# Patient Record
Sex: FEMALE | Race: WHITE | NOT HISPANIC OR LATINO | Employment: OTHER | URBAN - METROPOLITAN AREA
[De-identification: names, ages, dates, MRNs, and addresses within clinical notes are randomized per-mention and may not be internally consistent; named-entity substitution may affect disease eponyms.]

---

## 2017-01-04 DIAGNOSIS — C50.211 MALIGNANT NEOPLASM OF UPPER-INNER QUADRANT OF RIGHT FEMALE BREAST (HCC): ICD-10-CM

## 2017-01-17 ENCOUNTER — HOSPITAL ENCOUNTER (OUTPATIENT)
Dept: LAB | Facility: MEDICAL CENTER | Age: 67
End: 2017-01-17
Attending: INTERNAL MEDICINE
Payer: MEDICARE

## 2017-01-17 DIAGNOSIS — E78.5 HYPERLIPIDEMIA, UNSPECIFIED HYPERLIPIDEMIA TYPE: ICD-10-CM

## 2017-01-17 DIAGNOSIS — C50.211 MALIGNANT NEOPLASM OF UPPER-INNER QUADRANT OF RIGHT FEMALE BREAST (HCC): ICD-10-CM

## 2017-01-17 LAB
ALBUMIN SERPL BCP-MCNC: 4.2 G/DL (ref 3.2–4.9)
ALBUMIN/GLOB SERPL: 1.2 G/DL
ALP SERPL-CCNC: 49 U/L (ref 30–99)
ALT SERPL-CCNC: 17 U/L (ref 2–50)
ANION GAP SERPL CALC-SCNC: 7 MMOL/L (ref 0–11.9)
AST SERPL-CCNC: 29 U/L (ref 12–45)
BASOPHILS # BLD AUTO: 0.04 K/UL (ref 0–0.12)
BASOPHILS NFR BLD AUTO: 0.8 % (ref 0–1.8)
BILIRUB SERPL-MCNC: 0.4 MG/DL (ref 0.1–1.5)
BUN SERPL-MCNC: 25 MG/DL (ref 8–22)
CALCIUM SERPL-MCNC: 10.1 MG/DL (ref 8.5–10.5)
CHLORIDE SERPL-SCNC: 106 MMOL/L (ref 96–112)
CHOLEST SERPL-MCNC: 232 MG/DL (ref 100–199)
CO2 SERPL-SCNC: 27 MMOL/L (ref 20–33)
CREAT SERPL-MCNC: 1.05 MG/DL (ref 0.5–1.4)
EOSINOPHIL # BLD: 0.2 K/UL (ref 0–0.51)
EOSINOPHIL NFR BLD AUTO: 4.2 % (ref 0–6.9)
ERYTHROCYTE [DISTWIDTH] IN BLOOD BY AUTOMATED COUNT: 43.9 FL (ref 35.9–50)
GLOBULIN SER CALC-MCNC: 3.6 G/DL (ref 1.9–3.5)
GLUCOSE SERPL-MCNC: 93 MG/DL (ref 65–99)
HCT VFR BLD AUTO: 37.7 % (ref 37–47)
HDLC SERPL-MCNC: 67 MG/DL
HGB BLD-MCNC: 12.5 G/DL (ref 12–16)
IMM GRANULOCYTES # BLD AUTO: 0.01 K/UL (ref 0–0.11)
IMM GRANULOCYTES NFR BLD AUTO: 0.2 % (ref 0–0.9)
LDLC SERPL CALC-MCNC: 140 MG/DL
LYMPHOCYTES # BLD: 1.71 K/UL (ref 1–4.8)
LYMPHOCYTES NFR BLD AUTO: 35.7 % (ref 22–41)
MCH RBC QN AUTO: 31.2 PG (ref 27–33)
MCHC RBC AUTO-ENTMCNC: 33.2 G/DL (ref 33.6–35)
MCV RBC AUTO: 94 FL (ref 81.4–97.8)
MONOCYTES # BLD: 0.29 K/UL (ref 0–0.85)
MONOCYTES NFR BLD AUTO: 6.1 % (ref 0–13.4)
NEUTROPHILS # BLD: 2.54 K/UL (ref 2–7.15)
NEUTROPHILS NFR BLD AUTO: 53 % (ref 44–72)
NRBC # BLD AUTO: 0 K/UL
NRBC BLD-RTO: 0 /100 WBC
PLATELET # BLD AUTO: 223 K/UL (ref 164–446)
PMV BLD AUTO: 10.4 FL (ref 9–12.9)
POTASSIUM SERPL-SCNC: 4 MMOL/L (ref 3.6–5.5)
PROT SERPL-MCNC: 7.8 G/DL (ref 6–8.2)
RBC # BLD AUTO: 4.01 M/UL (ref 4.2–5.4)
SODIUM SERPL-SCNC: 140 MMOL/L (ref 135–145)
TRIGL SERPL-MCNC: 127 MG/DL (ref 0–149)
WBC # BLD AUTO: 4.8 K/UL (ref 4.8–10.8)

## 2017-01-17 PROCEDURE — 80053 COMPREHEN METABOLIC PANEL: CPT

## 2017-01-17 PROCEDURE — 36415 COLL VENOUS BLD VENIPUNCTURE: CPT

## 2017-01-17 PROCEDURE — 85025 COMPLETE CBC W/AUTO DIFF WBC: CPT

## 2017-01-17 PROCEDURE — 80061 LIPID PANEL: CPT | Mod: GA

## 2017-01-19 ENCOUNTER — OFFICE VISIT (OUTPATIENT)
Dept: HEMATOLOGY ONCOLOGY | Facility: MEDICAL CENTER | Age: 67
End: 2017-01-19
Payer: MEDICARE

## 2017-01-19 VITALS
HEIGHT: 65 IN | BODY MASS INDEX: 24.66 KG/M2 | OXYGEN SATURATION: 95 % | RESPIRATION RATE: 16 BRPM | DIASTOLIC BLOOD PRESSURE: 70 MMHG | SYSTOLIC BLOOD PRESSURE: 138 MMHG | HEART RATE: 78 BPM | TEMPERATURE: 97.7 F | WEIGHT: 148 LBS

## 2017-01-19 DIAGNOSIS — E04.1 THYROID NODULE: ICD-10-CM

## 2017-01-19 DIAGNOSIS — R93.89 ABNORMAL FINDINGS ON IMAGING TEST: ICD-10-CM

## 2017-01-19 PROCEDURE — 99214 OFFICE O/P EST MOD 30 MIN: CPT | Performed by: INTERNAL MEDICINE
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
## 2017-01-25 ENCOUNTER — OFFICE VISIT (OUTPATIENT)
Dept: MEDICAL GROUP | Facility: PHYSICIAN GROUP | Age: 67
End: 2017-01-25
Payer: MEDICARE

## 2017-01-25 ENCOUNTER — HOSPITAL ENCOUNTER (OUTPATIENT)
Dept: LAB | Facility: MEDICAL CENTER | Age: 67
End: 2017-01-25
Attending: INTERNAL MEDICINE
Payer: MEDICARE

## 2017-01-25 VITALS
HEART RATE: 78 BPM | TEMPERATURE: 98.1 F | DIASTOLIC BLOOD PRESSURE: 78 MMHG | OXYGEN SATURATION: 98 % | RESPIRATION RATE: 16 BRPM | WEIGHT: 147 LBS | HEIGHT: 65 IN | SYSTOLIC BLOOD PRESSURE: 170 MMHG | BODY MASS INDEX: 24.49 KG/M2

## 2017-01-25 DIAGNOSIS — E04.1 THYROID NODULE: ICD-10-CM

## 2017-01-25 DIAGNOSIS — G62.9 NEUROPATHY: ICD-10-CM

## 2017-01-25 DIAGNOSIS — S32.020S COMPRESSION FRACTURE OF L2, SEQUELA: ICD-10-CM

## 2017-01-25 LAB
MAGNESIUM SERPL-MCNC: 2.3 MG/DL (ref 1.5–2.5)
T3FREE SERPL-MCNC: 3.95 PG/ML (ref 2.4–4.2)
T4 FREE SERPL-MCNC: 0.9 NG/DL (ref 0.53–1.43)
TSH SERPL DL<=0.005 MIU/L-ACNC: 1.34 UIU/ML (ref 0.3–3.7)

## 2017-01-25 PROCEDURE — 99214 OFFICE O/P EST MOD 30 MIN: CPT | Performed by: NURSE PRACTITIONER

## 2017-01-25 PROCEDURE — 84443 ASSAY THYROID STIM HORMONE: CPT

## 2017-01-25 PROCEDURE — G8484 FLU IMMUNIZE NO ADMIN: HCPCS | Performed by: NURSE PRACTITIONER

## 2017-01-25 PROCEDURE — G8432 DEP SCR NOT DOC, RNG: HCPCS | Performed by: NURSE PRACTITIONER

## 2017-01-25 PROCEDURE — 3014F SCREEN MAMMO DOC REV: CPT | Performed by: NURSE PRACTITIONER

## 2017-01-25 PROCEDURE — 84481 FREE ASSAY (FT-3): CPT

## 2017-01-25 PROCEDURE — 1036F TOBACCO NON-USER: CPT | Performed by: NURSE PRACTITIONER

## 2017-01-25 PROCEDURE — 84439 ASSAY OF FREE THYROXINE: CPT

## 2017-01-25 PROCEDURE — 4040F PNEUMOC VAC/ADMIN/RCVD: CPT | Performed by: NURSE PRACTITIONER

## 2017-01-25 PROCEDURE — 83735 ASSAY OF MAGNESIUM: CPT

## 2017-01-25 PROCEDURE — G8420 CALC BMI NORM PARAMETERS: HCPCS | Performed by: NURSE PRACTITIONER

## 2017-01-25 PROCEDURE — 36415 COLL VENOUS BLD VENIPUNCTURE: CPT

## 2017-01-25 PROCEDURE — 1101F PT FALLS ASSESS-DOCD LE1/YR: CPT | Performed by: NURSE PRACTITIONER

## 2017-01-25 PROCEDURE — 3017F COLORECTAL CA SCREEN DOC REV: CPT | Performed by: NURSE PRACTITIONER

## 2017-01-25 RX ORDER — GABAPENTIN 100 MG/1
100 CAPSULE ORAL
Qty: 30 CAP | Refills: 2 | Status: SHIPPED | OUTPATIENT
Start: 2017-01-25 | End: 2017-08-30
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
## 2017-01-26 NOTE — PATIENT INSTRUCTIONS
Gabapentin capsules or tablets  What is this medicine?  GABAPENTIN (GA ba pen tin) is used to control partial seizures in adults with epilepsy. It is also used to treat certain types of nerve pain.  This medicine may be used for other purposes; ask your health care provider or pharmacist if you have questions.  COMMON BRAND NAME(S): Gabarone , Neurontin  What should I tell my health care provider before I take this medicine?  They need to know if you have any of these conditions:  -kidney disease  -suicidal thoughts, plans, or attempt; a previous suicide attempt by you or a family member  -an unusual or allergic reaction to gabapentin, other medicines, foods, dyes, or preservatives  -pregnant or trying to get pregnant  -breast-feeding  How should I use this medicine?  Take this medicine by mouth. Swallow it with a drink of water. Follow the directions on the prescription label. If this medicine upsets your stomach, take it with food or milk. Take your medicine at regular intervals. Do not take it more often than directed.  If you are directed to break the 600 or 800 mg tablets in half as part of your dose, the extra half tablet should be used for the next dose. If you have not used the extra half tablet within 3 days, it should be thrown away.  A special MedGuide will be given to you by the pharmacist with each prescription and refill. Be sure to read this information carefully each time.  Talk to your pediatrician regarding the use of this medicine in children. Special care may be needed.  Overdosage: If you think you have taken too much of this medicine contact a poison control center or emergency room at once.  NOTE: This medicine is only for you. Do not share this medicine with others.  What if I miss a dose?  If you miss a dose, take it as soon as you can. If it is almost time for your next dose, take only that dose. Do not take double or extra doses.  What may interact with this medicine?  Do not take this  medicine with any of the following medications:  -other gabapentin products  This medicine may also interact with the following medications:  -alcohol  -antacids  -antihistamines for allergy, cough and cold  -certain medicines for anxiety or sleep  -certain medicines for depression or psychotic disturbances  -homatropine; hydrocodone  -naproxen  -narcotic medicines (opiates) for pain  -phenothiazines like chlorpromazine, mesoridazine, prochlorperazine, thioridazine  This list may not describe all possible interactions. Give your health care provider a list of all the medicines, herbs, non-prescription drugs, or dietary supplements you use. Also tell them if you smoke, drink alcohol, or use illegal drugs. Some items may interact with your medicine.  What should I watch for while using this medicine?  Visit your doctor or health care professional for regular checks on your progress. You may want to keep a record at home of how you feel your condition is responding to treatment. You may want to share this information with your doctor or health care professional at each visit. You should contact your doctor or health care professional if your seizures get worse or if you have any new types of seizures. Do not stop taking this medicine or any of your seizure medicines unless instructed by your doctor or health care professional. Stopping your medicine suddenly can increase your seizures or their severity.  Wear a medical identification bracelet or chain if you are taking this medicine for seizures, and carry a card that lists all your medications.  You may get drowsy, dizzy, or have blurred vision. Do not drive, use machinery, or do anything that needs mental alertness until you know how this medicine affects you. To reduce dizzy or fainting spells, do not sit or stand up quickly, especially if you are an older patient. Alcohol can increase drowsiness and dizziness. Avoid alcoholic drinks.  Your mouth may get dry.  Chewing sugarless gum or sucking hard candy, and drinking plenty of water will help.  The use of this medicine may increase the chance of suicidal thoughts or actions. Pay special attention to how you are responding while on this medicine. Any worsening of mood, or thoughts of suicide or dying should be reported to your health care professional right away.  Women who become pregnant while using this medicine may enroll in the [place] American Antiepileptic Drug Pregnancy Registry by calling 1-858.958.6836. This registry collects information about the safety of antiepileptic drug use during pregnancy.  What side effects may I notice from receiving this medicine?  Side effects that you should report to your doctor or health care professional as soon as possible:  -allergic reactions like skin rash, itching or hives, swelling of the face, lips, or tongue  -worsening of mood, thoughts or actions of suicide or dying  Side effects that usually do not require medical attention (report to your doctor or health care professional if they continue or are bothersome):  -constipation  -difficulty walking or controlling muscle movements  -dizziness  -nausea  -slurred speech  -tiredness  -tremors  -weight gain  This list may not describe all possible side effects. Call your doctor for medical advice about side effects. You may report side effects to FDA at 5-738-FDA-8573.  Where should I keep my medicine?  Keep out of reach of children.  Store at room temperature between 15 and 30 degrees C (59 and 86 degrees F). Throw away any unused medicine after the expiration date.  NOTE: This sheet is a summary. It may not cover all possible information. If you have questions about this medicine, talk to your doctor, pharmacist, or health care provider.  © 2014, Elsevier/Gold Standard. (1/9/2013 11:43:23 AM)

## 2017-01-26 NOTE — ASSESSMENT & PLAN NOTE
Patient reports neuropathy in left leg and right hand. Describes pain as intermittent burning pain 8-9/10
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
## 2017-01-27 ENCOUNTER — HOSPITAL ENCOUNTER (OUTPATIENT)
Dept: RADIOLOGY | Facility: MEDICAL CENTER | Age: 67
End: 2017-01-27
Attending: SURGERY
Payer: MEDICARE

## 2017-01-27 DIAGNOSIS — E04.1 NONTOXIC UNINODULAR GOITER: ICD-10-CM

## 2017-01-27 DIAGNOSIS — E04.2 NONTOXIC MULTINODULAR GOITER: ICD-10-CM

## 2017-01-27 PROCEDURE — 76536 US EXAM OF HEAD AND NECK: CPT

## 2017-02-02 ENCOUNTER — PATIENT OUTREACH (OUTPATIENT)
Dept: HEALTH INFORMATION MANAGEMENT | Facility: OTHER | Age: 67
End: 2017-02-02

## 2017-02-02 NOTE — PROGRESS NOTES
Outcome: Patient requested to be called back to schedule this appointment in June -- Care gaps not discussed at this time. -- Demographics verified -- New member information not discused.    Attempt # 1st    Annual Wellness Visit Scheduling  Scheduling Status: Not Scheduled  If Not Scheduled, Choose Reason Why: Patient requested to be called back to schedule in June      Care Gap Scheduling (Attempt to Schedule EACH Overdue Care Gap!)  Health Maintenance Due   Topic Date Due   • Annual Wellness Visit  1950   • IMM DTaP/Tdap/Td Vaccine (1 - Tdap) 02/12/1969   • IMM ZOSTER VACCINE  02/12/2010   • IMM PNEUMOCOCCAL 65+ (ADULT) LOW/MEDIUM RISK SERIES (2 of 2 - PPSV23) 08/05/2016   • IMM INFLUENZA (1) 09/01/2016         [employer] Activation:  Already Active

## 2017-02-14 NOTE — PROGRESS NOTES
Attempt #:FINAL      Annual Wellness Visit Scheduling  1. Scheduling Status:Scheduled          Care Gap Scheduling (Attempt to Schedule EACH Overdue Care Gap!)     Health Maintenance Due   Topic Date Due   • Annual Wellness Visit  SCHEDULED    • IMM DTaP/Tdap/Td Vaccine (1 - Tdap) SCHEDULED   • IMM ZOSTER VACCINE  PT WOULD LIKE TO DISCUSS WITH PCP   • IMM PNEUMOCOCCAL 65+ (ADULT) LOW/MEDIUM RISK SERIES (2 of 2 - PPSV23) SCHEDULED    • IMM INFLUENZA (1) DISCUSS WITH PT          MyChart Activation: already active

## 2017-02-17 ENCOUNTER — HOSPITAL ENCOUNTER (OUTPATIENT)
Dept: PHYSICAL THERAPY | Facility: REHABILITATION | Age: 67
End: 2017-02-17
Attending: CLINICAL NURSE SPECIALIST
Payer: MEDICARE

## 2017-02-17 PROCEDURE — G8979 MOBILITY GOAL STATUS: HCPCS | Mod: CI

## 2017-02-17 PROCEDURE — 97110 THERAPEUTIC EXERCISES: CPT

## 2017-02-17 PROCEDURE — 97162 PT EVAL MOD COMPLEX 30 MIN: CPT

## 2017-02-17 PROCEDURE — G8978 MOBILITY CURRENT STATUS: HCPCS | Mod: CK

## 2017-02-20 ENCOUNTER — HOSPITAL ENCOUNTER (OUTPATIENT)
Dept: PHYSICAL THERAPY | Facility: REHABILITATION | Age: 67
End: 2017-02-20
Attending: CLINICAL NURSE SPECIALIST
Payer: MEDICARE

## 2017-02-20 PROCEDURE — 97110 THERAPEUTIC EXERCISES: CPT

## 2017-02-22 ENCOUNTER — APPOINTMENT (OUTPATIENT)
Dept: PHYSICAL THERAPY | Facility: REHABILITATION | Age: 67
End: 2017-02-22
Attending: CLINICAL NURSE SPECIALIST
Payer: MEDICARE

## 2017-02-23 DIAGNOSIS — I10 ESSENTIAL HYPERTENSION, BENIGN: ICD-10-CM

## 2017-02-23 RX ORDER — LOSARTAN POTASSIUM 25 MG/1
25 TABLET ORAL DAILY
Qty: 30 TAB | Refills: 11 | Status: SHIPPED | OUTPATIENT
Start: 2017-02-23 | End: 2017-02-27 | Stop reason: SDUPTHER

## 2017-02-27 ENCOUNTER — OFFICE VISIT (OUTPATIENT)
Dept: CARDIOLOGY | Facility: MEDICAL CENTER | Age: 67
End: 2017-02-27
Payer: MEDICARE

## 2017-02-27 VITALS
SYSTOLIC BLOOD PRESSURE: 148 MMHG | DIASTOLIC BLOOD PRESSURE: 76 MMHG | HEART RATE: 78 BPM | WEIGHT: 147 LBS | BODY MASS INDEX: 24.49 KG/M2 | HEIGHT: 65 IN

## 2017-02-27 DIAGNOSIS — E78.5 DYSLIPIDEMIA: ICD-10-CM

## 2017-02-27 DIAGNOSIS — I10 ESSENTIAL HYPERTENSION, BENIGN: ICD-10-CM

## 2017-02-27 DIAGNOSIS — I44.7 LBBB (LEFT BUNDLE BRANCH BLOCK): Chronic | ICD-10-CM

## 2017-02-27 PROCEDURE — G8432 DEP SCR NOT DOC, RNG: HCPCS | Performed by: INTERNAL MEDICINE

## 2017-02-27 PROCEDURE — 1101F PT FALLS ASSESS-DOCD LE1/YR: CPT | Performed by: INTERNAL MEDICINE

## 2017-02-27 PROCEDURE — 3017F COLORECTAL CA SCREEN DOC REV: CPT | Performed by: INTERNAL MEDICINE

## 2017-02-27 PROCEDURE — G8420 CALC BMI NORM PARAMETERS: HCPCS | Performed by: INTERNAL MEDICINE

## 2017-02-27 PROCEDURE — 99214 OFFICE O/P EST MOD 30 MIN: CPT | Performed by: INTERNAL MEDICINE

## 2017-02-27 PROCEDURE — 4040F PNEUMOC VAC/ADMIN/RCVD: CPT | Performed by: INTERNAL MEDICINE

## 2017-02-27 PROCEDURE — G8484 FLU IMMUNIZE NO ADMIN: HCPCS | Performed by: INTERNAL MEDICINE

## 2017-02-27 PROCEDURE — 3014F SCREEN MAMMO DOC REV: CPT | Performed by: INTERNAL MEDICINE

## 2017-02-27 PROCEDURE — 1036F TOBACCO NON-USER: CPT | Performed by: INTERNAL MEDICINE

## 2017-02-27 RX ORDER — LOSARTAN POTASSIUM 25 MG/1
25 TABLET ORAL DAILY
Qty: 90 TAB | Refills: 3 | Status: SHIPPED | OUTPATIENT
Start: 2017-02-27 | End: 2017-07-27

## 2017-02-27 RX ORDER — ROSUVASTATIN CALCIUM 10 MG/1
10 TABLET, COATED ORAL EVERY EVENING
Qty: 30 TAB | Refills: 11 | Status: SHIPPED | OUTPATIENT
Start: 2017-02-27 | End: 2017-07-27

## 2017-02-27 ASSESSMENT — ENCOUNTER SYMPTOMS
FALLS: 0
BRUISES/BLEEDS EASILY: 0
CLAUDICATION: 0
BLURRED VISION: 0
SHORTNESS OF BREATH: 0
NAUSEA: 0
LOSS OF CONSCIOUSNESS: 0
PND: 0
CHILLS: 0
FEVER: 0
FOCAL WEAKNESS: 0
WEAKNESS: 0
HEADACHES: 0
DIZZINESS: 0
COUGH: 0
PALPITATIONS: 0
ABDOMINAL PAIN: 0
SORE THROAT: 0
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
## 2017-02-28 ENCOUNTER — HOSPITAL ENCOUNTER (OUTPATIENT)
Dept: PHYSICAL THERAPY | Facility: REHABILITATION | Age: 67
End: 2017-02-28
Attending: CLINICAL NURSE SPECIALIST
Payer: MEDICARE

## 2017-02-28 PROCEDURE — 97110 THERAPEUTIC EXERCISES: CPT

## 2017-02-28 PROCEDURE — 97112 NEUROMUSCULAR REEDUCATION: CPT
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
## 2017-03-01 ENCOUNTER — TELEPHONE (OUTPATIENT)
Dept: CARDIOLOGY | Facility: MEDICAL CENTER | Age: 67
End: 2017-03-01

## 2017-03-01 NOTE — TELEPHONE ENCOUNTER
----- Message from Constanza Rodriguez sent at 3/1/2017  3:31 PM PST -----  Regarding: patient wants to discuss her Losartan medication  SMILEY/Althea        Patient wants to talk toy ou about her Losartan medication. She can be reached at [phone].

## 2017-03-01 NOTE — TELEPHONE ENCOUNTER
Patient states she has been taking Losartan 25mg for 2 or 3 days now and this morning her BP was 136/79 before taking it and after she took it she felt very dizzy and could not function for awhile.  She did not check her BP after feeling dizzy.  She is questioning if the dose is too high for her.  She will monitor her BP like she has been before she takes it and hold it if her BP is < 140.  To SMILEY PEARSON to please advise on Losartan dose.

## 2017-03-02 ENCOUNTER — HOSPITAL ENCOUNTER (OUTPATIENT)
Dept: PHYSICAL THERAPY | Facility: REHABILITATION | Age: 67
End: 2017-03-02
Attending: CLINICAL NURSE SPECIALIST
Payer: MEDICARE

## 2017-03-02 NOTE — TELEPHONE ENCOUNTER
Jarrod Mayberry M.D.  ALO [place]: Unspecified (Today, 3:45 PM)                     Try either 12.5 mg daily or if not quite for BP 12.5 BID            Previous          Patient informed and understands monitoring BP and how she feels.  She will report back if problems.

## 2017-03-03 ENCOUNTER — TELEPHONE (OUTPATIENT)
Dept: CARDIOLOGY | Facility: MEDICAL CENTER | Age: 67
End: 2017-03-03

## 2017-03-03 NOTE — TELEPHONE ENCOUNTER
----- Message from Dinorah Tiwari sent at 3/3/2017  3:33 PM PST -----  Regarding: reporting progress  Contact: [phone]  SMILEY/Althea    Pt calling to report progress before the weekend on particular med (she did not know which one).  Please call

## 2017-03-03 NOTE — TELEPHONE ENCOUNTER
Called patient back and she said she was on another call and wanted to finish that call and will call back.  Spoke with patient who states she took one half of her Losartan and later that afternoon her BP was elevated so she took the other half and that evening her BP was in the 140's.  She is extremely anxious and tends to take her BP 5 times a day and if elevated she takes it every couple of minutes.  I reassured her and we discussed that her highest BP at 150's systolic is not dangerous and that focusing on her BP and taking it too frequently causes anxiety that she may not even be aware of, thus elevating her BP even more.  Advised that she try taking the whole dose, 25mg of Losartan before bed since it caused dizziness when taken in the morning,  and monitoring her BP just twice daily, morning and evening and reporting back.  Reassured that we are available 24/7 as is the ER for a dangerous spike, and that there are many medications to treat HTN and there is also room for her to go up on her dose of Losartan.  She will report back in a week, sooner if problems.

## 2017-03-07 ENCOUNTER — APPOINTMENT (OUTPATIENT)
Dept: PHYSICAL THERAPY | Facility: REHABILITATION | Age: 67
End: 2017-03-07
Attending: CLINICAL NURSE SPECIALIST
Payer: MEDICARE

## 2017-03-07 ENCOUNTER — TELEPHONE (OUTPATIENT)
Dept: CARDIOLOGY | Facility: MEDICAL CENTER | Age: 67
End: 2017-03-07

## 2017-03-07 NOTE — TELEPHONE ENCOUNTER
----- Message from My Fallon R.N. sent at 3/6/2017  4:43 PM PST -----  Regarding: FW: patient has a question and wants a call back      ----- Message -----     From: Constanza Rodriguez     Sent: 3/6/2017   4:22 PM       To: My Fallon R.N.  Subject: patient has a question and wants a call back     SMILEY/My      Patient has question and would like a call back, would not elaborate further on nature of call. She can be reached at [phone].

## 2017-03-07 NOTE — TELEPHONE ENCOUNTER
Lengthy conversation with patient.  She decided she does not want to take her losartan at night as planned on Friday after a discussion, it is inconvenient.  She asked if she takes her BP too often if it will cause a neuropathy.  She has been taking her BP throughout the day and taking her Losartan only if she feels it is high.  Her BP has been running 130/67 and I let her know this is perfect.  She prefers to check her BP multiple times throughout the day and take the Losartan as necessary to avoid feeling lightheaded, as opposed to one half tablet twice daily or 1 tablet daily.  The highest BP for her before starting Losartan was 150 systolic. To SMILEY PEARSON to please advise.

## 2017-03-07 NOTE — TELEPHONE ENCOUNTER
Jarrod Mayberry M.D.  ALO Galeana: Unspecified (Today, 8:41 AM)                     Sounds reasonable she should do what she feels most comfortable and what works the best            Previous          Patient informed and she will try monitoring her BP and taking the Losartan as needed.

## 2017-03-09 ENCOUNTER — HOSPITAL ENCOUNTER (OUTPATIENT)
Dept: PHYSICAL THERAPY | Facility: REHABILITATION | Age: 67
End: 2017-03-09
Attending: CLINICAL NURSE SPECIALIST
Payer: MEDICARE

## 2017-03-09 PROCEDURE — 97110 THERAPEUTIC EXERCISES: CPT

## 2017-03-13 ENCOUNTER — TELEPHONE (OUTPATIENT)
Dept: CARDIOLOGY | Facility: MEDICAL CENTER | Age: 67
End: 2017-03-13

## 2017-03-13 NOTE — TELEPHONE ENCOUNTER
Dinorah ButlerptonIRON.        Phone Number: [phone]              SMILEY/rocael     Pt calling again, will be available to take your call after 11:40am.   Please try again        1353 - S/w pt, states she is unsure if she is getting h/a's from Losartan or not.  Pt states she went back to taking Losartan 25mg daily on 3/10/17 and takes it every morning at 9am.  Pt was unable to check her bp prior to taking the medication this AM because she was at the hospital where her  was having surgery.  Pt states her bp was 155/77, HR 96 at 12:45pm, however she was pretty anxious due to her 's surgery.  Pt states she also had a h/a, but the h/a is subsiding.  Advised pt to take her bp prior to her Losartan in the morning, then take the Losartan, then take a bp approx 2 hours after the medication, keep a log and call back on Thursday with an update.  Advised pt today, may not be the best day to gauge her bp because of the stress of her  having surgery.

## 2017-03-13 NOTE — TELEPHONE ENCOUNTER
----- Message from Dinorah Tiwari sent at 3/13/2017  8:02 AM PDT -----  Regarding: losartan problems  Contact: [phone]  SMILEY/rocael    Pt calling to report losartan has been causing her trouble since she began taking it.  Please call pt at

## 2017-03-22 ENCOUNTER — TELEPHONE (OUTPATIENT)
Dept: CARDIOLOGY | Facility: MEDICAL CENTER | Age: 67
End: 2017-03-22

## 2017-03-22 NOTE — TELEPHONE ENCOUNTER
From     Jarrod Mayberry M.D.      To     Padmini Bolton R.N.      Sent     3/21/2017  5:41 PM             Either she can go to losartan 25 mg bid or switch to bistolic 5 mg daily     Thank you              My Chart message answered with this advice.

## 2017-03-23 ENCOUNTER — HOSPITAL ENCOUNTER (OUTPATIENT)
Dept: PHYSICAL THERAPY | Facility: REHABILITATION | Age: 67
End: 2017-03-23
Attending: CLINICAL NURSE SPECIALIST
Payer: MEDICARE

## 2017-03-23 PROCEDURE — 97110 THERAPEUTIC EXERCISES: CPT

## 2017-03-23 PROCEDURE — G8980 MOBILITY D/C STATUS: HCPCS | Mod: CI

## 2017-03-23 PROCEDURE — G8979 MOBILITY GOAL STATUS: HCPCS | Mod: CI

## 2017-04-10 ENCOUNTER — HOSPITAL ENCOUNTER (OUTPATIENT)
Dept: LAB | Facility: MEDICAL CENTER | Age: 67
End: 2017-04-10
Attending: INTERNAL MEDICINE
Payer: MEDICARE

## 2017-04-10 DIAGNOSIS — E78.5 HYPERLIPIDEMIA, UNSPECIFIED HYPERLIPIDEMIA TYPE: ICD-10-CM

## 2017-04-10 LAB
ALBUMIN SERPL BCP-MCNC: 4.5 G/DL (ref 3.2–4.9)
ALBUMIN/GLOB SERPL: 1.5 G/DL
ALP SERPL-CCNC: 60 U/L (ref 30–99)
ALT SERPL-CCNC: 19 U/L (ref 2–50)
ANION GAP SERPL CALC-SCNC: 8 MMOL/L (ref 0–11.9)
AST SERPL-CCNC: 44 U/L (ref 12–45)
BASOPHILS # BLD AUTO: 0.6 % (ref 0–1.8)
BASOPHILS # BLD: 0.04 K/UL (ref 0–0.12)
BILIRUB SERPL-MCNC: 0.4 MG/DL (ref 0.1–1.5)
BUN SERPL-MCNC: 21 MG/DL (ref 8–22)
CALCIUM SERPL-MCNC: 9.6 MG/DL (ref 8.5–10.5)
CHLORIDE SERPL-SCNC: 103 MMOL/L (ref 96–112)
CO2 SERPL-SCNC: 26 MMOL/L (ref 20–33)
CREAT SERPL-MCNC: 0.99 MG/DL (ref 0.5–1.4)
EOSINOPHIL # BLD AUTO: 0.22 K/UL (ref 0–0.51)
EOSINOPHIL NFR BLD: 3 % (ref 0–6.9)
ERYTHROCYTE [DISTWIDTH] IN BLOOD BY AUTOMATED COUNT: 45.2 FL (ref 35.9–50)
GFR SERPL CREATININE-BSD FRML MDRD: 56 ML/MIN/1.73 M 2
GLOBULIN SER CALC-MCNC: 3 G/DL (ref 1.9–3.5)
GLUCOSE SERPL-MCNC: 136 MG/DL (ref 65–99)
HCT VFR BLD AUTO: 36.8 % (ref 37–47)
HGB BLD-MCNC: 11.8 G/DL (ref 12–16)
IMM GRANULOCYTES # BLD AUTO: 0.02 K/UL (ref 0–0.11)
IMM GRANULOCYTES NFR BLD AUTO: 0.3 % (ref 0–0.9)
LYMPHOCYTES # BLD AUTO: 2.18 K/UL (ref 1–4.8)
LYMPHOCYTES NFR BLD: 30.1 % (ref 22–41)
MCH RBC QN AUTO: 30.2 PG (ref 27–33)
MCHC RBC AUTO-ENTMCNC: 32.1 G/DL (ref 33.6–35)
MCV RBC AUTO: 94.1 FL (ref 81.4–97.8)
MONOCYTES # BLD AUTO: 0.39 K/UL (ref 0–0.85)
MONOCYTES NFR BLD AUTO: 5.4 % (ref 0–13.4)
NEUTROPHILS # BLD AUTO: 4.39 K/UL (ref 2–7.15)
NEUTROPHILS NFR BLD: 60.6 % (ref 44–72)
NRBC # BLD AUTO: 0 K/UL
NRBC BLD AUTO-RTO: 0 /100 WBC
PLATELET # BLD AUTO: 232 K/UL (ref 164–446)
PMV BLD AUTO: 11.1 FL (ref 9–12.9)
POTASSIUM SERPL-SCNC: 3.5 MMOL/L (ref 3.6–5.5)
PROT SERPL-MCNC: 7.5 G/DL (ref 6–8.2)
RBC # BLD AUTO: 3.91 M/UL (ref 4.2–5.4)
SODIUM SERPL-SCNC: 137 MMOL/L (ref 135–145)
WBC # BLD AUTO: 7.2 K/UL (ref 4.8–10.8)

## 2017-04-10 PROCEDURE — 36415 COLL VENOUS BLD VENIPUNCTURE: CPT

## 2017-04-10 PROCEDURE — 85025 COMPLETE CBC W/AUTO DIFF WBC: CPT

## 2017-04-10 PROCEDURE — 80053 COMPREHEN METABOLIC PANEL: CPT

## 2017-04-11 ENCOUNTER — HOSPITAL ENCOUNTER (OUTPATIENT)
Dept: RADIOLOGY | Facility: MEDICAL CENTER | Age: 67
End: 2017-04-11
Attending: INTERNAL MEDICINE
Payer: MEDICARE

## 2017-04-11 DIAGNOSIS — R93.89 ABNORMAL FINDINGS ON IMAGING TEST: ICD-10-CM

## 2017-04-11 PROCEDURE — 700117 HCHG RX CONTRAST REV CODE 255: Performed by: INTERNAL MEDICINE

## 2017-04-11 PROCEDURE — 71260 CT THORAX DX C+: CPT

## 2017-04-11 RX ADMIN — IOHEXOL 100 ML: 350 INJECTION, SOLUTION INTRAVENOUS at 09:30

## 2017-04-13 ENCOUNTER — OFFICE VISIT (OUTPATIENT)
Dept: HEMATOLOGY ONCOLOGY | Facility: MEDICAL CENTER | Age: 67
End: 2017-04-13
Payer: MEDICARE

## 2017-04-13 VITALS
OXYGEN SATURATION: 98 % | BODY MASS INDEX: 24.43 KG/M2 | DIASTOLIC BLOOD PRESSURE: 62 MMHG | WEIGHT: 146.83 LBS | TEMPERATURE: 98.4 F | HEART RATE: 78 BPM | RESPIRATION RATE: 16 BRPM | SYSTOLIC BLOOD PRESSURE: 122 MMHG

## 2017-04-13 DIAGNOSIS — E04.1 THYROID NODULE: ICD-10-CM

## 2017-04-13 PROCEDURE — 1101F PT FALLS ASSESS-DOCD LE1/YR: CPT | Performed by: INTERNAL MEDICINE

## 2017-04-13 PROCEDURE — 3014F SCREEN MAMMO DOC REV: CPT | Performed by: INTERNAL MEDICINE

## 2017-04-13 PROCEDURE — G8432 DEP SCR NOT DOC, RNG: HCPCS | Performed by: INTERNAL MEDICINE

## 2017-04-13 PROCEDURE — 1036F TOBACCO NON-USER: CPT | Performed by: INTERNAL MEDICINE

## 2017-04-13 PROCEDURE — 4040F PNEUMOC VAC/ADMIN/RCVD: CPT | Performed by: INTERNAL MEDICINE

## 2017-04-13 PROCEDURE — 99214 OFFICE O/P EST MOD 30 MIN: CPT | Performed by: INTERNAL MEDICINE

## 2017-04-13 PROCEDURE — G8420 CALC BMI NORM PARAMETERS: HCPCS | Performed by: INTERNAL MEDICINE

## 2017-04-13 ASSESSMENT — PAIN SCALES - GENERAL: PAINLEVEL: NO PAIN
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
## 2017-05-09 ENCOUNTER — TELEPHONE (OUTPATIENT)
Dept: CARDIOLOGY | Facility: MEDICAL CENTER | Age: 67
End: 2017-05-09

## 2017-05-09 NOTE — TELEPHONE ENCOUNTER
Called pt and left message about CT showing ulcerated plaque in descending aorta, stable from 10/2016     Advised to just follow up at next scheduled appointment no need for change in care    It is my pleasure to participate in the care of Ms. Dave.  Please do not hesitate to contact me with questions or concerns.    Jarrod Mayberry MD PhD FACC  Cardiologist [facility]

## 2017-05-09 NOTE — TELEPHONE ENCOUNTER
SMILEY/Lubna Glover told patient there was something on her scan and to have Dr. Mayberry review the scan. Patient would like a call back after Dr. Mayberry has seen the scan. She can be reached at [phone].            Pt states that Dr. Glover wanted Dr. Mayberry to review CT scan. States that it could be pertaining to cholesterol. Pt states that she would possible be open to doing injections if she is a candidate. Scan forwarded to Dr. Mayberry to review.     FYI to SMILEY

## 2017-06-30 ENCOUNTER — HOSPITAL ENCOUNTER (OUTPATIENT)
Dept: LAB | Facility: MEDICAL CENTER | Age: 67
End: 2017-06-30
Attending: SURGERY
Payer: MEDICARE

## 2017-06-30 LAB — THYROPEROXIDASE AB SERPL-ACNC: 2 IU/ML (ref 0–9)

## 2017-06-30 PROCEDURE — 36415 COLL VENOUS BLD VENIPUNCTURE: CPT

## 2017-06-30 PROCEDURE — 86376 MICROSOMAL ANTIBODY EACH: CPT

## 2017-06-30 PROCEDURE — 86800 THYROGLOBULIN ANTIBODY: CPT

## 2017-07-02 LAB — THYROGLOB AB SERPL-ACNC: <0.9 IU/ML (ref 0–4)

## 2017-07-05 ENCOUNTER — APPOINTMENT (OUTPATIENT)
Dept: HEMATOLOGY ONCOLOGY | Facility: MEDICAL CENTER | Age: 67
End: 2017-07-05
Payer: MEDICARE

## 2017-07-05 ENCOUNTER — TELEPHONE (OUTPATIENT)
Dept: HEMATOLOGY ONCOLOGY | Facility: MEDICAL CENTER | Age: 67
End: 2017-07-05

## 2017-07-05 NOTE — TELEPHONE ENCOUNTER
I called  about her missed oncology new patient appiontment at 0920. She stated that she had called over the weekend and spoke to our call center to inform us that she will not be making the appointment. I asked if I can rescheduled her appointment and  stated that she will call our office when she is ready to reschedule.

## 2017-07-27 ENCOUNTER — TELEPHONE (OUTPATIENT)
Dept: CARDIOLOGY | Facility: MEDICAL CENTER | Age: 67
End: 2017-07-27

## 2017-07-27 DIAGNOSIS — I10 ESSENTIAL HYPERTENSION, BENIGN: ICD-10-CM

## 2017-07-27 RX ORDER — LISINOPRIL 10 MG/1
10 TABLET ORAL DAILY
Qty: 30 TAB | Refills: 11 | OUTPATIENT
Start: 2017-07-27 | End: 2017-09-20 | Stop reason: SINTOL

## 2017-07-27 NOTE — TELEPHONE ENCOUNTER
----- Message from Constanza Rodriguez sent at 7/27/2017  8:00 AM PDT -----  Regarding: patient thinks blood pressure medication made her sick  Contact: [phone]  SMILEY/Althea      Patient thinks her blood pressure medication has made her sick. She can be reached at [phone].

## 2017-07-27 NOTE — TELEPHONE ENCOUNTER
Jarrod Mayberry M.D.  Padmini Bolton R.N.        Callhang: Unspecified (Today, 8:48 AM)                     Try lisinopril 10 mg, may need to increase if not enough for BP, monitor for dry cough     Thank you            Previous          Patient informed.  Medication called in.  Med list updated.

## 2017-07-27 NOTE — TELEPHONE ENCOUNTER
"Patient states she has had muscle aches ever since she started Losartan last February and always thought it was from her \"broken back\".  She looked up the side effects on the internet and is insisting on changing to another medication.  She does not monitor her BP and has no idea what this is running.  To SMILEY PEARSON to please advise.  "

## 2017-08-30 ENCOUNTER — HOSPITAL ENCOUNTER (OUTPATIENT)
Dept: RADIOLOGY | Facility: MEDICAL CENTER | Age: 67
End: 2017-08-30
Attending: FAMILY MEDICINE
Payer: MEDICARE

## 2017-08-30 ENCOUNTER — OFFICE VISIT (OUTPATIENT)
Dept: MEDICAL GROUP | Facility: PHYSICIAN GROUP | Age: 67
End: 2017-08-30
Payer: MEDICARE

## 2017-08-30 ENCOUNTER — HOSPITAL ENCOUNTER (OUTPATIENT)
Facility: MEDICAL CENTER | Age: 67
End: 2017-08-30
Attending: FAMILY MEDICINE
Payer: MEDICARE

## 2017-08-30 VITALS
WEIGHT: 144 LBS | HEIGHT: 65 IN | DIASTOLIC BLOOD PRESSURE: 84 MMHG | OXYGEN SATURATION: 99 % | RESPIRATION RATE: 14 BRPM | TEMPERATURE: 97.4 F | SYSTOLIC BLOOD PRESSURE: 126 MMHG | HEART RATE: 70 BPM | BODY MASS INDEX: 23.99 KG/M2

## 2017-08-30 DIAGNOSIS — S32.000S LUMBAR COMPRESSION FRACTURE, SEQUELA: ICD-10-CM

## 2017-08-30 DIAGNOSIS — M48.02 CERVICAL SPINAL STENOSIS: ICD-10-CM

## 2017-08-30 DIAGNOSIS — Z85.3 HISTORY OF BREAST CANCER: ICD-10-CM

## 2017-08-30 DIAGNOSIS — Z12.4 SCREENING FOR CERVICAL CANCER: ICD-10-CM

## 2017-08-30 DIAGNOSIS — M81.0 OSTEOPOROSIS, UNSPECIFIED OSTEOPOROSIS TYPE, UNSPECIFIED PATHOLOGICAL FRACTURE PRESENCE: ICD-10-CM

## 2017-08-30 DIAGNOSIS — E04.1 THYROID NODULE: ICD-10-CM

## 2017-08-30 DIAGNOSIS — N63.0 BREAST LUMP: ICD-10-CM

## 2017-08-30 DIAGNOSIS — Z78.9 STATIN INTOLERANCE: ICD-10-CM

## 2017-08-30 DIAGNOSIS — E78.5 HYPERLIPIDEMIA, UNSPECIFIED HYPERLIPIDEMIA TYPE: ICD-10-CM

## 2017-08-30 DIAGNOSIS — Z01.419 ENCOUNTER FOR CERVICAL PAP SMEAR WITH PELVIC EXAM: ICD-10-CM

## 2017-08-30 DIAGNOSIS — I70.90 ATHEROSCLEROSIS: ICD-10-CM

## 2017-08-30 PROCEDURE — 72100 X-RAY EXAM L-S SPINE 2/3 VWS: CPT

## 2017-08-30 PROCEDURE — 99215 OFFICE O/P EST HI 40 MIN: CPT | Mod: 25 | Performed by: FAMILY MEDICINE

## 2017-08-30 PROCEDURE — G0101 CA SCREEN;PELVIC/BREAST EXAM: HCPCS | Performed by: FAMILY MEDICINE
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
## 2017-08-31 LAB
CYTOLOGY REG CYTOL: NORMAL
HPV HR 12 DNA CVX QL NAA+PROBE: NEGATIVE
HPV16 DNA SPEC QL NAA+PROBE: NEGATIVE
HPV18 DNA SPEC QL NAA+PROBE: NEGATIVE
SPECIMEN SOURCE: NORMAL

## 2017-09-01 ENCOUNTER — TELEPHONE (OUTPATIENT)
Dept: MEDICAL GROUP | Facility: PHYSICIAN GROUP | Age: 67
End: 2017-09-01

## 2017-09-01 NOTE — TELEPHONE ENCOUNTER
----- Message from Jenna Hernandez M.D. sent at 9/1/2017 11:16 AM PDT -----  Please call Lauren.  We did not get enough cells on the PAP smear to evaluate.  The HPV came back negative.  She may stop in at her convenience for Re-pap.

## 2017-09-05 ENCOUNTER — HOSPITAL ENCOUNTER (OUTPATIENT)
Dept: LAB | Facility: MEDICAL CENTER | Age: 67
End: 2017-09-05
Attending: FAMILY MEDICINE
Payer: MEDICARE

## 2017-09-05 DIAGNOSIS — M81.0 OSTEOPOROSIS, UNSPECIFIED OSTEOPOROSIS TYPE, UNSPECIFIED PATHOLOGICAL FRACTURE PRESENCE: ICD-10-CM

## 2017-09-05 DIAGNOSIS — Z85.3 HISTORY OF BREAST CANCER: ICD-10-CM

## 2017-09-05 DIAGNOSIS — E04.1 THYROID NODULE: ICD-10-CM

## 2017-09-05 DIAGNOSIS — E78.5 HYPERLIPIDEMIA, UNSPECIFIED HYPERLIPIDEMIA TYPE: ICD-10-CM

## 2017-09-05 DIAGNOSIS — S32.000S LUMBAR COMPRESSION FRACTURE, SEQUELA: ICD-10-CM

## 2017-09-05 LAB
25(OH)D3 SERPL-MCNC: 25 NG/ML (ref 30–100)
ALBUMIN SERPL BCP-MCNC: 4.6 G/DL (ref 3.2–4.9)
ALBUMIN/GLOB SERPL: 1.3 G/DL
ALP SERPL-CCNC: 59 U/L (ref 30–99)
ALT SERPL-CCNC: 17 U/L (ref 2–50)
ANION GAP SERPL CALC-SCNC: 9 MMOL/L (ref 0–11.9)
AST SERPL-CCNC: 32 U/L (ref 12–45)
BASOPHILS # BLD AUTO: 0.9 % (ref 0–1.8)
BASOPHILS # BLD: 0.05 K/UL (ref 0–0.12)
BILIRUB SERPL-MCNC: 0.5 MG/DL (ref 0.1–1.5)
BUN SERPL-MCNC: 27 MG/DL (ref 8–22)
CALCIUM SERPL-MCNC: 10.6 MG/DL (ref 8.5–10.5)
CHLORIDE SERPL-SCNC: 100 MMOL/L (ref 96–112)
CHOLEST SERPL-MCNC: 260 MG/DL (ref 100–199)
CO2 SERPL-SCNC: 26 MMOL/L (ref 20–33)
CREAT SERPL-MCNC: 1.11 MG/DL (ref 0.5–1.4)
EOSINOPHIL # BLD AUTO: 0.16 K/UL (ref 0–0.51)
EOSINOPHIL NFR BLD: 2.8 % (ref 0–6.9)
ERYTHROCYTE [DISTWIDTH] IN BLOOD BY AUTOMATED COUNT: 46.2 FL (ref 35.9–50)
FASTING STATUS PATIENT QL REPORTED: NORMAL
GFR SERPL CREATININE-BSD FRML MDRD: 49 ML/MIN/1.73 M 2
GLOBULIN SER CALC-MCNC: 3.6 G/DL (ref 1.9–3.5)
GLUCOSE SERPL-MCNC: 84 MG/DL (ref 65–99)
HCT VFR BLD AUTO: 41 % (ref 37–47)
HDLC SERPL-MCNC: 60 MG/DL
HGB BLD-MCNC: 13 G/DL (ref 12–16)
IMM GRANULOCYTES # BLD AUTO: 0.02 K/UL (ref 0–0.11)
IMM GRANULOCYTES NFR BLD AUTO: 0.3 % (ref 0–0.9)
LDLC SERPL CALC-MCNC: 163 MG/DL
LYMPHOCYTES # BLD AUTO: 1.79 K/UL (ref 1–4.8)
LYMPHOCYTES NFR BLD: 31.1 % (ref 22–41)
MCH RBC QN AUTO: 30.9 PG (ref 27–33)
MCHC RBC AUTO-ENTMCNC: 31.7 G/DL (ref 33.6–35)
MCV RBC AUTO: 97.4 FL (ref 81.4–97.8)
MONOCYTES # BLD AUTO: 0.35 K/UL (ref 0–0.85)
MONOCYTES NFR BLD AUTO: 6.1 % (ref 0–13.4)
NEUTROPHILS # BLD AUTO: 3.39 K/UL (ref 2–7.15)
NEUTROPHILS NFR BLD: 58.8 % (ref 44–72)
NRBC # BLD AUTO: 0 K/UL
NRBC BLD AUTO-RTO: 0 /100 WBC
PLATELET # BLD AUTO: 241 K/UL (ref 164–446)
PMV BLD AUTO: 10.1 FL (ref 9–12.9)
POTASSIUM SERPL-SCNC: 4.2 MMOL/L (ref 3.6–5.5)
PROT SERPL-MCNC: 8.2 G/DL (ref 6–8.2)
RBC # BLD AUTO: 4.21 M/UL (ref 4.2–5.4)
SODIUM SERPL-SCNC: 135 MMOL/L (ref 135–145)
T3 SERPL-MCNC: 121.2 NG/DL (ref 60–181)
T4 FREE SERPL-MCNC: 0.86 NG/DL (ref 0.53–1.43)
TRIGL SERPL-MCNC: 186 MG/DL (ref 0–149)
TSH SERPL DL<=0.005 MIU/L-ACNC: 1.29 UIU/ML (ref 0.3–3.7)
WBC # BLD AUTO: 5.8 K/UL (ref 4.8–10.8)

## 2017-09-05 PROCEDURE — 84443 ASSAY THYROID STIM HORMONE: CPT

## 2017-09-05 PROCEDURE — 36415 COLL VENOUS BLD VENIPUNCTURE: CPT

## 2017-09-05 PROCEDURE — 85025 COMPLETE CBC W/AUTO DIFF WBC: CPT

## 2017-09-05 PROCEDURE — 80061 LIPID PANEL: CPT

## 2017-09-05 PROCEDURE — 84439 ASSAY OF FREE THYROXINE: CPT

## 2017-09-05 PROCEDURE — 84480 ASSAY TRIIODOTHYRONINE (T3): CPT

## 2017-09-05 PROCEDURE — 80053 COMPREHEN METABOLIC PANEL: CPT

## 2017-09-05 PROCEDURE — 82306 VITAMIN D 25 HYDROXY: CPT

## 2017-09-06 ENCOUNTER — TELEPHONE (OUTPATIENT)
Dept: MEDICAL GROUP | Facility: PHYSICIAN GROUP | Age: 67
End: 2017-09-06

## 2017-09-06 NOTE — TELEPHONE ENCOUNTER
----- Message from Jenna Hernandez M.D. sent at 9/5/2017 11:10 PM PDT -----  Please call patient to ensure they received their results through My Chart.  The patient requires follow up office visit.

## 2017-09-06 NOTE — TELEPHONE ENCOUNTER
ESTABLISHED PATIENT PRE-VISIT PLANNING     Note: Patient will not be contacted if there is no indication to call.     1.  Reviewed notes from the last few office visits within the medical group: Yes    2.  If any orders were placed at last visit or intended to be done for this visit (i.e. 6 mos follow-up), do we have Results/Consult Notes?        •  Labs - Labs ordered, completed and results are in chart.       •  Imaging - Imaging ordered, completed and results are in chart.       •  Referrals - Referral ordered, patient has NOT been seen.    3. Is this appointment scheduled as a Hospital Follow-Up? No    4.  Immunizations were updated in Epic using WebIZ?: Epic matches WebIZ       •  Web Iz Recommendations: FLU, PNEUMOVAX (PPSV23), TDAP and ZOSTAVAX (Shingles)    5.  Patient is due for the following Health Maintenance Topics:   Health Maintenance Due   Topic Date Due   • Annual Wellness Visit  1950   • IMM DTaP/Tdap/Td Vaccine (1 - Tdap) 02/12/1969   • IMM ZOSTER VACCINE  02/12/2010   • IMM PNEUMOCOCCAL 65+ (ADULT) LOW/MEDIUM RISK SERIES (2 of 2 - PPSV23) 08/05/2016   • IMM INFLUENZA (1) 09/01/2017           6.  Patient was informed to arrive 15 min prior to their scheduled appointment and bring in their medication bottles.

## 2017-09-07 ENCOUNTER — OFFICE VISIT (OUTPATIENT)
Dept: MEDICAL GROUP | Facility: PHYSICIAN GROUP | Age: 67
End: 2017-09-07
Payer: MEDICARE

## 2017-09-07 ENCOUNTER — TELEPHONE (OUTPATIENT)
Dept: CARDIOLOGY | Facility: MEDICAL CENTER | Age: 67
End: 2017-09-07

## 2017-09-07 ENCOUNTER — HOSPITAL ENCOUNTER (OUTPATIENT)
Facility: MEDICAL CENTER | Age: 67
End: 2017-09-07
Attending: FAMILY MEDICINE
Payer: MEDICARE

## 2017-09-07 VITALS
RESPIRATION RATE: 12 BRPM | DIASTOLIC BLOOD PRESSURE: 80 MMHG | SYSTOLIC BLOOD PRESSURE: 126 MMHG | HEART RATE: 90 BPM | OXYGEN SATURATION: 97 % | TEMPERATURE: 97.7 F

## 2017-09-07 DIAGNOSIS — Z12.4 SCREENING FOR CERVICAL CANCER: ICD-10-CM

## 2017-09-07 DIAGNOSIS — N30.01 ACUTE CYSTITIS WITH HEMATURIA: ICD-10-CM

## 2017-09-07 DIAGNOSIS — R33.9 URINARY RETENTION: ICD-10-CM

## 2017-09-07 DIAGNOSIS — N28.9 ACUTE RENAL INSUFFICIENCY: ICD-10-CM

## 2017-09-07 DIAGNOSIS — M79.10 MYALGIA: ICD-10-CM

## 2017-09-07 LAB
APPEARANCE UR: NORMAL
BILIRUB UR STRIP-MCNC: NORMAL MG/DL
COLOR UR AUTO: NORMAL
GLUCOSE UR STRIP.AUTO-MCNC: NORMAL MG/DL
KETONES UR STRIP.AUTO-MCNC: NORMAL MG/DL
LEUKOCYTE ESTERASE UR QL STRIP.AUTO: NORMAL
NITRITE UR QL STRIP.AUTO: NORMAL
PH UR STRIP.AUTO: 6 [PH] (ref 5–8)
PROT UR QL STRIP: 30 MG/DL
RBC UR QL AUTO: NORMAL
SP GR UR STRIP.AUTO: 1.02
UROBILINOGEN UR STRIP-MCNC: NORMAL MG/DL

## 2017-09-07 PROCEDURE — 87086 URINE CULTURE/COLONY COUNT: CPT

## 2017-09-07 PROCEDURE — 87186 SC STD MICRODIL/AGAR DIL: CPT

## 2017-09-07 PROCEDURE — 99214 OFFICE O/P EST MOD 30 MIN: CPT | Performed by: FAMILY MEDICINE

## 2017-09-07 PROCEDURE — 87077 CULTURE AEROBIC IDENTIFY: CPT

## 2017-09-07 PROCEDURE — 88175 CYTOPATH C/V AUTO FLUID REDO: CPT

## 2017-09-07 PROCEDURE — 81002 URINALYSIS NONAUTO W/O SCOPE: CPT | Performed by: FAMILY MEDICINE

## 2017-09-07 RX ORDER — NITROFURANTOIN 25; 75 MG/1; MG/1
100 CAPSULE ORAL 2 TIMES DAILY
Qty: 14 CAP | Refills: 0 | Status: SHIPPED | OUTPATIENT
Start: 2017-09-07 | End: 2017-09-10

## 2017-09-07 NOTE — TELEPHONE ENCOUNTER
Problem with Cholesterol level   Received: Today   Message Contents   Zay Jules R.N.   Phone Number: [phone]             CW/Kinsey     Pt received lab results back from PCP and her cholesterol level is high. Pt would please like a call back at [phone] to find out what she should do.      Attempted to call pt, no answer, will try again later    Kinsey BURNS

## 2017-09-07 NOTE — PROGRESS NOTES
"Chief Complaint   Patient presents with   • Gynecologic Exam   • Vaginal Itching   • Follow-Up     labs       HISTORY OF PRESENT ILLNESS: Patient is a 67 y.o. female established patient here today for the following concerns:    1. Urinary retention  2. Acute renal insufficiency  3. Acute cystitis with hematuria  Here today with one day of dysuria, difficulty emptying bladder and on most recent labs show pre-renal azotemia.      4. Myalgia  Has had long standing hx of wide spread muscle pain.  She was found to have vit D deficiency.  She denies any weakness.  \"feels like I'm on a statin\" all the time.      5. Screening for cervical cancer  Here for re-pap as there was not enough cellularity on the last check      Past Medical, Social, and Family history reviewed and updated in EPIC    Allergies:Statins [hmg-coa-r inhibitors]; Codeine; Penicillins; Statins [hmg-coa-r inhibitors]; Codeine; and Pcn [penicillins]    Current Outpatient Prescriptions   Medication Sig Dispense Refill   • nitrofurantoin monohydr macro (MACROBID) 100 MG Cap Take 1 Cap by mouth 2 times a day for 7 days. 14 Cap 0   • lisinopril (PRINIVIL) 10 MG Tab Take 1 Tab by mouth every day. 30 Tab 11   • ibuprofen (MOTRIN) 800 MG Tab Take 1 Tab by mouth every 8 hours as needed for Mild Pain. 30 Tab 3   • Calcium Carbonate (CALTRATE 600 PO) Take 1 Tab by mouth 1 time daily as needed.     • vitamin D, Ergocalciferol, (DRISDOL) 65657 UNITS Cap capsule Take  by mouth every 7 days.     • famotidine (PEPCID) 40 MG Tab TAKE ONE TABLET BY MOUTH ONCE DAILY 30 Tab 0     No current facility-administered medications for this visit.          ROS:  Review of Systems   Constitutional: Negative for fever, chills, weight loss and malaise/fatigue.   HENT: Negative for ear pain, nosebleeds, congestion, sore throat and neck pain.    Eyes: Negative for blurred vision.   Respiratory: Negative for cough, sputum production, shortness of breath and wheezing.    Cardiovascular: " Negative for chest pain, palpitations,  and leg swelling.   Gastrointestinal: Negative for heartburn, nausea, vomiting, diarrhea and abdominal pain.   Genitourinary: Negative for dysuria, urgency and frequency.   Musculoskeletal: Negative for myalgias, back pain and joint pain.   Skin: Negative for rash and itching.   Neurological: Negative for dizziness, tingling, tremors, sensory change, focal weakness and headaches.   Endo/Heme/Allergies: Does not bruise/bleed easily.   Psychiatric/Behavioral: Negative for depression, anxiety, suicidal ideas, insomnia and memory loss.      Exam:  Blood pressure 126/80, pulse 90, temperature 36.5 °C (97.7 °F), resp. rate 12, SpO2 97 %.    General:  Well nourished, well developed in NAD  Head is grossly normal.  Neck: Supple without JVD   Pulmonary:  Normal effort.   Cardiovascular: Regular rate  Extremities: no clubbing, cyanosis, or edema.  Psych: affect appropriate  External genitalia without lesions  Vaginal mucosa pink and well rugated  Minimal cervical discharge  Pap obtained    Bimanual exam shows normal positioned and sized uterus with no adnexal tenderness or masses        Please note that this dictation was created using voice recognition software. I have made every reasonable attempt to correct obvious errors, but I expect that there are errors of grammar and possibly content that I did not discover before finalizing the note.    Assessment/Plan:  1. Urinary retention  - POCT Urinalysis  - URINE CULTURE(NEW); Future  - nitrofurantoin monohydr macro (MACROBID) 100 MG Cap; Take 1 Cap by mouth 2 times a day for 7 days.  Dispense: 14 Cap; Refill: 0    2. Acute renal insufficiency  Suspect pre-renal, increase hydration, treat UTI.     3. Acute cystitis with hematuria    - nitrofurantoin monohydr macro (MACROBID) 100 MG Cap; Take 1 Cap by mouth 2 times a day for 7 days.  Dispense: 14 Cap; Refill: 0    4. Myalgia  - CK ISOENZYMES SERUM; Future    5. Screening for cervical  cancer  - THINPREP PAP ONLY; Future    No Follow-up on file.

## 2017-09-08 NOTE — TELEPHONE ENCOUNTER
Jarrod Mayberry M.D.  Kinsey Jules RCLEMENCIA.   Caller: Unspecified (Yesterday, 12:57 PM)             She could do repatha or praulent, it is up to the insurance, it is a monthly injection and it is very costly for the copy but no side effects if she wants to proceed let us know     Thank you      Spoke with pt, discussed MD Spivey recommendations, pt verbalizes understanding, per pt she is currently busy and requested to send my chart message for the name of medications so she could call her insurance later, my chart message sent to pt.    Kinsey BURNS

## 2017-09-08 NOTE — TELEPHONE ENCOUNTER
"Spoke with pt, pt reports recent lipid panel done, cholesterol is still high. Pt stated \"she tried everything and she is intolerant of statins\" so pt is wondering if she could try the Repatha, per pt she saw it on TV. Reminded pt of eating low choles diet and doing exercise as tolerated, pt stated \"i'm trying but is hard when you have cancer\", Advice pt that will ask MD Spivey regarding Repatha    Forwarded to MD Allyssa Euceda RN   "

## 2017-09-08 NOTE — TELEPHONE ENCOUNTER
Jarrod Mayberry M.D.  Kinsey Jules R.N.   Caller: Unspecified (Yesterday, 12:57 PM)             It isn't that bad, just focus on healthy eating     Thank you      Mychart message sent to pt with MD Allyssa Euceda RN

## 2017-09-08 NOTE — TELEPHONE ENCOUNTER
Medications to lower cholesterol   Received: Today   Message Contents   Lauren CurtisSuzi Jules R.N.   Phone Number: [phone]             Thank you Kinsey unfortunately I can't afford either one. 550.00   Can he find another over the counter that has less side effects for muscles and I'll have to try it.   I'm doing a blood test in a few days for muscles I'll know more then I just did a lipid blood test a couple of days ago if dr Mayberry could look at it a step me how bad it is.

## 2017-09-09 LAB
BACTERIA UR CULT: ABNORMAL
SIGNIFICANT IND 70042: ABNORMAL
SOURCE SOURCE: ABNORMAL

## 2017-09-10 RX ORDER — SULFAMETHOXAZOLE AND TRIMETHOPRIM 800; 160 MG/1; MG/1
1 TABLET ORAL 2 TIMES DAILY
Qty: 14 TAB | Refills: 0 | Status: SHIPPED | OUTPATIENT
Start: 2017-09-10 | End: 2017-09-20

## 2017-09-11 ENCOUNTER — HOSPITAL ENCOUNTER (OUTPATIENT)
Dept: RADIOLOGY | Facility: MEDICAL CENTER | Age: 67
End: 2017-09-11
Attending: FAMILY MEDICINE
Payer: MEDICARE

## 2017-09-11 ENCOUNTER — TELEPHONE (OUTPATIENT)
Dept: MEDICAL GROUP | Facility: PHYSICIAN GROUP | Age: 67
End: 2017-09-11

## 2017-09-11 DIAGNOSIS — E04.1 THYROID NODULE: ICD-10-CM

## 2017-09-11 NOTE — TELEPHONE ENCOUNTER
----- Message from Jenna Hernandez M.D. sent at 9/10/2017  8:48 PM PDT -----  Urine culture shows that the bacteria is only intermediately sensitive to macrobid, need to change antibiotics, Bactrim called in.

## 2017-09-13 ENCOUNTER — OFFICE VISIT (OUTPATIENT)
Dept: HEMATOLOGY ONCOLOGY | Facility: MEDICAL CENTER | Age: 67
End: 2017-09-13
Payer: MEDICARE

## 2017-09-13 VITALS
SYSTOLIC BLOOD PRESSURE: 130 MMHG | HEIGHT: 64 IN | BODY MASS INDEX: 24.35 KG/M2 | OXYGEN SATURATION: 90 % | DIASTOLIC BLOOD PRESSURE: 60 MMHG | HEART RATE: 85 BPM | WEIGHT: 142.64 LBS | TEMPERATURE: 98.1 F

## 2017-09-13 DIAGNOSIS — C50.211 MALIGNANT NEOPLASM OF UPPER-INNER QUADRANT OF RIGHT FEMALE BREAST (HCC): ICD-10-CM

## 2017-09-13 PROCEDURE — 99213 OFFICE O/P EST LOW 20 MIN: CPT | Performed by: INTERNAL MEDICINE

## 2017-09-13 ASSESSMENT — PAIN SCALES - GENERAL: PAINLEVEL: NO PAIN
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
## 2017-09-17 ENCOUNTER — PATIENT MESSAGE (OUTPATIENT)
Dept: MEDICAL GROUP | Facility: PHYSICIAN GROUP | Age: 67
End: 2017-09-17

## 2017-09-20 ENCOUNTER — PATIENT MESSAGE (OUTPATIENT)
Dept: MEDICAL GROUP | Facility: PHYSICIAN GROUP | Age: 67
End: 2017-09-20

## 2017-09-20 ENCOUNTER — OFFICE VISIT (OUTPATIENT)
Dept: MEDICAL GROUP | Facility: PHYSICIAN GROUP | Age: 67
End: 2017-09-20
Payer: MEDICARE

## 2017-09-20 VITALS
SYSTOLIC BLOOD PRESSURE: 122 MMHG | OXYGEN SATURATION: 96 % | HEART RATE: 94 BPM | HEIGHT: 64 IN | DIASTOLIC BLOOD PRESSURE: 68 MMHG | WEIGHT: 141 LBS | TEMPERATURE: 98.6 F | BODY MASS INDEX: 24.07 KG/M2 | RESPIRATION RATE: 12 BRPM

## 2017-09-20 DIAGNOSIS — I10 ESSENTIAL HYPERTENSION: ICD-10-CM

## 2017-09-20 DIAGNOSIS — R35.0 URINARY FREQUENCY: ICD-10-CM

## 2017-09-20 DIAGNOSIS — R31.9 HEMATURIA: ICD-10-CM

## 2017-09-20 LAB
APPEARANCE UR: CLEAR
BILIRUB UR STRIP-MCNC: NEGATIVE MG/DL
COLOR UR AUTO: YELLOW
GLUCOSE UR STRIP.AUTO-MCNC: NEGATIVE MG/DL
KETONES UR STRIP.AUTO-MCNC: NEGATIVE MG/DL
LEUKOCYTE ESTERASE UR QL STRIP.AUTO: NEGATIVE
NITRITE UR QL STRIP.AUTO: NEGATIVE
PH UR STRIP.AUTO: 6 [PH] (ref 5–8)
PROT UR QL STRIP: NORMAL MG/DL
RBC UR QL AUTO: NORMAL
SP GR UR STRIP.AUTO: 1.01
UROBILINOGEN UR STRIP-MCNC: NEGATIVE MG/DL

## 2017-09-20 PROCEDURE — 99214 OFFICE O/P EST MOD 30 MIN: CPT | Performed by: FAMILY MEDICINE

## 2017-09-20 PROCEDURE — 81002 URINALYSIS NONAUTO W/O SCOPE: CPT | Performed by: FAMILY MEDICINE

## 2017-09-20 RX ORDER — SULFAMETHOXAZOLE AND TRIMETHOPRIM 800; 160 MG/1; MG/1
1 TABLET ORAL 2 TIMES DAILY
Qty: 28 TAB | Refills: 0 | Status: SHIPPED | OUTPATIENT
Start: 2017-09-20 | End: 2017-10-04

## 2017-09-20 RX ORDER — VALSARTAN 80 MG/1
80 TABLET ORAL DAILY
Qty: 30 TAB | Refills: 11 | Status: SHIPPED | OUTPATIENT
Start: 2017-09-20 | End: 2018-01-30 | Stop reason: SINTOL

## 2017-09-20 NOTE — PROGRESS NOTES
Chief Complaint   Patient presents with   • Urinary Frequency       HISTORY OF PRESENT ILLNESS: Patient is a 67 y.o. female established patient here today for the following concerns:    1. Urinary frequency  3. Hematuria   Recently treated for E.coli UTI.  Reports that she finished the course of bactrim but is still having some dysuria.  No flank pains.  No hx of stones.  No fevers, chills or nausea.     2. Essential hypertension  Reports that she has been experiencing weeks of dry tickling cough.  Her BP medication was changed to lisinopril recently.  BP has been controlled.           Past Medical, Social, and Family history reviewed and updated in EPIC    Allergies:Statins [hmg-coa-r inhibitors]; Codeine; Penicillins; Statins [hmg-coa-r inhibitors]; Codeine; and Pcn [penicillins]    Current Outpatient Prescriptions   Medication Sig Dispense Refill   • valsartan (DIOVAN) 80 MG Tab Take 1 Tab by mouth every day. 30 Tab 11   • sulfamethoxazole-trimethoprim (BACTRIM DS) 800-160 MG tablet Take 1 Tab by mouth 2 times a day for 14 days. 28 Tab 0   • aspirin 81 MG tablet Take 81 mg by mouth every day.     • vitamin D, Ergocalciferol, (DRISDOL) 09974 UNITS Cap capsule Take  by mouth every 7 days.     • ibuprofen (MOTRIN) 800 MG Tab Take 1 Tab by mouth every 8 hours as needed for Mild Pain. 30 Tab 3   • famotidine (PEPCID) 40 MG Tab TAKE ONE TABLET BY MOUTH ONCE DAILY 30 Tab 0     No current facility-administered medications for this visit.          ROS:  Review of Systems   Constitutional: Negative for fever, chills, weight loss and malaise/fatigue.   HENT: Negative for ear pain, nosebleeds, congestion, sore throat and neck pain.    Eyes: Negative for blurred vision.   Respiratory: Negative for cough, sputum production, shortness of breath and wheezing.    Cardiovascular: Negative for chest pain, palpitations,  and leg swelling.   Gastrointestinal: Negative for heartburn, nausea, vomiting, diarrhea and abdominal pain.  "  Genitourinary: Negative for dysuria, urgency and frequency.   Musculoskeletal: Negative for myalgias, back pain and joint pain.   Skin: Negative for rash and itching.   Neurological: Negative for dizziness, tingling, tremors, sensory change, focal weakness and headaches.   Endo/Heme/Allergies: Does not bruise/bleed easily.   Psychiatric/Behavioral: Negative for depression, anxiety, suicidal ideas, insomnia and memory loss.      Exam:  Blood pressure 122/68, pulse 94, temperature 37 °C (98.6 °F), resp. rate 12, height 1.63 m (5' 4.17\"), weight 64 kg (141 lb), SpO2 96 %.    General:  Well nourished, well developed in NAD  Head is grossly normal.  Neck: Supple without JVD   Pulmonary:  Normal effort.   Cardiovascular: Regular rate  Extremities: no clubbing, cyanosis, or edema.  Psych: affect appropriate      Please note that this dictation was created using voice recognition software. I have made every reasonable attempt to correct obvious errors, but I expect that there are errors of grammar and possibly content that I did not discover before finalizing the note.    Assessment/Plan:  1. Urinary frequency  - POCT Urinalysis  - sulfamethoxazole-trimethoprim (BACTRIM DS) 800-160 MG tablet; Take 1 Tab by mouth 2 times a day for 14 days.  Dispense: 28 Tab; Refill: 0  - URINALYSIS; Future    2. Essential hypertension  - valsartan (DIOVAN) 80 MG Tab; Take 1 Tab by mouth every day.  Dispense: 30 Tab; Refill: 11    3. Hematuria  - sulfamethoxazole-trimethoprim (BACTRIM DS) 800-160 MG tablet; Take 1 Tab by mouth 2 times a day for 14 days.  Dispense: 28 Tab; Refill: 0  - URINALYSIS; Future  If hematuria is persistent after clearance of infection we will obtain imaging to r/o stone or mass            "

## 2017-09-20 NOTE — TELEPHONE ENCOUNTER
----- Message from Lauren Dave sent at 9/19/2017  8:01 PM PDT -----  Regarding: Non-Urgent Medical Question  Contact: [phone]  When do you want me to come in.

## 2017-09-21 ENCOUNTER — TELEPHONE (OUTPATIENT)
Dept: CARDIOLOGY | Facility: MEDICAL CENTER | Age: 67
End: 2017-09-21

## 2017-09-21 NOTE — TELEPHONE ENCOUNTER
Patient calling to discuss Bactrim and Diovan prescribed by PCP.  She was on Lisinopril and had a cough and this was changed yesterday to Diovan 80mg by her PCP.  She was given Bactrim for a UTI and blood in her urine.  She is quite anxious, wanting Dr. Mayberry's approval before she takes these medications.  She is asking what the side effects are, what if 80mg of Diovan doesn't work, what time of day can she take it, etc.  Reassured that these 2 medications are appropriate and that I will make sure Dr. Mayberry is aware and in agreement.  She agrees to give them a try.  To SMILEY PEARSON.

## 2017-09-21 NOTE — TELEPHONE ENCOUNTER
Jarrod Mayberry M.D.  ALO Galeana: Unspecified (Today, 11:06 AM)             Agreed diovan should help with the cough and keep the BP good     Thank you      Patient informed.

## 2017-09-21 NOTE — TELEPHONE ENCOUNTER
----- Message from Ledy Machado sent at 9/21/2017 10:30 AM PDT -----  Regarding: Patient's primary doctor wants to change BP medication  SMILEY/Althea    Patient said that her primary doctor wants to change her blood pressure medication and she wants a call back at [phone] to discuss.

## 2017-09-26 ENCOUNTER — TELEPHONE (OUTPATIENT)
Dept: RADIOLOGY | Facility: MEDICAL CENTER | Age: 67
End: 2017-09-26

## 2017-09-27 ENCOUNTER — HOSPITAL ENCOUNTER (OUTPATIENT)
Dept: RADIOLOGY | Facility: MEDICAL CENTER | Age: 67
End: 2017-09-27
Attending: FAMILY MEDICINE
Payer: MEDICARE

## 2017-09-27 DIAGNOSIS — N63.0 BREAST LUMP: ICD-10-CM

## 2017-09-27 DIAGNOSIS — Z85.3 HISTORY OF BREAST CANCER: ICD-10-CM

## 2017-09-27 PROCEDURE — 77080 DXA BONE DENSITY AXIAL: CPT

## 2017-09-27 PROCEDURE — 76642 ULTRASOUND BREAST LIMITED: CPT | Mod: RT

## 2017-09-27 PROCEDURE — G0279 TOMOSYNTHESIS, MAMMO: HCPCS

## 2017-10-02 ENCOUNTER — TELEPHONE (OUTPATIENT)
Dept: MEDICAL GROUP | Facility: PHYSICIAN GROUP | Age: 67
End: 2017-10-02

## 2017-10-02 DIAGNOSIS — R30.0 DYSURIA: ICD-10-CM

## 2017-10-02 NOTE — TELEPHONE ENCOUNTER
VOICEMAIL  1. Caller Name: Lauren Dave                        Call Back Number: [phone] (home)       2. Message: pt seen 09/20/17 for UTI and states she is still having sx.  She is asking if she can get an order for a UA    3. Patient approves office to leave a detailed voicemail/MyChart message: N\A

## 2017-10-05 ENCOUNTER — HOSPITAL ENCOUNTER (OUTPATIENT)
Facility: MEDICAL CENTER | Age: 67
End: 2017-10-05
Attending: FAMILY MEDICINE
Payer: MEDICARE

## 2017-10-05 DIAGNOSIS — R30.0 DYSURIA: ICD-10-CM

## 2017-10-05 LAB
AMBIGUOUS DTTM AMBI4: NORMAL
APPEARANCE UR: CLEAR
BACTERIA #/AREA URNS HPF: ABNORMAL /HPF
BILIRUB UR QL STRIP.AUTO: NEGATIVE
COLOR UR: YELLOW
EPI CELLS #/AREA URNS HPF: ABNORMAL /HPF
GLUCOSE UR STRIP.AUTO-MCNC: NEGATIVE MG/DL
KETONES UR STRIP.AUTO-MCNC: NEGATIVE MG/DL
LEUKOCYTE ESTERASE UR QL STRIP.AUTO: NEGATIVE
MICRO URNS: ABNORMAL
MUCOUS THREADS #/AREA URNS HPF: ABNORMAL /HPF
NITRITE UR QL STRIP.AUTO: NEGATIVE
PH UR STRIP.AUTO: 6 [PH]
PROT UR QL STRIP: 30 MG/DL
RBC # URNS HPF: ABNORMAL /HPF
RBC UR QL AUTO: ABNORMAL
SP GR UR STRIP.AUTO: 1.01
UROBILINOGEN UR STRIP.AUTO-MCNC: NORMAL MG/DL
WBC #/AREA URNS HPF: ABNORMAL /HPF

## 2017-10-05 PROCEDURE — 81001 URINALYSIS AUTO W/SCOPE: CPT

## 2017-10-06 ENCOUNTER — TELEPHONE (OUTPATIENT)
Dept: MEDICAL GROUP | Facility: PHYSICIAN GROUP | Age: 67
End: 2017-10-06

## 2017-10-06 NOTE — TELEPHONE ENCOUNTER
----- Message from Jenna Hernandez M.D. sent at 10/6/2017  8:38 AM PDT -----  No infection.  Decreasing microscopic blood.

## 2017-12-06 ENCOUNTER — OFFICE VISIT (OUTPATIENT)
Dept: CARDIOLOGY | Facility: MEDICAL CENTER | Age: 67
End: 2017-12-06
Payer: MEDICARE

## 2017-12-06 VITALS
HEIGHT: 64 IN | WEIGHT: 146 LBS | OXYGEN SATURATION: 97 % | BODY MASS INDEX: 24.92 KG/M2 | SYSTOLIC BLOOD PRESSURE: 142 MMHG | HEART RATE: 88 BPM | DIASTOLIC BLOOD PRESSURE: 92 MMHG

## 2017-12-06 DIAGNOSIS — E78.5 DYSLIPIDEMIA: ICD-10-CM

## 2017-12-06 DIAGNOSIS — I44.7 LBBB (LEFT BUNDLE BRANCH BLOCK): Chronic | ICD-10-CM

## 2017-12-06 DIAGNOSIS — I25.84 CORONARY ARTERY DISEASE DUE TO CALCIFIED CORONARY LESION: Chronic | ICD-10-CM

## 2017-12-06 DIAGNOSIS — I10 ESSENTIAL HYPERTENSION: Chronic | ICD-10-CM

## 2017-12-06 DIAGNOSIS — I25.10 CORONARY ARTERY DISEASE DUE TO CALCIFIED CORONARY LESION: Chronic | ICD-10-CM

## 2017-12-06 PROCEDURE — 99214 OFFICE O/P EST MOD 30 MIN: CPT | Performed by: INTERNAL MEDICINE

## 2017-12-08 ASSESSMENT — ENCOUNTER SYMPTOMS
COUGH: 0
SHORTNESS OF BREATH: 0
CLAUDICATION: 0
FOCAL WEAKNESS: 0
FEVER: 0
WEAKNESS: 0
NAUSEA: 0
CHILLS: 0
BRUISES/BLEEDS EASILY: 0
PND: 0
SORE THROAT: 0
FALLS: 0
BLURRED VISION: 0
PALPITATIONS: 0
ABDOMINAL PAIN: 0
DIZZINESS: 0

## 2017-12-08 NOTE — PROGRESS NOTES
Subjective:   Lauren Dave is a 67 y.o. female who presents today For follow-up of her left bundle branch block hypertension she's also had Adriamycin from breast cancer    Does a lot of different concerns muscle aches joint aches concerns about her heart health    She still concerning the role that she hasn't tolerated statins with her elevated LDLs    Past Medical History:   Diagnosis Date   • Arthritis    • Breast cancer (CMS-HCC) 8/7/2013   • Bronchitis 2005   • Bundle branch block, right    • Cancer (CMS-HCC)    • Cancer (CMS-HCC)     right breast cancer    • Cold    • Coronary artery disease due to calcified coronary lesion - Calcifications seen on CT scan also wtih aortic ulceration    • Dental disorder     tooth infection   • Dyslipidemia     Tried atorvastatin, pravastatin, lovastatin, and Zetia.  All causes severe myalgias.  Just taking fish oil.     • Essential hypertension    • Heart burn    • LBBB (left bundle branch block) 12/16/2014    Noted on prechemo EKG 9/2014, MUGA WNL   • Pneumonia    • Pseudogout    • Scarlet fever    • Unspecified hemorrhagic conditions     gums     Past Surgical History:   Procedure Laterality Date   • CATH REMOVAL  2/24/2014    Performed by Aggie Burns M.D. at [facility]   • MASTECTOMY  8/22/2013    Performed by Aggie Burns M.D. at [facility]   • NODE BIOPSY SENTINEL  8/22/2013    Performed by Aggie Burns M.D. at [facility]   • CATH PLACEMENT  8/22/2013    Performed by Aggie Burns M.D. at [facility]   • US-NEEDLE CORE BX-BREAST PANEL  2013    right breast   • COLONOSCOPY  2003   • BREAST BIOPSY     • TONSILLECTOMY       Family History   Problem Relation Age of Onset   • Cancer Mother      possible thyroid and gynecological   • Arthritis Father    • Heart Disease Paternal Grandmother    • Diabetes Paternal Grandmother    • Cancer Maternal Aunt      breast cancer- 60s  "  • Breast Cancer Maternal Aunt    • Diabetes Maternal Uncle    • Heart Disease Maternal Grandmother    • Heart Disease Maternal Grandfather      MI   • Stroke Paternal Grandfather    • Other Son      breast mass   • Other Brother      MS     History   Smoking Status   • Former Smoker   • Packs/day: 1.00   • Years: 0.00   • Types: Cigarettes   • Quit date: 10/1/2013   Smokeless Tobacco   • Never Used     Allergies   Allergen Reactions   • Statins [Hmg-Coa-R Inhibitors] Unspecified     Muscle Spasms   RXN=unknown   • Codeine Vomiting   • Lisinopril Cough   • Penicillins      \"does not work\" .'IMMUNE TO PENICILLIN,AMPICILLIN IS THE ONLY THING THAT WORKS'   • Statins [Hmg-Coa-R Inhibitors]      Muscle pain   • Codeine Vomiting and Nausea     Clarified with patient - states that she has an \"upset stomach\" when she takes it   • Pcn [Penicillins] Unspecified     Does not work  RXN=ongoing     Outpatient Encounter Prescriptions as of 12/6/2017   Medication Sig Dispense Refill   • valsartan (DIOVAN) 80 MG Tab Take 1 Tab by mouth every day. 30 Tab 11   • vitamin D, Ergocalciferol, (DRISDOL) 44763 UNITS Cap capsule Take  by mouth every 7 days.     • aspirin 81 MG tablet Take 81 mg by mouth every day.     • ibuprofen (MOTRIN) 800 MG Tab Take 1 Tab by mouth every 8 hours as needed for Mild Pain. 30 Tab 3   • famotidine (PEPCID) 40 MG Tab TAKE ONE TABLET BY MOUTH ONCE DAILY 30 Tab 0     No facility-administered encounter medications on file as of 12/6/2017.      Review of Systems   Constitutional: Negative for chills and fever.   HENT: Negative for sore throat.    Eyes: Negative for blurred vision.   Respiratory: Negative for cough and shortness of breath.    Cardiovascular: Negative for chest pain, palpitations, claudication, leg swelling and PND.   Gastrointestinal: Negative for abdominal pain and nausea.   Musculoskeletal: Negative for falls and joint pain.   Skin: Negative for rash.   Neurological: Negative for dizziness, " "focal weakness and weakness.   Endo/Heme/Allergies: Does not bruise/bleed easily.        Objective:   /92   Pulse 88   Ht 1.63 m (5' 4.17\")   Wt 66.2 kg (146 lb)   SpO2 97%   BMI 24.93 kg/m²     Physical Exam   Constitutional: No distress.   HENT:   Mouth/Throat: Oropharynx is clear and moist.   Eyes: No scleral icterus.   Cardiovascular: Normal rate, regular rhythm and intact distal pulses.  Exam reveals no gallop and no friction rub.    No murmur heard.  Pulmonary/Chest: Effort normal and breath sounds normal. She has no rales.   Abdominal: Bowel sounds are normal. There is no tenderness.   Musculoskeletal: She exhibits no edema.   Neurological: She is alert.   Skin: No rash noted. She is not diaphoretic.   Psychiatric: She has a normal mood and affect.       Assessment:     1. LBBB (left bundle branch block)     2. Dyslipidemia     3. Essential hypertension     4. Coronary artery disease due to calcified coronary lesion - Calcifications seen on CT scan also wtih aortic ulceration         Medical Decision Making:  Today's Assessment / Status / Plan:     It was my pleasure to meet with Ms. Dave.    She reports good blood pressures at home she is tolerating the valsartan best    We discussed lipid-lowering could be a candidate for PSA 9 overall her heart has since been reasonable given the cost she doesn't think she can afford it    I will see Ms. Dave back in 1 year time and encouraged her to follow up with us over the phone or e-mail using my MyChart as issues arise.    It is my pleasure to participate in the care of Ms. Dave.  Please do not hesitate to contact me with questions or concerns.    Jarrod Mayberry MD PhD FACC  Cardiologist [facility]    "

## 2018-01-11 DIAGNOSIS — K21.9 GASTROESOPHAGEAL REFLUX DISEASE WITHOUT ESOPHAGITIS: ICD-10-CM

## 2018-01-11 RX ORDER — FAMOTIDINE 40 MG/1
TABLET, FILM COATED ORAL
OUTPATIENT
Start: 2018-01-11

## 2018-01-11 NOTE — TELEPHONE ENCOUNTER
I informed the patient Per AYAKA Guzman we can not refill her pepcid medication. Meir states she needs to follow up with her primary care physician for medication maintaince. Patient was upset since meir prescribed the Pepcid back in June 26 th 2017. I explained because meir prescribed it for only 30 days and has only been seen by  twice this year that she Dosent feel comfortable refilling it. Patient stated she understood.

## 2018-01-12 ENCOUNTER — PATIENT MESSAGE (OUTPATIENT)
Dept: MEDICAL GROUP | Facility: PHYSICIAN GROUP | Age: 68
End: 2018-01-12

## 2018-01-12 DIAGNOSIS — M10.00 ACUTE IDIOPATHIC GOUT, UNSPECIFIED SITE: ICD-10-CM

## 2018-01-12 DIAGNOSIS — K21.9 GASTROESOPHAGEAL REFLUX DISEASE WITHOUT ESOPHAGITIS: ICD-10-CM

## 2018-01-12 RX ORDER — FAMOTIDINE 40 MG/1
40 TABLET, FILM COATED ORAL 2 TIMES DAILY
Qty: 180 TAB | Refills: 0 | Status: SHIPPED | OUTPATIENT
Start: 2018-01-12 | End: 2018-08-22

## 2018-01-12 RX ORDER — IBUPROFEN 800 MG/1
800 TABLET ORAL EVERY 8 HOURS PRN
Qty: 90 TAB | Refills: 0 | Status: SHIPPED | OUTPATIENT
Start: 2018-01-12 | End: 2018-07-21 | Stop reason: SDUPTHER

## 2018-01-12 NOTE — TELEPHONE ENCOUNTER
----- Message from Lauren Dave sent at 1/12/2018  7:30 AM PST -----  Regarding: RE: Prescription Question  Contact: [phone]  The famododine 1 in the am and 1 before bed.     ----- Message -----  From: Cleo Hodges, Med Ass't  Sent: 1/12/18, 7:08 AM  To: Lauren Dave  Subject: RE: Prescription Question    Hi Lauren,    Which one do you take of two in the morning and at night?  Please advise.  Thank you.    ----- Message -----     From: Lauren Dave     Sent: 1/12/2018  6:12 AM PST       To: Jenna Hernandez M.D.  Subject: Prescription Question    Hi could you refill my IB Profin 800 mg 90 day and my fomodadine 40mg I take two morn and night unless there is one stronger. 90 day.   Thank you

## 2018-01-12 NOTE — TELEPHONE ENCOUNTER
The medications are pending.  The patient stated that she takes the Famotidine differently.  Thank you.

## 2018-01-12 NOTE — TELEPHONE ENCOUNTER
----- Message from Lauren Dave sent at 1/12/2018  6:12 AM PST -----  Regarding: Prescription Question  Contact: [phone]  Hi could you refill my IB Profin 800 mg 90 day and my fomodadine 40mg I take two morn and night unless there is one stronger. 90 day.   Thank you

## 2018-01-29 ENCOUNTER — PATIENT MESSAGE (OUTPATIENT)
Dept: CARDIOLOGY | Facility: MEDICAL CENTER | Age: 68
End: 2018-01-29

## 2018-01-29 NOTE — TELEPHONE ENCOUNTER
Advised ptChandana message was forwarded to CW, however he is unavailable today.  Advised will contact her as soon as CW reviews her message.

## 2018-01-29 NOTE — TELEPHONE ENCOUNTER
----- Message from My Fallon R.N. sent at 1/29/2018  2:41 PM PST -----  Regarding: FW: valsartan causing coughing  Contact: [phone]    ----- Message -----  From: Dinorah Tiwari  Sent: 1/29/2018   1:06 PM  To: Padmini Bolton R.N.  Subject: valsartan causing coughing                       CW/kong    Pt calling to follow up on the [employer] msg she sent to CW this morning about her b/p medication(s).   She has issues with valsartan, coughing is like before affecting her sleep, muscle pain also.  Please call Lauren at .

## 2018-01-30 ENCOUNTER — PATIENT MESSAGE (OUTPATIENT)
Dept: FAMILY PLANNING/WOMEN'S HEALTH CLINIC | Facility: OTHER | Age: 68
End: 2018-01-30

## 2018-01-30 ENCOUNTER — TELEPHONE (OUTPATIENT)
Dept: MEDICAL GROUP | Facility: PHYSICIAN GROUP | Age: 68
End: 2018-01-30

## 2018-01-30 ENCOUNTER — TELEPHONE (OUTPATIENT)
Dept: CARDIOLOGY | Facility: MEDICAL CENTER | Age: 68
End: 2018-01-30

## 2018-01-30 DIAGNOSIS — Z13.220 SCREENING FOR HYPERLIPIDEMIA: ICD-10-CM

## 2018-01-30 DIAGNOSIS — Z13.29 SCREENING FOR ENDOCRINE DISORDER: ICD-10-CM

## 2018-01-30 DIAGNOSIS — I10 ESSENTIAL HYPERTENSION: Chronic | ICD-10-CM

## 2018-01-30 DIAGNOSIS — M79.10 MYALGIA: ICD-10-CM

## 2018-01-30 DIAGNOSIS — E55.9 VITAMIN D DEFICIENCY: ICD-10-CM

## 2018-01-30 DIAGNOSIS — Z13.0 SCREENING FOR DEFICIENCY ANEMIA: ICD-10-CM

## 2018-01-30 RX ORDER — AMLODIPINE BESYLATE 10 MG/1
10 TABLET ORAL DAILY
Qty: 30 TAB | Refills: 11 | Status: SHIPPED | OUTPATIENT
Start: 2018-01-30 | End: 2018-08-22

## 2018-01-30 NOTE — TELEPHONE ENCOUNTER
----- Message from Lauren Dave sent at 1/29/2018  7:21 PM PST -----  Regarding: RE: Non-Urgent Medical Question  Contact: [phone]  The ones I used to have from doctor Elsa in my chart and Liped and the one for muscles that dr. Hernandez and I talked about.     ----- Message -----  From: Rosalia Hodge Med Ass't  Sent: 1/29/18, 7:03 PM  To: Lauren Dave  Subject: RE: Non-Urgent Medical Question    What kind of test do you need?    ----- Message -----     From: Lauren Dave     Sent: 1/29/2018  5:47 PM PST       To: Rosalia Hodge Med Ass't  Subject: RE: Non-Urgent Medical Question    Can Jenna order a blood test for me.     ----- Message -----  From: Rosalia Hodge Med Ass't  Sent: 1/29/18, 5:12 PM  To: Lauren Dave  Subject: RE: Non-Urgent Medical Question    You can call [phone].     ----- Message -----     From: Lauren Dave     Sent: 1/29/2018  5:10 PM PST       To: Rosalia Hodge Med Ass't  Subject: RE: Non-Urgent Medical Question    Nishi has been calling me to schedule     ----- Message -----  From: Rosalia Hodge Med Ass't  Sent: 1/29/18, 5:06 PM  To: Lauren Dave  Subject: RE: Non-Urgent Medical Question    Misbah Aguilar,  Around what time of the year do you do your wellness visit?    Thank you,  Rosalia Hodges  Medical Assistant     ----- Message -----     From: Lauren Dave     Sent: 1/29/2018  4:55 PM PST       To: Jenna Hernandez M.D.  Subject: Non-Urgent Medical Question    Hi Can we schedule my wellness appointment soon and would like to get a blood test soon to discuss results.   Thanks

## 2018-01-31 NOTE — TELEPHONE ENCOUNTER
Please let her know that she just simply has high blood pressure is very common.  She should switch this to amlodipine 5 mg daily she may have to increase to 10 mg daily.    I will see Ms. Dave back in 6 months time and encouraged her to follow up with us over the phone or e-mail using my MyChart as issues arise.    It is my pleasure to participate in the care of Ms. Dave.  Please do not hesitate to contact me with questions or concerns.    Jarrod Mayberry MD PhD FACC  Cardiologist [facility]

## 2018-01-31 NOTE — TELEPHONE ENCOUNTER
Documentation       My Fallon R.N. routed conversation to You Yesterday (2:50 PM)      My Fallon R.N. Yesterday (2:49 PM)        Advised pt, Chandana message was forwarded to CW, however he is unavailable today.  Advised will contact her as soon as CW reviews her message.         Documentation       My Fallon R.N. Yesterday (2:48 PM)        ----- Message from My Fallon R.N. sent at 1/29/2018  2:41 PM PST -----  Regarding: FW: valsartan causing coughing  Contact: [phone]     ----- Message -----  From: Dinorah Tiwari  Sent: 1/29/2018   1:06 PM  To: Padmini Bolton R.N.  Subject: valsartan causing coughing                        CW/kong     Pt calling to follow up on the [employer]et[employer]ot msg she sent to CW this morning about her b/p medication(s).   She has issues with valsartan, coughing is like before affecting her sleep, muscle pain also.  Please call Lauren at .         Documentation       My Fallon R.N. routed conversation to Jarrod Mayberry M.D. Yesterday (11:30 AM)      [place] Ass't routed conversation to My Fallon R.N. Yesterday (8:38 AM)      Lauren Mayberry M.D. Yesterday (8:32 AM)        Hi Doctor Mayberry this is Crystals mother-law seems the Valsortin is causing me to cough and have a lot of back and muscle pain just like the losirtin what is the real reason I'm to be on this medication I'm told the 150's are not that high how can I get off and why do I have High blood pressure because of Chemo, exercise, weight or bundle block or cholesterol.   Can I stop cold turkey or can you suggest something for me.

## 2018-01-31 NOTE — TELEPHONE ENCOUNTER
Jarrod Mayberry M.D. routed conversation to You 44 minutes ago (4:15 PM)      Jarrod Mayberry M.D. 48 minutes ago (4:11 PM)     Patient informed.  Valsartan DC'd and amlodipine called in and med list updated.       Please let her know that she just simply has high blood pressure is very common.  She should switch this to amlodipine 5 mg daily she may have to increase to 10 mg daily.     I will see Ms. Dave back in 6 months time and encouraged her to follow up with us over the phone or e-mail using my MyChart as issues arise.     It is my pleasure to participate in the care of Ms. Dave.  Please do not hesitate to contact me with questions or concerns.     Jarrod Mayberry MD PhD FAC  Cardiologist [facility]            Documentation

## 2018-01-31 NOTE — TELEPHONE ENCOUNTER
Patient informed.  She is quite anxious, asking what the side effects are, asking me to spell it for her, asking if it will cause a cough, etc.  She agrees to try it, she will start at 5mg and increase to 10mg/d if needed. Medication called in and valsartan DC'd.

## 2018-01-31 NOTE — TELEPHONE ENCOUNTER
Please let her know that she just simply has high blood pressure is very common.  She should switch this to amlodipine 5 mg daily she may have to increase to 10 mg daily.     I will see Ms. Dave back in 6 months time and encouraged her to follow up with us over the phone or e-mail using my MyChart as issues arise.     It is my pleasure to participate in the care of Ms. Dave.  Please do not hesitate to contact me with questions or concerns.     Jarrod Mayberry MD PhD [facility]  Cardiologist [facility]      Patient  informed.  She is very anxious, concerned about side effects, asking how to spell it and if it will give her a cough...etc.,  She agrees to try it.  Medication called in and she will start at 5mg/d and if needed increase to 10mg/d.

## 2018-02-05 ENCOUNTER — HOSPITAL ENCOUNTER (OUTPATIENT)
Dept: LAB | Facility: MEDICAL CENTER | Age: 68
End: 2018-02-05
Attending: FAMILY MEDICINE
Payer: MEDICARE

## 2018-02-05 DIAGNOSIS — E78.2 MIXED HYPERLIPIDEMIA: ICD-10-CM

## 2018-02-05 DIAGNOSIS — Z13.0 SCREENING FOR DEFICIENCY ANEMIA: ICD-10-CM

## 2018-02-05 DIAGNOSIS — M79.10 MYALGIA: ICD-10-CM

## 2018-02-05 DIAGNOSIS — Z13.220 SCREENING FOR HYPERLIPIDEMIA: ICD-10-CM

## 2018-02-05 DIAGNOSIS — E55.9 VITAMIN D DEFICIENCY: ICD-10-CM

## 2018-02-05 LAB
25(OH)D3 SERPL-MCNC: 25 NG/ML (ref 30–100)
ALBUMIN SERPL BCP-MCNC: 4.5 G/DL (ref 3.2–4.9)
ALBUMIN/GLOB SERPL: 1.5 G/DL
ALP SERPL-CCNC: 57 U/L (ref 30–99)
ALT SERPL-CCNC: 14 U/L (ref 2–50)
ANION GAP SERPL CALC-SCNC: 8 MMOL/L (ref 0–11.9)
AST SERPL-CCNC: 30 U/L (ref 12–45)
BILIRUB SERPL-MCNC: 0.4 MG/DL (ref 0.1–1.5)
BUN SERPL-MCNC: 31 MG/DL (ref 8–22)
CALCIUM SERPL-MCNC: 9.7 MG/DL (ref 8.5–10.5)
CHLORIDE SERPL-SCNC: 103 MMOL/L (ref 96–112)
CK SERPL-CCNC: 47 U/L (ref 0–154)
CO2 SERPL-SCNC: 25 MMOL/L (ref 20–33)
CREAT SERPL-MCNC: 1.05 MG/DL (ref 0.5–1.4)
GLOBULIN SER CALC-MCNC: 3.1 G/DL (ref 1.9–3.5)
GLUCOSE SERPL-MCNC: 77 MG/DL (ref 65–99)
POTASSIUM SERPL-SCNC: 3.9 MMOL/L (ref 3.6–5.5)
PROT SERPL-MCNC: 7.6 G/DL (ref 6–8.2)
SODIUM SERPL-SCNC: 136 MMOL/L (ref 135–145)

## 2018-02-05 PROCEDURE — 82550 ASSAY OF CK (CPK): CPT

## 2018-02-05 PROCEDURE — 82306 VITAMIN D 25 HYDROXY: CPT

## 2018-02-05 PROCEDURE — 80053 COMPREHEN METABOLIC PANEL: CPT

## 2018-02-05 PROCEDURE — 85025 COMPLETE CBC W/AUTO DIFF WBC: CPT

## 2018-02-05 PROCEDURE — 36415 COLL VENOUS BLD VENIPUNCTURE: CPT

## 2018-02-06 ENCOUNTER — PATIENT MESSAGE (OUTPATIENT)
Dept: MEDICAL GROUP | Facility: PHYSICIAN GROUP | Age: 68
End: 2018-02-06

## 2018-02-06 LAB
BASOPHILS # BLD AUTO: 0.8 % (ref 0–1.8)
BASOPHILS # BLD: 0.04 K/UL (ref 0–0.12)
EOSINOPHIL # BLD AUTO: 0.13 K/UL (ref 0–0.51)
EOSINOPHIL NFR BLD: 2.5 % (ref 0–6.9)
ERYTHROCYTE [DISTWIDTH] IN BLOOD BY AUTOMATED COUNT: 45.1 FL (ref 35.9–50)
HCT VFR BLD AUTO: 39.4 % (ref 37–47)
HGB BLD-MCNC: 11.9 G/DL (ref 12–16)
IMM GRANULOCYTES # BLD AUTO: 0.02 K/UL (ref 0–0.11)
IMM GRANULOCYTES NFR BLD AUTO: 0.4 % (ref 0–0.9)
LYMPHOCYTES # BLD AUTO: 1.39 K/UL (ref 1–4.8)
LYMPHOCYTES NFR BLD: 26.5 % (ref 22–41)
MCH RBC QN AUTO: 29.5 PG (ref 27–33)
MCHC RBC AUTO-ENTMCNC: 30.2 G/DL (ref 33.6–35)
MCV RBC AUTO: 97.5 FL (ref 81.4–97.8)
MONOCYTES # BLD AUTO: 0.33 K/UL (ref 0–0.85)
MONOCYTES NFR BLD AUTO: 6.3 % (ref 0–13.4)
NEUTROPHILS # BLD AUTO: 3.33 K/UL (ref 2–7.15)
NEUTROPHILS NFR BLD: 63.5 % (ref 44–72)
NRBC # BLD AUTO: 0 K/UL
NRBC BLD-RTO: 0 /100 WBC
PLATELET # BLD AUTO: 254 K/UL (ref 164–446)
PMV BLD AUTO: 10.4 FL (ref 9–12.9)
RBC # BLD AUTO: 4.04 M/UL (ref 4.2–5.4)
WBC # BLD AUTO: 5.2 K/UL (ref 4.8–10.8)

## 2018-02-07 ENCOUNTER — TELEPHONE (OUTPATIENT)
Dept: CARDIOLOGY | Facility: MEDICAL CENTER | Age: 68
End: 2018-02-07

## 2018-02-07 ENCOUNTER — PATIENT MESSAGE (OUTPATIENT)
Dept: MEDICAL GROUP | Facility: PHYSICIAN GROUP | Age: 68
End: 2018-02-07

## 2018-02-07 DIAGNOSIS — R53.83 OTHER FATIGUE: ICD-10-CM

## 2018-02-07 DIAGNOSIS — D64.9 ANEMIA, UNSPECIFIED TYPE: ICD-10-CM

## 2018-02-07 NOTE — TELEPHONE ENCOUNTER
Spoke with patient who called to inform Dr. Mayberry that she never started the amlodipine because her BP has been good, running an average of 130/70 for 5 consecutive days now.  She has weaned herself off of the Valsartan and will continue to monitor. To SMILEY PEARSON.

## 2018-02-08 NOTE — PROGRESS NOTES
Patient is having some fatigue and according to her last CBC slight drop in her hemoglobin. I spoke with Dr. Charles's would like to run some iron studies just to check for iron deficiency anemia. Patient has been in communication with myself via my chart and I did update her on the plan. I have ordered iron studies for patient to complete.

## 2018-02-16 ENCOUNTER — HOSPITAL ENCOUNTER (OUTPATIENT)
Dept: LAB | Facility: MEDICAL CENTER | Age: 68
End: 2018-02-16
Attending: FAMILY MEDICINE
Payer: MEDICARE

## 2018-02-16 ENCOUNTER — HOSPITAL ENCOUNTER (OUTPATIENT)
Dept: LAB | Facility: MEDICAL CENTER | Age: 68
End: 2018-02-16
Attending: NURSE PRACTITIONER
Payer: MEDICARE

## 2018-02-16 DIAGNOSIS — E78.2 MIXED HYPERLIPIDEMIA: ICD-10-CM

## 2018-02-16 DIAGNOSIS — D64.9 ANEMIA, UNSPECIFIED TYPE: ICD-10-CM

## 2018-02-16 DIAGNOSIS — R53.83 OTHER FATIGUE: ICD-10-CM

## 2018-02-16 LAB
CHOLEST SERPL-MCNC: 244 MG/DL (ref 100–199)
FERRITIN SERPL-MCNC: 41.9 NG/ML (ref 10–291)
HDLC SERPL-MCNC: 57 MG/DL
IRON SATN MFR SERPL: 25 % (ref 15–55)
IRON SERPL-MCNC: 81 UG/DL (ref 40–170)
LDLC SERPL CALC-MCNC: 149 MG/DL
TIBC SERPL-MCNC: 329 UG/DL (ref 250–450)
TRIGL SERPL-MCNC: 191 MG/DL (ref 0–149)

## 2018-02-16 PROCEDURE — 82728 ASSAY OF FERRITIN: CPT

## 2018-02-16 PROCEDURE — 83550 IRON BINDING TEST: CPT

## 2018-02-16 PROCEDURE — 80061 LIPID PANEL: CPT

## 2018-02-16 PROCEDURE — 83540 ASSAY OF IRON: CPT

## 2018-02-16 PROCEDURE — 36415 COLL VENOUS BLD VENIPUNCTURE: CPT

## 2018-02-20 ENCOUNTER — TELEPHONE (OUTPATIENT)
Dept: HEMATOLOGY ONCOLOGY | Facility: MEDICAL CENTER | Age: 68
End: 2018-02-20

## 2018-02-20 NOTE — TELEPHONE ENCOUNTER
Patient completed iron studies which were within normal limits. I did discuss the results with Dr. Charles. There is no concern for iron deficiency anemia. I have updated patient via my chart and patient is scheduled to be seen in the clinic for her follow-up regarding breast cancer in April with Dr. Charles.

## 2018-02-21 ENCOUNTER — TELEPHONE (OUTPATIENT)
Dept: MEDICAL GROUP | Facility: PHYSICIAN GROUP | Age: 68
End: 2018-02-21

## 2018-02-21 NOTE — TELEPHONE ENCOUNTER
----- Message from Jenna Hernandez M.D. sent at 2/21/2018  7:42 AM PST -----  Please call patient to ensure they received their results through My Chart.  The patient requires follow up office visit.

## 2018-02-22 ENCOUNTER — PATIENT MESSAGE (OUTPATIENT)
Dept: MEDICAL GROUP | Facility: PHYSICIAN GROUP | Age: 68
End: 2018-02-22

## 2018-02-22 ENCOUNTER — TELEPHONE (OUTPATIENT)
Dept: HEMATOLOGY ONCOLOGY | Facility: MEDICAL CENTER | Age: 68
End: 2018-02-22

## 2018-02-22 DIAGNOSIS — E78.2 MIXED HYPERLIPIDEMIA: ICD-10-CM

## 2018-02-22 RX ORDER — EZETIMIBE 10 MG/1
10 TABLET ORAL DAILY
Qty: 90 TAB | Refills: 3 | Status: SHIPPED | OUTPATIENT
Start: 2018-02-22 | End: 2018-08-22

## 2018-02-22 NOTE — TELEPHONE ENCOUNTER
From: Lauren Dave  To: Anibal Hernandez M.D.  Sent: 2/22/2018 12:49 PM PST  Subject: Non-Urgent Medical Question    Was everything else ok.     ----- Message -----  From: Anibal Hernandez M.D.  Sent: 2/22/18, 12:40 PM  To: Lauren Dave  Subject: RE: Non-Urgent Medical Question    Yes office visit is for high cholesterol. I am sending in Zetia (non-statin) cholesterol medication. Repeat cholesterol in 3-6 months.     Anibal    ----- Message -----   From: Lauren Dave   Sent: 2/22/2018 12:32 PM PST   To: Anibal Hernandez M.D.  Subject: Non-Urgent Medical Question    I have a question about Lipid Profile resulted on 1/17/17, 12:20 PM.    Is this what the doctor wants to talk about where's the rest of the lipid panel. If so just have anibal order some medicine but no statins Not sure I will make the appointment if it keeps snowing I don't drive in the snow.

## 2018-02-22 NOTE — TELEPHONE ENCOUNTER
Patient called stated that she had her lab done would like for Viktoriya MARLEY to review them ASAP. Please call her ASAP with her results thank you.

## 2018-02-22 NOTE — TELEPHONE ENCOUNTER
From: Lauren Dave  To: Anibal Hernandez M.D.  Sent: 2/22/2018 12:32 PM PST  Subject: Non-Urgent Medical Question    I have a question about Lipid Profile resulted on 1/17/17, 12:20 PM.    Is this what the doctor wants to talk about where's the rest of the lipid panel. If so just have anibal order some medicine but no statins Not sure I will make the appointment if it keeps snowing I don't drive in the snow.

## 2018-02-27 RX ORDER — MONTELUKAST SODIUM 4 MG/1
1 TABLET, CHEWABLE ORAL 2 TIMES DAILY
Qty: 60 TAB | Refills: 5 | Status: SHIPPED | OUTPATIENT
Start: 2018-02-27 | End: 2018-08-22

## 2018-03-26 ENCOUNTER — TELEPHONE (OUTPATIENT)
Dept: MEDICAL GROUP | Facility: PHYSICIAN GROUP | Age: 68
End: 2018-03-26

## 2018-03-26 NOTE — TELEPHONE ENCOUNTER
1. Caller Name: [facility]                                         Call Back Number: [phone]      Patient approves a detailed voicemail message: N\A    Pharmacy states colestipol will cost pt $46 and she is asking for a cheaper alternative.

## 2018-03-27 RX ORDER — GEMFIBROZIL 600 MG/1
600 TABLET, FILM COATED ORAL 2 TIMES DAILY
Qty: 60 TAB | Refills: 11 | Status: SHIPPED | OUTPATIENT
Start: 2018-03-27 | End: 2018-08-22

## 2018-04-11 ENCOUNTER — HOSPITAL ENCOUNTER (OUTPATIENT)
Dept: RADIOLOGY | Facility: MEDICAL CENTER | Age: 68
End: 2018-04-11
Attending: SURGERY
Payer: MEDICARE

## 2018-04-11 DIAGNOSIS — E04.1 NONTOXIC UNINODULAR GOITER: ICD-10-CM

## 2018-04-11 DIAGNOSIS — E04.2 NONTOXIC MULTINODULAR GOITER: ICD-10-CM

## 2018-04-11 PROCEDURE — 76536 US EXAM OF HEAD AND NECK: CPT

## 2018-04-17 ENCOUNTER — APPOINTMENT (OUTPATIENT)
Dept: HEMATOLOGY ONCOLOGY | Facility: MEDICAL CENTER | Age: 68
End: 2018-04-17
Payer: MEDICARE

## 2018-04-17 ENCOUNTER — TELEPHONE (OUTPATIENT)
Dept: HEMATOLOGY ONCOLOGY | Facility: MEDICAL CENTER | Age: 68
End: 2018-04-17

## 2018-04-17 NOTE — TELEPHONE ENCOUNTER
Patient  called stated that she is sick today 04/17/18  and she would need to reschedule appointment  To June thank you

## 2018-06-04 ENCOUNTER — OFFICE VISIT (OUTPATIENT)
Dept: HEMATOLOGY ONCOLOGY | Facility: MEDICAL CENTER | Age: 68
End: 2018-06-04
Payer: MEDICARE

## 2018-06-04 VITALS
TEMPERATURE: 98.4 F | OXYGEN SATURATION: 96 % | HEART RATE: 82 BPM | DIASTOLIC BLOOD PRESSURE: 98 MMHG | SYSTOLIC BLOOD PRESSURE: 174 MMHG | HEIGHT: 64 IN | WEIGHT: 142.86 LBS | RESPIRATION RATE: 16 BRPM | BODY MASS INDEX: 24.39 KG/M2

## 2018-06-04 DIAGNOSIS — C50.211 MALIGNANT NEOPLASM OF UPPER-INNER QUADRANT OF RIGHT BREAST IN FEMALE, ESTROGEN RECEPTOR NEGATIVE (HCC): ICD-10-CM

## 2018-06-04 DIAGNOSIS — Z17.1 MALIGNANT NEOPLASM OF UPPER-INNER QUADRANT OF RIGHT BREAST IN FEMALE, ESTROGEN RECEPTOR NEGATIVE (HCC): ICD-10-CM

## 2018-06-04 PROCEDURE — 99214 OFFICE O/P EST MOD 30 MIN: CPT | Performed by: INTERNAL MEDICINE

## 2018-06-04 ASSESSMENT — PAIN SCALES - GENERAL: PAINLEVEL: NO PAIN
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
## 2018-06-14 ENCOUNTER — HOSPITAL ENCOUNTER (OUTPATIENT)
Dept: LAB | Facility: MEDICAL CENTER | Age: 68
End: 2018-06-14
Attending: FAMILY MEDICINE
Payer: MEDICARE

## 2018-06-14 DIAGNOSIS — E78.2 MIXED HYPERLIPIDEMIA: ICD-10-CM

## 2018-06-14 LAB
ALBUMIN SERPL BCP-MCNC: 4.6 G/DL (ref 3.2–4.9)
ALBUMIN/GLOB SERPL: 1.2 G/DL
ALP SERPL-CCNC: 66 U/L (ref 30–99)
ALT SERPL-CCNC: 16 U/L (ref 2–50)
ANION GAP SERPL CALC-SCNC: 10 MMOL/L (ref 0–11.9)
AST SERPL-CCNC: 34 U/L (ref 12–45)
BILIRUB SERPL-MCNC: 0.5 MG/DL (ref 0.1–1.5)
BUN SERPL-MCNC: 24 MG/DL (ref 8–22)
CALCIUM SERPL-MCNC: 10.1 MG/DL (ref 8.5–10.5)
CHLORIDE SERPL-SCNC: 102 MMOL/L (ref 96–112)
CHOLEST SERPL-MCNC: 262 MG/DL (ref 100–199)
CO2 SERPL-SCNC: 27 MMOL/L (ref 20–33)
CREAT SERPL-MCNC: 0.88 MG/DL (ref 0.5–1.4)
GLOBULIN SER CALC-MCNC: 3.8 G/DL (ref 1.9–3.5)
GLUCOSE SERPL-MCNC: 87 MG/DL (ref 65–99)
HDLC SERPL-MCNC: 69 MG/DL
LDLC SERPL CALC-MCNC: 142 MG/DL
POTASSIUM SERPL-SCNC: 4.2 MMOL/L (ref 3.6–5.5)
PROT SERPL-MCNC: 8.4 G/DL (ref 6–8.2)
SODIUM SERPL-SCNC: 139 MMOL/L (ref 135–145)
TRIGL SERPL-MCNC: 257 MG/DL (ref 0–149)

## 2018-06-14 PROCEDURE — 80061 LIPID PANEL: CPT

## 2018-06-14 PROCEDURE — 36415 COLL VENOUS BLD VENIPUNCTURE: CPT

## 2018-06-14 PROCEDURE — 80053 COMPREHEN METABOLIC PANEL: CPT

## 2018-06-15 ENCOUNTER — HOSPITAL ENCOUNTER (OUTPATIENT)
Dept: RADIOLOGY | Facility: MEDICAL CENTER | Age: 68
End: 2018-06-15
Attending: INTERNAL MEDICINE
Payer: MEDICARE

## 2018-06-15 ENCOUNTER — TELEPHONE (OUTPATIENT)
Dept: MEDICAL GROUP | Facility: PHYSICIAN GROUP | Age: 68
End: 2018-06-15

## 2018-06-15 DIAGNOSIS — C50.211 MALIGNANT NEOPLASM OF UPPER-INNER QUADRANT OF RIGHT BREAST IN FEMALE, ESTROGEN RECEPTOR NEGATIVE (HCC): ICD-10-CM

## 2018-06-15 DIAGNOSIS — Z17.1 MALIGNANT NEOPLASM OF UPPER-INNER QUADRANT OF RIGHT BREAST IN FEMALE, ESTROGEN RECEPTOR NEGATIVE (HCC): ICD-10-CM

## 2018-06-15 PROCEDURE — 71260 CT THORAX DX C+: CPT

## 2018-06-15 PROCEDURE — 700117 HCHG RX CONTRAST REV CODE 255: Performed by: INTERNAL MEDICINE

## 2018-06-15 RX ADMIN — IOHEXOL 75 ML: 350 INJECTION, SOLUTION INTRAVENOUS at 10:15

## 2018-06-15 NOTE — TELEPHONE ENCOUNTER
----- Message from Jenna Hernandez M.D. sent at 6/15/2018  7:56 AM PDT -----  Cholesterol is a bit high.  I think we may want to treat this.  Kidney function much improved. Globulin levels are high.  Recommend follow up visit to discuss

## 2018-06-29 ENCOUNTER — TELEPHONE (OUTPATIENT)
Dept: CARDIOLOGY | Facility: MEDICAL CENTER | Age: 68
End: 2018-06-29

## 2018-06-29 ENCOUNTER — PATIENT MESSAGE (OUTPATIENT)
Dept: MEDICAL GROUP | Facility: PHYSICIAN GROUP | Age: 68
End: 2018-06-29

## 2018-06-29 NOTE — TELEPHONE ENCOUNTER
Jarrod Mayberry M.D.  ALO Galeana: Unspecified (Today,  1:48 PM)             Let her know this is expected, nothing new     Thank you      Patient informed via My Chart.

## 2018-06-29 NOTE — TELEPHONE ENCOUNTER
Joseline Escobar, Zachery Ass't  Padmini Bolton R.N.   Phone Number: [phone]          Previous Messages         Non-Urgent Medical Question     You   Lauren Dave Just now (1:47 PM)         I sure will.  He is out of the office for a couple of weeks and he may check in with me sooner, and if so I will get back to you with what he says .  Please don't worry, for the most part it shows no significant changes.  You do not have an aneurysm and we all get atherosclerosis as we age, but lets see what Dr. Mayberry says, he's the expert.  I will be in touch soon. Take Care, Althea BURNS for Dr. Mayberry        This Akellat message has not been read.      Zachery Roberts Ass't routed conversation to You 20 minutes ago (1:27 PM)      Lauren Dave   Jarrod Mayberry M.D. 37 minutes ago (1:10 PM)        Althea could you have Dr Mayberry take a look at my last chest scan just recently something about the a Aorta something done at [facility].         To SMILEY PEARSON to please advise.

## 2018-07-21 DIAGNOSIS — M10.00 ACUTE IDIOPATHIC GOUT, UNSPECIFIED SITE: ICD-10-CM

## 2018-07-23 RX ORDER — IBUPROFEN 800 MG/1
TABLET ORAL
Qty: 90 TAB | Refills: 0 | Status: SHIPPED | OUTPATIENT
Start: 2018-07-23 | End: 2019-08-09 | Stop reason: SDUPTHER

## 2018-07-23 NOTE — TELEPHONE ENCOUNTER
Was the patient seen in the last year in this department? Yes     Does patient have an active prescription for medications requested? No     Received Request Via: Patient liz

## 2018-07-29 ENCOUNTER — HOSPITAL ENCOUNTER (OUTPATIENT)
Facility: MEDICAL CENTER | Age: 68
End: 2018-07-29
Attending: FAMILY MEDICINE
Payer: MEDICARE

## 2018-07-29 ENCOUNTER — OFFICE VISIT (OUTPATIENT)
Dept: URGENT CARE | Facility: PHYSICIAN GROUP | Age: 68
End: 2018-07-29
Payer: MEDICARE

## 2018-07-29 VITALS
TEMPERATURE: 97.6 F | DIASTOLIC BLOOD PRESSURE: 68 MMHG | WEIGHT: 142 LBS | BODY MASS INDEX: 24.37 KG/M2 | RESPIRATION RATE: 14 BRPM | SYSTOLIC BLOOD PRESSURE: 142 MMHG | OXYGEN SATURATION: 96 % | HEART RATE: 84 BPM

## 2018-07-29 DIAGNOSIS — R30.0 DYSURIA: ICD-10-CM

## 2018-07-29 DIAGNOSIS — N30.01 ACUTE CYSTITIS WITH HEMATURIA: ICD-10-CM

## 2018-07-29 LAB
APPEARANCE UR: CLEAR
BILIRUB UR STRIP-MCNC: NORMAL MG/DL
COLOR UR AUTO: YELLOW
GLUCOSE UR STRIP.AUTO-MCNC: NORMAL MG/DL
KETONES UR STRIP.AUTO-MCNC: NORMAL MG/DL
LEUKOCYTE ESTERASE UR QL STRIP.AUTO: NORMAL
NITRITE UR QL STRIP.AUTO: NORMAL
PH UR STRIP.AUTO: 6 [PH] (ref 5–8)
PROT UR QL STRIP: NORMAL MG/DL
RBC UR QL AUTO: NORMAL
SP GR UR STRIP.AUTO: 1.02
UROBILINOGEN UR STRIP-MCNC: NORMAL MG/DL

## 2018-07-29 PROCEDURE — 99213 OFFICE O/P EST LOW 20 MIN: CPT | Performed by: FAMILY MEDICINE

## 2018-07-29 PROCEDURE — 87086 URINE CULTURE/COLONY COUNT: CPT

## 2018-07-29 PROCEDURE — 81002 URINALYSIS NONAUTO W/O SCOPE: CPT | Performed by: FAMILY MEDICINE

## 2018-07-29 RX ORDER — SULFAMETHOXAZOLE AND TRIMETHOPRIM 800; 160 MG/1; MG/1
1 TABLET ORAL EVERY 12 HOURS
Qty: 6 TAB | Refills: 0 | Status: SHIPPED | OUTPATIENT
Start: 2018-07-29 | End: 2018-08-02 | Stop reason: SDUPTHER

## 2018-07-29 ASSESSMENT — ENCOUNTER SYMPTOMS
WEIGHT LOSS: 0
COUGH: 0
SORE THROAT: 0
EYE REDNESS: 0
FEVER: 0
SHORTNESS OF BREATH: 0
EYE DISCHARGE: 0
NAUSEA: 0
FLANK PAIN: 1
CHILLS: 0
VOMITING: 0
CONSTIPATION: 0
PALPITATIONS: 0
HEMOPTYSIS: 0
HEADACHES: 0
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
## 2018-07-31 LAB
BACTERIA UR CULT: NORMAL
SIGNIFICANT IND 70042: NORMAL
SITE SITE: NORMAL
SOURCE SOURCE: NORMAL

## 2018-08-02 ENCOUNTER — PATIENT MESSAGE (OUTPATIENT)
Dept: MEDICAL GROUP | Facility: PHYSICIAN GROUP | Age: 68
End: 2018-08-02

## 2018-08-02 DIAGNOSIS — N30.01 ACUTE CYSTITIS WITH HEMATURIA: ICD-10-CM

## 2018-08-02 RX ORDER — SULFAMETHOXAZOLE AND TRIMETHOPRIM 800; 160 MG/1; MG/1
1 TABLET ORAL EVERY 12 HOURS
Qty: 6 TAB | Refills: 0 | Status: SHIPPED | OUTPATIENT
Start: 2018-08-02 | End: 2018-08-05

## 2018-08-22 ENCOUNTER — OFFICE VISIT (OUTPATIENT)
Dept: MEDICAL GROUP | Facility: PHYSICIAN GROUP | Age: 68
End: 2018-08-22
Payer: MEDICARE

## 2018-08-22 VITALS
SYSTOLIC BLOOD PRESSURE: 126 MMHG | BODY MASS INDEX: 24.79 KG/M2 | RESPIRATION RATE: 14 BRPM | HEART RATE: 76 BPM | DIASTOLIC BLOOD PRESSURE: 80 MMHG | HEIGHT: 64 IN | OXYGEN SATURATION: 98 % | TEMPERATURE: 98.6 F | WEIGHT: 145.2 LBS

## 2018-08-22 DIAGNOSIS — I25.84 CORONARY ARTERY DISEASE DUE TO CALCIFIED CORONARY LESION: Chronic | ICD-10-CM

## 2018-08-22 DIAGNOSIS — K04.7 DENTAL ABSCESS: ICD-10-CM

## 2018-08-22 DIAGNOSIS — R77.9 ELEVATED BLOOD PROTEIN: ICD-10-CM

## 2018-08-22 DIAGNOSIS — E78.2 MIXED HYPERLIPIDEMIA: ICD-10-CM

## 2018-08-22 DIAGNOSIS — E55.9 VITAMIN D DEFICIENCY: ICD-10-CM

## 2018-08-22 DIAGNOSIS — I25.10 CORONARY ARTERY DISEASE DUE TO CALCIFIED CORONARY LESION: Chronic | ICD-10-CM

## 2018-08-22 DIAGNOSIS — I10 ESSENTIAL HYPERTENSION: Chronic | ICD-10-CM

## 2018-08-22 PROCEDURE — 99214 OFFICE O/P EST MOD 30 MIN: CPT | Performed by: FAMILY MEDICINE

## 2018-08-22 RX ORDER — EZETIMIBE 10 MG/1
10 TABLET ORAL DAILY
Qty: 90 TAB | Refills: 3 | Status: SHIPPED | OUTPATIENT
Start: 2018-08-22 | End: 2018-12-19

## 2018-08-22 ASSESSMENT — PATIENT HEALTH QUESTIONNAIRE - PHQ9: CLINICAL INTERPRETATION OF PHQ2 SCORE: 0

## 2018-08-22 NOTE — PROGRESS NOTES
Chief Complaint   Patient presents with   • Hyperlipidemia     fv labs       HISTORY OF PRESENT ILLNESS: Patient is a 68 y.o. female established patient here today for the following concerns:    1. Dental abscess  Here today to follow up on labs, but reports that she has been experiencing a dental abscess with pain.  Has dental appointment in 2 weeks. Requests ampicillin as this is the only antibiotic that she can tolerate that works.      2. Elevated blood protein  Review of the labs show elevated protein and globulin levels.  This has intermittently been high in the past.      3. Mixed hyperlipidemia  4. Coronary artery disease due to calcified coronary lesion - Calcifications seen on CT scan also wtih aortic ulceration  5. Essential hypertension  Reports since giving up coffee BP has been normal and she stopped taking all her blood pressure medications.  She did not start the cholesterol medications.  Has hx of CAD seen on CT.  She feels her cholesterol will be better than the last labs let on as she was incompletely fasting that day.  She is very hesitant to treat the cholesterol and is intolerant to statin therapy.     6. Vitamin D deficiency  Previously low, and required ergocalciferol replacement.  Feeling fatigued again.  Concerned she has fibromyalgia.       Past Medical, Social, and Family history reviewed and updated in EPIC    Allergies:Statins [hmg-coa-r inhibitors]; Codeine; Lisinopril; Penicillins; Statins [hmg-coa-r inhibitors]; Codeine; and Pcn [penicillins]    Current Outpatient Prescriptions   Medication Sig Dispense Refill   • ampicillin (PRINCIPEN) 250 MG Cap Take 1 Cap by mouth 4 times a day for 7 days. 28 Cap 0   • ezetimibe (ZETIA) 10 MG Tab Take 1 Tab by mouth every day. 90 Tab 3   • ibuprofen (MOTRIN) 800 MG Tab TAKE ONE TABLET BY MOUTH EVERY 8 HOURS AS NEEDED FOR MILD PAIN 90 Tab 0   • vitamin D, Ergocalciferol, (DRISDOL) 16869 UNITS Cap capsule Take  by mouth every 7 days.       No  "current facility-administered medications for this visit.          ROS:  Review of Systems   Constitutional: Negative for fever, chills, weight loss and malaise/fatigue.   HENT: Negative for ear pain, nosebleeds, congestion, sore throat and neck pain.    Eyes: Negative for blurred vision.   Respiratory: Negative for cough, sputum production, shortness of breath and wheezing.    Cardiovascular: Negative for chest pain, palpitations,  and leg swelling.   Gastrointestinal: Negative for heartburn, nausea, vomiting, diarrhea and abdominal pain.   Genitourinary: Negative for dysuria, urgency and frequency.   Musculoskeletal: Negative for myalgias, back pain and joint pain.   Skin: Negative for rash and itching.   Neurological: Negative for dizziness, tingling, tremors, sensory change, focal weakness and headaches.   Endo/Heme/Allergies: Does not bruise/bleed easily.   Psychiatric/Behavioral: Negative for depression, anxiety, suicidal ideas, insomnia and memory loss.      Exam:  Blood pressure 126/80, pulse 76, temperature 37 °C (98.6 °F), resp. rate 14, height 1.626 m (5' 4\"), weight 65.9 kg (145 lb 3.2 oz), SpO2 98 %.    General:  Well nourished, well developed in NAD  Head is grossly normal.  Neck: Supple without JVD   Pulmonary:  Normal effort.   Cardiovascular: Regular rate  Extremities: no clubbing, cyanosis, or edema.  Psych: affect appropriate      Please note that this dictation was created using voice recognition software. I have made every reasonable attempt to correct obvious errors, but I expect that there are errors of grammar and possibly content that I did not discover before finalizing the note.    Assessment/Plan:  1. Dental abscess  - ampicillin (PRINCIPEN) 250 MG Cap; Take 1 Cap by mouth 4 times a day for 7 days.  Dispense: 28 Cap; Refill: 0    2. Elevated blood protein  - COMP METABOLIC PANEL; Future    3. Mixed hyperlipidemia  - ezetimibe (ZETIA) 10 MG Tab; Take 1 Tab by mouth every day.  Dispense: 90 " Tab; Refill: 3  - LIPID PROFILE; Future    4. Coronary artery disease due to calcified coronary lesion - Calcifications seen on CT scan also wtih aortic ulceration  - ezetimibe (ZETIA) 10 MG Tab; Take 1 Tab by mouth every day.  Dispense: 90 Tab; Refill: 3  - LIPID PROFILE; Future    5. Essential hypertension  Off all meds, BP normal today  - LIPID PROFILE; Future    6. Vitamin D deficiency  - VITAMIN D,25 HYDROXY; Future

## 2018-08-22 NOTE — TELEPHONE ENCOUNTER
----- Message from Lauren Dave sent at 8/22/2018  3:29 PM PDT -----  Regarding: Prescription Question  Contact: [phone]  Please could you send my prescriptions to [facility] they do not have the meds at [address]  That Jenna ordered today I really need them bad. !! And can you let me know if confirmed.   Thank you Lauren

## 2018-08-23 ENCOUNTER — TELEPHONE (OUTPATIENT)
Dept: MEDICAL GROUP | Facility: PHYSICIAN GROUP | Age: 68
End: 2018-08-23

## 2018-08-23 DIAGNOSIS — K04.7 DENTAL INFECTION: ICD-10-CM

## 2018-08-23 RX ORDER — AMPICILLIN 500 MG/1
500 CAPSULE ORAL 4 TIMES DAILY
Qty: 40 CAP | Refills: 0 | Status: SHIPPED | OUTPATIENT
Start: 2018-08-23 | End: 2018-10-04

## 2018-08-23 NOTE — TELEPHONE ENCOUNTER
----- Message from Lauren Dave sent at 8/22/2018  7:21 PM PDT -----  Regarding: Prescription Question  Contact: [phone]  [place] I have called all over for ampicillin and no where to be found except [facility] they have 500 mil only not 250 please change it and call it in to [facility]s I am hurting real bad and need it first thing in the morning please.   Thank you Lauren

## 2018-08-30 ENCOUNTER — TELEPHONE (OUTPATIENT)
Dept: MEDICAL GROUP | Facility: PHYSICIAN GROUP | Age: 68
End: 2018-08-30

## 2018-08-30 RX ORDER — CLINDAMYCIN HYDROCHLORIDE 300 MG/1
300 CAPSULE ORAL 3 TIMES DAILY
Qty: 21 CAP | Refills: 0 | Status: SHIPPED | OUTPATIENT
Start: 2018-08-30 | End: 2018-09-06

## 2018-08-30 NOTE — TELEPHONE ENCOUNTER
Dr Hernandez- Pharmacy is stating that ampicillin is not available. Clindamycin could be a good option for pt given PNC allergy. Please advise.

## 2018-09-30 ENCOUNTER — OFFICE VISIT (OUTPATIENT)
Dept: URGENT CARE | Facility: PHYSICIAN GROUP | Age: 68
End: 2018-09-30
Payer: MEDICARE

## 2018-09-30 VITALS
OXYGEN SATURATION: 96 % | HEART RATE: 83 BPM | BODY MASS INDEX: 23.82 KG/M2 | TEMPERATURE: 97.5 F | SYSTOLIC BLOOD PRESSURE: 124 MMHG | HEIGHT: 65 IN | DIASTOLIC BLOOD PRESSURE: 72 MMHG | WEIGHT: 143 LBS

## 2018-09-30 DIAGNOSIS — J02.8 SORE THROAT (VIRAL): ICD-10-CM

## 2018-09-30 DIAGNOSIS — J01.00 ACUTE NON-RECURRENT MAXILLARY SINUSITIS: Primary | ICD-10-CM

## 2018-09-30 DIAGNOSIS — B97.89 SORE THROAT (VIRAL): ICD-10-CM

## 2018-09-30 DIAGNOSIS — R05.9 COUGH: ICD-10-CM

## 2018-09-30 LAB
INT CON NEG: NEGATIVE
INT CON POS: POSITIVE
S PYO AG THROAT QL: NORMAL

## 2018-09-30 PROCEDURE — 87880 STREP A ASSAY W/OPTIC: CPT | Performed by: PHYSICIAN ASSISTANT

## 2018-09-30 PROCEDURE — 99214 OFFICE O/P EST MOD 30 MIN: CPT | Performed by: PHYSICIAN ASSISTANT

## 2018-09-30 RX ORDER — AZITHROMYCIN 250 MG/1
TABLET, FILM COATED ORAL
Qty: 6 TAB | Refills: 0 | Status: SHIPPED | OUTPATIENT
Start: 2018-09-30 | End: 2018-10-04

## 2018-09-30 ASSESSMENT — ENCOUNTER SYMPTOMS
FEVER: 0
SPUTUM PRODUCTION: 1
SORE THROAT: 1
COUGH: 1
SINUS PAIN: 1
WHEEZING: 0
HEADACHES: 1

## 2018-09-30 NOTE — PROGRESS NOTES
"Subjective:      Lauren Dave is a 68 y.o. female who presents with Cough (congestion x 2 weeks)    PMH:  has a past medical history of Arthritis; Breast cancer (HCC) (8/7/2013); Bronchitis (2005); Bundle branch block, right; Cancer (HCC); Cancer (HCC); Cold; Coronary artery disease due to calcified coronary lesion - Calcifications seen on CT scan also wtih aortic ulceration; Dental disorder; Dyslipidemia; Essential hypertension; Heart burn; LBBB (left bundle branch block) (12/16/2014); Pneumonia; Pseudogout; Scarlet fever; and Unspecified hemorrhagic conditions.  MEDS:   Current Outpatient Prescriptions:   •  vitamin D, Ergocalciferol, (DRISDOL) 79580 UNITS Cap capsule, Take  by mouth every 7 days., Disp: , Rfl:   •  ampicillin (PRINCIPEN) 500 MG Cap, Take 1 Cap by mouth 4 times a day., Disp: 40 Cap, Rfl: 0  •  ezetimibe (ZETIA) 10 MG Tab, Take 1 Tab by mouth every day., Disp: 90 Tab, Rfl: 3  •  ibuprofen (MOTRIN) 800 MG Tab, TAKE ONE TABLET BY MOUTH EVERY 8 HOURS AS NEEDED FOR MILD PAIN, Disp: 90 Tab, Rfl: 0  ALLERGIES:   Allergies   Allergen Reactions   • Statins [Hmg-Coa-R Inhibitors] Unspecified     Muscle Spasms   RXN=unknown   • Codeine Vomiting   • Lisinopril Cough   • Penicillins      \"does not work\" .'IMMUNE TO PENICILLIN,AMPICILLIN IS THE ONLY THING THAT WORKS'   • Statins [Hmg-Coa-R Inhibitors]      Muscle pain   • Codeine Vomiting and Nausea     Clarified with patient - states that she has an \"upset stomach\" when she takes it   • Pcn [Penicillins] Unspecified     Does not work  RXN=ongoing     SURGHX:   Past Surgical History:   Procedure Laterality Date   • CATH REMOVAL  2/24/2014    Performed by Aggie Burns M.D. at SURGERY SAME DAY [facility]   • MASTECTOMY  8/22/2013    Performed by Aggie Burns M.D. at SURGERY SAME DAY [facility]   • NODE BIOPSY SENTINEL  8/22/2013    Performed by Aggie Burns M.D. at SURGERY SAME DAY [facility]   • CATH PLACEMENT  8/22/2013    " "Performed by Aggie Burns M.D. at SURGERY SAME DAY [facility]S   • US-NEEDLE CORE BX-BREAST PANEL  2013    right breast   • COLONOSCOPY  2003   • BREAST BIOPSY     • TONSILLECTOMY       SOCHX:  reports that she quit smoking about 5 years ago. Her smoking use included Cigarettes. She smoked 1.00 pack per day for 0.00 years. She has never used smokeless tobacco. She reports that she does not drink alcohol or use drugs.  FH:  Reviewed with patient/family. Not pertinent to this complaint.          Patient presents with:  Cough: congestion x 2 weeks, sore throat since last night.  \"swallowing razor blades\" .  Pt denies CP,SOB, NVD.            Cough   This is a new problem. Episode onset: 2 weeks. The problem has been unchanged. The problem occurs every few minutes. The cough is productive of sputum. Associated symptoms include headaches, nasal congestion, postnasal drip and a sore throat. Pertinent negatives include no fever or wheezing. The symptoms are aggravated by lying down. Risk factors for lung disease include smoking/tobacco exposure. She has tried body position changes and OTC cough suppressant for the symptoms. Her past medical history is significant for bronchitis.       Review of Systems   Constitutional: Negative for fever.   HENT: Positive for congestion, postnasal drip, sinus pain and sore throat.    Respiratory: Positive for cough and sputum production. Negative for wheezing.    Neurological: Positive for headaches.   All other systems reviewed and are negative.         Objective:     /72 (BP Location: Left arm, Patient Position: Sitting, BP Cuff Size: Adult)   Pulse 83   Temp 36.4 °C (97.5 °F)   Ht 1.651 m (5' 5\")   Wt 64.9 kg (143 lb)   SpO2 96%   BMI 23.80 kg/m²      Physical Exam   Constitutional: She is oriented to person, place, and time. She appears well-developed and well-nourished. No distress.   HENT:   Head: Normocephalic and atraumatic.   Right Ear: Tympanic membrane normal. "   Left Ear: Tympanic membrane normal.   Nose: Mucosal edema present. Right sinus exhibits maxillary sinus tenderness. Left sinus exhibits maxillary sinus tenderness.   Mouth/Throat: Uvula is midline and mucous membranes are normal. Posterior oropharyngeal erythema present. No oropharyngeal exudate or posterior oropharyngeal edema.   Eyes: Pupils are equal, round, and reactive to light. Conjunctivae and EOM are normal.   Neck: Normal range of motion. Neck supple.   Cardiovascular: Normal rate and regular rhythm.    Pulmonary/Chest: Effort normal and breath sounds normal. No respiratory distress.   Abdominal: Soft.   Musculoskeletal: Normal range of motion.   Lymphadenopathy:     She has no cervical adenopathy.   Neurological: She is alert and oriented to person, place, and time.   Skin: Skin is warm and dry. Capillary refill takes less than 2 seconds.   Psychiatric: She has a normal mood and affect.   Nursing note and vitals reviewed.              Assessment/Plan:     1. Acute non-recurrent maxillary sinusitis  azithromycin (ZITHROMAX) 250 MG Tab   2. Sore throat (viral)  POCT Rapid Strep A    azithromycin (ZITHROMAX) 250 MG Tab   3. Cough  azithromycin (ZITHROMAX) 250 MG Tab     PT can continue OTC medications, increase fluids and rest until symptoms improve.     PT should follow up with PCP in 1-2 days for re-evaluation if symptoms have not improved.  Discussed red flags and reasons to return to UC or ED.  Pt and/or family verbalized understanding of diagnosis and follow up instructions and was offered informational handout on diagnosis.  PT discharged.

## 2018-10-04 ENCOUNTER — TELEPHONE (OUTPATIENT)
Dept: URGENT CARE | Facility: PHYSICIAN GROUP | Age: 68
End: 2018-10-04

## 2018-10-04 ENCOUNTER — TELEPHONE (OUTPATIENT)
Dept: HEMATOLOGY ONCOLOGY | Facility: MEDICAL CENTER | Age: 68
End: 2018-10-04

## 2018-10-04 ENCOUNTER — TELEPHONE (OUTPATIENT)
Dept: MEDICAL GROUP | Facility: PHYSICIAN GROUP | Age: 68
End: 2018-10-04

## 2018-10-04 DIAGNOSIS — J01.00 ACUTE NON-RECURRENT MAXILLARY SINUSITIS: ICD-10-CM

## 2018-10-04 RX ORDER — AMPICILLIN 500 MG/1
500 CAPSULE ORAL 4 TIMES DAILY
Qty: 40 CAP | Refills: 0 | Status: SHIPPED | OUTPATIENT
Start: 2018-10-04 | End: 2018-10-04 | Stop reason: SDUPTHER

## 2018-10-04 RX ORDER — AMPICILLIN 500 MG/1
500 CAPSULE ORAL 4 TIMES DAILY
Qty: 40 CAP | Refills: 0 | Status: SHIPPED | OUTPATIENT
Start: 2018-10-04 | End: 2018-10-16

## 2018-10-04 NOTE — TELEPHONE ENCOUNTER
Lauren, called stating that she is on her last day of the Zpak and would like her Ampicillin Rx.  She knows that is the only thing that will help her.  She is still coughing and sounds very congested.  No fevers and her symptoms have improved very little.

## 2018-10-04 NOTE — TELEPHONE ENCOUNTER
Claudette gonzalez [facility] Imaging Scheduling stated she spoke with Yovnne Hurley on 9/27/2018 regarding an order for a 6 month diagnostic bilateral mammogram for patient. Contact ext:[phone]. Please advise.

## 2018-10-04 NOTE — TELEPHONE ENCOUNTER
"pt called stating the abx are working and wants a specific abx. I spoke with Etelvina TAYLOR  in Urgent Care and she stated to tell pt to finish abx and if isn't working then to see doctor Hernandez about it. Informed pt and pt said \"Nope. I wont do it. That's not gonna happen. Nope\". Transfer pt call over to Urgent Care   "

## 2018-10-16 ENCOUNTER — OFFICE VISIT (OUTPATIENT)
Dept: URGENT CARE | Facility: PHYSICIAN GROUP | Age: 68
End: 2018-10-16
Payer: MEDICARE

## 2018-10-16 VITALS
RESPIRATION RATE: 16 BRPM | TEMPERATURE: 98.4 F | HEART RATE: 83 BPM | WEIGHT: 145 LBS | OXYGEN SATURATION: 96 % | DIASTOLIC BLOOD PRESSURE: 86 MMHG | BODY MASS INDEX: 24.13 KG/M2 | SYSTOLIC BLOOD PRESSURE: 142 MMHG

## 2018-10-16 DIAGNOSIS — R19.7 ACUTE DIARRHEA: ICD-10-CM

## 2018-10-16 LAB
APPEARANCE UR: CLEAR
BILIRUB UR STRIP-MCNC: NEGATIVE MG/DL
COLOR UR AUTO: NORMAL
GLUCOSE UR STRIP.AUTO-MCNC: NEGATIVE MG/DL
KETONES UR STRIP.AUTO-MCNC: NEGATIVE MG/DL
LEUKOCYTE ESTERASE UR QL STRIP.AUTO: NEGATIVE
NITRITE UR QL STRIP.AUTO: NEGATIVE
PH UR STRIP.AUTO: 6 [PH] (ref 5–8)
PROT UR QL STRIP: NEGATIVE MG/DL
RBC UR QL AUTO: NORMAL
SP GR UR STRIP.AUTO: 1.01
UROBILINOGEN UR STRIP-MCNC: 0.2 MG/DL

## 2018-10-16 PROCEDURE — 81002 URINALYSIS NONAUTO W/O SCOPE: CPT | Performed by: NURSE PRACTITIONER

## 2018-10-16 PROCEDURE — 99213 OFFICE O/P EST LOW 20 MIN: CPT | Performed by: NURSE PRACTITIONER

## 2018-10-16 NOTE — LETTER
October 16, 2018         Patient: Lauren Dave   YOB: 1950   Date of Visit: 10/16/2018           To Whom it May Concern:    Lauren Dave was seen in my clinic on 10/16/2018. She may be excused from work for three days due to illness.     If you have any questions or concerns, please don't hesitate to call.        Sincerely,           DAILY Garcia.  Electronically Signed

## 2018-10-16 NOTE — LETTER
October 16, 2018         Patient: Lauren Dave   YOB: 1950   Date of Visit: 10/16/2018           To Whom it May Concern:    Lauren Dave was seen in my clinic on 10/16/2018. She may     If you have any questions or concerns, please don't hesitate to call.        Sincerely,           YULISSA Garcia  Electronically Signed

## 2018-10-17 ENCOUNTER — HOSPITAL ENCOUNTER (OUTPATIENT)
Facility: MEDICAL CENTER | Age: 68
End: 2018-10-17
Attending: NURSE PRACTITIONER
Payer: MEDICARE

## 2018-10-17 DIAGNOSIS — R19.7 ACUTE DIARRHEA: ICD-10-CM

## 2018-10-17 LAB
C DIFF DNA SPEC QL NAA+PROBE: NEGATIVE
C DIFF TOX GENS STL QL NAA+PROBE: NEGATIVE

## 2018-10-17 PROCEDURE — 87177 OVA AND PARASITES SMEARS: CPT

## 2018-10-17 PROCEDURE — 87493 C DIFF AMPLIFIED PROBE: CPT

## 2018-10-17 PROCEDURE — 87899 AGENT NOS ASSAY W/OPTIC: CPT

## 2018-10-17 PROCEDURE — 87046 STOOL CULTR AEROBIC BACT EA: CPT

## 2018-10-17 PROCEDURE — 87045 FECES CULTURE AEROBIC BACT: CPT

## 2018-10-17 PROCEDURE — 87209 SMEAR COMPLEX STAIN: CPT

## 2018-10-18 LAB
E COLI SXT1+2 STL IA: NORMAL
SIGNIFICANT IND 70042: NORMAL
SITE SITE: NORMAL
SOURCE SOURCE: NORMAL

## 2018-10-20 LAB
BACTERIA STL CULT: NORMAL
E COLI SXT1+2 STL IA: NORMAL
SIGNIFICANT IND 70042: NORMAL
SITE SITE: NORMAL
SOURCE SOURCE: NORMAL

## 2018-10-22 DIAGNOSIS — C50.211 MALIGNANT NEOPLASM OF UPPER-INNER QUADRANT OF RIGHT BREAST IN FEMALE, ESTROGEN RECEPTOR NEGATIVE (HCC): ICD-10-CM

## 2018-10-22 DIAGNOSIS — Z17.1 MALIGNANT NEOPLASM OF UPPER-INNER QUADRANT OF RIGHT BREAST IN FEMALE, ESTROGEN RECEPTOR NEGATIVE (HCC): ICD-10-CM

## 2018-10-22 LAB
O+P SPEC MICRO: NORMAL
SIGNIFICANT IND 70042: NORMAL
SITE SITE: NORMAL
SOURCE SOURCE: NORMAL

## 2018-11-07 ENCOUNTER — APPOINTMENT (OUTPATIENT)
Dept: RADIOLOGY | Facility: MEDICAL CENTER | Age: 68
End: 2018-11-07
Attending: INTERNAL MEDICINE
Payer: MEDICARE

## 2018-11-29 ENCOUNTER — PATIENT MESSAGE (OUTPATIENT)
Dept: MEDICAL GROUP | Facility: PHYSICIAN GROUP | Age: 68
End: 2018-11-29

## 2018-12-07 ENCOUNTER — PATIENT MESSAGE (OUTPATIENT)
Dept: MEDICAL GROUP | Facility: PHYSICIAN GROUP | Age: 68
End: 2018-12-07

## 2018-12-07 RX ORDER — OMEPRAZOLE 20 MG/1
20 CAPSULE, DELAYED RELEASE ORAL DAILY
Qty: 30 CAP | Refills: 1 | Status: SHIPPED | OUTPATIENT
Start: 2018-12-07 | End: 2018-12-19

## 2018-12-19 ENCOUNTER — OFFICE VISIT (OUTPATIENT)
Dept: CARDIOLOGY | Facility: MEDICAL CENTER | Age: 68
End: 2018-12-19
Payer: MEDICARE

## 2018-12-19 VITALS
HEIGHT: 65 IN | SYSTOLIC BLOOD PRESSURE: 140 MMHG | HEART RATE: 92 BPM | WEIGHT: 145 LBS | OXYGEN SATURATION: 93 % | BODY MASS INDEX: 24.16 KG/M2 | DIASTOLIC BLOOD PRESSURE: 70 MMHG

## 2018-12-19 DIAGNOSIS — E78.5 DYSLIPIDEMIA: ICD-10-CM

## 2018-12-19 DIAGNOSIS — I25.10 CORONARY ARTERY DISEASE DUE TO CALCIFIED CORONARY LESION: Chronic | ICD-10-CM

## 2018-12-19 DIAGNOSIS — I25.84 CORONARY ARTERY DISEASE DUE TO CALCIFIED CORONARY LESION: Chronic | ICD-10-CM

## 2018-12-19 DIAGNOSIS — M10.00 ACUTE IDIOPATHIC GOUT, UNSPECIFIED SITE: ICD-10-CM

## 2018-12-19 DIAGNOSIS — I44.7 LBBB (LEFT BUNDLE BRANCH BLOCK): Chronic | ICD-10-CM

## 2018-12-19 PROCEDURE — 99214 OFFICE O/P EST MOD 30 MIN: CPT | Performed by: INTERNAL MEDICINE

## 2018-12-19 RX ORDER — AMLODIPINE BESYLATE 10 MG/1
5-10 TABLET ORAL DAILY
Qty: 30 TAB | Refills: 6 | Status: SHIPPED | OUTPATIENT
Start: 2018-12-19 | End: 2019-04-03

## 2018-12-19 ASSESSMENT — ENCOUNTER SYMPTOMS
CHILLS: 0
PALPITATIONS: 0
WEAKNESS: 0
ABDOMINAL PAIN: 0
DIZZINESS: 0
CLAUDICATION: 0
SORE THROAT: 0
COUGH: 0
FALLS: 0
FEVER: 0
NAUSEA: 0
SHORTNESS OF BREATH: 0
PND: 0
FOCAL WEAKNESS: 0
BLURRED VISION: 0
HEADACHES: 1
BRUISES/BLEEDS EASILY: 0

## 2018-12-19 NOTE — PROGRESS NOTES
Chief Complaint   Patient presents with   • Irregular Heart Beat     follow up       Subjective:   Lauren Dave is a 68 y.o. female who presents today follow-up of her history of left bundle branch block and hypertension as well as dyslipidemia    Yesterday she had significant headache and pressure across the back of the head she checked her blood pressure quite high 170s over 110 this improved over the course of the day and did not resolve by the next morning but did resolve later in the day she did in fact take 5 mg of amlodipine    Past Medical History:   Diagnosis Date   • Arthritis    • Breast cancer (HCC) 8/7/2013   • Bronchitis 2005   • Bundle branch block, right    • Cancer (HCC)    • Cancer (HCC)     right breast cancer    • Cold    • Coronary artery disease due to calcified coronary lesion - Calcifications seen on CT scan also wtih aortic ulceration    • Dental disorder     tooth infection   • Dyslipidemia     Tried atorvastatin, pravastatin, lovastatin, and Zetia.  All causes severe myalgias.  Just taking fish oil.     • Essential hypertension    • Heart burn    • LBBB (left bundle branch block) 12/16/2014    Noted on prechemo EKG 9/2014, FELIX NORIEGAL   • Pneumonia    • Pseudogout    • Scarlet fever    • Unspecified hemorrhagic conditions     gums     Past Surgical History:   Procedure Laterality Date   • CATH REMOVAL  2/24/2014    Performed by Aggie Burns M.D. at SURGERY SAME DAY [facility]   • MASTECTOMY  8/22/2013    Performed by Aggie Burns M.D. at SURGERY SAME DAY [facility]   • NODE BIOPSY SENTINEL  8/22/2013    Performed by Aggie Burns M.D. at SURGERY SAME DAY [facility]   • CATH PLACEMENT  8/22/2013    Performed by Aggie Burns M.D. at SURGERY SAME DAY [facility]   • US-NEEDLE CORE BX-BREAST PANEL  2013    right breast   • COLONOSCOPY  2003   • BREAST BIOPSY     • TONSILLECTOMY       Family History   Problem Relation Age of Onset   • Cancer Mother          "possible thyroid and gynecological   • Arthritis Father    • Heart Disease Paternal Grandmother    • Diabetes Paternal Grandmother    • Cancer Maternal Aunt         breast cancer- 60s   • Breast Cancer Maternal Aunt    • Diabetes Maternal Uncle    • Heart Disease Maternal Grandmother    • Heart Disease Maternal Grandfather         MI   • Stroke Paternal Grandfather    • Other Son         breast mass   • Other Brother         MS     Social History     Social History   • Marital status:      Spouse name: N/A   • Number of children: 2   • Years of education: N/A     Occupational History   •  [employer]     Social History Main Topics   • Smoking status: Former Smoker     Packs/day: 1.00     Years: 0.00     Types: Cigarettes     Quit date: 10/1/2013   • Smokeless tobacco: Never Used   • Alcohol use No   • Drug use: No   • Sexual activity: Not on file      Comment: , 2 children, enemployed     Other Topics Concern   • Not on file     Social History Narrative    ** Merged History Encounter **         Patient is a mariya. Patient has 2 adult children. She is  from .           Allergies   Allergen Reactions   • Statins [Hmg-Coa-R Inhibitors] Unspecified     Muscle Spasms   RXN=unknown   • Codeine Vomiting   • Lisinopril Cough   • Penicillins      \"does not work\" .'IMMUNE TO PENICILLIN,AMPICILLIN IS THE ONLY THING THAT WORKS'   • Statins [Hmg-Coa-R Inhibitors]      Muscle pain   • Codeine Vomiting and Nausea     Clarified with patient - states that she has an \"upset stomach\" when she takes it   • Pcn [Penicillins] Unspecified     Does not work  RXN=ongoing     Outpatient Encounter Prescriptions as of 12/19/2018   Medication Sig Dispense Refill   • ibuprofen (MOTRIN) 800 MG Tab TAKE ONE TABLET BY MOUTH EVERY 8 HOURS AS NEEDED FOR MILD PAIN 90 Tab 0   • vitamin D, Ergocalciferol, (DRISDOL) 85287 UNITS Cap capsule Take  by mouth every 7 days.     • [DISCONTINUED] omeprazole (PRILOSEC) 20 MG " "delayed-release capsule Take 1 Cap by mouth every day. (Patient not taking: Reported on 12/19/2018) 30 Cap 1   • [DISCONTINUED] ezetimibe (ZETIA) 10 MG Tab Take 1 Tab by mouth every day. (Patient not taking: Reported on 12/19/2018) 90 Tab 3     No facility-administered encounter medications on file as of 12/19/2018.      Review of Systems   Constitutional: Negative for chills and fever.   HENT: Negative for sore throat.    Eyes: Negative for blurred vision.   Respiratory: Negative for cough and shortness of breath.    Cardiovascular: Negative for chest pain, palpitations, claudication, leg swelling and PND.   Gastrointestinal: Negative for abdominal pain and nausea.   Musculoskeletal: Negative for falls and joint pain.   Skin: Negative for rash.   Neurological: Positive for headaches. Negative for dizziness, focal weakness and weakness.   Endo/Heme/Allergies: Does not bruise/bleed easily.        Objective:   /70 (BP Location: Left arm, Patient Position: Sitting, BP Cuff Size: Adult)   Pulse 92   Ht 1.651 m (5' 5\")   Wt 65.8 kg (145 lb)   SpO2 93%   BMI 24.13 kg/m²     Physical Exam   Constitutional: No distress.   HENT:   Mouth/Throat: Oropharynx is clear and moist. No oropharyngeal exudate.   Eyes: No scleral icterus.   Neck: No JVD present.   Cardiovascular: Normal rate and normal heart sounds.  Exam reveals no gallop and no friction rub.    No murmur heard.  Pulmonary/Chest: No respiratory distress. She has no wheezes. She has no rales.   Abdominal: Soft. Bowel sounds are normal.   Musculoskeletal: She exhibits no edema.   Neurological: She is alert.   Skin: No rash noted. She is not diaphoretic.   Psychiatric: She has a normal mood and affect.     We reviewed in person the recent labs  Recent Results (from the past 4032 hour(s))   POCT Urinalysis    Collection Time: 07/29/18 12:08 PM   Result Value Ref Range    POC Color yellow Negative    POC Appearance clear Negative    POC Leukocyte Esterase trace " Negative    POC Nitrites neg Negative    POC Urobiligen neg Negative (0.2) mg/dL    POC Protein trace Negative mg/dL    POC Urine PH 6.0 5.0 - 8.0    POC Blood moderate Negative    POC Specific Gravity 1.020 <1.005 - >1.030    POC Ketones neg Negative mg/dL    POC Bilirubin neg Negative mg/dL    POC Glucose neg Negative mg/dL   Urine Culture    Collection Time: 07/29/18 12:15 PM   Result Value Ref Range    Significant Indicator NEG     Source UR     Site      Urine Culture Mixed skin samantha ,000 cfu/mL    POCT Rapid Strep A    Collection Time: 09/30/18  9:50 AM   Result Value Ref Range    Rapid Strep Screen Neg     Internal Control Positive Positive     Internal Control Negative Negative    POCT Urinalysis    Collection Time: 10/16/18  4:57 PM   Result Value Ref Range    POC Color LIGHT YELLOW Negative    POC Appearance CLEAR Negative    POC Leukocyte Esterase NEGATIVE Negative    POC Nitrites NEGATIVE Negative    POC Urobiligen 0.2 Negative (0.2) mg/dL    POC Protein NEGATIVE Negative mg/dL    POC Urine PH 6.0 5.0 - 8.0    POC Blood MODERATE Negative    POC Specific Gravity 1.010 <1.005 - >1.030    POC Ketones NEGATIVE Negative mg/dL    POC Bilirubin NEGATIVE Negative mg/dL    POC Glucose NEGATIVE Negative mg/dL   CULTURE STOOL    Collection Time: 10/17/18  8:00 AM   Result Value Ref Range    EHEC Negative for Shiga Toxin 1 and 2.     Significant Indicator NEG     Source STL     Site STOOL     Culture Result Stool       No enteric pathogens isolated.  NOTE:  Stool cultures are screened for Shiga Toxins 1 and 2,  Salmonella, Shigella, Campylobacter, Aeromonas,  Plesiomonas, and Vibrio.     C Diff by PCR rflx Toxin    Collection Time: 10/17/18  8:00 AM   Result Value Ref Range    C Diff by PCR Negative Negative    [id number] Presumptive Negative Negative   COMPLETE O&P    Collection Time: 10/17/18  8:00 AM   Result Value Ref Range    Significant Indicator NEG     Source STL     Site STOOL     Ova And Parasites        No Ova or Parasites seen.  NOTE:  Ova and parasite exam of stool is generally not performed on  patients hospitalized for >3 days unless nosocomial  transmission of parasitic agents is suspected.  Screening for Cryptosporidium is not included in routine Ova  and Parasite examination.     EHEC(SIGA TOXIN)DETECTION    Collection Time: 10/17/18  8:00 AM   Result Value Ref Range    Significant Indicator NEG     Source STL     Site STOOL     EHEC Negative for Shiga Toxin 1 and 2.        Assessment:     1. LBBB (left bundle branch block)  EC-ECHOCARDIOGRAM COMPLETE W/O CONT   2. Coronary artery disease due to calcified coronary lesion - Calcifications seen on CT scan also wtih aortic ulceration  EC-ECHOCARDIOGRAM COMPLETE W/O CONT   3. Dyslipidemia     4. Acute idiopathic gout, unspecified site         Medical Decision Making:  Today's Assessment / Status / Plan:     It was my pleasure to meet with Ms. Dave.    The 10-year ASCVD risk score (Washingtonkoby CRUZ Jr., et al., 2013) is: 9.4%   She has tried many statins without success we did discuss that she could do the PCSK9 but the cost seems to be prohibitive I do not think that there is dramatic evidence for the benefit of Zetia or Colestid so she will focus on heart healthy diet    We should get a echocardiogram long-term left bundle branch block    Monitor blood pressure she may in fact need daily blood pressure medicines in the past she has been able to wean off with close monitoring she understands the goal blood pressures    I will see Ms. Dave back in 1 year time and encouraged her to follow up with us over the phone or e-mail using my MyChart as issues arise.    It is my pleasure to participate in the care of Ms. Dave.  Please do not hesitate to contact me with questions or concerns.    Jarrod Mayberry MD PhD FACC  Cardiologist [facility]

## 2018-12-19 NOTE — PATIENT INSTRUCTIONS
Lowering total cholesterol and LDL (bad) cholesterol:  - Eat leaner cuts of meat, or eliminate altogether if possible red meat, and frequently substitute fish or chicken.  - Limit saturated fat to no more than 7-10% of total calories no more than 10 g per day is recommended. Some sources of saturated fat include butter, animal fats, hydrogenated vegetable fats and oils, many desserts, whole milk dairy products.  - Replaced saturated fats with polyunsaturated fats and monounsaturated fats. Foods high in monounsaturated fat include nuts, although well, canola oil, avocados, and olives.  - Limit trans fat (processed foods) and replaced with fresh fruits and vegetables  - Recommend nonfat dairy products  - Increase substantially the amount of soluble fiber intake (legumes such as beans, fruit, whole grains).  - Consider nutritional supplements: plant sterile spreads such as Benecol, fish oil,  flaxseed oil, omega-3 acids capsules 1000 mg twice a day, or viscous fiber such as Metamucil  - Attain ideal weight and regular exercise (at least 30 minutes per day of walking)    Lowering triglycerides:  - Reduce intake of simple sugar: Desserts, candy, pastries, honey, sodas, sugared cereals, yogurt, Gatorade, sports bars, canned fruit, smoothies, fruit juice, coffee drinks  - Reduced intake of refined starches: Refined Pasta  - Reduce or abstain from alcohol  - Increase omega-3 fatty acids: [place], Trout, Mackerel, Herring, Albacore tuna and supplements  - Attain ideal weight and regular exercise (at least 30 minutes per day of walking)      Elevating HDL (good) cholesterol:  - Increase physical activity  - Seasoned foods with garlic and onions  - Increase omega-3 fatty acids and supplements as listed above  - Incorporating appropriate amounts of monounsaturated fats such as nuts, olive oil, canola oil, avocados, olives  - Stop smoking  - Attain ideal weight and regular exercise (at least 30 minutes per day of walking)

## 2018-12-21 DIAGNOSIS — J01.00 ACUTE NON-RECURRENT MAXILLARY SINUSITIS: ICD-10-CM

## 2018-12-21 RX ORDER — AMPICILLIN 500 MG/1
500 CAPSULE ORAL 4 TIMES DAILY
Qty: 40 CAP | Refills: 0 | OUTPATIENT
Start: 2018-12-21

## 2018-12-21 NOTE — TELEPHONE ENCOUNTER
Med is denied, original RX was written for dental abcess 8/18 which was changed to clindamycin due to out of stock status. I have no idea why RX would still be active as we called a replaced it months ago. I explained that med is denied and if pt has an infection needs to be seen and evaluated.

## 2019-01-02 ENCOUNTER — APPOINTMENT (OUTPATIENT)
Dept: RADIOLOGY | Facility: MEDICAL CENTER | Age: 69
End: 2019-01-02
Attending: INTERNAL MEDICINE
Payer: MEDICARE

## 2019-01-30 ENCOUNTER — APPOINTMENT (OUTPATIENT)
Dept: RADIOLOGY | Facility: MEDICAL CENTER | Age: 69
End: 2019-01-30
Attending: INTERNAL MEDICINE
Payer: MEDICARE

## 2019-02-05 RX ORDER — OMEPRAZOLE 20 MG/1
CAPSULE, DELAYED RELEASE ORAL
Qty: 90 CAP | Refills: 1 | Status: SHIPPED | OUTPATIENT
Start: 2019-02-05 | End: 2019-09-18

## 2019-02-07 ENCOUNTER — TELEPHONE (OUTPATIENT)
Dept: HEMATOLOGY ONCOLOGY | Facility: MEDICAL CENTER | Age: 69
End: 2019-02-07

## 2019-02-07 NOTE — TELEPHONE ENCOUNTER
"Patient states she has not been able to complete her mammogram due to the construction located at the breast center. She informs me there is construction in the parking lot and she \"will not park underground.\" She has rescheduled her appointment. I will reschedule her appointment with Dr. Charles.   "

## 2019-02-13 ENCOUNTER — APPOINTMENT (OUTPATIENT)
Dept: HEMATOLOGY ONCOLOGY | Facility: MEDICAL CENTER | Age: 69
End: 2019-02-13
Payer: MEDICARE

## 2019-02-27 ENCOUNTER — HOSPITAL ENCOUNTER (OUTPATIENT)
Dept: LAB | Facility: MEDICAL CENTER | Age: 69
End: 2019-02-27
Attending: SURGERY
Payer: MEDICARE

## 2019-02-27 ENCOUNTER — HOSPITAL ENCOUNTER (OUTPATIENT)
Dept: LAB | Facility: MEDICAL CENTER | Age: 69
End: 2019-02-27
Attending: FAMILY MEDICINE
Payer: MEDICARE

## 2019-02-27 DIAGNOSIS — R77.9 ELEVATED BLOOD PROTEIN: ICD-10-CM

## 2019-02-27 DIAGNOSIS — E55.9 VITAMIN D DEFICIENCY: ICD-10-CM

## 2019-02-27 DIAGNOSIS — I25.10 CORONARY ARTERY DISEASE DUE TO CALCIFIED CORONARY LESION: Chronic | ICD-10-CM

## 2019-02-27 DIAGNOSIS — I10 ESSENTIAL HYPERTENSION: Chronic | ICD-10-CM

## 2019-02-27 DIAGNOSIS — E78.2 MIXED HYPERLIPIDEMIA: ICD-10-CM

## 2019-02-27 DIAGNOSIS — I25.84 CORONARY ARTERY DISEASE DUE TO CALCIFIED CORONARY LESION: Chronic | ICD-10-CM

## 2019-02-27 LAB
25(OH)D3 SERPL-MCNC: 44 NG/ML (ref 30–100)
ALBUMIN SERPL BCP-MCNC: 4.2 G/DL (ref 3.2–4.9)
ALBUMIN/GLOB SERPL: 1.3 G/DL
ALP SERPL-CCNC: 54 U/L (ref 30–99)
ALT SERPL-CCNC: 15 U/L (ref 2–50)
ANION GAP SERPL CALC-SCNC: 8 MMOL/L (ref 0–11.9)
AST SERPL-CCNC: 32 U/L (ref 12–45)
BILIRUB SERPL-MCNC: 0.5 MG/DL (ref 0.1–1.5)
BUN SERPL-MCNC: 27 MG/DL (ref 8–22)
CALCIUM SERPL-MCNC: 9.6 MG/DL (ref 8.5–10.5)
CHLORIDE SERPL-SCNC: 105 MMOL/L (ref 96–112)
CHOLEST SERPL-MCNC: 263 MG/DL (ref 100–199)
CO2 SERPL-SCNC: 26 MMOL/L (ref 20–33)
CREAT SERPL-MCNC: 0.9 MG/DL (ref 0.5–1.4)
FASTING STATUS PATIENT QL REPORTED: NORMAL
GLOBULIN SER CALC-MCNC: 3.3 G/DL (ref 1.9–3.5)
GLUCOSE SERPL-MCNC: 91 MG/DL (ref 65–99)
HDLC SERPL-MCNC: 58 MG/DL
LDLC SERPL CALC-MCNC: 164 MG/DL
POTASSIUM SERPL-SCNC: 4 MMOL/L (ref 3.6–5.5)
PROT SERPL-MCNC: 7.5 G/DL (ref 6–8.2)
SODIUM SERPL-SCNC: 139 MMOL/L (ref 135–145)
T4 FREE SERPL-MCNC: 0.92 NG/DL (ref 0.53–1.43)
TRIGL SERPL-MCNC: 203 MG/DL (ref 0–149)
TSH SERPL DL<=0.005 MIU/L-ACNC: 1.6 UIU/ML (ref 0.38–5.33)

## 2019-02-27 PROCEDURE — 82306 VITAMIN D 25 HYDROXY: CPT

## 2019-02-27 PROCEDURE — 80061 LIPID PANEL: CPT

## 2019-02-27 PROCEDURE — 84439 ASSAY OF FREE THYROXINE: CPT

## 2019-02-27 PROCEDURE — 84443 ASSAY THYROID STIM HORMONE: CPT

## 2019-02-27 PROCEDURE — 80053 COMPREHEN METABOLIC PANEL: CPT

## 2019-02-27 PROCEDURE — 36415 COLL VENOUS BLD VENIPUNCTURE: CPT

## 2019-03-01 ENCOUNTER — TELEPHONE (OUTPATIENT)
Dept: MEDICAL GROUP | Facility: PHYSICIAN GROUP | Age: 69
End: 2019-03-01

## 2019-03-01 NOTE — TELEPHONE ENCOUNTER
----- Message from Jenna Hernandez M.D. sent at 2/28/2019 11:08 PM PST -----  Please call patient to ensure they received their results through My Chart.  The patient requires follow up office visit for cholesterol elevation

## 2019-03-06 ENCOUNTER — HOSPITAL ENCOUNTER (OUTPATIENT)
Dept: RADIOLOGY | Facility: MEDICAL CENTER | Age: 69
End: 2019-03-06
Attending: INTERNAL MEDICINE
Payer: MEDICARE

## 2019-03-06 ENCOUNTER — HOSPITAL ENCOUNTER (OUTPATIENT)
Dept: RADIOLOGY | Facility: MEDICAL CENTER | Age: 69
End: 2019-03-06
Attending: SURGERY
Payer: MEDICARE

## 2019-03-06 DIAGNOSIS — E04.2 NONTOXIC MULTINODULAR GOITER: ICD-10-CM

## 2019-03-06 DIAGNOSIS — C50.211 MALIGNANT NEOPLASM OF UPPER-INNER QUADRANT OF RIGHT BREAST IN FEMALE, ESTROGEN RECEPTOR NEGATIVE (HCC): ICD-10-CM

## 2019-03-06 DIAGNOSIS — Z17.1 MALIGNANT NEOPLASM OF UPPER-INNER QUADRANT OF RIGHT BREAST IN FEMALE, ESTROGEN RECEPTOR NEGATIVE (HCC): ICD-10-CM

## 2019-03-06 PROCEDURE — 76536 US EXAM OF HEAD AND NECK: CPT

## 2019-03-06 PROCEDURE — G0279 TOMOSYNTHESIS, MAMMO: HCPCS

## 2019-03-13 ENCOUNTER — OFFICE VISIT (OUTPATIENT)
Dept: HEMATOLOGY ONCOLOGY | Facility: MEDICAL CENTER | Age: 69
End: 2019-03-13
Payer: MEDICARE

## 2019-03-13 VITALS
HEIGHT: 66 IN | WEIGHT: 147.38 LBS | SYSTOLIC BLOOD PRESSURE: 122 MMHG | TEMPERATURE: 96.8 F | OXYGEN SATURATION: 97 % | HEART RATE: 83 BPM | RESPIRATION RATE: 14 BRPM | DIASTOLIC BLOOD PRESSURE: 68 MMHG | BODY MASS INDEX: 23.69 KG/M2

## 2019-03-13 DIAGNOSIS — C50.211 MALIGNANT NEOPLASM OF UPPER-INNER QUADRANT OF RIGHT BREAST IN FEMALE, ESTROGEN RECEPTOR NEGATIVE (HCC): ICD-10-CM

## 2019-03-13 DIAGNOSIS — Z17.1 MALIGNANT NEOPLASM OF UPPER-INNER QUADRANT OF RIGHT BREAST IN FEMALE, ESTROGEN RECEPTOR NEGATIVE (HCC): ICD-10-CM

## 2019-03-13 DIAGNOSIS — Z12.31 ENCOUNTER FOR SCREENING MAMMOGRAM FOR MALIGNANT NEOPLASM OF BREAST: ICD-10-CM

## 2019-03-13 PROCEDURE — 99214 OFFICE O/P EST MOD 30 MIN: CPT | Performed by: INTERNAL MEDICINE

## 2019-03-13 ASSESSMENT — PAIN SCALES - GENERAL: PAINLEVEL: NO PAIN
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
## 2019-03-18 ENCOUNTER — TELEPHONE (OUTPATIENT)
Dept: CARDIOLOGY | Facility: MEDICAL CENTER | Age: 69
End: 2019-03-18

## 2019-03-18 DIAGNOSIS — E78.5 DYSLIPIDEMIA: ICD-10-CM

## 2019-03-18 NOTE — TELEPHONE ENCOUNTER
Concerns relayed to MD for recommendations.    -----------  Non-Urgent Medical Question   Lauren Mayberry M.D. 2 days ago     Hi Doctor Jefe can I get a referral to the cholesterol clinic at [facility] as you know I can't take any cholesterol meds I guess the shots are cheaper now and they don't make your muscles hurt is this something that would help bring down my numbers.   I just did a blood test at [facility] if you would look it over and tell me your thoughts.   Thanks Lauren henry.

## 2019-03-19 NOTE — TELEPHONE ENCOUNTER
Sure    Her lipids look about the same perhaps just a little bit worse than previous but nothing alarming again reasonable to take the newer agents    Thank you

## 2019-04-03 ENCOUNTER — NON-PROVIDER VISIT (OUTPATIENT)
Dept: VASCULAR LAB | Facility: MEDICAL CENTER | Age: 69
End: 2019-04-03
Attending: INTERNAL MEDICINE
Payer: MEDICARE

## 2019-04-03 VITALS — SYSTOLIC BLOOD PRESSURE: 167 MMHG | HEART RATE: 89 BPM | DIASTOLIC BLOOD PRESSURE: 102 MMHG

## 2019-04-03 DIAGNOSIS — E78.5 DYSLIPIDEMIA: ICD-10-CM

## 2019-04-03 PROCEDURE — 99213 OFFICE O/P EST LOW 20 MIN: CPT | Performed by: PHARMACIST

## 2019-04-03 RX ORDER — EZETIMIBE 10 MG/1
10 TABLET ORAL DAILY
Qty: 30 TAB | Refills: 0 | Status: SHIPPED | OUTPATIENT
Start: 2019-04-03 | End: 2019-09-18

## 2019-04-03 RX ORDER — FAMOTIDINE 40 MG/1
40 TABLET, FILM COATED ORAL DAILY
COMMUNITY
End: 2019-09-18

## 2019-04-03 NOTE — PROGRESS NOTES
"[facility] - Initial Visit  Date of Service: 04/03/19    TIME IN: 958a  TIME OUT: 1045a    Dmitri Li has been referred for evaluation and management of dyslipidemia    Referral Source: Dr. Mayberry on 3/19/19    HPI  History of ASCVD: Yes, Details: CAD  Other Established (non-atherosclerotic) Vascular Disease, if Present: None  Age at Initial Diagnosis of Dyslipidemia: \"years ago\" per pt    Current Prescription Lipid Lowering Medications - including dose:   Statin: None  Non-Statin: None  Current Lipid Lowering and Related Supplements:   None  Any Current Side Effects Potentially Related to Lipid Lowering therapy?   No  Current Adherence to Lipid Lowering Therapies   Complete  Previously Attempted Interventions for Lipids - including outcome  Statin:   Atorvastatin 20mg - myalgia  Pravastatin 20mg - myalgia  Rosuvastatin 2.5mg - myalgia    Non-Statin:   Gemfibirozil - unable to tolerate  Colestipol - unable to tolerate  Fenofibrate - myalgia   Zetia - not sure  CholestOFF - myalgia    Any Previous History of Statin Intolerance?   Yes, Details: see above  Baseline Lipids Prior to Treatment:     Results for DMITRI LI (MRN [id number]) as of 4/3/2019 14:43   Ref. Range 2/27/2019 07:17   Cholesterol,Tot Latest Ref Range: 100 - 199 mg/dL 263 (H)   Triglycerides Latest Ref Range: 0 - 149 mg/dL 203 (H)   HDL Latest Ref Range: >=40 mg/dL 58   LDL Latest Ref Range: <100 mg/dL 164 (H)       Other Pertinent History: Hx of breast cancer, GERD  History of other CV risk factors: HTN    PAST MEDICAL HISTORY:  has a past medical history of Arthritis; Breast cancer (HCC) (8/7/2013); Bronchitis (2005); Bundle branch block, right; Cancer (HCC); Cancer (HCC); Cold; Coronary artery disease due to calcified coronary lesion - Calcifications seen on CT scan also wtih aortic ulceration; Dental disorder; Dyslipidemia; Essential hypertension; Heart burn; LBBB (left bundle branch block) (12/16/2014); Pneumonia; " Pseudogout; Scarlet fever; and Unspecified hemorrhagic conditions.    PAST SURGICAL HISTORY:  has a past surgical history that includes colonoscopy (2003); tonsillectomy; breast biopsy; mastectomy (8/22/2013); node biopsy sentinel (8/22/2013); cath placement (8/22/2013); cath removal (2/24/2014); and us-needle core bx-breast panel (2013).    CURRENT MEDICATIONS:   Current Outpatient Prescriptions:   •  famotidine, 40 mg, Oral, DAILY  •  vitamin D3, 1 Cap, Oral, DAILY  •  omeprazole, TAKE 1 CAPSULE BY MOUTH ONCE DAILY, Taking  •  ibuprofen, TAKE ONE TABLET BY MOUTH EVERY 8 HOURS AS NEEDED FOR MILD PAIN, PRN    ALLERGIES: Statins [hmg-coa-r inhibitors]; Codeine; Lisinopril; Penicillins; Statins [hmg-coa-r inhibitors]; Codeine; and Pcn [penicillins]    SOCIAL HISTORY   History   Smoking Status   • Former Smoker   • Packs/day: 1.00   • Years: 0.00   • Types: Cigarettes   • Quit date: 10/1/2013   Smokeless Tobacco   • Never Used     Change in weight: Stable  Exercise habits: minimal exercise - walks all day at work, works 4 days a week  Diet: low fat    ROS  Physical Exam    DATA REVIEW:  Most Recent Lipid Panel:   Lab Results   Component Value Date    CHOLSTRLTOT 263 (H) 02/27/2019    TRIGLYCERIDE 203 (H) 02/27/2019    HDL 58 02/27/2019     (H) 02/27/2019       Other Pertinent Blood Work:   Lab Results   Component Value Date    SODIUM 139 02/27/2019    POTASSIUM 4.0 02/27/2019    CHLORIDE 105 02/27/2019    CO2 26 02/27/2019    ANION 8.0 02/27/2019    GLUCOSE 91 02/27/2019    BUN 27 (H) 02/27/2019    CREATININE 0.90 02/27/2019    CALCIUM 9.6 02/27/2019    ASTSGOT 32 02/27/2019    ALTSGPT 15 02/27/2019    ALKPHOSPHAT 54 02/27/2019    TBILIRUBIN 0.5 02/27/2019    ALBUMIN 4.2 02/27/2019    AGRATIO 1.3 02/27/2019    TSHULTRASEN 1.600 02/27/2019    CPKTOTAL 47 02/05/2018       Recent Imaging Studies:    Pt having ECHO on 4/10/19    12/26/2014 - US Carotid   FINDINGS:  Right carotid: The carotid vessels are patent.   There is mild hypoechoic atherosclerotic plaque seen at the bifurcation and within the proximal internal carotid artery.  Peak systolic velocity is measured at 107 cm/sec within the common carotid artery and 88 cm/sec within the internal carotid artery.  The IC/CC ratio is calculated at 1.04.    Left carotid: The carotid vessels are patent.  There is mild calcific and hypoechoic atherosclerotic plaque seen at the bifurcation and within the internal carotid artery.  Peak systolic velocity is measured at 99 cm/sec within the common carotid artery and 94 cm/sec within the internal carotid artery.  The IC/CC ratio is calculated at 0.95.    Antegrade flow is seen within the vertebral arteries.  1.  Mild calcific and hypoechoic atherosclerotic disease in the carotid bulbs and proximal internal carotid arteries. Resulting stenosis is less than 50%.    ASSESSMENT AND PLAN  Patient Type, check all that apply:   Primary Prevention  Established Atherosclerotic Cardiovascular Disease (ASCVD)  Yes, Details: CAD  Other Established (non-atherosclerotic) Vascular Disease, if Present:  Left Bundle Branch Block  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):   Unknown  ACC/AHA Indication for Statin Therapy, ayaka all that apply:  Established ASCVD: Indication for High intensity statin   Calculated Risk for ASCVD, if applicable    N/A  Other Significant Risk Markers, if any, ayaka all that apply   None  Goal LDL-C and nonHDL-C based on Clinic Protocol  LDL-C:   <70 mg/dL  Lifestyle Recommendations From Today’s Visit:    Eating Plan: Concentrate on  Low sat/Trans fat    Statin Therapy Recommendations from Today’s Visit:   None  Non-Statin Medications Recommendations from Today’s Visit:   Zetia 10mg QD  Indication for PCSK9 Inhibitor, if applicable:  ASCVD with suboptimal control of LDL-C despite maximally tolerated statin  Supplements Recommended at this visit:   Continue vitD, start CoQ10   Recommendations for Other  "Cardiovascular Risk Factors, ayaka all that apply: Hypertension- pt states that her BP is \"normal\" at home. Advised to monitor and bring to next appt   Patient referred to lipid clinic for Dyslipidemia and history of CAD seen on CT. Patient has tried atorvastatin, pravastatin, and rosuvastatin at various times but per patient decision has stopped taking various statins due to myalgia and cost inhibiting. Patient has also tried Zetia, finofibrate, fish oil, and Niacin at various times with no success due to patient reporting increased myalgia while taking these agents and cost inhibiting.     Patient and  report to have a diet that consists mostly of chicken, fish and veggies. They try to limit the amount of processed foods they eat. Patient states that with her job she walks about \"12 miles a day,\" and states that she works 4 days a week.     Pt developed a rash while on zetia, however this was also during her radiation treatment for breast cancer. Due to this pt is willing to rechallenge zetia, however as pt's LDL target is less than 70 due to having CAD, will most likely need to consider starting PCSK9I, although pt is on a limited income and this is very concerning for her.     Studies Ordered at Todays Visit: None  Blood Work Ordered At Today’s visit: lipid panel, lfts   Follow-Up: 5/29/19 as this is the first available time in May that pt would be able to return with her .     Jenna Dominguez, PharmD    CC:  TERENCE Christianson, Jarrod BUTLER, *  Dr. Bloch  "

## 2019-04-04 ENCOUNTER — TELEPHONE (OUTPATIENT)
Dept: CARDIOLOGY | Facility: MEDICAL CENTER | Age: 69
End: 2019-04-04

## 2019-04-04 NOTE — TELEPHONE ENCOUNTER
question about lab order   Received: Today Message Contents   Constanza Szymanski R.N.          CW/Domonique     Patient said she is scheduled for a blood draw next Wednesday, but she just had one at [facility], she wants to know if she still needs to do the lab order for [facility]. She can be reached at [phone].      Upon chart review, [facility] did not order any recent labs.    Returned patient call and reviewed findings.  Deferred patient to follow up with Lipid Clinic as they are the ones who placed recent labs from their OV yesterday.  She verbalizes understanding and states no other concerns or questions at this time.  Pt is appreciative of information given.

## 2019-04-10 ENCOUNTER — HOSPITAL ENCOUNTER (OUTPATIENT)
Dept: CARDIOLOGY | Facility: MEDICAL CENTER | Age: 69
End: 2019-04-10
Attending: INTERNAL MEDICINE
Payer: MEDICARE

## 2019-04-10 DIAGNOSIS — I44.7 LBBB (LEFT BUNDLE BRANCH BLOCK): Chronic | ICD-10-CM

## 2019-04-10 DIAGNOSIS — I25.84 CORONARY ARTERY DISEASE DUE TO CALCIFIED CORONARY LESION: Chronic | ICD-10-CM

## 2019-04-10 DIAGNOSIS — I25.10 CORONARY ARTERY DISEASE DUE TO CALCIFIED CORONARY LESION: Chronic | ICD-10-CM

## 2019-04-10 LAB
LV EJECT FRACT  99904: 55
LV EJECT FRACT MOD 2C 99903: 51.49
LV EJECT FRACT MOD 4C 99902: 52.05
LV EJECT FRACT MOD BP 99901: 52.78

## 2019-04-10 PROCEDURE — 93306 TTE W/DOPPLER COMPLETE: CPT

## 2019-04-10 PROCEDURE — 93306 TTE W/DOPPLER COMPLETE: CPT | Mod: 26 | Performed by: INTERNAL MEDICINE

## 2019-04-11 ENCOUNTER — TELEPHONE (OUTPATIENT)
Dept: CARDIOLOGY | Facility: MEDICAL CENTER | Age: 69
End: 2019-04-11

## 2019-04-11 NOTE — LETTER
April 11, 2019        Lauren Dave  [address] [facility] [place] [address]          Dear Lauren,    We have received the results of your recent: Echocardiogram    Your test came back within normal limits.  Please follow up as previously discussed with your physician.      Feel free to call us with any questions.        Sincerely,          Domonique Mayberry  Electronically Signed

## 2019-04-11 NOTE — TELEPHONE ENCOUNTER
EC-ECHOCARDIOGRAM COMPLETE W/O CONT   Order: [id number]   Status:  Final result   Visible to patient:  Yes (MyChart)  Dx:  LBBB (left bundle branch block); Tisha...   Notes recorded by Jarrod Mayberry M.D. on 4/10/2019 at 8:16 PM PDT    The echo looks good, please let her know     Thank you     Attempted to call patient, unable to reach.  Left voicemail regarding MD recommendations and to return this call if she has any questions or concerns.    Letter printed with MD recommendations and mailed to patient's mailing address.

## 2019-04-18 ENCOUNTER — TELEPHONE (OUTPATIENT)
Dept: VASCULAR LAB | Facility: MEDICAL CENTER | Age: 69
End: 2019-04-18

## 2019-04-18 NOTE — TELEPHONE ENCOUNTER
Patient called in today to inform clinic that she does not want to take Praluent and would like to decline all future appointments with the lipid clinic.    Baljit Pichardo, Med. Ass't  [facility]

## 2019-05-24 ENCOUNTER — TELEPHONE (OUTPATIENT)
Dept: MEDICAL GROUP | Facility: PHYSICIAN GROUP | Age: 69
End: 2019-05-24

## 2019-05-24 DIAGNOSIS — R20.2 NOTALGIA PARESTHETICA: ICD-10-CM

## 2019-05-24 NOTE — TELEPHONE ENCOUNTER
----- Message from Lauren Dave sent at 5/24/2019  9:37 AM PDT -----  Regarding: Non-Urgent Medical Question  Contact: [phone]  Misbah West I would like to see Dr Morales for my Notalgia Paresthetica they need a referral from you if you could fax them here is their number. Thank You Lauren Salinas [phone]

## 2019-05-24 NOTE — TELEPHONE ENCOUNTER
----- Message from Jim Sullivan MD sent at 2/6/2017  1:56 PM CST -----  See attached result which is abnormal    Instructions:Inform patient that her liver tests are normal. Her eosinophil count is higher than last year indicating more allergic activity in her body   Order is pending if you agree.

## 2019-08-09 DIAGNOSIS — M10.00 ACUTE IDIOPATHIC GOUT, UNSPECIFIED SITE: ICD-10-CM

## 2019-08-09 RX ORDER — IBUPROFEN 800 MG/1
800 TABLET ORAL EVERY 8 HOURS PRN
Qty: 90 TAB | Refills: 0 | Status: SHIPPED | OUTPATIENT
Start: 2019-08-09 | End: 2019-09-18

## 2019-08-09 NOTE — TELEPHONE ENCOUNTER
----- Message from Lauren Dave sent at 8/9/2019 12:01 PM PDT -----  Regarding: Prescription Question  Contact: [phone]  Misbah West could you send in my 800 mg of   IBprofin for my Gout I just ran out. [facility] [address] I'll be stopping by there in an hour.   Thanks Lauren

## 2019-09-18 ENCOUNTER — PATIENT MESSAGE (OUTPATIENT)
Dept: CARDIOLOGY | Facility: MEDICAL CENTER | Age: 69
End: 2019-09-18

## 2019-09-18 ENCOUNTER — OFFICE VISIT (OUTPATIENT)
Dept: MEDICAL GROUP | Facility: PHYSICIAN GROUP | Age: 69
End: 2019-09-18
Payer: MEDICARE

## 2019-09-18 ENCOUNTER — HOSPITAL ENCOUNTER (OUTPATIENT)
Facility: MEDICAL CENTER | Age: 69
End: 2019-09-18
Attending: FAMILY MEDICINE
Payer: MEDICARE

## 2019-09-18 VITALS
WEIGHT: 146.6 LBS | DIASTOLIC BLOOD PRESSURE: 62 MMHG | BODY MASS INDEX: 23.56 KG/M2 | TEMPERATURE: 97.4 F | OXYGEN SATURATION: 98 % | HEIGHT: 66 IN | SYSTOLIC BLOOD PRESSURE: 130 MMHG | HEART RATE: 62 BPM | RESPIRATION RATE: 16 BRPM

## 2019-09-18 DIAGNOSIS — Z17.1 MALIGNANT NEOPLASM OF UPPER-INNER QUADRANT OF RIGHT BREAST IN FEMALE, ESTROGEN RECEPTOR NEGATIVE (HCC): ICD-10-CM

## 2019-09-18 DIAGNOSIS — C50.211 MALIGNANT NEOPLASM OF UPPER-INNER QUADRANT OF RIGHT BREAST IN FEMALE, ESTROGEN RECEPTOR NEGATIVE (HCC): ICD-10-CM

## 2019-09-18 DIAGNOSIS — Z23 NEED FOR VACCINATION AGAINST STREPTOCOCCUS PNEUMONIAE: ICD-10-CM

## 2019-09-18 DIAGNOSIS — Z12.4 SCREENING FOR CERVICAL CANCER: ICD-10-CM

## 2019-09-18 DIAGNOSIS — E78.2 MIXED HYPERLIPIDEMIA: ICD-10-CM

## 2019-09-18 DIAGNOSIS — R73.01 IMPAIRED FASTING GLUCOSE: ICD-10-CM

## 2019-09-18 DIAGNOSIS — Z01.419 WELL WOMAN EXAM WITH ROUTINE GYNECOLOGICAL EXAM: ICD-10-CM

## 2019-09-18 DIAGNOSIS — E67.3 HYPERVITAMINOSIS D: ICD-10-CM

## 2019-09-18 DIAGNOSIS — I65.22 LEFT CAROTID STENOSIS: ICD-10-CM

## 2019-09-18 DIAGNOSIS — E04.1 THYROID NODULE: ICD-10-CM

## 2019-09-18 DIAGNOSIS — Z12.11 SCREENING FOR COLON CANCER: ICD-10-CM

## 2019-09-18 DIAGNOSIS — R23.3 EASY BRUISABILITY: ICD-10-CM

## 2019-09-18 PROCEDURE — 88175 CYTOPATH C/V AUTO FLUID REDO: CPT

## 2019-09-18 PROCEDURE — G0101 CA SCREEN;PELVIC/BREAST EXAM: HCPCS | Mod: 25 | Performed by: FAMILY MEDICINE

## 2019-09-18 PROCEDURE — 90732 PPSV23 VACC 2 YRS+ SUBQ/IM: CPT | Performed by: FAMILY MEDICINE

## 2019-09-18 PROCEDURE — 87624 HPV HI-RISK TYP POOLED RSLT: CPT

## 2019-09-18 PROCEDURE — G0009 ADMIN PNEUMOCOCCAL VACCINE: HCPCS | Performed by: FAMILY MEDICINE

## 2019-09-18 ASSESSMENT — PATIENT HEALTH QUESTIONNAIRE - PHQ9: CLINICAL INTERPRETATION OF PHQ2 SCORE: 0
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
## 2019-09-19 NOTE — PATIENT COMMUNICATION
It seems the last time she did this was 2014 and she had mild narrowing then but can certainly get an update I ordered it please let her know

## 2019-10-04 ENCOUNTER — TELEPHONE (OUTPATIENT)
Dept: HEMATOLOGY ONCOLOGY | Facility: MEDICAL CENTER | Age: 69
End: 2019-10-04

## 2019-10-04 NOTE — TELEPHONE ENCOUNTER
Patient called requesting to stay here with [facility] for care and needed to schedule her 1 year follow up visit. Patient requested Viktoriya.

## 2019-10-08 ENCOUNTER — TELEPHONE (OUTPATIENT)
Dept: CARDIOLOGY | Facility: MEDICAL CENTER | Age: 69
End: 2019-10-08

## 2019-10-08 NOTE — TELEPHONE ENCOUNTER
Not sure what to say she does hve carotid stenosis and carotid plaque could call [phone] and see if they have a better diagnosis    Thank you

## 2019-10-08 NOTE — TELEPHONE ENCOUNTER
"Called [phone] and reviewed MD recommendations.  Spoke with [facility] tech and states there is no problem with the diagnosis carotid stenosis.    Returned Steffany's phone call and reviewed findings.  She states, 9 months ago Medicare became more strict with approving testing.  Discussed MD recommendations and she states that carotids that is less than 50% considered normal and not stenosis.  She states [facility] will not stop her from having the test done but we will have her sign the ABN that says she will pay for the cost that insurance won't cover.  Steffany noted \"[facility] screening\" as a resource and they may perform the testing if pt does not want to proceed with the testing to be performed at [facility].      Received fax from Steffany regarding approved diagnosis for US.    Called patient and reviewed findings.  Offered to talk to pt about public resource of [facility], but was not accepted.  Pt verbalizes understanding and states she will go ahead and cancel appointment.    "

## 2019-10-08 NOTE — TELEPHONE ENCOUNTER
Steffany Cash says she is returning your call. She would like a call back at: [phone].    Thank you so much,    Enrique

## 2019-10-08 NOTE — TELEPHONE ENCOUNTER
SMILEY Jeter at imaging called re: diagnosis code for US order, it won't be covered by insurance & pt has the appt tomorrow. She is at ext. [phone]

## 2019-10-09 ENCOUNTER — APPOINTMENT (OUTPATIENT)
Dept: RADIOLOGY | Facility: MEDICAL CENTER | Age: 69
End: 2019-10-09
Attending: INTERNAL MEDICINE
Payer: MEDICARE

## 2019-10-30 ENCOUNTER — HOSPITAL ENCOUNTER (OUTPATIENT)
Dept: LAB | Facility: MEDICAL CENTER | Age: 69
End: 2019-10-30
Attending: FAMILY MEDICINE
Payer: MEDICARE

## 2019-10-30 ENCOUNTER — HOSPITAL ENCOUNTER (OUTPATIENT)
Dept: RADIOLOGY | Facility: MEDICAL CENTER | Age: 69
End: 2019-10-30
Attending: INTERNAL MEDICINE
Payer: MEDICARE

## 2019-10-30 DIAGNOSIS — E78.2 MIXED HYPERLIPIDEMIA: ICD-10-CM

## 2019-10-30 DIAGNOSIS — E67.3 HYPERVITAMINOSIS D: ICD-10-CM

## 2019-10-30 DIAGNOSIS — E04.1 THYROID NODULE: ICD-10-CM

## 2019-10-30 DIAGNOSIS — I65.22 LEFT CAROTID STENOSIS: ICD-10-CM

## 2019-10-30 DIAGNOSIS — R23.3 EASY BRUISABILITY: ICD-10-CM

## 2019-10-30 DIAGNOSIS — R73.01 IMPAIRED FASTING GLUCOSE: ICD-10-CM

## 2019-10-30 LAB
25(OH)D3 SERPL-MCNC: 34 NG/ML (ref 30–100)
ALBUMIN SERPL BCP-MCNC: 4.6 G/DL (ref 3.2–4.9)
ALBUMIN/GLOB SERPL: 1.4 G/DL
ALP SERPL-CCNC: 64 U/L (ref 30–99)
ALT SERPL-CCNC: 17 U/L (ref 2–50)
ANION GAP SERPL CALC-SCNC: 11 MMOL/L (ref 0–11.9)
AST SERPL-CCNC: 32 U/L (ref 12–45)
BASOPHILS # BLD AUTO: 0.8 % (ref 0–1.8)
BASOPHILS # BLD: 0.05 K/UL (ref 0–0.12)
BILIRUB SERPL-MCNC: 0.4 MG/DL (ref 0.1–1.5)
BUN SERPL-MCNC: 21 MG/DL (ref 8–22)
CALCIUM SERPL-MCNC: 9.7 MG/DL (ref 8.5–10.5)
CHLORIDE SERPL-SCNC: 102 MMOL/L (ref 96–112)
CHOLEST SERPL-MCNC: 283 MG/DL (ref 100–199)
CO2 SERPL-SCNC: 26 MMOL/L (ref 20–33)
CREAT SERPL-MCNC: 0.91 MG/DL (ref 0.5–1.4)
EOSINOPHIL # BLD AUTO: 0.11 K/UL (ref 0–0.51)
EOSINOPHIL NFR BLD: 1.8 % (ref 0–6.9)
ERYTHROCYTE [DISTWIDTH] IN BLOOD BY AUTOMATED COUNT: 45.8 FL (ref 35.9–50)
EST. AVERAGE GLUCOSE BLD GHB EST-MCNC: 126 MG/DL
FASTING STATUS PATIENT QL REPORTED: NORMAL
GLOBULIN SER CALC-MCNC: 3.4 G/DL (ref 1.9–3.5)
GLUCOSE SERPL-MCNC: 85 MG/DL (ref 65–99)
HBA1C MFR BLD: 6 % (ref 0–5.6)
HCT VFR BLD AUTO: 42.4 % (ref 37–47)
HDLC SERPL-MCNC: 69 MG/DL
HGB BLD-MCNC: 13.5 G/DL (ref 12–16)
IMM GRANULOCYTES # BLD AUTO: 0.02 K/UL (ref 0–0.11)
IMM GRANULOCYTES NFR BLD AUTO: 0.3 % (ref 0–0.9)
LDLC SERPL CALC-MCNC: 179 MG/DL
LYMPHOCYTES # BLD AUTO: 1.38 K/UL (ref 1–4.8)
LYMPHOCYTES NFR BLD: 22.3 % (ref 22–41)
MCH RBC QN AUTO: 30.8 PG (ref 27–33)
MCHC RBC AUTO-ENTMCNC: 31.8 G/DL (ref 33.6–35)
MCV RBC AUTO: 96.8 FL (ref 81.4–97.8)
MONOCYTES # BLD AUTO: 0.38 K/UL (ref 0–0.85)
MONOCYTES NFR BLD AUTO: 6.1 % (ref 0–13.4)
NEUTROPHILS # BLD AUTO: 4.24 K/UL (ref 2–7.15)
NEUTROPHILS NFR BLD: 68.7 % (ref 44–72)
NRBC # BLD AUTO: 0 K/UL
NRBC BLD-RTO: 0 /100 WBC
PLATELET # BLD AUTO: 253 K/UL (ref 164–446)
PMV BLD AUTO: 10.9 FL (ref 9–12.9)
POTASSIUM SERPL-SCNC: 4 MMOL/L (ref 3.6–5.5)
PROT SERPL-MCNC: 8 G/DL (ref 6–8.2)
RBC # BLD AUTO: 4.38 M/UL (ref 4.2–5.4)
SODIUM SERPL-SCNC: 139 MMOL/L (ref 135–145)
T3 SERPL-MCNC: 134.5 NG/DL (ref 60–181)
T4 FREE SERPL-MCNC: 0.91 NG/DL (ref 0.53–1.43)
TRIGL SERPL-MCNC: 175 MG/DL (ref 0–149)
TSH SERPL DL<=0.005 MIU/L-ACNC: 1.79 UIU/ML (ref 0.38–5.33)
WBC # BLD AUTO: 6.2 K/UL (ref 4.8–10.8)

## 2019-10-30 PROCEDURE — 85025 COMPLETE CBC W/AUTO DIFF WBC: CPT

## 2019-10-30 PROCEDURE — 84439 ASSAY OF FREE THYROXINE: CPT

## 2019-10-30 PROCEDURE — 80061 LIPID PANEL: CPT

## 2019-10-30 PROCEDURE — 83036 HEMOGLOBIN GLYCOSYLATED A1C: CPT | Mod: GA

## 2019-10-30 PROCEDURE — 84480 ASSAY TRIIODOTHYRONINE (T3): CPT

## 2019-10-30 PROCEDURE — 82306 VITAMIN D 25 HYDROXY: CPT

## 2019-10-30 PROCEDURE — 36415 COLL VENOUS BLD VENIPUNCTURE: CPT

## 2019-10-30 PROCEDURE — 80053 COMPREHEN METABOLIC PANEL: CPT

## 2019-10-30 PROCEDURE — 84443 ASSAY THYROID STIM HORMONE: CPT

## 2019-10-30 PROCEDURE — 93880 EXTRACRANIAL BILAT STUDY: CPT

## 2019-10-31 ENCOUNTER — PATIENT MESSAGE (OUTPATIENT)
Dept: CARDIOLOGY | Facility: MEDICAL CENTER | Age: 69
End: 2019-10-31

## 2019-12-30 DIAGNOSIS — M10.00 ACUTE IDIOPATHIC GOUT, UNSPECIFIED SITE: ICD-10-CM

## 2020-01-02 RX ORDER — IBUPROFEN 800 MG/1
800 TABLET ORAL EVERY 8 HOURS PRN
Qty: 90 TAB | Refills: 0 | Status: SHIPPED | OUTPATIENT
Start: 2020-01-02 | End: 2020-02-06

## 2020-01-08 ENCOUNTER — HOSPITAL ENCOUNTER (EMERGENCY)
Facility: MEDICAL CENTER | Age: 70
End: 2020-01-08
Attending: EMERGENCY MEDICINE
Payer: MEDICARE

## 2020-01-08 ENCOUNTER — APPOINTMENT (OUTPATIENT)
Dept: RADIOLOGY | Facility: MEDICAL CENTER | Age: 70
End: 2020-01-08
Attending: EMERGENCY MEDICINE
Payer: MEDICARE

## 2020-01-08 VITALS
RESPIRATION RATE: 15 BRPM | OXYGEN SATURATION: 96 % | HEART RATE: 88 BPM | TEMPERATURE: 98.1 F | SYSTOLIC BLOOD PRESSURE: 168 MMHG | DIASTOLIC BLOOD PRESSURE: 81 MMHG | WEIGHT: 142.42 LBS | BODY MASS INDEX: 23.73 KG/M2 | HEIGHT: 65 IN

## 2020-01-08 DIAGNOSIS — G89.29 CHRONIC PAIN OF LEFT KNEE: ICD-10-CM

## 2020-01-08 DIAGNOSIS — M25.562 CHRONIC PAIN OF LEFT KNEE: ICD-10-CM

## 2020-01-08 DIAGNOSIS — F41.8 SITUATIONAL ANXIETY: ICD-10-CM

## 2020-01-08 PROCEDURE — 73564 X-RAY EXAM KNEE 4 OR MORE: CPT | Mod: LT

## 2020-01-08 PROCEDURE — 302875 HCHG BANDAGE ACE 4 OR 6""

## 2020-01-08 PROCEDURE — 99284 EMERGENCY DEPT VISIT MOD MDM: CPT

## 2020-01-08 ASSESSMENT — LIFESTYLE VARIABLES: DO YOU DRINK ALCOHOL: NO

## 2020-01-08 NOTE — ED PROVIDER NOTES
ED Provider Note    CHIEF COMPLAINT  Chief Complaint   Patient presents with   • Knee Pain     pt has had edema in her L knee for 30 days. pt recieved cortizone shot w/ no relief. pt has hx of gout and told to get referal from ER for MRI. Pt using home cane.        HPI  Lauren Dave is a 69 y.o. female who presents to the emergency department for evaluation of knee pain.  Patient has had left knee pain for about a month.  She states she is a history of pseudogout.  She denies history of falls trauma or twisting injury.  She was seen by an orthopedic doctor at the orthopedic clinic when x-rays performed show some arthritis.  She had a steroid shot and was advised to follow-up with your primary care doctor.  She called her doctor for follow-up and she was told to come the emergency department.    Denies any recent falls or trauma.  No fevers or chills.  She has pain when she tries to straighten it all the way but she is able to.  Bending quite easily.  Denies any pain in the calf.  Denies any other acute concerns or complaints.  She is here requesting an MRI.    REVIEW OF SYSTEMS  See HPI for further details. All other systems are negative.    PAST MEDICAL HISTORY  Past Medical History:   Diagnosis Date   • Arthritis    • Breast cancer (HCC) 8/7/2013   • Bronchitis 2005   • Bundle branch block, right    • Cancer (HCC)    • Cancer (HCC)     right breast cancer    • Cold    • Coronary artery disease due to calcified coronary lesion - Calcifications seen on CT scan also wtih aortic ulceration    • Dental disorder     tooth infection   • Dyslipidemia     Tried atorvastatin, pravastatin, lovastatin, and Zetia.  All causes severe myalgias.  Just taking fish oil.     • Essential hypertension    • Heart burn    • LBBB (left bundle branch block) 12/16/2014    Noted on prechemo EKG 9/2014, FELIX WNL   • Pneumonia    • Pseudogout    • Scarlet fever    • Unspecified hemorrhagic conditions     gums       FAMILY  HISTORY  Family History   Problem Relation Age of Onset   • Cancer Mother         possible thyroid and gynecological   • Arthritis Father    • Heart Disease Paternal Grandmother    • Diabetes Paternal Grandmother    • Cancer Maternal Aunt         breast cancer- 60s   • Breast Cancer Maternal Aunt    • Diabetes Maternal Uncle    • Heart Disease Maternal Grandmother    • Heart Disease Maternal Grandfather         MI   • Stroke Paternal Grandfather    • Other Son         breast mass   • Other Brother         MS       SOCIAL HISTORY  Social History     Socioeconomic History   • Marital status:      Spouse name: Not on file   • Number of children: 2   • Years of education: Not on file   • Highest education level: Not on file   Occupational History     Employer: [employer]   Social Needs   • Financial resource strain: Not on file   • Food insecurity:     Worry: Not on file     Inability: Not on file   • Transportation needs:     Medical: Not on file     Non-medical: Not on file   Tobacco Use   • Smoking status: Former Smoker     Packs/day: 1.00     Years: 0.00     Pack years: 0.00     Types: Cigarettes     Last attempt to quit: 10/1/2013     Years since quittin.2   • Smokeless tobacco: Never Used   Substance and Sexual Activity   • Alcohol use: No     Alcohol/week: 0.0 oz   • Drug use: No   • Sexual activity: Not on file     Comment: , 2 children, enemployed   Lifestyle   • Physical activity:     Days per week: Not on file     Minutes per session: Not on file   • Stress: Not on file   Relationships   • Social connections:     Talks on phone: Not on file     Gets together: Not on file     Attends Yarsanism service: Not on file     Active member of club or organization: Not on file     Attends meetings of clubs or organizations: Not on file     Relationship status: Not on file   • Intimate partner violence:     Fear of current or ex partner: Not on file     Emotionally abused: Not on file      "Physically abused: Not on file     Forced sexual activity: Not on file   Other Topics Concern   • Not on file   Social History Narrative    ** Merged History Encounter **         Patient is a . Patient has 2 adult children. She is  from .             SURGICAL HISTORY  Past Surgical History:   Procedure Laterality Date   • CATH REMOVAL  2/24/2014    Performed by Aggie Burns M.D. at [facility]   • MASTECTOMY  8/22/2013    Performed by Aggie Burns M.D. at [facility]   • NODE BIOPSY SENTINEL  8/22/2013    Performed by Aggie Burns M.D. at [facility]   • CATH PLACEMENT  8/22/2013    Performed by Aggie Burns M.D. at [facility]   • US-NEEDLE CORE BX-BREAST PANEL  2013    right breast   • COLONOSCOPY  2003   • BREAST BIOPSY     • TONSILLECTOMY         CURRENT MEDICATIONS  Home Medications     Reviewed by Kae Shah R.N. (Registered Nurse) on 01/08/20 at 0825  Med List Status: Not Addressed   Medication Last Dose Status   Cholecalciferol (VITAMIN D3) 5000 units Cap  Active   ibuprofen (MOTRIN) 800 MG Tab  Active                ALLERGIES  Allergies   Allergen Reactions   • Statins [Hmg-Coa-R Inhibitors] Unspecified     Muscle Spasms   RXN=unknown   • Codeine Vomiting   • Eggs      Pt states that she is allergic to eggs per her mother.   • Lisinopril Cough   • Penicillins      \"does not work\" .'IMMUNE TO PENICILLIN,AMPICILLIN IS THE ONLY THING THAT WORKS'   • Statins [Hmg-Coa-R Inhibitors]      Muscle pain   • Codeine Vomiting and Nausea     Clarified with patient - states that she has an \"upset stomach\" when she takes it   • Pcn [Penicillins] Unspecified     Does not work  RXN=ongoing       PHYSICAL EXAM  VITAL SIGNS: BP (!) 180/80   Pulse 96   Temp 36.7 °C (98.1 °F) (Temporal)   Resp 16   Ht 1.651 m (5' 5\")   Wt 64.6 kg (142 lb 6.7 oz)   SpO2 96%   BMI 23.70 kg/m²    Constitutional: Well " developed, Well nourished, No acute distress, Non-toxic appearance.   HENT: Normocephalic, Atraumatic,  Musculoskeletal: Good range of motion in all major joints.  Right lower extremity has good range of motion.  Normal distal neurovascular examination.  The knee is swollen and there is swelling in the prepatellar and suprapatellar area.  Is not red or hot.  She has good range of motion.  She has no signs of septic arthritis.  Ligaments are stable.  Neurologic: Alert, No focal deficits noted.   Psychiatric: Affect normal      RADIOLOGY/PROCEDURES  DX-KNEE COMPLETE 4+ LEFT   Final Result      1.  Tricompartmental osteoarthritis      2.  Chondrocalcinosis      3.  Joint effusion            COURSE & MEDICAL DECISION MAKING  Pertinent Labs & Imaging studies reviewed. (See chart for details)    The patient presents with a long history of knee pain worsening over the last month.  She been seen by orthopedics.  X-ray today shows constellation of chronic findings.  There is no history of acute trauma.  Her knee looks to have calcium pyrophosphate deposition disease a lot of calcium and degenerative change.  There is no signs of acute infectious process.    I think this is aggravated by the patient working a new job and walking a lot.  She will be advised to rest.  Follow-up with orthopedics.  She should return for pain, swelling or she develops redness or other concerns.  Questions were answered.  She is agreeable with the plan.  Discharged in good condition.    She is counseled that an MRI is unlikely to actually be helpful.  And that she should follow-up orthopedic doctor and she will need ongoing care for this.  She is referred back to Ortho and her primary care doctor.  Questions were answered, she is agreeable with the plan.      The patient was noted to have elevated blood pressure while in the ER and was counseled to see their doctor within one wee to have this rechecked.    Julio Espino M.D.  [facility] [address]  [place] [id number]  [phone]    Schedule an appointment as soon as possible for a visit in 2 days          FINAL IMPRESSION  1. Chronic pain of left knee         2.   3.         Electronically signed by: Ryder López, 1/8/2020 9:44 AM

## 2020-01-08 NOTE — DISCHARGE INSTRUCTIONS
Rest, use Ace wrap for comfort.  Minimize walking.  Follow-up with orthopedic doctor.  Return for more pain redness warmth fever or other concerns

## 2020-01-08 NOTE — ED TRIAGE NOTES
"..  Chief Complaint   Patient presents with   • Knee Pain     pt has had edema in her L knee for 30 days. pt recieved cortizone shot w/ no relief. pt has hx of gout and told to get referal from ER for MRI. Pt using home cane.    ..BP (!) 180/80   Pulse 96   Temp 36.7 °C (98.1 °F) (Temporal)   Resp 16   Ht 1.651 m (5' 5\")   Wt 64.6 kg (142 lb 6.7 oz)   SpO2 96%   "

## 2020-01-10 ENCOUNTER — PATIENT MESSAGE (OUTPATIENT)
Dept: MEDICAL GROUP | Facility: PHYSICIAN GROUP | Age: 70
End: 2020-01-10

## 2020-01-13 ENCOUNTER — OFFICE VISIT (OUTPATIENT)
Dept: URGENT CARE | Facility: PHYSICIAN GROUP | Age: 70
End: 2020-01-13
Payer: MEDICARE

## 2020-01-13 VITALS
TEMPERATURE: 98.4 F | HEIGHT: 66 IN | DIASTOLIC BLOOD PRESSURE: 74 MMHG | RESPIRATION RATE: 16 BRPM | BODY MASS INDEX: 23.46 KG/M2 | HEART RATE: 92 BPM | WEIGHT: 146 LBS | OXYGEN SATURATION: 95 % | SYSTOLIC BLOOD PRESSURE: 138 MMHG

## 2020-01-13 DIAGNOSIS — J22 LOWER RESP. TRACT INFECTION: ICD-10-CM

## 2020-01-13 PROCEDURE — 99214 OFFICE O/P EST MOD 30 MIN: CPT | Performed by: FAMILY MEDICINE

## 2020-01-13 RX ORDER — BENZONATATE 100 MG/1
100 CAPSULE ORAL 3 TIMES DAILY PRN
Qty: 30 CAP | Refills: 0 | Status: SHIPPED | OUTPATIENT
Start: 2020-01-13 | End: 2020-04-14

## 2020-01-13 RX ORDER — BENZONATATE 100 MG/1
100 CAPSULE ORAL 3 TIMES DAILY PRN
Qty: 30 CAP | Refills: 0 | Status: SHIPPED
Start: 2020-01-13 | End: 2020-01-13 | Stop reason: SDUPTHER

## 2020-01-13 ASSESSMENT — ENCOUNTER SYMPTOMS
SPUTUM PRODUCTION: 0
SHORTNESS OF BREATH: 0
MYALGIAS: 0
EYE PAIN: 0
DIZZINESS: 0
NAUSEA: 0
COUGH: 1
VOMITING: 1
FEVER: 0
SORE THROAT: 0
CHILLS: 0

## 2020-01-13 NOTE — PROGRESS NOTES
"Subjective:   Lauren Dave is a 69 y.o. female who presents for Cough (Dry cough x2days  (POST- congestion, vomiting, fever, headaches))        69-year-old female presents to the urgent care with a chief complaint of a cough for the past 2 days.  The patient has been exposed to colleagues at work with similar symptoms.  Cough is nonproductive.  Patient mentions the symptoms were worse yesterday with subjective fevers headache and vomiting twice yesterday.  Patient states symptoms have improved today at the cough is persisting.    Cough   This is a new problem. Episode onset: 2 days. The cough is non-productive. Pertinent negatives include no chest pain, chills, fever, myalgias, rash, sore throat or shortness of breath. Treatments tried: NyQuil, ibuprofen. The treatment provided moderate relief.     Review of Systems   Constitutional: Negative for chills and fever.   HENT: Negative for sore throat.    Eyes: Negative for pain.   Respiratory: Positive for cough. Negative for sputum production and shortness of breath.    Cardiovascular: Negative for chest pain.   Gastrointestinal: Positive for vomiting (Resolved now). Negative for nausea.   Genitourinary: Negative for hematuria.   Musculoskeletal: Negative for myalgias.   Skin: Negative for rash.   Neurological: Negative for dizziness.   All other systems reviewed and are negative.    Allergies   Allergen Reactions   • Statins [Hmg-Coa-R Inhibitors] Unspecified     Muscle Spasms   RXN=unknown   • Codeine Vomiting   • Eggs      Pt states that she is allergic to eggs per her mother.   • Lisinopril Cough   • Penicillins      \"does not work\" .'IMMUNE TO PENICILLIN,AMPICILLIN IS THE ONLY THING THAT WORKS'   • Statins [Hmg-Coa-R Inhibitors]      Muscle pain   • Codeine Vomiting and Nausea     Clarified with patient - states that she has an \"upset stomach\" when she takes it   • Pcn [Penicillins] Unspecified     Does not work  RXN=ongoing      Objective:   /74   " "Pulse 92   Temp 36.9 °C (98.4 °F) (Temporal)   Resp 16   Ht 1.67 m (5' 5.75\")   Wt 66.2 kg (146 lb)   SpO2 95%   BMI 23.74 kg/m²   Physical Exam  Vitals signs and nursing note reviewed.   Constitutional:       General: She is not in acute distress.     Appearance: She is well-developed.   HENT:      Head: Normocephalic and atraumatic.      Right Ear: Tympanic membrane and external ear normal.      Left Ear: Tympanic membrane and external ear normal.      Nose: Nose normal.      Mouth/Throat:      Mouth: Mucous membranes are moist.   Eyes:      Conjunctiva/sclera: Conjunctivae normal.      Pupils: Pupils are equal, round, and reactive to light.   Cardiovascular:      Rate and Rhythm: Normal rate and regular rhythm.      Heart sounds: No murmur.   Pulmonary:      Effort: Pulmonary effort is normal. No respiratory distress.      Breath sounds: Rhonchi (few) present. No wheezing.   Abdominal:      General: There is no distension.      Palpations: Abdomen is soft.      Tenderness: There is no tenderness.   Musculoskeletal: Normal range of motion.   Skin:     General: Skin is warm and dry.   Neurological:      General: No focal deficit present.      Mental Status: She is alert and oriented to person, place, and time. Mental status is at baseline.      Gait: Gait (gait at baseline) normal.   Psychiatric:         Judgment: Judgment normal.           Assessment/Plan:   1. Lower resp. tract infection  - benzonatate (TESSALON) 100 MG Cap; Take 1 Cap by mouth 3 times a day as needed for Cough.  Dispense: 30 Cap; Refill: 0      Discussed close monitoring, return precautions, and supportive measures including maintaining adequate fluid hydration and caloric intake, relative rest and OTC symptom management including acetaminophen as needed for pain and/or fever.    Differential diagnosis, natural history, supportive care, and indications for immediate follow-up discussed.     Advised the patient to follow-up with the primary " care physician for recheck, reevaluation, and consideration of further management.

## 2020-01-13 NOTE — PATIENT INSTRUCTIONS
Bronchitis  Bronchitis is a problem of the air tubes leading to your lungs. This problem makes it hard for air to get in and out of the lungs. You may cough a lot because your air tubes are narrow. Going without care can cause lasting (chronic) bronchitis.  HOME CARE   · Drink enough fluids to keep your pee (urine) clear or pale yellow.  · Use a cool mist humidifier.  · Quit smoking if you smoke. If you keep smoking, the bronchitis might not get better.  · Only take medicine as told by your doctor.  GET HELP RIGHT AWAY IF:   · Coughing keeps you awake.  · You start to wheeze.  · You become more sick or weak.  · You have a hard time breathing or get short of breath.  · You cough up blood.  · Coughing lasts more than 2 weeks.  · You have a fever.  · Your baby is older than 3 months with a rectal temperature of 102° F (38.9° C) or higher.  · Your baby is 3 months old or younger with a rectal temperature of 100.4° F (38° C) or higher.  MAKE SURE YOU:  · Understand these instructions.  · Will watch your condition.  · Will get help right away if you are not doing well or get worse.  Document Released: 06/05/2009 Document Revised: 03/11/2013 Document Reviewed: 11/19/2010  [employer]® Patient Information ©2014 [employer], [employer].

## 2020-01-15 ENCOUNTER — PATIENT MESSAGE (OUTPATIENT)
Dept: MEDICAL GROUP | Facility: PHYSICIAN GROUP | Age: 70
End: 2020-01-15

## 2020-01-15 DIAGNOSIS — M17.10 PRIMARY OSTEOARTHRITIS OF KNEE, UNSPECIFIED LATERALITY: ICD-10-CM

## 2020-02-05 DIAGNOSIS — M10.00 ACUTE IDIOPATHIC GOUT, UNSPECIFIED SITE: ICD-10-CM

## 2020-02-06 RX ORDER — IBUPROFEN 800 MG/1
TABLET ORAL
Qty: 90 TAB | Refills: 0 | Status: SHIPPED | OUTPATIENT
Start: 2020-02-06 | End: 2020-05-13

## 2020-02-06 NOTE — TELEPHONE ENCOUNTER
Was the patient seen in the last year in this department? Yes    Does patient have an active prescription for medications requested? No     Received Request Via: Pharmacy      Pt met protocol?: Yes    LAST OV 09/18/2019

## 2020-03-03 ENCOUNTER — TELEPHONE (OUTPATIENT)
Dept: HEMATOLOGY ONCOLOGY | Facility: MEDICAL CENTER | Age: 70
End: 2020-03-03

## 2020-03-03 DIAGNOSIS — Z17.1 MALIGNANT NEOPLASM OF UPPER-INNER QUADRANT OF RIGHT BREAST IN FEMALE, ESTROGEN RECEPTOR NEGATIVE (HCC): ICD-10-CM

## 2020-03-03 DIAGNOSIS — C50.211 MALIGNANT NEOPLASM OF UPPER-INNER QUADRANT OF RIGHT BREAST IN FEMALE, ESTROGEN RECEPTOR NEGATIVE (HCC): ICD-10-CM

## 2020-03-03 NOTE — TELEPHONE ENCOUNTER
Phone Number Called: [phone]    Call outcome: Left detailed message for patient. Informed to call back with any additional questions.    Message: AYAKA Guzman ordered CBC and CMP that are in her chart and she can complete them prior to her appointment.

## 2020-03-03 NOTE — TELEPHONE ENCOUNTER
Patient called to see if we can order labs prior to her appointment on 03/18/2020.Patient stated she always has labs prior to her appointments with our office.

## 2020-03-11 ENCOUNTER — APPOINTMENT (OUTPATIENT)
Dept: RADIOLOGY | Facility: MEDICAL CENTER | Age: 70
End: 2020-03-11
Attending: INTERNAL MEDICINE
Payer: MEDICARE

## 2020-03-18 ENCOUNTER — APPOINTMENT (OUTPATIENT)
Dept: HEMATOLOGY ONCOLOGY | Facility: MEDICAL CENTER | Age: 70
End: 2020-03-18
Payer: MEDICARE

## 2020-04-14 ENCOUNTER — TELEMEDICINE (OUTPATIENT)
Dept: MEDICAL GROUP | Facility: PHYSICIAN GROUP | Age: 70
End: 2020-04-14
Payer: MEDICARE

## 2020-04-14 VITALS — BODY MASS INDEX: 21.53 KG/M2 | WEIGHT: 134 LBS | HEIGHT: 66 IN

## 2020-04-14 DIAGNOSIS — R63.4 UNINTENTIONAL WEIGHT LOSS: ICD-10-CM

## 2020-04-14 DIAGNOSIS — Z12.31 ENCOUNTER FOR SCREENING MAMMOGRAM FOR MALIGNANT NEOPLASM OF BREAST: ICD-10-CM

## 2020-04-14 DIAGNOSIS — M11.89 PSEUDOGOUT INVOLVING MULTIPLE JOINTS: ICD-10-CM

## 2020-04-14 DIAGNOSIS — Z12.39 SCREENING FOR BREAST CANCER: ICD-10-CM

## 2020-04-14 DIAGNOSIS — M25.50 POLYARTHRALGIA: ICD-10-CM

## 2020-04-14 DIAGNOSIS — R73.9 HYPERGLYCEMIA: ICD-10-CM

## 2020-04-14 DIAGNOSIS — M23.209 OLD TEAR OF MENISCUS OF KNEE, UNSPECIFIED LATERALITY, UNSPECIFIED MENISCUS, UNSPECIFIED TEAR TYPE: ICD-10-CM

## 2020-04-14 DIAGNOSIS — R53.1 WEAKNESS: ICD-10-CM

## 2020-04-14 DIAGNOSIS — G56.03 BILATERAL CARPAL TUNNEL SYNDROME: ICD-10-CM

## 2020-04-14 DIAGNOSIS — E78.2 MIXED HYPERLIPIDEMIA: ICD-10-CM

## 2020-04-14 DIAGNOSIS — G62.9 NEUROPATHY: ICD-10-CM

## 2020-04-14 DIAGNOSIS — R53.83 FATIGUE, UNSPECIFIED TYPE: ICD-10-CM

## 2020-04-14 DIAGNOSIS — Z79.52 CURRENT CHRONIC USE OF SYSTEMIC STEROIDS: ICD-10-CM

## 2020-04-14 DIAGNOSIS — Z82.61 FAMILY HISTORY OF RHEUMATOID ARTHRITIS: ICD-10-CM

## 2020-04-14 PROCEDURE — 99214 OFFICE O/P EST MOD 30 MIN: CPT | Mod: 95,CR | Performed by: FAMILY MEDICINE

## 2020-04-14 RX ORDER — METHYLPREDNISOLONE 4 MG/1
TABLET ORAL
COMMUNITY
Start: 2020-02-19 | End: 2020-04-13

## 2020-04-14 RX ORDER — IBUPROFEN 800 MG/1
TABLET ORAL
COMMUNITY
Start: 2020-04-13 | End: 2020-04-13

## 2020-04-14 RX ORDER — FENOFIBRATE 145 MG/1
TABLET, COATED ORAL
COMMUNITY
Start: 2020-04-12 | End: 2020-04-13

## 2020-04-14 RX ORDER — MELOXICAM 15 MG/1
15 TABLET ORAL
COMMUNITY
Start: 2020-03-05 | End: 2020-04-13

## 2020-04-14 RX ORDER — ALBUTEROL SULFATE 90 UG/1
AEROSOL, METERED RESPIRATORY (INHALATION)
COMMUNITY
Start: 2020-04-12 | End: 2020-04-13

## 2020-04-14 ASSESSMENT — FIBROSIS 4 INDEX: FIB4 SCORE: 2.15

## 2020-04-14 NOTE — PROGRESS NOTES
Chief Complaint   Patient presents with   • Extremity Weakness       HISTORY OF PRESENT ILLNESS: Patient is a 70 y.o. female established patient here today for the following concerns:    This encounter was conducted via Zoom .   Verbal consent was obtained. Patient's identity was verified.      This is a pleasant 70 year old female with hx of impaired glucose tolerance and hx of breast cancer who reports over the last few months has been treated for bronchitis and for multiple joint issues including meniscus tear, pseudogout and carpal tunnel using steroids.  She reports that since being off them she has been feeling poorly. Reports unintentional weight loss, generalized weakness and worsening joint pains.   Her brother was just diagnosed with RA.  She requests some updated labs to see if anything systemic is going on.  She does have appointment with ALAYNA today about her joint pain and weakness.          Past Medical, Social, and Family history reviewed and updated in EPIC    Allergies:Statins [hmg-coa-r inhibitors]; Codeine; Eggs; Lisinopril; Penicillins; Statins [hmg-coa-r inhibitors]; Codeine; and Pcn [penicillins]    Current Outpatient Medications   Medication Sig Dispense Refill   • ibuprofen (MOTRIN) 800 MG Tab TAKE 1 TABLET BY MOUTH EVERY 8 HOURS AS NEEDED FOR MILD PAIN 90 Tab 0   • Cholecalciferol (VITAMIN D3) 5000 units Cap Take 1 Cap by mouth every day.       No current facility-administered medications for this visit.          ROS:  Review of Systems   Constitutional: Negative for fever, chills, weight loss and malaise/fatigue.   HENT: Negative for ear pain, nosebleeds, congestion, sore throat and neck pain.    Eyes: Negative for blurred vision.   Respiratory: Negative for cough, sputum production, shortness of breath and wheezing.    Cardiovascular: Negative for chest pain, palpitations,  and leg swelling.   Gastrointestinal: Negative for heartburn, nausea, vomiting, diarrhea and abdominal pain.  "  Genitourinary: Negative for dysuria, urgency and frequency.   Musculoskeletal: Negative for myalgias, back pain and joint pain.   Skin: Negative for rash and itching.   Neurological: Negative for dizziness, tingling, tremors, sensory change, focal weakness and headaches.   Endo/Heme/Allergies: Does not bruise/bleed easily.   Psychiatric/Behavioral: Negative for depression, anxiety, suicidal ideas, insomnia and memory loss.      Exam:  Ht 1.67 m (5' 5.75\")   Wt 60.8 kg (134 lb)     General:  Well nourished, well developed in NAD  Head is grossly normal.  Neck: Supple without JVD   Pulmonary:  Normal effort. No wheezing, talking in full sentences.   Psych: affect appropriate      Please note that this dictation was created using voice recognition software. I have made every reasonable attempt to correct obvious errors, but I expect that there are errors of grammar and possibly content that I did not discover before finalizing the note.    Assessment/Plan:  Encounter Diagnoses   Name Primary?   • Neuropathy    • Old tear of meniscus of knee, unspecified laterality, unspecified meniscus, unspecified tear type    • Bilateral carpal tunnel syndrome    • Weakness    • Unintentional weight loss    • Fatigue, unspecified type    • Pseudogout involving multiple joints    • Current chronic use of systemic steroids    • Polyarthralgia    • Family history of rheumatoid arthritis    • Hyperglycemia    • Mixed hyperlipidemia        Will follow up with ortho and oncology as planned for their ongoing care, but will include some additional labs.  My concern is conversion to DM2 after multiple steroid rounds.  Follow up after labs have been completed.  In addition, given the joint pain and family hx will get RA work up too.   "

## 2020-04-16 ENCOUNTER — HOSPITAL ENCOUNTER (OUTPATIENT)
Dept: LAB | Facility: MEDICAL CENTER | Age: 70
End: 2020-04-16
Attending: NURSE PRACTITIONER
Payer: MEDICARE

## 2020-04-16 ENCOUNTER — HOSPITAL ENCOUNTER (OUTPATIENT)
Dept: LAB | Facility: MEDICAL CENTER | Age: 70
End: 2020-04-16
Attending: FAMILY MEDICINE
Payer: MEDICARE

## 2020-04-16 DIAGNOSIS — G62.9 NEUROPATHY: ICD-10-CM

## 2020-04-16 DIAGNOSIS — R63.4 UNINTENTIONAL WEIGHT LOSS: ICD-10-CM

## 2020-04-16 DIAGNOSIS — R53.1 WEAKNESS: ICD-10-CM

## 2020-04-16 DIAGNOSIS — R73.9 HYPERGLYCEMIA: ICD-10-CM

## 2020-04-16 DIAGNOSIS — Z82.61 FAMILY HISTORY OF RHEUMATOID ARTHRITIS: ICD-10-CM

## 2020-04-16 DIAGNOSIS — E78.2 MIXED HYPERLIPIDEMIA: ICD-10-CM

## 2020-04-16 DIAGNOSIS — R53.83 FATIGUE, UNSPECIFIED TYPE: ICD-10-CM

## 2020-04-16 DIAGNOSIS — M25.50 POLYARTHRALGIA: ICD-10-CM

## 2020-04-16 DIAGNOSIS — Z79.52 CURRENT CHRONIC USE OF SYSTEMIC STEROIDS: ICD-10-CM

## 2020-04-16 DIAGNOSIS — Z17.1 MALIGNANT NEOPLASM OF UPPER-INNER QUADRANT OF RIGHT BREAST IN FEMALE, ESTROGEN RECEPTOR NEGATIVE (HCC): ICD-10-CM

## 2020-04-16 DIAGNOSIS — C50.211 MALIGNANT NEOPLASM OF UPPER-INNER QUADRANT OF RIGHT BREAST IN FEMALE, ESTROGEN RECEPTOR NEGATIVE (HCC): ICD-10-CM

## 2020-04-16 LAB
25(OH)D3 SERPL-MCNC: 65 NG/ML (ref 30–100)
ALBUMIN SERPL BCP-MCNC: 4.1 G/DL (ref 3.2–4.9)
ALBUMIN SERPL BCP-MCNC: 4.2 G/DL (ref 3.2–4.9)
ALBUMIN/GLOB SERPL: 1.1 G/DL
ALBUMIN/GLOB SERPL: 1.1 G/DL
ALP SERPL-CCNC: 70 U/L (ref 30–99)
ALP SERPL-CCNC: 73 U/L (ref 30–99)
ALT SERPL-CCNC: 10 U/L (ref 2–50)
ALT SERPL-CCNC: 12 U/L (ref 2–50)
ANION GAP SERPL CALC-SCNC: 14 MMOL/L (ref 7–16)
ANION GAP SERPL CALC-SCNC: 15 MMOL/L (ref 7–16)
APPEARANCE UR: CLEAR
AST SERPL-CCNC: 21 U/L (ref 12–45)
AST SERPL-CCNC: 34 U/L (ref 12–45)
BACTERIA #/AREA URNS HPF: NEGATIVE /HPF
BILIRUB SERPL-MCNC: 0.2 MG/DL (ref 0.1–1.5)
BILIRUB SERPL-MCNC: 0.3 MG/DL (ref 0.1–1.5)
BILIRUB UR QL STRIP.AUTO: NEGATIVE
BUN SERPL-MCNC: 29 MG/DL (ref 8–22)
BUN SERPL-MCNC: 30 MG/DL (ref 8–22)
CALCIUM SERPL-MCNC: 10 MG/DL (ref 8.5–10.5)
CALCIUM SERPL-MCNC: 10 MG/DL (ref 8.5–10.5)
CHLORIDE SERPL-SCNC: 100 MMOL/L (ref 96–112)
CHLORIDE SERPL-SCNC: 101 MMOL/L (ref 96–112)
CHOLEST SERPL-MCNC: 225 MG/DL (ref 100–199)
CO2 SERPL-SCNC: 22 MMOL/L (ref 20–33)
CO2 SERPL-SCNC: 23 MMOL/L (ref 20–33)
COLOR UR: YELLOW
CREAT SERPL-MCNC: 0.95 MG/DL (ref 0.5–1.4)
CREAT SERPL-MCNC: 0.97 MG/DL (ref 0.5–1.4)
EPI CELLS #/AREA URNS HPF: NEGATIVE /HPF
FASTING STATUS PATIENT QL REPORTED: NORMAL
FOLATE SERPL-MCNC: 13.7 NG/ML
GLOBULIN SER CALC-MCNC: 3.8 G/DL (ref 1.9–3.5)
GLOBULIN SER CALC-MCNC: 3.9 G/DL (ref 1.9–3.5)
GLUCOSE SERPL-MCNC: 84 MG/DL (ref 65–99)
GLUCOSE SERPL-MCNC: 92 MG/DL (ref 65–99)
GLUCOSE UR STRIP.AUTO-MCNC: NEGATIVE MG/DL
HDLC SERPL-MCNC: 58 MG/DL
HYALINE CASTS #/AREA URNS LPF: ABNORMAL /LPF
KETONES UR STRIP.AUTO-MCNC: NEGATIVE MG/DL
LDLC SERPL CALC-MCNC: 138 MG/DL
LEUKOCYTE ESTERASE UR QL STRIP.AUTO: NEGATIVE
MICRO URNS: ABNORMAL
NITRITE UR QL STRIP.AUTO: NEGATIVE
PH UR STRIP.AUTO: 6 [PH] (ref 5–8)
POTASSIUM SERPL-SCNC: 4.2 MMOL/L (ref 3.6–5.5)
POTASSIUM SERPL-SCNC: 4.2 MMOL/L (ref 3.6–5.5)
PROT SERPL-MCNC: 7.9 G/DL (ref 6–8.2)
PROT SERPL-MCNC: 8.1 G/DL (ref 6–8.2)
PROT UR QL STRIP: 100 MG/DL
RBC # URNS HPF: ABNORMAL /HPF
RBC UR QL AUTO: ABNORMAL
RHEUMATOID FACT SER IA-ACNC: 12 IU/ML (ref 0–14)
SODIUM SERPL-SCNC: 137 MMOL/L (ref 135–145)
SODIUM SERPL-SCNC: 138 MMOL/L (ref 135–145)
SP GR UR STRIP.AUTO: 1.02
TRIGL SERPL-MCNC: 145 MG/DL (ref 0–149)
TSH SERPL DL<=0.005 MIU/L-ACNC: 1.71 UIU/ML (ref 0.38–5.33)
UROBILINOGEN UR STRIP.AUTO-MCNC: 0.2 MG/DL
VIT B12 SERPL-MCNC: 422 PG/ML (ref 211–911)
WBC #/AREA URNS HPF: ABNORMAL /HPF

## 2020-04-16 PROCEDURE — 85025 COMPLETE CBC W/AUTO DIFF WBC: CPT

## 2020-04-16 PROCEDURE — 86038 ANTINUCLEAR ANTIBODIES: CPT

## 2020-04-16 PROCEDURE — 83036 HEMOGLOBIN GLYCOSYLATED A1C: CPT | Mod: GA

## 2020-04-16 PROCEDURE — 80053 COMPREHEN METABOLIC PANEL: CPT | Mod: 91

## 2020-04-16 PROCEDURE — 82306 VITAMIN D 25 HYDROXY: CPT

## 2020-04-16 PROCEDURE — 86200 CCP ANTIBODY: CPT

## 2020-04-16 PROCEDURE — 84443 ASSAY THYROID STIM HORMONE: CPT

## 2020-04-16 PROCEDURE — 81001 URINALYSIS AUTO W/SCOPE: CPT

## 2020-04-16 PROCEDURE — 80061 LIPID PANEL: CPT

## 2020-04-16 PROCEDURE — 85025 COMPLETE CBC W/AUTO DIFF WBC: CPT | Mod: 91

## 2020-04-16 PROCEDURE — 80053 COMPREHEN METABOLIC PANEL: CPT

## 2020-04-16 PROCEDURE — 84207 ASSAY OF VITAMIN B-6: CPT

## 2020-04-16 PROCEDURE — 86431 RHEUMATOID FACTOR QUANT: CPT

## 2020-04-16 PROCEDURE — 82607 VITAMIN B-12: CPT

## 2020-04-16 PROCEDURE — 82746 ASSAY OF FOLIC ACID SERUM: CPT

## 2020-04-16 PROCEDURE — 36415 COLL VENOUS BLD VENIPUNCTURE: CPT

## 2020-04-17 LAB
BASOPHILS # BLD AUTO: 0.6 % (ref 0–1.8)
BASOPHILS # BLD AUTO: 0.8 % (ref 0–1.8)
BASOPHILS # BLD: 0.05 K/UL (ref 0–0.12)
BASOPHILS # BLD: 0.07 K/UL (ref 0–0.12)
CCP IGG SERPL-ACNC: 4 UNITS (ref 0–19)
COMMENT 1642: NORMAL
COMMENT 1642: NORMAL
EOSINOPHIL # BLD AUTO: 0.22 K/UL (ref 0–0.51)
EOSINOPHIL # BLD AUTO: 0.25 K/UL (ref 0–0.51)
EOSINOPHIL NFR BLD: 2.5 % (ref 0–6.9)
EOSINOPHIL NFR BLD: 2.8 % (ref 0–6.9)
ERYTHROCYTE [DISTWIDTH] IN BLOOD BY AUTOMATED COUNT: 50.6 FL (ref 35.9–50)
ERYTHROCYTE [DISTWIDTH] IN BLOOD BY AUTOMATED COUNT: 50.6 FL (ref 35.9–50)
EST. AVERAGE GLUCOSE BLD GHB EST-MCNC: 123 MG/DL
HBA1C MFR BLD: 5.9 % (ref 0–5.6)
HCT VFR BLD AUTO: 36.5 % (ref 37–47)
HCT VFR BLD AUTO: 37.3 % (ref 37–47)
HGB BLD-MCNC: 10.9 G/DL (ref 12–16)
HGB BLD-MCNC: 11 G/DL (ref 12–16)
HYPOCHROMIA BLD QL SMEAR: ABNORMAL
IMM GRANULOCYTES # BLD AUTO: 0.02 K/UL (ref 0–0.11)
IMM GRANULOCYTES # BLD AUTO: 0.03 K/UL (ref 0–0.11)
IMM GRANULOCYTES NFR BLD AUTO: 0.2 % (ref 0–0.9)
IMM GRANULOCYTES NFR BLD AUTO: 0.3 % (ref 0–0.9)
LYMPHOCYTES # BLD AUTO: 1.22 K/UL (ref 1–4.8)
LYMPHOCYTES # BLD AUTO: 1.26 K/UL (ref 1–4.8)
LYMPHOCYTES NFR BLD: 13.8 % (ref 22–41)
LYMPHOCYTES NFR BLD: 13.9 % (ref 22–41)
MACROCYTES BLD QL SMEAR: ABNORMAL
MCH RBC QN AUTO: 27.9 PG (ref 27–33)
MCH RBC QN AUTO: 28.3 PG (ref 27–33)
MCHC RBC AUTO-ENTMCNC: 29.5 G/DL (ref 33.6–35)
MCHC RBC AUTO-ENTMCNC: 29.9 G/DL (ref 33.6–35)
MCV RBC AUTO: 94.7 FL (ref 81.4–97.8)
MCV RBC AUTO: 94.8 FL (ref 81.4–97.8)
MONOCYTES # BLD AUTO: 0.56 K/UL (ref 0–0.85)
MONOCYTES # BLD AUTO: 0.56 K/UL (ref 0–0.85)
MONOCYTES NFR BLD AUTO: 6.2 % (ref 0–13.4)
MONOCYTES NFR BLD AUTO: 6.3 % (ref 0–13.4)
MORPHOLOGY BLD-IMP: NORMAL
MORPHOLOGY BLD-IMP: NORMAL
NEUTROPHILS # BLD AUTO: 6.79 K/UL (ref 2–7.15)
NEUTROPHILS # BLD AUTO: 6.93 K/UL (ref 2–7.15)
NEUTROPHILS NFR BLD: 76.1 % (ref 44–72)
NEUTROPHILS NFR BLD: 76.5 % (ref 44–72)
NRBC # BLD AUTO: 0 K/UL
NRBC # BLD AUTO: 0 K/UL
NRBC BLD-RTO: 0 /100 WBC
NRBC BLD-RTO: 0 /100 WBC
NUCLEAR IGG SER QL IA: NORMAL
PLATELET # BLD AUTO: 373 K/UL (ref 164–446)
PLATELET # BLD AUTO: 454 K/UL (ref 164–446)
PLATELET BLD QL SMEAR: NORMAL
PLATELET BLD QL SMEAR: NORMAL
PMV BLD AUTO: 10.2 FL (ref 9–12.9)
PMV BLD AUTO: 10.6 FL (ref 9–12.9)
POLYCHROMASIA BLD QL SMEAR: NORMAL
RBC # BLD AUTO: 3.85 M/UL (ref 4.2–5.4)
RBC # BLD AUTO: 3.94 M/UL (ref 4.2–5.4)
RBC BLD AUTO: PRESENT
RBC BLD AUTO: PRESENT
WBC # BLD AUTO: 8.9 K/UL (ref 4.8–10.8)
WBC # BLD AUTO: 9.1 K/UL (ref 4.8–10.8)

## 2020-04-19 LAB — VIT B6 SERPL-MCNC: 21.1 NMOL/L (ref 20–125)

## 2020-04-20 ENCOUNTER — TELEPHONE (OUTPATIENT)
Dept: HEMATOLOGY ONCOLOGY | Facility: MEDICAL CENTER | Age: 70
End: 2020-04-20

## 2020-04-20 ENCOUNTER — APPOINTMENT (OUTPATIENT)
Dept: HEMATOLOGY ONCOLOGY | Facility: MEDICAL CENTER | Age: 70
End: 2020-04-20
Payer: MEDICARE

## 2020-04-20 DIAGNOSIS — C50.211 MALIGNANT NEOPLASM OF UPPER-INNER QUADRANT OF RIGHT BREAST IN FEMALE, ESTROGEN RECEPTOR NEGATIVE (HCC): ICD-10-CM

## 2020-04-20 DIAGNOSIS — Z17.1 MALIGNANT NEOPLASM OF UPPER-INNER QUADRANT OF RIGHT BREAST IN FEMALE, ESTROGEN RECEPTOR NEGATIVE (HCC): ICD-10-CM

## 2020-04-20 NOTE — TELEPHONE ENCOUNTER
Pt returned message. Informed pt of above info from APRN.  Pt verbalized understanding & stated no further questions at this time.

## 2020-04-20 NOTE — TELEPHONE ENCOUNTER
Per request of Viktoriya MARLEY, called pt to review recent lab results.  Pt did not answer, left message with contact info & office hours.    To Staff:  Please transfer to RN    For RN:  Inform pt that lab results were reviewed by Viktoriya MARLEY & her levels show slight anemia which APRN will f/u with PCP on.  Pt's results also show that she is slightly dehydrated & instruct her to increase her fluids.  Also inform pt that she will need to f/u with PCP (Dr. Hernandez) to receive lab results that were ordered by PCP.

## 2020-04-21 ENCOUNTER — PATIENT MESSAGE (OUTPATIENT)
Dept: MEDICAL GROUP | Facility: PHYSICIAN GROUP | Age: 70
End: 2020-04-21

## 2020-04-21 ENCOUNTER — TELEPHONE (OUTPATIENT)
Dept: MEDICAL GROUP | Facility: PHYSICIAN GROUP | Age: 70
End: 2020-04-21

## 2020-04-22 ENCOUNTER — TELEMEDICINE (OUTPATIENT)
Dept: MEDICAL GROUP | Facility: PHYSICIAN GROUP | Age: 70
End: 2020-04-22
Payer: MEDICARE

## 2020-04-22 ENCOUNTER — PATIENT MESSAGE (OUTPATIENT)
Dept: MEDICAL GROUP | Facility: PHYSICIAN GROUP | Age: 70
End: 2020-04-22

## 2020-04-22 VITALS — WEIGHT: 137 LBS | HEIGHT: 65 IN | BODY MASS INDEX: 22.82 KG/M2

## 2020-04-22 DIAGNOSIS — M11.9 CRYSTAL ARTHROPATHY: ICD-10-CM

## 2020-04-22 DIAGNOSIS — D50.9 IRON DEFICIENCY ANEMIA, UNSPECIFIED IRON DEFICIENCY ANEMIA TYPE: ICD-10-CM

## 2020-04-22 DIAGNOSIS — R31.21 ASYMPTOMATIC MICROSCOPIC HEMATURIA: ICD-10-CM

## 2020-04-22 PROCEDURE — 99214 OFFICE O/P EST MOD 30 MIN: CPT | Mod: 95,CR | Performed by: FAMILY MEDICINE

## 2020-04-22 RX ORDER — LANOLIN ALCOHOL/MO/W.PET/CERES
325 CREAM (GRAM) TOPICAL
Qty: 90 TAB | Refills: 0 | Status: SHIPPED | OUTPATIENT
Start: 2020-04-22 | End: 2020-05-22

## 2020-04-22 ASSESSMENT — FIBROSIS 4 INDEX: FIB4 SCORE: 1.02

## 2020-04-22 NOTE — PROGRESS NOTES
Chief Complaint   Patient presents with   • Anemia     fv labs       HISTORY OF PRESENT ILLNESS: Patient is a 70 y.o. female established patient here today for the following concerns:    This encounter was conducted via Zoom .   Verbal consent was obtained. Patient's identity was verified.      1. Iron deficiency anemia, unspecified iron deficiency anemia type  2. Asymptomatic microscopic hematuria  3. Crystal arthropathy  Patient is on Zoom today to discuss her labs.  She was found to have about 2 g drop in HGB in 6 months.  Reports no recent surgeries.  She has hx of Ulcers but reports no reminiscent pain or diarrhea/melena or hematochezia.  She is due for colon cancer screening and has cologuard on hand.  She was also found to have very small amount of microscopic hematuria.  At the time of last visit she had been feeling very run down and with hx of Breast Cancer she underwent lab testing.  This is reviewed in detail with the patient.  There was some concern about underlying RA and therefore underwent RF and CCP which were negative.  She reports family hx of pseudogout, but tells me a brother takes a preventive medication.  She has not had a UA level recently.        Past Medical, Social, and Family history reviewed and updated in EPIC    Allergies:Statins [hmg-coa-r inhibitors]; Codeine; Eggs; Lisinopril; Penicillins; Statins [hmg-coa-r inhibitors]; Codeine; and Pcn [penicillins]    Current Outpatient Medications   Medication Sig Dispense Refill   • ferrous sulfate 325 (65 Fe) MG EC tablet Take 1 Tab by mouth 3 times a day, with meals for 30 days. 90 Tab 0   • ibuprofen (MOTRIN) 800 MG Tab TAKE 1 TABLET BY MOUTH EVERY 8 HOURS AS NEEDED FOR MILD PAIN 90 Tab 0   • Cholecalciferol (VITAMIN D3) 5000 units Cap Take 1 Cap by mouth every day.       No current facility-administered medications for this visit.          ROS:  Review of Systems   Constitutional: Negative for fever, chills, weight loss and malaise/fatigue.  "  HENT: Negative for ear pain, nosebleeds, congestion, sore throat and neck pain.    Eyes: Negative for blurred vision.   Respiratory: Negative for cough, sputum production, shortness of breath and wheezing.    Cardiovascular: Negative for chest pain, palpitations,  and leg swelling.   Gastrointestinal: Negative for heartburn, nausea, vomiting, diarrhea and abdominal pain.   Genitourinary: Negative for dysuria, urgency and frequency.   Musculoskeletal: Negative for myalgias, back pain and joint pain.   Skin: Negative for rash and itching.   Neurological: Negative for dizziness, tingling, tremors, sensory change, focal weakness and headaches.   Endo/Heme/Allergies: Does not bruise/bleed easily.   Psychiatric/Behavioral: Negative for depression, anxiety, suicidal ideas, insomnia and memory loss.      Exam:  Ht 1.638 m (5' 4.5\")   Wt 62.1 kg (137 lb)     General:  Well nourished, well developed in NAD  Head is grossly normal.  Neck: Supple without JVD, Trachea midline, no thyromegaly noted  Pulmonary:  Normal effort. Speaking in full sentences  Psych: affect appropriate  Skin: no Jaundice noted or facial rashes    Please note that this dictation was created using voice recognition software. I have made every reasonable attempt to correct obvious errors, but I expect that there are errors of grammar and possibly content that I did not discover before finalizing the note.    Assessment/Plan:  1. Iron deficiency anemia, unspecified iron deficiency anemia type  Will check cologuard for colon cancer screening but also FIT test given the new onset iron deficiency anemia.  Start iron replacement and check labs in 1 month. Recheck microscopic hematuria, suspect postmenopausal atrophy as this cause given only 5 RBCs.   - OCCULT BLOOD, FECAL IMMUNOASSAY  - CBC WITH DIFFERENTIAL; Future  - FERRITIN; Future  - IRON/TOTAL IRON BIND; Future  - RETICULOCYTES COUNT; Future  - URINALYSIS,CULTURE IF INDICATED; Future  - ferrous sulfate " 325 (65 Fe) MG EC tablet; Take 1 Tab by mouth 3 times a day, with meals for 30 days.  Dispense: 90 Tab; Refill: 0    2. Asymptomatic microscopic hematuria  - CBC WITH DIFFERENTIAL; Future  - FERRITIN; Future  - IRON/TOTAL IRON BIND; Future  - RETICULOCYTES COUNT; Future  - URINALYSIS,CULTURE IF INDICATED; Future  - ferrous sulfate 325 (65 Fe) MG EC tablet; Take 1 Tab by mouth 3 times a day, with meals for 30 days.  Dispense: 90 Tab; Refill: 0    3. Crystal arthropathy  Check   - URIC ACID, SERUM    1 month

## 2020-05-11 DIAGNOSIS — M10.00 ACUTE IDIOPATHIC GOUT, UNSPECIFIED SITE: ICD-10-CM

## 2020-05-12 NOTE — TELEPHONE ENCOUNTER
Was the patient seen in the last year in this department? Yes    Does patient have an active prescription for medications requested? No     Received Request Via: Pharmacy      Pt met protocol?: No   Pt last ov 9/2019

## 2020-05-13 RX ORDER — IBUPROFEN 800 MG/1
TABLET ORAL
Qty: 90 TAB | Refills: 0 | Status: SHIPPED | OUTPATIENT
Start: 2020-05-13 | End: 2020-08-21 | Stop reason: SDUPTHER

## 2020-05-16 ENCOUNTER — HOSPITAL ENCOUNTER (OUTPATIENT)
Dept: LAB | Facility: MEDICAL CENTER | Age: 70
End: 2020-05-16
Attending: FAMILY MEDICINE
Payer: MEDICARE

## 2020-05-22 ENCOUNTER — HOSPITAL ENCOUNTER (OUTPATIENT)
Dept: RADIOLOGY | Facility: MEDICAL CENTER | Age: 70
End: 2020-05-22
Attending: NURSE PRACTITIONER | Admitting: NURSE PRACTITIONER
Payer: MEDICARE

## 2020-05-22 DIAGNOSIS — Z17.1 MALIGNANT NEOPLASM OF UPPER-INNER QUADRANT OF RIGHT BREAST IN FEMALE, ESTROGEN RECEPTOR NEGATIVE (HCC): ICD-10-CM

## 2020-05-22 DIAGNOSIS — C50.211 MALIGNANT NEOPLASM OF UPPER-INNER QUADRANT OF RIGHT BREAST IN FEMALE, ESTROGEN RECEPTOR NEGATIVE (HCC): ICD-10-CM

## 2020-05-22 PROCEDURE — G0279 TOMOSYNTHESIS, MAMMO: HCPCS

## 2020-05-23 ENCOUNTER — HOSPITAL ENCOUNTER (OUTPATIENT)
Dept: LAB | Facility: MEDICAL CENTER | Age: 70
End: 2020-05-23
Attending: FAMILY MEDICINE
Payer: MEDICARE

## 2020-05-23 DIAGNOSIS — D50.9 IRON DEFICIENCY ANEMIA, UNSPECIFIED IRON DEFICIENCY ANEMIA TYPE: ICD-10-CM

## 2020-05-23 DIAGNOSIS — R31.21 ASYMPTOMATIC MICROSCOPIC HEMATURIA: ICD-10-CM

## 2020-05-23 LAB
APPEARANCE UR: CLEAR
BACTERIA #/AREA URNS HPF: NEGATIVE /HPF
BASOPHILS # BLD AUTO: 0.4 % (ref 0–1.8)
BASOPHILS # BLD: 0.04 K/UL (ref 0–0.12)
BILIRUB UR QL STRIP.AUTO: NEGATIVE
COLOR UR: YELLOW
EOSINOPHIL # BLD AUTO: 0.14 K/UL (ref 0–0.51)
EOSINOPHIL NFR BLD: 1.5 % (ref 0–6.9)
EPI CELLS #/AREA URNS HPF: NEGATIVE /HPF
ERYTHROCYTE [DISTWIDTH] IN BLOOD BY AUTOMATED COUNT: 54.5 FL (ref 35.9–50)
FERRITIN SERPL-MCNC: 107 NG/ML (ref 10–291)
GLUCOSE UR STRIP.AUTO-MCNC: NEGATIVE MG/DL
HCT VFR BLD AUTO: 36.8 % (ref 37–47)
HGB BLD-MCNC: 11.3 G/DL (ref 12–16)
HGB RETIC QN AUTO: 33.2 PG/CELL (ref 29–35)
HYALINE CASTS #/AREA URNS LPF: ABNORMAL /LPF
IMM GRANULOCYTES # BLD AUTO: 0.05 K/UL (ref 0–0.11)
IMM GRANULOCYTES NFR BLD AUTO: 0.6 % (ref 0–0.9)
IMM RETICS NFR: 9.1 % (ref 9.3–17.4)
IRON SATN MFR SERPL: 20 % (ref 15–55)
IRON SERPL-MCNC: 55 UG/DL (ref 40–170)
KETONES UR STRIP.AUTO-MCNC: NEGATIVE MG/DL
LEUKOCYTE ESTERASE UR QL STRIP.AUTO: NEGATIVE
LYMPHOCYTES # BLD AUTO: 1.64 K/UL (ref 1–4.8)
LYMPHOCYTES NFR BLD: 18.1 % (ref 22–41)
MCH RBC QN AUTO: 29.2 PG (ref 27–33)
MCHC RBC AUTO-ENTMCNC: 30.7 G/DL (ref 33.6–35)
MCV RBC AUTO: 95.1 FL (ref 81.4–97.8)
MICRO URNS: ABNORMAL
MONOCYTES # BLD AUTO: 0.66 K/UL (ref 0–0.85)
MONOCYTES NFR BLD AUTO: 7.3 % (ref 0–13.4)
NEUTROPHILS # BLD AUTO: 6.54 K/UL (ref 2–7.15)
NEUTROPHILS NFR BLD: 72.1 % (ref 44–72)
NITRITE UR QL STRIP.AUTO: NEGATIVE
NRBC # BLD AUTO: 0 K/UL
NRBC BLD-RTO: 0 /100 WBC
PH UR STRIP.AUTO: 6 [PH] (ref 5–8)
PLATELET # BLD AUTO: 322 K/UL (ref 164–446)
PMV BLD AUTO: 10.9 FL (ref 9–12.9)
PROT UR QL STRIP: 100 MG/DL
RBC # BLD AUTO: 3.87 M/UL (ref 4.2–5.4)
RBC # URNS HPF: ABNORMAL /HPF
RBC UR QL AUTO: NEGATIVE
RETICS # AUTO: 0.07 M/UL (ref 0.04–0.06)
RETICS/RBC NFR: 1.9 % (ref 0.8–2.1)
SP GR UR STRIP.AUTO: 1.02
TIBC SERPL-MCNC: 277 UG/DL (ref 250–450)
UIBC SERPL-MCNC: 222 UG/DL (ref 110–370)
URATE SERPL-MCNC: 4.7 MG/DL (ref 1.9–8.2)
UROBILINOGEN UR STRIP.AUTO-MCNC: 0.2 MG/DL
WBC # BLD AUTO: 9.1 K/UL (ref 4.8–10.8)
WBC #/AREA URNS HPF: ABNORMAL /HPF

## 2020-05-23 PROCEDURE — 85046 RETICYTE/HGB CONCENTRATE: CPT

## 2020-05-23 PROCEDURE — 36415 COLL VENOUS BLD VENIPUNCTURE: CPT

## 2020-05-23 PROCEDURE — 85025 COMPLETE CBC W/AUTO DIFF WBC: CPT

## 2020-05-23 PROCEDURE — 82728 ASSAY OF FERRITIN: CPT

## 2020-05-23 PROCEDURE — 83550 IRON BINDING TEST: CPT

## 2020-05-23 PROCEDURE — 84550 ASSAY OF BLOOD/URIC ACID: CPT

## 2020-05-23 PROCEDURE — 83540 ASSAY OF IRON: CPT

## 2020-05-23 PROCEDURE — 81001 URINALYSIS AUTO W/SCOPE: CPT

## 2020-05-26 ENCOUNTER — TELEPHONE (OUTPATIENT)
Dept: MEDICAL GROUP | Facility: PHYSICIAN GROUP | Age: 70
End: 2020-05-26

## 2020-05-26 NOTE — TELEPHONE ENCOUNTER
I spoke to Lauren.    Lauren is requesting VV with Jenna Hernandez M.D. for further discussion.    VV scheduled.

## 2020-05-27 ENCOUNTER — TELEPHONE (OUTPATIENT)
Dept: HEMATOLOGY ONCOLOGY | Facility: MEDICAL CENTER | Age: 70
End: 2020-05-27

## 2020-05-27 NOTE — TELEPHONE ENCOUNTER
Patient called in to cancel appt and would not give a reason as to why. Lauren said that she would call us when she is ready to reschedule.

## 2020-05-29 ENCOUNTER — TELEPHONE (OUTPATIENT)
Dept: HEMATOLOGY ONCOLOGY | Facility: MEDICAL CENTER | Age: 70
End: 2020-05-29

## 2020-05-29 NOTE — TELEPHONE ENCOUNTER
Phone Number Called: [phone]    Call outcome: Did not leave a detailed message. Requested patient to call back.    Message: Left message for patient to return call to reschedule her appointment with Viktoriya Tavera. Per Viktoriya patient can be seen virtually instead of coming into the office.

## 2020-05-29 NOTE — TELEPHONE ENCOUNTER
Spoke with pt in regards to her recent [employer] message. Pt states she had labs drawn through her PCP and is requesting the results. I informed pt she will need to call her PCP to have the results released but that I will also send her message over to Viktoriya MARLEY to see if there is any additional information that needs to be relayed. Pt aware she is out of the office at this time so a return call (if needed) will not be made until early next week. Pt states she will call our office back to reschedule her missed appointment since she does not have a  at this time. Pt states no further questions.

## 2020-06-04 ENCOUNTER — TELEMEDICINE (OUTPATIENT)
Dept: HEMATOLOGY ONCOLOGY | Facility: MEDICAL CENTER | Age: 70
End: 2020-06-04
Payer: MEDICARE

## 2020-06-04 DIAGNOSIS — C50.211 MALIGNANT NEOPLASM OF UPPER-INNER QUADRANT OF RIGHT BREAST IN FEMALE, ESTROGEN RECEPTOR NEGATIVE (HCC): ICD-10-CM

## 2020-06-04 DIAGNOSIS — Z17.1 MALIGNANT NEOPLASM OF UPPER-INNER QUADRANT OF RIGHT BREAST IN FEMALE, ESTROGEN RECEPTOR NEGATIVE (HCC): ICD-10-CM

## 2020-06-04 PROCEDURE — 99213 OFFICE O/P EST LOW 20 MIN: CPT | Mod: 95,CR | Performed by: NURSE PRACTITIONER

## 2020-06-04 RX ORDER — IBUPROFEN 800 MG/1
TABLET ORAL
COMMUNITY
Start: 2020-05-15 | End: 2020-07-08 | Stop reason: SDUPTHER

## 2020-06-04 ASSESSMENT — ENCOUNTER SYMPTOMS
DIARRHEA: 0
NAUSEA: 0
VOMITING: 0
CHILLS: 0
WHEEZING: 0
COUGH: 0
SHORTNESS OF BREATH: 0
PALPITATIONS: 0
CONSTIPATION: 0
WEIGHT LOSS: 0
FEVER: 0
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
## 2020-06-05 ENCOUNTER — TELEMEDICINE (OUTPATIENT)
Dept: MEDICAL GROUP | Facility: PHYSICIAN GROUP | Age: 70
End: 2020-06-05
Payer: MEDICARE

## 2020-06-05 VITALS — HEIGHT: 65 IN | WEIGHT: 139 LBS | BODY MASS INDEX: 23.16 KG/M2

## 2020-06-05 DIAGNOSIS — E04.1 THYROID NODULE: ICD-10-CM

## 2020-06-05 DIAGNOSIS — D50.9 IRON DEFICIENCY ANEMIA, UNSPECIFIED IRON DEFICIENCY ANEMIA TYPE: ICD-10-CM

## 2020-06-05 DIAGNOSIS — R77.1 ELEVATED SERUM GLOBULIN LEVEL: ICD-10-CM

## 2020-06-05 PROCEDURE — 99214 OFFICE O/P EST MOD 30 MIN: CPT | Mod: 95,CR | Performed by: FAMILY MEDICINE

## 2020-06-05 ASSESSMENT — FIBROSIS 4 INDEX: FIB4 SCORE: 1.44

## 2020-06-05 NOTE — PROGRESS NOTES
Chief Complaint   Patient presents with   • Anemia     fv labs       HISTORY OF PRESENT ILLNESS: Patient is a 70 y.o. female established patient here today for the following concerns:    This encounter was conducted via Zoom .   Verbal consent was obtained. Patient's identity was verified.      1. Elevated serum globulin level  2. Iron deficiency anemia, unspecified iron deficiency anemia type  3. Thyroid nodule    This is a super sweet 70 year old female with hx of breast cancer in 2017 who was recently found to have significant drop in H/H along with profound fatigue and anorexia.  She reports that she has been able to gain weight and appetite is better.  She was empirically started on iron therapy and has had progressive improvement in blood counts already.  She has FIT testing and cologuard to still get done.  Her oncology APN noted slight elevation in Immunoglobulin levels and recommended possible SPEP to round out her testing.  In addition, has thyroid US upcoming for surveillance.  Over all feeling well other than joint pain for which she has received steroid injections and PT.        Past Medical, Social, and Family history reviewed and updated in EPIC    Allergies:Statins [hmg-coa-r inhibitors]; Codeine; Eggs; Lisinopril; Penicillins; Statins [hmg-coa-r inhibitors]; Codeine; and Pcn [penicillins]    Current Outpatient Medications   Medication Sig Dispense Refill   • Ferrous Sulfate (IRON PO) Take  by mouth.     • FAMOTIDINE PO Take  by mouth.     • ibuprofen (MOTRIN) 800 MG Tab TAKE 1 TABLET BY MOUTH EVERY 8 HOURS AS NEEDED FOR MILD PAIN 90 Tab 0   • Cholecalciferol (VITAMIN D3) 5000 units Cap Take 1 Cap by mouth every day.     • ibuprofen (MOTRIN) 800 MG Tab        No current facility-administered medications for this visit.          ROS:  Review of Systems   Constitutional: Negative for fever, chills, weight loss and malaise/fatigue.   HENT: Negative for ear pain, nosebleeds, congestion, sore throat and  "neck pain.    Eyes: Negative for blurred vision.   Respiratory: Negative for cough, sputum production, shortness of breath and wheezing.    Cardiovascular: Negative for chest pain, palpitations,  and leg swelling.   Gastrointestinal: Negative for heartburn, nausea, vomiting, diarrhea and abdominal pain.   Genitourinary: Negative for dysuria, urgency and frequency.   Musculoskeletal: Negative for myalgias, back pain and joint pain.   Skin: Negative for rash and itching.   Neurological: Negative for dizziness, tingling, tremors, sensory change, focal weakness and headaches.   Endo/Heme/Allergies: Does not bruise/bleed easily.   Psychiatric/Behavioral: Negative for depression, anxiety, suicidal ideas, insomnia and memory loss.      Exam:  Ht 1.638 m (5' 4.5\")   Wt 63 kg (139 lb)     General:  Well nourished, well developed in NAD  Head is grossly normal.  Neck: Supple without JVD, Trachea midline, no thyromegaly noted  Pulmonary:  Normal effort. Speaking in full sentences  Psych: affect appropriate  Skin: no Jaundice noted or facial rashes    Please note that this dictation was created using voice recognition software. I have made every reasonable attempt to correct obvious errors, but I expect that there are errors of grammar and possibly content that I did not discover before finalizing the note.    Assessment/Plan:  1. Elevated serum globulin level  - SPEP W/REFLEX TO ROGELIO, A, G, M; Future    2. Iron deficiency anemia, unspecified iron deficiency anemia type  Recheck levels in another 1-3 months, continue iron, awaiting stool testing.     3. Thyroid nodule  Awaiting US.     Follow up pending findings.          "

## 2020-06-07 ENCOUNTER — HOSPITAL ENCOUNTER (OUTPATIENT)
Facility: MEDICAL CENTER | Age: 70
End: 2020-06-07
Attending: FAMILY MEDICINE
Payer: MEDICARE

## 2020-06-07 PROCEDURE — 82274 ASSAY TEST FOR BLOOD FECAL: CPT

## 2020-06-08 ENCOUNTER — HOSPITAL ENCOUNTER (OUTPATIENT)
Dept: LAB | Facility: MEDICAL CENTER | Age: 70
End: 2020-06-08
Attending: FAMILY MEDICINE
Payer: MEDICARE

## 2020-06-08 ENCOUNTER — HOSPITAL ENCOUNTER (OUTPATIENT)
Dept: RADIOLOGY | Facility: MEDICAL CENTER | Age: 70
End: 2020-06-08
Attending: SURGERY
Payer: MEDICARE

## 2020-06-08 DIAGNOSIS — E04.2 MULTINODULAR GOITER: ICD-10-CM

## 2020-06-08 DIAGNOSIS — R77.1 ELEVATED SERUM GLOBULIN LEVEL: ICD-10-CM

## 2020-06-08 LAB — URATE SERPL-MCNC: 5.2 MG/DL (ref 1.9–8.2)

## 2020-06-08 PROCEDURE — 84165 PROTEIN E-PHORESIS SERUM: CPT

## 2020-06-08 PROCEDURE — 36415 COLL VENOUS BLD VENIPUNCTURE: CPT

## 2020-06-08 PROCEDURE — 76536 US EXAM OF HEAD AND NECK: CPT

## 2020-06-08 PROCEDURE — 84155 ASSAY OF PROTEIN SERUM: CPT

## 2020-06-08 PROCEDURE — 84550 ASSAY OF BLOOD/URIC ACID: CPT

## 2020-06-11 LAB
ALBUMIN SERPL ELPH-MCNC: 4.38 G/DL (ref 3.75–5.01)
ALPHA1 GLOB SERPL ELPH-MCNC: 0.27 G/DL (ref 0.19–0.46)
ALPHA2 GLOB SERPL ELPH-MCNC: 0.77 G/DL (ref 0.48–1.05)
B-GLOBULIN SERPL ELPH-MCNC: 0.77 G/DL (ref 0.48–1.1)
GAMMA GLOB SERPL ELPH-MCNC: 1.21 G/DL (ref 0.62–1.51)
INTERPRETATION SERPL IFE-IMP: NORMAL
MONOCLON BAND OBS SERPL: NORMAL
PATHOLOGY STUDY: NORMAL
PROT SERPL-MCNC: 7.4 G/DL (ref 6.3–8.2)

## 2020-06-12 LAB — HEMOCCULT STL QL IA: NEGATIVE

## 2020-06-25 DIAGNOSIS — D50.9 IRON DEFICIENCY ANEMIA, UNSPECIFIED IRON DEFICIENCY ANEMIA TYPE: ICD-10-CM

## 2020-07-06 DIAGNOSIS — M10.00 ACUTE IDIOPATHIC GOUT, UNSPECIFIED SITE: ICD-10-CM

## 2020-07-06 RX ORDER — IBUPROFEN 800 MG/1
TABLET ORAL
Qty: 90 TAB | Refills: 0 | Status: CANCELLED | OUTPATIENT
Start: 2020-07-06

## 2020-07-06 NOTE — TELEPHONE ENCOUNTER
----- Message from Lauren Dave sent at 7/5/2020 10:10 AM PDT -----  Regarding: Prescription Question  Contact: [phone]  Can you call in my 800 IB in

## 2020-07-08 RX ORDER — IBUPROFEN 800 MG/1
800 TABLET ORAL EVERY 8 HOURS PRN
Qty: 90 TAB | Refills: 0 | Status: SHIPPED | OUTPATIENT
Start: 2020-07-08 | End: 2020-08-13

## 2020-07-24 ENCOUNTER — TELEPHONE (OUTPATIENT)
Dept: PHYSICAL MEDICINE AND REHAB | Facility: MEDICAL CENTER | Age: 70
End: 2020-07-24

## 2020-07-24 ENCOUNTER — OFFICE VISIT (OUTPATIENT)
Dept: PHYSICAL MEDICINE AND REHAB | Facility: MEDICAL CENTER | Age: 70
End: 2020-07-24
Payer: MEDICARE

## 2020-07-24 VITALS
TEMPERATURE: 98.6 F | DIASTOLIC BLOOD PRESSURE: 90 MMHG | SYSTOLIC BLOOD PRESSURE: 170 MMHG | BODY MASS INDEX: 22.52 KG/M2 | HEART RATE: 88 BPM | OXYGEN SATURATION: 97 % | HEIGHT: 65 IN | WEIGHT: 135.14 LBS

## 2020-07-24 DIAGNOSIS — M54.12 CERVICAL RADICULOPATHY: ICD-10-CM

## 2020-07-24 DIAGNOSIS — M54.2 CERVICALGIA: ICD-10-CM

## 2020-07-24 DIAGNOSIS — G56.03 BILATERAL CARPAL TUNNEL SYNDROME: ICD-10-CM

## 2020-07-24 PROCEDURE — 95885 MUSC TST DONE W/NERV TST LIM: CPT | Mod: XS | Performed by: PHYSICAL MEDICINE & REHABILITATION

## 2020-07-24 PROCEDURE — 95911 NRV CNDJ TEST 9-10 STUDIES: CPT | Performed by: PHYSICAL MEDICINE & REHABILITATION

## 2020-07-24 PROCEDURE — 95886 MUSC TEST DONE W/N TEST COMP: CPT | Performed by: PHYSICAL MEDICINE & REHABILITATION

## 2020-07-24 PROCEDURE — 99999 PR NO CHARGE: CPT | Mod: 25 | Performed by: PHYSICAL MEDICINE & REHABILITATION

## 2020-07-24 ASSESSMENT — PATIENT HEALTH QUESTIONNAIRE - PHQ9: CLINICAL INTERPRETATION OF PHQ2 SCORE: 0

## 2020-07-24 ASSESSMENT — FIBROSIS 4 INDEX: FIB4 SCORE: 1.44

## 2020-07-24 ASSESSMENT — PAIN SCALES - GENERAL: PAINLEVEL: 4=SLIGHT-MODERATE PAIN

## 2020-07-24 NOTE — PROCEDURES
[facility]  Sports and Spine, Physiatry, EMG  [facility]  [address]  [place] [address]    Test Date:  2020    Patient: Luaren Dave : 1950 Physician: Warren Berrios MD   Sex: Female Height:  cm Ref Phys: Dr. Varma   MRN#: [id number] Weight:  lbs. Technician: N/A     Patient Complaints:  Bilateral upper extremity paresthesias    Lauren presents for electrodiagnostic evaluation of her bilateral upper extremity paresthesias.  She reports that she has numbness and tingling in her upper extremities, the left is worse than the right.  The 3rd digit is completely numb.  From her report, these symptoms have been ongoing since 2020.  She does have some history of mild neck stiffness, but does report significant neck pain currently.  Her pain is a 9/10 on the NRS, related to symptoms in her hands.    PMH/PSH/Meds/SH were reviewed in the chart at the time of the visit.      Past medical history is positive for breast cancer with sentinel node biopsy on the right    NCV & EMG Findings:  Evaluation of the left median motor nerve showed prolonged distal onset latency (4.8 ms) and reduced amplitude (4.6 mV).  The right median motor nerve showed prolonged distal onset latency (4.8 ms) and decreased conduction velocity (Elbow-Wrist, 45 m/s).  The left median/radial (dig I) comparison and the right median/radial (dig I) comparison nerves showed prolonged distal peak latency (Median, L3.8, R3.8 ms) and abnormal peak latency difference (Median-Radial, L1.1, R1.0 ms).  The left median/ulnar (dig IV) comparison and the right median/ulnar (dig IV) comparison nerves showed prolonged distal peak latency (Median Wr, L4.4, R4.6 ms) and abnormal peak latency difference (Median Wr-Ulnar Wr, L0.8, R0.7 ms).  All left vs. right side differences were within normal limits.      Needle evaluation of the left biceps, the left mid cervical paraspinal, and the left low cervical  paraspinal muscles showed CRD Other. No myokymia was noted.  All remaining muscles (as indicated in the following table) showed no evidence of electrical instability.      Impression/Recommendations:  Abnormal study  1. Today's electrodiagnostic findings are consistent with:      A. 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 carpal tunnel syndrome (CTS)).  This is a sensorimotor lesion with some evidence of remote axonal loss without ongoing axon loss on needle exam of the APB      B. Right median mononeuropathy at the wrist (e.g. CTS).  This is a sensorimotor lesion.  Needle exam of the right APB was deferred.      C. Chronic left cervical radiculopathy, primarily involving C5-6  2. Today's electrodiagnostic findings point against:      A. Acute left cervical radiculopathy      B. Myopathy  3. Clinically, these findings are consistent with her symptoms.  Needle examination of the right upper extremity was discontinued with determination that she had had lymph node biopsy on the right related to her breast cancer treatment.  Discussed that this is a relative caution, rather than an absolute contraindication to needle testing, but determined not to proceed with further needle examination of the right upper extremity.    Thank you very much for asking me to participate in Lauren Dave's care.  Please contact me with any questions or concerns.    ___________________________    Warren Berrios MD  Physical Medicine and Rehabilitation  Interventional Spine and Sports Physiatry  [facility]          Nerve Conduction Studies  Motor Summary Table     Stim Site NR Onset (ms) Norm Onset (ms) O-P Amp (mV) Norm O-P Amp Site1 Site2 Delta-0 (ms) Dist (cm) Camilo (m/s) Norm Camilo (m/s)   Left Median Motor (Abd Poll Brev)    T32C   Wrist    4.8 <4.2 4.6 >5 Elbow Wrist 4.0 20.0 50 >50   Elbow    8.8  4.3  Ulnar to APB Elbow 5.3 0.0     Ulnar to APB    3.5  3.2          Right Median Motor (Abd Poll Brev)    T31.4C      Wrist    4.8 <4.2 7.0 >5 Elbow Wrist 4.3 19.5 45 >50   Elbow    9.1  7.1  Ulnar to APB Elbow 5.6 0.0     Ulnar to APB    3.5  3.9            Comparison Summary Table     Stim Site NR Peak (ms) Norm Peak (ms) P-T Amp (µV) Site1 Site2 Delta-P (ms) Norm Delta (ms)   Left Median/Radial Dig I Comparison (Digit 1 - 10cm)   Median    3.8 <3.1 3.3 Median Radial 1.1 <0.4   Radial    2.7 <3.0 10.0       Right Median/Radial Dig I Comparison (Digit 1 - 10cm)   Median    3.8 <3.1 11.5 Median Radial 1.0 <0.4   Radial    2.8 <3.0 7.4       Left Median/Ulnar Dig IV Comparison (Digit 4 - 14cm)   Median Wr    4.4 <4.1 4.9 Median Wr Ulnar Wr 0.8 <0.4   Ulnar Wr    3.6 <3.9 13.7       Right Median/Ulnar Dig IV Comparison (Digit 4 - 14cm)   Median Wr    4.6 <4.1 18.1 Median Wr Ulnar Wr 0.7 <0.4   Ulnar Wr    3.9 <3.9 8.1         EMG+     Side Muscle Nerve Root Ins Act Fibs Psw Amp Dur Poly Recrt Int Pat Other Comment   Right Deltoid Axillary C5-6 Nml Nml Nml Nml Nml 0 Nml Nml None    Right Biceps Musculocut C5-6 Nml Nml Nml Nml Nml 0 Nml Nml None    Left Deltoid Axillary C5-6 Nml Nml Nml Nml Nml 0 Nml Nml None    Left Biceps Musculocut C5-6 Nml Nml Nml Nml Nml 0 Nml Nml CRD    Left Triceps Radial C6-7-8 Nml Nml Nml Nml Nml 0 Nml Nml None    Left PronatorTeres Median C6-7 Nml Nml Nml Nml Nml 0 Nml Nml None    Left Abd Poll Brev Median C8-T1 Nml Nml Nml Nml Nml 0 Nml Nml None    Left 1stDorInt Ulnar C8-T1 Nml Nml Nml Nml Nml 0 Nml Nml None    Left Cervical Parasp Up Rami C1-3 Nml Nml Nml      None    Left Cervical Parasp Mid Rami C4-6 Nml Nml Nml      CRD    Left Cervical Parasp Low Rami C7-8 Nml Nml Nml      CRD            Waveforms:

## 2020-07-24 NOTE — TELEPHONE ENCOUNTER
Pt asked when the EMG report will be done. She would like them sent to her Mychart, Dr.Amber Hernandez, and  immediately. I informed her that our notes for this visit still need to be written and uploaded, and that this may take a few days as the Doctor has other patients. Please advise.

## 2020-07-29 ENCOUNTER — PATIENT MESSAGE (OUTPATIENT)
Dept: CARDIOLOGY | Facility: MEDICAL CENTER | Age: 70
End: 2020-07-29

## 2020-07-29 RX ORDER — AMLODIPINE BESYLATE 5 MG/1
5 TABLET ORAL DAILY
Qty: 30 TAB | Refills: 11 | Status: SHIPPED | OUTPATIENT
Start: 2020-07-29 | End: 2020-08-13 | Stop reason: SINTOL

## 2020-07-29 NOTE — PATIENT COMMUNICATION
"Called pt in regards to her [employer] message. Pt states her BP has been in the 150's - 170's but can't say how long it has been running high for, as she just started taking her own BP daily within the last couple of days. Pt states she has been under a lot of stress recently. She states she had amlodipine in which she she was told to take 1/4 to 1/2 of a pill when BP is high but that this medication is now  and she would like a new prescription for it w/ CW's recommendation for dosing (if it should be the same or different). Pt states CW told her she should take her amlodipine daily but she reports that she does not do this, she only takes it when she feels BP is high, but states \"maybe I should start taking it every day\". Pt states she took 1/4 of  amlodipine this am after elevated BP reading in the 160's and then took another 1/4 for a total of 1/2 of a pill, so 5 mg, in which she states BP was 139/73 after. Advised pt to work on stress relief methods, stay hydrated, take BP daily but not to take it 3 times a day like she has been as this can add stress and that if BP med is ordered daily for her again she should take it daily. Advised pt that she has not been seen in our office since 2018, therefore she should make an appt, offered to transfer her to scheduling and/or give her the number but the pt says \"I can't right now, I will call again tomorrow\". Informed pt that I will ask CW if he would like to reorder the amlodipine for the pt and at what dose. Also told her that I would relay this information to her through [employer] along w/ our number for the scheduling office.       To CW, would you like to reorder amlodipine for pt and at what dose?   "

## 2020-08-09 ENCOUNTER — PATIENT MESSAGE (OUTPATIENT)
Dept: CARDIOLOGY | Facility: MEDICAL CENTER | Age: 70
End: 2020-08-09

## 2020-08-11 ENCOUNTER — PATIENT MESSAGE (OUTPATIENT)
Dept: CARDIOLOGY | Facility: MEDICAL CENTER | Age: 70
End: 2020-08-11

## 2020-08-11 DIAGNOSIS — I44.7 LBBB (LEFT BUNDLE BRANCH BLOCK): ICD-10-CM

## 2020-08-11 DIAGNOSIS — R60.0 LOCALIZED EDEMA: ICD-10-CM

## 2020-08-11 NOTE — PATIENT COMMUNICATION
Its likely the amlodipine so the swelling should stop within a week after she stops it now, if it doesn't go away we can arrange for an echo    She can trial losartan 25 mg daily for blood pressure    Thank you

## 2020-08-12 ENCOUNTER — TELEPHONE (OUTPATIENT)
Dept: PHYSICAL MEDICINE AND REHAB | Facility: MEDICAL CENTER | Age: 70
End: 2020-08-12

## 2020-08-12 ENCOUNTER — TELEPHONE (OUTPATIENT)
Dept: MEDICAL GROUP | Facility: PHYSICIAN GROUP | Age: 70
End: 2020-08-12

## 2020-08-12 SDOH — HEALTH STABILITY: MENTAL HEALTH: HOW OFTEN DO YOU HAVE A DRINK CONTAINING ALCOHOL?: NEVER

## 2020-08-12 SDOH — HEALTH STABILITY: MENTAL HEALTH: HOW OFTEN DO YOU HAVE 6 OR MORE DRINKS ON ONE OCCASION?: NEVER

## 2020-08-12 NOTE — TELEPHONE ENCOUNTER
Future Appointments       Provider Department Center    8/13/2020 1:40 PM Jenna Hernandez M.D.; [facility]  [facility]    9/3/2020 9:30 AM Jarrod Mayberry M.D. [facility]     5/24/2021 10:50 AM [facility] [address]    5/26/2021 11:30 AM [facility]         ANNUAL WELLNESS VISIT PRE-VISIT PLANNING WITHOUT OUTREACH    1.  Reviewed note from last office visit with PCP: YES    2.  If any orders were placed at last visit, do we have Results/Consult Notes?        •  Labs - Labs ordered, NOT completed. Patient advised to complete prior to next appointment.       •  Imaging - Imaging ordered, completed and results are in chart.       •  Referrals - No referrals were ordered at last office visit.    3.  Immunizations were updated in [employer] using [employer]?: Yes       •  [employer] Recommendations: FLU, TDAP, SHINGRIX (Shingles) and CPOX        •  Is patient due for Tdap? YES. Patient was not notified of copay/out of pocket cost.       •  Is patient due for Shingrix? YES. Patient was not notified of copay/out of pocket cost.     4.  Patient is due for the following Health Maintenance Topics:   Health Maintenance Due   Topic Date Due   • Annual Wellness Visit  1950   • IMM ZOSTER VACCINES (1 of 2) 02/12/2000     5.  Reviewed/Updated the following with patient:       •   Preferred Pharmacy? YES       •   Preferred Lab? YES       •   Preferred Communication? YES       •   Allergies? YES       •   Medications? YES. Was Abstract Encounter opened and chart updated? YES       •   Social History? YES. Was Abstract Encounter opened and chart updated? YES       •   Family History (document living status of immediate family members and if + hx of  cancer, diabetes, hypertension, hyperlipidemia, heart attack, stroke) YES. Was Abstract Encounter opened and chart updated?  YES    6.  Care Team Updated:       •   DME Company (gait device, O2, CPAP, etc.): NO       •   Other Specialists (eye doctor, derm, GYN, cardiology, endo, etc): YES    7. Orders for overdue Health Maintenance topics pended in Pre-Charting? NO    8.  Patient has the following Care Path diagnoses on Problem List:  NONE    9.  Patient was advised: “This is a free wellness visit. The provider will screen for medical conditions to help you stay healthy. If you have other concerns to address you may be asked to discuss these at a separate visit or there may be an additional fee.”     10.  Patient was informed to arrive 15 min prior to their scheduled appointment and bring in their medication bottles.

## 2020-08-12 NOTE — TELEPHONE ENCOUNTER
I called patient to let her to  know I sent the EMG results through my Chart she was requested . RR

## 2020-08-13 ENCOUNTER — HOSPITAL ENCOUNTER (OUTPATIENT)
Dept: LAB | Facility: MEDICAL CENTER | Age: 70
End: 2020-08-13
Attending: FAMILY MEDICINE
Payer: MEDICARE

## 2020-08-13 ENCOUNTER — OFFICE VISIT (OUTPATIENT)
Dept: MEDICAL GROUP | Facility: PHYSICIAN GROUP | Age: 70
End: 2020-08-13
Payer: MEDICARE

## 2020-08-13 VITALS
BODY MASS INDEX: 21.89 KG/M2 | RESPIRATION RATE: 16 BRPM | TEMPERATURE: 98 F | HEIGHT: 65 IN | DIASTOLIC BLOOD PRESSURE: 62 MMHG | SYSTOLIC BLOOD PRESSURE: 115 MMHG | WEIGHT: 131.4 LBS | OXYGEN SATURATION: 96 % | HEART RATE: 89 BPM

## 2020-08-13 DIAGNOSIS — M11.20 PSEUDOGOUT: ICD-10-CM

## 2020-08-13 DIAGNOSIS — R73.01 IMPAIRED FASTING GLUCOSE: ICD-10-CM

## 2020-08-13 DIAGNOSIS — Z00.00 MEDICARE ANNUAL WELLNESS VISIT, SUBSEQUENT: Primary | ICD-10-CM

## 2020-08-13 DIAGNOSIS — I10 ESSENTIAL HYPERTENSION: Chronic | ICD-10-CM

## 2020-08-13 DIAGNOSIS — R60.0 PEDAL EDEMA: ICD-10-CM

## 2020-08-13 DIAGNOSIS — N28.9 RENAL INSUFFICIENCY: ICD-10-CM

## 2020-08-13 LAB
ANION GAP SERPL CALC-SCNC: 16 MMOL/L (ref 7–16)
BUN SERPL-MCNC: 35 MG/DL (ref 8–22)
CALCIUM SERPL-MCNC: 10.1 MG/DL (ref 8.5–10.5)
CHLORIDE SERPL-SCNC: 101 MMOL/L (ref 96–112)
CO2 SERPL-SCNC: 22 MMOL/L (ref 20–33)
CREAT SERPL-MCNC: 1.56 MG/DL (ref 0.5–1.4)
EST. AVERAGE GLUCOSE BLD GHB EST-MCNC: 117 MG/DL
FASTING STATUS PATIENT QL REPORTED: NORMAL
GLUCOSE SERPL-MCNC: 98 MG/DL (ref 65–99)
HBA1C MFR BLD: 5.7 % (ref 0–5.6)
POTASSIUM SERPL-SCNC: 4.2 MMOL/L (ref 3.6–5.5)
SODIUM SERPL-SCNC: 139 MMOL/L (ref 135–145)

## 2020-08-13 PROCEDURE — 80048 BASIC METABOLIC PNL TOTAL CA: CPT

## 2020-08-13 PROCEDURE — 36415 COLL VENOUS BLD VENIPUNCTURE: CPT | Mod: GA

## 2020-08-13 PROCEDURE — 83036 HEMOGLOBIN GLYCOSYLATED A1C: CPT | Mod: GA

## 2020-08-13 PROCEDURE — G0439 PPPS, SUBSEQ VISIT: HCPCS | Performed by: FAMILY MEDICINE

## 2020-08-13 RX ORDER — LOSARTAN POTASSIUM 25 MG/1
25 TABLET ORAL DAILY
Qty: 90 TAB | Refills: 3 | Status: SHIPPED | OUTPATIENT
Start: 2020-08-13 | End: 2020-08-19 | Stop reason: SINTOL

## 2020-08-13 ASSESSMENT — ACTIVITIES OF DAILY LIVING (ADL): BATHING_REQUIRES_ASSISTANCE: 0

## 2020-08-13 ASSESSMENT — ENCOUNTER SYMPTOMS: GENERAL WELL-BEING: FAIR

## 2020-08-13 ASSESSMENT — FIBROSIS 4 INDEX: FIB4 SCORE: 1.44

## 2020-08-13 ASSESSMENT — PATIENT HEALTH QUESTIONNAIRE - PHQ9: CLINICAL INTERPRETATION OF PHQ2 SCORE: 0
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
## 2020-08-14 ENCOUNTER — TELEPHONE (OUTPATIENT)
Dept: MEDICAL GROUP | Facility: PHYSICIAN GROUP | Age: 70
End: 2020-08-14

## 2020-08-14 DIAGNOSIS — N18.9 ACUTE ON CHRONIC RENAL INSUFFICIENCY: ICD-10-CM

## 2020-08-14 DIAGNOSIS — N28.9 ACUTE ON CHRONIC RENAL INSUFFICIENCY: ICD-10-CM

## 2020-08-14 NOTE — TELEPHONE ENCOUNTER
Received request via: Patient    Was the patient seen in the last year in this department? Yes    Does the patient have an active prescription (recently filled or refills available) for medication(s) requested? Yes. Refill request has been refused in Epic. Contacted pharmacy and called in most recent prescription.  Called in to [facility].

## 2020-08-19 ENCOUNTER — HOSPITAL ENCOUNTER (OUTPATIENT)
Dept: LAB | Facility: MEDICAL CENTER | Age: 70
End: 2020-08-19
Attending: FAMILY MEDICINE
Payer: MEDICARE

## 2020-08-19 ENCOUNTER — OFFICE VISIT (OUTPATIENT)
Dept: MEDICAL GROUP | Facility: PHYSICIAN GROUP | Age: 70
End: 2020-08-19
Payer: MEDICARE

## 2020-08-19 VITALS
SYSTOLIC BLOOD PRESSURE: 122 MMHG | BODY MASS INDEX: 22.33 KG/M2 | HEART RATE: 88 BPM | WEIGHT: 134 LBS | RESPIRATION RATE: 16 BRPM | HEIGHT: 65 IN | DIASTOLIC BLOOD PRESSURE: 60 MMHG | TEMPERATURE: 97.9 F | OXYGEN SATURATION: 100 %

## 2020-08-19 DIAGNOSIS — N28.9 ACUTE RENAL INSUFFICIENCY: ICD-10-CM

## 2020-08-19 DIAGNOSIS — R60.0 PEDAL EDEMA: ICD-10-CM

## 2020-08-19 DIAGNOSIS — D50.9 IRON DEFICIENCY ANEMIA, UNSPECIFIED IRON DEFICIENCY ANEMIA TYPE: ICD-10-CM

## 2020-08-19 DIAGNOSIS — I10 ESSENTIAL HYPERTENSION: ICD-10-CM

## 2020-08-19 DIAGNOSIS — Z20.822 CLOSE EXPOSURE TO COVID-19 VIRUS: ICD-10-CM

## 2020-08-19 LAB
ALBUMIN SERPL BCP-MCNC: 4.6 G/DL (ref 3.2–4.9)
ALBUMIN/GLOB SERPL: 1.4 G/DL
ALP SERPL-CCNC: 71 U/L (ref 30–99)
ALT SERPL-CCNC: 10 U/L (ref 2–50)
ANION GAP SERPL CALC-SCNC: 16 MMOL/L (ref 7–16)
APPEARANCE UR: CLEAR
AST SERPL-CCNC: 25 U/L (ref 12–45)
BACTERIA #/AREA URNS HPF: NEGATIVE /HPF
BASOPHILS # BLD AUTO: 0.6 % (ref 0–1.8)
BASOPHILS # BLD: 0.05 K/UL (ref 0–0.12)
BILIRUB SERPL-MCNC: 0.2 MG/DL (ref 0.1–1.5)
BILIRUB UR QL STRIP.AUTO: NEGATIVE
BUN SERPL-MCNC: 27 MG/DL (ref 8–22)
CALCIUM SERPL-MCNC: 9.8 MG/DL (ref 8.5–10.5)
CHLORIDE SERPL-SCNC: 99 MMOL/L (ref 96–112)
CO2 SERPL-SCNC: 23 MMOL/L (ref 20–33)
COLOR UR: YELLOW
CREAT SERPL-MCNC: 1.17 MG/DL (ref 0.5–1.4)
EOSINOPHIL # BLD AUTO: 0.34 K/UL (ref 0–0.51)
EOSINOPHIL NFR BLD: 3.8 % (ref 0–6.9)
EPI CELLS #/AREA URNS HPF: NEGATIVE /HPF
ERYTHROCYTE [DISTWIDTH] IN BLOOD BY AUTOMATED COUNT: 47.4 FL (ref 35.9–50)
FASTING STATUS PATIENT QL REPORTED: NORMAL
FERRITIN SERPL-MCNC: 124 NG/ML (ref 10–291)
GLOBULIN SER CALC-MCNC: 3.2 G/DL (ref 1.9–3.5)
GLUCOSE SERPL-MCNC: 155 MG/DL (ref 65–99)
GLUCOSE UR STRIP.AUTO-MCNC: NEGATIVE MG/DL
HCT VFR BLD AUTO: 37.3 % (ref 37–47)
HGB BLD-MCNC: 11.6 G/DL (ref 12–16)
HYALINE CASTS #/AREA URNS LPF: ABNORMAL /LPF
IMM GRANULOCYTES # BLD AUTO: 0.02 K/UL (ref 0–0.11)
IMM GRANULOCYTES NFR BLD AUTO: 0.2 % (ref 0–0.9)
IRON SATN MFR SERPL: 12 % (ref 15–55)
IRON SERPL-MCNC: 33 UG/DL (ref 40–170)
KETONES UR STRIP.AUTO-MCNC: NEGATIVE MG/DL
LEUKOCYTE ESTERASE UR QL STRIP.AUTO: ABNORMAL
LYMPHOCYTES # BLD AUTO: 1.96 K/UL (ref 1–4.8)
LYMPHOCYTES NFR BLD: 21.9 % (ref 22–41)
MCH RBC QN AUTO: 30.2 PG (ref 27–33)
MCHC RBC AUTO-ENTMCNC: 31.1 G/DL (ref 33.6–35)
MCV RBC AUTO: 97.1 FL (ref 81.4–97.8)
MICRO URNS: ABNORMAL
MONOCYTES # BLD AUTO: 0.61 K/UL (ref 0–0.85)
MONOCYTES NFR BLD AUTO: 6.8 % (ref 0–13.4)
NEUTROPHILS # BLD AUTO: 5.97 K/UL (ref 2–7.15)
NEUTROPHILS NFR BLD: 66.7 % (ref 44–72)
NITRITE UR QL STRIP.AUTO: NEGATIVE
NRBC # BLD AUTO: 0 K/UL
NRBC BLD-RTO: 0 /100 WBC
PH UR STRIP.AUTO: 6 [PH] (ref 5–8)
PLATELET # BLD AUTO: 347 K/UL (ref 164–446)
PMV BLD AUTO: 10.4 FL (ref 9–12.9)
POTASSIUM SERPL-SCNC: 3.9 MMOL/L (ref 3.6–5.5)
PROT SERPL-MCNC: 7.8 G/DL (ref 6–8.2)
PROT UR QL STRIP: 30 MG/DL
RBC # BLD AUTO: 3.84 M/UL (ref 4.2–5.4)
RBC # URNS HPF: ABNORMAL /HPF
RBC UR QL AUTO: ABNORMAL
SODIUM SERPL-SCNC: 138 MMOL/L (ref 135–145)
SP GR UR STRIP.AUTO: 1.01
TIBC SERPL-MCNC: 273 UG/DL (ref 250–450)
UIBC SERPL-MCNC: 240 UG/DL (ref 110–370)
UROBILINOGEN UR STRIP.AUTO-MCNC: 0.2 MG/DL
WBC # BLD AUTO: 9 K/UL (ref 4.8–10.8)
WBC #/AREA URNS HPF: ABNORMAL /HPF

## 2020-08-19 PROCEDURE — 99214 OFFICE O/P EST MOD 30 MIN: CPT | Performed by: FAMILY MEDICINE

## 2020-08-19 PROCEDURE — 82728 ASSAY OF FERRITIN: CPT

## 2020-08-19 PROCEDURE — 36415 COLL VENOUS BLD VENIPUNCTURE: CPT

## 2020-08-19 PROCEDURE — 87086 URINE CULTURE/COLONY COUNT: CPT

## 2020-08-19 PROCEDURE — 83540 ASSAY OF IRON: CPT

## 2020-08-19 PROCEDURE — 80053 COMPREHEN METABOLIC PANEL: CPT

## 2020-08-19 PROCEDURE — 81001 URINALYSIS AUTO W/SCOPE: CPT

## 2020-08-19 PROCEDURE — 83550 IRON BINDING TEST: CPT

## 2020-08-19 PROCEDURE — 85025 COMPLETE CBC W/AUTO DIFF WBC: CPT

## 2020-08-19 RX ORDER — AMLODIPINE BESYLATE 5 MG/1
5 TABLET ORAL DAILY
COMMUNITY
End: 2020-08-19

## 2020-08-19 ASSESSMENT — FIBROSIS 4 INDEX: FIB4 SCORE: 1.44

## 2020-08-19 NOTE — PROGRESS NOTES
Chief Complaint   Patient presents with   • Hypertension     fv lisinopril   • Foot Swelling     bilateral feet swelling   • Anemia     fv        HISTORY OF PRESENT ILLNESS: Patient is a 70 y.o. female established patient here today for the following concerns:    1. Close exposure to COVID-19 virus  Reports she was very sick in March and feels likely had COVID 19.  Requesting antibody test.      2. Acute renal insufficiency  3. Pedal edema  4. Essential hypertension  She is struggling with pedal edema.  She was started on amlodipine 5 mg for BP, she feels that her feet were puffy prior to the onset of her starting amlodipine.  SHe has had work up with ortho and has had labs with use for underlying rheum disease and MM which were both negative.  She has been using NSAIDs for the joint pains as it is the only thing that seems to work.  She was noted to have prerenal azotemia as well.  She has been pushing water like crazy these last few days.  She is due to recheck iron levels.  Home BP has been running in the 140s/90s.  Today looks reasonable on the 5 mg only of amlodipine.       Past Medical, Social, and Family history reviewed and updated in EPIC    Allergies:Statins [hmg-coa-r inhibitors], Eggs, Lisinopril, Penicillins, and Codeine    Current Outpatient Medications   Medication Sig Dispense Refill   • FAMOTIDINE PO Take  by mouth.     • ibuprofen (MOTRIN) 800 MG Tab TAKE 1 TABLET BY MOUTH EVERY 8 HOURS AS NEEDED FOR MILD PAIN 90 Tab 0   • Ferrous Sulfate (IRON PO) Take  by mouth.     • Cholecalciferol (VITAMIN D3) 5000 units Cap Take 1 Cap by mouth every day.       No current facility-administered medications for this visit.          ROS:  Review of Systems   Constitutional: Negative for fever, chills, weight loss and malaise/fatigue.   HENT: Negative for ear pain, nosebleeds, congestion, sore throat and neck pain.    Eyes: Negative for blurred vision.   Respiratory: Negative for cough, sputum production,  "shortness of breath and wheezing.    Cardiovascular: Negative for chest pain, palpitations,  and leg swelling.   Gastrointestinal: Negative for heartburn, nausea, vomiting, diarrhea and abdominal pain.   Genitourinary: Negative for dysuria, urgency and frequency.   Musculoskeletal: Negative for myalgias, back pain and joint pain.   Skin: Negative for rash and itching.   Neurological: Negative for dizziness, tingling, tremors, sensory change, focal weakness and headaches.   Endo/Heme/Allergies: Does not bruise/bleed easily.   Psychiatric/Behavioral: Negative for depression, anxiety, suicidal ideas, insomnia and memory loss.      Exam:  /60   Pulse 88   Temp 36.6 °C (97.9 °F)   Resp 16   Ht 1.638 m (5' 4.5\")   Wt 60.8 kg (134 lb)   SpO2 100%     General:  Well nourished, well developed in NAD  Head is grossly normal.  Neck: Supple without JVD   Pulmonary:  Normal effort.   Cardiovascular: Regular rate  Extremities: no clubbing, cyanosis, or bilateral non-pitting pedal edema.  Psych: affect appropriate      Please note that this dictation was created using voice recognition software. I have made every reasonable attempt to correct obvious errors, but I expect that there are errors of grammar and possibly content that I did not discover before finalizing the note.    Assessment/Plan:  1. Close exposure to COVID-19 virus  No active symptoms.  Discussed that likely this will be negative given the previous   - SARS CoV-2 Ab, Total; Future    2. Acute renal insufficiency  R/o nephropathy, appears prerenal   - Comp Metabolic Panel; Future  - URINALYSIS,CULTURE IF INDICATED; Future    3. Pedal edema  D/c the amlodipine   - Comp Metabolic Panel; Future  - URINALYSIS,CULTURE IF INDICATED; Future    4. Essential hypertension  Continue to monitor home BP.  The next class I would consider as ACE/ARB cause cough and CCB cause swelling and she already has prerenal azotemia, would be BB.  Will wait for echo.  Consider low " dose atenolol if no contraindication.

## 2020-08-20 ENCOUNTER — TELEPHONE (OUTPATIENT)
Dept: MEDICAL GROUP | Facility: PHYSICIAN GROUP | Age: 70
End: 2020-08-20

## 2020-08-20 ENCOUNTER — PATIENT MESSAGE (OUTPATIENT)
Dept: MEDICAL GROUP | Facility: PHYSICIAN GROUP | Age: 70
End: 2020-08-20

## 2020-08-20 DIAGNOSIS — M81.0 OSTEOPOROSIS, UNSPECIFIED OSTEOPOROSIS TYPE, UNSPECIFIED PATHOLOGICAL FRACTURE PRESENCE: ICD-10-CM

## 2020-08-20 DIAGNOSIS — E61.1 IRON DEFICIENCY: ICD-10-CM

## 2020-08-20 NOTE — TELEPHONE ENCOUNTER
----- Message from Lauren Dave sent at 8/19/2020  7:03 PM PDT -----  Regarding: Procedure Question  Contact: [phone]  Jenna let's set up a bone density test after the 25th.

## 2020-08-20 NOTE — TELEPHONE ENCOUNTER
----- Message from Zachery Castellanos Ass't sent at 8/20/2020  1:05 PM PDT -----  Patient informed of provider's result note. Patient would like to know if she still needs to take medication for anemia?   Kary Quevedo    Chief Complaint   Patient presents with    GI Problem       History of Present Illness:   Ms. Quevedo comes in today again having stomach issues.  She reports for 7 days experiencing excessive abdominal burning, pain with gas and burping.  She states she has not tried medication for her recent symptoms as she does not like to take medications.  She states she now only drinks shakes to avoid having discomfort.  She states eating makes her symptoms worse.  She reports having similar symptoms in the past and was diagnosed with peptic ulcer disease by EGD 20 years ago.  She states she was also treated for H pylori during that time by GI Dr. Skinner.    She states she drinks coffee daily (up until 4 days ago).  She states she occasionally drinks alcohol.  Otherwise, she denies tobacco or illicit drug use; consumption of spicy foods or peppermint; use of NSAID's; or late night eating.    She reports having occasional headaches due to allergies.  Otherwise, she denies having fever, chills, fatigue, appetite changes; shortness of breath, cough, wheezing; chest pain, palpitations, leg swelling; nausea, vomiting, diarrhea, constipation, indigestion or heartburn; unusual urinary symptoms; back pain; associated headaches; anxiety, depression, homicidal or suicidal thoughts.    She states she has never had colonoscopy.  She reports no blood in her stool.      No current outpatient medications on file.       Review of Systems   Constitutional: Negative for activity change, appetite change, chills, fatigue and fever.   Respiratory: Negative for cough, shortness of breath and wheezing.    Cardiovascular: Negative for chest pain, palpitations and leg swelling.   Gastrointestinal: Positive for abdominal pain. Negative for blood in stool, constipation, diarrhea, nausea and vomiting.        See history of present illness.   Genitourinary: Negative for difficulty urinating.   Musculoskeletal: Negative for back pain.    Neurological: Negative for headaches.   Psychiatric/Behavioral: Negative for dysphoric mood and suicidal ideas. The patient is not nervous/anxious.         Negative for homicidal ideas.       Objective:  Physical Exam  Vitals signs reviewed.   Constitutional:       General: She is not in acute distress.     Appearance: Normal appearance. She is well-developed. She is not ill-appearing or diaphoretic.      Comments: Pleasant.   Eyes:      Comments: She wears glasses.   Neck:      Musculoskeletal: Normal range of motion and neck supple. No muscular tenderness.      Thyroid: No thyromegaly.   Cardiovascular:      Rate and Rhythm: Normal rate and regular rhythm.      Heart sounds: Normal heart sounds. No murmur.   Pulmonary:      Effort: Pulmonary effort is normal. No respiratory distress.      Breath sounds: Normal breath sounds. No stridor. No wheezing.   Abdominal:      General: Bowel sounds are normal. There is no distension.      Palpations: Abdomen is soft. There is no mass.      Tenderness: There is no abdominal tenderness. There is no guarding or rebound.   Musculoskeletal: Normal range of motion.         General: No swelling or tenderness.      Comments: She is ambulatory without problems.   Lymphadenopathy:      Cervical: No cervical adenopathy.   Neurological:      General: No focal deficit present.      Mental Status: She is alert and oriented to person, place, and time.   Psychiatric:         Mood and Affect: Mood normal.         Behavior: Behavior normal.         Thought Content: Thought content normal.         Judgment: Judgment normal.         ASSESSMENT:  1. Epigastric abdominal pain    2. History of peptic ulcer disease    3. Colon cancer screening        PLAN:  Kary was seen today for gi problem.    Diagnoses and all orders for this visit:    Epigastric abdominal pain  -     pantoprazole (PROTONIX) 40 MG tablet; Take 1 tablet (40 mg total) by mouth once daily.  -     Case request GI:  ESOPHAGOGASTRODUODENOSCOPY (EGD)    History of peptic ulcer disease    Colon cancer screening  -     Case request GI: COLONOSCOPY       Patient advised to call for results.  Take Protonix 40 mg daily x 1 month; medication precautions discussed with patient.  GERD lifestyle changes discussed/advised.  Continue current medications, follow low sodium, low cholesterol, low carb diet, daily walks.  Follow up if symptoms worsen or fail to improve.

## 2020-08-20 NOTE — RESULT ENCOUNTER NOTE
Patient informed of provider's result note. Patient would like to know if she still needs to take medication for anemia?

## 2020-08-21 DIAGNOSIS — M10.00 ACUTE IDIOPATHIC GOUT, UNSPECIFIED SITE: ICD-10-CM

## 2020-08-21 RX ORDER — IBUPROFEN 800 MG/1
TABLET ORAL
Qty: 90 TAB | Refills: 0 | Status: SHIPPED | OUTPATIENT
Start: 2020-08-21

## 2020-08-21 RX ORDER — LANOLIN ALCOHOL/MO/W.PET/CERES
325 CREAM (GRAM) TOPICAL 2 TIMES DAILY
Qty: 60 TAB | Refills: 3 | Status: SHIPPED | OUTPATIENT
Start: 2020-08-21 | End: 2021-03-15

## 2020-08-22 LAB
BACTERIA UR CULT: NORMAL
SIGNIFICANT IND 70042: NORMAL
SITE SITE: NORMAL
SOURCE SOURCE: NORMAL

## 2020-08-25 ENCOUNTER — HOSPITAL ENCOUNTER (OUTPATIENT)
Dept: CARDIOLOGY | Facility: MEDICAL CENTER | Age: 70
End: 2020-08-25
Attending: INTERNAL MEDICINE
Payer: MEDICARE

## 2020-08-25 ENCOUNTER — HOSPITAL ENCOUNTER (OUTPATIENT)
Dept: RADIOLOGY | Facility: MEDICAL CENTER | Age: 70
End: 2020-08-25
Attending: FAMILY MEDICINE
Payer: MEDICARE

## 2020-08-25 DIAGNOSIS — M81.0 OSTEOPOROSIS, UNSPECIFIED OSTEOPOROSIS TYPE, UNSPECIFIED PATHOLOGICAL FRACTURE PRESENCE: ICD-10-CM

## 2020-08-25 DIAGNOSIS — R60.0 LOCALIZED EDEMA: ICD-10-CM

## 2020-08-25 DIAGNOSIS — I44.7 LBBB (LEFT BUNDLE BRANCH BLOCK): ICD-10-CM

## 2020-08-25 LAB
LV EJECT FRACT  99904: 60
LV EJECT FRACT MOD 2C 99903: 52.3
LV EJECT FRACT MOD 4C 99902: 57.57
LV EJECT FRACT MOD BP 99901: 54.95

## 2020-08-25 PROCEDURE — 77080 DXA BONE DENSITY AXIAL: CPT

## 2020-08-25 PROCEDURE — 93306 TTE W/DOPPLER COMPLETE: CPT

## 2020-08-25 PROCEDURE — 93306 TTE W/DOPPLER COMPLETE: CPT | Mod: 26 | Performed by: INTERNAL MEDICINE

## 2020-08-26 ENCOUNTER — PATIENT MESSAGE (OUTPATIENT)
Dept: MEDICAL GROUP | Facility: PHYSICIAN GROUP | Age: 70
End: 2020-08-26

## 2020-08-26 ENCOUNTER — PATIENT MESSAGE (OUTPATIENT)
Dept: CARDIOLOGY | Facility: MEDICAL CENTER | Age: 70
End: 2020-08-26

## 2020-08-26 NOTE — PATIENT COMMUNICATION
Jarrod Mayberry M.D.  Domonique Szymanski R.N.             The echo looks good, please let her know     Thank you      See Powered by [employer] message 08/26/20

## 2020-08-30 ENCOUNTER — PATIENT MESSAGE (OUTPATIENT)
Dept: MEDICAL GROUP | Facility: PHYSICIAN GROUP | Age: 70
End: 2020-08-30

## 2020-08-30 DIAGNOSIS — M79.672 CHRONIC PAIN IN LEFT FOOT: ICD-10-CM

## 2020-08-30 DIAGNOSIS — G89.29 CHRONIC FOOT PAIN, RIGHT: ICD-10-CM

## 2020-08-30 DIAGNOSIS — M79.671 CHRONIC FOOT PAIN, RIGHT: ICD-10-CM

## 2020-08-30 DIAGNOSIS — G89.29 CHRONIC PAIN IN LEFT FOOT: ICD-10-CM

## 2020-09-02 ENCOUNTER — PATIENT MESSAGE (OUTPATIENT)
Dept: MEDICAL GROUP | Facility: PHYSICIAN GROUP | Age: 70
End: 2020-09-02

## 2020-09-03 ENCOUNTER — OFFICE VISIT (OUTPATIENT)
Dept: CARDIOLOGY | Facility: MEDICAL CENTER | Age: 70
End: 2020-09-03
Payer: MEDICARE

## 2020-09-03 ENCOUNTER — TELEPHONE (OUTPATIENT)
Dept: MEDICAL GROUP | Facility: PHYSICIAN GROUP | Age: 70
End: 2020-09-03

## 2020-09-03 VITALS
SYSTOLIC BLOOD PRESSURE: 140 MMHG | WEIGHT: 133.6 LBS | OXYGEN SATURATION: 94 % | BODY MASS INDEX: 22.26 KG/M2 | HEART RATE: 86 BPM | HEIGHT: 65 IN | DIASTOLIC BLOOD PRESSURE: 72 MMHG

## 2020-09-03 DIAGNOSIS — M25.50 POLYARTHRALGIA: ICD-10-CM

## 2020-09-03 DIAGNOSIS — I44.7 LBBB (LEFT BUNDLE BRANCH BLOCK): Chronic | ICD-10-CM

## 2020-09-03 DIAGNOSIS — I25.84 CORONARY ARTERY DISEASE DUE TO CALCIFIED CORONARY LESION: Chronic | ICD-10-CM

## 2020-09-03 DIAGNOSIS — I25.10 CORONARY ARTERY DISEASE DUE TO CALCIFIED CORONARY LESION: Chronic | ICD-10-CM

## 2020-09-03 DIAGNOSIS — M79.89 SWOLLEN FINGER: ICD-10-CM

## 2020-09-03 DIAGNOSIS — I10 ESSENTIAL HYPERTENSION: Chronic | ICD-10-CM

## 2020-09-03 DIAGNOSIS — M13.0 POLYARTHRITIS: ICD-10-CM

## 2020-09-03 PROCEDURE — 99214 OFFICE O/P EST MOD 30 MIN: CPT | Performed by: INTERNAL MEDICINE

## 2020-09-03 ASSESSMENT — ENCOUNTER SYMPTOMS
CLAUDICATION: 0
BRUISES/BLEEDS EASILY: 0
SENSORY CHANGE: 1
PALPITATIONS: 0
SORE THROAT: 0
ABDOMINAL PAIN: 0
BLURRED VISION: 0
CHILLS: 0
WEAKNESS: 0
FALLS: 0
FEVER: 0
BACK PAIN: 1
FOCAL WEAKNESS: 0
COUGH: 0
SHORTNESS OF BREATH: 0
PND: 0
NAUSEA: 0
DIZZINESS: 0

## 2020-09-03 ASSESSMENT — FIBROSIS 4 INDEX: FIB4 SCORE: 1.59

## 2020-09-03 NOTE — PROGRESS NOTES
Chief Complaint   Patient presents with   • Follow-Up     Left Bundle Branch Block       Subjective:   Lauren Dave is a 70 y.o. female who presents today for follow-up of her history of coronary calcifications and dyslipidemia to a severe degree although not able to tolerate statins or low-fat intermittent hypertension should call us for problems with her blood pressures were last  She is been doing with a lot of polyarthritis and has significant swelling of her toes and fingers she had negative CCP and rheumatoid factor she was worried she had bronchitis possibly COVID back in March    Past Medical History:   Diagnosis Date   • Arthritis    • Breast cancer (HCC) 8/7/2013   • Bronchitis 2005   • Bundle branch block, right    • Cancer (HCC)    • Cancer (HCC)     right breast cancer    • Cold    • Coronary artery disease due to calcified coronary lesion - Calcifications seen on CT scan also wtih aortic ulceration    • Dental disorder     tooth infection   • Dyslipidemia     Tried atorvastatin, pravastatin, lovastatin, and Zetia.  All causes severe myalgias.  Just taking fish oil.     • Essential hypertension    • Heart burn    • LBBB (left bundle branch block) 12/16/2014    Noted on prechemo EKG 9/2014, MUGYASMANY WNL   • Pneumonia    • Pseudogout    • Scarlet fever    • Unspecified hemorrhagic conditions     gums     Past Surgical History:   Procedure Laterality Date   • CATH REMOVAL  2/24/2014    Performed by Aggie Burns M.D. at SURGERY SAME DAY [facility]   • MASTECTOMY  8/22/2013    Performed by Aggie Burns M.D. at SURGERY SAME DAY [facility]   • NODE BIOPSY SENTINEL  8/22/2013    Performed by Aggie Burns M.D. at SURGERY SAME DAY [facility]   • CATH PLACEMENT  8/22/2013    Performed by Aggie Burns M.D. at SURGERY SAME DAY [facility]   • US-NEEDLE CORE BX-BREAST PANEL  2013    right breast   • COLONOSCOPY  2003   • BREAST BIOPSY     • TONSILLECTOMY       Family History   Problem  Relation Age of Onset   • Cancer Mother         possible thyroid and gynecological   • Mult Sclerosis Mother    • Arthritis Father    • Mult Sclerosis Brother    • Gout Brother    • Heart Disease Paternal Grandmother    • Diabetes Paternal Grandmother    • Cancer Maternal Aunt         breast cancer- 60s   • Diabetes Maternal Uncle    • Heart Disease Maternal Grandmother    • Heart Disease Maternal Grandfather         MI   • Stroke Paternal Grandfather    • Other Son         breast mass   • Heart Disease Son         HEART MURMUR   • Gout Son      Social History     Socioeconomic History   • Marital status:      Spouse name: Not on file   • Number of children: 2   • Years of education: Not on file   • Highest education level: Not on file   Occupational History     Employer: [employer]   Social Needs   • Financial resource strain: Not on file   • Food insecurity     Worry: Not on file     Inability: Not on file   • Transportation needs     Medical: Not on file     Non-medical: Not on file   Tobacco Use   • Smoking status: Former Smoker     Packs/day: 1.00     Years: 0.00     Pack years: 0.00     Types: Cigarettes     Quit date: 10/1/2013     Years since quittin.9   • Smokeless tobacco: Never Used   Substance and Sexual Activity   • Alcohol use: No     Alcohol/week: 0.0 oz     Frequency: Never     Binge frequency: Never   • Drug use: No   • Sexual activity: Not on file     Comment: , 2 children, enemployed   Lifestyle   • Physical activity     Days per week: Not on file     Minutes per session: Not on file   • Stress: Not on file   Relationships   • Social connections     Talks on phone: Not on file     Gets together: Not on file     Attends [employer] service: Not on file     Active member of club or organization: Not on file     Attends meetings of clubs or organizations: Not on file     Relationship status: Not on file   • Intimate partner violence     Fear of current or ex partner: Not on file  "    Emotionally abused: Not on file     Physically abused: Not on file     Forced sexual activity: Not on file   Other Topics Concern   • Not on file   Social History Narrative    ** Merged History Encounter **         Patient is a . Patient has 2 adult children. She is  from .           Allergies   Allergen Reactions   • Statins [Hmg-Coa-R Inhibitors] Unspecified     Muscle Spasms   RXN=unknown   • Eggs      Pt states that she is allergic to eggs per her mother.   • Lisinopril Cough   • Penicillins      \"does not work\" .'IMMUNE TO PENICILLIN,AMPICILLIN IS THE ONLY THING THAT WORKS'   • Codeine Vomiting and Nausea     Clarified with patient - states that she has an \"upset stomach\" when she takes it     Outpatient Encounter Medications as of 9/3/2020   Medication Sig Dispense Refill   • ibuprofen (MOTRIN) 800 MG Tab TAKE 1 TABLET BY MOUTH EVERY 8 HOURS AS NEEDED FOR MILD PAIN 90 Tab 0   • ferrous sulfate 325 (65 Fe) MG EC tablet Take 1 Tab by mouth 2 Times a Day. 60 Tab 3   • Ferrous Sulfate (IRON PO) Take  by mouth.     • FAMOTIDINE PO Take  by mouth.     • Cholecalciferol (VITAMIN D3) 5000 units Cap Take 1 Cap by mouth every day.       No facility-administered encounter medications on file as of 9/3/2020.      Review of Systems   Constitutional: Negative for chills and fever.   HENT: Negative for sore throat.    Eyes: Negative for blurred vision.   Respiratory: Negative for cough and shortness of breath.    Cardiovascular: Positive for leg swelling (feet). Negative for chest pain, palpitations, claudication and PND.   Gastrointestinal: Negative for abdominal pain and nausea.   Musculoskeletal: Positive for back pain and joint pain. Negative for falls.   Skin: Negative for rash.   Neurological: Positive for sensory change. Negative for dizziness, focal weakness and weakness.   Endo/Heme/Allergies: Does not bruise/bleed easily.        Objective:   /72 (BP Location: Left arm, Patient " "Position: Sitting, BP Cuff Size: Adult)   Pulse 86   Ht 1.651 m (5' 5\")   Wt 60.6 kg (133 lb 9.6 oz)   SpO2 94%   BMI 22.23 kg/m²     Physical Exam   Constitutional: No distress.   HENT:   Patient wearing a mask due to COVID precautions   Eyes: No scleral icterus.   Neck: No JVD present.   Cardiovascular: Normal rate and normal heart sounds. Exam reveals no gallop and no friction rub.   No murmur heard.  Pulmonary/Chest: No respiratory distress. She has no wheezes. She has no rales.   Abdominal: Soft. Bowel sounds are normal.   Musculoskeletal:         General: No edema.      Comments: She has a diffuse dactylitis of the fingers and toes   Neurological: She is alert.   Skin: No rash noted. She is not diaphoretic.   Psychiatric: She has a normal mood and affect.     We reviewed in person the most recent labs  Recent Results (from the past 4032 hour(s))   CBC WITH DIFFERENTIAL    Collection Time: 04/16/20  8:49 AM   Result Value Ref Range    WBC 9.1 4.8 - 10.8 K/uL    RBC 3.94 (L) 4.20 - 5.40 M/uL    Hemoglobin 11.0 (L) 12.0 - 16.0 g/dL    Hematocrit 37.3 37.0 - 47.0 %    MCV 94.7 81.4 - 97.8 fL    MCH 27.9 27.0 - 33.0 pg    MCHC 29.5 (L) 33.6 - 35.0 g/dL    RDW 50.6 (H) 35.9 - 50.0 fL    Platelet Count 373 164 - 446 K/uL    MPV 10.6 9.0 - 12.9 fL    Neutrophils-Polys 76.10 (H) 44.00 - 72.00 %    Lymphocytes 13.90 (L) 22.00 - 41.00 %    Monocytes 6.20 0.00 - 13.40 %    Eosinophils 2.80 0.00 - 6.90 %    Basophils 0.80 0.00 - 1.80 %    Immature Granulocytes 0.20 0.00 - 0.90 %    Nucleated RBC 0.00 /100 WBC    Neutrophils (Absolute) 6.93 2.00 - 7.15 K/uL    Lymphs (Absolute) 1.26 1.00 - 4.80 K/uL    Monos (Absolute) 0.56 0.00 - 0.85 K/uL    Eos (Absolute) 0.25 0.00 - 0.51 K/uL    Baso (Absolute) 0.07 0.00 - 0.12 K/uL    Immature Granulocytes (abs) 0.02 0.00 - 0.11 K/uL    NRBC (Absolute) 0.00 K/uL    Hypochromia 2+    Comp Metabolic Panel    Collection Time: 04/16/20  8:49 AM   Result Value Ref Range    Sodium 138 " 135 - 145 mmol/L    Potassium 4.2 3.6 - 5.5 mmol/L    Chloride 101 96 - 112 mmol/L    Co2 22 20 - 33 mmol/L    Anion Gap 15.0 7.0 - 16.0    Glucose 84 65 - 99 mg/dL    Bun 30 (H) 8 - 22 mg/dL    Creatinine 0.97 0.50 - 1.40 mg/dL    Calcium 10.0 8.5 - 10.5 mg/dL    AST(SGOT) 34 12 - 45 U/L    ALT(SGPT) 12 2 - 50 U/L    Alkaline Phosphatase 70 30 - 99 U/L    Total Bilirubin 0.3 0.1 - 1.5 mg/dL    Albumin 4.2 3.2 - 4.9 g/dL    Total Protein 8.1 6.0 - 8.2 g/dL    Globulin 3.9 (H) 1.9 - 3.5 g/dL    A-G Ratio 1.1 g/dL   ESTIMATED GFR    Collection Time: 04/16/20  8:49 AM   Result Value Ref Range    GFR If African American >60 >60 mL/min/1.73 m 2    GFR If Non  57 (A) >60 mL/min/1.73 m 2   PERIPHERAL SMEAR REVIEW    Collection Time: 04/16/20  8:49 AM   Result Value Ref Range    Peripheral Smear Review see below    PLATELET ESTIMATE    Collection Time: 04/16/20  8:49 AM   Result Value Ref Range    Plt Estimation Normal    MORPHOLOGY    Collection Time: 04/16/20  8:49 AM   Result Value Ref Range    RBC Morphology Present    DIFFERENTIAL COMMENT    Collection Time: 04/16/20  8:49 AM   Result Value Ref Range    Comments-Diff see below    CBC WITH DIFFERENTIAL    Collection Time: 04/16/20  8:55 AM   Result Value Ref Range    WBC 8.9 4.8 - 10.8 K/uL    RBC 3.85 (L) 4.20 - 5.40 M/uL    Hemoglobin 10.9 (L) 12.0 - 16.0 g/dL    Hematocrit 36.5 (L) 37.0 - 47.0 %    MCV 94.8 81.4 - 97.8 fL    MCH 28.3 27.0 - 33.0 pg    MCHC 29.9 (L) 33.6 - 35.0 g/dL    RDW 50.6 (H) 35.9 - 50.0 fL    Platelet Count 454 (H) 164 - 446 K/uL    MPV 10.2 9.0 - 12.9 fL    Neutrophils-Polys 76.50 (H) 44.00 - 72.00 %    Lymphocytes 13.80 (L) 22.00 - 41.00 %    Monocytes 6.30 0.00 - 13.40 %    Eosinophils 2.50 0.00 - 6.90 %    Basophils 0.60 0.00 - 1.80 %    Immature Granulocytes 0.30 0.00 - 0.90 %    Nucleated RBC 0.00 /100 WBC    Neutrophils (Absolute) 6.79 2.00 - 7.15 K/uL    Lymphs (Absolute) 1.22 1.00 - 4.80 K/uL    Monos (Absolute) 0.56  0.00 - 0.85 K/uL    Eos (Absolute) 0.22 0.00 - 0.51 K/uL    Baso (Absolute) 0.05 0.00 - 0.12 K/uL    Immature Granulocytes (abs) 0.03 0.00 - 0.11 K/uL    NRBC (Absolute) 0.00 K/uL    Macrocytosis 2+    Comp Metabolic Panel    Collection Time: 04/16/20  8:55 AM   Result Value Ref Range    Sodium 137 135 - 145 mmol/L    Potassium 4.2 3.6 - 5.5 mmol/L    Chloride 100 96 - 112 mmol/L    Co2 23 20 - 33 mmol/L    Anion Gap 14.0 7.0 - 16.0    Glucose 92 65 - 99 mg/dL    Bun 29 (H) 8 - 22 mg/dL    Creatinine 0.95 0.50 - 1.40 mg/dL    Calcium 10.0 8.5 - 10.5 mg/dL    AST(SGOT) 21 12 - 45 U/L    ALT(SGPT) 10 2 - 50 U/L    Alkaline Phosphatase 73 30 - 99 U/L    Total Bilirubin 0.2 0.1 - 1.5 mg/dL    Albumin 4.1 3.2 - 4.9 g/dL    Total Protein 7.9 6.0 - 8.2 g/dL    Globulin 3.8 (H) 1.9 - 3.5 g/dL    A-G Ratio 1.1 g/dL   Lipid Profile    Collection Time: 04/16/20  8:55 AM   Result Value Ref Range    Cholesterol,Tot 225 (H) 100 - 199 mg/dL    Triglycerides 145 0 - 149 mg/dL    HDL 58 >=40 mg/dL     (H) <100 mg/dL   HEMOGLOBIN A1C    Collection Time: 04/16/20  8:55 AM   Result Value Ref Range    Glycohemoglobin 5.9 (H) 0.0 - 5.6 %    Est Avg Glucose 123 mg/dL   TSH WITH REFLEX TO FT4    Collection Time: 04/16/20  8:55 AM   Result Value Ref Range    TSH 1.710 0.380 - 5.330 uIU/mL   VITAMIN D,25 HYDROXY    Collection Time: 04/16/20  8:55 AM   Result Value Ref Range    25-Hydroxy   Vitamin D 25 65 30 - 100 ng/mL   RHEUMATOID ARTHRITIS FACTOR    Collection Time: 04/16/20  8:55 AM   Result Value Ref Range    Rheumatoid Factor -Neph- 12 0 - 14 IU/mL   LYLY IGG HEBRERT W/RFLX TO LYLY IGG IFA    Collection Time: 04/16/20  8:55 AM   Result Value Ref Range    Antinuclear Antibody None Detected None Detected   CCP ANTIBODY    Collection Time: 04/16/20  8:55 AM   Result Value Ref Range    Ccp Antibodies 4 0 - 19 Units   VITAMIN B12    Collection Time: 04/16/20  8:55 AM   Result Value Ref Range    Vitamin B12 -True Cobalamin 422 023 - 913  pg/mL   VITAMIN B6    Collection Time: 04/16/20  8:55 AM   Result Value Ref Range    Vitamin B6 21.1 20.0 - 125.0 nmol/L   FOLATE    Collection Time: 04/16/20  8:55 AM   Result Value Ref Range    Folate -Folic Acid 13.7 >4.0 ng/mL   FASTING STATUS    Collection Time: 04/16/20  8:55 AM   Result Value Ref Range    Fasting Status Fasting    ESTIMATED GFR    Collection Time: 04/16/20  8:55 AM   Result Value Ref Range    GFR If African American >60 >60 mL/min/1.73 m 2    GFR If Non African American 58 (A) >60 mL/min/1.73 m 2   PERIPHERAL SMEAR REVIEW    Collection Time: 04/16/20  8:55 AM   Result Value Ref Range    Peripheral Smear Review see below    PLATELET ESTIMATE    Collection Time: 04/16/20  8:55 AM   Result Value Ref Range    Plt Estimation Increased    MORPHOLOGY    Collection Time: 04/16/20  8:55 AM   Result Value Ref Range    RBC Morphology Present     Polychromia 1+    DIFFERENTIAL COMMENT    Collection Time: 04/16/20  8:55 AM   Result Value Ref Range    Comments-Diff see below    URINALYSIS,CULTURE IF INDICATED    Collection Time: 04/16/20  9:03 AM    Specimen: Urine   Result Value Ref Range    Color Yellow     Character Clear     Specific Gravity 1.019 <1.035    Ph 6.0 5.0 - 8.0    Glucose Negative Negative mg/dL    Ketones Negative Negative mg/dL    Protein 100 (A) Negative mg/dL    Bilirubin Negative Negative    Urobilinogen, Urine 0.2 Negative    Nitrite Negative Negative    Leukocyte Esterase Negative Negative    Occult Blood Small (A) Negative    Micro Urine Req Microscopic    URINE MICROSCOPIC (W/UA)    Collection Time: 04/16/20  9:03 AM   Result Value Ref Range    WBC 5-10 (A) /hpf    RBC 5-10 (A) /hpf    Bacteria Negative None /hpf    Epithelial Cells Negative /hpf    Hyaline Cast 0-2 /lpf   URINALYSIS,CULTURE IF INDICATED    Collection Time: 05/23/20  9:24 AM    Specimen: Urine   Result Value Ref Range    Color Yellow     Character Clear     Specific Gravity 1.019 <1.035    Ph 6.0 5.0 - 8.0     Glucose Negative Negative mg/dL    Ketones Negative Negative mg/dL    Protein 100 (A) Negative mg/dL    Bilirubin Negative Negative    Urobilinogen, Urine 0.2 Negative    Nitrite Negative Negative    Leukocyte Esterase Negative Negative    Occult Blood Negative Negative    Micro Urine Req Microscopic    RETICULOCYTES COUNT    Collection Time: 05/23/20  9:24 AM   Result Value Ref Range    Reticulocyte Count 1.9 0.8 - 2.1 %    Retic, Absolute 0.07 (H) 0.04 - 0.06 M/uL    Imm. Reticulocyte Fraction 9.1 (L) 9.3 - 17.4 %    Retic Hgb Equivalent 33.2 29.0 - 35.0 pg/cell   IRON/TOTAL IRON BIND    Collection Time: 05/23/20  9:24 AM   Result Value Ref Range    Iron 55 40 - 170 ug/dL    Total Iron Binding 277 250 - 450 ug/dL    Unsat Iron Binding 222 110 - 370 ug/dL    % Saturation 20 15 - 55 %   FERRITIN    Collection Time: 05/23/20  9:24 AM   Result Value Ref Range    Ferritin 107.0 10.0 - 291.0 ng/mL   CBC WITH DIFFERENTIAL    Collection Time: 05/23/20  9:24 AM   Result Value Ref Range    WBC 9.1 4.8 - 10.8 K/uL    RBC 3.87 (L) 4.20 - 5.40 M/uL    Hemoglobin 11.3 (L) 12.0 - 16.0 g/dL    Hematocrit 36.8 (L) 37.0 - 47.0 %    MCV 95.1 81.4 - 97.8 fL    MCH 29.2 27.0 - 33.0 pg    MCHC 30.7 (L) 33.6 - 35.0 g/dL    RDW 54.5 (H) 35.9 - 50.0 fL    Platelet Count 322 164 - 446 K/uL    MPV 10.9 9.0 - 12.9 fL    Neutrophils-Polys 72.10 (H) 44.00 - 72.00 %    Lymphocytes 18.10 (L) 22.00 - 41.00 %    Monocytes 7.30 0.00 - 13.40 %    Eosinophils 1.50 0.00 - 6.90 %    Basophils 0.40 0.00 - 1.80 %    Immature Granulocytes 0.60 0.00 - 0.90 %    Nucleated RBC 0.00 /100 WBC    Neutrophils (Absolute) 6.54 2.00 - 7.15 K/uL    Lymphs (Absolute) 1.64 1.00 - 4.80 K/uL    Monos (Absolute) 0.66 0.00 - 0.85 K/uL    Eos (Absolute) 0.14 0.00 - 0.51 K/uL    Baso (Absolute) 0.04 0.00 - 0.12 K/uL    Immature Granulocytes (abs) 0.05 0.00 - 0.11 K/uL    NRBC (Absolute) 0.00 K/uL   URIC ACID    Collection Time: 05/23/20  9:24 AM   Result Value Ref Range     Uric Acid 4.7 1.9 - 8.2 mg/dL   URINE MICROSCOPIC (W/UA)    Collection Time: 05/23/20  9:24 AM   Result Value Ref Range    WBC 0-2 /hpf    RBC 5-10 (A) /hpf    Bacteria Negative None /hpf    Epithelial Cells Negative /hpf    Hyaline Cast 0-2 /lpf   OCCULT BLOOD FECES IMMUNOASSAY    Collection Time: 06/07/20  4:30 PM   Result Value Ref Range    Occult Blood, IA Negative Negative   SPEP W/REFLEX TO ROGELIO, A, G, M    Collection Time: 06/08/20 10:07 AM   Result Value Ref Range    Albumin 4.38 3.75 - 5.01 g/dL    Alpha-1 Globulin 0.27 0.19 - 0.46 g/dL    Alpha-2 Globulin 0.77 0.48 - 1.05 g/dL    Beta Globulin 0.77 0.48 - 1.10 g/dL    Gamma Globulin 1.21 0.62 - 1.51 g/dL    Interpretation See Note     ROGELIO Reflex Not Done     Total Protein, Serum 7.4 6.3 - 8.2 g/dL    EER Serum Prot. Electro. Reflex See Note    URIC ACID    Collection Time: 06/08/20 10:07 AM   Result Value Ref Range    Uric Acid 5.2 1.9 - 8.2 mg/dL   HEMOGLOBIN A1C    Collection Time: 08/13/20  2:34 PM   Result Value Ref Range    Glycohemoglobin 5.7 (H) 0.0 - 5.6 %    Est Avg Glucose 117 mg/dL   Basic Metabolic Panel    Collection Time: 08/13/20  2:34 PM   Result Value Ref Range    Sodium 139 135 - 145 mmol/L    Potassium 4.2 3.6 - 5.5 mmol/L    Chloride 101 96 - 112 mmol/L    Co2 22 20 - 33 mmol/L    Glucose 98 65 - 99 mg/dL    Bun 35 (H) 8 - 22 mg/dL    Creatinine 1.56 (H) 0.50 - 1.40 mg/dL    Calcium 10.1 8.5 - 10.5 mg/dL    Anion Gap 16.0 7.0 - 16.0   FASTING STATUS    Collection Time: 08/13/20  2:34 PM   Result Value Ref Range    Fasting Status Non-Fasting    ESTIMATED GFR    Collection Time: 08/13/20  2:34 PM   Result Value Ref Range    GFR If  40 (A) >60 mL/min/1.73 m 2    GFR If Non  33 (A) >60 mL/min/1.73 m 2   URINALYSIS,CULTURE IF INDICATED    Collection Time: 08/19/20  3:34 PM    Specimen: Urine   Result Value Ref Range    Color Yellow     Character Clear     Specific Gravity 1.012 <1.035    Ph 6.0 5.0 - 8.0     Glucose Negative Negative mg/dL    Ketones Negative Negative mg/dL    Protein 30 (A) Negative mg/dL    Bilirubin Negative Negative    Urobilinogen, Urine 0.2 Negative    Nitrite Negative Negative    Leukocyte Esterase Small (A) Negative    Occult Blood Trace (A) Negative    Micro Urine Req Microscopic    URINE MICROSCOPIC (W/UA)    Collection Time: 08/19/20  3:34 PM   Result Value Ref Range    WBC 10-20 (A) /hpf    RBC 0-2 /hpf    Bacteria Negative None /hpf    Epithelial Cells Negative /hpf    Hyaline Cast 0-2 /lpf   URINE CULTURE(NEW)    Collection Time: 08/19/20  3:34 PM    Specimen: Urine   Result Value Ref Range    Significant Indicator NEG     Source UR     Site -     Culture Result Mixed skin samantha 10-50,000 cfu/mL    Comp Metabolic Panel    Collection Time: 08/19/20  3:35 PM   Result Value Ref Range    Sodium 138 135 - 145 mmol/L    Potassium 3.9 3.6 - 5.5 mmol/L    Chloride 99 96 - 112 mmol/L    Co2 23 20 - 33 mmol/L    Anion Gap 16.0 7.0 - 16.0    Glucose 155 (H) 65 - 99 mg/dL    Bun 27 (H) 8 - 22 mg/dL    Creatinine 1.17 0.50 - 1.40 mg/dL    Calcium 9.8 8.5 - 10.5 mg/dL    AST(SGOT) 25 12 - 45 U/L    ALT(SGPT) 10 2 - 50 U/L    Alkaline Phosphatase 71 30 - 99 U/L    Total Bilirubin 0.2 0.1 - 1.5 mg/dL    Albumin 4.6 3.2 - 4.9 g/dL    Total Protein 7.8 6.0 - 8.2 g/dL    Globulin 3.2 1.9 - 3.5 g/dL    A-G Ratio 1.4 g/dL   FERRITIN    Collection Time: 08/19/20  3:35 PM   Result Value Ref Range    Ferritin 124.0 10.0 - 291.0 ng/mL   IRON/TOTAL IRON BIND    Collection Time: 08/19/20  3:35 PM   Result Value Ref Range    Iron 33 (L) 40 - 170 ug/dL    Total Iron Binding 273 250 - 450 ug/dL    Unsat Iron Binding 240 110 - 370 ug/dL    % Saturation 12 (L) 15 - 55 %   CBC WITH DIFFERENTIAL    Collection Time: 08/19/20  3:35 PM   Result Value Ref Range    WBC 9.0 4.8 - 10.8 K/uL    RBC 3.84 (L) 4.20 - 5.40 M/uL    Hemoglobin 11.6 (L) 12.0 - 16.0 g/dL    Hematocrit 37.3 37.0 - 47.0 %    MCV 97.1 81.4 - 97.8 fL    MCH  30.2 27.0 - 33.0 pg    MCHC 31.1 (L) 33.6 - 35.0 g/dL    RDW 47.4 35.9 - 50.0 fL    Platelet Count 347 164 - 446 K/uL    MPV 10.4 9.0 - 12.9 fL    Neutrophils-Polys 66.70 44.00 - 72.00 %    Lymphocytes 21.90 (L) 22.00 - 41.00 %    Monocytes 6.80 0.00 - 13.40 %    Eosinophils 3.80 0.00 - 6.90 %    Basophils 0.60 0.00 - 1.80 %    Immature Granulocytes 0.20 0.00 - 0.90 %    Nucleated RBC 0.00 /100 WBC    Neutrophils (Absolute) 5.97 2.00 - 7.15 K/uL    Lymphs (Absolute) 1.96 1.00 - 4.80 K/uL    Monos (Absolute) 0.61 0.00 - 0.85 K/uL    Eos (Absolute) 0.34 0.00 - 0.51 K/uL    Baso (Absolute) 0.05 0.00 - 0.12 K/uL    Immature Granulocytes (abs) 0.02 0.00 - 0.11 K/uL    NRBC (Absolute) 0.00 K/uL   FASTING STATUS    Collection Time: 08/19/20  3:35 PM   Result Value Ref Range    Fasting Status Non-Fasting    ESTIMATED GFR    Collection Time: 08/19/20  3:35 PM   Result Value Ref Range    GFR If  55 (A) >60 mL/min/1.73 m 2    GFR If Non  46 (A) >60 mL/min/1.73 m 2   EC-ECHOCARDIOGRAM COMPLETE W/O CONT    Collection Time: 08/25/20  3:54 PM   Result Value Ref Range    Eject.Frac. MOD BP 54.95     Eject.Frac. MOD 4C 57.57     Eject.Frac. MOD 2C 52.3     Left Ventrical Ejection Fraction 60        Assessment:     1. Essential hypertension     2. Coronary artery disease due to calcified coronary lesion - Calcifications seen on CT scan also wtih aortic ulceration     3. LBBB (left bundle branch block)     4. Swollen finger  REFERRAL TO RHEUMATOLOGY   5. Polyarthritis  REFERRAL TO RHEUMATOLOGY       Medical Decision Making:  Today's Assessment / Status / Plan:     It was my pleasure to meet with Ms. Dave.    Recent echo was normal    Blood pressure is well controlled.  We specifically assessed the labs on hypertension treatment    Her symptoms seem much more consistent with some rheumatologic condition she should see rheumatology    I will see Ms. Dave back in 1 year time and encouraged her to  follow up with us over the phone or electronically using my MyChart as issues arise.    It is my pleasure to participate in the care of Ms. Dave.  Please do not hesitate to contact me with questions or concerns.    Jarrod Mayberry MD PhD [facility]  Cardiologist [facility]    Please note that this dictation was created using voice recognition software. There may be errors I did not discover before finalizing the note.

## 2020-09-03 NOTE — TELEPHONE ENCOUNTER
----- Message from Lauren Dave sent at 9/3/2020 12:11 PM PDT -----  Regarding: RE: Procedure Question  Contact: [phone]  I found one that takes new patients could dr Hernandez send a referral to   [facility] at fax [phone] there the only one taking new patients in [place] let them know how bad my hands and feet are.    hands and fingers and ankles very swollen need to get in ASAP   Thank You      ----- Message -----  From: Cleo Hodges, Med Ass't  Sent: 9/3/20, 11:38 AM  To: Lauren Dave  Subject: RE: Procedure Question    Misbah Aguilar,    The referrals are pretty quick on authorizing the referral.  It should not take long.    Thank you.      ----- Message -----       From:Lauren Dave       Sent:9/3/2020 11:35 AM PDT         To:Jenna Hernandez M.D.    Subject:Procedure Question    How fast can I get in to see a rheumatologist can you refer stat.

## 2020-09-05 ENCOUNTER — HOSPITAL ENCOUNTER (OUTPATIENT)
Dept: RADIOLOGY | Facility: MEDICAL CENTER | Age: 70
End: 2020-09-05
Attending: FAMILY MEDICINE
Payer: MEDICARE

## 2020-09-05 ENCOUNTER — HOSPITAL ENCOUNTER (OUTPATIENT)
Dept: LAB | Facility: MEDICAL CENTER | Age: 70
End: 2020-09-05
Attending: FAMILY MEDICINE
Payer: MEDICARE

## 2020-09-05 DIAGNOSIS — Z20.822 CLOSE EXPOSURE TO COVID-19 VIRUS: ICD-10-CM

## 2020-09-05 DIAGNOSIS — G89.29 CHRONIC FOOT PAIN, RIGHT: ICD-10-CM

## 2020-09-05 DIAGNOSIS — M79.671 CHRONIC FOOT PAIN, RIGHT: ICD-10-CM

## 2020-09-05 DIAGNOSIS — M79.672 CHRONIC PAIN IN LEFT FOOT: ICD-10-CM

## 2020-09-05 DIAGNOSIS — G89.29 CHRONIC PAIN IN LEFT FOOT: ICD-10-CM

## 2020-09-05 PROCEDURE — 36415 COLL VENOUS BLD VENIPUNCTURE: CPT

## 2020-09-05 PROCEDURE — 86769 SARS-COV-2 COVID-19 ANTIBODY: CPT

## 2020-09-06 LAB — SARS-COV-2 AB SERPL QL IA: NORMAL

## 2020-09-08 ENCOUNTER — TELEPHONE (OUTPATIENT)
Dept: MEDICAL GROUP | Facility: PHYSICIAN GROUP | Age: 70
End: 2020-09-08

## 2020-09-08 ENCOUNTER — HOSPITAL ENCOUNTER (OUTPATIENT)
Dept: LAB | Facility: MEDICAL CENTER | Age: 70
End: 2020-09-08
Attending: PODIATRIST
Payer: MEDICARE

## 2020-09-08 DIAGNOSIS — M79.89 SWOLLEN FINGER: ICD-10-CM

## 2020-09-08 DIAGNOSIS — M25.50 POLYARTHRALGIA: ICD-10-CM

## 2020-09-08 DIAGNOSIS — N28.9 ACUTE RENAL INSUFFICIENCY: ICD-10-CM

## 2020-09-08 LAB
ERYTHROCYTE [SEDIMENTATION RATE] IN BLOOD BY WESTERGREN METHOD: 60 MM/HOUR (ref 0–30)
RHEUMATOID FACT SER IA-ACNC: 12 IU/ML (ref 0–14)
URATE SERPL-MCNC: 5.6 MG/DL (ref 1.9–8.2)

## 2020-09-08 PROCEDURE — 86812 HLA TYPING A B OR C: CPT

## 2020-09-08 PROCEDURE — 85652 RBC SED RATE AUTOMATED: CPT

## 2020-09-08 PROCEDURE — 86431 RHEUMATOID FACTOR QUANT: CPT

## 2020-09-08 PROCEDURE — 86038 ANTINUCLEAR ANTIBODIES: CPT

## 2020-09-08 PROCEDURE — 84550 ASSAY OF BLOOD/URIC ACID: CPT

## 2020-09-08 PROCEDURE — 36415 COLL VENOUS BLD VENIPUNCTURE: CPT

## 2020-09-08 NOTE — TELEPHONE ENCOUNTER
----- Message from Lauren Dave sent at 9/8/2020 10:43 AM PDT -----  Regarding: Non-Urgent Medical Question  Contact: [phone]  Misbah Gallo could you please re fax a referral over to Dr aGllegos office a rumatogist they are taking new patients I really need to get an appointment ASAP [phone] [phone] they say they haven't gotten it yet.   Feet are killing me.

## 2020-09-08 NOTE — TELEPHONE ENCOUNTER
Referral was already sent to [facility] which is where Dr. Mendiola is.  I don't know what she needs for her podiatrist.  I also see she has a my chart message stating she cancelled the MRI we ordered for her feet.  I don't understand why, it was to look for neuromas and underlying arthritis which is what I ordered.  I would ask that she follow up with her podiatrist on what imaging he wants or her orthopedist if she doesn't feel we have the right testing ordered.

## 2020-09-10 LAB
HLA-B27 QL FC: NEGATIVE
NUCLEAR IGG SER QL IA: NORMAL

## 2020-09-24 ENCOUNTER — TELEPHONE (OUTPATIENT)
Dept: MEDICAL GROUP | Facility: PHYSICIAN GROUP | Age: 70
End: 2020-09-24

## 2020-09-24 NOTE — TELEPHONE ENCOUNTER
----- Message from Jenna Hernandez M.D. sent at 9/24/2020  9:02 AM PDT -----  Regarding: RE: MRI  Yes I just need the report not the images for my records.   ----- Message -----  From: Cleo Hodges, Zachery Ass't  Sent: 9/23/2020   4:41 PM PDT  To: Jenna Hernandez M.D.  Subject: MAGDALENE Rubio from [facility] called regarding Lauren.  She states that she was calling in regards to the MRI.  She states that they can do the MRI, but the images cannot be sent right now.  They are still in the process and about a month away from being able to do that.  They would only be able to send out the report right now.  If you wist to continue, please advise.    Joanne Cross ([facility])  [phone]

## 2020-09-29 DIAGNOSIS — M25.471 RIGHT ANKLE SWELLING: ICD-10-CM

## 2020-10-05 ENCOUNTER — PATIENT MESSAGE (OUTPATIENT)
Dept: CARDIOLOGY | Facility: MEDICAL CENTER | Age: 70
End: 2020-10-05

## 2020-10-05 DIAGNOSIS — R60.0 LOCALIZED EDEMA: ICD-10-CM

## 2020-10-12 ENCOUNTER — TELEPHONE (OUTPATIENT)
Dept: MEDICAL GROUP | Facility: PHYSICIAN GROUP | Age: 70
End: 2020-10-12

## 2020-10-12 NOTE — TELEPHONE ENCOUNTER
----- Message from Lauren Dave sent at 10/12/2020  3:44 PM PDT -----  Regarding: Procedure Question  Contact: [phone]  Hi were you able to find results of MRI on right foot and ankle from [facility]   Elizabeth Aguilar

## 2020-10-12 NOTE — LETTER
[facility]  Jenna Hernandez M.D.  [address]  [facility] [facility] [address]  Fax: [phone]   Authorization for Release/Disclosure of   Protected Health Information   Name: DMITRI DAVE : 1950 SSN: [id number]   Address: [address] [place] [address]  [place] [address] Phone:    [phone] (home)    I authorize the entity listed below to release/disclose the PHI below to:   [facility]/Jenna Hernandez M.D. and Jenna Hernandez M.D.   Provider or Entity Name:  [facility]    Address   City, [facility]   Phone:      Fax:  [phone]   Reason for request: continuity of care   Information to be released:    [  ] LAST COLONOSCOPY,  including any PATH REPORT and follow-up  [  ] LAST FIT/COLOGUARD RESULT [  ] LAST DEXA  [  ] LAST MAMMOGRAM  [  ] LAST PAP  [  ] LAST LABS [  ] RETINA EXAM REPORT  [  ] IMMUNIZATION RECORDS  [X] Release all info        DATES OF SERVICE OR TIME PERIOD TO BE DISCLOSED: _____________  I understand and acknowledge that:  * This Authorization may be revoked at any time by you in writing, except if your health information has already been used or disclosed.  * Your health information that will be used or disclosed as a result of you signing this authorization could be re-disclosed by the recipient. If this occurs, your re-disclosed health information may no longer be protected by State or Federal laws.  * You may refuse to sign this Authorization. Your refusal will not affect your ability to obtain treatment.  * This Authorization becomes effective upon signing and will  on (date) __________.      If no date is indicated, this Authorization will  one (1) year from the signature date.    Name: Dmitri Dave    Signature: [facility]    Date:     10/12/2020       PLEASE FAX REQUESTED RECORDS BACK TO: [phone]

## 2020-10-14 ENCOUNTER — HOSPITAL ENCOUNTER (OUTPATIENT)
Dept: RADIOLOGY | Facility: MEDICAL CENTER | Age: 70
End: 2020-10-14
Attending: INTERNAL MEDICINE
Payer: MEDICARE

## 2020-10-14 ENCOUNTER — PATIENT MESSAGE (OUTPATIENT)
Dept: CARDIOLOGY | Facility: MEDICAL CENTER | Age: 70
End: 2020-10-14

## 2020-10-14 DIAGNOSIS — R60.0 LOCALIZED EDEMA: ICD-10-CM

## 2020-10-14 PROCEDURE — 76775 US EXAM ABDO BACK WALL LIM: CPT

## 2020-10-15 ENCOUNTER — TELEPHONE (OUTPATIENT)
Dept: CARDIOLOGY | Facility: MEDICAL CENTER | Age: 70
End: 2020-10-15

## 2020-10-15 ENCOUNTER — TELEPHONE (OUTPATIENT)
Dept: MEDICAL GROUP | Facility: PHYSICIAN GROUP | Age: 70
End: 2020-10-15

## 2020-10-15 NOTE — TELEPHONE ENCOUNTER
----- Message from Jenna Hernandez M.D. sent at 10/15/2020  7:11 AM PDT -----  Synovitis (inflammation of the joint lining) no erosions.  Need follow up with rheumatology

## 2020-10-15 NOTE — TELEPHONE ENCOUNTER
----- Message from Jarrod Mayberry M.D. sent at 10/14/2020  5:25 PM PDT -----  Kidney cysts were seen which are typically benign    Please let her know    Thank you

## 2020-10-17 ENCOUNTER — HOSPITAL ENCOUNTER (OUTPATIENT)
Dept: LAB | Facility: MEDICAL CENTER | Age: 70
End: 2020-10-17
Attending: DERMATOLOGY
Payer: MEDICARE

## 2020-10-17 LAB
ALBUMIN SERPL BCP-MCNC: 4.2 G/DL (ref 3.2–4.9)
ALBUMIN/GLOB SERPL: 1.2 G/DL
ALP SERPL-CCNC: 67 U/L (ref 30–99)
ALT SERPL-CCNC: 10 U/L (ref 2–50)
ANION GAP SERPL CALC-SCNC: 12 MMOL/L (ref 7–16)
APPEARANCE UR: CLEAR
AST SERPL-CCNC: 26 U/L (ref 12–45)
BACTERIA #/AREA URNS HPF: NEGATIVE /HPF
BASOPHILS # BLD AUTO: 0.5 % (ref 0–1.8)
BASOPHILS # BLD: 0.04 K/UL (ref 0–0.12)
BILIRUB SERPL-MCNC: 0.3 MG/DL (ref 0.1–1.5)
BILIRUB UR QL STRIP.AUTO: NEGATIVE
BUN SERPL-MCNC: 25 MG/DL (ref 8–22)
CALCIUM SERPL-MCNC: 9.8 MG/DL (ref 8.5–10.5)
CHLORIDE SERPL-SCNC: 104 MMOL/L (ref 96–112)
CK SERPL-CCNC: 33 U/L (ref 0–154)
CO2 SERPL-SCNC: 21 MMOL/L (ref 20–33)
COLOR UR: YELLOW
COMMENT 1642: NORMAL
CREAT SERPL-MCNC: 1.1 MG/DL (ref 0.5–1.4)
EOSINOPHIL # BLD AUTO: 0.23 K/UL (ref 0–0.51)
EOSINOPHIL NFR BLD: 2.9 % (ref 0–6.9)
EPI CELLS #/AREA URNS HPF: NEGATIVE /HPF
ERYTHROCYTE [DISTWIDTH] IN BLOOD BY AUTOMATED COUNT: 44 FL (ref 35.9–50)
GLOBULIN SER CALC-MCNC: 3.4 G/DL (ref 1.9–3.5)
GLUCOSE SERPL-MCNC: 80 MG/DL (ref 65–99)
GLUCOSE UR STRIP.AUTO-MCNC: NEGATIVE MG/DL
HCT VFR BLD AUTO: 36.1 % (ref 37–47)
HGB BLD-MCNC: 11 G/DL (ref 12–16)
HYALINE CASTS #/AREA URNS LPF: NORMAL /LPF
IMM GRANULOCYTES # BLD AUTO: 0.02 K/UL (ref 0–0.11)
IMM GRANULOCYTES NFR BLD AUTO: 0.3 % (ref 0–0.9)
KETONES UR STRIP.AUTO-MCNC: NEGATIVE MG/DL
LEUKOCYTE ESTERASE UR QL STRIP.AUTO: NEGATIVE
LYMPHOCYTES # BLD AUTO: 1.43 K/UL (ref 1–4.8)
LYMPHOCYTES NFR BLD: 17.9 % (ref 22–41)
MCH RBC QN AUTO: 28.8 PG (ref 27–33)
MCHC RBC AUTO-ENTMCNC: 30.5 G/DL (ref 33.6–35)
MCV RBC AUTO: 94.5 FL (ref 81.4–97.8)
MICRO URNS: ABNORMAL
MONOCYTES # BLD AUTO: 0.41 K/UL (ref 0–0.85)
MONOCYTES NFR BLD AUTO: 5.1 % (ref 0–13.4)
MORPHOLOGY BLD-IMP: NORMAL
NEUTROPHILS # BLD AUTO: 5.86 K/UL (ref 2–7.15)
NEUTROPHILS NFR BLD: 73.3 % (ref 44–72)
NITRITE UR QL STRIP.AUTO: NEGATIVE
NRBC # BLD AUTO: 0 K/UL
NRBC BLD-RTO: 0 /100 WBC
PH UR STRIP.AUTO: 5.5 [PH] (ref 5–8)
PLATELET # BLD AUTO: 301 K/UL (ref 164–446)
PMV BLD AUTO: 11.3 FL (ref 9–12.9)
POTASSIUM SERPL-SCNC: 4 MMOL/L (ref 3.6–5.5)
PROT SERPL-MCNC: 7.6 G/DL (ref 6–8.2)
PROT UR QL STRIP: 100 MG/DL
RBC # BLD AUTO: 3.82 M/UL (ref 4.2–5.4)
RBC # URNS HPF: NORMAL /HPF
RBC UR QL AUTO: ABNORMAL
SODIUM SERPL-SCNC: 137 MMOL/L (ref 135–145)
SP GR UR STRIP.AUTO: 1.02
UROBILINOGEN UR STRIP.AUTO-MCNC: 0.2 MG/DL
WBC # BLD AUTO: 8 K/UL (ref 4.8–10.8)
WBC #/AREA URNS HPF: NORMAL /HPF

## 2020-10-17 PROCEDURE — 85025 COMPLETE CBC W/AUTO DIFF WBC: CPT

## 2020-10-17 PROCEDURE — 81001 URINALYSIS AUTO W/SCOPE: CPT

## 2020-10-17 PROCEDURE — 80053 COMPREHEN METABOLIC PANEL: CPT

## 2020-10-17 PROCEDURE — 82085 ASSAY OF ALDOLASE: CPT

## 2020-10-17 PROCEDURE — 82550 ASSAY OF CK (CPK): CPT

## 2020-10-17 PROCEDURE — 36415 COLL VENOUS BLD VENIPUNCTURE: CPT

## 2020-10-19 ENCOUNTER — TELEPHONE (OUTPATIENT)
Dept: CARDIOLOGY | Facility: MEDICAL CENTER | Age: 70
End: 2020-10-19

## 2020-10-20 LAB — ALDOLASE SERPL-CCNC: 4.8 U/L (ref 1.5–8.1)

## 2020-11-03 ENCOUNTER — PATIENT MESSAGE (OUTPATIENT)
Dept: CARDIOLOGY | Facility: MEDICAL CENTER | Age: 70
End: 2020-11-03

## 2020-11-03 ENCOUNTER — TELEMEDICINE (OUTPATIENT)
Dept: MEDICAL GROUP | Facility: PHYSICIAN GROUP | Age: 70
End: 2020-11-03
Payer: MEDICARE

## 2020-11-03 DIAGNOSIS — L93.0 DISCOID LUPUS: ICD-10-CM

## 2020-11-03 DIAGNOSIS — M06.00 SERONEGATIVE RHEUMATOID ARTHRITIS (HCC): Primary | ICD-10-CM

## 2020-11-03 PROCEDURE — 99214 OFFICE O/P EST MOD 30 MIN: CPT | Mod: 95,CR | Performed by: FAMILY MEDICINE

## 2020-11-03 RX ORDER — PREDNISONE 10 MG/1
TABLET ORAL
Qty: 30 TAB | Refills: 0 | Status: SHIPPED | OUTPATIENT
Start: 2020-11-03 | End: 2020-11-11 | Stop reason: SDUPTHER

## 2020-11-03 RX ORDER — HYDROXYCHLOROQUINE SULFATE 200 MG/1
200 TABLET, FILM COATED ORAL DAILY
Qty: 30 TAB | Refills: 1 | Status: SHIPPED | OUTPATIENT
Start: 2020-11-03 | End: 2020-11-18 | Stop reason: SDUPTHER

## 2020-11-03 NOTE — PROGRESS NOTES
Chief Complaint   Patient presents with   • Joint Swelling     bilateral hand and feet       HISTORY OF PRESENT ILLNESS: Patient is a 70 y.o. female established patient here today for the following concerns:    This encounter was conducted via Zoom .   Verbal consent was obtained. Patient's identity was verified.      1. Seronegative rheumatoid arthritis (HCC)  2. Discoid lupus  This is a pleasant 70 year old female who over the course of the last 6+ months has had progressive joint pains, swelling in the feet and ankles including the hands.  She has hx of gout, but this has not been anything like her gout.  She has had steroid injections which will help only very temporarily.  She also updates me that she recently had biopsy of rash on the face which was consistent with discoid lupus.  She has family hx of RA in brother.   She does have consultation with rheumatology in about 6 weeks       Past Medical, Social, and Family history reviewed and updated in EPIC    Allergies:Statins [hmg-coa-r inhibitors], Eggs, Lisinopril, Penicillins, and Codeine    Current Outpatient Medications   Medication Sig Dispense Refill   • Cyanocobalamin (VITAMIN B-12 PO) Take  by mouth.     • CALCIUM PO Take  by mouth.     • predniSONE (DELTASONE) 10 MG Tab Take 60 mg PO x 3 days, 50 mg x 3 days, 40 mg x 3 days, 30 mg x 3 days, 20 mg x 3 days, 10 mg x 3 days 30 Tab 0   • hydroxychloroquine (PLAQUENIL) 200 MG Tab Take 1 Tab by mouth every day. 30 Tab 1   • ibuprofen (MOTRIN) 800 MG Tab TAKE 1 TABLET BY MOUTH EVERY 8 HOURS AS NEEDED FOR MILD PAIN 90 Tab 0   • ferrous sulfate 325 (65 Fe) MG EC tablet Take 1 Tab by mouth 2 Times a Day. 60 Tab 3   • FAMOTIDINE PO Take  by mouth.     • Cholecalciferol (VITAMIN D3) 5000 units Cap Take 1 Cap by mouth every day.     • Ferrous Sulfate (IRON PO) Take  by mouth.       No current facility-administered medications for this visit.          ROS:  Review of Systems   Constitutional: Negative for fever,  chills, weight loss and malaise/fatigue.   HENT: Negative for ear pain, nosebleeds, congestion, sore throat and neck pain.    Eyes: Negative for blurred vision.   Respiratory: Negative for cough, sputum production, shortness of breath and wheezing.    Cardiovascular: Negative for chest pain, palpitations,  and leg swelling.   Gastrointestinal: Negative for heartburn, nausea, vomiting, diarrhea and abdominal pain.   Genitourinary: Negative for dysuria, urgency and frequency.   Musculoskeletal: Negative for myalgias, back pain and joint pain.   Skin: Negative for rash and itching.   Neurological: Negative for dizziness, tingling, tremors, sensory change, focal weakness and headaches.   Endo/Heme/Allergies: Does not bruise/bleed easily.   Psychiatric/Behavioral: Negative for depression, anxiety, suicidal ideas, insomnia and memory loss.      Exam:  Afebrile per patient    General:  Well nourished, well developed in NAD  Head is grossly normal.  Neck: Supple without JVD, Trachea midline, no thyromegaly noted  Pulmonary:  Normal effort. Speaking in full sentences  Psych: affect appropriate  Skin: no Jaundice noted or facial rashes  MSK: bilateral hands edematous with poor range of motion in the PIP and MCP.      Please note that this dictation was created using voice recognition software. I have made every reasonable attempt to correct obvious errors, but I expect that there are errors of grammar and possibly content that I did not discover before finalizing the note.    Assessment/Plan:  1. Seronegative rheumatoid arthritis (HCC)  - predniSONE (DELTASONE) 10 MG Tab; Take 60 mg PO x 3 days, 50 mg x 3 days, 40 mg x 3 days, 30 mg x 3 days, 20 mg x 3 days, 10 mg x 3 days  Dispense: 30 Tab; Refill: 0  - hydroxychloroquine (PLAQUENIL) 200 MG Tab; Take 1 Tab by mouth every day.  Dispense: 30 Tab; Refill: 1  - REFERRAL TO OPHTHALMOLOGY  - Comp Metabolic Panel; Future  - CBC WITH DIFFERENTIAL; Future  - G6PD QUANT + RBC;  Future    2. Discoid lupus  - G6PD QUANT + RBC; Future    follow up 2 weeks.

## 2020-11-04 ENCOUNTER — PATIENT MESSAGE (OUTPATIENT)
Dept: CARDIOLOGY | Facility: MEDICAL CENTER | Age: 70
End: 2020-11-04

## 2020-11-11 DIAGNOSIS — M06.00 SERONEGATIVE RHEUMATOID ARTHRITIS (HCC): ICD-10-CM

## 2020-11-11 RX ORDER — PREDNISONE 10 MG/1
TABLET ORAL
Qty: 30 TAB | Refills: 0 | Status: SHIPPED | OUTPATIENT
Start: 2020-11-11 | End: 2020-11-18 | Stop reason: SDUPTHER

## 2020-11-18 ENCOUNTER — TELEMEDICINE (OUTPATIENT)
Dept: MEDICAL GROUP | Facility: PHYSICIAN GROUP | Age: 70
End: 2020-11-18
Payer: MEDICARE

## 2020-11-18 VITALS — BODY MASS INDEX: 22.16 KG/M2 | HEIGHT: 65 IN | WEIGHT: 133 LBS

## 2020-11-18 DIAGNOSIS — M06.00 SERONEGATIVE RHEUMATOID ARTHRITIS (HCC): ICD-10-CM

## 2020-11-18 PROCEDURE — 99213 OFFICE O/P EST LOW 20 MIN: CPT | Mod: 95,CR | Performed by: FAMILY MEDICINE

## 2020-11-18 RX ORDER — PREDNISONE 10 MG/1
TABLET ORAL
Qty: 30 TAB | Refills: 0 | Status: SHIPPED | OUTPATIENT
Start: 2020-11-18 | End: 2022-01-18

## 2020-11-18 RX ORDER — HYDROXYCHLOROQUINE SULFATE 200 MG/1
200 TABLET, FILM COATED ORAL DAILY
Qty: 30 TAB | Refills: 1 | Status: SHIPPED | OUTPATIENT
Start: 2020-11-18 | End: 2021-04-15 | Stop reason: SDUPTHER

## 2020-11-18 ASSESSMENT — FIBROSIS 4 INDEX: FIB4 SCORE: 1.91

## 2020-11-18 NOTE — PROGRESS NOTES
Chief Complaint   Patient presents with   • Hand Swelling     fv        HISTORY OF PRESENT ILLNESS: Patient is a 70 y.o. female established patient here today for the following concerns:    This encounter was conducted via Zoom .   Verbal consent was obtained. Patient's identity was verified.      1. Seronegative rheumatoid arthritis (HCC)  This is a very pleasant 70 year old female who is here after having symmetrical joint swelling of the feet and ankles and synovitis detected on MRI of the ankles.  She has responded beautifully to prednisone and has been started on Plaquenil.  She reports that she is feeling human again and like herself.  She has her first rheumatology consult in 1 month.  She has eye exam scheduled for January, but will check in with her optometrist.        Past Medical, Social, and Family history reviewed and updated in EPIC    Allergies:Statins [hmg-coa-r inhibitors], Eggs, Lisinopril, Penicillins, and Codeine    Current Outpatient Medications   Medication Sig Dispense Refill   • predniSONE (DELTASONE) 10 MG Tab Take 30 mg PO x 3 days, 20 mg x 3 days, 10 mg x 3 days 30 Tab 0   • hydroxychloroquine (PLAQUENIL) 200 MG Tab Take 1 Tab by mouth every day. 30 Tab 1   • Cyanocobalamin (VITAMIN B-12 PO) Take  by mouth.     • CALCIUM PO Take  by mouth.     • ibuprofen (MOTRIN) 800 MG Tab TAKE 1 TABLET BY MOUTH EVERY 8 HOURS AS NEEDED FOR MILD PAIN 90 Tab 0   • FAMOTIDINE PO Take  by mouth.     • Cholecalciferol (VITAMIN D3) 5000 units Cap Take 1 Cap by mouth every day.     • ferrous sulfate 325 (65 Fe) MG EC tablet Take 1 Tab by mouth 2 Times a Day. (Patient not taking: Reported on 11/18/2020) 60 Tab 3   • Ferrous Sulfate (IRON PO) Take  by mouth.       No current facility-administered medications for this visit.          ROS:  Review of Systems   Constitutional: Negative for fever, chills, weight loss and malaise/fatigue.   HENT: Negative for ear pain, nosebleeds, congestion, sore throat and neck  "pain.    Eyes: Negative for blurred vision.   Respiratory: Negative for cough, sputum production, shortness of breath and wheezing.    Cardiovascular: Negative for chest pain, palpitations,  and leg swelling.   Gastrointestinal: Negative for heartburn, nausea, vomiting, diarrhea and abdominal pain.   Genitourinary: Negative for dysuria, urgency and frequency.   Musculoskeletal: Negative for myalgias, back pain and joint pain.   Skin: Negative for rash and itching.   Neurological: Negative for dizziness, tingling, tremors, sensory change, focal weakness and headaches.   Endo/Heme/Allergies: Does not bruise/bleed easily.   Psychiatric/Behavioral: Negative for depression, anxiety, suicidal ideas, insomnia and memory loss.      Exam:  Ht 1.651 m (5' 5\")   Wt 60.3 kg (133 lb)     General:  Well nourished, well developed in NAD  Head is grossly normal.  Neck: Supple without JVD, Trachea midline, no thyromegaly noted  Pulmonary:  Normal effort. Speaking in full sentences  Psych: affect appropriate  Skin: no Jaundice noted or facial rashes    Please note that this dictation was created using voice recognition software. I have made every reasonable attempt to correct obvious errors, but I expect that there are errors of grammar and possibly content that I did not discover before finalizing the note.    Assessment/Plan:  1. Seronegative rheumatoid arthritis (HCC)  Continue slow taper of prednisone.  Continue Plaquenil.  Keep rheum appointment.  Follow up thereafter.   - predniSONE (DELTASONE) 10 MG Tab; Take 30 mg PO x 3 days, 20 mg x 3 days, 10 mg x 3 days  Dispense: 30 Tab; Refill: 0  - hydroxychloroquine (PLAQUENIL) 200 MG Tab; Take 1 Tab by mouth every day.  Dispense: 30 Tab; Refill: 1    Transitional care discussed.         "

## 2021-01-15 DIAGNOSIS — Z23 NEED FOR VACCINATION: ICD-10-CM

## 2021-02-03 ENCOUNTER — IMMUNIZATION (OUTPATIENT)
Dept: FAMILY PLANNING/WOMEN'S HEALTH CLINIC | Facility: IMMUNIZATION CENTER | Age: 71
End: 2021-02-03
Attending: INTERNAL MEDICINE
Payer: MEDICARE

## 2021-02-03 DIAGNOSIS — Z23 NEED FOR VACCINATION: ICD-10-CM

## 2021-02-03 DIAGNOSIS — Z23 ENCOUNTER FOR VACCINATION: Primary | ICD-10-CM

## 2021-02-03 PROCEDURE — 0001A PFIZER SARS-COV-2 VACCINE: CPT

## 2021-02-03 PROCEDURE — 91300 PFIZER SARS-COV-2 VACCINE: CPT

## 2021-02-04 ENCOUNTER — PATIENT MESSAGE (OUTPATIENT)
Dept: MEDICAL GROUP | Facility: PHYSICIAN GROUP | Age: 71
End: 2021-02-04

## 2021-02-04 ENCOUNTER — TELEPHONE (OUTPATIENT)
Dept: MEDICAL GROUP | Facility: PHYSICIAN GROUP | Age: 71
End: 2021-02-04

## 2021-02-04 RX ORDER — FAMOTIDINE 40 MG/1
TABLET, FILM COATED ORAL
Qty: 180 TAB | Refills: 0 | Status: SHIPPED | OUTPATIENT
Start: 2021-02-04 | End: 2021-04-13

## 2021-02-16 ENCOUNTER — HOSPITAL ENCOUNTER (EMERGENCY)
Facility: MEDICAL CENTER | Age: 71
End: 2021-02-16
Attending: EMERGENCY MEDICINE
Payer: MEDICARE

## 2021-02-16 ENCOUNTER — APPOINTMENT (OUTPATIENT)
Dept: RADIOLOGY | Facility: MEDICAL CENTER | Age: 71
End: 2021-02-16
Attending: EMERGENCY MEDICINE
Payer: MEDICARE

## 2021-02-16 ENCOUNTER — NURSE TRIAGE (OUTPATIENT)
Dept: HEALTH INFORMATION MANAGEMENT | Facility: OTHER | Age: 71
End: 2021-02-16

## 2021-02-16 VITALS
HEIGHT: 66 IN | BODY MASS INDEX: 22.5 KG/M2 | RESPIRATION RATE: 20 BRPM | TEMPERATURE: 97.7 F | HEART RATE: 88 BPM | OXYGEN SATURATION: 95 % | DIASTOLIC BLOOD PRESSURE: 68 MMHG | SYSTOLIC BLOOD PRESSURE: 138 MMHG | WEIGHT: 140 LBS

## 2021-02-16 DIAGNOSIS — U07.1 PNEUMONIA DUE TO COVID-19 VIRUS: ICD-10-CM

## 2021-02-16 DIAGNOSIS — J12.82 PNEUMONIA DUE TO COVID-19 VIRUS: ICD-10-CM

## 2021-02-16 DIAGNOSIS — E86.0 DEHYDRATION: ICD-10-CM

## 2021-02-16 LAB
ALBUMIN SERPL BCP-MCNC: 3.8 G/DL (ref 3.2–4.9)
ALBUMIN/GLOB SERPL: 0.9 G/DL
ALP SERPL-CCNC: 96 U/L (ref 30–99)
ALT SERPL-CCNC: 52 U/L (ref 2–50)
ANION GAP SERPL CALC-SCNC: 13 MMOL/L (ref 7–16)
AST SERPL-CCNC: 100 U/L (ref 12–45)
BASOPHILS # BLD AUTO: 0.2 % (ref 0–1.8)
BASOPHILS # BLD: 0.02 K/UL (ref 0–0.12)
BILIRUB SERPL-MCNC: 0.5 MG/DL (ref 0.1–1.5)
BUN SERPL-MCNC: 30 MG/DL (ref 8–22)
CALCIUM SERPL-MCNC: 9.5 MG/DL (ref 8.5–10.5)
CHLORIDE SERPL-SCNC: 94 MMOL/L (ref 96–112)
CO2 SERPL-SCNC: 21 MMOL/L (ref 20–33)
CREAT SERPL-MCNC: 1.43 MG/DL (ref 0.5–1.4)
EOSINOPHIL # BLD AUTO: 0.02 K/UL (ref 0–0.51)
EOSINOPHIL NFR BLD: 0.2 % (ref 0–6.9)
ERYTHROCYTE [DISTWIDTH] IN BLOOD BY AUTOMATED COUNT: 46 FL (ref 35.9–50)
FLUAV RNA SPEC QL NAA+PROBE: NEGATIVE
FLUBV RNA SPEC QL NAA+PROBE: NEGATIVE
GLOBULIN SER CALC-MCNC: 4.1 G/DL (ref 1.9–3.5)
GLUCOSE SERPL-MCNC: 108 MG/DL (ref 65–99)
HCT VFR BLD AUTO: 35.3 % (ref 37–47)
HGB BLD-MCNC: 11.5 G/DL (ref 12–16)
IMM GRANULOCYTES # BLD AUTO: 0.08 K/UL (ref 0–0.11)
IMM GRANULOCYTES NFR BLD AUTO: 0.8 % (ref 0–0.9)
LACTATE BLD-SCNC: 1.3 MMOL/L (ref 0.5–2)
LYMPHOCYTES # BLD AUTO: 0.68 K/UL (ref 1–4.8)
LYMPHOCYTES NFR BLD: 7 % (ref 22–41)
MCH RBC QN AUTO: 30.3 PG (ref 27–33)
MCHC RBC AUTO-ENTMCNC: 32.6 G/DL (ref 33.6–35)
MCV RBC AUTO: 93.1 FL (ref 81.4–97.8)
MONOCYTES # BLD AUTO: 0.83 K/UL (ref 0–0.85)
MONOCYTES NFR BLD AUTO: 8.6 % (ref 0–13.4)
NEUTROPHILS # BLD AUTO: 8.07 K/UL (ref 2–7.15)
NEUTROPHILS NFR BLD: 83.2 % (ref 44–72)
NRBC # BLD AUTO: 0 K/UL
NRBC BLD-RTO: 0 /100 WBC
PLATELET # BLD AUTO: 309 K/UL (ref 164–446)
PMV BLD AUTO: 10.6 FL (ref 9–12.9)
POTASSIUM SERPL-SCNC: 3.5 MMOL/L (ref 3.6–5.5)
PROT SERPL-MCNC: 7.9 G/DL (ref 6–8.2)
RBC # BLD AUTO: 3.79 M/UL (ref 4.2–5.4)
RSV RNA SPEC QL NAA+PROBE: NEGATIVE
SARS-COV-2 RNA RESP QL NAA+PROBE: DETECTED
SODIUM SERPL-SCNC: 128 MMOL/L (ref 135–145)
SPECIMEN SOURCE: ABNORMAL
WBC # BLD AUTO: 9.7 K/UL (ref 4.8–10.8)

## 2021-02-16 PROCEDURE — 80053 COMPREHEN METABOLIC PANEL: CPT

## 2021-02-16 PROCEDURE — 71045 X-RAY EXAM CHEST 1 VIEW: CPT

## 2021-02-16 PROCEDURE — 87040 BLOOD CULTURE FOR BACTERIA: CPT

## 2021-02-16 PROCEDURE — 0241U HCHG SARS-COV-2 COVID-19 NFCT DS RESP RNA 4 TRGT MIC: CPT

## 2021-02-16 PROCEDURE — 96374 THER/PROPH/DIAG INJ IV PUSH: CPT

## 2021-02-16 PROCEDURE — 700105 HCHG RX REV CODE 258: Performed by: EMERGENCY MEDICINE

## 2021-02-16 PROCEDURE — 83605 ASSAY OF LACTIC ACID: CPT

## 2021-02-16 PROCEDURE — 99285 EMERGENCY DEPT VISIT HI MDM: CPT

## 2021-02-16 PROCEDURE — C9803 HOPD COVID-19 SPEC COLLECT: HCPCS | Performed by: EMERGENCY MEDICINE

## 2021-02-16 PROCEDURE — 700111 HCHG RX REV CODE 636 W/ 250 OVERRIDE (IP): Performed by: EMERGENCY MEDICINE

## 2021-02-16 PROCEDURE — 85025 COMPLETE CBC W/AUTO DIFF WBC: CPT

## 2021-02-16 RX ORDER — ONDANSETRON 4 MG/1
4 TABLET, FILM COATED ORAL EVERY 4 HOURS PRN
Qty: 10 EACH | Refills: 0 | Status: SHIPPED | OUTPATIENT
Start: 2021-02-16 | End: 2021-03-15

## 2021-02-16 RX ORDER — ONDANSETRON 4 MG/1
4 TABLET, ORALLY DISINTEGRATING ORAL EVERY 8 HOURS PRN
Qty: 10 TABLET | Refills: 0 | Status: SHIPPED | OUTPATIENT
Start: 2021-02-16 | End: 2021-03-15

## 2021-02-16 RX ORDER — SODIUM CHLORIDE 9 MG/ML
1000 INJECTION, SOLUTION INTRAVENOUS ONCE
Status: COMPLETED | OUTPATIENT
Start: 2021-02-16 | End: 2021-02-16

## 2021-02-16 RX ORDER — ONDANSETRON 2 MG/ML
4 INJECTION INTRAMUSCULAR; INTRAVENOUS ONCE
Status: COMPLETED | OUTPATIENT
Start: 2021-02-16 | End: 2021-02-16

## 2021-02-16 RX ADMIN — SODIUM CHLORIDE 1000 ML: 9 INJECTION, SOLUTION INTRAVENOUS at 15:35

## 2021-02-16 RX ADMIN — ONDANSETRON 4 MG: 2 INJECTION INTRAMUSCULAR; INTRAVENOUS at 15:35

## 2021-02-16 ASSESSMENT — FIBROSIS 4 INDEX: FIB4 SCORE: 3.19

## 2021-02-16 NOTE — ED PROVIDER NOTES
"ED Provider  Scribed for Travon Razo D.O. by Dany Kumar. 2/16/2021  1:21 PM    Means of arrival:Ten  History obtained from:Patient  History limited by: None    CHIEF COMPLAINT  Chief Complaint   Patient presents with    Cough       HPI  Lauren Dave is a 71 y.o. female who presents to the ED for evaluation of a cough onset approximately 2/3/2021. The patient reports that she was in contact with an individual on 2/3/2021 who later tested positive for COVID-19. The patient reports that she also received the first dose of her COVID-19 vaccine on 2/5/2021 but states that she began to experience a cough before receiving this dose. The patient reports that her \"nagging\" cough has persisted since its onset and is typically precipitated with deep inspirations. She endorses associated congestion, fevers, chills, diarrhea, diaphoresis, and nose pain which she has alleviated with ampicillin. However, she denies any associated anosmia, ageusia, or dyspnea. The patient has been treating her symptoms with Tylenol and she reports taking hydroxychloroquine regularly for arthritis. She also takes Prednisone regularly. The patient had chemotherapy completed about 8 years ago but she denies any history of lung or heart disease.    REVIEW OF SYSTEMS  See HPI for further details. All other systems are negative.     PAST MEDICAL HISTORY   has a past medical history of Arthritis, Breast cancer (HCC) (8/7/2013), Bronchitis (2005), Bundle branch block, right, Cancer (HCC), Cancer (HCC), Cold, Coronary artery disease due to calcified coronary lesion - Calcifications seen on CT scan also wtih aortic ulceration, Dental disorder, Dyslipidemia, Essential hypertension, Heart burn, LBBB (left bundle branch block) (12/16/2014), Pneumonia, Pseudogout, Scarlet fever, and Unspecified hemorrhagic conditions.    SOCIAL HISTORY  Social History     Tobacco Use    Smoking status: Former Smoker     Packs/day: 1.00     Years: 0.00     " "Pack years: 0.00     Types: Cigarettes     Quit date: 10/1/2013     Years since quittin.3    Smokeless tobacco: Never Used   Substance and Sexual Activity    Alcohol use: No     Alcohol/week: 0.0 oz    Drug use: No    Sexual activity:      Comment: , 2 children, enemployed       SURGICAL HISTORY   has a past surgical history that includes colonoscopy (); tonsillectomy; breast biopsy; mastectomy (2013); node biopsy sentinel (2013); cath placement (2013); cath removal (2014); and us-needle core bx-breast panel ().    CURRENT MEDICATIONS  Home Medications    **Home medications have not yet been reviewed for this encounter**         ALLERGIES  Allergies   Allergen Reactions    Statins [Hmg-Coa-R Inhibitors] Unspecified     Muscle Spasms   RXN=unknown    Eggs      Pt states that she is allergic to eggs per her mother.    Lisinopril Cough    Penicillins      \"does not work\" .'IMMUNE TO PENICILLIN,AMPICILLIN IS THE ONLY THING THAT WORKS'    Codeine Vomiting and Nausea     Clarified with patient - states that she has an \"upset stomach\" when she takes it       PHYSICAL EXAM  VITAL SIGNS: /65   Pulse (!) 101   Temp 37.1 °C (98.7 °F) (Temporal)   Resp (!) 24   SpO2 92%   Constitutional: Alert in no apparent distress.  HENT: No signs of trauma, mucous membranes are moist  Eyes: Conjunctiva normal, Non-icteric.   Neck: Normal range of motion, No tenderness, Supple.  Lymphatic: No lymphadenopathy noted.   Cardiovascular: Regular rate and rhythm, no murmurs.   Thorax & Lungs: Dry cough present. Normal breath sounds, No respiratory distress, No wheezing, No chest tenderness.   Abdomen: Bowel sounds normal, Soft, No tenderness, No masses, No pulsatile masses. No peritoneal signs.  Skin: Warm, Dry, normal color.   Back: No bony tenderness, No CVA tenderness.   Extremities: No edema, No tenderness, No cyanosis  Musculoskeletal: Good range of motion in all major joints. No tenderness to " palpation or major deformities noted.   Neurologic: Alert and oriented x4, Normal motor function, Normal sensory function, No focal deficits noted.   Psychiatric: Affect normal, Judgment normal, Mood normal.     DIAGNOSTIC STUDIES    LABS  Results for orders placed or performed during the hospital encounter of 02/16/21   COV-2, FLU A/B, AND RSV BY PCR (2-4 HOURS CEPHEID): Collect NP swab in VTM    Specimen: Respirate   Result Value Ref Range    Influenza virus A RNA Negative Negative    Influenza virus B, PCR Negative Negative    RSV, PCR Negative Negative    SARS-CoV-2 by PCR DETECTED (AA)     SARS-CoV-2 Source NP Swab    LACTIC ACID   Result Value Ref Range    Lactic Acid 1.3 0.5 - 2.0 mmol/L   CBC WITH DIFFERENTIAL   Result Value Ref Range    WBC 9.7 4.8 - 10.8 K/uL    RBC 3.79 (L) 4.20 - 5.40 M/uL    Hemoglobin 11.5 (L) 12.0 - 16.0 g/dL    Hematocrit 35.3 (L) 37.0 - 47.0 %    MCV 93.1 81.4 - 97.8 fL    MCH 30.3 27.0 - 33.0 pg    MCHC 32.6 (L) 33.6 - 35.0 g/dL    RDW 46.0 35.9 - 50.0 fL    Platelet Count 309 164 - 446 K/uL    MPV 10.6 9.0 - 12.9 fL    Neutrophils-Polys 83.20 (H) 44.00 - 72.00 %    Lymphocytes 7.00 (L) 22.00 - 41.00 %    Monocytes 8.60 0.00 - 13.40 %    Eosinophils 0.20 0.00 - 6.90 %    Basophils 0.20 0.00 - 1.80 %    Immature Granulocytes 0.80 0.00 - 0.90 %    Nucleated RBC 0.00 /100 WBC    Neutrophils (Absolute) 8.07 (H) 2.00 - 7.15 K/uL    Lymphs (Absolute) 0.68 (L) 1.00 - 4.80 K/uL    Monos (Absolute) 0.83 0.00 - 0.85 K/uL    Eos (Absolute) 0.02 0.00 - 0.51 K/uL    Baso (Absolute) 0.02 0.00 - 0.12 K/uL    Immature Granulocytes (abs) 0.08 0.00 - 0.11 K/uL    NRBC (Absolute) 0.00 K/uL   COMP METABOLIC PANEL   Result Value Ref Range    Sodium 128 (L) 135 - 145 mmol/L    Potassium 3.5 (L) 3.6 - 5.5 mmol/L    Chloride 94 (L) 96 - 112 mmol/L    Co2 21 20 - 33 mmol/L    Anion Gap 13.0 7.0 - 16.0    Glucose 108 (H) 65 - 99 mg/dL    Bun 30 (H) 8 - 22 mg/dL    Creatinine 1.43 (H) 0.50 - 1.40 mg/dL     Calcium 9.5 8.5 - 10.5 mg/dL    AST(SGOT) 100 (H) 12 - 45 U/L    ALT(SGPT) 52 (H) 2 - 50 U/L    Alkaline Phosphatase 96 30 - 99 U/L    Total Bilirubin 0.5 0.1 - 1.5 mg/dL    Albumin 3.8 3.2 - 4.9 g/dL    Total Protein 7.9 6.0 - 8.2 g/dL    Globulin 4.1 (H) 1.9 - 3.5 g/dL    A-G Ratio 0.9 g/dL   BLOOD CULTURE    Specimen: Peripheral; Blood   Result Value Ref Range    Significant Indicator NEG     Source BLD     Site PERIPHERAL     Culture Result       No Growth  Note: Blood cultures are incubated for 5 days and  are monitored continuously.Positive blood cultures  are called to the RN and reported as soon as  they are identified.     ESTIMATED GFR   Result Value Ref Range    GFR If  44 (A) >60 mL/min/1.73 m 2    GFR If Non  36 (A) >60 mL/min/1.73 m 2     All labs reviewed by me.    RADIOLOGY  DX-CHEST-PORTABLE (1 VIEW)   Final Result      Bilateral ill-defined pulmonary infiltrates. These findings are consistent with Covid 19 pneumonia.        The radiologist's interpretations of all radiological studies have been reviewed by me.    Films have been independently by me      COURSE  Pertinent Labs & Imaging studies reviewed. (See chart for details)    1:21 PM - Patient seen and examined at bedside. Discussed plan of care. Ordered for DX-Chest, Lactic Acid, COV-2, Flu A/B, and RSV, CBC Diff, CMP, and Blood Culture x 2 to evaluate her symptoms.     MEDICAL DECISION MAKING  This is a 71 y.o. female who presents with complaints of URI symptoms and fevers.  She was exposed to someone with Covid prior to getting her injection, and her Covid test here is positive.  Her chest x-ray is concerning for Covid, she has no respiratory distress, and her pulse oximetry is normal.  This time she has signs of mild dehydration from decreased p.o. intake additional fluids will be given and she will be discharged home      Discharged home in stable condition    FINAL IMPRESSION  1. Pneumonia due to COVID-19  virus    2. Dehydration         I, Dany Kumar (Scribe), am scribing for, and in the presence of, Travon Razo D.O..    Electronically signed by: Dany Kumar (Scribe), 2/16/2021    ITravon D.O. personally performed the services described in this documentation, as scribed by Dany Kumar in my presence, and it is both accurate and complete.    C    The note accurately reflects work and decisions made by me.  Travon Razo D.O.  2/17/2021  7:20 PM

## 2021-02-16 NOTE — ED TRIAGE NOTES
Pt here via [facility] for c/o cough and flu like s/s pt states has had COVID exposure on 2/5 received her first COVID vaccine on 2/3/2021. Pt c/o sweats, cough, fever/chills, diarrhea, and vomiting.

## 2021-02-16 NOTE — ED NOTES
Flory from Lab called with critical result of COVID positive at 1516. Critical lab result read back to Flory.   Dr. Razo notified of critical lab result at 1516.  Critical lab result read back by Dr. Razo.

## 2021-02-16 NOTE — DISCHARGE INSTRUCTIONS
You are Covid positive, your chest x-ray is consistent with Covid.    There is no cure for Covid.  Will be discharged home with antinausea medications please use Motrin Tylenol for discomfort and fevers.  Use over-the-counter decongestants for other symptoms.    Please ensure that you drink additional fluids.  Return for worsening symptoms

## 2021-02-17 ENCOUNTER — TELEPHONE (OUTPATIENT)
Dept: CARDIOLOGY | Facility: MEDICAL CENTER | Age: 71
End: 2021-02-17

## 2021-02-17 ENCOUNTER — NURSE TRIAGE (OUTPATIENT)
Dept: HEALTH INFORMATION MANAGEMENT | Facility: OTHER | Age: 71
End: 2021-02-17

## 2021-02-17 NOTE — ED NOTES
Saw pt in the hallway states she cant stay any longer states she needs to get home to her dog. States her dog needs insulin. Asked if zofran could be sent to [facility] on [address]. Will notify ERP.

## 2021-02-17 NOTE — ED NOTES
Po challenge complete and pt states she feels better.paged ERP overhead to update. Pt refusing 2nd set of blood cultures @ this time. IV d' per pt  Request

## 2021-02-18 ENCOUNTER — NURSE TRIAGE (OUTPATIENT)
Dept: HEALTH INFORMATION MANAGEMENT | Facility: OTHER | Age: 71
End: 2021-02-18

## 2021-02-18 ENCOUNTER — PATIENT MESSAGE (OUTPATIENT)
Dept: CARDIOLOGY | Facility: MEDICAL CENTER | Age: 71
End: 2021-02-18

## 2021-02-18 NOTE — TELEPHONE ENCOUNTER
Pt having diarrhea and sweats. Diarrhea started yesterday. Pt wanting information on managing covid symptoms. Given information on symptom management.    Reason for Disposition  • [1] COVID-19 infection diagnosed or suspected AND [2] mild symptoms (fever, cough) AND [3] no trouble breathing or other complications    Additional Information  • Negative: SEVERE difficulty breathing (e.g., struggling for each breath, speaks in single words)  • Negative: Difficult to awaken or acting confused (e.g., disoriented, slurred speech)  • Negative: Bluish (or gray) lips or face now  • Negative: Shock suspected (e.g., cold/pale/clammy skin, too weak to stand, low BP, rapid pulse)  • Negative: Sounds like a life-threatening emergency to the triager  • Negative: [1] COVID-19 suspected (e.g., cough, fever, shortness of breath) AND [2] public health department recommends testing  • Negative: [1] COVID-19 exposure AND [2] no symptoms  • Negative: COVID-19 and Breastfeeding, questions about  • Negative: SEVERE or constant chest pain (Exception: mild central chest pain, present only when coughing)  • Negative: MODERATE difficulty breathing (e.g., speaks in phrases, SOB even at rest, pulse 100-120)  • Negative: MILD difficulty breathing (e.g., minimal/no SOB at rest, SOB with walking, pulse <100)  • Negative: Chest pain  • Negative: Patient sounds very sick or weak to the triager  • Negative: Fever > 103 F (39.4 C)  • Negative: [1] Fever > 101 F (38.3 C) AND [2] age > 60  • Negative: [1] Fever > 100.0 F (37.8 C) AND [2] bedridden (e.g., nursing home patient, CVA, chronic illness, recovering from surgery)  • Negative: HIGH RISK patient (e.g., age > 64 years, diabetes, heart or lung disease, weak immune system)  • Negative: Fever present > 3 days (72 hours)  • Negative: [1] Fever returns after gone for over 24 hours AND [2] symptoms worse or not improved  • Negative: [1] Continuous (nonstop) coughing interferes with work or school AND [2]  "no improvement using cough treatment per protocol  • Negative: Cough present > 3 weeks    Answer Assessment - Initial Assessment Questions  1. COVID-19 DIAGNOSIS: \"Who made your Coronavirus (COVID-19) diagnosis?\" \"Was it confirmed by a positive lab test?\" If not diagnosed by a HCP, ask \"Are there lots of cases (community spread) where you live?\" (See public health department website, if unsure)    * MAJOR community spread: high number of cases; numbers of cases are increasing; many people hospitalized.    * MINOR community spread: low number of cases; not increasing; few or no people hospitalized      MAJOR  2. ONSET: \"When did the COVID-19 symptoms start?\"       17 days  3. WORST SYMPTOM: \"What is your worst symptom?\" (e.g., cough, fever, shortness of breath, muscle aches)      Diarrhea and sweats  4. COUGH: \"How bad is the cough?\"        Coughing up phlem  5. FEVER: \"Do you have a fever?\" If so, ask: \"What is your temperature, how was it measured, and when did it start?\"      unknown  6. RESPIRATORY STATUS: \"Describe your breathing?\" (e.g., shortness of breath, wheezing, unable to speak)       normal  7. BETTER-SAME-WORSE: \"Are you getting better, staying the same or getting worse compared to yesterday?\"  If getting worse, ask, \"In what way?\"      same  8. HIGH RISK DISEASE: \"Do you have any chronic medical problems?\" (e.g., asthma, heart or lung disease, weak immune system, etc.)      unknown  9. PREGNANCY: \"Is there any chance you are pregnant?\" \"When was your last menstrual period?\"      no  10. OTHER SYMPTOMS: \"Do you have any other symptoms?\"  (e.g., runny nose, headache, sore throat, loss of smell)        no    Protocols used: CORONAVIRUS (COVID-19) DIAGNOSED OR [id number]      "

## 2021-02-18 NOTE — TELEPHONE ENCOUNTER
Sorry to hear about COVID.  Chest xrays are not accurate to measure the heart, last year it looked great on the echo which is more accurate.    Wish her a happy birthday and that she gets better soon.    Thanks

## 2021-02-18 NOTE — TELEPHONE ENCOUNTER
CW    Pt called stating she has Covid and was told she has an enlarged heart. Pt is asking if CW will look at her tests and give Pt his opinion on what she should do. Pt states she doesn't have a PCP. Please call Pt [phone].

## 2021-02-19 NOTE — TELEPHONE ENCOUNTER
Returned pt call and reviewed MD recommendations.  She verbalizes understanding and states no other concerns or questions at this time.  Pt is appreciative of information given.

## 2021-02-21 LAB
BACTERIA BLD CULT: NORMAL
SIGNIFICANT IND 70042: NORMAL
SITE SITE: NORMAL
SOURCE SOURCE: NORMAL

## 2021-02-24 ENCOUNTER — NURSE TRIAGE (OUTPATIENT)
Dept: HEALTH INFORMATION MANAGEMENT | Facility: OTHER | Age: 71
End: 2021-02-24

## 2021-02-24 NOTE — TELEPHONE ENCOUNTER
Pt calling to ask where she may be able to get a repeat chest xray to see if her pneumonia is getting better. Pt only has cough but tested for covid on 2/16. Advised PCP for order for xray or urgent care. Advised pt of symptoms to monitor to know if pneumonia may be getting worse.    Reason for Disposition  • COVID-19, questions about    Additional Information  • Negative: SEVERE difficulty breathing (e.g., struggling for each breath, speaks in single words)  • Negative: Difficult to awaken or acting confused (e.g., disoriented, slurred speech)  • Negative: Bluish (or gray) lips or face now  • Negative: Shock suspected (e.g., cold/pale/clammy skin, too weak to stand, low BP, rapid pulse)  • Negative: Sounds like a life-threatening emergency to the triager  • Negative: [1] COVID-19 suspected (e.g., cough, fever, shortness of breath) AND [2] public health department recommends testing  • Negative: [1] COVID-19 exposure AND [2] no symptoms  • Negative: COVID-19 and Breastfeeding, questions about  • Negative: SEVERE or constant chest pain (Exception: mild central chest pain, present only when coughing)  • Negative: MODERATE difficulty breathing (e.g., speaks in phrases, SOB even at rest, pulse 100-120)  • Negative: MILD difficulty breathing (e.g., minimal/no SOB at rest, SOB with walking, pulse <100)  • Negative: Chest pain  • Negative: Patient sounds very sick or weak to the triager  • Negative: Fever > 103 F (39.4 C)  • Negative: [1] Fever > 101 F (38.3 C) AND [2] age > 60  • Negative: [1] Fever > 100.0 F (37.8 C) AND [2] bedridden (e.g., nursing home patient, CVA, chronic illness, recovering from surgery)  • Negative: HIGH RISK patient (e.g., age > 64 years, diabetes, heart or lung disease, weak immune system)  • Negative: Fever present > 3 days (72 hours)  • Negative: [1] Fever returns after gone for over 24 hours AND [2] symptoms worse or not improved  • Negative: [1] Continuous (nonstop) coughing interferes with  "work or school AND [2] no improvement using cough treatment per protocol  • Negative: Cough present > 3 weeks  • Negative: [1] COVID-19 infection diagnosed or suspected AND [2] mild symptoms (fever, cough) AND [3] no trouble breathing or other complications    Answer Assessment - Initial Assessment Questions  1. COVID-19 DIAGNOSIS: \"Who made your Coronavirus (COVID-19) diagnosis?\" \"Was it confirmed by a positive lab test?\" If not diagnosed by a HCP, ask \"Are there lots of cases (community spread) where you live?\" (See public health department website, if unsure)    * MAJOR community spread: high number of cases; numbers of cases are increasing; many people hospitalized.    * MINOR community spread: low number of cases; not increasing; few or no people hospitalized      MAJOR  2. ONSET: \"When did the COVID-19 symptoms start?\"       2/3/21  3. WORST SYMPTOM: \"What is your worst symptom?\" (e.g., cough, fever, shortness of breath, muscle aches)      cough  4. COUGH: \"How bad is the cough?\"        lingering  5. FEVER: \"Do you have a fever?\" If so, ask: \"What is your temperature, how was it measured, and when did it start?\"      no  6. RESPIRATORY STATUS: \"Describe your breathing?\" (e.g., shortness of breath, wheezing, unable to speak)       normal  7. BETTER-SAME-WORSE: \"Are you getting better, staying the same or getting worse compared to yesterday?\"  If getting worse, ask, \"In what way?\"      same  8. HIGH RISK DISEASE: \"Do you have any chronic medical problems?\" (e.g., asthma, heart or lung disease, weak immune system, etc.)      no  9. PREGNANCY: \"Is there any chance you are pregnant?\" \"When was your last menstrual period?\"      no  10. OTHER SYMPTOMS: \"Do you have any other symptoms?\"  (e.g., runny nose, headache, sore throat, loss of smell)        no    Protocols used: CORONAVIRUS (COVID-19) DIAGNOSED OR [id number]      "

## 2021-03-09 ENCOUNTER — NURSE TRIAGE (OUTPATIENT)
Dept: HEALTH INFORMATION MANAGEMENT | Facility: OTHER | Age: 71
End: 2021-03-09

## 2021-03-09 NOTE — TELEPHONE ENCOUNTER
"Pt would like to establish with new PCP to receive imaging post covid. No new patient appt until April at [facility] requested by patient. Pt will try [facility].    Reason for Disposition  • Nursing judgment    Additional Information  • Negative: Nursing judgment  • Negative: Nursing judgment    Answer Assessment - Initial Assessment Questions  1. REASON FOR CALL or QUESTION: \"What is your reason for calling today?\" or \"How can I best help you?\" or \"What question do you have that I can help answer?\"      Imaging post covid    Protocols used: INFORMATION ONLY CALL - NO TRIAGE-A-OH      "

## 2021-03-15 ENCOUNTER — OFFICE VISIT (OUTPATIENT)
Dept: CARDIOLOGY | Facility: MEDICAL CENTER | Age: 71
End: 2021-03-15
Payer: MEDICARE

## 2021-03-15 VITALS
SYSTOLIC BLOOD PRESSURE: 150 MMHG | RESPIRATION RATE: 18 BRPM | WEIGHT: 135.8 LBS | OXYGEN SATURATION: 98 % | DIASTOLIC BLOOD PRESSURE: 72 MMHG | BODY MASS INDEX: 22.63 KG/M2 | HEIGHT: 65 IN | HEART RATE: 84 BPM

## 2021-03-15 DIAGNOSIS — I25.84 CORONARY ARTERY DISEASE DUE TO CALCIFIED CORONARY LESION: Chronic | ICD-10-CM

## 2021-03-15 DIAGNOSIS — E78.5 DYSLIPIDEMIA: ICD-10-CM

## 2021-03-15 DIAGNOSIS — D50.9 IRON DEFICIENCY ANEMIA, UNSPECIFIED IRON DEFICIENCY ANEMIA TYPE: ICD-10-CM

## 2021-03-15 DIAGNOSIS — I25.10 CORONARY ARTERY DISEASE DUE TO CALCIFIED CORONARY LESION: Chronic | ICD-10-CM

## 2021-03-15 DIAGNOSIS — R91.1 PULMONARY NODULE: ICD-10-CM

## 2021-03-15 DIAGNOSIS — I44.7 LBBB (LEFT BUNDLE BRANCH BLOCK): Chronic | ICD-10-CM

## 2021-03-15 PROCEDURE — 99213 OFFICE O/P EST LOW 20 MIN: CPT | Performed by: INTERNAL MEDICINE

## 2021-03-15 RX ORDER — ALENDRONATE SODIUM 70 MG/1
TABLET ORAL
COMMUNITY
Start: 2021-02-17 | End: 2021-03-15

## 2021-03-15 ASSESSMENT — ENCOUNTER SYMPTOMS
PND: 0
COUGH: 0
FEVER: 0
FALLS: 0
CHILLS: 0
SHORTNESS OF BREATH: 0
SORE THROAT: 0
DIZZINESS: 0
PALPITATIONS: 0
NAUSEA: 0
BLURRED VISION: 0
WEAKNESS: 0
ABDOMINAL PAIN: 0
CLAUDICATION: 0
FOCAL WEAKNESS: 0
BRUISES/BLEEDS EASILY: 0

## 2021-03-15 ASSESSMENT — FIBROSIS 4 INDEX: FIB4 SCORE: 3.19

## 2021-03-15 NOTE — PROGRESS NOTES
Chief Complaint   Patient presents with   • Coronary Artery Disease     F/V Dx: CAD due to Calcified Coronary Lesion - Calcifications   • Dyslipidemia     F/V   • Hypertension     F/V       Subjective:   Lauren Dave is a 71 y.o. female who presents today for follow-up of a history of left bundle branch block    She recently had Covid with pneumonia labs show hyponatremia she is also had recurrence of iron deficiency anemia    Her ex- whom I take care of was hospitalized for Covid as well and is now living in a facility hoping to go to a group home    Past Medical History:   Diagnosis Date   • Arthritis    • Breast cancer (HCC) 8/7/2013   • Bronchitis 2005   • Bundle branch block, right    • Cancer (HCC)    • Cancer (HCC)     right breast cancer    • Cold    • Coronary artery disease due to calcified coronary lesion - Calcifications seen on CT scan also wtih aortic ulceration    • Dental disorder     tooth infection   • Dyslipidemia     Tried atorvastatin, pravastatin, lovastatin, and Zetia.  All causes severe myalgias.  Just taking fish oil.     • Essential hypertension    • Heart burn    • LBBB (left bundle branch block) 12/16/2014    Noted on prechemo EKG 9/2014, MUGA WNL   • Pneumonia    • Pseudogout    • Scarlet fever    • Unspecified hemorrhagic conditions     gums     Past Surgical History:   Procedure Laterality Date   • CATH REMOVAL  2/24/2014    Performed by Aggie Burns M.D. at SURGERY SAME DAY [facility] ORS   • MASTECTOMY  8/22/2013    Performed by Aggie Burns M.D. at SURGERY SAME DAY [facility] ORS   • NODE BIOPSY SENTINEL  8/22/2013    Performed by Aggie Burns M.D. at SURGERY SAME DAY [facility] ORS   • CATH PLACEMENT  8/22/2013    Performed by Aggie Burns M.D. at SURGERY SAME DAY [facility] ORS   • US-NEEDLE CORE BX-BREAST PANEL  2013    right breast   • COLONOSCOPY  2003   • BREAST BIOPSY     • TONSILLECTOMY       Family History   Problem Relation Age of Onset   •  Cancer Mother         possible thyroid and gynecological   • Mult Sclerosis Mother    • Arthritis Father    • Mult Sclerosis Brother    • Gout Brother    • Heart Disease Paternal Grandmother    • Diabetes Paternal Grandmother    • Cancer Maternal Aunt         breast cancer- 60s   • Diabetes Maternal Uncle    • Heart Disease Maternal Grandmother    • Heart Disease Maternal Grandfather         MI   • Stroke Paternal Grandfather    • Other Son         breast mass   • Heart Disease Son         HEART MURMUR   • Gout Son      Social History     Socioeconomic History   • Marital status:      Spouse name: Not on file   • Number of children: 2   • Years of education: Not on file   • Highest education level: Not on file   Occupational History     Employer: [employer]   Tobacco Use   • Smoking status: Former Smoker     Packs/day: 1.00     Years: 0.00     Pack years: 0.00     Types: Cigarettes     Quit date: 10/1/2013     Years since quittin.4   • Smokeless tobacco: Never Used   Substance and Sexual Activity   • Alcohol use: No     Alcohol/week: 0.0 oz   • Drug use: No   • Sexual activity: Not on file     Comment: , 2 children, enemployed   Other Topics Concern   • Not on file   Social History Narrative    ** Merged History Encounter **         Patient is a mariya. Patient has 2 adult children. She is  from .           Social Determinants of Health     Financial Resource Strain:    • Difficulty of Paying Living Expenses:    Food Insecurity:    • Worried About Running Out of Food in the Last Year:    • Ran Out of Food in the Last Year:    Transportation Needs:    • Lack of Transportation (Medical):    • Lack of Transportation (Non-Medical):    Physical Activity:    • Days of Exercise per Week:    • Minutes of Exercise per Session:    Stress:    • Feeling of Stress :    Social Connections:    • Frequency of Communication with Friends and Family:    • Frequency of Social Gatherings with  "Friends and Family:    • Attends Mosque Services:    • Active Member of Clubs or Organizations:    • Attends Club or Organization Meetings:    • Marital Status:    Intimate Partner Violence:    • Fear of Current or Ex-Partner:    • Emotionally Abused:    • Physically Abused:    • Sexually Abused:      Allergies   Allergen Reactions   • Statins [Hmg-Coa-R Inhibitors] Unspecified     Muscle Spasms   RXN=unknown   • Eggs      Pt states that she is allergic to eggs per her mother.   • Lisinopril Cough   • Penicillins      \"does not work\" .'IMMUNE TO PENICILLIN,AMPICILLIN IS THE ONLY THING THAT WORKS'   • Codeine Vomiting and Nausea     Clarified with patient - states that she has an \"upset stomach\" when she takes it     Outpatient Encounter Medications as of 3/15/2021   Medication Sig Dispense Refill   • famotidine (PEPCID) 40 MG Tab Take 1 tablet by mouth twice daily 180 Tab 0   • predniSONE (DELTASONE) 10 MG Tab Take 30 mg PO x 3 days, 20 mg x 3 days, 10 mg x 3 days 30 Tab 0   • hydroxychloroquine (PLAQUENIL) 200 MG Tab Take 1 Tab by mouth every day. 30 Tab 1   • Cyanocobalamin (VITAMIN B-12 PO) Take  by mouth.     • CALCIUM PO Take  by mouth.     • ibuprofen (MOTRIN) 800 MG Tab TAKE 1 TABLET BY MOUTH EVERY 8 HOURS AS NEEDED FOR MILD PAIN 90 Tab 0   • Cholecalciferol (VITAMIN D3) 5000 units Cap Take 1 Cap by mouth every day.     • [DISCONTINUED] alendronate (FOSAMAX) 70 MG Tab TAKE 1 TABLET BY MOUTH 30 MINUTES BEFORE THE FIRST FOOD BEVERAGE OR MEDICINE OF THE DAY WITH PLAIN WATER ONCE A WEEK FOR 30 DAYS     • [DISCONTINUED] ondansetron (ZOFRAN) 4 MG Tab tablet Take 1 tablet by mouth every four hours as needed for Nausea/Vomiting. (Patient not taking: Reported on 3/15/2021) 10 Each 0   • [DISCONTINUED] ondansetron (ZOFRAN ODT) 4 MG TABLET DISPERSIBLE Take 1 tablet by mouth every 8 hours as needed for Nausea. (Patient not taking: Reported on 3/15/2021) 10 tablet 0   • [DISCONTINUED] ferrous sulfate 325 (65 Fe) MG EC " "tablet Take 1 Tab by mouth 2 Times a Day. (Patient not taking: Reported on 11/18/2020) 60 Tab 3   • [DISCONTINUED] Ferrous Sulfate (IRON PO) Take  by mouth.       No facility-administered encounter medications on file as of 3/15/2021.     Review of Systems   Constitutional: Negative for chills and fever.   HENT: Negative for sore throat.    Eyes: Negative for blurred vision.   Respiratory: Negative for cough and shortness of breath.    Cardiovascular: Negative for chest pain, palpitations, claudication, leg swelling and PND.   Gastrointestinal: Negative for abdominal pain and nausea.   Musculoskeletal: Negative for falls and joint pain.   Skin: Negative for rash.   Neurological: Negative for dizziness, focal weakness and weakness.   Endo/Heme/Allergies: Does not bruise/bleed easily.        Objective:   /72 (BP Location: Left arm, Patient Position: Sitting, BP Cuff Size: Adult)   Pulse 84   Resp 18   Ht 1.651 m (5' 5\")   Wt 61.6 kg (135 lb 12.8 oz)   SpO2 98%   BMI 22.60 kg/m²     Physical Exam   Constitutional: No distress.   HENT:   Patient wearing a mask due to COVID precautions   Eyes: No scleral icterus.   Neck: No JVD present.   Cardiovascular: Normal rate and normal heart sounds. Exam reveals no gallop and no friction rub.   No murmur heard.  Pulmonary/Chest: No respiratory distress. She has no wheezes. She has no rales.   Abdominal: Soft. Bowel sounds are normal.   Musculoskeletal:         General: No edema.   Neurological: She is alert.   Skin: No rash noted. She is not diaphoretic.   Psychiatric: She has a normal mood and affect.     We reviewed in person the most recent labs  Recent Results (from the past 4032 hour(s))   ALDOLASE    Collection Time: 10/17/20  9:34 AM   Result Value Ref Range    Aldolase 4.8 1.5 - 8.1 U/L   CBC WITH DIFFERENTIAL    Collection Time: 10/17/20  9:34 AM   Result Value Ref Range    WBC 8.0 4.8 - 10.8 K/uL    RBC 3.82 (L) 4.20 - 5.40 M/uL    Hemoglobin 11.0 (L) 12.0 - " 16.0 g/dL    Hematocrit 36.1 (L) 37.0 - 47.0 %    MCV 94.5 81.4 - 97.8 fL    MCH 28.8 27.0 - 33.0 pg    MCHC 30.5 (L) 33.6 - 35.0 g/dL    RDW 44.0 35.9 - 50.0 fL    Platelet Count 301 164 - 446 K/uL    MPV 11.3 9.0 - 12.9 fL    Neutrophils-Polys 73.30 (H) 44.00 - 72.00 %    Lymphocytes 17.90 (L) 22.00 - 41.00 %    Monocytes 5.10 0.00 - 13.40 %    Eosinophils 2.90 0.00 - 6.90 %    Basophils 0.50 0.00 - 1.80 %    Immature Granulocytes 0.30 0.00 - 0.90 %    Nucleated RBC 0.00 /100 WBC    Neutrophils (Absolute) 5.86 2.00 - 7.15 K/uL    Lymphs (Absolute) 1.43 1.00 - 4.80 K/uL    Monos (Absolute) 0.41 0.00 - 0.85 K/uL    Eos (Absolute) 0.23 0.00 - 0.51 K/uL    Baso (Absolute) 0.04 0.00 - 0.12 K/uL    Immature Granulocytes (abs) 0.02 0.00 - 0.11 K/uL    NRBC (Absolute) 0.00 K/uL   Comp Metabolic Panel    Collection Time: 10/17/20  9:34 AM   Result Value Ref Range    Sodium 137 135 - 145 mmol/L    Potassium 4.0 3.6 - 5.5 mmol/L    Chloride 104 96 - 112 mmol/L    Co2 21 20 - 33 mmol/L    Anion Gap 12.0 7.0 - 16.0    Glucose 80 65 - 99 mg/dL    Bun 25 (H) 8 - 22 mg/dL    Creatinine 1.10 0.50 - 1.40 mg/dL    Calcium 9.8 8.5 - 10.5 mg/dL    AST(SGOT) 26 12 - 45 U/L    ALT(SGPT) 10 2 - 50 U/L    Alkaline Phosphatase 67 30 - 99 U/L    Total Bilirubin 0.3 0.1 - 1.5 mg/dL    Albumin 4.2 3.2 - 4.9 g/dL    Total Protein 7.6 6.0 - 8.2 g/dL    Globulin 3.4 1.9 - 3.5 g/dL    A-G Ratio 1.2 g/dL   CREATINE KINASE    Collection Time: 10/17/20  9:34 AM   Result Value Ref Range    CPK Total 33 0 - 154 U/L   URINALYSIS,CULTURE IF INDICATED    Collection Time: 10/17/20  9:34 AM   Result Value Ref Range    Color Yellow     Character Clear     Specific Gravity 1.020 <1.035    Ph 5.5 5.0 - 8.0    Glucose Negative Negative mg/dL    Ketones Negative Negative mg/dL    Protein 100 (A) Negative mg/dL    Bilirubin Negative Negative    Urobilinogen, Urine 0.2 Negative    Nitrite Negative Negative    Leukocyte Esterase Negative Negative    Occult Blood  Small (A) Negative    Micro Urine Req Microscopic    URINE MICROSCOPIC (W/UA)    Collection Time: 10/17/20  9:34 AM   Result Value Ref Range    WBC 2-5 /hpf    RBC 0-2 /hpf    Bacteria Negative None /hpf    Epithelial Cells Negative /hpf    Hyaline Cast 0-2 /lpf   PERIPHERAL SMEAR REVIEW    Collection Time: 10/17/20  9:34 AM   Result Value Ref Range    Peripheral Smear Review see below    DIFFERENTIAL COMMENT    Collection Time: 10/17/20  9:34 AM   Result Value Ref Range    Comments-Diff see below    ESTIMATED GFR    Collection Time: 10/17/20  9:34 AM   Result Value Ref Range    GFR If  59 (A) >60 mL/min/1.73 m 2    GFR If Non African American 49 (A) >60 mL/min/1.73 m 2   LACTIC ACID    Collection Time: 02/16/21  1:15 PM   Result Value Ref Range    Lactic Acid 1.3 0.5 - 2.0 mmol/L   CBC WITH DIFFERENTIAL    Collection Time: 02/16/21  1:15 PM   Result Value Ref Range    WBC 9.7 4.8 - 10.8 K/uL    RBC 3.79 (L) 4.20 - 5.40 M/uL    Hemoglobin 11.5 (L) 12.0 - 16.0 g/dL    Hematocrit 35.3 (L) 37.0 - 47.0 %    MCV 93.1 81.4 - 97.8 fL    MCH 30.3 27.0 - 33.0 pg    MCHC 32.6 (L) 33.6 - 35.0 g/dL    RDW 46.0 35.9 - 50.0 fL    Platelet Count 309 164 - 446 K/uL    MPV 10.6 9.0 - 12.9 fL    Neutrophils-Polys 83.20 (H) 44.00 - 72.00 %    Lymphocytes 7.00 (L) 22.00 - 41.00 %    Monocytes 8.60 0.00 - 13.40 %    Eosinophils 0.20 0.00 - 6.90 %    Basophils 0.20 0.00 - 1.80 %    Immature Granulocytes 0.80 0.00 - 0.90 %    Nucleated RBC 0.00 /100 WBC    Neutrophils (Absolute) 8.07 (H) 2.00 - 7.15 K/uL    Lymphs (Absolute) 0.68 (L) 1.00 - 4.80 K/uL    Monos (Absolute) 0.83 0.00 - 0.85 K/uL    Eos (Absolute) 0.02 0.00 - 0.51 K/uL    Baso (Absolute) 0.02 0.00 - 0.12 K/uL    Immature Granulocytes (abs) 0.08 0.00 - 0.11 K/uL    NRBC (Absolute) 0.00 K/uL   COMP METABOLIC PANEL    Collection Time: 02/16/21  1:15 PM   Result Value Ref Range    Sodium 128 (L) 135 - 145 mmol/L    Potassium 3.5 (L) 3.6 - 5.5 mmol/L    Chloride 94  (L) 96 - 112 mmol/L    Co2 21 20 - 33 mmol/L    Anion Gap 13.0 7.0 - 16.0    Glucose 108 (H) 65 - 99 mg/dL    Bun 30 (H) 8 - 22 mg/dL    Creatinine 1.43 (H) 0.50 - 1.40 mg/dL    Calcium 9.5 8.5 - 10.5 mg/dL    AST(SGOT) 100 (H) 12 - 45 U/L    ALT(SGPT) 52 (H) 2 - 50 U/L    Alkaline Phosphatase 96 30 - 99 U/L    Total Bilirubin 0.5 0.1 - 1.5 mg/dL    Albumin 3.8 3.2 - 4.9 g/dL    Total Protein 7.9 6.0 - 8.2 g/dL    Globulin 4.1 (H) 1.9 - 3.5 g/dL    A-G Ratio 0.9 g/dL   BLOOD CULTURE    Collection Time: 02/16/21  1:15 PM    Specimen: Peripheral; Blood   Result Value Ref Range    Significant Indicator NEG     Source BLD     Site PERIPHERAL     Culture Result No growth after 5 days of incubation.    ESTIMATED GFR    Collection Time: 02/16/21  1:15 PM   Result Value Ref Range    GFR If  44 (A) >60 mL/min/1.73 m 2    GFR If Non  36 (A) >60 mL/min/1.73 m 2   COV-2, FLU A/B, AND RSV BY PCR (2-4 HOURS CEPHEID): Collect NP swab in VTM    Collection Time: 02/16/21  1:48 PM    Specimen: Respirate   Result Value Ref Range    Influenza virus A RNA Negative Negative    Influenza virus B, PCR Negative Negative    RSV, PCR Negative Negative    SARS-CoV-2 by PCR DETECTED (AA)     SARS-CoV-2 Source NP Swab        Assessment:     1. LBBB (left bundle branch block)     2. Dyslipidemia     3. Coronary artery disease due to calcified coronary lesion - Calcifications seen on CT scan also wtih aortic ulceration     4. Iron deficiency anemia, unspecified iron deficiency anemia type  CBC WITHOUT DIFFERENTIAL    Comp Metabolic Panel    IRON/TOTAL IRON BIND   5. Pulmonary nodule  DX-CHEST-2 VIEWS       Medical Decision Making:  Today's Assessment / Status / Plan:     It was my pleasure to meet with Ms. Dave.    Blood pressure is well controlled.  We specifically assessed the labs on hypertension treatment    We will update her chest x-ray for resolution    I will see Ms. Dave back in 1 year time and  encouraged her to follow up with us over the phone or electronically using my MyChart as issues arise.    It is my pleasure to participate in the care of Ms. Dave.  Please do not hesitate to contact me with questions or concerns.    Jarrod Mayberry MD PhD [facility]  Cardiologist [facility]    Please note that this dictation was created using voice recognition software. There may be errors I did not discover before finalizing the note.

## 2021-04-13 RX ORDER — FAMOTIDINE 40 MG/1
TABLET, FILM COATED ORAL
Qty: 180 TABLET | Refills: 1 | Status: SHIPPED | OUTPATIENT
Start: 2021-04-13

## 2021-04-14 DIAGNOSIS — M06.00 SERONEGATIVE RHEUMATOID ARTHRITIS (HCC): ICD-10-CM

## 2021-04-14 RX ORDER — HYDROXYCHLOROQUINE SULFATE 200 MG/1
200 TABLET, FILM COATED ORAL DAILY
Qty: 30 TABLET | Refills: 1 | Status: CANCELLED | OUTPATIENT
Start: 2021-04-14

## 2021-04-15 DIAGNOSIS — M06.00 SERONEGATIVE RHEUMATOID ARTHRITIS (HCC): ICD-10-CM

## 2021-04-15 RX ORDER — HYDROXYCHLOROQUINE SULFATE 200 MG/1
200 TABLET, FILM COATED ORAL DAILY
Qty: 30 TABLET | Refills: 0 | Status: SHIPPED | OUTPATIENT
Start: 2021-04-15 | End: 2023-07-18

## 2021-04-20 ENCOUNTER — HOSPITAL ENCOUNTER (OUTPATIENT)
Dept: RADIOLOGY | Facility: MEDICAL CENTER | Age: 71
End: 2021-04-20
Attending: FAMILY MEDICINE
Payer: MEDICARE

## 2021-04-20 DIAGNOSIS — Z86.16 HISTORY OF 2019 NOVEL CORONAVIRUS DISEASE (COVID-19): ICD-10-CM

## 2021-04-20 DIAGNOSIS — Z87.68 PERSONAL HISTORY OF PERINATAL PROBLEMS: ICD-10-CM

## 2021-04-20 PROCEDURE — 71250 CT THORAX DX C-: CPT | Mod: MH

## 2021-05-24 ENCOUNTER — HOSPITAL ENCOUNTER (OUTPATIENT)
Dept: RADIOLOGY | Facility: MEDICAL CENTER | Age: 71
End: 2021-05-24
Attending: NURSE PRACTITIONER
Payer: MEDICARE

## 2021-05-24 DIAGNOSIS — Z17.1 MALIGNANT NEOPLASM OF UPPER-INNER QUADRANT OF RIGHT BREAST IN FEMALE, ESTROGEN RECEPTOR NEGATIVE (HCC): ICD-10-CM

## 2021-05-24 DIAGNOSIS — C50.211 MALIGNANT NEOPLASM OF UPPER-INNER QUADRANT OF RIGHT BREAST IN FEMALE, ESTROGEN RECEPTOR NEGATIVE (HCC): ICD-10-CM

## 2021-05-24 PROCEDURE — G0279 TOMOSYNTHESIS, MAMMO: HCPCS

## 2021-05-26 ENCOUNTER — APPOINTMENT (OUTPATIENT)
Dept: HEMATOLOGY ONCOLOGY | Facility: MEDICAL CENTER | Age: 71
End: 2021-05-26
Payer: MEDICARE

## 2021-06-21 ENCOUNTER — TELEPHONE (OUTPATIENT)
Dept: HEMATOLOGY ONCOLOGY | Facility: MEDICAL CENTER | Age: 71
End: 2021-06-21

## 2021-06-21 DIAGNOSIS — Z17.1 MALIGNANT NEOPLASM OF UPPER-INNER QUADRANT OF RIGHT BREAST IN FEMALE, ESTROGEN RECEPTOR NEGATIVE (HCC): ICD-10-CM

## 2021-06-21 DIAGNOSIS — C50.211 MALIGNANT NEOPLASM OF UPPER-INNER QUADRANT OF RIGHT BREAST IN FEMALE, ESTROGEN RECEPTOR NEGATIVE (HCC): ICD-10-CM

## 2021-06-21 NOTE — TELEPHONE ENCOUNTER
Pt called into the clinic in regards to their upcoming appt. Patient was inquiring if they need any lab done prior. Consulted with KAYLA Valera and informed patient that there is no lab orders currently placed but will get into contact with AYAKA Tavera to see if any blood work is needed.

## 2021-07-01 ENCOUNTER — HOSPITAL ENCOUNTER (OUTPATIENT)
Dept: RADIOLOGY | Facility: MEDICAL CENTER | Age: 71
End: 2021-07-01
Attending: INTERNAL MEDICINE
Payer: MEDICARE

## 2021-07-01 ENCOUNTER — HOSPITAL ENCOUNTER (OUTPATIENT)
Dept: LAB | Facility: MEDICAL CENTER | Age: 71
End: 2021-07-01
Attending: INTERNAL MEDICINE
Payer: MEDICARE

## 2021-07-01 ENCOUNTER — HOSPITAL ENCOUNTER (OUTPATIENT)
Dept: LAB | Facility: MEDICAL CENTER | Age: 71
End: 2021-07-01
Attending: NURSE PRACTITIONER
Payer: MEDICARE

## 2021-07-01 ENCOUNTER — HOSPITAL ENCOUNTER (OUTPATIENT)
Dept: LAB | Facility: MEDICAL CENTER | Age: 71
End: 2021-07-01
Attending: FAMILY MEDICINE
Payer: MEDICARE

## 2021-07-01 ENCOUNTER — APPOINTMENT (OUTPATIENT)
Dept: LAB | Facility: MEDICAL CENTER | Age: 71
End: 2021-07-01
Payer: MEDICARE

## 2021-07-01 DIAGNOSIS — M06.00 SERONEGATIVE RHEUMATOID ARTHRITIS (HCC): ICD-10-CM

## 2021-07-01 DIAGNOSIS — C50.211 MALIGNANT NEOPLASM OF UPPER-INNER QUADRANT OF RIGHT BREAST IN FEMALE, ESTROGEN RECEPTOR NEGATIVE (HCC): ICD-10-CM

## 2021-07-01 DIAGNOSIS — L93.0 DISCOID LUPUS: ICD-10-CM

## 2021-07-01 DIAGNOSIS — D50.9 IRON DEFICIENCY ANEMIA, UNSPECIFIED IRON DEFICIENCY ANEMIA TYPE: ICD-10-CM

## 2021-07-01 DIAGNOSIS — R91.1 PULMONARY NODULE: ICD-10-CM

## 2021-07-01 DIAGNOSIS — Z17.1 MALIGNANT NEOPLASM OF UPPER-INNER QUADRANT OF RIGHT BREAST IN FEMALE, ESTROGEN RECEPTOR NEGATIVE (HCC): ICD-10-CM

## 2021-07-01 DIAGNOSIS — M54.6 PAIN IN THORACIC SPINE: ICD-10-CM

## 2021-07-01 LAB
ALBUMIN SERPL BCP-MCNC: 4.4 G/DL (ref 3.2–4.9)
ALBUMIN SERPL BCP-MCNC: 4.4 G/DL (ref 3.2–4.9)
ALBUMIN SERPL BCP-MCNC: 4.5 G/DL (ref 3.2–4.9)
ALBUMIN/GLOB SERPL: 1.4 G/DL
ALBUMIN/GLOB SERPL: 1.4 G/DL
ALBUMIN/GLOB SERPL: 1.5 G/DL
ALP SERPL-CCNC: 35 U/L (ref 30–99)
ALP SERPL-CCNC: 36 U/L (ref 30–99)
ALP SERPL-CCNC: 37 U/L (ref 30–99)
ALT SERPL-CCNC: 10 U/L (ref 2–50)
ALT SERPL-CCNC: 12 U/L (ref 2–50)
ALT SERPL-CCNC: 12 U/L (ref 2–50)
ANION GAP SERPL CALC-SCNC: 10 MMOL/L (ref 7–16)
ANION GAP SERPL CALC-SCNC: 11 MMOL/L (ref 7–16)
ANION GAP SERPL CALC-SCNC: 9 MMOL/L (ref 7–16)
AST SERPL-CCNC: 35 U/L (ref 12–45)
AST SERPL-CCNC: 36 U/L (ref 12–45)
AST SERPL-CCNC: 36 U/L (ref 12–45)
BASOPHILS # BLD AUTO: 0.3 % (ref 0–1.8)
BASOPHILS # BLD AUTO: 0.4 % (ref 0–1.8)
BASOPHILS # BLD: 0.02 K/UL (ref 0–0.12)
BASOPHILS # BLD: 0.03 K/UL (ref 0–0.12)
BILIRUB SERPL-MCNC: 0.2 MG/DL (ref 0.1–1.5)
BILIRUB SERPL-MCNC: 0.3 MG/DL (ref 0.1–1.5)
BILIRUB SERPL-MCNC: 0.3 MG/DL (ref 0.1–1.5)
BUN SERPL-MCNC: 28 MG/DL (ref 8–22)
BUN SERPL-MCNC: 28 MG/DL (ref 8–22)
BUN SERPL-MCNC: 29 MG/DL (ref 8–22)
CALCIUM SERPL-MCNC: 9.6 MG/DL (ref 8.5–10.5)
CHLORIDE SERPL-SCNC: 100 MMOL/L (ref 96–112)
CHLORIDE SERPL-SCNC: 101 MMOL/L (ref 96–112)
CHLORIDE SERPL-SCNC: 103 MMOL/L (ref 96–112)
CO2 SERPL-SCNC: 26 MMOL/L (ref 20–33)
CO2 SERPL-SCNC: 27 MMOL/L (ref 20–33)
CO2 SERPL-SCNC: 27 MMOL/L (ref 20–33)
CREAT SERPL-MCNC: 1.17 MG/DL (ref 0.5–1.4)
CREAT SERPL-MCNC: 1.22 MG/DL (ref 0.5–1.4)
CREAT SERPL-MCNC: 1.27 MG/DL (ref 0.5–1.4)
EOSINOPHIL # BLD AUTO: 0.04 K/UL (ref 0–0.51)
EOSINOPHIL # BLD AUTO: 0.05 K/UL (ref 0–0.51)
EOSINOPHIL NFR BLD: 0.6 % (ref 0–6.9)
EOSINOPHIL NFR BLD: 0.7 % (ref 0–6.9)
ERYTHROCYTE [DISTWIDTH] IN BLOOD BY AUTOMATED COUNT: 47.4 FL (ref 35.9–50)
ERYTHROCYTE [DISTWIDTH] IN BLOOD BY AUTOMATED COUNT: 47.8 FL (ref 35.9–50)
ERYTHROCYTE [DISTWIDTH] IN BLOOD BY AUTOMATED COUNT: 48.1 FL (ref 35.9–50)
GLOBULIN SER CALC-MCNC: 3.1 G/DL (ref 1.9–3.5)
GLUCOSE SERPL-MCNC: 146 MG/DL (ref 65–99)
GLUCOSE SERPL-MCNC: 148 MG/DL (ref 65–99)
GLUCOSE SERPL-MCNC: 150 MG/DL (ref 65–99)
HCT VFR BLD AUTO: 42.6 % (ref 37–47)
HCT VFR BLD AUTO: 43 % (ref 37–47)
HCT VFR BLD AUTO: 43.2 % (ref 37–47)
HGB BLD-MCNC: 13.5 G/DL (ref 12–16)
HGB BLD-MCNC: 13.6 G/DL (ref 12–16)
HGB BLD-MCNC: 13.6 G/DL (ref 12–16)
IMM GRANULOCYTES # BLD AUTO: 0.02 K/UL (ref 0–0.11)
IMM GRANULOCYTES # BLD AUTO: 0.03 K/UL (ref 0–0.11)
IMM GRANULOCYTES NFR BLD AUTO: 0.3 % (ref 0–0.9)
IMM GRANULOCYTES NFR BLD AUTO: 0.4 % (ref 0–0.9)
IRON SATN MFR SERPL: 34 % (ref 15–55)
IRON SERPL-MCNC: 82 UG/DL (ref 40–170)
LYMPHOCYTES # BLD AUTO: 1.76 K/UL (ref 1–4.8)
LYMPHOCYTES # BLD AUTO: 1.87 K/UL (ref 1–4.8)
LYMPHOCYTES NFR BLD: 24.4 % (ref 22–41)
LYMPHOCYTES NFR BLD: 25.9 % (ref 22–41)
MCH RBC QN AUTO: 31.3 PG (ref 27–33)
MCH RBC QN AUTO: 31.7 PG (ref 27–33)
MCH RBC QN AUTO: 31.9 PG (ref 27–33)
MCHC RBC AUTO-ENTMCNC: 31.3 G/DL (ref 33.6–35)
MCHC RBC AUTO-ENTMCNC: 31.6 G/DL (ref 33.6–35)
MCHC RBC AUTO-ENTMCNC: 31.9 G/DL (ref 33.6–35)
MCV RBC AUTO: 100.2 FL (ref 81.4–97.8)
MCV RBC AUTO: 100.2 FL (ref 81.4–97.8)
MCV RBC AUTO: 99.8 FL (ref 81.4–97.8)
MONOCYTES # BLD AUTO: 0.42 K/UL (ref 0–0.85)
MONOCYTES # BLD AUTO: 0.46 K/UL (ref 0–0.85)
MONOCYTES NFR BLD AUTO: 5.8 % (ref 0–13.4)
MONOCYTES NFR BLD AUTO: 6.4 % (ref 0–13.4)
NEUTROPHILS # BLD AUTO: 4.79 K/UL (ref 2–7.15)
NEUTROPHILS # BLD AUTO: 4.95 K/UL (ref 2–7.15)
NEUTROPHILS NFR BLD: 66.2 % (ref 44–72)
NEUTROPHILS NFR BLD: 68.6 % (ref 44–72)
NRBC # BLD AUTO: 0 K/UL
NRBC # BLD AUTO: 0 K/UL
NRBC BLD-RTO: 0 /100 WBC
NRBC BLD-RTO: 0 /100 WBC
PLATELET # BLD AUTO: 244 K/UL (ref 164–446)
PLATELET # BLD AUTO: 250 K/UL (ref 164–446)
PLATELET # BLD AUTO: 261 K/UL (ref 164–446)
PMV BLD AUTO: 10.7 FL (ref 9–12.9)
PMV BLD AUTO: 11.1 FL (ref 9–12.9)
PMV BLD AUTO: 11.2 FL (ref 9–12.9)
POTASSIUM SERPL-SCNC: 3.6 MMOL/L (ref 3.6–5.5)
PROT SERPL-MCNC: 7.5 G/DL (ref 6–8.2)
PROT SERPL-MCNC: 7.5 G/DL (ref 6–8.2)
PROT SERPL-MCNC: 7.6 G/DL (ref 6–8.2)
RBC # BLD AUTO: 4.27 M/UL (ref 4.2–5.4)
RBC # BLD AUTO: 4.29 M/UL (ref 4.2–5.4)
RBC # BLD AUTO: 4.31 M/UL (ref 4.2–5.4)
SARS-COV-2 AB SERPL QL IA: REACTIVE
SODIUM SERPL-SCNC: 137 MMOL/L (ref 135–145)
SODIUM SERPL-SCNC: 137 MMOL/L (ref 135–145)
SODIUM SERPL-SCNC: 140 MMOL/L (ref 135–145)
TIBC SERPL-MCNC: 242 UG/DL (ref 250–450)
UIBC SERPL-MCNC: 160 UG/DL (ref 110–370)
WBC # BLD AUTO: 7.2 K/UL (ref 4.8–10.8)
WBC # BLD AUTO: 7.2 K/UL (ref 4.8–10.8)
WBC # BLD AUTO: 7.3 K/UL (ref 4.8–10.8)

## 2021-07-01 PROCEDURE — 80053 COMPREHEN METABOLIC PANEL: CPT | Mod: 91

## 2021-07-01 PROCEDURE — 86769 SARS-COV-2 COVID-19 ANTIBODY: CPT

## 2021-07-01 PROCEDURE — 72072 X-RAY EXAM THORAC SPINE 3VWS: CPT

## 2021-07-01 PROCEDURE — 82955 ASSAY OF G6PD ENZYME: CPT

## 2021-07-01 PROCEDURE — 83550 IRON BINDING TEST: CPT

## 2021-07-01 PROCEDURE — 80053 COMPREHEN METABOLIC PANEL: CPT

## 2021-07-01 PROCEDURE — 85027 COMPLETE CBC AUTOMATED: CPT | Mod: XU

## 2021-07-01 PROCEDURE — 36415 COLL VENOUS BLD VENIPUNCTURE: CPT

## 2021-07-01 PROCEDURE — 85025 COMPLETE CBC W/AUTO DIFF WBC: CPT

## 2021-07-01 PROCEDURE — 85025 COMPLETE CBC W/AUTO DIFF WBC: CPT | Mod: 91

## 2021-07-01 PROCEDURE — 83540 ASSAY OF IRON: CPT

## 2021-07-03 LAB — G6PD RBC-CCNC: 12.3 U/G HB (ref 9.9–16.6)

## 2021-07-08 ENCOUNTER — OFFICE VISIT (OUTPATIENT)
Dept: HEMATOLOGY ONCOLOGY | Facility: MEDICAL CENTER | Age: 71
End: 2021-07-08
Payer: MEDICARE

## 2021-07-08 VITALS
TEMPERATURE: 97.8 F | BODY MASS INDEX: 24.26 KG/M2 | HEART RATE: 88 BPM | DIASTOLIC BLOOD PRESSURE: 68 MMHG | WEIGHT: 142.09 LBS | RESPIRATION RATE: 18 BRPM | HEIGHT: 64 IN | OXYGEN SATURATION: 99 % | SYSTOLIC BLOOD PRESSURE: 160 MMHG

## 2021-07-08 DIAGNOSIS — E04.1 THYROID NODULE: ICD-10-CM

## 2021-07-08 DIAGNOSIS — R91.1 PULMONARY NODULE: ICD-10-CM

## 2021-07-08 DIAGNOSIS — M25.511 ACUTE PAIN OF RIGHT SHOULDER: ICD-10-CM

## 2021-07-08 DIAGNOSIS — C50.211 MALIGNANT NEOPLASM OF UPPER-INNER QUADRANT OF RIGHT BREAST IN FEMALE, ESTROGEN RECEPTOR NEGATIVE (HCC): ICD-10-CM

## 2021-07-08 DIAGNOSIS — R10.30 LOWER ABDOMINAL PAIN: ICD-10-CM

## 2021-07-08 DIAGNOSIS — Z17.1 MALIGNANT NEOPLASM OF UPPER-INNER QUADRANT OF RIGHT BREAST IN FEMALE, ESTROGEN RECEPTOR NEGATIVE (HCC): ICD-10-CM

## 2021-07-08 PROCEDURE — 99214 OFFICE O/P EST MOD 30 MIN: CPT | Performed by: NURSE PRACTITIONER

## 2021-07-08 RX ORDER — METHYLPREDNISOLONE 4 MG/1
TABLET ORAL
COMMUNITY
Start: 2021-06-29 | End: 2022-01-18

## 2021-07-08 RX ORDER — TRIAMCINOLONE ACETONIDE 1 MG/G
CREAM TOPICAL
COMMUNITY
Start: 2021-04-29 | End: 2022-01-18

## 2021-07-08 RX ORDER — ALENDRONATE SODIUM 70 MG/1
TABLET ORAL
COMMUNITY
Start: 2021-06-29 | End: 2022-01-18

## 2021-07-08 RX ORDER — TIZANIDINE 4 MG/1
TABLET ORAL
COMMUNITY
Start: 2021-06-28 | End: 2022-01-18

## 2021-07-08 RX ORDER — PREDNISONE 5 MG/1
10 TABLET ORAL DAILY
COMMUNITY
Start: 2021-04-16 | End: 2022-11-01

## 2021-07-08 ASSESSMENT — ENCOUNTER SYMPTOMS
DIAPHORESIS: 0
CHILLS: 0
ABDOMINAL PAIN: 1
DIARRHEA: 1
NAUSEA: 0
WEIGHT LOSS: 0
FEVER: 0
COUGH: 0
VOMITING: 0
HEADACHES: 1
CONSTIPATION: 0
DIZZINESS: 0
SHORTNESS OF BREATH: 0
PALPITATIONS: 0
WHEEZING: 0
BLOOD IN STOOL: 0

## 2021-07-08 ASSESSMENT — PATIENT HEALTH QUESTIONNAIRE - PHQ9: CLINICAL INTERPRETATION OF PHQ2 SCORE: 0

## 2021-07-08 ASSESSMENT — FIBROSIS 4 INDEX: FIB4 SCORE: 3.02

## 2021-07-08 ASSESSMENT — PAIN SCALES - GENERAL: PAINLEVEL: 8=MODERATE-SEVERE PAIN
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
## 2021-07-15 ENCOUNTER — TELEPHONE (OUTPATIENT)
Dept: HEMATOLOGY ONCOLOGY | Facility: MEDICAL CENTER | Age: 71
End: 2021-07-15

## 2021-07-15 NOTE — TELEPHONE ENCOUNTER
Contacted patient today to see how she was doing after my visit from last week.  Patient was experiencing significant pain on the right shoulder/neck and was taking over-the-counter medication such as ibuprofen as well as Tylenol.  Patient is also followed by rheumatology and is currently on prednisone 2.5 mg daily per rheumatology.  Patient has abnormal kidney functions and I did inform her that I would recommend her discontinuing the ibuprofen based on her current medication list with the prednisone as well as her kidney function.  She contacted her PCP through [facility] and not able to get into be seen until mid August.    She did state that her abdominal pain has completely resolved.  However she does still have significant pain in that shoulder and neck.  She is also previously seen by Dr. Berrios, physiatry and pain specialist.  Based on this pain I did recommend patient contact Dr. Berrios to see if she can assist with the shoulder pain.  Patient did verbalize understanding's and agreement the plan.      Did confirm again that it would be fried for her to hold off on any ibuprofen based on the fact that she currently takes prednisone per her rheumatologist as well as her slightly abnormal kidney function.  She did verbalize understanding as well.

## 2021-07-15 NOTE — TELEPHONE ENCOUNTER
Can you please call Lauren Dave and see if she would like to schedule a follow-up to evaluation neck and shoulder pain?  Thank you.  Warren Berrios MD

## 2021-07-19 ENCOUNTER — PATIENT MESSAGE (OUTPATIENT)
Dept: CARDIOLOGY | Facility: MEDICAL CENTER | Age: 71
End: 2021-07-19

## 2021-07-19 NOTE — TELEPHONE ENCOUNTER
----- Message from Jarrod Mayberry M.D. sent at 7/2/2021  9:01 AM PDT -----  Labs look good, please let her know     Thank you

## 2021-07-22 ENCOUNTER — APPOINTMENT (OUTPATIENT)
Dept: PHYSICAL MEDICINE AND REHAB | Facility: MEDICAL CENTER | Age: 71
End: 2021-07-22
Payer: MEDICARE

## 2021-07-22 NOTE — PROGRESS NOTES
Follow up patient note  Pain Medicine, Interventional spine and sports physiatry, Physical medicine rehabilitation      Chief complaint: No chief complaint on file.   ***      HISTORY    Please see new patient note dated *** by Dr Berrios,  for more details.     HPI  Patient identification: Lauren Dave 71 y.o. female  *** presents for follow-up of    Interval history: ***       ROS Red Flags : ***  Fever, Chills, Sweats: Denies  Involuntary Weight Loss: Denies  Bowel/Bladder Incontinence: Denies  Saddle Anesthesia: Denies    PMHx:   Past Medical History:   Diagnosis Date   • Arthritis    • Breast cancer (HCC) 8/7/2013   • Bronchitis 2005   • Bundle branch block, right    • Cancer (HCC)    • Cancer (HCC)     right breast cancer    • Cold    • Coronary artery disease due to calcified coronary lesion - Calcifications seen on CT scan also wtih aortic ulceration    • Dental disorder     tooth infection   • Dyslipidemia     Tried atorvastatin, pravastatin, lovastatin, and Zetia.  All causes severe myalgias.  Just taking fish oil.     • Essential hypertension    • Heart burn    • LBBB (left bundle branch block) 12/16/2014    Noted on prechemo EKG 9/2014, MUGA WNL   • Pneumonia    • Pseudogout    • Scarlet fever    • Unspecified hemorrhagic conditions     gums       PSHx:   Past Surgical History:   Procedure Laterality Date   • CATH REMOVAL  2/24/2014    Performed by Aggie Burns M.D. at SURGERY SAME DAY [facility]   • MASTECTOMY  8/22/2013    Performed by Aggie Burns M.D. at SURGERY SAME DAY [facility]   • NODE BIOPSY SENTINEL  8/22/2013    Performed by Aggie Burns M.D. at SURGERY SAME DAY [facility]   • CATH PLACEMENT  8/22/2013    Performed by Aggie Burns M.D. at SURGERY SAME DAY [facility]   • US-NEEDLE CORE BX-BREAST PANEL  2013    right breast   • COLONOSCOPY  2003   • BREAST BIOPSY     • TONSILLECTOMY         Family history   Denies neuromuscular disease  Family History  "  Problem Relation Age of Onset   • Cancer Mother         possible thyroid and gynecological   • Mult Sclerosis Mother    • Arthritis Father    • Mult Sclerosis Brother    • Gout Brother    • Heart Disease Paternal Grandmother    • Diabetes Paternal Grandmother    • Cancer Maternal Aunt         breast cancer- 60s   • Diabetes Maternal Uncle    • Heart Disease Maternal Grandmother    • Heart Disease Maternal Grandfather         MI   • Stroke Paternal Grandfather    • Other Son         breast mass   • Heart Disease Son         HEART MURMUR   • Gout Son        Medications:   Current Outpatient Medications   Medication   • alendronate (FOSAMAX) 70 MG Tab   • methylPREDNISolone (MEDROL DOSEPAK) 4 MG Tablet Therapy Pack   • tizanidine (ZANAFLEX) 4 MG Tab   • triamcinolone acetonide (KENALOG) 0.1 % Cream   • predniSONE (DELTASONE) 5 MG Tab   • hydroxychloroquine (PLAQUENIL) 200 MG Tab   • famotidine (PEPCID) 40 MG Tab   • predniSONE (DELTASONE) 10 MG Tab   • Cyanocobalamin (VITAMIN B-12 PO)   • CALCIUM PO   • ibuprofen (MOTRIN) 800 MG Tab   • Cholecalciferol (VITAMIN D3) 5000 units Cap     No current facility-administered medications for this visit.       Allergies:   Allergies   Allergen Reactions   • Statins [Hmg-Coa-R Inhibitors] Unspecified     Muscle Spasms   RXN=unknown   • Eggs      Pt states that she is allergic to eggs per her mother.   • Lisinopril Cough   • Penicillins      \"does not work\" .'IMMUNE TO PENICILLIN,AMPICILLIN IS THE ONLY THING THAT WORKS'   • Codeine Vomiting and Nausea     Clarified with patient - states that she has an \"upset stomach\" when she takes it       Social Hx:   Social History     Socioeconomic History   • Marital status:      Spouse name: Not on file   • Number of children: 2   • Years of education: Not on file   • Highest education level: Not on file   Occupational History     Employer: [employer]   Tobacco Use   • Smoking status: Former Smoker     Packs/day: 1.00     " Years: 0.00     Pack years: 0.00     Types: Cigarettes     Quit date: 10/1/2013     Years since quittin.8   • Smokeless tobacco: Never Used   Vaping Use   • Vaping Use: Never used   Substance and Sexual Activity   • Alcohol use: No     Alcohol/week: 0.0 oz   • Drug use: No   • Sexual activity: Not on file     Comment: , 2 children, enemployed   Other Topics Concern   • Not on file   Social History Narrative    ** Merged History Encounter **         Patient is a . Patient has 2 adult children. She is  from .           Social Determinants of Health     Financial Resource Strain:    • Difficulty of Paying Living Expenses:    Food Insecurity:    • Worried About Running Out of Food in the Last Year:    • Ran Out of Food in the Last Year:    Transportation Needs:    • Lack of Transportation (Medical):    • Lack of Transportation (Non-Medical):    Physical Activity:    • Days of Exercise per Week:    • Minutes of Exercise per Session:    Stress:    • Feeling of Stress :    Social Connections:    • Frequency of Communication with Friends and Family:    • Frequency of Social Gatherings with Friends and Family:    • Attends Hoah[employer] Services:    • Active Member of Clubs or Organizations:    • Attends Club or Organization Meetings:    • Marital Status:    Intimate Partner Violence:    • Fear of Current or Ex-Partner:    • Emotionally Abused:    • Physically Abused:    • Sexually Abused:          EXAMINATION     Physical Exam:   Vitals: There were no vitals taken for this visit.    Constitutional:   Body Habitus: There is no height or weight on file to calculate BMI.  Cooperation: Fully cooperates with exam  Appearance: Well-groomed no disheveled ***    Respiratory-  breathing comfortable on room air, no audible wheezing  Cardiovascular- capillary refills less than 2 seconds. No lower extremity edema is noted. ***  Psychiatric- alert and oriented ×3. Normal affect. ***  Spine:  ***          MEDICAL DECISION MAKING    DATA    Labs:   Lab Results   Component Value Date/Time    SODIUM 140 07/01/2021 08:48 AM    SODIUM 137 07/01/2021 08:48 AM    SODIUM 137 07/01/2021 08:48 AM    POTASSIUM 3.6 07/01/2021 08:48 AM    POTASSIUM 3.6 07/01/2021 08:48 AM    POTASSIUM 3.6 07/01/2021 08:48 AM    CHLORIDE 103 07/01/2021 08:48 AM    CHLORIDE 101 07/01/2021 08:48 AM    CHLORIDE 100 07/01/2021 08:48 AM    CO2 26 07/01/2021 08:48 AM    CO2 27 07/01/2021 08:48 AM    CO2 27 07/01/2021 08:48 AM    GLUCOSE 148 (H) 07/01/2021 08:48 AM    GLUCOSE 150 (H) 07/01/2021 08:48 AM    GLUCOSE 146 (H) 07/01/2021 08:48 AM    BUN 28 (H) 07/01/2021 08:48 AM    BUN 29 (H) 07/01/2021 08:48 AM    BUN 28 (H) 07/01/2021 08:48 AM    CREATININE 1.22 07/01/2021 08:48 AM    CREATININE 1.27 07/01/2021 08:48 AM    CREATININE 1.17 07/01/2021 08:48 AM        Lab Results   Component Value Date/Time    PROTHROMBTM 12.0 10/25/2016 12:49 PM    INR 0.86 (L) 10/25/2016 12:49 PM        Lab Results   Component Value Date/Time    WBC 7.2 07/01/2021 08:48 AM    WBC 7.3 07/01/2021 08:48 AM    WBC 7.2 07/01/2021 08:48 AM    RBC 4.29 07/01/2021 08:48 AM    RBC 4.27 07/01/2021 08:48 AM    RBC 4.31 07/01/2021 08:48 AM    HEMOGLOBIN 13.6 07/01/2021 08:48 AM    HEMOGLOBIN 13.6 07/01/2021 08:48 AM    HEMOGLOBIN 13.5 07/01/2021 08:48 AM    HEMATOCRIT 43.0 07/01/2021 08:48 AM    HEMATOCRIT 42.6 07/01/2021 08:48 AM    HEMATOCRIT 43.2 07/01/2021 08:48 AM    .2 (H) 07/01/2021 08:48 AM    MCV 99.8 (H) 07/01/2021 08:48 AM    .2 (H) 07/01/2021 08:48 AM    MCH 31.7 07/01/2021 08:48 AM    MCH 31.9 07/01/2021 08:48 AM    MCH 31.3 07/01/2021 08:48 AM    MCHC 31.6 (L) 07/01/2021 08:48 AM    MCHC 31.9 (L) 07/01/2021 08:48 AM    MCHC 31.3 (L) 07/01/2021 08:48 AM    MPV 11.2 07/01/2021 08:48 AM    MPV 10.7 07/01/2021 08:48 AM    MPV 11.1 07/01/2021 08:48 AM    NEUTSPOLYS 66.20 07/01/2021 08:48 AM    NEUTSPOLYS 68.60 07/01/2021 08:48 AM    LYMPHOCYTES 25.90  07/01/2021 08:48 AM    LYMPHOCYTES 24.40 07/01/2021 08:48 AM    MONOCYTES 6.40 07/01/2021 08:48 AM    MONOCYTES 5.80 07/01/2021 08:48 AM    EOSINOPHILS 0.70 07/01/2021 08:48 AM    EOSINOPHILS 0.60 07/01/2021 08:48 AM    BASOPHILS 0.40 07/01/2021 08:48 AM    BASOPHILS 0.30 07/01/2021 08:48 AM    HYPOCHROMIA 2+ 04/16/2020 08:49 AM        Lab Results   Component Value Date/Time    HBA1C 5.7 (H) 08/13/2020 02:34 PM          Imaging: I personally reviewed following images    X-ray thoracic spine 07/01/2021  ***    I reviewed the following radiology reports    Xray thoarcic spine 07/01/2021  IMPRESSION:     Mild multilevel degenerative disc disease.  Moderate probably old superior endplate compression L2 vertebral body.                   Results for orders placed in visit on 08/07/15    MR-CERVICAL SPINE-W/O    Impression  Developmentally narrow central canal resulting in moderate C4/5/6 central stenoses. Lesser C3/4 central stenosis    Moderate left C5/6 foraminal stenosis due to uncovertebral spurring    Enlarged left thyroid lobe. Ultrasound is advised to further characterize.                                                               Results for orders placed during the hospital encounter of 04/20/21    CT-CHEST (THORAX) W/O    Impression  1. Mild peripheral groundglass and linear opacities throughout both lungs, likely sequela of prior Covid 19 infection.  2. No new or suspicious pulmonary nodule.  3. Unchanged bilateral thyroid nodules.         DIAGNOSIS   {No diagnosis found. (Refresh or delete this SmartLink)}      ASSESSMENT and PLAN:     Lauren Dave 71 y.o. female seen for above    There are no diagnoses linked to this encounter.      Follow up: {cktime follow up:61684}        Thank you for allowing me to participate in the care of this patient. If you have any questions please not hesitate to contact me.      Please note that this dictation was created using voice recognition software. I have made  every reasonable attempt to correct obvious errors but there may be errors of grammar and content that I may have overlooked prior to finalization of this note.      Warren Berrios MD  Interventional Spine and Sports Physiatry  Physical Medicine and Rehabilitation  [facility]

## 2021-07-27 ENCOUNTER — PATIENT MESSAGE (OUTPATIENT)
Dept: CARDIOLOGY | Facility: MEDICAL CENTER | Age: 71
End: 2021-07-27

## 2021-07-29 ENCOUNTER — TELEPHONE (OUTPATIENT)
Dept: CARDIOLOGY | Facility: MEDICAL CENTER | Age: 71
End: 2021-07-29

## 2021-07-29 NOTE — TELEPHONE ENCOUNTER
CW    Patient asked if they need an artery check, they said it may be a CT scan. But they are not sure. Pt would like to know if it needs to be done before scheduling an appointment. They can be reached at [phone].    Thank you,  Beth VALENCIA

## 2021-07-29 NOTE — PATIENT COMMUNICATION
Task deferred to schedulers to schedule for [facility].    Replied to pt via demandmarthart regarding findings.

## 2021-08-03 NOTE — TELEPHONE ENCOUNTER
Returned pt call and reviewed MD recommendations.  She verbalizes understanding and states no other concerns or questions at this time.  Lauren is appreciative of information given.

## 2021-12-21 ENCOUNTER — PATIENT MESSAGE (OUTPATIENT)
Dept: PHYSICAL MEDICINE AND REHAB | Facility: MEDICAL CENTER | Age: 71
End: 2021-12-21

## 2021-12-27 NOTE — TELEPHONE ENCOUNTER
From: Lauren Dave  To: Physician Warren Berrios  Sent: 12/21/2021 8:40 AM PST  Subject: Nerve pain in back very painful     Would like an appointment ASAP have Tuesday and weds off. Have tomorrow off if I can get squeezed in. The 22nd.   [phone]

## 2021-12-27 NOTE — PATIENT COMMUNICATION
Called to follow up as the message I sent last week had not been read. The patient declined to schedule at this time she stated she wanted to wait on the weather.

## 2022-01-03 ENCOUNTER — HOSPITAL ENCOUNTER (OUTPATIENT)
Dept: LAB | Facility: MEDICAL CENTER | Age: 72
End: 2022-01-03
Attending: INTERNAL MEDICINE
Payer: MEDICARE

## 2022-01-03 LAB
ALT SERPL-CCNC: 11 U/L (ref 2–50)
AST SERPL-CCNC: 39 U/L (ref 12–45)
BASOPHILS # BLD AUTO: 0.6 % (ref 0–1.8)
BASOPHILS # BLD: 0.05 K/UL (ref 0–0.12)
CREAT SERPL-MCNC: 1.21 MG/DL (ref 0.5–1.4)
CRP SERPL HS-MCNC: 0.32 MG/DL (ref 0–0.75)
EOSINOPHIL # BLD AUTO: 0.14 K/UL (ref 0–0.51)
EOSINOPHIL NFR BLD: 1.6 % (ref 0–6.9)
ERYTHROCYTE [DISTWIDTH] IN BLOOD BY AUTOMATED COUNT: 46.5 FL (ref 35.9–50)
ERYTHROCYTE [SEDIMENTATION RATE] IN BLOOD BY WESTERGREN METHOD: 20 MM/HOUR (ref 0–25)
HCT VFR BLD AUTO: 39.1 % (ref 37–47)
HCV AB SER QL: NORMAL
HGB BLD-MCNC: 12.6 G/DL (ref 12–16)
IMM GRANULOCYTES # BLD AUTO: 0.04 K/UL (ref 0–0.11)
IMM GRANULOCYTES NFR BLD AUTO: 0.5 % (ref 0–0.9)
LYMPHOCYTES # BLD AUTO: 1.86 K/UL (ref 1–4.8)
LYMPHOCYTES NFR BLD: 21.5 % (ref 22–41)
MCH RBC QN AUTO: 31.7 PG (ref 27–33)
MCHC RBC AUTO-ENTMCNC: 32.2 G/DL (ref 33.6–35)
MCV RBC AUTO: 98.5 FL (ref 81.4–97.8)
MONOCYTES # BLD AUTO: 0.59 K/UL (ref 0–0.85)
MONOCYTES NFR BLD AUTO: 6.8 % (ref 0–13.4)
NEUTROPHILS # BLD AUTO: 5.97 K/UL (ref 2–7.15)
NEUTROPHILS NFR BLD: 69 % (ref 44–72)
NRBC # BLD AUTO: 0 K/UL
NRBC BLD-RTO: 0 /100 WBC
PLATELET # BLD AUTO: 270 K/UL (ref 164–446)
PMV BLD AUTO: 11.1 FL (ref 9–12.9)
RBC # BLD AUTO: 3.97 M/UL (ref 4.2–5.4)
WBC # BLD AUTO: 8.7 K/UL (ref 4.8–10.8)

## 2022-01-03 PROCEDURE — 86140 C-REACTIVE PROTEIN: CPT

## 2022-01-03 PROCEDURE — 84460 ALANINE AMINO (ALT) (SGPT): CPT

## 2022-01-03 PROCEDURE — 84450 TRANSFERASE (AST) (SGOT): CPT

## 2022-01-03 PROCEDURE — 36415 COLL VENOUS BLD VENIPUNCTURE: CPT

## 2022-01-03 PROCEDURE — 82565 ASSAY OF CREATININE: CPT

## 2022-01-03 PROCEDURE — 85652 RBC SED RATE AUTOMATED: CPT

## 2022-01-03 PROCEDURE — 86803 HEPATITIS C AB TEST: CPT

## 2022-01-03 PROCEDURE — 86480 TB TEST CELL IMMUN MEASURE: CPT

## 2022-01-03 PROCEDURE — 85025 COMPLETE CBC W/AUTO DIFF WBC: CPT

## 2022-01-04 ENCOUNTER — HOSPITAL ENCOUNTER (OUTPATIENT)
Dept: RADIOLOGY | Facility: MEDICAL CENTER | Age: 72
End: 2022-01-04
Attending: FAMILY MEDICINE
Payer: MEDICARE

## 2022-01-04 DIAGNOSIS — Z86.16 HISTORY OF 2019 NOVEL CORONAVIRUS DISEASE (COVID-19): ICD-10-CM

## 2022-01-04 DIAGNOSIS — J84.10 POSTINFLAMMATORY PULMONARY FIBROSIS (HCC): ICD-10-CM

## 2022-01-04 DIAGNOSIS — R07.9 CHEST PAIN, UNSPECIFIED TYPE: ICD-10-CM

## 2022-01-04 LAB
GAMMA INTERFERON BACKGROUND BLD IA-ACNC: 0.04 IU/ML
M TB IFN-G BLD-IMP: NEGATIVE
M TB IFN-G CD4+ BCKGRND COR BLD-ACNC: 0 IU/ML
MITOGEN IGNF BCKGRD COR BLD-ACNC: 1.55 IU/ML
QFT TB2 - NIL TBQ2: 0 IU/ML

## 2022-01-04 PROCEDURE — 71260 CT THORAX DX C+: CPT | Mod: MH

## 2022-01-04 PROCEDURE — 700117 HCHG RX CONTRAST REV CODE 255: Performed by: FAMILY MEDICINE

## 2022-01-04 RX ADMIN — IOHEXOL 75 ML: 350 INJECTION, SOLUTION INTRAVENOUS at 10:45

## 2022-01-11 ENCOUNTER — APPOINTMENT (OUTPATIENT)
Dept: PHYSICAL MEDICINE AND REHAB | Facility: MEDICAL CENTER | Age: 72
End: 2022-01-11
Payer: MEDICARE

## 2022-01-14 ENCOUNTER — PATIENT MESSAGE (OUTPATIENT)
Dept: CARDIOLOGY | Facility: MEDICAL CENTER | Age: 72
End: 2022-01-14

## 2022-01-14 ENCOUNTER — TELEPHONE (OUTPATIENT)
Dept: CARDIOLOGY | Facility: MEDICAL CENTER | Age: 72
End: 2022-01-14

## 2022-01-14 NOTE — TELEPHONE ENCOUNTER
CW    Patient sent a my chart message this morning and was told to make an appointment asap. Dr Herrera's first available is March 1st. What would you like her to do. Please reach out.    Thank you,   Linda MASON

## 2022-01-17 NOTE — PATIENT COMMUNICATION
Upon chart review MD has availability tomorrow and called pt to offer appt, she accepts.  Scheduled pt for [facility] tomorrow 1/18/2022.  She verbalizes understanding and states no other concerns or questions at this time.  Pt is appreciative of information given.

## 2022-01-18 ENCOUNTER — OFFICE VISIT (OUTPATIENT)
Dept: CARDIOLOGY | Facility: MEDICAL CENTER | Age: 72
End: 2022-01-18
Payer: MEDICARE

## 2022-01-18 VITALS
OXYGEN SATURATION: 98 % | WEIGHT: 145 LBS | HEIGHT: 65 IN | SYSTOLIC BLOOD PRESSURE: 140 MMHG | HEART RATE: 90 BPM | DIASTOLIC BLOOD PRESSURE: 70 MMHG | RESPIRATION RATE: 16 BRPM | BODY MASS INDEX: 24.16 KG/M2

## 2022-01-18 DIAGNOSIS — I10 ESSENTIAL HYPERTENSION: Chronic | ICD-10-CM

## 2022-01-18 DIAGNOSIS — I65.22 LEFT CAROTID STENOSIS: ICD-10-CM

## 2022-01-18 DIAGNOSIS — I25.10 CORONARY ARTERY DISEASE DUE TO CALCIFIED CORONARY LESION: Chronic | ICD-10-CM

## 2022-01-18 DIAGNOSIS — I44.7 LBBB (LEFT BUNDLE BRANCH BLOCK): Chronic | ICD-10-CM

## 2022-01-18 DIAGNOSIS — I25.84 CORONARY ARTERY DISEASE DUE TO CALCIFIED CORONARY LESION: Chronic | ICD-10-CM

## 2022-01-18 DIAGNOSIS — E78.5 DYSLIPIDEMIA: ICD-10-CM

## 2022-01-18 PROCEDURE — 99214 OFFICE O/P EST MOD 30 MIN: CPT | Performed by: INTERNAL MEDICINE

## 2022-01-18 RX ORDER — AMITRIPTYLINE HYDROCHLORIDE 25 MG/1
25 TABLET, FILM COATED ORAL NIGHTLY
Qty: 90 TABLET | Refills: 3 | Status: SHIPPED | OUTPATIENT
Start: 2022-01-18 | End: 2022-06-29

## 2022-01-18 RX ORDER — CETIRIZINE HYDROCHLORIDE 10 MG/1
10 TABLET ORAL 2 TIMES DAILY
COMMUNITY
Start: 2021-11-26 | End: 2022-06-29

## 2022-01-18 RX ORDER — GABAPENTIN 300 MG/1
300 CAPSULE ORAL 2 TIMES DAILY
COMMUNITY
Start: 2021-12-22 | End: 2022-01-18

## 2022-01-18 RX ORDER — AMLODIPINE BESYLATE 5 MG/1
5 TABLET ORAL DAILY
Qty: 90 TABLET | Refills: 3 | Status: SHIPPED | OUTPATIENT
Start: 2022-01-18 | End: 2022-06-29 | Stop reason: SDUPTHER

## 2022-01-18 ASSESSMENT — FIBROSIS 4 INDEX: FIB4 SCORE: 3.09

## 2022-01-18 NOTE — PROGRESS NOTES
Chief Complaint   Patient presents with   • Coronary Artery Disease     F/V Dx: Coronary artery disease due to calcified coronary lesion - Calcifications seen on CT scan also wtih aortic ulceration       Subjective     Lauren Dave is a 71 y.o. female who presents today for follow-up of her history of dyslipidemia but bundle-branch block hypertension    Is been doing okay for the last year tolerating medications okay    Had an episode in the car where she wasn't sure if she passed out    Has some dizziness and some has been hotter at work    Past Medical History:   Diagnosis Date   • Arthritis    • Breast cancer (HCC) 8/7/2013   • Bronchitis 2005   • Cancer (HCC)    • Cancer (HCC)     right breast cancer    • Cold    • Coronary artery disease due to calcified coronary lesion - Calcifications seen on CT scan also wtih aortic ulceration    • Dental disorder     tooth infection   • Dyslipidemia     Tried atorvastatin, pravastatin, lovastatin, and Zetia.  All causes severe myalgias.  Just taking fish oil.     • Essential hypertension    • Heart burn    • LBBB (left bundle branch block) 12/16/2014    Noted on prechemo EKG 9/2014, MUGYASMANY WNL   • Pneumonia    • Pseudogout    • Scarlet fever    • Unspecified hemorrhagic conditions     gums     Past Surgical History:   Procedure Laterality Date   • CATH REMOVAL  2/24/2014    Performed by Aggie Burns M.D. at SURGERY SAME DAY [facility] ORS   • MASTECTOMY  8/22/2013    Performed by Aggei Burns M.D. at SURGERY SAME DAY [facility] ORS   • NODE BIOPSY SENTINEL  8/22/2013    Performed by Aggie Burns M.D. at SURGERY SAME DAY [facility] ORS   • CATH PLACEMENT  8/22/2013    Performed by Aggie Burns M.D. at SURGERY SAME DAY [facility] ORS   • US-NEEDLE CORE BX-BREAST PANEL  2013    right breast   • COLONOSCOPY  2003   • BREAST BIOPSY     • TONSILLECTOMY       Family History   Problem Relation Age of Onset   • Cancer Mother         possible thyroid and  gynecological   • Multiple Sclerosis Mother    • Arthritis Father    • Multiple Sclerosis Brother    • Gout Brother    • Heart Disease Paternal Grandmother    • Diabetes Paternal Grandmother    • Cancer Maternal Aunt         breast cancer- 60s   • Diabetes Maternal Uncle    • Heart Disease Maternal Grandmother    • Heart Disease Maternal Grandfather         MI   • Stroke Paternal Grandfather    • Other Son         breast mass   • Heart Disease Son         HEART MURMUR   • Gout Son      Social History     Socioeconomic History   • Marital status:      Spouse name: Not on file   • Number of children: 2   • Years of education: Not on file   • Highest education level: Not on file   Occupational History     Employer: [employer]   Tobacco Use   • Smoking status: Former Smoker     Packs/day: 1.00     Years: 0.00     Pack years: 0.00     Types: Cigarettes     Quit date: 10/1/2013     Years since quittin.3   • Smokeless tobacco: Never Used   Vaping Use   • Vaping Use: Never used   Substance and Sexual Activity   • Alcohol use: No     Alcohol/week: 0.0 oz   • Drug use: No   • Sexual activity: Not on file     Comment: , 2 children, enemployed   Other Topics Concern   • Not on file   Social History Narrative    ** Merged History Encounter **         Patient is a mariya. Patient has 2 adult children. She is  from .           Social Determinants of Health     Financial Resource Strain:    • Difficulty of Paying Living Expenses: Not on file   Food Insecurity:    • Worried About Running Out of Food in the Last Year: Not on file   • Ran Out of Food in the Last Year: Not on file   Transportation Needs:    • Lack of Transportation (Medical): Not on file   • Lack of Transportation (Non-Medical): Not on file   Physical Activity:    • Days of Exercise per Week: Not on file   • Minutes of Exercise per Session: Not on file   Stress:    • Feeling of Stress : Not on file   Social Connections:    •  "Frequency of Communication with Friends and Family: Not on file   • Frequency of Social Gatherings with Friends and Family: Not on file   • Attends Quaker Services: Not on file   • Active Member of Clubs or Organizations: Not on file   • Attends Club or Organization Meetings: Not on file   • Marital Status: Not on file   Intimate Partner Violence:    • Fear of Current or Ex-Partner: Not on file   • Emotionally Abused: Not on file   • Physically Abused: Not on file   • Sexually Abused: Not on file   Housing Stability:    • Unable to Pay for Housing in the Last Year: Not on file   • Number of Places Lived in the Last Year: Not on file   • Unstable Housing in the Last Year: Not on file     Allergies   Allergen Reactions   • Statins [Hmg-Coa-R Inhibitors] Unspecified     Muscle Spasms   RXN=unknown   • Atorvastatin    • Eggs      Pt states that she is allergic to eggs per her mother.   • Lisinopril Cough   • Penicillins      \"does not work\" .'IMMUNE TO PENICILLIN,AMPICILLIN IS THE ONLY THING THAT WORKS'   • Codeine Vomiting and Nausea     Clarified with patient - states that she has an \"upset stomach\" when she takes it     Outpatient Encounter Medications as of 1/18/2022   Medication Sig Dispense Refill   • cetirizine (ZYRTEC) 10 MG Tab Take 10 mg by mouth 2 times a day.     • predniSONE (DELTASONE) 5 MG Tab Take 10 mg by mouth.     • hydroxychloroquine (PLAQUENIL) 200 MG Tab Take 1 tablet by mouth every day. 30 tablet 0   • famotidine (PEPCID) 40 MG Tab Take 1 tablet by mouth twice daily 180 tablet 1   • Cyanocobalamin (VITAMIN B-12 PO) Take  by mouth.     • ibuprofen (MOTRIN) 800 MG Tab TAKE 1 TABLET BY MOUTH EVERY 8 HOURS AS NEEDED FOR MILD PAIN 90 Tab 0   • Cholecalciferol (VITAMIN D3) 5000 units Cap Take 1 Cap by mouth every day.     • [DISCONTINUED] gabapentin (NEURONTIN) 300 MG Cap Take 300 mg by mouth 2 times a day. (Patient not taking: Reported on 1/18/2022)     • [DISCONTINUED] alendronate (FOSAMAX) 70 MG " "Tab TAKE 1 TABLET BY MOUTH 30 MINUTES BEFORE THE FIRST FOOD BEVERAGE OR MEDICINE OF THE DAY WITH PLAIN WATER ONCE A WEEK FOR 30 DAYS (Patient not taking: Reported on 1/18/2022)     • [DISCONTINUED] methylPREDNISolone (MEDROL DOSEPAK) 4 MG Tablet Therapy Pack TAKE BY MOUTH AS DIRECTED ON INSIDE OF PACKAGE (Patient not taking: Reported on 1/18/2022)     • [DISCONTINUED] tizanidine (ZANAFLEX) 4 MG Tab TAKE 1 TABLET BY MOUTH AS NEEDED AT BEDTIME FOR 30 DAYS (Patient not taking: Reported on 1/18/2022)     • [DISCONTINUED] triamcinolone acetonide (KENALOG) 0.1 % Cream APPLY CREAM EXTERNALLY TWICE DAILY TO AFFECTED AREA (Patient not taking: Reported on 1/18/2022)     • [DISCONTINUED] predniSONE (DELTASONE) 10 MG Tab Take 30 mg PO x 3 days, 20 mg x 3 days, 10 mg x 3 days (Patient not taking: Reported on 1/18/2022) 30 Tab 0   • [DISCONTINUED] CALCIUM PO Take  by mouth. (Patient not taking: Reported on 1/18/2022)       No facility-administered encounter medications on file as of 1/18/2022.     ROS           Objective     /70 (BP Location: Left arm, Patient Position: Sitting, BP Cuff Size: Adult)   Pulse 90   Resp 16   Ht 1.651 m (5' 5\")   Wt 65.8 kg (145 lb)   SpO2 98%   BMI 24.13 kg/m²     Physical Exam  Constitutional:       General: She is not in acute distress.     Appearance: She is not diaphoretic.   Eyes:      General: No scleral icterus.  Neck:      Vascular: No JVD.   Cardiovascular:      Rate and Rhythm: Normal rate.      Heart sounds: Normal heart sounds. No murmur heard.  No friction rub. No gallop.    Pulmonary:      Effort: No respiratory distress.      Breath sounds: No wheezing or rales.   Abdominal:      General: Bowel sounds are normal.      Palpations: Abdomen is soft.   Skin:     Findings: No rash.   Neurological:      Mental Status: She is alert.            We reviewed in person the most recent labs  Recent Results (from the past 5040 hour(s))   G6PD QUANT + RBC    Collection Time: 07/01/21  " 8:48 AM   Result Value Ref Range    G-6-Pd 12.3 9.9 - 16.6 U/g Hb   Comp Metabolic Panel    Collection Time: 07/01/21  8:48 AM   Result Value Ref Range    Sodium 140 135 - 145 mmol/L    Potassium 3.6 3.6 - 5.5 mmol/L    Chloride 103 96 - 112 mmol/L    Co2 26 20 - 33 mmol/L    Anion Gap 11.0 7.0 - 16.0    Glucose 148 (H) 65 - 99 mg/dL    Bun 28 (H) 8 - 22 mg/dL    Creatinine 1.22 0.50 - 1.40 mg/dL    Calcium 9.6 8.5 - 10.5 mg/dL    AST(SGOT) 36 12 - 45 U/L    ALT(SGPT) 10 2 - 50 U/L    Alkaline Phosphatase 37 30 - 99 U/L    Total Bilirubin 0.2 0.1 - 1.5 mg/dL    Albumin 4.4 3.2 - 4.9 g/dL    Total Protein 7.5 6.0 - 8.2 g/dL    Globulin 3.1 1.9 - 3.5 g/dL    A-G Ratio 1.4 g/dL   CBC WITH DIFFERENTIAL    Collection Time: 07/01/21  8:48 AM   Result Value Ref Range    WBC 7.2 4.8 - 10.8 K/uL    RBC 4.29 4.20 - 5.40 M/uL    Hemoglobin 13.6 12.0 - 16.0 g/dL    Hematocrit 43.0 37.0 - 47.0 %    .2 (H) 81.4 - 97.8 fL    MCH 31.7 27.0 - 33.0 pg    MCHC 31.6 (L) 33.6 - 35.0 g/dL    RDW 47.8 35.9 - 50.0 fL    Platelet Count 250 164 - 446 K/uL    MPV 11.2 9.0 - 12.9 fL    Neutrophils-Polys 66.20 44.00 - 72.00 %    Lymphocytes 25.90 22.00 - 41.00 %    Monocytes 6.40 0.00 - 13.40 %    Eosinophils 0.70 0.00 - 6.90 %    Basophils 0.40 0.00 - 1.80 %    Immature Granulocytes 0.40 0.00 - 0.90 %    Nucleated RBC 0.00 /100 WBC    Neutrophils (Absolute) 4.79 2.00 - 7.15 K/uL    Lymphs (Absolute) 1.87 1.00 - 4.80 K/uL    Monos (Absolute) 0.46 0.00 - 0.85 K/uL    Eos (Absolute) 0.05 0.00 - 0.51 K/uL    Baso (Absolute) 0.03 0.00 - 0.12 K/uL    Immature Granulocytes (abs) 0.03 0.00 - 0.11 K/uL    NRBC (Absolute) 0.00 K/uL   IRON/TOTAL IRON BIND    Collection Time: 07/01/21  8:48 AM   Result Value Ref Range    Iron 82 40 - 170 ug/dL    Total Iron Binding 242 (L) 250 - 450 ug/dL    Unsat Iron Binding 160 110 - 370 ug/dL    % Saturation 34 15 - 55 %   Comp Metabolic Panel    Collection Time: 07/01/21  8:48 AM   Result Value Ref Range     Sodium 137 135 - 145 mmol/L    Potassium 3.6 3.6 - 5.5 mmol/L    Chloride 101 96 - 112 mmol/L    Co2 27 20 - 33 mmol/L    Anion Gap 9.0 7.0 - 16.0    Glucose 150 (H) 65 - 99 mg/dL    Bun 29 (H) 8 - 22 mg/dL    Creatinine 1.27 0.50 - 1.40 mg/dL    Calcium 9.6 8.5 - 10.5 mg/dL    AST(SGOT) 35 12 - 45 U/L    ALT(SGPT) 12 2 - 50 U/L    Alkaline Phosphatase 35 30 - 99 U/L    Total Bilirubin 0.3 0.1 - 1.5 mg/dL    Albumin 4.4 3.2 - 4.9 g/dL    Total Protein 7.5 6.0 - 8.2 g/dL    Globulin 3.1 1.9 - 3.5 g/dL    A-G Ratio 1.4 g/dL   CBC WITHOUT DIFFERENTIAL    Collection Time: 07/01/21  8:48 AM   Result Value Ref Range    WBC 7.3 4.8 - 10.8 K/uL    RBC 4.27 4.20 - 5.40 M/uL    Hemoglobin 13.6 12.0 - 16.0 g/dL    Hematocrit 42.6 37.0 - 47.0 %    MCV 99.8 (H) 81.4 - 97.8 fL    MCH 31.9 27.0 - 33.0 pg    MCHC 31.9 (L) 33.6 - 35.0 g/dL    RDW 47.4 35.9 - 50.0 fL    Platelet Count 261 164 - 446 K/uL    MPV 10.7 9.0 - 12.9 fL   SARS CoV-2 Ab, Total    Collection Time: 07/01/21  8:48 AM   Result Value Ref Range    SARS-CoV-2 Ab Qual Reactive (A) Non-Reactive   CBC WITH DIFFERENTIAL    Collection Time: 07/01/21  8:48 AM   Result Value Ref Range    WBC 7.2 4.8 - 10.8 K/uL    RBC 4.31 4.20 - 5.40 M/uL    Hemoglobin 13.5 12.0 - 16.0 g/dL    Hematocrit 43.2 37.0 - 47.0 %    .2 (H) 81.4 - 97.8 fL    MCH 31.3 27.0 - 33.0 pg    MCHC 31.3 (L) 33.6 - 35.0 g/dL    RDW 48.1 35.9 - 50.0 fL    Platelet Count 244 164 - 446 K/uL    MPV 11.1 9.0 - 12.9 fL    Neutrophils-Polys 68.60 44.00 - 72.00 %    Lymphocytes 24.40 22.00 - 41.00 %    Monocytes 5.80 0.00 - 13.40 %    Eosinophils 0.60 0.00 - 6.90 %    Basophils 0.30 0.00 - 1.80 %    Immature Granulocytes 0.30 0.00 - 0.90 %    Nucleated RBC 0.00 /100 WBC    Neutrophils (Absolute) 4.95 2.00 - 7.15 K/uL    Lymphs (Absolute) 1.76 1.00 - 4.80 K/uL    Monos (Absolute) 0.42 0.00 - 0.85 K/uL    Eos (Absolute) 0.04 0.00 - 0.51 K/uL    Baso (Absolute) 0.02 0.00 - 0.12 K/uL    Immature Granulocytes  (abs) 0.02 0.00 - 0.11 K/uL    NRBC (Absolute) 0.00 K/uL   Comp Metabolic Panel    Collection Time: 07/01/21  8:48 AM   Result Value Ref Range    Sodium 137 135 - 145 mmol/L    Potassium 3.6 3.6 - 5.5 mmol/L    Chloride 100 96 - 112 mmol/L    Co2 27 20 - 33 mmol/L    Anion Gap 10.0 7.0 - 16.0    Glucose 146 (H) 65 - 99 mg/dL    Bun 28 (H) 8 - 22 mg/dL    Creatinine 1.17 0.50 - 1.40 mg/dL    Calcium 9.6 8.5 - 10.5 mg/dL    AST(SGOT) 36 12 - 45 U/L    ALT(SGPT) 12 2 - 50 U/L    Alkaline Phosphatase 36 30 - 99 U/L    Total Bilirubin 0.3 0.1 - 1.5 mg/dL    Albumin 4.5 3.2 - 4.9 g/dL    Total Protein 7.6 6.0 - 8.2 g/dL    Globulin 3.1 1.9 - 3.5 g/dL    A-G Ratio 1.5 g/dL   ESTIMATED GFR    Collection Time: 07/01/21  8:48 AM   Result Value Ref Range    GFR If  53 (A) >60 mL/min/1.73 m 2    GFR If Non  43 (A) >60 mL/min/1.73 m 2   ESTIMATED GFR    Collection Time: 07/01/21  8:48 AM   Result Value Ref Range    GFR If  55 (A) >60 mL/min/1.73 m 2    GFR If Non  46 (A) >60 mL/min/1.73 m 2   ESTIMATED GFR    Collection Time: 07/01/21  8:48 AM   Result Value Ref Range    GFR If African American 50 (A) >60 mL/min/1.73 m 2    GFR If Non  41 (A) >60 mL/min/1.73 m 2   CBC WITH DIFFERENTIAL    Collection Time: 01/03/22  8:54 AM   Result Value Ref Range    WBC 8.7 4.8 - 10.8 K/uL    RBC 3.97 (L) 4.20 - 5.40 M/uL    Hemoglobin 12.6 12.0 - 16.0 g/dL    Hematocrit 39.1 37.0 - 47.0 %    MCV 98.5 (H) 81.4 - 97.8 fL    MCH 31.7 27.0 - 33.0 pg    MCHC 32.2 (L) 33.6 - 35.0 g/dL    RDW 46.5 35.9 - 50.0 fL    Platelet Count 270 164 - 446 K/uL    MPV 11.1 9.0 - 12.9 fL    Neutrophils-Polys 69.00 44.00 - 72.00 %    Lymphocytes 21.50 (L) 22.00 - 41.00 %    Monocytes 6.80 0.00 - 13.40 %    Eosinophils 1.60 0.00 - 6.90 %    Basophils 0.60 0.00 - 1.80 %    Immature Granulocytes 0.50 0.00 - 0.90 %    Nucleated RBC 0.00 /100 WBC    Neutrophils (Absolute) 5.97 2.00 - 7.15  K/uL    Lymphs (Absolute) 1.86 1.00 - 4.80 K/uL    Monos (Absolute) 0.59 0.00 - 0.85 K/uL    Eos (Absolute) 0.14 0.00 - 0.51 K/uL    Baso (Absolute) 0.05 0.00 - 0.12 K/uL    Immature Granulocytes (abs) 0.04 0.00 - 0.11 K/uL    NRBC (Absolute) 0.00 K/uL   Sed Rate    Collection Time: 01/03/22  8:54 AM   Result Value Ref Range    Sed Rate Westergren 20 0 - 25 mm/hour   CRP QUANTITIVE (NON-CARDIAC)    Collection Time: 01/03/22  8:54 AM   Result Value Ref Range    Stat C-Reactive Protein 0.32 0.00 - 0.75 mg/dL   CREATININE    Collection Time: 01/03/22  8:54 AM   Result Value Ref Range    Creatinine 1.21 0.50 - 1.40 mg/dL   ASPARTATE AMINO-BLAS    Collection Time: 01/03/22  8:54 AM   Result Value Ref Range    AST(SGOT) 39 12 - 45 U/L   ALANINE AMINO-TRANS    Collection Time: 01/03/22  8:54 AM   Result Value Ref Range    ALT(SGPT) 11 2 - 50 U/L   Quantiferon Gold Plus TB (4 tube)    Collection Time: 01/03/22  8:54 AM   Result Value Ref Range    QFT NIL 0.04 IU/mL    QFT TB1 - NIL 0.00 <=0.34 IU/mL    QFT TB2 - NIL 0.00 <=0.34 IU/mL    QFT Mitogen - NIL 1.55 IU/mL    QFT Gold Plus Negative Negative   HEP C VIRUS ANTIBODY    Collection Time: 01/03/22  8:54 AM   Result Value Ref Range    Hepatitis C Antibody Non-Reactive Non-Reactive   ESTIMATED GFR    Collection Time: 01/03/22  8:54 AM   Result Value Ref Range    GFR If  53 (A) >60 mL/min/1.73 m 2    GFR If Non  44 (A) >60 mL/min/1.73 m 2     Reviewed her CT and       Assessment & Plan     1. LBBB (left bundle branch block)     2. Essential hypertension     3. Dyslipidemia     4. Coronary artery disease due to calcified coronary lesion - Calcifications seen on CT scan also wtih aortic ulceration     5. Left carotid stenosis  US-CAROTID DOPPLER BILAT       Medical Decision Making: Today's Assessment/Status/Plan:        It was my pleasure to meet with MsBeti Jolie.    We addressed the management of hypertension at today's visit. Blood pressure  is well controlled.  We specifically assessed the labs on hypertension treatment    Intolerant to multiple statins    Describes radicualr pain in the thoracic spine will trial low dose elavil with mail order    We will check a carotid with mild stenosis in the past should she have further recurrences can always do a event monitor    I will see Ms. Dave back in 1 year time and encouraged her to follow up with us over the phone or electronically using my MyChart as issues arise.    It is my pleasure to participate in the care of Ms. Dave.  Please do not hesitate to contact me with questions or concerns.    Jarrod Mayberry MD PhD formerly [facility]  Cardiologist [facility]    Please note that this dictation was created using voice recognition software. There may be errors I did not discover before finalizing the note.

## 2022-01-24 ENCOUNTER — TELEPHONE (OUTPATIENT)
Dept: CARDIOLOGY | Facility: MEDICAL CENTER | Age: 72
End: 2022-01-24

## 2022-01-24 NOTE — TELEPHONE ENCOUNTER
----- Message from Domonique Szymanski R.N. sent at 1/24/2022  1:42 PM PST -----  Pt requesting to schedule FV with CW in [facility] or .  She is also requesting to be placed on last minute cancellation list.    Thank you!

## 2022-01-24 NOTE — PATIENT COMMUNICATION
Upon chart review, Carotid u/s already ordered during last OV and scheduled for 1/25.     Replied to pt via [employer] regarding findings and advise.

## 2022-01-25 ENCOUNTER — HOSPITAL ENCOUNTER (OUTPATIENT)
Dept: RADIOLOGY | Facility: MEDICAL CENTER | Age: 72
End: 2022-01-25
Attending: INTERNAL MEDICINE
Payer: MEDICARE

## 2022-01-25 DIAGNOSIS — I65.22 LEFT CAROTID STENOSIS: ICD-10-CM

## 2022-01-25 PROCEDURE — 93880 EXTRACRANIAL BILAT STUDY: CPT

## 2022-01-28 ENCOUNTER — PATIENT MESSAGE (OUTPATIENT)
Dept: CARDIOLOGY | Facility: MEDICAL CENTER | Age: 72
End: 2022-01-28

## 2022-01-28 NOTE — TELEPHONE ENCOUNTER
----- Message from Jarrod Mayberry M.D. sent at 1/26/2022  2:11 PM PST -----  The ultrasound duplex looks good, please let her know     Thank you

## 2022-04-25 ENCOUNTER — PATIENT MESSAGE (OUTPATIENT)
Dept: CARDIOLOGY | Facility: MEDICAL CENTER | Age: 72
End: 2022-04-25
Payer: MEDICARE

## 2022-04-26 NOTE — PATIENT COMMUNICATION
Replied to pt via [employer] requesting more information regarding concerns, awaiting pt response.    Task deferred to schedulers to schedule for FV via staff message

## 2022-04-28 ENCOUNTER — TELEPHONE (OUTPATIENT)
Dept: CARDIOLOGY | Facility: MEDICAL CENTER | Age: 72
End: 2022-04-28
Payer: MEDICARE

## 2022-04-28 NOTE — TELEPHONE ENCOUNTER
----- Message from Domonique Szymanski R.N. sent at 4/26/2022 10:48 AM PDT -----  CW pt requesting to schedule FV regarding her BP concerns, thank you!

## 2022-05-22 ENCOUNTER — PATIENT MESSAGE (OUTPATIENT)
Dept: CARDIOLOGY | Facility: MEDICAL CENTER | Age: 72
End: 2022-05-22
Payer: MEDICARE

## 2022-05-22 NOTE — LETTER
May 26, 2022        Lauren Dave  [address] [facility]  [place] [address]        Dear Lauren:    We received your [employer] message from 5/22/22 and we responded to it on 5/23/2022 at 9:22am.  We received notification you have not reviewed the message at this time.    Please reply back to the [employer] message or call our office as we have a few questions in regards to your blood pressure concerns.    If you have any questions or concerns, please don't hesitate to call.        Sincerely,        Domonique BURNS    Electronically Signed

## 2022-05-23 NOTE — PATIENT COMMUNICATION
Replied to pt via [employer] requesting more information regarding concerns, awaiting pt response.    Task deferred to schedulers to schedule for FV via staff message.

## 2022-05-25 ENCOUNTER — TELEPHONE (OUTPATIENT)
Dept: CARDIOLOGY | Facility: MEDICAL CENTER | Age: 72
End: 2022-05-25
Payer: MEDICARE

## 2022-05-25 NOTE — TELEPHONE ENCOUNTER
----- Message from Domonique Szymanski R.N. sent at 5/23/2022  9:21 AM PDT -----  Pt requesting VV, please schedule with either CW or MR, thank you!

## 2022-06-01 DIAGNOSIS — Z17.1 MALIGNANT NEOPLASM OF UPPER-INNER QUADRANT OF RIGHT BREAST IN FEMALE, ESTROGEN RECEPTOR NEGATIVE (HCC): ICD-10-CM

## 2022-06-01 DIAGNOSIS — C50.211 MALIGNANT NEOPLASM OF UPPER-INNER QUADRANT OF RIGHT BREAST IN FEMALE, ESTROGEN RECEPTOR NEGATIVE (HCC): ICD-10-CM

## 2022-06-02 ENCOUNTER — PATIENT MESSAGE (OUTPATIENT)
Dept: CARDIOLOGY | Facility: MEDICAL CENTER | Age: 72
End: 2022-06-02
Payer: MEDICARE

## 2022-06-02 NOTE — LETTER
June 8, 2022        Lauren Dave  [address] [facility]  [place] [address]        Dear Lauren:    I replied to your [employer] message 6/3/22 and received a notification it was not reviewed at this time.    Are those blood pressures you sent after taking your blood pressure medications?  If so, how long after?     Please refer to the message we sent on 5/23/22 and reply with your answers to the questions we asked as Dr. Mayberry will want to know those specifics in order to safely advise on your concerns.    If you have any questions or concerns, please don't hesitate to call.        Sincerely,      Domonique BURNS  Electronically Signed

## 2022-06-04 NOTE — PATIENT COMMUNICATION
Replied to pt via [employer] requesting more information regarding concerns, awaiting pt response.

## 2022-06-08 ENCOUNTER — APPOINTMENT (OUTPATIENT)
Dept: RADIOLOGY | Facility: MEDICAL CENTER | Age: 72
End: 2022-06-08
Attending: NURSE PRACTITIONER
Payer: MEDICARE

## 2022-06-08 ENCOUNTER — TELEPHONE (OUTPATIENT)
Dept: CARDIOLOGY | Facility: MEDICAL CENTER | Age: 72
End: 2022-06-08
Payer: MEDICARE

## 2022-06-08 NOTE — TELEPHONE ENCOUNTER
----- Message from Domonique Szymanski R.N. sent at 6/8/2022  1:55 PM PDT -----  Regarding: request to place on last min cancellation  Pt requesting to be placed on last min cancellation list, thank you!

## 2022-06-11 ENCOUNTER — PATIENT MESSAGE (OUTPATIENT)
Dept: CARDIOLOGY | Facility: MEDICAL CENTER | Age: 72
End: 2022-06-11
Payer: MEDICARE

## 2022-06-19 ENCOUNTER — PATIENT MESSAGE (OUTPATIENT)
Dept: CARDIOLOGY | Facility: MEDICAL CENTER | Age: 72
End: 2022-06-19
Payer: MEDICARE

## 2022-06-20 ENCOUNTER — PATIENT MESSAGE (OUTPATIENT)
Dept: CARDIOLOGY | Facility: MEDICAL CENTER | Age: 72
End: 2022-06-20
Payer: MEDICARE

## 2022-06-20 ENCOUNTER — TELEPHONE (OUTPATIENT)
Dept: CARDIOLOGY | Facility: MEDICAL CENTER | Age: 72
End: 2022-06-20
Payer: MEDICARE

## 2022-06-20 NOTE — TELEPHONE ENCOUNTER
----- Message from Domonique Szymanski R.N. sent at 6/20/2022 11:19 AM PDT -----  Please place pt on last minute cancellation list for CW, per pt request, thank you!

## 2022-06-20 NOTE — PATIENT COMMUNICATION
Replied to pt via [employer] regarding recommendations, see encounter.    awaiting for BP log to be received

## 2022-06-21 ENCOUNTER — PATIENT MESSAGE (OUTPATIENT)
Dept: CARDIOLOGY | Facility: MEDICAL CENTER | Age: 72
End: 2022-06-21
Payer: MEDICARE

## 2022-06-22 ENCOUNTER — PATIENT MESSAGE (OUTPATIENT)
Dept: CARDIOLOGY | Facility: MEDICAL CENTER | Age: 72
End: 2022-06-22
Payer: MEDICARE

## 2022-06-24 ENCOUNTER — TELEPHONE (OUTPATIENT)
Dept: CARDIOLOGY | Facility: MEDICAL CENTER | Age: 72
End: 2022-06-24
Payer: MEDICARE

## 2022-06-24 NOTE — TELEPHONE ENCOUNTER
Reviewed MD availability for 6/29.    Called pt to offer last minute opening 6/29 at 3:40pm.  Pt verbalizes understanding and states no other concerns or questions at this time.  Lauren is appreciative of information given.

## 2022-06-29 ENCOUNTER — OFFICE VISIT (OUTPATIENT)
Dept: CARDIOLOGY | Facility: MEDICAL CENTER | Age: 72
End: 2022-06-29
Payer: MEDICARE

## 2022-06-29 ENCOUNTER — HOSPITAL ENCOUNTER (OUTPATIENT)
Dept: RADIOLOGY | Facility: MEDICAL CENTER | Age: 72
End: 2022-06-29
Attending: NURSE PRACTITIONER
Payer: MEDICARE

## 2022-06-29 ENCOUNTER — HOSPITAL ENCOUNTER (OUTPATIENT)
Dept: LAB | Facility: MEDICAL CENTER | Age: 72
End: 2022-06-29
Attending: FAMILY MEDICINE
Payer: MEDICARE

## 2022-06-29 ENCOUNTER — HOSPITAL ENCOUNTER (OUTPATIENT)
Dept: LAB | Facility: MEDICAL CENTER | Age: 72
End: 2022-06-29
Attending: NURSE PRACTITIONER
Payer: MEDICARE

## 2022-06-29 VITALS
HEART RATE: 93 BPM | OXYGEN SATURATION: 98 % | HEIGHT: 64 IN | DIASTOLIC BLOOD PRESSURE: 58 MMHG | SYSTOLIC BLOOD PRESSURE: 132 MMHG | BODY MASS INDEX: 24.69 KG/M2 | RESPIRATION RATE: 16 BRPM | WEIGHT: 144.6 LBS

## 2022-06-29 DIAGNOSIS — E78.5 DYSLIPIDEMIA: ICD-10-CM

## 2022-06-29 DIAGNOSIS — I44.7 LBBB (LEFT BUNDLE BRANCH BLOCK): Chronic | ICD-10-CM

## 2022-06-29 DIAGNOSIS — I25.10 CORONARY ARTERY DISEASE DUE TO CALCIFIED CORONARY LESION: Chronic | ICD-10-CM

## 2022-06-29 DIAGNOSIS — I25.84 CORONARY ARTERY DISEASE DUE TO CALCIFIED CORONARY LESION: Chronic | ICD-10-CM

## 2022-06-29 DIAGNOSIS — I10 ESSENTIAL HYPERTENSION: Chronic | ICD-10-CM

## 2022-06-29 DIAGNOSIS — Z17.1 MALIGNANT NEOPLASM OF UPPER-INNER QUADRANT OF RIGHT BREAST IN FEMALE, ESTROGEN RECEPTOR NEGATIVE (HCC): ICD-10-CM

## 2022-06-29 DIAGNOSIS — C50.211 MALIGNANT NEOPLASM OF UPPER-INNER QUADRANT OF RIGHT BREAST IN FEMALE, ESTROGEN RECEPTOR NEGATIVE (HCC): ICD-10-CM

## 2022-06-29 DIAGNOSIS — I10 ESSENTIAL HYPERTENSION: ICD-10-CM

## 2022-06-29 LAB
ALBUMIN SERPL BCP-MCNC: 4.4 G/DL (ref 3.2–4.9)
ALBUMIN/GLOB SERPL: 1.6 G/DL
ALP SERPL-CCNC: 39 U/L (ref 30–99)
ALT SERPL-CCNC: 12 U/L (ref 2–50)
ANION GAP SERPL CALC-SCNC: 10 MMOL/L (ref 7–16)
AST SERPL-CCNC: 38 U/L (ref 12–45)
BASOPHILS # BLD AUTO: 0.2 % (ref 0–1.8)
BASOPHILS # BLD: 0.02 K/UL (ref 0–0.12)
BILIRUB SERPL-MCNC: 0.2 MG/DL (ref 0.1–1.5)
BUN SERPL-MCNC: 29 MG/DL (ref 8–22)
CALCIUM SERPL-MCNC: 9.6 MG/DL (ref 8.5–10.5)
CHLORIDE SERPL-SCNC: 102 MMOL/L (ref 96–112)
CO2 SERPL-SCNC: 24 MMOL/L (ref 20–33)
CREAT SERPL-MCNC: 1.1 MG/DL (ref 0.5–1.4)
CRP SERPL HS-MCNC: <0.3 MG/DL (ref 0–0.75)
EOSINOPHIL # BLD AUTO: 0.03 K/UL (ref 0–0.51)
EOSINOPHIL NFR BLD: 0.3 % (ref 0–6.9)
ERYTHROCYTE [DISTWIDTH] IN BLOOD BY AUTOMATED COUNT: 46.9 FL (ref 35.9–50)
GFR SERPLBLD CREATININE-BSD FMLA CKD-EPI: 53 ML/MIN/1.73 M 2
GLOBULIN SER CALC-MCNC: 2.7 G/DL (ref 1.9–3.5)
GLUCOSE SERPL-MCNC: 145 MG/DL (ref 65–99)
HCT VFR BLD AUTO: 40.1 % (ref 37–47)
HGB BLD-MCNC: 12.6 G/DL (ref 12–16)
IMM GRANULOCYTES # BLD AUTO: 0.05 K/UL (ref 0–0.11)
IMM GRANULOCYTES NFR BLD AUTO: 0.6 % (ref 0–0.9)
LYMPHOCYTES # BLD AUTO: 0.96 K/UL (ref 1–4.8)
LYMPHOCYTES NFR BLD: 11 % (ref 22–41)
MCH RBC QN AUTO: 31.4 PG (ref 27–33)
MCHC RBC AUTO-ENTMCNC: 31.4 G/DL (ref 33.6–35)
MCV RBC AUTO: 100 FL (ref 81.4–97.8)
MONOCYTES # BLD AUTO: 0.41 K/UL (ref 0–0.85)
MONOCYTES NFR BLD AUTO: 4.7 % (ref 0–13.4)
NEUTROPHILS # BLD AUTO: 7.29 K/UL (ref 2–7.15)
NEUTROPHILS NFR BLD: 83.2 % (ref 44–72)
NRBC # BLD AUTO: 0 K/UL
NRBC BLD-RTO: 0 /100 WBC
PLATELET # BLD AUTO: 268 K/UL (ref 164–446)
PMV BLD AUTO: 10.6 FL (ref 9–12.9)
POTASSIUM SERPL-SCNC: 4.5 MMOL/L (ref 3.6–5.5)
PROT SERPL-MCNC: 7.1 G/DL (ref 6–8.2)
RBC # BLD AUTO: 4.01 M/UL (ref 4.2–5.4)
SODIUM SERPL-SCNC: 136 MMOL/L (ref 135–145)
TSH SERPL DL<=0.005 MIU/L-ACNC: 0.87 UIU/ML (ref 0.38–5.33)
WBC # BLD AUTO: 8.8 K/UL (ref 4.8–10.8)

## 2022-06-29 PROCEDURE — G0279 TOMOSYNTHESIS, MAMMO: HCPCS

## 2022-06-29 PROCEDURE — 80053 COMPREHEN METABOLIC PANEL: CPT

## 2022-06-29 PROCEDURE — 85025 COMPLETE CBC W/AUTO DIFF WBC: CPT

## 2022-06-29 PROCEDURE — 85652 RBC SED RATE AUTOMATED: CPT

## 2022-06-29 PROCEDURE — 86140 C-REACTIVE PROTEIN: CPT

## 2022-06-29 PROCEDURE — 36415 COLL VENOUS BLD VENIPUNCTURE: CPT

## 2022-06-29 PROCEDURE — 99214 OFFICE O/P EST MOD 30 MIN: CPT | Performed by: INTERNAL MEDICINE

## 2022-06-29 PROCEDURE — 84443 ASSAY THYROID STIM HORMONE: CPT

## 2022-06-29 RX ORDER — AMPICILLIN 500 MG/1
CAPSULE ORAL
COMMUNITY
Start: 2022-06-28 | End: 2022-06-29

## 2022-06-29 RX ORDER — HYDRALAZINE HYDROCHLORIDE 10 MG/1
10 TABLET, FILM COATED ORAL 3 TIMES DAILY PRN
Qty: 25 TABLET | Refills: 11 | Status: SHIPPED | OUTPATIENT
Start: 2022-06-29 | End: 2022-06-29 | Stop reason: SDUPTHER

## 2022-06-29 RX ORDER — AMLODIPINE BESYLATE 5 MG/1
5 TABLET ORAL
Qty: 90 TABLET | Refills: 3 | Status: SHIPPED | OUTPATIENT
Start: 2022-06-29 | End: 2022-06-29 | Stop reason: SDUPTHER

## 2022-06-29 RX ORDER — AMLODIPINE BESYLATE 5 MG/1
5 TABLET ORAL
Qty: 90 TABLET | Refills: 3 | Status: SHIPPED | OUTPATIENT
Start: 2022-06-29 | End: 2022-09-20 | Stop reason: SDUPTHER

## 2022-06-29 RX ORDER — HYDRALAZINE HYDROCHLORIDE 10 MG/1
10 TABLET, FILM COATED ORAL 3 TIMES DAILY PRN
Qty: 25 TABLET | Refills: 11 | Status: SHIPPED | OUTPATIENT
Start: 2022-06-29 | End: 2023-06-21 | Stop reason: SDUPTHER

## 2022-06-29 ASSESSMENT — FIBROSIS 4 INDEX: FIB4 SCORE: 3.14

## 2022-06-29 NOTE — TELEPHONE ENCOUNTER
Received request from FABIAN COATES pt requesting medications to be sent to [address].    Medication sent to preferred pharmacy as requested.

## 2022-06-29 NOTE — PATIENT INSTRUCTIONS
For low blood pressures lie on the ground with feet up and monitor blood pressure and have some salt

## 2022-06-30 LAB — ERYTHROCYTE [SEDIMENTATION RATE] IN BLOOD BY WESTERGREN METHOD: 16 MM/HOUR (ref 0–25)

## 2022-06-30 NOTE — PROGRESS NOTES
Chief Complaint   Patient presents with   • Coronary Artery Disease     F/V Dx: Coronary artery disease due to calcified coronary lesion - Calcifications seen on CT scan also wtih aortic ulceration   • Dyslipidemia   • Hypertension       Subjective     Lauren Dave is a 72 y.o. female who presents today for follow-up of her labile hypertension and left bundle branch block    She had an event in the other day where she had intense chest pressure after a stressful phone conversation and then developed coughing difficulty with breathing she was about to call EMS but her symptoms have improved    She did have another episode where she fell to her knees in her kitchen    She describes throbbing and aching of the left arm with activity    Past Medical History:   Diagnosis Date   • Arthritis    • Breast cancer (HCC) 8/7/2013   • Bronchitis 2005   • Cancer (HCC)    • Cancer (HCC)     right breast cancer    • Cold    • Coronary artery disease due to calcified coronary lesion - Calcifications seen on CT scan also wtih aortic ulceration    • Dental disorder     tooth infection   • Dyslipidemia     Tried atorvastatin, pravastatin, lovastatin, and Zetia.  All causes severe myalgias.  Just taking fish oil.     • Essential hypertension    • Heart burn    • LBBB (left bundle branch block) 12/16/2014    Noted on prechemo EKG 9/2014, MUGYASMANY WNL   • Pneumonia    • Pseudogout    • Scarlet fever    • Unspecified hemorrhagic conditions     gums     Past Surgical History:   Procedure Laterality Date   • CATH REMOVAL  2/24/2014    Performed by Aggie Burns M.D. at SURGERY SAME DAY [facility]   • MASTECTOMY  8/22/2013    Performed by Aggie Burns M.D. at SURGERY SAME DAY [facility]   • NODE BIOPSY SENTINEL  8/22/2013    Performed by Aggie Burns M.D. at SURGERY SAME DAY [facility]   • CATH PLACEMENT  8/22/2013    Performed by Aggie Burns M.D. at SURGERY SAME DAY [facility] ORS   • US-NEEDLE CORE BX-BREAST  PANEL  2013    right breast   • COLONOSCOPY  2003   • BREAST BIOPSY     • TONSILLECTOMY       Family History   Problem Relation Age of Onset   • Cancer Mother         possible thyroid and gynecological   • Multiple Sclerosis Mother    • Arthritis Father    • Multiple Sclerosis Brother    • Gout Brother    • Heart Disease Paternal Grandmother    • Diabetes Paternal Grandmother    • Cancer Maternal Aunt         breast cancer- 60s   • Diabetes Maternal Uncle    • Heart Disease Maternal Grandmother    • Heart Disease Maternal Grandfather         MI   • Stroke Paternal Grandfather    • Other Son         breast mass   • Heart Disease Son         HEART MURMUR   • Gout Son      Social History     Socioeconomic History   • Marital status:      Spouse name: Not on file   • Number of children: 2   • Years of education: Not on file   • Highest education level: Not on file   Occupational History     Employer: [employer]    Tobacco Use   • Smoking status: Former Smoker     Packs/day: 1.00     Years: 0.00     Pack years: 0.00     Types: Cigarettes     Quit date: 10/1/2013     Years since quittin.7   • Smokeless tobacco: Never Used   Vaping Use   • Vaping Use: Never used   Substance and Sexual Activity   • Alcohol use: No     Alcohol/week: 0.0 oz   • Drug use: No   • Sexual activity: Not on file     Comment: , 2 children, enemployed   Other Topics Concern   • Not on file   Social History Narrative    ** Merged History Encounter **         Patient is a . Patient has 2 adult children. She is  from .           Social Determinants of Health     Financial Resource Strain: Not on file   Food Insecurity: Not on file   Transportation Needs: Not on file   Physical Activity: Not on file   Stress: Not on file   Social Connections: Not on file   Intimate Partner Violence: Not on file   Housing Stability: Not on file     Allergies   Allergen Reactions   • Statins [Hmg-Coa-R Inhibitors] Unspecified     " Muscle Spasms   RXN=unknown   • Atorvastatin    • Eggs      Pt states that she is allergic to eggs per her mother.   • Lisinopril Cough   • Losartan      Tried in 2017   • Penicillins      \"does not work\" .'IMMUNE TO PENICILLIN,AMPICILLIN IS THE ONLY THING THAT WORKS'   • Codeine Vomiting and Nausea     Clarified with patient - states that she has an \"upset stomach\" when she takes it     Outpatient Encounter Medications as of 6/29/2022   Medication Sig Dispense Refill   • [DISCONTINUED] amLODIPine (NORVASC) 5 MG Tab Take 1 Tablet by mouth 1 time a day as needed (/90). 90 Tablet 3   • [DISCONTINUED] hydrALAZINE (APRESOLINE) 10 MG Tab Take 1 Tablet by mouth 3 times a day as needed (/100). 25 Tablet 11   • predniSONE (DELTASONE) 5 MG Tab Take 10 mg by mouth every day.     • hydroxychloroquine (PLAQUENIL) 200 MG Tab Take 1 tablet by mouth every day. 30 tablet 0   • famotidine (PEPCID) 40 MG Tab Take 1 tablet by mouth twice daily (Patient taking differently: Take 40 mg by mouth as needed.) 180 tablet 1   • Cyanocobalamin (VITAMIN B-12 PO) Take  by mouth.     • ibuprofen (MOTRIN) 800 MG Tab TAKE 1 TABLET BY MOUTH EVERY 8 HOURS AS NEEDED FOR MILD PAIN 90 Tab 0   • Cholecalciferol (VITAMIN D3) 5000 units Cap Take 1 Cap by mouth every day.     • [DISCONTINUED] ampicillin (PRINCIPEN) 500 MG Cap  (Patient not taking: Reported on 6/29/2022)     • [DISCONTINUED] cetirizine (ZYRTEC) 10 MG Tab Take 10 mg by mouth 2 times a day.     • [DISCONTINUED] amLODIPine (NORVASC) 5 MG Tab Take 1 Tablet by mouth every day. (Patient taking differently: Take 5 mg by mouth as needed.) 90 Tablet 3   • [DISCONTINUED] amitriptyline (ELAVIL) 25 MG Tab Take 1 Tablet by mouth every evening. 90 Tablet 3   • [DISCONTINUED] amitriptyline (ELAVIL) 25 MG Tab Take 1 Tablet by mouth every evening. 90 Tablet 3     No facility-administered encounter medications on file as of 6/29/2022.     ROS           Objective     /58 (BP Location: Left " "arm, Patient Position: Sitting, BP Cuff Size: Adult)   Pulse 93   Resp 16   Ht 1.626 m (5' 4\")   Wt 65.6 kg (144 lb 9.6 oz)   SpO2 98%   BMI 24.82 kg/m²     Physical Exam  Constitutional:       General: She is not in acute distress.     Appearance: She is not diaphoretic.   Eyes:      General: No scleral icterus.  Neck:      Vascular: No JVD.   Cardiovascular:      Rate and Rhythm: Normal rate.      Heart sounds: Normal heart sounds. No murmur heard.    No friction rub. No gallop.   Pulmonary:      Effort: No respiratory distress.      Breath sounds: No wheezing or rales.   Abdominal:      General: Bowel sounds are normal.      Palpations: Abdomen is soft.   Skin:     Findings: No rash.   Neurological:      Mental Status: She is alert.            We reviewed in person the most recent labs  Recent Results (from the past 5040 hour(s))   CBC WITH DIFFERENTIAL    Collection Time: 01/03/22  8:54 AM   Result Value Ref Range    WBC 8.7 4.8 - 10.8 K/uL    RBC 3.97 (L) 4.20 - 5.40 M/uL    Hemoglobin 12.6 12.0 - 16.0 g/dL    Hematocrit 39.1 37.0 - 47.0 %    MCV 98.5 (H) 81.4 - 97.8 fL    MCH 31.7 27.0 - 33.0 pg    MCHC 32.2 (L) 33.6 - 35.0 g/dL    RDW 46.5 35.9 - 50.0 fL    Platelet Count 270 164 - 446 K/uL    MPV 11.1 9.0 - 12.9 fL    Neutrophils-Polys 69.00 44.00 - 72.00 %    Lymphocytes 21.50 (L) 22.00 - 41.00 %    Monocytes 6.80 0.00 - 13.40 %    Eosinophils 1.60 0.00 - 6.90 %    Basophils 0.60 0.00 - 1.80 %    Immature Granulocytes 0.50 0.00 - 0.90 %    Nucleated RBC 0.00 /100 WBC    Neutrophils (Absolute) 5.97 2.00 - 7.15 K/uL    Lymphs (Absolute) 1.86 1.00 - 4.80 K/uL    Monos (Absolute) 0.59 0.00 - 0.85 K/uL    Eos (Absolute) 0.14 0.00 - 0.51 K/uL    Baso (Absolute) 0.05 0.00 - 0.12 K/uL    Immature Granulocytes (abs) 0.04 0.00 - 0.11 K/uL    NRBC (Absolute) 0.00 K/uL   Sed Rate    Collection Time: 01/03/22  8:54 AM   Result Value Ref Range    Sed Rate [facility] 20 0 - 25 mm/hour   CRP QUANTITIVE (NON-CARDIAC) "    Collection Time: 01/03/22  8:54 AM   Result Value Ref Range    Stat C-Reactive Protein 0.32 0.00 - 0.75 mg/dL   CREATININE    Collection Time: 01/03/22  8:54 AM   Result Value Ref Range    Creatinine 1.21 0.50 - 1.40 mg/dL   ASPARTATE AMINO-BLAS    Collection Time: 01/03/22  8:54 AM   Result Value Ref Range    AST(SGOT) 39 12 - 45 U/L   ALANINE AMINO-TRANS    Collection Time: 01/03/22  8:54 AM   Result Value Ref Range    ALT(SGPT) 11 2 - 50 U/L   Quantiferon Gold Plus TB (4 tube)    Collection Time: 01/03/22  8:54 AM   Result Value Ref Range    QFT NIL 0.04 IU/mL    QFT TB1 - NIL 0.00 <=0.34 IU/mL    QFT TB2 - NIL 0.00 <=0.34 IU/mL    QFT Mitogen - NIL 1.55 IU/mL    QFT Gold Plus Negative Negative   HEP C VIRUS ANTIBODY    Collection Time: 01/03/22  8:54 AM   Result Value Ref Range    Hepatitis C Antibody Non-Reactive Non-Reactive   ESTIMATED GFR    Collection Time: 01/03/22  8:54 AM   Result Value Ref Range    GFR If  53 (A) >60 mL/min/1.73 m 2    GFR If Non  44 (A) >60 mL/min/1.73 m 2   Comp Metabolic Panel    Collection Time: 06/29/22 12:28 PM   Result Value Ref Range    Sodium 136 135 - 145 mmol/L    Potassium 4.5 3.6 - 5.5 mmol/L    Chloride 102 96 - 112 mmol/L    Co2 24 20 - 33 mmol/L    Anion Gap 10.0 7.0 - 16.0    Glucose 145 (H) 65 - 99 mg/dL    Bun 29 (H) 8 - 22 mg/dL    Creatinine 1.10 0.50 - 1.40 mg/dL    Calcium 9.6 8.5 - 10.5 mg/dL    AST(SGOT) 38 12 - 45 U/L    ALT(SGPT) 12 2 - 50 U/L    Alkaline Phosphatase 39 30 - 99 U/L    Total Bilirubin 0.2 0.1 - 1.5 mg/dL    Albumin 4.4 3.2 - 4.9 g/dL    Total Protein 7.1 6.0 - 8.2 g/dL    Globulin 2.7 1.9 - 3.5 g/dL    A-G Ratio 1.6 g/dL   ESTIMATED GFR    Collection Time: 06/29/22 12:28 PM   Result Value Ref Range    GFR (CKD-EPI) 53 (A) >60 mL/min/1.73 m 2   THYROID CASCADE PROFILE    Collection Time: 06/29/22 12:31 PM   Result Value Ref Range    TSH 0.870 0.380 - 5.330 uIU/mL         Assessment & Plan     1. LBBB (left  bundle branch block)  NM-CARDIAC STRESS TEST    EC-ECHOCARDIOGRAM COMPLETE W/O CONT   2. Essential hypertension  EC-ECHOCARDIOGRAM COMPLETE W/O CONT   3. Dyslipidemia     4. Coronary artery disease due to calcified coronary lesion - Calcifications seen on CT scan also wtih aortic ulceration  NM-CARDIAC STRESS TEST    EC-ECHOCARDIOGRAM COMPLETE W/O CONT       Medical Decision Making: Today's Assessment/Status/Plan:         It was my pleasure to meet with Ms. Dave.    We addressed the management of hypertension at today's visit. Blood pressure is well controlled.  We specifically assessed the labs on hypertension treatment    She is on as needed amlodipine and if her blood pressures are too high take as needed hydralazine unfortunately she has low blood pressures at times as well and has had episodes of near syncope so cannot take a daily medicine likely    We will do a follow-up echocardiogram with her history of chemotherapy exposure and left bundle branch block and a stress test last 1 was 5 years ago with the left arm throbbing and aching with activity she understands if this is abnormal we will recommend an angiogram    I will see Ms. Dave back in 4 months time and encouraged her to follow up with us over the phone or electronically using my MyChart as issues arise.    It is my pleasure to participate in the care of Ms. Dave.  Please do not hesitate to contact me with questions or concerns.    Jarrod Mayberry MD PhD [facility]  Cardiologist [facility]    Please note that this dictation was created using voice recognition software. There may be errors I did not discover before finalizing the note.

## 2022-07-07 ENCOUNTER — APPOINTMENT (OUTPATIENT)
Dept: HEMATOLOGY ONCOLOGY | Facility: MEDICAL CENTER | Age: 72
End: 2022-07-07
Payer: MEDICARE

## 2022-07-12 ENCOUNTER — APPOINTMENT (OUTPATIENT)
Dept: HEMATOLOGY ONCOLOGY | Facility: MEDICAL CENTER | Age: 72
End: 2022-07-12
Payer: MEDICARE

## 2022-07-12 ENCOUNTER — APPOINTMENT (OUTPATIENT)
Dept: RADIOLOGY | Facility: MEDICAL CENTER | Age: 72
End: 2022-07-12
Attending: INTERNAL MEDICINE
Payer: MEDICARE

## 2022-07-27 ENCOUNTER — APPOINTMENT (OUTPATIENT)
Dept: RADIOLOGY | Facility: MEDICAL CENTER | Age: 72
End: 2022-07-27
Attending: FAMILY MEDICINE
Payer: MEDICARE

## 2022-08-03 ENCOUNTER — HOSPITAL ENCOUNTER (OUTPATIENT)
Dept: RADIOLOGY | Facility: MEDICAL CENTER | Age: 72
End: 2022-08-03
Attending: FAMILY MEDICINE
Payer: MEDICARE

## 2022-08-03 DIAGNOSIS — R14.0 GASTRIC TYMPANY: ICD-10-CM

## 2022-08-17 ENCOUNTER — HOSPITAL ENCOUNTER (OUTPATIENT)
Dept: HEMATOLOGY ONCOLOGY | Facility: MEDICAL CENTER | Age: 72
End: 2022-08-17
Attending: NURSE PRACTITIONER
Payer: MEDICARE

## 2022-08-17 VITALS
DIASTOLIC BLOOD PRESSURE: 84 MMHG | HEART RATE: 85 BPM | OXYGEN SATURATION: 95 % | HEIGHT: 63 IN | RESPIRATION RATE: 16 BRPM | BODY MASS INDEX: 25.62 KG/M2 | SYSTOLIC BLOOD PRESSURE: 136 MMHG | WEIGHT: 144.62 LBS | TEMPERATURE: 98.1 F

## 2022-08-17 DIAGNOSIS — Z85.3 HISTORY OF BREAST CANCER IN FEMALE: ICD-10-CM

## 2022-08-17 PROCEDURE — 99213 OFFICE O/P EST LOW 20 MIN: CPT | Performed by: NURSE PRACTITIONER

## 2022-08-17 PROCEDURE — 99212 OFFICE O/P EST SF 10 MIN: CPT

## 2022-08-17 ASSESSMENT — ENCOUNTER SYMPTOMS
FEVER: 0
VOMITING: 0
DIARRHEA: 0
PALPITATIONS: 0
MYALGIAS: 1
SHORTNESS OF BREATH: 0
WEIGHT LOSS: 0
CHILLS: 0
CONSTIPATION: 0
COUGH: 0
WHEEZING: 0
NAUSEA: 0

## 2022-08-17 ASSESSMENT — PAIN SCALES - GENERAL: PAINLEVEL: NO PAIN

## 2022-08-17 ASSESSMENT — FIBROSIS 4 INDEX: FIB4 SCORE: 2.95
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
## 2022-08-31 ENCOUNTER — HOSPITAL ENCOUNTER (OUTPATIENT)
Dept: RADIOLOGY | Facility: MEDICAL CENTER | Age: 72
End: 2022-08-31
Attending: INTERNAL MEDICINE
Payer: MEDICARE

## 2022-08-31 ENCOUNTER — PATIENT MESSAGE (OUTPATIENT)
Dept: CARDIOLOGY | Facility: MEDICAL CENTER | Age: 72
End: 2022-08-31

## 2022-08-31 DIAGNOSIS — I25.10 CORONARY ARTERY DISEASE DUE TO CALCIFIED CORONARY LESION: Chronic | ICD-10-CM

## 2022-08-31 DIAGNOSIS — I25.84 CORONARY ARTERY DISEASE DUE TO CALCIFIED CORONARY LESION: Chronic | ICD-10-CM

## 2022-08-31 DIAGNOSIS — I44.7 LBBB (LEFT BUNDLE BRANCH BLOCK): Chronic | ICD-10-CM

## 2022-08-31 PROCEDURE — 78452 HT MUSCLE IMAGE SPECT MULT: CPT | Mod: 26 | Performed by: INTERNAL MEDICINE

## 2022-08-31 PROCEDURE — 93018 CV STRESS TEST I&R ONLY: CPT | Performed by: INTERNAL MEDICINE

## 2022-08-31 PROCEDURE — 700111 HCHG RX REV CODE 636 W/ 250 OVERRIDE (IP)

## 2022-08-31 PROCEDURE — A9502 TC99M TETROFOSMIN: HCPCS

## 2022-08-31 RX ORDER — REGADENOSON 0.08 MG/ML
0.4 INJECTION, SOLUTION INTRAVENOUS ONCE
Status: COMPLETED | OUTPATIENT
Start: 2022-08-31 | End: 2022-08-31

## 2022-08-31 RX ORDER — AMINOPHYLLINE 25 MG/ML
100 INJECTION, SOLUTION INTRAVENOUS
Status: DISCONTINUED | OUTPATIENT
Start: 2022-08-31 | End: 2022-09-01 | Stop reason: HOSPADM

## 2022-08-31 RX ORDER — REGADENOSON 0.08 MG/ML
INJECTION, SOLUTION INTRAVENOUS
Status: COMPLETED
Start: 2022-08-31 | End: 2022-08-31

## 2022-08-31 RX ADMIN — REGADENOSON 0.4 MG: 0.08 INJECTION, SOLUTION INTRAVENOUS at 11:27

## 2022-09-01 NOTE — TELEPHONE ENCOUNTER
----- Message from Jarrod Mayberry M.D. sent at 8/31/2022  5:16 PM PDT -----  Stress test looks good but we need to verify EF with an echocardiogram, please let her know     Thank you

## 2022-09-20 ENCOUNTER — OFFICE VISIT (OUTPATIENT)
Dept: CARDIOLOGY | Facility: MEDICAL CENTER | Age: 72
End: 2022-09-20
Payer: MEDICARE

## 2022-09-20 VITALS
DIASTOLIC BLOOD PRESSURE: 80 MMHG | RESPIRATION RATE: 16 BRPM | SYSTOLIC BLOOD PRESSURE: 160 MMHG | HEIGHT: 62 IN | WEIGHT: 147 LBS | OXYGEN SATURATION: 98 % | BODY MASS INDEX: 27.05 KG/M2 | HEART RATE: 84 BPM

## 2022-09-20 DIAGNOSIS — E78.5 DYSLIPIDEMIA: ICD-10-CM

## 2022-09-20 DIAGNOSIS — I10 ESSENTIAL HYPERTENSION: ICD-10-CM

## 2022-09-20 DIAGNOSIS — I25.84 CORONARY ARTERY DISEASE DUE TO CALCIFIED CORONARY LESION: Chronic | ICD-10-CM

## 2022-09-20 DIAGNOSIS — I25.10 CORONARY ARTERY DISEASE DUE TO CALCIFIED CORONARY LESION: Chronic | ICD-10-CM

## 2022-09-20 DIAGNOSIS — I44.7 LBBB (LEFT BUNDLE BRANCH BLOCK): Chronic | ICD-10-CM

## 2022-09-20 PROCEDURE — 99214 OFFICE O/P EST MOD 30 MIN: CPT | Performed by: INTERNAL MEDICINE

## 2022-09-20 RX ORDER — AMLODIPINE BESYLATE 2.5 MG/1
2.5 TABLET ORAL
Qty: 180 TABLET | Refills: 3 | Status: SHIPPED | OUTPATIENT
Start: 2022-09-20 | End: 2022-10-24

## 2022-09-20 ASSESSMENT — FIBROSIS 4 INDEX: FIB4 SCORE: 2.95

## 2022-09-20 NOTE — PROGRESS NOTES
Chief Complaint   Patient presents with    Follow-Up     F/V Dx: LBBB (left bundle branch block)    Hypertension    Coronary Artery Disease     F/V DX:cornary artery disease due to calcified coronary lesion - Calcifications seen on CT scan also wtih aortic ulceration       Subjective     Lauren Dave is a 72 y.o. female who presents today for follow-up of history of labile hypertension left bundle branch block    We did an stress test for episodes of chest pain earlier this year had moderate septal scar    BP has been very labile sometimes 100/60 but as high as 220/160      Past Medical History:   Diagnosis Date    Arthritis     Breast cancer (HCC) 8/7/2013    Bronchitis 2005    Cancer (HCC)     Cancer (HCC)     right breast cancer     Cold     Coronary artery disease due to calcified coronary lesion - Calcifications seen on CT scan also wtih aortic ulceration     Dental disorder     tooth infection    Dyslipidemia     Tried atorvastatin, pravastatin, lovastatin, and Zetia.  All causes severe myalgias.  Just taking fish oil.      Essential hypertension     Heart burn     LBBB (left bundle branch block) 12/16/2014    Noted on prechemo EKG 9/2014, MUGA WNL    Pneumonia     Pseudogout     Scarlet fever     Unspecified hemorrhagic conditions     gums     Past Surgical History:   Procedure Laterality Date    CATH REMOVAL  2/24/2014    Performed by Aggie Burns M.D. at [facility]    MASTECTOMY  8/22/2013    Performed by Aggie Burns M.D. [facility]    NODE BIOPSY SENTINEL  8/22/2013    Performed by Aggie Burns M.D. at [facility]    CATH PLACEMENT  8/22/2013    Performed by Aggie Burns M.D. at [facility]    US-NEEDLE CORE BX-BREAST PANEL  2013    right breast    COLONOSCOPY  2003    BREAST BIOPSY      TONSILLECTOMY       Family History   Problem Relation Age of Onset    Cancer Mother         possible thyroid and  "gynecological    Multiple Sclerosis Mother     Arthritis Father     Multiple Sclerosis Brother     Gout Brother     Heart Disease Paternal Grandmother     Diabetes Paternal Grandmother     Cancer Maternal Aunt         breast cancer- 60s    Diabetes Maternal Uncle     Heart Disease Maternal Grandmother     Heart Disease Maternal Grandfather         MI    Stroke Paternal Grandfather     Other Son         breast mass    Heart Disease Son         HEART MURMUR    Gout Son      Social History     Socioeconomic History    Marital status:      Spouse name: Not on file    Number of children: 2    Years of education: Not on file    Highest education level: Not on file   Occupational History     Employer: [employer]    Tobacco Use    Smoking status: Former     Packs/day: 1.00     Years: 0.00     Pack years: 0.00     Types: Cigarettes     Quit date: 10/1/2013     Years since quittin.9    Smokeless tobacco: Never   Vaping Use    Vaping Use: Never used   Substance and Sexual Activity    Alcohol use: No     Alcohol/week: 0.0 oz    Drug use: No    Sexual activity: Not on file     Comment: , 2 children, enemployed   Other Topics Concern    Not on file   Social History Narrative    ** Merged History Encounter **         Patient is a . Patient has 2 adult children. She is  from .           Social Determinants of Health     Financial Resource Strain: Not on file   Food Insecurity: Not on file   Transportation Needs: Not on file   Physical Activity: Not on file   Stress: Not on file   Social Connections: Not on file   Intimate Partner Violence: Not on file   Housing Stability: Not on file     Allergies   Allergen Reactions    Statins [Hmg-Coa-R Inhibitors] Unspecified     Muscle Spasms   RXN=unknown    Atorvastatin     Eggs      Pt states that she is allergic to eggs per her mother.    Lisinopril Cough    Losartan      Tried in 2017    Penicillins      \"does not work\" .'IMMUNE TO " "PENICILLIN,AMPICILLIN IS THE ONLY THING THAT WORKS'    Codeine Vomiting and Nausea     Clarified with patient - states that she has an \"upset stomach\" when she takes it     Outpatient Encounter Medications as of 9/20/2022   Medication Sig Dispense Refill    amLODIPine (NORVASC) 5 MG Tab Take 1 Tablet by mouth 1 time a day as needed (/90). 90 Tablet 3    hydrALAZINE (APRESOLINE) 10 MG Tab Take 1 Tablet by mouth 3 times a day as needed (/100). 25 Tablet 11    predniSONE (DELTASONE) 5 MG Tab Take 10 mg by mouth every day.      hydroxychloroquine (PLAQUENIL) 200 MG Tab Take 1 tablet by mouth every day. 30 tablet 0    famotidine (PEPCID) 40 MG Tab Take 1 tablet by mouth twice daily (Patient taking differently: Take 40 mg by mouth as needed.) 180 tablet 1    Cyanocobalamin (VITAMIN B-12 PO) Take  by mouth.      ibuprofen (MOTRIN) 800 MG Tab TAKE 1 TABLET BY MOUTH EVERY 8 HOURS AS NEEDED FOR MILD PAIN 90 Tab 0    Cholecalciferol (VITAMIN D3) 5000 units Cap Take 1 Cap by mouth every day.       No facility-administered encounter medications on file as of 9/20/2022.     ROS           Objective     BP (!) 160/80 (BP Location: Left arm, Patient Position: Sitting, BP Cuff Size: Adult)   Pulse 84   Resp 16   Ht 1.575 m (5' 2\")   Wt 66.7 kg (147 lb)   SpO2 98%   BMI 26.89 kg/m²     Physical Exam  Constitutional:       General: She is not in acute distress.     Appearance: She is not diaphoretic.   HENT:      Mouth/Throat:      Comments: Wearing a mask for COVID protocol  Eyes:      General: No scleral icterus.  Neck:      Vascular: No JVD.   Cardiovascular:      Rate and Rhythm: Normal rate.      Heart sounds: Normal heart sounds. No murmur heard.    No friction rub. No gallop.   Pulmonary:      Effort: No respiratory distress.      Breath sounds: No wheezing or rales.   Abdominal:      General: Bowel sounds are normal.      Palpations: Abdomen is soft.   Musculoskeletal:      Right lower leg: No edema.      Left " lower leg: No edema.   Skin:     Findings: No rash.   Neurological:      Mental Status: She is alert. Mental status is at baseline.   Psychiatric:         Mood and Affect: Mood normal.          We reviewed in person the most recent labs  Recent Results (from the past 5040 hour(s))   Comp Metabolic Panel    Collection Time: 06/29/22 12:28 PM   Result Value Ref Range    Sodium 136 135 - 145 mmol/L    Potassium 4.5 3.6 - 5.5 mmol/L    Chloride 102 96 - 112 mmol/L    Co2 24 20 - 33 mmol/L    Anion Gap 10.0 7.0 - 16.0    Glucose 145 (H) 65 - 99 mg/dL    Bun 29 (H) 8 - 22 mg/dL    Creatinine 1.10 0.50 - 1.40 mg/dL    Calcium 9.6 8.5 - 10.5 mg/dL    AST(SGOT) 38 12 - 45 U/L    ALT(SGPT) 12 2 - 50 U/L    Alkaline Phosphatase 39 30 - 99 U/L    Total Bilirubin 0.2 0.1 - 1.5 mg/dL    Albumin 4.4 3.2 - 4.9 g/dL    Total Protein 7.1 6.0 - 8.2 g/dL    Globulin 2.7 1.9 - 3.5 g/dL    A-G Ratio 1.6 g/dL   CBC WITH DIFFERENTIAL    Collection Time: 06/29/22 12:28 PM   Result Value Ref Range    WBC 8.8 4.8 - 10.8 K/uL    RBC 4.01 (L) 4.20 - 5.40 M/uL    Hemoglobin 12.6 12.0 - 16.0 g/dL    Hematocrit 40.1 37.0 - 47.0 %    .0 (H) 81.4 - 97.8 fL    MCH 31.4 27.0 - 33.0 pg    MCHC 31.4 (L) 33.6 - 35.0 g/dL    RDW 46.9 35.9 - 50.0 fL    Platelet Count 268 164 - 446 K/uL    MPV 10.6 9.0 - 12.9 fL    Neutrophils-Polys 83.20 (H) 44.00 - 72.00 %    Lymphocytes 11.00 (L) 22.00 - 41.00 %    Monocytes 4.70 0.00 - 13.40 %    Eosinophils 0.30 0.00 - 6.90 %    Basophils 0.20 0.00 - 1.80 %    Immature Granulocytes 0.60 0.00 - 0.90 %    Nucleated RBC 0.00 /100 WBC    Neutrophils (Absolute) 7.29 (H) 2.00 - 7.15 K/uL    Lymphs (Absolute) 0.96 (L) 1.00 - 4.80 K/uL    Monos (Absolute) 0.41 0.00 - 0.85 K/uL    Eos (Absolute) 0.03 0.00 - 0.51 K/uL    Baso (Absolute) 0.02 0.00 - 0.12 K/uL    Immature Granulocytes (abs) 0.05 0.00 - 0.11 K/uL    NRBC (Absolute) 0.00 K/uL   ESTIMATED GFR    Collection Time: 06/29/22 12:28 PM   Result Value Ref Range     GFR (CKD-EPI) 53 (A) >60 mL/min/1.73 m 2   THYROID CASCADE PROFILE    Collection Time: 06/29/22 12:31 PM   Result Value Ref Range    TSH 0.870 0.380 - 5.330 uIU/mL   Sed Rate    Collection Time: 06/29/22 12:31 PM   Result Value Ref Range    Sed Rate Westergren 16 0 - 25 mm/hour   CRP QUANTITIVE (NON-CARDIAC)    Collection Time: 06/29/22 12:31 PM   Result Value Ref Range    Stat C-Reactive Protein <0.30 0.00 - 0.75 mg/dL       We reviewed the pictures of stress test with scar of the septum EF was 31%  Assessment & Plan     1. Dyslipidemia        2. Coronary artery disease due to calcified coronary lesion - Calcifications seen on CT scan also wtih aortic ulceration        3. LBBB (left bundle branch block)            Medical Decision Making: Today's Assessment/Status/Plan:        It was my pleasure to meet with Ms. Dave.    We addressed the management of hypertension at today's visit. Blood pressure is well controlled.  We specifically assessed the labs on hypertension treatment    We addressed the management of dyslipidemia at today's visit. She is intolerant to statin    We addressed the management of coronary artery disease.  She is on proper antiplatelet, cholesterol management and beta-blockers as appropriate.  We addressed the potential side effects and laboratory follow-up for these medications.    Check EF with echocardiogram if low consider angiogram    Has typical angina will add daily amlodipine and increase as needed    I will see Ms. Dave back in 3 months time and encouraged her to follow up with us over the phone or electronically using my MyChart as issues arise.    It is my pleasure to participate in the care of Ms. Dave.  Please do not hesitate to contact me with questions or concerns.    Jarrod Mayberry MD PhD FACC  Cardiologist [facility]    Please note that this dictation was created using voice recognition software. There may be errors I did  not discover before finalizing the note.

## 2022-09-21 ENCOUNTER — TELEPHONE (OUTPATIENT)
Dept: VASCULAR LAB | Facility: MEDICAL CENTER | Age: 72
End: 2022-09-21
Payer: MEDICARE

## 2022-09-21 NOTE — TELEPHONE ENCOUNTER
[facility]    Called pt to establish care with lipid clinic.  Pt refuses to establish care until she knows the cost of the medications we will use. Provided pt with names of the medications so she can check withher insurance regarding deductibles, etc.     Follow up in 2 weeks.    Vince Sheppard, PharmD

## 2022-10-05 ENCOUNTER — DOCUMENTATION (OUTPATIENT)
Dept: VASCULAR LAB | Facility: MEDICAL CENTER | Age: 72
End: 2022-10-05
Payer: MEDICARE

## 2022-10-05 NOTE — PROGRESS NOTES
[facility] and Pharmacotherapy [facility]    Received pharmacotherapy referral for lipids  from Dr. Mayberry on 9-20-22.    Patient requests c/b in a week or so to discuss scheduling initial visit as she would like more time to investigate medication cost(s).    Insurance: Medicare  PCP: non-[facility]  Locations to be seen: [facility] at [phone], fax [phone]    Sumit Bill, PharmD, BCACP

## 2022-10-12 ENCOUNTER — TELEPHONE (OUTPATIENT)
Dept: CARDIOLOGY | Facility: MEDICAL CENTER | Age: 72
End: 2022-10-12

## 2022-10-12 ENCOUNTER — HOSPITAL ENCOUNTER (OUTPATIENT)
Dept: CARDIOLOGY | Facility: MEDICAL CENTER | Age: 72
End: 2022-10-12
Attending: INTERNAL MEDICINE
Payer: MEDICARE

## 2022-10-12 DIAGNOSIS — I25.84 CORONARY ARTERY DISEASE DUE TO CALCIFIED CORONARY LESION: Chronic | ICD-10-CM

## 2022-10-12 DIAGNOSIS — I44.7 LBBB (LEFT BUNDLE BRANCH BLOCK): Chronic | ICD-10-CM

## 2022-10-12 DIAGNOSIS — I44.7 LBBB (LEFT BUNDLE BRANCH BLOCK): ICD-10-CM

## 2022-10-12 DIAGNOSIS — I10 ESSENTIAL HYPERTENSION: Chronic | ICD-10-CM

## 2022-10-12 DIAGNOSIS — I25.10 CORONARY ARTERY DISEASE DUE TO CALCIFIED CORONARY LESION: Chronic | ICD-10-CM

## 2022-10-12 DIAGNOSIS — I50.22 CHRONIC SYSTOLIC CONGESTIVE HEART FAILURE (HCC): ICD-10-CM

## 2022-10-12 LAB
LV EJECT FRACT  99904: 35
LV EJECT FRACT MOD 2C 99903: 42.88
LV EJECT FRACT MOD 4C 99902: 39
LV EJECT FRACT MOD BP 99901: 41.48

## 2022-10-12 PROCEDURE — 93306 TTE W/DOPPLER COMPLETE: CPT

## 2022-10-12 PROCEDURE — 93306 TTE W/DOPPLER COMPLETE: CPT | Mod: 26 | Performed by: INTERNAL MEDICINE

## 2022-10-13 ENCOUNTER — TELEPHONE (OUTPATIENT)
Dept: VASCULAR LAB | Facility: MEDICAL CENTER | Age: 72
End: 2022-10-13
Payer: MEDICARE

## 2022-10-13 NOTE — TELEPHONE ENCOUNTER
[facility] and Pharmacotherapy [facility]    Received pharmacotherapy referral for lipids from Dr. Mayberry on 9/20/22    Patient states she will c/b to discuss scheduling initial visit as she would like more time to investigate medication cost(s).    Insurance: A[employer]  PCP: [facility]  Locations to be seen: Any    [facility]ticoagulation[facility]Pharmacotherapy Clinic at [phone], fax [phone]    Marciano Hogan, DelorisD, BCACP

## 2022-10-18 ENCOUNTER — PRE-ADMISSION TESTING (OUTPATIENT)
Dept: ADMISSIONS | Facility: MEDICAL CENTER | Age: 72
End: 2022-10-18
Attending: INTERNAL MEDICINE
Payer: MEDICARE

## 2022-10-18 ENCOUNTER — TELEPHONE (OUTPATIENT)
Dept: CARDIOLOGY | Facility: MEDICAL CENTER | Age: 72
End: 2022-10-18

## 2022-10-18 DIAGNOSIS — Z01.810 PRE-OPERATIVE CARDIOVASCULAR EXAMINATION: ICD-10-CM

## 2022-10-18 DIAGNOSIS — Z01.812 PRE-OPERATIVE LABORATORY EXAMINATION: ICD-10-CM

## 2022-10-18 LAB
ALBUMIN SERPL BCP-MCNC: 4.3 G/DL (ref 3.2–4.9)
ALBUMIN/GLOB SERPL: 1.5 G/DL
ALP SERPL-CCNC: 47 U/L (ref 30–99)
ALT SERPL-CCNC: 14 U/L (ref 2–50)
ANION GAP SERPL CALC-SCNC: 13 MMOL/L (ref 7–16)
APTT PPP: 27.7 SEC (ref 24.7–36)
AST SERPL-CCNC: 34 U/L (ref 12–45)
BILIRUB SERPL-MCNC: 0.2 MG/DL (ref 0.1–1.5)
BUN SERPL-MCNC: 28 MG/DL (ref 8–22)
CALCIUM SERPL-MCNC: 9.5 MG/DL (ref 8.5–10.5)
CHLORIDE SERPL-SCNC: 101 MMOL/L (ref 96–112)
CO2 SERPL-SCNC: 25 MMOL/L (ref 20–33)
CREAT SERPL-MCNC: 1.09 MG/DL (ref 0.5–1.4)
EKG IMPRESSION: NORMAL
ERYTHROCYTE [DISTWIDTH] IN BLOOD BY AUTOMATED COUNT: 44.1 FL (ref 35.9–50)
GFR SERPLBLD CREATININE-BSD FMLA CKD-EPI: 54 ML/MIN/1.73 M 2
GLOBULIN SER CALC-MCNC: 2.9 G/DL (ref 1.9–3.5)
GLUCOSE SERPL-MCNC: 196 MG/DL (ref 65–99)
HCT VFR BLD AUTO: 38.9 % (ref 37–47)
HGB BLD-MCNC: 12.6 G/DL (ref 12–16)
INR PPP: 0.91 (ref 0.87–1.13)
MCH RBC QN AUTO: 31.3 PG (ref 27–33)
MCHC RBC AUTO-ENTMCNC: 32.4 G/DL (ref 33.6–35)
MCV RBC AUTO: 96.5 FL (ref 81.4–97.8)
PLATELET # BLD AUTO: 331 K/UL (ref 164–446)
PMV BLD AUTO: 9.8 FL (ref 9–12.9)
POTASSIUM SERPL-SCNC: 4.2 MMOL/L (ref 3.6–5.5)
PROT SERPL-MCNC: 7.2 G/DL (ref 6–8.2)
PROTHROMBIN TIME: 12.2 SEC (ref 12–14.6)
RBC # BLD AUTO: 4.03 M/UL (ref 4.2–5.4)
SODIUM SERPL-SCNC: 139 MMOL/L (ref 135–145)
WBC # BLD AUTO: 6.8 K/UL (ref 4.8–10.8)

## 2022-10-18 PROCEDURE — 36415 COLL VENOUS BLD VENIPUNCTURE: CPT

## 2022-10-18 PROCEDURE — 85610 PROTHROMBIN TIME: CPT

## 2022-10-18 PROCEDURE — 93005 ELECTROCARDIOGRAM TRACING: CPT

## 2022-10-18 PROCEDURE — 93010 ELECTROCARDIOGRAM REPORT: CPT | Performed by: INTERNAL MEDICINE

## 2022-10-18 PROCEDURE — 85730 THROMBOPLASTIN TIME PARTIAL: CPT

## 2022-10-18 PROCEDURE — 80053 COMPREHEN METABOLIC PANEL: CPT

## 2022-10-18 PROCEDURE — 85027 COMPLETE CBC AUTOMATED: CPT

## 2022-10-18 ASSESSMENT — FIBROSIS 4 INDEX: FIB4 SCORE: 2.95

## 2022-10-18 NOTE — TELEPHONE ENCOUNTER
Received notification from ANEL, procedure  preadmitting KAT[facility] Oh states they are hesitant to let patient go home due to EKG results.  Requesting advise.    Voalte sent to MD for advise, awaiting response.

## 2022-10-18 NOTE — TELEPHONE ENCOUNTER
Already done      So nice to meet you!        Preventive Health Recommendations  Male Ages 40 to 49    Yearly exam:             See your health care provider every year in order to  o   Review health changes.   o   Discuss preventive care.    o   Review your medicines if your doctor has prescribed any.  You should be tested each year for STDs (sexually transmitted diseases) if you re at risk.   Have a cholesterol test every 5 years.   Have a colonoscopy (test for colon cancer) if someone in your family has had colon cancer or polyps before age 50.   After age 45, have a diabetes test (fasting glucose). If you are at risk for diabetes, you should have this test every 3 years.    Talk with your health care provider about whether or not a prostate cancer screening test (PSA) is right for you.    Shots: Get a flu shot each year. Get a tetanus shot every 10 years.     Nutrition:  Eat at least 5 servings of fruits and vegetables daily.   Eat whole-grain bread, whole-wheat pasta and brown rice instead of white grains and rice.   Get adequate Calcium and Vitamin D.     Lifestyle  Exercise for at least 150 minutes a week (30 minutes a day, 5 days a week). This will help you control your weight and prevent disease.   Limit alcohol to one drink per day.   No smoking.   Wear sunscreen to prevent skin cancer.   See your dentist every six months for an exam and cleaning.

## 2022-10-18 NOTE — TELEPHONE ENCOUNTER
Returned Elizabeth's call and reviewed MD recommendations.  She verbalizes understanding and states no other concerns or questions at this time.  Elizabeth is appreciative of information given.

## 2022-10-18 NOTE — TELEPHONE ENCOUNTER
Patient preop for Tomorrow EKG does show some ST depressions not present on prior but in the setting of left bundle branch block alarming she can safely go home and follow-up with us tomorrow for angiogram.    It is my pleasure to participate in the care of Ms. Dave.  Please do not hesitate to contact me with questions or concerns.    Jarrod Mayberry MD PhD [facility]  Cardiologist [facility]    Please note that this dictation was created using voice recognition software. There may be errors I did not discover before finalizing the note.     10/18/2022  3:32 PM

## 2022-10-18 NOTE — TELEPHONE ENCOUNTER
CW    Caller and Facility Name:   Elizabeth from Pre- Admit  Requesting the EKG be read STAT    Callback Number or Extension: -  [phone]  [phone]    Thank you,   Althea RIVERA

## 2022-10-19 ENCOUNTER — HOSPITAL ENCOUNTER (OUTPATIENT)
Facility: MEDICAL CENTER | Age: 72
End: 2022-10-19
Attending: INTERNAL MEDICINE | Admitting: INTERNAL MEDICINE
Payer: MEDICARE

## 2022-10-19 ENCOUNTER — APPOINTMENT (OUTPATIENT)
Dept: CARDIOLOGY | Facility: MEDICAL CENTER | Age: 72
End: 2022-10-19
Attending: INTERNAL MEDICINE
Payer: MEDICARE

## 2022-10-19 ENCOUNTER — TELEPHONE (OUTPATIENT)
Dept: CARDIOLOGY | Facility: MEDICAL CENTER | Age: 72
End: 2022-10-19

## 2022-10-19 VITALS
HEART RATE: 83 BPM | WEIGHT: 144.18 LBS | TEMPERATURE: 96.8 F | RESPIRATION RATE: 18 BRPM | DIASTOLIC BLOOD PRESSURE: 80 MMHG | OXYGEN SATURATION: 95 % | BODY MASS INDEX: 25.55 KG/M2 | SYSTOLIC BLOOD PRESSURE: 129 MMHG | HEIGHT: 63 IN

## 2022-10-19 DIAGNOSIS — I44.7 LBBB (LEFT BUNDLE BRANCH BLOCK): ICD-10-CM

## 2022-10-19 DIAGNOSIS — I25.9 ISCHEMIC HEART DISEASE DUE TO CORONARY ARTERY OBSTRUCTION (HCC): Chronic | ICD-10-CM

## 2022-10-19 DIAGNOSIS — I25.84 CORONARY ARTERY DISEASE DUE TO CALCIFIED CORONARY LESION: ICD-10-CM

## 2022-10-19 DIAGNOSIS — I25.84 CORONARY ARTERY DISEASE DUE TO CALCIFIED CORONARY LESION: Chronic | ICD-10-CM

## 2022-10-19 DIAGNOSIS — I24.0 ISCHEMIC HEART DISEASE DUE TO CORONARY ARTERY OBSTRUCTION (HCC): Chronic | ICD-10-CM

## 2022-10-19 DIAGNOSIS — I25.10 CORONARY ARTERY DISEASE DUE TO CALCIFIED CORONARY LESION: Chronic | ICD-10-CM

## 2022-10-19 DIAGNOSIS — I25.10 CORONARY ARTERY DISEASE DUE TO CALCIFIED CORONARY LESION: ICD-10-CM

## 2022-10-19 DIAGNOSIS — I50.22 CHRONIC SYSTOLIC CONGESTIVE HEART FAILURE (HCC): ICD-10-CM

## 2022-10-19 LAB — GLUCOSE BLD STRIP.AUTO-MCNC: 74 MG/DL (ref 65–99)

## 2022-10-19 PROCEDURE — 82962 GLUCOSE BLOOD TEST: CPT

## 2022-10-19 PROCEDURE — 700102 HCHG RX REV CODE 250 W/ 637 OVERRIDE(OP)

## 2022-10-19 PROCEDURE — 160035 HCHG PACU - 1ST 60 MINS PHASE I

## 2022-10-19 PROCEDURE — 99153 MOD SED SAME PHYS/QHP EA: CPT

## 2022-10-19 PROCEDURE — 93458 L HRT ARTERY/VENTRICLE ANGIO: CPT | Mod: 26 | Performed by: INTERNAL MEDICINE

## 2022-10-19 PROCEDURE — 700111 HCHG RX REV CODE 636 W/ 250 OVERRIDE (IP)

## 2022-10-19 PROCEDURE — 700105 HCHG RX REV CODE 258: Performed by: INTERNAL MEDICINE

## 2022-10-19 PROCEDURE — 160046 HCHG PACU - 1ST 60 MINS PHASE II

## 2022-10-19 PROCEDURE — 700101 HCHG RX REV CODE 250

## 2022-10-19 PROCEDURE — 700117 HCHG RX CONTRAST REV CODE 255: Performed by: INTERNAL MEDICINE

## 2022-10-19 PROCEDURE — 99152 MOD SED SAME PHYS/QHP 5/>YRS: CPT | Performed by: INTERNAL MEDICINE

## 2022-10-19 PROCEDURE — A9270 NON-COVERED ITEM OR SERVICE: HCPCS

## 2022-10-19 PROCEDURE — 160002 HCHG RECOVERY MINUTES (STAT)

## 2022-10-19 RX ORDER — SODIUM CHLORIDE 9 MG/ML
INJECTION, SOLUTION INTRAVENOUS CONTINUOUS
Status: DISCONTINUED | OUTPATIENT
Start: 2022-10-19 | End: 2022-10-19 | Stop reason: HOSPADM

## 2022-10-19 RX ORDER — SODIUM CHLORIDE 9 MG/ML
1000 INJECTION, SOLUTION INTRAVENOUS ONCE
Status: COMPLETED | OUTPATIENT
Start: 2022-10-19 | End: 2022-10-19

## 2022-10-19 RX ORDER — HEPARIN SODIUM 200 [USP'U]/100ML
INJECTION, SOLUTION INTRAVENOUS
Status: COMPLETED
Start: 2022-10-19 | End: 2022-10-19

## 2022-10-19 RX ORDER — MIDAZOLAM HYDROCHLORIDE 1 MG/ML
INJECTION INTRAMUSCULAR; INTRAVENOUS
Status: COMPLETED
Start: 2022-10-19 | End: 2022-10-19

## 2022-10-19 RX ORDER — ASPIRIN 81 MG/1
TABLET, CHEWABLE ORAL
Status: COMPLETED
Start: 2022-10-19 | End: 2022-10-19

## 2022-10-19 RX ORDER — METOPROLOL SUCCINATE 25 MG/1
25 TABLET, EXTENDED RELEASE ORAL EVERY EVENING
Qty: 90 TABLET | Refills: 0 | Status: SHIPPED | OUTPATIENT
Start: 2022-10-19 | End: 2022-10-19

## 2022-10-19 RX ORDER — AMPICILLIN 500 MG/1
500 CAPSULE ORAL 4 TIMES DAILY
Status: ON HOLD | COMMUNITY
Start: 2022-11-03 | End: 2022-11-28

## 2022-10-19 RX ORDER — LIDOCAINE HYDROCHLORIDE 20 MG/ML
INJECTION, SOLUTION INFILTRATION; PERINEURAL
Status: COMPLETED
Start: 2022-10-19 | End: 2022-10-19

## 2022-10-19 RX ORDER — ASPIRIN 81 MG/1
81 TABLET ORAL DAILY
Qty: 100 TABLET | Refills: 0 | Status: SHIPPED | OUTPATIENT
Start: 2022-10-19

## 2022-10-19 RX ORDER — HEPARIN SODIUM 1000 [USP'U]/ML
INJECTION, SOLUTION INTRAVENOUS; SUBCUTANEOUS
Status: COMPLETED
Start: 2022-10-19 | End: 2022-10-19

## 2022-10-19 RX ORDER — VERAPAMIL HYDROCHLORIDE 2.5 MG/ML
INJECTION, SOLUTION INTRAVENOUS
Status: COMPLETED
Start: 2022-10-19 | End: 2022-10-19

## 2022-10-19 RX ADMIN — HEPARIN SODIUM: 1000 INJECTION, SOLUTION INTRAVENOUS; SUBCUTANEOUS at 09:22

## 2022-10-19 RX ADMIN — MIDAZOLAM 2 MG: 1 INJECTION INTRAMUSCULAR; INTRAVENOUS at 10:25

## 2022-10-19 RX ADMIN — IOHEXOL 37 ML: 350 INJECTION, SOLUTION INTRAVENOUS at 10:28

## 2022-10-19 RX ADMIN — SODIUM CHLORIDE 1000 ML: 9 INJECTION, SOLUTION INTRAVENOUS at 08:55

## 2022-10-19 RX ADMIN — ASPIRIN 81 MG 324 MG: 81 TABLET ORAL at 10:10

## 2022-10-19 RX ADMIN — HEPARIN SODIUM 2000 UNITS: 200 INJECTION, SOLUTION INTRAVENOUS at 09:22

## 2022-10-19 RX ADMIN — LIDOCAINE HYDROCHLORIDE: 20 INJECTION, SOLUTION INFILTRATION; PERINEURAL at 09:22

## 2022-10-19 RX ADMIN — FENTANYL CITRATE 100 MCG: 50 INJECTION, SOLUTION INTRAMUSCULAR; INTRAVENOUS at 10:25

## 2022-10-19 RX ADMIN — NITROGLYCERIN 10 ML: 20 INJECTION INTRAVENOUS at 09:22

## 2022-10-19 RX ADMIN — VERAPAMIL HYDROCHLORIDE 5 MG: 2.5 INJECTION, SOLUTION INTRAVENOUS at 09:22

## 2022-10-19 ASSESSMENT — FIBROSIS 4 INDEX: FIB4 SCORE: 1.98

## 2022-10-19 ASSESSMENT — PAIN DESCRIPTION - PAIN TYPE: TYPE: ACUTE PAIN

## 2022-10-19 NOTE — OR NURSING
1045: Pt arrived from cath lab post L heart cath. Pt is awake and oriented. TR band in place to R radial; CDI. Cardiac rhythm appears to be SR with BBB. Pt denies pain or nausea.     1100: Left VM with callback for pt's contactHaleigh. Pt tolerating orals.     1215: TR band removed per order; gauze and tegaderm to R radial sight; CDI and soft; educated pt on wrist precautions; pt requested work excuse note; RN voalte-texted Dr. Matthew.     1251: Report to [facility] Hathaway. Pt to phase II via julián with RN.

## 2022-10-19 NOTE — OR NURSING
VSS, pt steady with ambulation, meets discharge criteria. Discharged home with friend. Ambulated to lobby with hospital escort. Alfred CDI. IV dc'd, cathlon intact. Pt to f/u with physician as directed by physician. Pt to return to ER for any emergent sx. Pt verbalizes understanding of discharge instructions and all questions were answered.

## 2022-10-19 NOTE — DISCHARGE INSTRUCTIONS
HOME CARE INSTRUCTIONS    ACTIVITY: Rest and take it easy for the first 24 hours.  A responsible adult is recommended to remain with you during that time.  It is normal to feel sleepy.  We encourage you to not do anything that requires balance, judgment or coordination.    FOR 24 HOURS DO NOT:  Drive, operate machinery or run household appliances.  Drink beer or alcoholic beverages.  Make important decisions or sign legal documents.    SPECIAL INSTRUCTIONS:     General Care Instructions  Maintain a bandage over the incision site for 24 hours.  It's normal to find a small bruise or dime-sized lump at the insertion site. This should disappear within a few weeks.  Do not apply lotions or powders to the site.  Do not immerse the catheter insertion site in water (bathtub/swimming) for five days. It is ok to shower 24 hours after the procedure.  You may resume your normal diet immediately; on the day of your procedure, drink 6-10 glasses of water to help flush the contrast liquid out of your system.  If the doctor inserted the catheter in your arm:  For 48 hours, DO NOT lift anything heavier than 10 pounds (approximately a gallon of milk). DO NOT do any heavy pushing, pulling, or twisting.    When to call your healthcare provider  Call your cardiologist right away at [phone] if you have any of the following:   Problems/Concerns taking any of your prescribed heart medicines.   The insertion site has increasing pain, swelling, redness, bleeding, or drainage.   Your arm or leg below where the insertion site changes color, is cool, or is numb.   You have chest pain or shortness of breath that does not go away with rest.   Your pulse feels irregular -- very slow (less than 60 beats/minute) or very fast (over 100 beats/minute).   You have dizziness, fainting, or you are very tired.   You are coughing up blood or yellow or green mucus.   You have chills or a fever over 101°F (38.3°C).    If there is bleeding at the catheter  insertion site, apply pressure for 10 minutes.  If bleeding persists, call 911, and continue to hold pressure until advanced medical support arrives.    DIET: To avoid nausea, slowly advance diet as tolerated, avoiding spicy or greasy foods for the first day.  Add more substantial food to your diet according to your physician's instructions.  Babies can be fed formula or breast milk as soon as they are hungry.  INCREASE FLUIDS AND FIBER TO AVOID CONSTIPATION.    SURGICAL DRESSING/BATHING: May remove dressing in 24 hours. Do not submerge in water such as bath tubs, hot tubs, or swimming pools until cleared by your provider.     MEDICATIONS: Resume taking daily medication.  Take prescribed pain medication with food.  If no medication is prescribed, you may take non-aspirin pain medication if needed.  PAIN MEDICATION CAN BE VERY CONSTIPATING.  Take a stool softener or laxative such as senokot, pericolace, or milk of magnesia if needed.    A follow-up appointment should be arranged with your doctor; call to schedule.    You should CALL YOUR PHYSICIAN if you develop:  Fever greater than 101 degrees F.  Pain not relieved by medication, or persistent nausea or vomiting.  Excessive bleeding (blood soaking through dressing) or unexpected drainage from the wound.  Extreme redness or swelling around the incision site, drainage of pus or foul smelling drainage.  Inability to urinate or empty your bladder within 8 hours.  Problems with breathing or chest pain.    You should call 911 if you develop problems with breathing or chest pain.  If you are unable to contact your doctor or surgical center, you should go to the nearest emergency room or urgent care center.  Physician's telephone #: [phone]    MILD FLU-LIKE SYMPTOMS ARE NORMAL.  YOU MAY EXPERIENCE GENERALIZED MUSCLE ACHES, THROAT IRRITATION, HEADACHE AND/OR SOME NAUSEA.    If any questions arise, call your doctor.  If your doctor is not available, please feel free to  call the Surgical Center at [phone].  The Center is open Monday through Friday from 7AM to 7PM.      A registered nurse may call you a few days after your surgery to see how you are doing after your procedure.    You may also receive a survey in the mail within the next two weeks and we ask that you take a few moments to complete the survey and return it to us.  Our goal is to provide you with very good care and we value your comments.     Depression / Suicide Risk    As you are discharged from this [facility] facility, it is important to learn how to keep safe from harming yourself.    Recognize the warning signs:  Abrupt changes in personality, positive or negative- including increase in energy   Giving away possessions  Change in eating patterns- significant weight changes-  positive or negative  Change in sleeping patterns- unable to sleep or sleeping all the time   Unwillingness or inability to communicate  Depression  Unusual sadness, discouragement and loneliness  Talk of wanting to die  Neglect of personal appearance   Rebelliousness- reckless behavior  Withdrawal from people/activities they love  Confusion- inability to concentrate     If you or a loved one observes any of these behaviors or has concerns about self-harm, here's what you can do:  Talk about it- your feelings and reasons for harming yourself  Remove any means that you might use to hurt yourself (examples: pills, rope, extension cords, firearm)  Get professional help from the community (Mental Health, Substance Abuse, psychological counseling)  Do not be alone:Call your Safe Contact- someone whom you trust who will be there for you.  Call your local CRISIS HOTLINE 035-2273 or 847-073-9033  Call your local Children's Mobile Crisis Response Team [place] (940) 139-1709 or www.[employer]  Call the toll free National Suicide Prevention Hotlines   National Suicide Prevention Lifeline 002-341-BLFK (5991)  National Hope Line  Network [facility] (504-5601)    I acknowledge receipt and understanding of these Home Care instructions.

## 2022-10-19 NOTE — PROCEDURES
Cardiac Catheterization report    10/19/2022  10:49 AM    Referring MD: Dr. Mayberry    Indication/Preoperative diagnosis:  Systolic heart failure  LBBB  Labile hypertension  Aortic atherosclerosis    Postoperative diagnosis:  Calcific multivessel CAD (ostial LAD, mid LCx, pRCA)  LVEDP 12 mmHg    Recommendations:  Guideline directed medical therapy   Cardiovascular Risk factor modification  CABG      Procedures performed:  Coronary arteriograms  Left heart catheterization and Left ventriculogram   Supervision moderate sedation      FINDINGS:  I.  HEMODYNAMICS   Ao: 159/67 mmHg   LVEDP: 12 mmHg   Gradient on LV pullback: No    II. CORONARY ANGIOGRAPHY:  Left main coronary artery:  Large, bifurcating no CAD  Left anterior descending artery:  Large vessel, 95% ostial heavily calcified stenosis.   Left circumflex coronary artery:  Moderate to large caliber, co-dominant. 80% mid stenosis.  Right coronary artery:  Moderate caliber co-dominant 70% calcific proximal-ostial stenosis.      Procedure details:  The risks and benefits of cardiac catheterization and possible intervention were explained to the patient including death, heart attack, stroke, and emergency surgery.  The patient verbalized understanding and wished to proceed.  The patient was brought to the cardiac catheterization laboratory in the fasting state and prepped and draped in the usual sterile fashion.  The right wrist was locally anesthetized with lidocaine and the right radial artery was cannulated with 5/6-[place] equipment and standard radial cocktail was given.  Coronary angiography was performed using standard diagnostic catheters in the usual fashion and used to cross the aortic valve to perform  left heart catheterization. All catheters and guidewires were removed.  A TR-Band was placed without difficulty to achieve patent hemostasis.  Patient tolerated procedure well left the Cath Lab in stable condition.    Complications:  None apparent    Sedation  time:  Moderate sedation directly monitored by me during the case while supervising the administration of the sedation medication by an independent trained RN to assist in the monitoring of the patient's level of consciousness and physiological status. I, the supervising physician was present the entire time from beginning of medication administration until the end of the procedure from 1007 until 1031. For detailed administration records please see the moderate sedation documentation in the media tab.    Following the procedure I discussed the results with the patient and the patient who declines to have anyone else notified. Discussed with Dr. Mayberry.    Yair Matthew MD, FACC, [facility]

## 2022-10-19 NOTE — PROGRESS NOTES
Patient in pre-op, assessment completed, patient and friend Haleigh updated on plan of care, all questions answered, no further needs at this time, call light within reach.

## 2022-10-20 ENCOUNTER — OFFICE VISIT (OUTPATIENT)
Dept: CARDIOLOGY | Facility: MEDICAL CENTER | Age: 72
End: 2022-10-20
Payer: MEDICARE

## 2022-10-20 VITALS
HEART RATE: 82 BPM | HEIGHT: 62 IN | WEIGHT: 146.8 LBS | SYSTOLIC BLOOD PRESSURE: 118 MMHG | OXYGEN SATURATION: 95 % | DIASTOLIC BLOOD PRESSURE: 58 MMHG | RESPIRATION RATE: 14 BRPM | BODY MASS INDEX: 27.02 KG/M2

## 2022-10-20 DIAGNOSIS — I25.10 CORONARY ARTERY DISEASE DUE TO CALCIFIED CORONARY LESION: Chronic | ICD-10-CM

## 2022-10-20 DIAGNOSIS — I24.0 ISCHEMIC HEART DISEASE DUE TO CORONARY ARTERY OBSTRUCTION (HCC): Chronic | ICD-10-CM

## 2022-10-20 DIAGNOSIS — I25.9 ISCHEMIC HEART DISEASE DUE TO CORONARY ARTERY OBSTRUCTION (HCC): Chronic | ICD-10-CM

## 2022-10-20 DIAGNOSIS — I50.22 CHRONIC SYSTOLIC CONGESTIVE HEART FAILURE (HCC): Chronic | ICD-10-CM

## 2022-10-20 DIAGNOSIS — E27.40 HYPOADRENALISM (HCC): Chronic | ICD-10-CM

## 2022-10-20 DIAGNOSIS — D69.2 SENILE PURPURA (HCC): ICD-10-CM

## 2022-10-20 DIAGNOSIS — I70.0 ATHEROSCLEROSIS OF AORTA (HCC): ICD-10-CM

## 2022-10-20 DIAGNOSIS — E78.5 DYSLIPIDEMIA: ICD-10-CM

## 2022-10-20 DIAGNOSIS — I10 ESSENTIAL HYPERTENSION: Chronic | ICD-10-CM

## 2022-10-20 DIAGNOSIS — I44.7 LBBB (LEFT BUNDLE BRANCH BLOCK): Chronic | ICD-10-CM

## 2022-10-20 DIAGNOSIS — I25.84 CORONARY ARTERY DISEASE DUE TO CALCIFIED CORONARY LESION: Chronic | ICD-10-CM

## 2022-10-20 PROCEDURE — 99215 OFFICE O/P EST HI 40 MIN: CPT | Performed by: INTERNAL MEDICINE

## 2022-10-20 RX ORDER — METOPROLOL SUCCINATE 50 MG/1
50 TABLET, EXTENDED RELEASE ORAL DAILY
Qty: 90 TABLET | Refills: 3 | Status: ON HOLD | OUTPATIENT
Start: 2022-10-20 | End: 2022-12-01

## 2022-10-20 ASSESSMENT — FIBROSIS 4 INDEX: FIB4 SCORE: 1.98

## 2022-10-20 NOTE — PATIENT INSTRUCTIONS
Cardiac Catheterization report     10/19/2022  10:49 AM     Referring MD: Dr. Mayberry     Indication/Preoperative diagnosis:  Systolic heart failure  LBBB  Labile hypertension  Aortic atherosclerosis     Postoperative diagnosis:  Calcific multivessel CAD (ostial LAD, mid LCx, pRCA)  LVEDP 12 mmHg     Recommendations:  Guideline directed medical therapy   Cardiovascular Risk factor modification  CABG        Procedures performed:  Coronary arteriograms  Left heart catheterization and Left ventriculogram   Supervision moderate sedation        FINDINGS:  I.  HEMODYNAMICS              Ao: 159/67 mmHg              LVEDP: 12 mmHg              Gradient on LV pullback: No     II. CORONARY ANGIOGRAPHY:  Left main coronary artery:  Large, bifurcating no CAD  Left anterior descending artery:  Large vessel, 95% ostial heavily calcified stenosis.   Left circumflex coronary artery:  Moderate to large caliber, co-dominant. 80% mid stenosis.  Right coronary artery:  Moderate caliber co-dominant 70% calcific proximal-ostial stenosis.        Procedure details:  The risks and benefits of cardiac catheterization and possible intervention were explained to the patient including death, heart attack, stroke, and emergency surgery.  The patient verbalized understanding and wished to proceed.  The patient was brought to the cardiac catheterization laboratory in the fasting state and prepped and draped in the usual sterile fashion.  The right wrist was locally anesthetized with lidocaine and the right radial artery was cannulated with 5/6-Azeri equipment and standard radial cocktail was given.  Coronary angiography was performed using standard diagnostic catheters in the usual fashion and used to cross the aortic valve to perform  left heart catheterization. All catheters and guidewires were removed.  A TR-Band was placed without difficulty to achieve patent hemostasis.  Patient tolerated procedure well left the Cath Lab in stable  condition.     Complications:  None apparent     Sedation time:  Moderate sedation directly monitored by me during the case while supervising the administration of the sedation medication by an independent trained RN to assist in the monitoring of the patient's level of consciousness and physiological status. I, the supervising physician was present the entire time from beginning of medication administration until the end of the procedure from 1007 until 1031. For detailed administration records please see the moderate sedation documentation in the media tab.     Following the procedure I discussed the results with the patient and the patient who declines to have anyone else notified. Discussed with Dr. Mayberry.     Yair Matthew MD, FACC, [facility]

## 2022-10-20 NOTE — PROGRESS NOTES
Chief Complaint   Patient presents with    Dyslipidemia       Subjective     Lauren Dave is a 72 y.o. female who presents today for urgent follow-up of her angiogram yesterday.  We did arrange an angiogram due to abnormal stress test and echocardiogram and this found that she had severe three-vessel coronary disease.  I have arranged for her to meet with Dr. Padmini Mac for CT surgery to discuss surgery    Obviously with these findings she is very anxious about her cholesterol the past we tried multiple statins which she was intolerant to and referred for PCSK9 with the above information hopefully that can help to get it approved and hopefully it is affordable    She has been on long-term steroids and is down on the normal amount we discussed for heart surgery she may be advised to take stress dose steroids given her steroid dependence and that this would increase possibility for complications related to surgery    She has long-term back pain and was wondering if this was related to her heart    When she did her pre-EKG she had ST depressions in the setting of stress and high blood pressure which increases the risk of her ischemic heart disease with coronary obstruction    She is done well on aspirin and prednisone she does have senile purpura on exam today    Past Medical History:   Diagnosis Date    Arthritis     Breast cancer (HCC) 08/07/2013    Bronchitis 2005    Cancer (HCC)     right breast cancer     Cataract     removed    Chronic systolic congestive heart failure (HCC) EF 35% 10/12/2022    Coronary artery disease due to calcified coronary lesion - Calcifications seen on CT scan also wtih aortic ulceration     Dental disorder     tooth infection    Dyslipidemia     Tried atorvastatin, pravastatin, lovastatin, and Zetia.  All causes severe myalgias.  Just taking fish oil.      Essential hypertension     Heart burn     Ischemic heart disease due to coronary artery obstruction (HCC) - Severe 3vD  on cath 10/19/2022    LBBB (left bundle branch block) 2014    Noted on prechemo EKG 2014, MUGA WNL    Pneumonia     Pseudogout     Scarlet fever     Unspecified hemorrhagic conditions     gums     Past Surgical History:   Procedure Laterality Date    CATH REMOVAL  2014    Performed by Aggie Burns M.D. at [facility]    MASTECTOMY  2013    Performed by Aggie Burns M.D. at [facility]    NODE BIOPSY SENTINEL  2013    Performed by Aggie Burns M.D. at [facility]    CATH PLACEMENT  2013    Performed by Aggie Burns M.D. at [facility]    US-NEEDLE CORE BX-BREAST PANEL      right breast    COLONOSCOPY      BREAST BIOPSY      TONSILLECTOMY       Family History   Problem Relation Age of Onset    Cancer Mother         possible thyroid and gynecological    Multiple Sclerosis Mother     Arthritis Father     Multiple Sclerosis Brother     Gout Brother     Heart Disease Paternal Grandmother     Diabetes Paternal Grandmother     Cancer Maternal Aunt         breast cancer- 60s    Diabetes Maternal Uncle     Heart Disease Maternal Grandmother     Heart Disease Maternal Grandfather         MI    Stroke Paternal Grandfather     Other Son         breast mass    Heart Disease Son         HEART MURMUR    Gout Son      Social History     Socioeconomic History    Marital status:      Spouse name: Not on file    Number of children: 2    Years of education: Not on file    Highest education level: Not on file   Occupational History     Employer: [employer]   Tobacco Use    Smoking status: Former     Packs/day: 1.00     Years: 0.00     Pack years: 0.00     Types: Cigarettes     Quit date: 10/1/2013     Years since quittin.0    Smokeless tobacco: Never   Vaping Use    Vaping Use: Never used   Substance and Sexual Activity    Alcohol use: No     Alcohol/week: 0.0 oz    Drug use: No    Sexual  "activity: Not on file     Comment: , 2 children, enemployed   Other Topics Concern    Not on file   Social History Narrative    ** Merged History Encounter **         Patient is a . Patient has 2 adult children. She is  from .           Social Determinants of Health     Financial Resource Strain: Not on file   Food Insecurity: Not on file   Transportation Needs: Not on file   Physical Activity: Not on file   Stress: Not on file   Social Connections: Not on file   Intimate Partner Violence: Not on file   Housing Stability: Not on file     Allergies   Allergen Reactions    Statins [Hmg-Coa-R Inhibitors] Unspecified     Muscle Spasms   RXN=unknown    Atorvastatin     Eggs      Pt states that she is allergic to eggs per her mother.    Lisinopril Cough    Losartan      Tried in 2017    Penicillins      \"does not work\" .'IMMUNE TO PENICILLIN,AMPICILLIN IS THE ONLY THING THAT WORKS'    Codeine Vomiting and Nausea     Clarified with patient - states that she has an \"upset stomach\" when she takes it     Outpatient Encounter Medications as of 10/20/2022   Medication Sig Dispense Refill    METOPROLOL SUCCINATE PO Take 25 mg by mouth every day.      AMPICILLIN PO Take  by mouth. Twice a day for 7 days      aspirin 81 MG EC tablet Take 1 Tablet by mouth every day. 100 Tablet 0    amLODIPine (NORVASC) 2.5 MG Tab Take 1 Tablet by mouth 1 time a day as needed (/90). 180 Tablet 3    hydrALAZINE (APRESOLINE) 10 MG Tab Take 1 Tablet by mouth 3 times a day as needed (/100). 25 Tablet 11    predniSONE (DELTASONE) 5 MG Tab Take 10 mg by mouth every day.      hydroxychloroquine (PLAQUENIL) 200 MG Tab Take 1 tablet by mouth every day. 30 tablet 0    famotidine (PEPCID) 40 MG Tab Take 1 tablet by mouth twice daily (Patient taking differently: Take 40 mg by mouth as needed.) 180 tablet 1    Cyanocobalamin (VITAMIN B-12 PO) Take  by mouth.      ibuprofen (MOTRIN) 800 MG Tab TAKE 1 TABLET BY MOUTH " "EVERY 8 HOURS AS NEEDED FOR MILD PAIN 90 Tab 0    Cholecalciferol (VITAMIN D3) 5000 units Cap Take 1 Cap by mouth every day.       No facility-administered encounter medications on file as of 10/20/2022.     ROS           Objective     /58 (BP Location: Left arm, Patient Position: Sitting, BP Cuff Size: Adult)   Pulse 82   Resp 14   Ht 1.575 m (5' 2\")   Wt 66.6 kg (146 lb 12.8 oz)   SpO2 95%   BMI 26.85 kg/m²     Physical Exam                 We reviewed the images of her angiogram as well as her CT scan from January that shows severe coronary disease but also chronic ulcerative plaque of the aorta  Assessment & Plan     1. Ischemic heart disease due to coronary artery obstruction (HCC) - Severe 3vD on cath  US-CAROTID DOPPLER BILAT    US-VEIN MAPPING LOWER EXTREMITY BILAT      2. LBBB (left bundle branch block)        3. Coronary artery disease due to calcified coronary lesion - Calcifications seen on CT scan also wtih aortic ulceration  US-CAROTID DOPPLER BILAT    US-VEIN MAPPING LOWER EXTREMITY BILAT      4. Chronic systolic congestive heart failure (HCC) EF 35%        5. Dyslipidemia        6. Senile purpura (HCC)        7. Essential hypertension        8. 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 Dave.    We addressed the management of hypertension at today's visit. Blood pressure is well controlled.  We specifically assessed the labs on hypertension treatment    We addressed the management of dyslipidemia and atherosclerosis at today's visit.  I gave her a prescription for Repatha to see if this is affordable as she would like to know upfront if this is an option prior to committing to the lipid clinic as she had done in the past    We addressed the management of coronary artery disease.  She is on proper antiplatelet, cholesterol management and beta-blockers as appropriate.  " We addressed the potential side effects and laboratory follow-up for these medications.    We discussed a lot of things about potential CABG versus angiogram the preferred treatment would be CABG given multivessel coronary disease low EF and nature of her coronary disease she also has ulcerated plaques of the aorta and interventional team would likely require Impella support for stenting of her proximal LAD so bypass is preferred as she does have significant atherosclerotic plaque of the descending aorta she does have plaque of the ascending aorta and so that may limit her options for CABG or certainly increase the risk for complications from cross-clamp    She is on long-term steroids which certainly increases her risk for complications of the sternotomy and healing from CABG    We will update her carotid ultrasound and vein mapping to make sure that her conversation is most informative with the surgeons    Depending on a conversation with the surgery team happy to participate in the conversation will decide for CABG versus high risk PCI again the overall preferences for CABG if available    I will see Ms. Dave back in 2 months time and encouraged her to follow up with us over the phone or electronically using my MyChart as issues arise.    It is my pleasure to participate in the care of Ms. Dave.  Please do not hesitate to contact me with questions or concerns.    Jarrod Mayberry MD PhD [facility]  Cardiologist [facility]    Please note that this dictation was created using voice recognition software. There may be errors I did not discover before finalizing the note.     Ms. Dave's care is highly complex due to high risk diagnosis with either severe exacerbation, progression, or side effects of treatment. We specifically discussed the need for high risk medication requiring at least quarterly testing and/or made a decision on elective/emergent major surgery with  identified patient or procedure risk factors specific to Ms. Dave. I have personally spent extra time in discussion about these facts with Ms. Dave and reviewed and or ordered at least 3 tests, documents or other physician/[facility] reports available including labs, imaging and EKGs as appropriate separate from today's encounter.  I have reviewed images with Ms. Dave and personally interpreted on this encounter day the referenced EKG, echocardiogram, stress tests, CT scan, cardiac catheterization or other cardiac imaging and my personal interpretation is what is specifically stated in this note.

## 2022-10-20 NOTE — TELEPHONE ENCOUNTER
I met with Ms. Dave after her diagnostic angiogram in the Cath Lab she has three-vessel disease proximal RCA proximal LAD and mid left circumflex given her reduced ejection fraction she would most benefit from CT surgery evaluation and likely bypass.  She would like to meet with Dr. Mac.  If she decides not to pursue surgical revascularization we can attempt higher risk intervention to her multivessel coronary disease.    It is my pleasure to participate in the care of Ms. Dave.  Please do not hesitate to contact me with questions or concerns.    Jarrod Mayberry MD PhD [facility]  Cardiologist [facility]    Please note that this dictation was created using voice recognition software. There may be errors I did not discover before finalizing the note.     10/19/2022  8:33 PM  Went over

## 2022-10-21 ENCOUNTER — TELEPHONE (OUTPATIENT)
Dept: CARDIOLOGY | Facility: MEDICAL CENTER | Age: 72
End: 2022-10-21

## 2022-10-21 NOTE — TELEPHONE ENCOUNTER
CW    Caller: Lauren    Topic/issue: Pt called and wanted to speak to a nurse on which dosage she should take for metoprolol SR (TOPROL XL) 50 MG TABLET SR 24 HR. She states she has one script for 25 MG and the script sent yesterday was for 50 MG.    Callback Number: [phone]

## 2022-10-24 ENCOUNTER — TELEPHONE (OUTPATIENT)
Dept: CARDIOLOGY | Facility: MEDICAL CENTER | Age: 72
End: 2022-10-24
Payer: MEDICARE

## 2022-10-24 DIAGNOSIS — I10 ESSENTIAL HYPERTENSION: ICD-10-CM

## 2022-10-24 RX ORDER — AMLODIPINE BESYLATE 2.5 MG/1
2.5 TABLET ORAL 2 TIMES DAILY
Qty: 60 TABLET | Refills: 3 | Status: ON HOLD | OUTPATIENT
Start: 2022-10-24 | End: 2022-12-01

## 2022-10-24 NOTE — TELEPHONE ENCOUNTER
CW    Caller: Lauren    Reported Symptoms: Low BP, she would like to discuss her: Metoprolot and Amlodipine.    She also has not heard from Jolanta Mac regarding her surgery, and was told to report this.     Recent Blood Pressure/Heart Rate Readin/69  126/74    Callback Number: [phone]    Thank you,   Althea RIVERA

## 2022-10-24 NOTE — TELEPHONE ENCOUNTER
Phone Number Called: [phone]    Call outcome: Spoke to patient regarding message below.    Message: Called to follow up with the patient regarding concerns of blood pressure. Patient recent blood pressure is 126/74. Patient wants to know if she needs to take her amlodipine when her blood pressure is normal like this. Review of chart shows that patient should only be taking amlodipine 2.5 MG One time daily as needed for BP > 160/90. Patient states CW told her she should be taking the Amlodipine BID. This RN was unable to locate this recommendation in patient chart. Advised patient to take medication as prescribed and this RN will reach out to CW to advise on patient amlodipine dose. Patient wants to know when she should be taking her blood pressure if she is only taking the amlodipine as needed. Advise patient she can check her blood pressure in the morning and evening and if she has symptoms such as headache, chest pain, or dizziness. Patient verbalizes understanding. Confirmed with patient that she is taking metoprolol 50 MG daily and hydralazine TID PRN for BP >200/100.     Patient is concerned because Dr Padmini Mac with CT surgery has not reached out to her yet and she needs to know if she can return to work prior to scheduling surgery. Advised patient I will reach out to Dr Mac office to call patient ASAP.     To CW: Please advise on patient amlodipine dose. Patient thought you told her to take it BID. Instructions in chart say daily PRN for BP > 160/90.    TO CT Surgery: Please call patient ASAP.

## 2022-10-25 NOTE — TELEPHONE ENCOUNTER
Agreed to trial amlodipine 2.5 twice daily to see if it is more stable and follow-up with CT surgery to get an appointment

## 2022-10-25 NOTE — TELEPHONE ENCOUNTER
Phone Number Called: [phone]    Call outcome: Spoke to patient regarding message below.    Message: Called to inform patient of CW recommendations. Advised patient to check her BP 2 hours after she takes her amlodipine. Patient verbalizes understanding. New phone numbers provided for CT Surgery. Patient verbalizes understanding. Will follow up if BP going too low on BID Amlodipine.

## 2022-10-25 NOTE — H&P (VIEW-ONLY)
REFERRING PHYSICIAN: Jarrod Mayberry MD.     CONSULTING PHYSICIAN: Padmini Mac MD.    CHIEF COMPLAINT: chest pain    HISTORY OF PRESENT ILLNESS: The patient is a 72 y.o. female with past medical history of HTN, HLD, breast cancer status post chemotherapy and radiation (Right chest), long term steroid use, HFrEF 35%, and coronary artery disease presents to the clinic for chest pain. She had an abnormal stress test and an angiogram which revealed multivessel disease. Patient weaned off steroids three days ago.   She has had COVID twice and was told there are residual lung changes but at no point did she require home oxygen.  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        PAST MEDICAL HISTORY:   Past Medical History:   Diagnosis Date    Arthritis     Breast cancer (HCC) 08/07/2013    Bronchitis 2005    Cancer (HCC)     right breast cancer     Cataract     removed    Chronic systolic congestive heart failure (HCC) EF 35% 10/12/2022    Coronary artery disease due to calcified coronary lesion - Calcifications seen on CT scan also wtih aortic ulceration     Dental disorder     tooth infection    Dyslipidemia     Tried atorvastatin, pravastatin, lovastatin, and Zetia.  All causes severe myalgias.  Just taking fish oil.      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 at SURGERY [facility]    MASTECTOMY  8/22/2013    Performed by Aggie Burns M.D. at SURGERY SAME DAY [facility] ORS    NODE BIOPSY SENTINEL  8/22/2013    Performed by  Aggie Burns M.D. at SURGERY SAME DAY [facility]S    CATH PLACEMENT  2013    Performed by Aggie Burns M.D. at SURGERY SAME DAY [facility] ORS    US-NEEDLE CORE BX-BREAST PANEL      right breast    COLONOSCOPY  2003    BREAST BIOPSY      TONSILLECTOMY         FAMILY HISTORY:   Family History   Problem Relation Age of Onset    Cancer Mother         possible thyroid and gynecological    Multiple Sclerosis Mother     Arthritis Father     Multiple Sclerosis Brother     Gout Brother     Heart Disease Paternal Grandmother     Diabetes Paternal Grandmother     Cancer Maternal Aunt         breast cancer- 60s    Diabetes Maternal Uncle     Heart Disease Maternal Grandmother     Heart Disease Maternal Grandfather         MI    Stroke Paternal Grandfather     Other Son         breast mass    Heart Disease Son         HEART MURMUR    Gout Son         SOCIAL HISTORY:   Social History     Socioeconomic History    Marital status:      Spouse name: Not on file    Number of children: 2    Years of education: Not on file    Highest education level: Not on file   Occupational History     Employer: [employer]   Tobacco Use    Smoking status: Former     Packs/day: 1.00     Years: 0.00     Pack years: 0.00     Types: Cigarettes     Quit date: 10/1/2013     Years since quittin.0    Smokeless tobacco: Never   Vaping Use    Vaping Use: Never used   Substance and Sexual Activity    Alcohol use: No     Alcohol/week: 0.0 oz    Drug use: No    Sexual activity: Not on file     Comment: , 2 children, enemployed   Other Topics Concern    Not on file   Social History Narrative    ** Merged History Encounter **         Patient is a . Patient has 2 adult children. She is  from .           Social Determinants of Health     Financial Resource Strain: Not on file   Food Insecurity: Not on file   Transportation Needs: Not on file   Physical Activity: Not on file   Stress: Not on file  "  Social Connections: Not on file   Intimate Partner Violence: Not on file   Housing Stability: Not on file       ALLERGIES:   Allergies   Allergen Reactions    Statins [Hmg-Coa-R Inhibitors] Unspecified     Muscle Spasms   RXN=unknown    Atorvastatin     Eggs      Pt states that she is allergic to eggs per her mother.    Lisinopril Cough    Losartan      Tried in 2017    Penicillins      \"does not work\" .'IMMUNE TO PENICILLIN,AMPICILLIN IS THE ONLY THING THAT WORKS'    Codeine Vomiting and Nausea     Clarified with patient - states that she has an \"upset stomach\" when she takes it        CURRENT MEDICATIONS:     Current Outpatient Medications:     amLODIPine (NORVASC) 2.5 MG Tab, Take 1 Tablet by mouth 2 times a day., Disp: 60 Tablet, Rfl: 3    metoprolol SR (TOPROL XL) 50 MG TABLET SR 24 HR, Take 1 Tablet by mouth every day., Disp: 90 Tablet, Rfl: 3    AMPICILLIN PO, Take  by mouth. Twice a day for 7 days, Disp: , Rfl:     aspirin 81 MG EC tablet, Take 1 Tablet by mouth every day., Disp: 100 Tablet, Rfl: 0    hydrALAZINE (APRESOLINE) 10 MG Tab, Take 1 Tablet by mouth 3 times a day as needed (/100)., Disp: 25 Tablet, Rfl: 11    predniSONE (DELTASONE) 5 MG Tab, Take 10 mg by mouth every day., Disp: , Rfl:     hydroxychloroquine (PLAQUENIL) 200 MG Tab, Take 1 tablet by mouth every day., Disp: 30 tablet, Rfl: 0    famotidine (PEPCID) 40 MG Tab, Take 1 tablet by mouth twice daily (Patient taking differently: Take 40 mg by mouth as needed.), Disp: 180 tablet, Rfl: 1    Cyanocobalamin (VITAMIN B-12 PO), Take  by mouth., Disp: , Rfl:     ibuprofen (MOTRIN) 800 MG Tab, TAKE 1 TABLET BY MOUTH EVERY 8 HOURS AS NEEDED FOR MILD PAIN, Disp: 90 Tab, Rfl: 0    Cholecalciferol (VITAMIN D3) 5000 units Cap, Take 1 Cap by mouth every day., Disp: , Rfl:      LABS REVIEWED:  Lab Results   Component Value Date/Time    SODIUM 139 10/18/2022 02:41 PM    POTASSIUM 4.2 10/18/2022 02:41 PM    CHLORIDE 101 10/18/2022 02:41 PM    CO2 25 " 10/18/2022 02:41 PM    GLUCOSE 196 (H) 10/18/2022 02:41 PM    BUN 28 (H) 10/18/2022 02:41 PM    CREATININE 1.09 10/18/2022 02:41 PM      Lab Results   Component Value Date/Time    PROTHROMBTM 12.2 10/18/2022 02:41 PM    INR 0.91 10/18/2022 02:41 PM      Lab Results   Component Value Date/Time    WBC 6.8 10/18/2022 02:41 PM    RBC 4.03 (L) 10/18/2022 02:41 PM    HEMOGLOBIN 12.6 10/18/2022 02:41 PM    HEMATOCRIT 38.9 10/18/2022 02:41 PM    MCV 96.5 10/18/2022 02:41 PM    MCH 31.3 10/18/2022 02:41 PM    MCHC 32.4 (L) 10/18/2022 02:41 PM    MPV 9.8 10/18/2022 02:41 PM    NEUTSPOLYS 83.20 (H) 06/29/2022 12:28 PM    LYMPHOCYTES 11.00 (L) 06/29/2022 12:28 PM    MONOCYTES 4.70 06/29/2022 12:28 PM    EOSINOPHILS 0.30 06/29/2022 12:28 PM    BASOPHILS 0.20 06/29/2022 12:28 PM    HYPOCHROMIA 2+ 04/16/2020 08:49 AM        IMAGING REVIEWED AND INTERPRETED:    ECHOCARDIOGRAM:   Compared to the images of the prior study 08/25/2020, there is now   reduced ejection fraction and wall motion abnormalities, previously   normal.     Moderately reduced left ventricular systolic function.  The left ventricular ejection fraction is visually estimated to be 35%.  Hypokinesis of the inferior and septal walls.  Grade I diastolic dysfunction.  The right ventricle is normal in size and systolic function.  No significant valvular abnormalities.     Aorta  Normal aortic root for body surface area. The ascending aorta diameter   is 3.1 cm.    ANGIOGRAM:   II. CORONARY ANGIOGRAPHY:  Left main coronary artery:  Large, bifurcating no CAD  Left anterior descending artery:  Large vessel, 95% ostial heavily calcified stenosis.   Left circumflex coronary artery:  Moderate to large caliber, co-dominant. 80% mid stenosis.  Right coronary artery:  Moderate caliber co-dominant 70% calcific proximal-ostial stenosis.    REVIEW OF SYSTEMS:   Review of Systems   Constitutional:  Negative for chills, fever and weight loss.   HENT:  Negative for ear pain,  nosebleeds and tinnitus.    Eyes:  Negative for double vision, photophobia and pain.   Respiratory:  Negative for cough, hemoptysis and shortness of breath.    Cardiovascular:  Positive for chest pain. Negative for palpitations, orthopnea, leg swelling and PND.   Gastrointestinal:  Negative for abdominal pain, blood in stool, nausea and vomiting.   Genitourinary:  Negative for frequency, hematuria and urgency.   Musculoskeletal:  Positive for joint pain (with gout).   Skin:  Negative for rash.   Neurological:  Negative for dizziness, tremors, speech change, focal weakness, seizures and headaches.   Endo/Heme/Allergies:  Negative for polydipsia. Does not bruise/bleed easily.   Psychiatric/Behavioral:  Negative for hallucinations and memory loss.        PHYSICAL EXAMINATION:   Physical Exam  Vitals reviewed.   Constitutional:       General: She is not in acute distress.     Appearance: Normal appearance.   HENT:      Head: Normocephalic and atraumatic.      Right Ear: External ear normal.      Left Ear: External ear normal.      Nose: Nose normal. No congestion.   Eyes:      General: No scleral icterus.     Extraocular Movements: Extraocular movements intact.      Conjunctiva/sclera: Conjunctivae normal.   Cardiovascular:      Rate and Rhythm: Normal rate and regular rhythm.   Pulmonary:      Effort: Pulmonary effort is normal. No respiratory distress.   Abdominal:      General: Abdomen is flat. There is no distension.   Musculoskeletal:         General: Normal range of motion.      Cervical back: Normal range of motion.   Skin:     General: Skin is warm and dry.   Neurological:      Mental Status: She is alert and oriented to person, place, and time. Mental status is at baseline.      Cranial Nerves: No cranial nerve deficit.   Psychiatric:         Mood and Affect: Mood normal.         Judgment: Judgment normal.     /62 (BP Location: Left arm, Patient Position: Sitting, BP Cuff Size: Adult)   Pulse 77   Temp  "36 °C (96.8 °F) (Temporal)   Ht 1.6 m (5' 3\")   Wt 66.8 kg (147 lb 3.2 oz)   SpO2 92%   BMI 26.08 kg/m²        IMPRESSION:  73 yo woman with HTN, HLD, breast cancer status post chemotherapy and radiation (Right chest), long term steroid use for gout recently weaned off, HFrEF 35% now with multivessel CAD and stable angina.      PLAN:  I recommend proceeding with CABG next available outpatient appointment.    The procedure, its risks, benefits, potential complications and alternative treatments were discussed with the patient in detail including the risks should she decide not to undergo my recommended treatment. All of her questions were answered to her satisfaction and she is willing to proceed with the operation. The risks include death, stroke,  infection: to include a rare bacterial infection related to the use of the heart/lung machine, stella-operative myocardial infarction, dysrhythmias, diaphragmatic paralysis, chest wall paresthesia, tracheostomy, kidney or other organ failure, possible return to the operating room for bleeding, bleeding requiring transfusion with its attendant risks including AIDS or hepatitis, dehiscence of surgical incisions, respiratory complications including the need for prolonged ventilator support, Protamine or other drug reaction, peripheral neuropathy, loss of limb, and miscount of surgical items. The operative mortality risk is approximately 2-3%. The STS mortality risk score is 1.9% and the morbidity and mortality risk score is 10-15%. The scores were discussed with patient.    The operation,CABG x3 is scheduled for Thursday 11/10 at 7:30 AM at [facility].    Findings and recommendations have been discussed with the patient’s cardiologist Jarrod Mayberry MD.  Thank you for this very challenging consultation and participation in the patient’s care.  I will keep you apprised of all future developments.          Sincerely,       Padmini Mac MD.       "

## 2022-10-25 NOTE — PROGRESS NOTES
REFERRING PHYSICIAN: Jarrod Mayberry MD.     CONSULTING PHYSICIAN: Padmini Mac MD.    CHIEF COMPLAINT: chest pain    HISTORY OF PRESENT ILLNESS: The patient is a 72 y.o. female with past medical history of HTN, HLD, breast cancer status post chemotherapy and radiation (Right chest), long term steroid use, HFrEF 35%, and coronary artery disease presents to the clinic for chest pain. She had an abnormal stress test and an angiogram which revealed multivessel disease. Patient weaned off steroids three days ago.   She has had COVID twice and was told there are residual lung changes but at no point did she require home oxygen.  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        PAST MEDICAL HISTORY:   Past Medical History:   Diagnosis Date    Arthritis     Breast cancer (HCC) 08/07/2013    Bronchitis 2005    Cancer (HCC)     right breast cancer     Cataract     removed    Chronic systolic congestive heart failure (HCC) EF 35% 10/12/2022    Coronary artery disease due to calcified coronary lesion - Calcifications seen on CT scan also wtih aortic ulceration     Dental disorder     tooth infection    Dyslipidemia     Tried atorvastatin, pravastatin, lovastatin, and Zetia.  All causes severe myalgias.  Just taking fish oil.      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 [facility]    MASTECTOMY  8/22/2013    Performed by Aggie Burns M.D. at SURGERY SAME DAY [facility] ORS    NODE BIOPSY SENTINEL  8/22/2013    Performed by  Aggie Burns M.D. at SURGERY SAME DAY [facility]S    CATH PLACEMENT  2013    Performed by Aggie Burns M.D. at SURGERY SAME DAY [facility]    US-NEEDLE CORE BX-BREAST PANEL      right breast    COLONOSCOPY  2003    BREAST BIOPSY      TONSILLECTOMY         FAMILY HISTORY:   Family History   Problem Relation Age of Onset    Cancer Mother         possible thyroid and gynecological    Multiple Sclerosis Mother     Arthritis Father     Multiple Sclerosis Brother     Gout Brother     Heart Disease Paternal Grandmother     Diabetes Paternal Grandmother     Cancer Maternal Aunt         breast cancer- 60s    Diabetes Maternal Uncle     Heart Disease Maternal Grandmother     Heart Disease Maternal Grandfather         MI    Stroke Paternal Grandfather     Other Son         breast mass    Heart Disease Son         HEART MURMUR    Gout Son         SOCIAL HISTORY:   Social History     Socioeconomic History    Marital status:      Spouse name: Not on file    Number of children: 2    Years of education: Not on file    Highest education level: Not on file   Occupational History     Employer: [employer]   Tobacco Use    Smoking status: Former     Packs/day: 1.00     Years: 0.00     Pack years: 0.00     Types: Cigarettes     Quit date: 10/1/2013     Years since quittin.0    Smokeless tobacco: Never   Vaping Use    Vaping Use: Never used   Substance and Sexual Activity    Alcohol use: No     Alcohol/week: 0.0 oz    Drug use: No    Sexual activity: Not on file     Comment: , 2 children, enemployed   Other Topics Concern    Not on file   Social History Narrative    ** Merged History Encounter **         Patient is a . Patient has 2 adult children. She is  from .           Social Determinants of Health     Financial Resource Strain: Not on file   Food Insecurity: Not on file   Transportation Needs: Not on file   Physical Activity: Not on file   Stress: Not on file  "  Social Connections: Not on file   Intimate Partner Violence: Not on file   Housing Stability: Not on file       ALLERGIES:   Allergies   Allergen Reactions    Statins [Hmg-Coa-R Inhibitors] Unspecified     Muscle Spasms   RXN=unknown    Atorvastatin     Eggs      Pt states that she is allergic to eggs per her mother.    Lisinopril Cough    Losartan      Tried in 2017    Penicillins      \"does not work\" .'IMMUNE TO PENICILLIN,AMPICILLIN IS THE ONLY THING THAT WORKS'    Codeine Vomiting and Nausea     Clarified with patient - states that she has an \"upset stomach\" when she takes it        CURRENT MEDICATIONS:     Current Outpatient Medications:     amLODIPine (NORVASC) 2.5 MG Tab, Take 1 Tablet by mouth 2 times a day., Disp: 60 Tablet, Rfl: 3    metoprolol SR (TOPROL XL) 50 MG TABLET SR 24 HR, Take 1 Tablet by mouth every day., Disp: 90 Tablet, Rfl: 3    AMPICILLIN PO, Take  by mouth. Twice a day for 7 days, Disp: , Rfl:     aspirin 81 MG EC tablet, Take 1 Tablet by mouth every day., Disp: 100 Tablet, Rfl: 0    hydrALAZINE (APRESOLINE) 10 MG Tab, Take 1 Tablet by mouth 3 times a day as needed (/100)., Disp: 25 Tablet, Rfl: 11    predniSONE (DELTASONE) 5 MG Tab, Take 10 mg by mouth every day., Disp: , Rfl:     hydroxychloroquine (PLAQUENIL) 200 MG Tab, Take 1 tablet by mouth every day., Disp: 30 tablet, Rfl: 0    famotidine (PEPCID) 40 MG Tab, Take 1 tablet by mouth twice daily (Patient taking differently: Take 40 mg by mouth as needed.), Disp: 180 tablet, Rfl: 1    Cyanocobalamin (VITAMIN B-12 PO), Take  by mouth., Disp: , Rfl:     ibuprofen (MOTRIN) 800 MG Tab, TAKE 1 TABLET BY MOUTH EVERY 8 HOURS AS NEEDED FOR MILD PAIN, Disp: 90 Tab, Rfl: 0    Cholecalciferol (VITAMIN D3) 5000 units Cap, Take 1 Cap by mouth every day., Disp: , Rfl:      LABS REVIEWED:  Lab Results   Component Value Date/Time    SODIUM 139 10/18/2022 02:41 PM    POTASSIUM 4.2 10/18/2022 02:41 PM    CHLORIDE 101 10/18/2022 02:41 PM    CO2 25 " 10/18/2022 02:41 PM    GLUCOSE 196 (H) 10/18/2022 02:41 PM    BUN 28 (H) 10/18/2022 02:41 PM    CREATININE 1.09 10/18/2022 02:41 PM      Lab Results   Component Value Date/Time    PROTHROMBTM 12.2 10/18/2022 02:41 PM    INR 0.91 10/18/2022 02:41 PM      Lab Results   Component Value Date/Time    WBC 6.8 10/18/2022 02:41 PM    RBC 4.03 (L) 10/18/2022 02:41 PM    HEMOGLOBIN 12.6 10/18/2022 02:41 PM    HEMATOCRIT 38.9 10/18/2022 02:41 PM    MCV 96.5 10/18/2022 02:41 PM    MCH 31.3 10/18/2022 02:41 PM    MCHC 32.4 (L) 10/18/2022 02:41 PM    MPV 9.8 10/18/2022 02:41 PM    NEUTSPOLYS 83.20 (H) 06/29/2022 12:28 PM    LYMPHOCYTES 11.00 (L) 06/29/2022 12:28 PM    MONOCYTES 4.70 06/29/2022 12:28 PM    EOSINOPHILS 0.30 06/29/2022 12:28 PM    BASOPHILS 0.20 06/29/2022 12:28 PM    HYPOCHROMIA 2+ 04/16/2020 08:49 AM        IMAGING REVIEWED AND INTERPRETED:    ECHOCARDIOGRAM:   Compared to the images of the prior study 08/25/2020, there is now   reduced ejection fraction and wall motion abnormalities, previously   normal.     Moderately reduced left ventricular systolic function.  The left ventricular ejection fraction is visually estimated to be 35%.  Hypokinesis of the inferior and septal walls.  Grade I diastolic dysfunction.  The right ventricle is normal in size and systolic function.  No significant valvular abnormalities.     Aorta  Normal aortic root for body surface area. The ascending aorta diameter   is 3.1 cm.    ANGIOGRAM:   II. CORONARY ANGIOGRAPHY:  Left main coronary artery:  Large, bifurcating no CAD  Left anterior descending artery:  Large vessel, 95% ostial heavily calcified stenosis.   Left circumflex coronary artery:  Moderate to large caliber, co-dominant. 80% mid stenosis.  Right coronary artery:  Moderate caliber co-dominant 70% calcific proximal-ostial stenosis.    REVIEW OF SYSTEMS:   Review of Systems   Constitutional:  Negative for chills, fever and weight loss.   HENT:  Negative for ear pain,  nosebleeds and tinnitus.    Eyes:  Negative for double vision, photophobia and pain.   Respiratory:  Negative for cough, hemoptysis and shortness of breath.    Cardiovascular:  Positive for chest pain. Negative for palpitations, orthopnea, leg swelling and PND.   Gastrointestinal:  Negative for abdominal pain, blood in stool, nausea and vomiting.   Genitourinary:  Negative for frequency, hematuria and urgency.   Musculoskeletal:  Positive for joint pain (with gout).   Skin:  Negative for rash.   Neurological:  Negative for dizziness, tremors, speech change, focal weakness, seizures and headaches.   Endo/Heme/Allergies:  Negative for polydipsia. Does not bruise/bleed easily.   Psychiatric/Behavioral:  Negative for hallucinations and memory loss.        PHYSICAL EXAMINATION:   Physical Exam  Vitals reviewed.   Constitutional:       General: She is not in acute distress.     Appearance: Normal appearance.   HENT:      Head: Normocephalic and atraumatic.      Right Ear: External ear normal.      Left Ear: External ear normal.      Nose: Nose normal. No congestion.   Eyes:      General: No scleral icterus.     Extraocular Movements: Extraocular movements intact.      Conjunctiva/sclera: Conjunctivae normal.   Cardiovascular:      Rate and Rhythm: Normal rate and regular rhythm.   Pulmonary:      Effort: Pulmonary effort is normal. No respiratory distress.   Abdominal:      General: Abdomen is flat. There is no distension.   Musculoskeletal:         General: Normal range of motion.      Cervical back: Normal range of motion.   Skin:     General: Skin is warm and dry.   Neurological:      Mental Status: She is alert and oriented to person, place, and time. Mental status is at baseline.      Cranial Nerves: No cranial nerve deficit.   Psychiatric:         Mood and Affect: Mood normal.         Judgment: Judgment normal.     /62 (BP Location: Left arm, Patient Position: Sitting, BP Cuff Size: Adult)   Pulse 77   Temp  "36 °C (96.8 °F) (Temporal)   Ht 1.6 m (5' 3\")   Wt 66.8 kg (147 lb 3.2 oz)   SpO2 92%   BMI 26.08 kg/m²        IMPRESSION:  73 yo woman with HTN, HLD, breast cancer status post chemotherapy and radiation (Right chest), long term steroid use for gout recently weaned off, HFrEF 35% now with multivessel CAD and stable angina.      PLAN:  I recommend proceeding with CABG next available outpatient appointment.    The procedure, its risks, benefits, potential complications and alternative treatments were discussed with the patient in detail including the risks should she decide not to undergo my recommended treatment. All of her questions were answered to her satisfaction and she is willing to proceed with the operation. The risks include death, stroke,  infection: to include a rare bacterial infection related to the use of the heart/lung machine, stella-operative myocardial infarction, dysrhythmias, diaphragmatic paralysis, chest wall paresthesia, tracheostomy, kidney or other organ failure, possible return to the operating room for bleeding, bleeding requiring transfusion with its attendant risks including AIDS or hepatitis, dehiscence of surgical incisions, respiratory complications including the need for prolonged ventilator support, Protamine or other drug reaction, peripheral neuropathy, loss of limb, and miscount of surgical items. The operative mortality risk is approximately 2-3%. The STS mortality risk score is 1.9% and the morbidity and mortality risk score is 10-15%. The scores were discussed with patient.    The operation,CABG x3 is scheduled for Thursday 11/10 at 7:30 AM at [facility].    Findings and recommendations have been discussed with the patient’s cardiologist Jarrod Mayberry MD.  Thank you for this very challenging consultation and participation in the patient’s care.  I will keep you apprised of all future developments.          Sincerely,       Padmini Mac MD.       "

## 2022-10-27 ENCOUNTER — DOCUMENTATION (OUTPATIENT)
Dept: VASCULAR LAB | Facility: MEDICAL CENTER | Age: 72
End: 2022-10-27
Payer: MEDICARE

## 2022-10-27 NOTE — PROGRESS NOTES
[facility] Heart and Vascular Health and Pharmacotherapy Programs     Received pharmacotherapy referral for lipids from Dr. Mayberry on 9/20/22     Previously the patient stated she would c/b to discuss scheduling initial visit as she would like more time to investigate medication cost(s).    Called patient to follow up with her again today. No answer and VM was full.   Sent the patient a WikiYou message.     Insurance: [employer]  PCP: [facility]  Locations to be seen: Any     [facility] Anticoagulation/Pharmacotherapy Clinic at [phone], fax [phone]    Sara Romero PharmD

## 2022-11-01 ENCOUNTER — OFFICE VISIT (OUTPATIENT)
Dept: CARDIOTHORACIC SURGERY | Facility: MEDICAL CENTER | Age: 72
End: 2022-11-01
Payer: MEDICARE

## 2022-11-01 VITALS
HEIGHT: 63 IN | TEMPERATURE: 96.8 F | SYSTOLIC BLOOD PRESSURE: 122 MMHG | DIASTOLIC BLOOD PRESSURE: 62 MMHG | HEART RATE: 77 BPM | BODY MASS INDEX: 26.08 KG/M2 | OXYGEN SATURATION: 92 % | WEIGHT: 147.2 LBS

## 2022-11-01 DIAGNOSIS — I25.10 CORONARY ARTERY DISEASE DUE TO CALCIFIED CORONARY LESION: ICD-10-CM

## 2022-11-01 DIAGNOSIS — I25.84 CORONARY ARTERY DISEASE DUE TO CALCIFIED CORONARY LESION: ICD-10-CM

## 2022-11-01 PROCEDURE — 99205 OFFICE O/P NEW HI 60 MIN: CPT | Performed by: THORACIC SURGERY (CARDIOTHORACIC VASCULAR SURGERY)

## 2022-11-01 ASSESSMENT — ENCOUNTER SYMPTOMS
PHOTOPHOBIA: 0
EYE PAIN: 0
FEVER: 0
HEADACHES: 0
PALPITATIONS: 0
ORTHOPNEA: 0
TREMORS: 0
CHILLS: 0
ABDOMINAL PAIN: 0
NAUSEA: 0
SPEECH CHANGE: 0
BLOOD IN STOOL: 0
FOCAL WEAKNESS: 0
SEIZURES: 0
BRUISES/BLEEDS EASILY: 0
COUGH: 0
POLYDIPSIA: 0
HEMOPTYSIS: 0
HALLUCINATIONS: 0
PND: 0
SHORTNESS OF BREATH: 0
WEIGHT LOSS: 0
DIZZINESS: 0
VOMITING: 0
MEMORY LOSS: 0
DOUBLE VISION: 0

## 2022-11-01 ASSESSMENT — FIBROSIS 4 INDEX: FIB4 SCORE: 1.98

## 2022-11-01 NOTE — NON-PROVIDER
Problem: Prehabilitation    Goal: optimize identified modifiable risk factors prior to cardiac surgery.     Intervention: Screening and interventions for the following  risk factors with educational materials provided if indicated  and patient demonstrates readiness to participate.  Dentition, malnutrition, CAD and dietary cholesterol,  obesity, alcohol, tobacco, and illegal drug use,  home exercise regimen appropriateness, and social support  system for post discharge planning. Also, teaching of and   participation of inspiratory muscle training via  incentive spirometer (provided).    Review of post-surgical physical limitations, upcoming  appointments & testing, ordered diagnostics, and medication review  to identify and educate on anticoagulant and antihypertensives  that need cessation in the days prior to surgery.    The cardiac surgery prehabilitation sheet, surgery instruction sheet,  MAP, education booklet, vitals logbook, normals after heart surgery,  and cardiac rehab flier was provided for patient to read and review.    Carb load drink and surgical CHG wipes were also  provided with instructions on use.    DENTITION:  Routine dental appointment prior to surgery is  not indicated for CABG procedure,  Patient not educated as she does not get   regular dental cleanings. she confirms current   dental infection or issues that should be  addressed prior to surgery and were not  encouraged to get a dental cleaning.    INCENTIVE SPIROMETRY:  Discussed importance of incentive spirometry (IS) use,  20 times a day AT MINIMUM but more often if possible to  optimize cardio-pulmonary function prior to surgery.  They were instructed to allow a 5 minute break in  between uses to achieve max IS volume.  Education with return demonstration performed.   Patient is effective, coaching was not needed.  Prehabilitation IS baseline is 2000.    HOME EXERCISE REGIMEN:  We discussed importance of preventing deconditioning  and  muscle wasting in the time preceding surgery as this will occur  post surgery.    > 50 % left main disease present? NO  EF-- 35%  Active sub lingual nitro use? NO  she is appropriate for initiation  of a home exercise regimen.  she is moderately physically active; current exercise  tolerance/level is moderate due to some  symptoms of dizziness. she was educated   to start exercise regimen of walking on   a flat surface 30 minutes a day,  adjusting the length for tolerance level.  she was educated that if chest pain or SOB occurs  patient is to stop immediately and if symptoms do not  resolve after stopping to call 911.    she was also encouraged to practice sit to stand from a chair  without arm assistance 12 times a day to strengthen quadriceps.    CAD:  Patient does have known CAD; education on cholesterol, diet,  and the heart are indicated and were provided.    OBESITY:  Patient's BMI is not over 30.  Education regarding portion sizing and diet are no indicated and were not provided.    MALNUTRITION:  Malnutrition screening tool (MST) shows she is not at risk  with a score of 0. (0-1 not at risk; greater or equal to 2 is at risk).    Given patient response to MST, functional capacity,   and ** reported muscle loss, it was not  recommended they increase their protein  intake to 1.2 to 2.5 gram/kg/day.    SMOKING:  Patient denies current smoking history.  Smoking risks and cessation  education not indicated and was not provided, 25 pack year history 13 years ago.    ALCOHOL ABUSE:  Patient denies alcohol abuse.  Alcohol risks and cessation  education not indicated and was not provided.    ILLEGAL DRUG USE:  Patient denies illicit drug use.  Illicit drug use risks  and cessation education not indicated and was not provided    SOCIAL SUPPORT SYSTEM FOR DISCHARGE NEEDS:    Patient does have family, her sister Haleigh to stay for 1-week post  discharge to assist with ADL's. No barriers to hospital  discharge  anticipated.    PSYCHOLOGICAL PREPARATION:  We discussed the basics of physical limitation post op to include:               No driving for 4 weeks               No lifting, pushing or pulling > 10 lbs for 6 weeks  Sternal precautions to include moving within the tube and  safe mobility in and out of bed and the chair.    ANTIBIOTIC STEWARDSHIP:  MRSA swab will be collected by [facility] during pre-admit testing.    MEDICATION AN UPCOMING APPOINTMENTS REVIEW:  Current medications were reviewed that may need  specific stop dates prior to surgery. The patient was instructed   to stop the following medications after the indicated date.    No meds to stop    Vascular scheduling sheet was provided and explained.    Walk test Times: 5 5 5    A phone appointment for pre op education was made for 11/8 at 11:15  to review all provided education materials.

## 2022-11-02 ENCOUNTER — HOSPITAL ENCOUNTER (OUTPATIENT)
Dept: RADIOLOGY | Facility: MEDICAL CENTER | Age: 72
End: 2022-11-02
Attending: INTERNAL MEDICINE
Payer: MEDICARE

## 2022-11-02 ENCOUNTER — HOSPITAL ENCOUNTER (OUTPATIENT)
Dept: RADIOLOGY | Facility: MEDICAL CENTER | Age: 72
End: 2022-11-02
Attending: THORACIC SURGERY (CARDIOTHORACIC VASCULAR SURGERY)
Payer: MEDICARE

## 2022-11-02 DIAGNOSIS — I24.0 ISCHEMIC HEART DISEASE DUE TO CORONARY ARTERY OBSTRUCTION (HCC): Chronic | ICD-10-CM

## 2022-11-02 DIAGNOSIS — I25.84 CORONARY ARTERY DISEASE DUE TO CALCIFIED CORONARY LESION: Chronic | ICD-10-CM

## 2022-11-02 DIAGNOSIS — I25.9 ISCHEMIC HEART DISEASE DUE TO CORONARY ARTERY OBSTRUCTION (HCC): Chronic | ICD-10-CM

## 2022-11-02 DIAGNOSIS — I25.10 CORONARY ARTERY DISEASE DUE TO CALCIFIED CORONARY LESION: ICD-10-CM

## 2022-11-02 DIAGNOSIS — I25.10 CORONARY ARTERY DISEASE DUE TO CALCIFIED CORONARY LESION: Chronic | ICD-10-CM

## 2022-11-02 DIAGNOSIS — I25.84 CORONARY ARTERY DISEASE DUE TO CALCIFIED CORONARY LESION: ICD-10-CM

## 2022-11-02 PROCEDURE — 93970 EXTREMITY STUDY: CPT | Mod: 26 | Performed by: INTERNAL MEDICINE

## 2022-11-02 PROCEDURE — 93970 EXTREMITY STUDY: CPT

## 2022-11-02 PROCEDURE — 93880 EXTRACRANIAL BILAT STUDY: CPT

## 2022-11-02 PROCEDURE — 93880 EXTRACRANIAL BILAT STUDY: CPT | Mod: 26 | Performed by: INTERNAL MEDICINE

## 2022-11-03 ENCOUNTER — TELEPHONE (OUTPATIENT)
Dept: VASCULAR LAB | Facility: MEDICAL CENTER | Age: 72
End: 2022-11-03
Payer: MEDICARE

## 2022-11-03 NOTE — TELEPHONE ENCOUNTER
[facility] Heart and Vascular Health and Pharmacotherapy Programs     Received pharmacotherapy referral for lipids from Dr. Mayberry on 9/20/22     Patient states she would like c/b in 3-4 months as she will be having CABG x 3 on 11/10/22. Advised that her lipids should be addressed/controlled ASAP given this - pt expressed understanding.    Will f/u w/ pt per her request.     Insurance: AARP  PCP: [facility]  Locations to be seen: Any     [facility] Anticoagulation/Pharmacotherapy Clinic at [phone], fax [phone]     Marciano Hogan, PharmD, BCACP

## 2022-11-07 ENCOUNTER — HOSPITAL ENCOUNTER (OUTPATIENT)
Dept: RADIOLOGY | Facility: MEDICAL CENTER | Age: 72
DRG: 235 | End: 2022-11-07
Attending: THORACIC SURGERY (CARDIOTHORACIC VASCULAR SURGERY) | Admitting: THORACIC SURGERY (CARDIOTHORACIC VASCULAR SURGERY)
Payer: MEDICARE

## 2022-11-07 ENCOUNTER — APPOINTMENT (OUTPATIENT)
Dept: ADMISSIONS | Facility: MEDICAL CENTER | Age: 72
DRG: 235 | End: 2022-11-07
Payer: MEDICARE

## 2022-11-07 ENCOUNTER — PRE-ADMISSION TESTING (OUTPATIENT)
Dept: ADMISSIONS | Facility: MEDICAL CENTER | Age: 72
DRG: 235 | End: 2022-11-07
Attending: NURSE PRACTITIONER
Payer: MEDICARE

## 2022-11-07 DIAGNOSIS — Z01.812 PRE-OPERATIVE LABORATORY EXAMINATION: ICD-10-CM

## 2022-11-07 DIAGNOSIS — Z01.810 PRE-OPERATIVE CARDIOVASCULAR EXAMINATION: ICD-10-CM

## 2022-11-07 DIAGNOSIS — Z01.811 PRE-OPERATIVE RESPIRATORY EXAMINATION: ICD-10-CM

## 2022-11-07 LAB
ABO GROUP BLD: NORMAL
ALBUMIN SERPL BCP-MCNC: 4.7 G/DL (ref 3.2–4.9)
ALBUMIN/GLOB SERPL: 1.5 G/DL
ALP SERPL-CCNC: 47 U/L (ref 30–99)
ALT SERPL-CCNC: 15 U/L (ref 2–50)
AMPHET UR QL SCN: NEGATIVE
ANION GAP SERPL CALC-SCNC: 13 MMOL/L (ref 7–16)
APPEARANCE UR: CLEAR
APTT PPP: 29.2 SEC (ref 24.7–36)
AST SERPL-CCNC: 35 U/L (ref 12–45)
BACTERIA #/AREA URNS HPF: NEGATIVE /HPF
BARBITURATES UR QL SCN: NEGATIVE
BASOPHILS # BLD AUTO: 0.9 % (ref 0–1.8)
BASOPHILS # BLD: 0.05 K/UL (ref 0–0.12)
BENZODIAZ UR QL SCN: NEGATIVE
BILIRUB SERPL-MCNC: 0.2 MG/DL (ref 0.1–1.5)
BILIRUB UR QL STRIP.AUTO: NEGATIVE
BLD GP AB SCN SERPL QL: NORMAL
BUN SERPL-MCNC: 28 MG/DL (ref 8–22)
BZE UR QL SCN: NEGATIVE
CALCIUM SERPL-MCNC: 9.8 MG/DL (ref 8.5–10.5)
CANNABINOIDS UR QL SCN: NEGATIVE
CHLORIDE SERPL-SCNC: 98 MMOL/L (ref 96–112)
CO2 SERPL-SCNC: 24 MMOL/L (ref 20–33)
COLOR UR: ABNORMAL
CREAT SERPL-MCNC: 1.11 MG/DL (ref 0.5–1.4)
EKG IMPRESSION: NORMAL
EOSINOPHIL # BLD AUTO: 0.25 K/UL (ref 0–0.51)
EOSINOPHIL NFR BLD: 4.3 % (ref 0–6.9)
EPI CELLS #/AREA URNS HPF: NEGATIVE /HPF
ERYTHROCYTE [DISTWIDTH] IN BLOOD BY AUTOMATED COUNT: 45.1 FL (ref 35.9–50)
EST. AVERAGE GLUCOSE BLD GHB EST-MCNC: 117 MG/DL
GFR SERPLBLD CREATININE-BSD FMLA CKD-EPI: 53 ML/MIN/1.73 M 2
GLOBULIN SER CALC-MCNC: 3.1 G/DL (ref 1.9–3.5)
GLUCOSE SERPL-MCNC: 83 MG/DL (ref 65–99)
GLUCOSE UR STRIP.AUTO-MCNC: NEGATIVE MG/DL
HBA1C MFR BLD: 5.7 % (ref 4–5.6)
HCT VFR BLD AUTO: 38.6 % (ref 37–47)
HGB BLD-MCNC: 12.4 G/DL (ref 12–16)
HYALINE CASTS #/AREA URNS LPF: ABNORMAL /LPF
IMM GRANULOCYTES # BLD AUTO: 0.03 K/UL (ref 0–0.11)
IMM GRANULOCYTES NFR BLD AUTO: 0.5 % (ref 0–0.9)
INR PPP: 0.94 (ref 0.87–1.13)
KETONES UR STRIP.AUTO-MCNC: NEGATIVE MG/DL
LEUKOCYTE ESTERASE UR QL STRIP.AUTO: NEGATIVE
LYMPHOCYTES # BLD AUTO: 1.37 K/UL (ref 1–4.8)
LYMPHOCYTES NFR BLD: 23.5 % (ref 22–41)
MCH RBC QN AUTO: 31.3 PG (ref 27–33)
MCHC RBC AUTO-ENTMCNC: 32.1 G/DL (ref 33.6–35)
MCV RBC AUTO: 97.5 FL (ref 81.4–97.8)
METHADONE UR QL SCN: NEGATIVE
MICRO URNS: ABNORMAL
MONOCYTES # BLD AUTO: 0.84 K/UL (ref 0–0.85)
MONOCYTES NFR BLD AUTO: 14.4 % (ref 0–13.4)
NEUTROPHILS # BLD AUTO: 3.3 K/UL (ref 2–7.15)
NEUTROPHILS NFR BLD: 56.4 % (ref 44–72)
NITRITE UR QL STRIP.AUTO: NEGATIVE
NRBC # BLD AUTO: 0 K/UL
NRBC BLD-RTO: 0 /100 WBC
OPIATES UR QL SCN: NEGATIVE
OXYCODONE UR QL SCN: NEGATIVE
PCP UR QL SCN: NEGATIVE
PH UR STRIP.AUTO: 6 [PH] (ref 5–8)
PLATELET # BLD AUTO: 273 K/UL (ref 164–446)
PMV BLD AUTO: 10.2 FL (ref 9–12.9)
POTASSIUM SERPL-SCNC: 4.3 MMOL/L (ref 3.6–5.5)
PROPOXYPH UR QL SCN: NEGATIVE
PROT SERPL-MCNC: 7.8 G/DL (ref 6–8.2)
PROT UR QL STRIP: 30 MG/DL
PROTHROMBIN TIME: 12.5 SEC (ref 12–14.6)
RBC # BLD AUTO: 3.96 M/UL (ref 4.2–5.4)
RBC # URNS HPF: ABNORMAL /HPF
RBC UR QL AUTO: NEGATIVE
RH BLD: NORMAL
SCCMEC + MECA PNL NOSE NAA+PROBE: NEGATIVE
SCCMEC + MECA PNL NOSE NAA+PROBE: NEGATIVE
SODIUM SERPL-SCNC: 135 MMOL/L (ref 135–145)
SP GR UR STRIP.AUTO: 1.02
UROBILINOGEN UR STRIP.AUTO-MCNC: 0.2 MG/DL
WBC # BLD AUTO: 5.8 K/UL (ref 4.8–10.8)
WBC #/AREA URNS HPF: ABNORMAL /HPF

## 2022-11-07 PROCEDURE — 81001 URINALYSIS AUTO W/SCOPE: CPT

## 2022-11-07 PROCEDURE — 85730 THROMBOPLASTIN TIME PARTIAL: CPT

## 2022-11-07 PROCEDURE — 83036 HEMOGLOBIN GLYCOSYLATED A1C: CPT

## 2022-11-07 PROCEDURE — 86850 RBC ANTIBODY SCREEN: CPT

## 2022-11-07 PROCEDURE — 85610 PROTHROMBIN TIME: CPT

## 2022-11-07 PROCEDURE — 71046 X-RAY EXAM CHEST 2 VIEWS: CPT

## 2022-11-07 PROCEDURE — 93005 ELECTROCARDIOGRAM TRACING: CPT

## 2022-11-07 PROCEDURE — 80307 DRUG TEST PRSMV CHEM ANLYZR: CPT

## 2022-11-07 PROCEDURE — 87641 MR-STAPH DNA AMP PROBE: CPT

## 2022-11-07 PROCEDURE — 86900 BLOOD TYPING SEROLOGIC ABO: CPT

## 2022-11-07 PROCEDURE — 86901 BLOOD TYPING SEROLOGIC RH(D): CPT

## 2022-11-07 PROCEDURE — 93010 ELECTROCARDIOGRAM REPORT: CPT | Performed by: INTERNAL MEDICINE

## 2022-11-07 PROCEDURE — 36415 COLL VENOUS BLD VENIPUNCTURE: CPT

## 2022-11-07 PROCEDURE — 85025 COMPLETE CBC W/AUTO DIFF WBC: CPT

## 2022-11-07 PROCEDURE — 80053 COMPREHEN METABOLIC PANEL: CPT

## 2022-11-07 PROCEDURE — 87640 STAPH A DNA AMP PROBE: CPT

## 2022-11-07 ASSESSMENT — FIBROSIS 4 INDEX: FIB4 SCORE: 1.98

## 2022-11-08 ENCOUNTER — TELEPHONE (OUTPATIENT)
Dept: CARDIOTHORACIC SURGERY | Facility: MEDICAL CENTER | Age: 72
End: 2022-11-08
Payer: MEDICARE

## 2022-11-08 NOTE — TELEPHONE ENCOUNTER
Patient was reminded that CABG X 3 surgery with  Dr. Mac is on 11/10 at 0730 in the morning. she   are aware check in is at 0515 am.    Baseline IS was 2000. she has been compliant   with the IS but is not reaching 20 times a day due to sinus issues. Volume has not improved.    She has been taking ampicillin for her sinuitis, she started 11/3 and will finish the 7 day course by her surgery.    she was prescribed a walking regimen or  told to continue and she has been compliant.   she has been practicing sit to stand.    The CHG wipes instructions were reviewed and understood.    PAT date and time was reviewed with patient and  verified it is within 72 hours of surgery.    Vascular studies and procedures: vein mapping and carotids completed were scheduled, completed and reviewed by myself for concerning results did not  need escalation to the [facility]/MD.    she did not need a dental check/work.    she was told that amlodipine medication (s) are okay to  take the morning of surgery prior to check in.    Call time 12 minutes

## 2022-11-09 ENCOUNTER — ANESTHESIA EVENT (OUTPATIENT)
Dept: SURGERY | Facility: MEDICAL CENTER | Age: 72
DRG: 235 | End: 2022-11-09
Payer: MEDICARE

## 2022-11-10 ENCOUNTER — APPOINTMENT (OUTPATIENT)
Dept: RADIOLOGY | Facility: MEDICAL CENTER | Age: 72
DRG: 235 | End: 2022-11-10
Attending: THORACIC SURGERY (CARDIOTHORACIC VASCULAR SURGERY)
Payer: MEDICARE

## 2022-11-10 ENCOUNTER — ANESTHESIA (OUTPATIENT)
Dept: SURGERY | Facility: MEDICAL CENTER | Age: 72
DRG: 235 | End: 2022-11-10
Payer: MEDICARE

## 2022-11-10 ENCOUNTER — APPOINTMENT (OUTPATIENT)
Dept: CARDIOLOGY | Facility: MEDICAL CENTER | Age: 72
DRG: 235 | End: 2022-11-10
Attending: THORACIC SURGERY (CARDIOTHORACIC VASCULAR SURGERY)
Payer: MEDICARE

## 2022-11-10 ENCOUNTER — HOSPITAL ENCOUNTER (INPATIENT)
Facility: MEDICAL CENTER | Age: 72
LOS: 25 days | DRG: 235 | End: 2022-12-05
Attending: THORACIC SURGERY (CARDIOTHORACIC VASCULAR SURGERY) | Admitting: HOSPITALIST
Payer: MEDICARE

## 2022-11-10 DIAGNOSIS — I44.2 COMPLETE HEART BLOCK (HCC): Chronic | ICD-10-CM

## 2022-11-10 DIAGNOSIS — E78.5 DYSLIPIDEMIA: ICD-10-CM

## 2022-11-10 DIAGNOSIS — I48.0 PAF (PAROXYSMAL ATRIAL FIBRILLATION) (HCC): ICD-10-CM

## 2022-11-10 DIAGNOSIS — I50.20 HFREF (HEART FAILURE WITH REDUCED EJECTION FRACTION) (HCC): ICD-10-CM

## 2022-11-10 DIAGNOSIS — I63.9 ACUTE ISCHEMIC STROKE (HCC): ICD-10-CM

## 2022-11-10 DIAGNOSIS — I25.84 CORONARY ARTERY DISEASE DUE TO CALCIFIED CORONARY LESION: Chronic | ICD-10-CM

## 2022-11-10 DIAGNOSIS — Z95.1 S/P CABG X 3: ICD-10-CM

## 2022-11-10 DIAGNOSIS — I50.43 CHF (CONGESTIVE HEART FAILURE), NYHA CLASS III, ACUTE ON CHRONIC, COMBINED (HCC): ICD-10-CM

## 2022-11-10 DIAGNOSIS — I25.10 CORONARY ARTERY DISEASE DUE TO CALCIFIED CORONARY LESION: Chronic | ICD-10-CM

## 2022-11-10 DIAGNOSIS — M25.572 LEFT LATERAL ANKLE PAIN: ICD-10-CM

## 2022-11-10 DIAGNOSIS — F41.9 ANXIETY: ICD-10-CM

## 2022-11-10 DIAGNOSIS — G93.40 ENCEPHALOPATHY ACUTE: ICD-10-CM

## 2022-11-10 DIAGNOSIS — R13.10 DYSPHAGIA, UNSPECIFIED TYPE: ICD-10-CM

## 2022-11-10 DIAGNOSIS — I44.7 LBBB (LEFT BUNDLE BRANCH BLOCK): Chronic | ICD-10-CM

## 2022-11-10 PROBLEM — Z99.11 ENCOUNTER FOR WEANING FROM VENTILATOR (HCC): Status: ACTIVE | Noted: 2022-11-10

## 2022-11-10 PROBLEM — J44.9 CHRONIC OBSTRUCTIVE PULMONARY DISEASE (COPD) (HCC): Status: ACTIVE | Noted: 2022-11-10

## 2022-11-10 LAB
ACT BLD: 115 SEC (ref 74–137)
ACT BLD: 121 SEC (ref 74–137)
ACT BLD: 416 SEC (ref 74–137)
ACT BLD: 451 SEC (ref 74–137)
ACT BLD: 532 SEC (ref 74–137)
ACT BLD: 596 SEC (ref 74–137)
APTT PPP: 34.8 SEC (ref 24.7–36)
BARCODED ABORH UBTYP: 6200
BARCODED ABORH UBTYP: 6200
BARCODED PRD CODE UBPRD: NORMAL
BARCODED PRD CODE UBPRD: NORMAL
BARCODED UNIT NUM UBUNT: NORMAL
BARCODED UNIT NUM UBUNT: NORMAL
BASE EXCESS BLDA CALC-SCNC: -2 MMOL/L (ref -4–3)
BASE EXCESS BLDA CALC-SCNC: -2 MMOL/L (ref -4–3)
BASE EXCESS BLDA CALC-SCNC: -3 MMOL/L (ref -4–3)
BASE EXCESS BLDA CALC-SCNC: 0 MMOL/L (ref -4–3)
BASE EXCESS BLDA CALC-SCNC: 1 MMOL/L (ref -4–3)
BASE EXCESS BLDA CALC-SCNC: 4 MMOL/L (ref -4–3)
BASE EXCESS BLDV CALC-SCNC: -2 MMOL/L (ref -4–3)
BODY TEMPERATURE: ABNORMAL DEGREES
CA-I BLD ISE-SCNC: 0.9 MMOL/L (ref 1.1–1.3)
CA-I BLD ISE-SCNC: 0.93 MMOL/L (ref 1.1–1.3)
CA-I BLD ISE-SCNC: 0.96 MMOL/L (ref 1.1–1.3)
CA-I BLD ISE-SCNC: 1.17 MMOL/L (ref 1.1–1.3)
CA-I BLD ISE-SCNC: 1.2 MMOL/L (ref 1.1–1.3)
CA-I BLD ISE-SCNC: 1.23 MMOL/L (ref 1.1–1.3)
CO2 BLDA-SCNC: 23 MMOL/L (ref 20–33)
CO2 BLDA-SCNC: 24 MMOL/L (ref 20–33)
CO2 BLDA-SCNC: 25 MMOL/L (ref 20–33)
CO2 BLDA-SCNC: 26 MMOL/L (ref 20–33)
CO2 BLDA-SCNC: 26 MMOL/L (ref 20–33)
CO2 BLDA-SCNC: 30 MMOL/L (ref 20–33)
CO2 BLDV-SCNC: 25 MMOL/L (ref 20–33)
COMPONENT R 8504R: NORMAL
COMPONENT R 8504R: NORMAL
DELSYS IDSYS: ABNORMAL
EKG IMPRESSION: NORMAL
END TIDAL CARBON DIOXIDE IECO2: 26 MMHG
GLUCOSE BLD STRIP.AUTO-MCNC: 126 MG/DL (ref 65–99)
GLUCOSE BLD STRIP.AUTO-MCNC: 143 MG/DL (ref 65–99)
GLUCOSE BLD STRIP.AUTO-MCNC: 143 MG/DL (ref 65–99)
GLUCOSE BLD STRIP.AUTO-MCNC: 144 MG/DL (ref 65–99)
GLUCOSE BLD STRIP.AUTO-MCNC: 148 MG/DL (ref 65–99)
GLUCOSE BLD STRIP.AUTO-MCNC: 150 MG/DL (ref 65–99)
GLUCOSE BLD STRIP.AUTO-MCNC: 150 MG/DL (ref 65–99)
GLUCOSE BLD STRIP.AUTO-MCNC: 166 MG/DL (ref 65–99)
GLUCOSE BLD STRIP.AUTO-MCNC: 171 MG/DL (ref 65–99)
GLUCOSE BLD STRIP.AUTO-MCNC: 172 MG/DL (ref 65–99)
HCO3 BLDA-SCNC: 21.9 MMOL/L (ref 17–25)
HCO3 BLDA-SCNC: 22.7 MMOL/L (ref 17–25)
HCO3 BLDA-SCNC: 23.7 MMOL/L (ref 17–25)
HCO3 BLDA-SCNC: 24.6 MMOL/L (ref 17–25)
HCO3 BLDA-SCNC: 24.9 MMOL/L (ref 17–25)
HCO3 BLDA-SCNC: 28.9 MMOL/L (ref 17–25)
HCO3 BLDV-SCNC: 23.8 MMOL/L (ref 24–28)
HCT VFR BLD AUTO: 32.1 % (ref 37–47)
HCT VFR BLD CALC: 17 % (ref 37–47)
HCT VFR BLD CALC: 20 % (ref 37–47)
HCT VFR BLD CALC: 26 % (ref 37–47)
HCT VFR BLD CALC: 26 % (ref 37–47)
HCT VFR BLD CALC: 27 % (ref 37–47)
HCT VFR BLD CALC: 28 % (ref 37–47)
HGB BLD-MCNC: 10.8 G/DL (ref 12–16)
HGB BLD-MCNC: 5.8 G/DL (ref 12–16)
HGB BLD-MCNC: 6.8 G/DL (ref 12–16)
HGB BLD-MCNC: 8.8 G/DL (ref 12–16)
HGB BLD-MCNC: 8.8 G/DL (ref 12–16)
HGB BLD-MCNC: 9.2 G/DL (ref 12–16)
HGB BLD-MCNC: 9.5 G/DL (ref 12–16)
HOROWITZ INDEX BLDA+IHG-RTO: 208 MM[HG]
INR PPP: 1.2 (ref 0.87–1.13)
MAGNESIUM SERPL-MCNC: 3.1 MG/DL (ref 1.5–2.5)
MODE IMODE: ABNORMAL
O2/TOTAL GAS SETTING VFR VENT: 50 %
PCO2 BLDA: 34.3 MMHG (ref 26–37)
PCO2 BLDA: 34.6 MMHG (ref 26–37)
PCO2 BLDA: 36.4 MMHG (ref 26–37)
PCO2 BLDA: 37.5 MMHG (ref 26–37)
PCO2 BLDA: 43 MMHG (ref 26–37)
PCO2 BLDA: 48 MMHG (ref 26–37)
PCO2 BLDV: 46.6 MMHG (ref 41–51)
PCO2 TEMP ADJ BLDA: 30.6 MMHG (ref 26–37)
PCO2 TEMP ADJ BLDA: 32.1 MMHG (ref 26–37)
PCO2 TEMP ADJ BLDA: 36.4 MMHG (ref 26–37)
PCO2 TEMP ADJ BLDA: 37.5 MMHG (ref 26–37)
PCO2 TEMP ADJ BLDA: 40.6 MMHG (ref 26–37)
PCO2 TEMP ADJ BLDA: 48 MMHG (ref 26–37)
PCO2 TEMP ADJ BLDV: 41.4 MMHG (ref 41–51)
PEEP END EXPIRATORY PRESSURE IPEEP: 8 CMH20
PERCENT MINUTE VOLUME IPMV: 160
PH BLDA: 7.3 [PH] (ref 7.4–7.5)
PH BLDA: 7.4 [PH] (ref 7.4–7.5)
PH BLDA: 7.41 [PH] (ref 7.4–7.5)
PH BLDA: 7.42 [PH] (ref 7.4–7.5)
PH BLDA: 7.44 [PH] (ref 7.4–7.5)
PH BLDA: 7.46 [PH] (ref 7.4–7.5)
PH BLDV: 7.32 [PH] (ref 7.31–7.45)
PH TEMP ADJ BLDA: 7.3 [PH] (ref 7.4–7.5)
PH TEMP ADJ BLDA: 7.4 [PH] (ref 7.4–7.5)
PH TEMP ADJ BLDA: 7.42 [PH] (ref 7.4–7.5)
PH TEMP ADJ BLDA: 7.43 [PH] (ref 7.4–7.5)
PH TEMP ADJ BLDA: 7.46 [PH] (ref 7.4–7.5)
PH TEMP ADJ BLDA: 7.51 [PH] (ref 7.4–7.5)
PH TEMP ADJ BLDV: 7.35 [PH] (ref 7.31–7.45)
PLATELET # BLD AUTO: 160 K/UL (ref 164–446)
PO2 BLDA: 104 MMHG (ref 64–87)
PO2 BLDA: 137 MMHG (ref 64–87)
PO2 BLDA: 189 MMHG (ref 64–87)
PO2 BLDA: 201 MMHG (ref 64–87)
PO2 BLDA: 299 MMHG (ref 64–87)
PO2 BLDA: 339 MMHG (ref 64–87)
PO2 BLDV: 54 MMHG (ref 25–40)
PO2 TEMP ADJ BLDA: 137 MMHG (ref 64–87)
PO2 TEMP ADJ BLDA: 189 MMHG (ref 64–87)
PO2 TEMP ADJ BLDA: 201 MMHG (ref 64–87)
PO2 TEMP ADJ BLDA: 293 MMHG (ref 64–87)
PO2 TEMP ADJ BLDA: 325 MMHG (ref 64–87)
PO2 TEMP ADJ BLDA: 95 MMHG (ref 64–87)
PO2 TEMP ADJ BLDV: 44 MMHG (ref 25–40)
POTASSIUM BLD-SCNC: 4 MMOL/L (ref 3.6–5.5)
POTASSIUM BLD-SCNC: 4.3 MMOL/L (ref 3.6–5.5)
POTASSIUM BLD-SCNC: 5 MMOL/L (ref 3.6–5.5)
POTASSIUM BLD-SCNC: 5.1 MMOL/L (ref 3.6–5.5)
POTASSIUM BLD-SCNC: 5.6 MMOL/L (ref 3.6–5.5)
POTASSIUM BLD-SCNC: 5.8 MMOL/L (ref 3.6–5.5)
POTASSIUM SERPL-SCNC: 4.1 MMOL/L (ref 3.6–5.5)
POTASSIUM SERPL-SCNC: 4.5 MMOL/L (ref 3.6–5.5)
PRODUCT TYPE UPROD: NORMAL
PRODUCT TYPE UPROD: NORMAL
PROTHROMBIN TIME: 15 SEC (ref 12–14.6)
SAO2 % BLDA: 100 % (ref 93–99)
SAO2 % BLDA: 98 % (ref 93–99)
SAO2 % BLDA: 99 % (ref 93–99)
SAO2 % BLDV: 84 %
SODIUM BLD-SCNC: 135 MMOL/L (ref 135–145)
SODIUM BLD-SCNC: 135 MMOL/L (ref 135–145)
SODIUM BLD-SCNC: 137 MMOL/L (ref 135–145)
SODIUM BLD-SCNC: 138 MMOL/L (ref 135–145)
SPECIMEN DRAWN FROM PATIENT: ABNORMAL
UNIT STATUS USTAT: NORMAL
UNIT STATUS USTAT: NORMAL

## 2022-11-10 PROCEDURE — 33508 ENDOSCOPIC VEIN HARVEST: CPT | Mod: AS,59 | Performed by: NURSE PRACTITIONER

## 2022-11-10 PROCEDURE — 82330 ASSAY OF CALCIUM: CPT | Mod: 91

## 2022-11-10 PROCEDURE — 700105 HCHG RX REV CODE 258: Performed by: ANESTHESIOLOGY

## 2022-11-10 PROCEDURE — 160031 HCHG SURGERY MINUTES - 1ST 30 MINS LEVEL 5: Performed by: THORACIC SURGERY (CARDIOTHORACIC VASCULAR SURGERY)

## 2022-11-10 PROCEDURE — 93010 ELECTROCARDIOGRAM REPORT: CPT | Performed by: INTERNAL MEDICINE

## 2022-11-10 PROCEDURE — 99100 ANES PT EXTEME AGE<1 YR&>70: CPT | Performed by: ANESTHESIOLOGY

## 2022-11-10 PROCEDURE — 700105 HCHG RX REV CODE 258: Performed by: NURSE PRACTITIONER

## 2022-11-10 PROCEDURE — 700105 HCHG RX REV CODE 258: Performed by: THORACIC SURGERY (CARDIOTHORACIC VASCULAR SURGERY)

## 2022-11-10 PROCEDURE — 71045 X-RAY EXAM CHEST 1 VIEW: CPT

## 2022-11-10 PROCEDURE — C9248 INJ, CLEVIDIPINE BUTYRATE: HCPCS | Performed by: ANESTHESIOLOGY

## 2022-11-10 PROCEDURE — C1894 INTRO/SHEATH, NON-LASER: HCPCS | Performed by: THORACIC SURGERY (CARDIOTHORACIC VASCULAR SURGERY)

## 2022-11-10 PROCEDURE — C9248 INJ, CLEVIDIPINE BUTYRATE: HCPCS | Performed by: NURSE PRACTITIONER

## 2022-11-10 PROCEDURE — 33533 CABG ARTERIAL SINGLE: CPT | Mod: AS | Performed by: NURSE PRACTITIONER

## 2022-11-10 PROCEDURE — 99291 CRITICAL CARE FIRST HOUR: CPT | Performed by: INTERNAL MEDICINE

## 2022-11-10 PROCEDURE — C1768 GRAFT, VASCULAR: HCPCS | Performed by: THORACIC SURGERY (CARDIOTHORACIC VASCULAR SURGERY)

## 2022-11-10 PROCEDURE — 93312 ECHO TRANSESOPHAGEAL: CPT | Mod: 26,59 | Performed by: ANESTHESIOLOGY

## 2022-11-10 PROCEDURE — 700102 HCHG RX REV CODE 250 W/ 637 OVERRIDE(OP): Performed by: THORACIC SURGERY (CARDIOTHORACIC VASCULAR SURGERY)

## 2022-11-10 PROCEDURE — 33508 ENDOSCOPIC VEIN HARVEST: CPT | Mod: 59 | Performed by: THORACIC SURGERY (CARDIOTHORACIC VASCULAR SURGERY)

## 2022-11-10 PROCEDURE — 33518 CABG ARTERY-VEIN TWO: CPT | Performed by: THORACIC SURGERY (CARDIOTHORACIC VASCULAR SURGERY)

## 2022-11-10 PROCEDURE — 85610 PROTHROMBIN TIME: CPT

## 2022-11-10 PROCEDURE — 85049 AUTOMATED PLATELET COUNT: CPT

## 2022-11-10 PROCEDURE — 5A1221Z PERFORMANCE OF CARDIAC OUTPUT, CONTINUOUS: ICD-10-PCS | Performed by: THORACIC SURGERY (CARDIOTHORACIC VASCULAR SURGERY)

## 2022-11-10 PROCEDURE — 37799 UNLISTED PX VASCULAR SURGERY: CPT

## 2022-11-10 PROCEDURE — 700101 HCHG RX REV CODE 250: Performed by: THORACIC SURGERY (CARDIOTHORACIC VASCULAR SURGERY)

## 2022-11-10 PROCEDURE — 700101 HCHG RX REV CODE 250

## 2022-11-10 PROCEDURE — 160009 HCHG ANES TIME/MIN: Performed by: THORACIC SURGERY (CARDIOTHORACIC VASCULAR SURGERY)

## 2022-11-10 PROCEDURE — 700105 HCHG RX REV CODE 258

## 2022-11-10 PROCEDURE — C1751 CATH, INF, PER/CENT/MIDLINE: HCPCS | Performed by: THORACIC SURGERY (CARDIOTHORACIC VASCULAR SURGERY)

## 2022-11-10 PROCEDURE — 93005 ELECTROCARDIOGRAM TRACING: CPT | Performed by: NURSE PRACTITIONER

## 2022-11-10 PROCEDURE — 02110Z9 BYPASS CORONARY ARTERY, TWO ARTERIES FROM LEFT INTERNAL MAMMARY, OPEN APPROACH: ICD-10-PCS | Performed by: THORACIC SURGERY (CARDIOTHORACIC VASCULAR SURGERY)

## 2022-11-10 PROCEDURE — 85347 COAGULATION TIME ACTIVATED: CPT | Mod: 91

## 2022-11-10 PROCEDURE — C1729 CATH, DRAINAGE: HCPCS | Performed by: THORACIC SURGERY (CARDIOTHORACIC VASCULAR SURGERY)

## 2022-11-10 PROCEDURE — P9047 ALBUMIN (HUMAN), 25%, 50ML: HCPCS | Mod: JG

## 2022-11-10 PROCEDURE — 700111 HCHG RX REV CODE 636 W/ 250 OVERRIDE (IP)

## 2022-11-10 PROCEDURE — 503001 HCHG PERFUSION: Performed by: THORACIC SURGERY (CARDIOTHORACIC VASCULAR SURGERY)

## 2022-11-10 PROCEDURE — 700111 HCHG RX REV CODE 636 W/ 250 OVERRIDE (IP): Performed by: THORACIC SURGERY (CARDIOTHORACIC VASCULAR SURGERY)

## 2022-11-10 PROCEDURE — 84132 ASSAY OF SERUM POTASSIUM: CPT

## 2022-11-10 PROCEDURE — 94003 VENT MGMT INPAT SUBQ DAY: CPT

## 2022-11-10 PROCEDURE — 700111 HCHG RX REV CODE 636 W/ 250 OVERRIDE (IP): Performed by: NURSE PRACTITIONER

## 2022-11-10 PROCEDURE — C1898 LEAD, PMKR, OTHER THAN TRANS: HCPCS | Performed by: THORACIC SURGERY (CARDIOTHORACIC VASCULAR SURGERY)

## 2022-11-10 PROCEDURE — 85730 THROMBOPLASTIN TIME PARTIAL: CPT

## 2022-11-10 PROCEDURE — 00567 ANES DIRECT CABG W/PUMP: CPT | Performed by: ANESTHESIOLOGY

## 2022-11-10 PROCEDURE — 700102 HCHG RX REV CODE 250 W/ 637 OVERRIDE(OP): Performed by: NURSE PRACTITIONER

## 2022-11-10 PROCEDURE — 86923 COMPATIBILITY TEST ELECTRIC: CPT

## 2022-11-10 PROCEDURE — 160042 HCHG SURGERY MINUTES - EA ADDL 1 MIN LEVEL 5: Performed by: THORACIC SURGERY (CARDIOTHORACIC VASCULAR SURGERY)

## 2022-11-10 PROCEDURE — 82803 BLOOD GASES ANY COMBINATION: CPT | Mod: 91

## 2022-11-10 PROCEDURE — 700111 HCHG RX REV CODE 636 W/ 250 OVERRIDE (IP): Performed by: ANESTHESIOLOGY

## 2022-11-10 PROCEDURE — 700101 HCHG RX REV CODE 250: Performed by: NURSE PRACTITIONER

## 2022-11-10 PROCEDURE — 36556 INSERT NON-TUNNEL CV CATH: CPT | Mod: 59,RT | Performed by: ANESTHESIOLOGY

## 2022-11-10 PROCEDURE — 82962 GLUCOSE BLOOD TEST: CPT | Mod: 91

## 2022-11-10 PROCEDURE — 99292 CRITICAL CARE ADDL 30 MIN: CPT | Performed by: INTERNAL MEDICINE

## 2022-11-10 PROCEDURE — 83735 ASSAY OF MAGNESIUM: CPT

## 2022-11-10 PROCEDURE — 93325 DOPPLER ECHO COLOR FLOW MAPG: CPT | Mod: 26 | Performed by: ANESTHESIOLOGY

## 2022-11-10 PROCEDURE — 33533 CABG ARTERIAL SINGLE: CPT | Performed by: THORACIC SURGERY (CARDIOTHORACIC VASCULAR SURGERY)

## 2022-11-10 PROCEDURE — A9270 NON-COVERED ITEM OR SERVICE: HCPCS | Performed by: THORACIC SURGERY (CARDIOTHORACIC VASCULAR SURGERY)

## 2022-11-10 PROCEDURE — 160048 HCHG OR STATISTICAL LEVEL 1-5: Performed by: THORACIC SURGERY (CARDIOTHORACIC VASCULAR SURGERY)

## 2022-11-10 PROCEDURE — 84295 ASSAY OF SERUM SODIUM: CPT | Mod: 91

## 2022-11-10 PROCEDURE — 06BQ3ZZ EXCISION OF LEFT SAPHENOUS VEIN, PERCUTANEOUS APPROACH: ICD-10-PCS | Performed by: THORACIC SURGERY (CARDIOTHORACIC VASCULAR SURGERY)

## 2022-11-10 PROCEDURE — 33518 CABG ARTERY-VEIN TWO: CPT | Mod: AS | Performed by: NURSE PRACTITIONER

## 2022-11-10 PROCEDURE — 94799 UNLISTED PULMONARY SVC/PX: CPT

## 2022-11-10 PROCEDURE — 700101 HCHG RX REV CODE 250: Performed by: ANESTHESIOLOGY

## 2022-11-10 PROCEDURE — C1725 CATH, TRANSLUMIN NON-LASER: HCPCS | Performed by: THORACIC SURGERY (CARDIOTHORACIC VASCULAR SURGERY)

## 2022-11-10 PROCEDURE — 93320 DOPPLER ECHO COMPLETE: CPT | Mod: 26 | Performed by: ANESTHESIOLOGY

## 2022-11-10 PROCEDURE — 94002 VENT MGMT INPAT INIT DAY: CPT

## 2022-11-10 PROCEDURE — 85014 HEMATOCRIT: CPT

## 2022-11-10 PROCEDURE — 36620 INSERTION CATHETER ARTERY: CPT | Performed by: ANESTHESIOLOGY

## 2022-11-10 PROCEDURE — 85018 HEMOGLOBIN: CPT

## 2022-11-10 PROCEDURE — 770022 HCHG ROOM/CARE - ICU (200)

## 2022-11-10 PROCEDURE — P9016 RBC LEUKOCYTES REDUCED: HCPCS

## 2022-11-10 PROCEDURE — 021109W BYPASS CORONARY ARTERY, TWO ARTERIES FROM AORTA WITH AUTOLOGOUS VENOUS TISSUE, OPEN APPROACH: ICD-10-PCS | Performed by: THORACIC SURGERY (CARDIOTHORACIC VASCULAR SURGERY)

## 2022-11-10 PROCEDURE — 36430 TRANSFUSION BLD/BLD COMPNT: CPT

## 2022-11-10 RX ORDER — CARVEDILOL 3.12 MG/1
3.12 TABLET ORAL 2 TIMES DAILY WITH MEALS
Status: DISCONTINUED | OUTPATIENT
Start: 2022-11-11 | End: 2022-11-11

## 2022-11-10 RX ORDER — POLYETHYLENE GLYCOL 3350 17 G/17G
1 POWDER, FOR SOLUTION ORAL DAILY
Status: DISCONTINUED | OUTPATIENT
Start: 2022-11-11 | End: 2022-11-11

## 2022-11-10 RX ORDER — NITROGLYCERIN 20 MG/100ML
0-100 INJECTION INTRAVENOUS CONTINUOUS
Status: DISCONTINUED | OUTPATIENT
Start: 2022-11-10 | End: 2022-11-11

## 2022-11-10 RX ORDER — PHENYLEPHRINE HCL IN 0.9% NACL 0.5 MG/5ML
SYRINGE (ML) INTRAVENOUS PRN
Status: DISCONTINUED | OUTPATIENT
Start: 2022-11-10 | End: 2022-11-10

## 2022-11-10 RX ORDER — TRAMADOL HYDROCHLORIDE 50 MG/1
50 TABLET ORAL EVERY 4 HOURS PRN
Status: DISCONTINUED | OUTPATIENT
Start: 2022-11-10 | End: 2022-11-11

## 2022-11-10 RX ORDER — SODIUM CHLORIDE 9 MG/ML
INJECTION, SOLUTION INTRAVENOUS CONTINUOUS
Status: DISCONTINUED | OUTPATIENT
Start: 2022-11-10 | End: 2022-11-13

## 2022-11-10 RX ORDER — ALUMINA, MAGNESIA, AND SIMETHICONE 2400; 2400; 240 MG/30ML; MG/30ML; MG/30ML
30 SUSPENSION ORAL EVERY 4 HOURS PRN
Status: DISCONTINUED | OUTPATIENT
Start: 2022-11-10 | End: 2022-11-11

## 2022-11-10 RX ORDER — OXYCODONE HYDROCHLORIDE 5 MG/1
5 TABLET ORAL
Status: DISCONTINUED | OUTPATIENT
Start: 2022-11-10 | End: 2022-11-11

## 2022-11-10 RX ORDER — PAPAVERINE HYDROCHLORIDE 30 MG/ML
INJECTION INTRAMUSCULAR; INTRAVENOUS
Status: DISCONTINUED | OUTPATIENT
Start: 2022-11-10 | End: 2022-11-10

## 2022-11-10 RX ORDER — ACETAMINOPHEN 650 MG/1
650 SUPPOSITORY RECTAL EVERY 4 HOURS PRN
Status: DISCONTINUED | OUTPATIENT
Start: 2022-11-10 | End: 2022-11-11

## 2022-11-10 RX ORDER — METHADONE HYDROCHLORIDE 10 MG/ML
INJECTION, SOLUTION INTRAMUSCULAR; INTRAVENOUS; SUBCUTANEOUS PRN
Status: DISCONTINUED | OUTPATIENT
Start: 2022-11-10 | End: 2022-11-10

## 2022-11-10 RX ORDER — AMOXICILLIN 250 MG
2 CAPSULE ORAL 2 TIMES DAILY
Status: DISCONTINUED | OUTPATIENT
Start: 2022-11-10 | End: 2022-11-11

## 2022-11-10 RX ORDER — MIDAZOLAM HYDROCHLORIDE 1 MG/ML
INJECTION INTRAMUSCULAR; INTRAVENOUS PRN
Status: DISCONTINUED | OUTPATIENT
Start: 2022-11-10 | End: 2022-11-10

## 2022-11-10 RX ORDER — BISACODYL 10 MG
10 SUPPOSITORY, RECTAL RECTAL
Status: DISCONTINUED | OUTPATIENT
Start: 2022-11-10 | End: 2022-11-11

## 2022-11-10 RX ORDER — ACETAMINOPHEN 500 MG
1000 TABLET ORAL ONCE
Status: COMPLETED | OUTPATIENT
Start: 2022-11-10 | End: 2022-11-10

## 2022-11-10 RX ORDER — SODIUM CHLORIDE, SODIUM LACTATE, POTASSIUM CHLORIDE, CALCIUM CHLORIDE 600; 310; 30; 20 MG/100ML; MG/100ML; MG/100ML; MG/100ML
INJECTION, SOLUTION INTRAVENOUS
Status: DISCONTINUED | OUTPATIENT
Start: 2022-11-10 | End: 2022-11-10

## 2022-11-10 RX ORDER — ONDANSETRON 2 MG/ML
8 INJECTION INTRAMUSCULAR; INTRAVENOUS EVERY 6 HOURS PRN
Status: DISCONTINUED | OUTPATIENT
Start: 2022-11-10 | End: 2022-11-24

## 2022-11-10 RX ORDER — EPINEPHRINE HCL IN 0.9 % NACL 4MG/250ML
0-.2 PLASTIC BAG, INJECTION (ML) INTRAVENOUS CONTINUOUS
Status: DISCONTINUED | OUTPATIENT
Start: 2022-11-10 | End: 2022-11-11

## 2022-11-10 RX ORDER — ACETAMINOPHEN 500 MG
1000 TABLET ORAL EVERY 6 HOURS PRN
Status: DISCONTINUED | OUTPATIENT
Start: 2022-11-15 | End: 2022-11-11

## 2022-11-10 RX ORDER — SODIUM CHLORIDE, SODIUM GLUCONATE, SODIUM ACETATE, POTASSIUM CHLORIDE AND MAGNESIUM CHLORIDE 526; 502; 368; 37; 30 MG/100ML; MG/100ML; MG/100ML; MG/100ML; MG/100ML
INJECTION, SOLUTION INTRAVENOUS PRN
Status: DISCONTINUED | OUTPATIENT
Start: 2022-11-10 | End: 2022-11-22

## 2022-11-10 RX ORDER — DEXMEDETOMIDINE HYDROCHLORIDE 4 UG/ML
0-1.5 INJECTION, SOLUTION INTRAVENOUS CONTINUOUS
Status: DISCONTINUED | OUTPATIENT
Start: 2022-11-10 | End: 2022-11-11

## 2022-11-10 RX ORDER — NOREPINEPHRINE BITARTRATE 0.03 MG/ML
0-30 INJECTION, SOLUTION INTRAVENOUS CONTINUOUS
Status: DISCONTINUED | OUTPATIENT
Start: 2022-11-10 | End: 2022-11-11

## 2022-11-10 RX ORDER — MORPHINE SULFATE 4 MG/ML
4 INJECTION INTRAVENOUS
Status: DISCONTINUED | OUTPATIENT
Start: 2022-11-10 | End: 2022-11-11

## 2022-11-10 RX ORDER — HEPARIN SODIUM,PORCINE 1000/ML
VIAL (ML) INJECTION PRN
Status: DISCONTINUED | OUTPATIENT
Start: 2022-11-10 | End: 2022-11-10

## 2022-11-10 RX ORDER — CLOPIDOGREL BISULFATE 75 MG/1
75 TABLET ORAL DAILY
Status: DISCONTINUED | OUTPATIENT
Start: 2022-11-11 | End: 2022-11-11

## 2022-11-10 RX ORDER — SODIUM CHLORIDE, SODIUM GLUCONATE, SODIUM ACETATE, POTASSIUM CHLORIDE AND MAGNESIUM CHLORIDE 526; 502; 368; 37; 30 MG/100ML; MG/100ML; MG/100ML; MG/100ML; MG/100ML
INJECTION, SOLUTION INTRAVENOUS
Status: DISCONTINUED | OUTPATIENT
Start: 2022-11-10 | End: 2022-11-10

## 2022-11-10 RX ORDER — VECURONIUM BROMIDE 1 MG/ML
INJECTION, POWDER, LYOPHILIZED, FOR SOLUTION INTRAVENOUS PRN
Status: DISCONTINUED | OUTPATIENT
Start: 2022-11-10 | End: 2022-11-10

## 2022-11-10 RX ORDER — OMEPRAZOLE 20 MG/1
20 CAPSULE, DELAYED RELEASE ORAL DAILY
Status: DISCONTINUED | OUTPATIENT
Start: 2022-11-11 | End: 2022-11-11

## 2022-11-10 RX ORDER — DEXMEDETOMIDINE HYDROCHLORIDE 4 UG/ML
0-1.5 INJECTION, SOLUTION INTRAVENOUS CONTINUOUS
Status: DISCONTINUED | OUTPATIENT
Start: 2022-11-10 | End: 2022-11-10

## 2022-11-10 RX ORDER — PROMETHAZINE HYDROCHLORIDE 25 MG/1
25 SUPPOSITORY RECTAL EVERY 6 HOURS PRN
Status: DISCONTINUED | OUTPATIENT
Start: 2022-11-10 | End: 2022-11-24

## 2022-11-10 RX ORDER — OXYCODONE HYDROCHLORIDE 10 MG/1
10 TABLET ORAL
Status: DISCONTINUED | OUTPATIENT
Start: 2022-11-10 | End: 2022-11-11

## 2022-11-10 RX ORDER — ACETAMINOPHEN 500 MG
1000 TABLET ORAL EVERY 6 HOURS
Status: DISCONTINUED | OUTPATIENT
Start: 2022-11-10 | End: 2022-11-11

## 2022-11-10 RX ORDER — PROTAMINE SULFATE 10 MG/ML
INJECTION, SOLUTION INTRAVENOUS PRN
Status: DISCONTINUED | OUTPATIENT
Start: 2022-11-10 | End: 2022-11-10

## 2022-11-10 RX ORDER — INSULIN LISPRO 100 [IU]/ML
0-14 INJECTION, SOLUTION INTRAVENOUS; SUBCUTANEOUS
Status: DISCONTINUED | OUTPATIENT
Start: 2022-11-10 | End: 2022-11-11

## 2022-11-10 RX ORDER — METHADONE HYDROCHLORIDE 10 MG/ML
20 INJECTION, SOLUTION INTRAMUSCULAR; INTRAVENOUS; SUBCUTANEOUS ONCE
Status: DISCONTINUED | OUTPATIENT
Start: 2022-11-10 | End: 2022-11-10

## 2022-11-10 RX ORDER — EPINEPHRINE HCL IN 0.9 % NACL 4MG/250ML
0-.2 PLASTIC BAG, INJECTION (ML) INTRAVENOUS CONTINUOUS
Status: DISCONTINUED | OUTPATIENT
Start: 2022-11-10 | End: 2022-11-10

## 2022-11-10 RX ORDER — VANCOMYCIN HYDROCHLORIDE 1 G/20ML
1500 INJECTION, POWDER, LYOPHILIZED, FOR SOLUTION INTRAVENOUS ONCE
Status: DISCONTINUED | OUTPATIENT
Start: 2022-11-10 | End: 2022-11-10

## 2022-11-10 RX ORDER — MAGNESIUM SULFATE 1 G/100ML
1 INJECTION INTRAVENOUS DAILY
Status: DISPENSED | OUTPATIENT
Start: 2022-11-10 | End: 2022-11-13

## 2022-11-10 RX ORDER — MIDAZOLAM HYDROCHLORIDE 1 MG/ML
2 INJECTION INTRAMUSCULAR; INTRAVENOUS
Status: DISCONTINUED | OUTPATIENT
Start: 2022-11-10 | End: 2022-11-11

## 2022-11-10 RX ORDER — PROCHLORPERAZINE EDISYLATE 5 MG/ML
10 INJECTION INTRAMUSCULAR; INTRAVENOUS EVERY 6 HOURS PRN
Status: DISCONTINUED | OUTPATIENT
Start: 2022-11-10 | End: 2022-11-24

## 2022-11-10 RX ORDER — ACETAMINOPHEN 325 MG/1
650 TABLET ORAL EVERY 4 HOURS PRN
Status: DISCONTINUED | OUTPATIENT
Start: 2022-11-10 | End: 2022-11-11

## 2022-11-10 RX ORDER — NOREPINEPHRINE BITARTRATE 0.03 MG/ML
0-30 INJECTION, SOLUTION INTRAVENOUS CONTINUOUS
Status: DISCONTINUED | OUTPATIENT
Start: 2022-11-10 | End: 2022-11-10

## 2022-11-10 RX ADMIN — SODIUM CHLORIDE, SODIUM LACTATE, POTASSIUM CHLORIDE, CALCIUM CHLORIDE: 600; 310; 30; 20 INJECTION, SOLUTION INTRAVENOUS at 08:35

## 2022-11-10 RX ADMIN — MIDAZOLAM 2 MG: 1 INJECTION, SOLUTION INTRAMUSCULAR; INTRAVENOUS at 22:04

## 2022-11-10 RX ADMIN — CLEVIPIDINE 2 MG/HR: 0.5 EMULSION INTRAVENOUS at 16:25

## 2022-11-10 RX ADMIN — CEFAZOLIN 2 G: 2 INJECTION, POWDER, FOR SOLUTION INTRAMUSCULAR; INTRAVENOUS at 16:28

## 2022-11-10 RX ADMIN — FENTANYL CITRATE 100 MCG: 50 INJECTION, SOLUTION INTRAMUSCULAR; INTRAVENOUS at 10:06

## 2022-11-10 RX ADMIN — CLEVIPIDINE 100 MCG: 0.5 EMULSION INTRAVENOUS at 09:00

## 2022-11-10 RX ADMIN — HEPARIN SODIUM 27000 UNITS: 1000 INJECTION, SOLUTION INTRAVENOUS; SUBCUTANEOUS at 10:00

## 2022-11-10 RX ADMIN — FENTANYL CITRATE 100 MCG: 50 INJECTION, SOLUTION INTRAMUSCULAR; INTRAVENOUS at 12:10

## 2022-11-10 RX ADMIN — SODIUM CHLORIDE: 9 INJECTION, SOLUTION INTRAVENOUS at 13:54

## 2022-11-10 RX ADMIN — EPINEPHRINE 0.02 MCG/KG/MIN: 1 INJECTION INTRAMUSCULAR; INTRAVENOUS; SUBCUTANEOUS at 15:22

## 2022-11-10 RX ADMIN — DEXMEDETOMIDINE 2.5 MCG/KG/HR: 200 INJECTION, SOLUTION INTRAVENOUS at 10:02

## 2022-11-10 RX ADMIN — SODIUM CHLORIDE, SODIUM GLUCONATE, SODIUM ACETATE, POTASSIUM CHLORIDE AND MAGNESIUM CHLORIDE: 526; 502; 368; 37; 30 INJECTION, SOLUTION INTRAVENOUS at 16:19

## 2022-11-10 RX ADMIN — METOPROLOL TARTRATE 12.5 MG: 25 TABLET, FILM COATED ORAL at 06:09

## 2022-11-10 RX ADMIN — PROPOFOL 50 MG: 10 INJECTION, EMULSION INTRAVENOUS at 08:06

## 2022-11-10 RX ADMIN — SODIUM CHLORIDE 2 UNITS/HR: 9 INJECTION, SOLUTION INTRAVENOUS at 13:22

## 2022-11-10 RX ADMIN — METHADONE HYDROCHLORIDE 10 MG: 10 INJECTION, SOLUTION INTRAMUSCULAR; INTRAVENOUS; SUBCUTANEOUS at 08:57

## 2022-11-10 RX ADMIN — CLEVIPIDINE 100 MCG: 0.5 EMULSION INTRAVENOUS at 10:02

## 2022-11-10 RX ADMIN — DEXMEDETOMIDINE 0.8 MCG/KG/HR: 200 INJECTION, SOLUTION INTRAVENOUS at 23:50

## 2022-11-10 RX ADMIN — Medication 100 MCG: at 07:56

## 2022-11-10 RX ADMIN — MIDAZOLAM HYDROCHLORIDE 1 MG: 1 INJECTION, SOLUTION INTRAMUSCULAR; INTRAVENOUS at 07:56

## 2022-11-10 RX ADMIN — EPINEPHRINE 0.05 MCG/KG/MIN: 1 INJECTION INTRAMUSCULAR; INTRAVENOUS; SUBCUTANEOUS at 11:47

## 2022-11-10 RX ADMIN — PROTAMINE SULFATE 250 MG: 10 INJECTION, SOLUTION INTRAVENOUS at 11:50

## 2022-11-10 RX ADMIN — PROPOFOL 30 MG: 10 INJECTION, EMULSION INTRAVENOUS at 08:08

## 2022-11-10 RX ADMIN — Medication 100 MCG: at 10:30

## 2022-11-10 RX ADMIN — AMINOCAPROIC ACID 2 G/HR: 250 INJECTION, SOLUTION INTRAVENOUS at 10:41

## 2022-11-10 RX ADMIN — FENTANYL CITRATE 50 MCG: 50 INJECTION, SOLUTION INTRAMUSCULAR; INTRAVENOUS at 08:07

## 2022-11-10 RX ADMIN — MIDAZOLAM HYDROCHLORIDE 1 MG: 1 INJECTION, SOLUTION INTRAMUSCULAR; INTRAVENOUS at 07:50

## 2022-11-10 RX ADMIN — CLEVIPIDINE 50 MCG: 0.5 EMULSION INTRAVENOUS at 12:11

## 2022-11-10 RX ADMIN — CLEVIPIDINE 100 MCG: 0.5 EMULSION INTRAVENOUS at 10:15

## 2022-11-10 RX ADMIN — VANCOMYCIN HYDROCHLORIDE 1000 MG: 1 INJECTION, POWDER, LYOPHILIZED, FOR SOLUTION INTRAVENOUS at 08:35

## 2022-11-10 RX ADMIN — SODIUM CHLORIDE, SODIUM GLUCONATE, SODIUM ACETATE, POTASSIUM CHLORIDE AND MAGNESIUM CHLORIDE: 526; 502; 368; 37; 30 INJECTION, SOLUTION INTRAVENOUS at 08:35

## 2022-11-10 RX ADMIN — VECURONIUM BROMIDE 8 MG: 10 INJECTION, POWDER, LYOPHILIZED, FOR SOLUTION INTRAVENOUS at 10:30

## 2022-11-10 RX ADMIN — AMINOCAPROIC ACID 6.71 MG: 250 INJECTION, SOLUTION INTRAVENOUS at 10:01

## 2022-11-10 RX ADMIN — CLEVIPIDINE 100 MCG: 0.5 EMULSION INTRAVENOUS at 10:08

## 2022-11-10 RX ADMIN — PROPOFOL 30 MG: 10 INJECTION, EMULSION INTRAVENOUS at 08:11

## 2022-11-10 RX ADMIN — SODIUM CHLORIDE, SODIUM GLUCONATE, SODIUM ACETATE, POTASSIUM CHLORIDE AND MAGNESIUM CHLORIDE: 526; 502; 368; 37; 30 INJECTION, SOLUTION INTRAVENOUS at 12:52

## 2022-11-10 RX ADMIN — SODIUM CHLORIDE, POTASSIUM CHLORIDE, SODIUM LACTATE AND CALCIUM CHLORIDE: 600; 310; 30; 20 INJECTION, SOLUTION INTRAVENOUS at 07:48

## 2022-11-10 RX ADMIN — ACETAMINOPHEN 1000 MG: 500 TABLET ORAL at 06:09

## 2022-11-10 RX ADMIN — SODIUM CHLORIDE 2 UNITS/HR: 9 INJECTION, SOLUTION INTRAVENOUS at 11:28

## 2022-11-10 RX ADMIN — EPHEDRINE SULFATE 10 MG: 50 INJECTION, SOLUTION INTRAVENOUS at 10:02

## 2022-11-10 RX ADMIN — VECURONIUM BROMIDE 12 MG: 10 INJECTION, POWDER, LYOPHILIZED, FOR SOLUTION INTRAVENOUS at 08:07

## 2022-11-10 RX ADMIN — MIDAZOLAM 2 MG: 1 INJECTION, SOLUTION INTRAMUSCULAR; INTRAVENOUS at 20:17

## 2022-11-10 ASSESSMENT — FIBROSIS 4 INDEX: FIB4 SCORE: 2.38

## 2022-11-10 NOTE — ANESTHESIA TIME REPORT
Anesthesia Start and Stop Event Times     Date Time Event    11/10/2022 0659 Ready for Procedure     0748 Anesthesia Start     1331 Anesthesia Stop        Responsible Staff  11/10/22    Name Role Begin End    Kenneth Foley M.D. Anesth 0748 1331        Overtime Reason:  no overtime (within assigned shift)    Comments:

## 2022-11-10 NOTE — CONSULTS
CRITICAL CARE MEDICINE ATTENDING CONSULTATION    Date of admission  11/10/2022    Chief Complaint  72 y.o. female admitted 11/10/2022 for elective CABG.    Hospital Course      11/10 -    three-vessel CABG      Interval Problem Update  Reviewed last 24 hour events:      I was kindly asked by Dr. Elizabeth Mac to see and evaluate this lady for management of mechanical ventilation and critical care management following three-vessel CABG.  The entire history is obtained from healthcare providers and the medical record as this lady cannot provide me with any history.  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  Today Dr. Mac took her to the operating theater and performed an uncomplicated three-vessel CABG.  She is now in the cardiac surgery unit.      Review of Systems  Review of Systems   Unable to perform ROS: Acuity of condition     Vital Signs for the last 24 hours  Temp:  [35.6 °C (96.1 °F)-36.3 °C (97.4 °F)] 35.7 °C (96.3 °F)  Pulse:  [26-83] 72  Resp:  [14-44] 16  BP: ()/(52-68) 118/57  SpO2:  [96 %-100 %] 98 %    Hemodynamic parameters for the last 24 hours  CVP:  [6 MM HG-8 MM HG] 6 MM HG    Vent Settings for the last 24 hours  Vent Mode: ASV  PEEP/CPAP: 8  MAP: 12  Control VTE (exp VT): 311    Physical Exam  Physical Exam  Constitutional:       Appearance: She is not diaphoretic.      Comments: On ventilator   HENT:      Head: Normocephalic.      Mouth/Throat:      Pharynx: Oropharynx is clear.   Eyes:      Pupils: Pupils are equal, round, and reactive to light.   Cardiovascular:      Comments: Sinus rhythm  Pulmonary:      Breath sounds: No wheezing or rales.   Abdominal:      General: There is no distension.      Tenderness: There is no abdominal tenderness.   Musculoskeletal:       Cervical back: Normal range of motion.      Right lower leg: No edema.      Left lower leg: No edema.   Skin:     General: Skin is warm.      Capillary Refill: Capillary refill takes less than 2 seconds.   Neurological:      Comments: Sedated       Medications  Current Facility-Administered Medications   Medication Dose Route Frequency Provider Last Rate Last Admin    Respiratory Therapy Consult   Nebulization Continuous RT Kevyn Kramer, A.P.R.N.        NS infusion   Intravenous Continuous Kevyn Kramer, A.P.R.N.   Stopped at 11/10/22 1514    electrolyte-A (PLASMALYTE-A) infusion   Intravenous PRN Kevyn Kramer, A.P.R.N. 999 mL/hr at 11/10/22 1619 New Bag at 11/10/22 1619    calcium CHLORIDE 1,000 mg in dextrose 5% 100 mL IVPB  1,000 mg Intravenous Once PRN Kevyn Kramer, A.P.R.N.        magnesium sulfate in D5W IVPB premix 1 g  1 g Intravenous DAILY Kevyn Kramer, A.P.R.N.   Held at 11/10/22 1345    K+ Scale: Goal of 4.5  1 Each Intravenous Q6HRS Kevyn Kramer, A.P.R.N.        [START ON 11/11/2022] aspirin EC (ECOTRIN) tablet 81 mg  81 mg Oral DAILY Kevyn Kramer, A.P.R.N.        [START ON 11/11/2022] clopidogrel (PLAVIX) tablet 75 mg  75 mg Oral DAILY Kevyn Kramer, A.P.R.N.        clevidipine (Cleviprex) IV emulsion  0-21 mg/hr Intravenous Continuous Kevyn Kramer, A.P.R.N.   Stopped at 11/10/22 1639    nitroglycerin 50 mg in D5W 250 ml infusion  0-100 mcg/min Intravenous Continuous Kevyn Kramer, A.P.R.N.   Dose not Required at 11/10/22 1345    Pharmacy Consult Request ...Pain Management Review 1 Each  1 Each Other PHARMACY TO DOSE Kevyn Kramer A.P.R.N.        acetaminophen (TYLENOL) tablet 1,000 mg  1,000 mg Oral Q6HRS Kevyn Kramer, A.P.R.N.        Followed by    [START ON 11/15/2022] acetaminophen (TYLENOL) tablet 1,000 mg  1,000 mg Oral Q6HRS PRN YASMANY Kwon.P.R.N.        oxyCODONE immediate-release (ROXICODONE) tablet 5 mg  5 mg Oral Q3HRS PRN Kevyn Kramer, A.P.R.N.        Or    oxyCODONE  immediate release (ROXICODONE) tablet 10 mg  10 mg Oral Q3HRS PRN Kevyn Kramer, A.P.R.N.        Or    fentaNYL (SUBLIMAZE) injection 50 mcg  50 mcg Intravenous Q3HRS PRN Kevyn Kramer, A.P.R.N.        traMADol (Ultram) 50 MG tablet 50 mg  50 mg Oral Q4HRS PRN Kevyn Kramer, A.P.R.N.        midazolam (Versed) injection 2 mg  2 mg Intravenous Q HOUR PRN Kevyn Kramer, A.P.R.N.        dexmedetomidine (PRECEDEX) 400 mcg/100mL NS premix infusion  0-1.5 mcg/kg/hr Intravenous Continuous Kevyn Kramer, A.P.R.N.   Stopped at 11/10/22 1533    sodium bicarbonate 8.4 % injection 50 mEq  50 mEq Intravenous Q HOUR PRN Kevyn Kramer, A.P.R.N.        morphine 4 MG/ML injection 4 mg  4 mg Intravenous Q HOUR PRN Kevyn Kramer, A.P.R.N.        ondansetron (ZOFRAN) syringe/vial injection 8 mg  8 mg Intravenous Q6HRS PRN Kevyn Kramer, A.P.R.N.        Or    prochlorperazine (COMPAZINE) injection 10 mg  10 mg Intravenous Q6HRS PRN Kevyn Kramer, A.P.R.N.        Or    promethazine (PHENERGAN) suppository 25 mg  25 mg Rectal Q6HRS PRN Kevyn Kramer, A.P.R.N.        acetaminophen (Tylenol) tablet 650 mg  650 mg Oral Q4HRS PRN Kevyn Kramer, A.P.R.N.        Or    acetaminophen (TYLENOL) suppository 650 mg  650 mg Rectal Q4HRS PRN Kevyn Kramer, A.P.R.N.        senna-docusate (PERICOLACE or SENOKOT S) 8.6-50 MG per tablet 2 Tablet  2 Tablet Oral BID Kevyn Kramer, A.P.R.N.        And    [START ON 11/11/2022] polyethylene glycol/lytes (MIRALAX) PACKET 1 Packet  1 Packet Oral DAILY Kevyn Kramer, A.P.R.N.        And    [START ON 11/12/2022] magnesium hydroxide (MILK OF MAGNESIA) suspension 30 mL  30 mL Oral DAILY YASMANY Kwon.P.R.N.        And    bisacodyl (DULCOLAX) suppository 10 mg  10 mg Rectal QDAY PRN YASMANY Kwon.P.R.N.        [START ON 11/11/2022] omeprazole (PRILOSEC) capsule 20 mg  20 mg Oral DAILY YASMANY Kwon.P.R.N.        mag hydrox-al hydrox-simeth (MAALOX PLUS ES or MYLANTA DS) suspension 30 mL  30 mL Oral Q4HRS PRN  SHANKAR KwonPBetiRTRESSA        [START ON 11/11/2022] carvedilol (COREG) tablet 3.125 mg  3.125 mg Oral BID WITH MEALS ROMAIN KwonRTRESSA        insulin regular (HumuLIN R,NovoLIN R) injection  0-14 Units Intravenous Once SHANKAR KwonPBetiRTRESSA        insulin lispro (AdmeLOG,HumaLOG) injection  0-14 Units Subcutaneous TID AC SHANKAR KwonPBetiRTRESSA        insulin regular (HumuLIN R) 100 Units in  mL Infusion  0-29 Units/hr Intravenous Continuous SHANKAR KwonP.ALO   Stopped. (Insulin or Heparin) at 11/10/22 1637    dextrose 10 % BOLUS 12.5-25 g  12.5-25 g Intravenous PRN YULISSA Kwon MD Alert...Pharmacy to initiate transition from insulin infusion to subcutaneous insulin for cardiothoracic surgery 1 Each  1 Each Other Continuous SHANKAR KwonP.RTRESSA        EPINEPHrine (Adrenalin) infusion 4 mg/250 mL (premix)  0-0.2 mcg/kg/min Intravenous Continuous YASMANY Kwon.P.R.N.   Stopped at 11/10/22 1529    norepinephrine (Levophed) 8 mg in 250 mL NS infusion (premix)  0-30 mcg/min Intravenous Continuous YASMANY Kwon.P.R.NBeti   Dose not Required at 11/10/22 1345       Fluids    Intake/Output Summary (Last 24 hours) at 11/10/2022 1705  Last data filed at 11/10/2022 1704  Gross per 24 hour   Intake 4046.97 ml   Output 1460 ml   Net 2586.97 ml       Laboratory  Recent Labs     11/10/22  1130 11/10/22  1206 11/10/22  1443   ISTATAPH 7.436 7.425 7.413   ISTATAPCO2 43.0* 37.5* 34.3   ISTATAPO2 299* 137* 104*   ISTATATCO2 30 26 23   GVXMEUM6KSI 100* 99 98   ISTATARTHCO3 28.9* 24.6 21.9   ISTATARTBE 4* 0 -2   ISTATTEMP 35.7 C 37.0 C 35.5 C   ISTATFIO2  --   --  50   ISTATSPEC Arterial Arterial Arterial   ISTATAPHTC 7.455 7.425 7.435   ETIOEWKT2TZ 293* 137* 95*     Recent Labs     11/10/22  1330   POTASSIUM 4.5   MAGNESIUM 3.1*     No results for input(s): ALTSGPT, ASTSGOT, ALKPHOSPHAT, TBILIRUBIN, DBILIRUBIN, GAMMAGT, AMYLASE, LIPASE, ALB, PREALBUMIN, GLUCOSE in the last 72 hours.       Recent Labs     11/10/22  1330   HEMOGLOBIN 10.8*   HEMATOCRIT 32.1*   PLATELETCT 160*   PROTHROMBTM 15.0*   APTT 34.8   INR 1.20*       Imaging  X-Ray:  I have personally reviewed the images and compared with prior images. and My impression is: Perihilar edema    Assessment/Plan      Encounter for ventilator weaning   Intubated 11/10 for CABG   ABCDEF bundle   I am weaning the ventilator based upon hemodynamics and cardiopulmonary status    S/P two-vessel CABG   LIMA-LAD, RSVG-OM2, RSVG-RCA    Chronic systolic heart failure   LVEF in following CABG 40% per anesthesiology   I am titrating epinephrine drip    Primary hypertension    History of right breast cancer, stage IA with prior lumpectomy and adjuvant chemoradiation    Hypoadrenalism on chronic corticosteroid therapy   Review of the medication reconciliation does not show any corticosteroids - will clarify with the patient/family when able    History of seronegative rheumatoid arthritis   On hydroxychloroquine prior to admission    History of gout    Dyslipidemia   Intolerant to statins      VTE:  Contraindicated  Ulcer: PPI  Lines: Central Line  Ongoing indication addressed, Arterial Line  Ongoing indication addressed, and Berger Catheter  Ongoing indication addressed    I have performed a physical exam and reviewed and updated ROS and Plan today (11/10/2022). In review of yesterday's note (11/9/2022), there are no changes except as documented above.     I have assessed and reassessed her respiratory status with ventilator adjustments and spontaneous breathing trials, airway mechanics, ventilator waveforms, blood pressure, hemodynamics, cardiovascular status and neurologic status.  She is at increased risk for worsening respiratory and cardiovascular system dysfunction.    Discussed patient condition and risk of morbidity and/or mortality with RN, RT, and anesthesiologist    The patient remains critically ill.  Critical care time = 90 minutes in directly  providing and coordinating critical care and extensive data review.  No time overlap and excludes procedures.    A Critical Care Medicine progress note may have been authored by a resident physician or advanced practitioner of nursing under my direct supervision on this date of service.  As the supervising and attending physician, I have either attested to or cosigned that document.  IN THE EVENT THAT DISCREPANCIES EXIST BETWEEN THIS DOCUMENT AND ANY DOCUMENT THAT I HAVE ATTESTED TO OR COSIGNED ON THIS DATE OF SERVICE, THEN THIS DOCUMENT REMAINS THE FINAL AUTHORITY AS TO MY ASSESSMENT AND PLAN REGARDING THE CARE OF THIS PATIENT.    Kamron Back MD  Pulmonary and Critical Care Medicine

## 2022-11-10 NOTE — ANESTHESIA PREPROCEDURE EVALUATION
Case: [id number] Date/Time: 11/10/22 0715    Procedures:       CORONARY ARTERY BYPASS GRAFTING X 3, WITH ENDOSCOPIC VEIN HARVEST AND TRANSESOPHAGEAL ECHOCARDIOGRAM      ECHOCARDIOGRAM, TRANSESOPHAGEAL, INTRAOPERATIVE    Pre-op diagnosis: CORONARY ARTERY DISEASE    Location: [facility] OR 02 / SURGERY [facility]    Surgeons: Padmini Mac M.D.          Relevant Problems   PULMONARY   (positive) Chronic obstructive pulmonary disease (COPD) (HCC)      CARDIAC   (positive) Atherosclerosis of aorta (HCC)   (positive) Chronic systolic congestive heart failure (HCC) EF 35%   (positive) Coronary artery disease due to calcified coronary lesion - Calcifications seen on CT scan also wtih aortic ulceration   (positive) Essential hypertension   (positive) Ischemic heart disease due to coronary artery obstruction (HCC) - Severe 3vD on cath   (positive) LBBB (left bundle branch block)      GI   (positive) Gastroesophageal reflux disease without esophagitis      Other   (positive) Hypoadrenalism ([facility]) - need for chronic steroids       Physical Exam    Airway   Mallampati: II  TM distance: >3 FB  Neck ROM: full       Cardiovascular - normal exam  Rhythm: regular  Rate: normal  (-) murmur     Dental - normal exam      Very poor dentition   Pulmonary - normal exam  Breath sounds clear to auscultation     Abdominal    Neurological - normal exam                 Anesthesia Plan    ASA 4   ASA physical status 4 criteria: CAD/stents - recent (< 3 months) and severe reduction of ejection fractions    Plan - general       Airway plan will be ETT          Induction: intravenous    Postoperative Plan: Postoperative administration of opioids is intended.    Pertinent diagnostic labs and testing reviewed    Informed Consent:    Anesthetic plan and risks discussed with patient.    Use of blood products discussed with: patient whom consented to blood products.

## 2022-11-10 NOTE — ANESTHESIA PROCEDURE NOTES
Airway    Date/Time: 11/10/2022 8:09 AM  Performed by: Kenneth Foley M.D.  Authorized by: Kenneth Foley M.D.     Location:  OR  Urgency:  Elective  Difficult Airway: No    Indications for Airway Management:  Anesthesia      Spontaneous Ventilation: absent    Sedation Level:  Deep  Preoxygenated: Yes    Patient Position:  Sniffing  Mask Difficulty Assessment:  1 - vent by mask  Final Airway Type:  Endotracheal airway  Final Endotracheal Airway:  ETT  Cuffed: Yes    Technique Used for Successful ETT Placement:  Direct laryngoscopy  Devices/Methods Used in Placement:  Cricoid pressure and anterior pressure/BURP    Insertion Site:  Oral  Blade Type:  Boyd  Laryngoscope Blade/Videolaryngoscope Blade Size:  2  ETT Size (mm):  7.5  Measured from:  Teeth  ETT to Teeth (cm):  22  Placement Verified by: auscultation and capnometry    Cormack-Lehane Classification:  Grade IIa - partial view of glottis  Number of Attempts at Approach:  1   View only possible with Boyd blade in gap between upper teeth, otherwise Glide would be needed.

## 2022-11-10 NOTE — ANESTHESIA PROCEDURE NOTES
DAVE    Date/Time: 11/10/2022 8:40 AM  Performed by: Kenneth Foley M.D.  Authorized by: Kenneth Foley M.D.     Start Time:11/10/2022 8:40 AM  Preanesthetic Checklist: patient identified, IV checked, site marked, risks and benefits discussed, surgical consent, monitors and equipment checked, pre-op evaluation and timeout performed    Indication for DAVE: diagnostic   Patient Location: OR  Intubated: Yes  Bite Block: Yes  Heart Visualized: Yes  Insertion: atraumatic    **See FULL DAVE report in patient's chart via CV Synapse**

## 2022-11-10 NOTE — ANESTHESIA PROCEDURE NOTES
Central Venous Line  Performed by: Kenneth Foley M.D.  Authorized by: Kenneth Foley M.D.     Start Time:  11/10/2022 8:25 AM  End Time:  11/10/2022 8:35 AM  Patient Location:  OR  Indication: central venous access        provider hand hygiene performed prior to central venous catheter insertion, all 5 sterile barriers used (gloves, gown, cap, mask, large sterile drape) during central venous catheter insertion and skin prep agent completely dried prior to procedure    Patient Position:  Trendelenburg  Site:  Internal jugular  Prep:  Chlorhexidine  Catheter Size:  8.5 Fr  Catheter Type:  Introducer  Number of Lumens:  Single lumen  target vein identified, needle advanced into vein and blood aspirated and guidewire advanced into vein    Seldinger Technique?: Yes    Ultrasound-Guided: ultrasound-guided and surface landmarks  Image captured, interpreted and electronically stored.  Sterile Gel and Probe Cover Used for Ultrasound?: Yes    Intravenous Verification: verified by ultrasound, venous blood return and chest x-ray pending    all ports aspirated, all ports flushed easily, guidewire was removed intact, biopatch was applied, line was sutured in place and dressing was applied    Events: patient tolerated procedure well with no complications

## 2022-11-10 NOTE — ANESTHESIA PROCEDURE NOTES
Central Venous Line  Performed by: Kenneth Foley M.D.  Authorized by: Kenneth Foley M.D.     Start Time:  11/10/2022 8:15 AM  End Time:  11/10/2022 8:24 AM  Patient Location:  OR  Indication: central venous access        provider hand hygiene performed prior to central venous catheter insertion, all 5 sterile barriers used (gloves, gown, cap, mask, large sterile drape) during central venous catheter insertion and skin prep agent completely dried prior to procedure    Patient Position:  Trendelenburg  Laterality:  Right  Site:  Subclavian  Prep:  Chlorhexidine  Catheter Size:  7 Fr  Catheter Length (cm):  20  Number of Lumens:  Triple lumen  target vein identified, needle advanced into vein and blood aspirated and guidewire advanced into vein    Seldinger Technique?: Yes    Ultrasound-Guided: surface landmarks    Intravenous Verification: venous blood return and chest x-ray pending    all ports aspirated, all ports flushed easily, guidewire was removed intact, biopatch was applied, line was sutured in place and dressing was applied    Events: patient tolerated procedure well with no complications    PA Catheter Placed?: No

## 2022-11-10 NOTE — PROGRESS NOTES
Med Rec Complete per patient  Allergies Reviewed with patient  Patient's Preferred Pharmacy: [facility] on [address].     Patient recently completed a 7 day course of Ampicillin 500mg, (taken 4x a day) on 11/9/22.

## 2022-11-10 NOTE — INTERVAL H&P NOTE
H&P reviewed. The patient was examined and there are no changes to the H&P    Carotid US: FINDINGS   Bilateral-   Heterogeneous plaque of the bifurcation extending into the internal carotid    artery. <50% internal carotid artery stenosis.    Plaque is irregular on the surface and heterogeneous with mixed    echogenicity.       The bilateral subclavian and vertebral artery waveforms are antegrade and    normal in character and velocity.

## 2022-11-10 NOTE — ANESTHESIA PROCEDURE NOTES
Arterial Line  Performed by: Kenneth Foley M.D.  Authorized by: Kenneth Foley M.D.     Start Time:  11/10/2022 8:00 AM  End Time:  11/10/2022 8:05 AM  Localization: ultrasound guidance and surface landmarks    Patient Location:  OR  Indication: continuous blood pressure monitoring and blood sampling needed        Catheter Size:  20 G  Seldinger Technique?: Yes    Laterality:  Right  Site:  Radial artery  Line Secured:  Antimicrobial disc, tape and transparent dressing  Events: patient tolerated procedure well with no complications

## 2022-11-10 NOTE — PROGRESS NOTES
Pt brought up to room by OR team. Dr. Back at bedside. Report received from Dr. Foley. All lines and gtts verified, see MAR. ABG obtained and reviewed, No changes to ventilator made. Mediastinal CT x2 and Alli tube connected to suction, no air leak noted. 12 Lead EKG obtained, Pt has BBB. Ac hugger placed for temp 35.7.

## 2022-11-10 NOTE — OP REPORT
OPERATIVE NOTE   Lauren Dave   11/10/22              PRE-OP DIAGNOSIS: multivessel CAD with stable angina and abnormal stress test, ischemic cardiomyopathy with severely reduced LVEF, chronic systolic CHF, HTN, HLD, breast cancer s/p chemo and XRT right chest            POST-OP DIAGNOSIS: same            PROCEDURE:Coronary artery bypass grafting x3  Left internal mammary artery to left anterior descending artery  Reversed saphenous vein graft to obtuse marginal #2  Reversed saphenous vein graft to distal right coronary artery  Endo-vein procurement           SURGEON: Dr. Padmini Mac    FIRST ASSISTANT/ VEIN PROCUREMENT: Kevyn MARLEY            ANESTHESIA: General endotracheal, Dr. Kenneth Foley    CARDIOPULMONARY BYPASS TIME: 70 minutes    AORTIC CROSSCLAMP TIME: 57 minutes                  DRAINS: left pleural 24F nasreen, mediastinal 32F chest tubes x 2            SPECIMENS: none     FINDINGS: good OM and LAD targets, small distal RCA target, small but adequate vein conduit, good size LIMA with great flow, some soft plaque visible in aorta during proximal anastomoses           COMPLICATIONS: none identified            DISPOSITION: ICU            CONDITION: intubated, hemodynamically stable             Procedure details:      The patient was taken to the operating room. Standard monitoring lines and Berger catheter were placed. General anesthesia was induced. The patient was prepped and draped in sterile fashion. An endoscopic procurement of the greater saphenous vein in the left leg was carried out by AYAKA Lambert. Standard median sternotomy was performed. The left internal mammary artery was taken down between cautery and clips. The patient was heparinized and the vessel divided distally. The heart was exposed and pericardial traction sutures placed. The distal aortic and triple stage right atrial venous cannulation was performed. Antegrade cardioplegia catheter was placed. The patient was  placed on cardiopulmonary bypass. The aorta was cross-clamped and the heart arrested with [place] cardioplegia. Myocardial protection was maintained with topical cooling.  The first graft was the reversed saphenous vein to the distal right coronary artery. The artery at the chosen grafting site was exposed and entered. Then the anastomosis was performed in end-to-side fashion with 7-0 prolene suture. The second graft was the reversed saphenous vein to the Obtuse Marginal #2. The artery at the chosen grafting site was exposed and entered. Then the anastomosis was performed in end-to-side fashion with 7-0 prolene suture. The last graft was the left internal mammary artery to the left anterior descending artery. The artery at the chosen grafting site was exposed and entered. Then the anastomosis was performed in end-to-side fashion with 7-0 prolene suture.The pedicle was secured to the epicardium with two 6-0 Prolene sutures. The proximal anastomoses were done in end-to-side fashion utilizing single clamp technique.   The heart was allowed 10 minutes to re-perfuse and DAVE was used to verify appropriate wall motion. The patient was then weaned and  from cardiopulmonary bypass. Protamine was given to reverse the heparin. The heart was decannulated. Ventricular pacing wires were placed. The chest tubes were placed. Hemostasis was secured then the sternum was closed using stainless steel wires. The incision was closed in three layers with sutures. Sterile dressings were placed. At the end of the operation, all sponge and needle counts were correct.

## 2022-11-11 ENCOUNTER — APPOINTMENT (OUTPATIENT)
Dept: RADIOLOGY | Facility: MEDICAL CENTER | Age: 72
DRG: 235 | End: 2022-11-11
Attending: NURSE PRACTITIONER
Payer: MEDICARE

## 2022-11-11 ENCOUNTER — APPOINTMENT (OUTPATIENT)
Dept: RADIOLOGY | Facility: MEDICAL CENTER | Age: 72
DRG: 235 | End: 2022-11-11
Attending: INTERNAL MEDICINE
Payer: MEDICARE

## 2022-11-11 ENCOUNTER — PATIENT MESSAGE (OUTPATIENT)
Dept: HEALTH INFORMATION MANAGEMENT | Facility: OTHER | Age: 72
End: 2022-11-11

## 2022-11-11 LAB
BASE EXCESS BLDA CALC-SCNC: -1 MMOL/L (ref -4–3)
BASE EXCESS BLDA CALC-SCNC: -3 MMOL/L (ref -4–3)
BASE EXCESS BLDA CALC-SCNC: 1 MMOL/L (ref -4–3)
BODY TEMPERATURE: ABNORMAL DEGREES
CA-I BLD ISE-SCNC: 1.13 MMOL/L (ref 1.1–1.3)
CA-I BLD ISE-SCNC: 1.15 MMOL/L (ref 1.1–1.3)
CO2 BLDA-SCNC: 25 MMOL/L (ref 20–33)
DELSYS IDSYS: ABNORMAL
EKG IMPRESSION: NORMAL
END TIDAL CARBON DIOXIDE IECO2: 33 MMHG
ERYTHROCYTE [DISTWIDTH] IN BLOOD BY AUTOMATED COUNT: 46.1 FL (ref 35.9–50)
GLUCOSE BLD STRIP.AUTO-MCNC: 113 MG/DL (ref 65–99)
GLUCOSE BLD STRIP.AUTO-MCNC: 131 MG/DL (ref 65–99)
GLUCOSE BLD STRIP.AUTO-MCNC: 140 MG/DL (ref 65–99)
GLUCOSE BLD STRIP.AUTO-MCNC: 143 MG/DL (ref 65–99)
GLUCOSE BLD STRIP.AUTO-MCNC: 145 MG/DL (ref 65–99)
GLUCOSE BLD STRIP.AUTO-MCNC: 153 MG/DL (ref 65–99)
GLUCOSE BLD STRIP.AUTO-MCNC: 154 MG/DL (ref 65–99)
GLUCOSE BLD STRIP.AUTO-MCNC: 155 MG/DL (ref 65–99)
GLUCOSE BLD STRIP.AUTO-MCNC: 161 MG/DL (ref 65–99)
GLUCOSE BLD STRIP.AUTO-MCNC: 163 MG/DL (ref 65–99)
GLUCOSE BLD STRIP.AUTO-MCNC: 169 MG/DL (ref 65–99)
HCO3 BLDA-SCNC: 23.8 MMOL/L (ref 17–25)
HCO3 BLDA-SCNC: 24 MMOL/L (ref 17–25)
HCO3 BLDA-SCNC: 24 MMOL/L (ref 17–25)
HCT VFR BLD AUTO: 29.6 % (ref 37–47)
HGB BLD-MCNC: 10 G/DL (ref 12–16)
HOROWITZ INDEX BLDA+IHG-RTO: 218 MM[HG]
HOROWITZ INDEX BLDA+IHG-RTO: 240 MM[HG]
HOROWITZ INDEX BLDA+IHG-RTO: 273 MM[HG]
LV EJECT FRACT  99904: 35
LV EJECT FRACT MOD 2C 99903: 36.46
LV EJECT FRACT MOD 4C 99902: 31.23
LV EJECT FRACT MOD BP 99901: 24.36
MAGNESIUM SERPL-MCNC: 2.4 MG/DL (ref 1.5–2.5)
MCH RBC QN AUTO: 30.8 PG (ref 27–33)
MCHC RBC AUTO-ENTMCNC: 33.8 G/DL (ref 33.6–35)
MCV RBC AUTO: 91.1 FL (ref 81.4–97.8)
MODE IMODE: ABNORMAL
O2/TOTAL GAS SETTING VFR VENT: 100 %
O2/TOTAL GAS SETTING VFR VENT: 30 %
O2/TOTAL GAS SETTING VFR VENT: 30 %
PCO2 BLDA: 32.1 MMHG (ref 26–37)
PCO2 BLDA: 38.1 MMHG (ref 26–37)
PCO2 BLDA: 47.2 MMHG (ref 26–37)
PCO2 TEMP ADJ BLDA: 37.3 MMHG (ref 26–37)
PEEP END EXPIRATORY PRESSURE IPEEP: 8 CMH20
PERCENT MINUTE VOLUME IPMV: 120
PH BLDA: 7.31 [PH] (ref 7.4–7.5)
PH BLDA: 7.41 [PH] (ref 7.4–7.5)
PH BLDA: 7.48 [PH] (ref 7.4–7.5)
PH TEMP ADJ BLDA: 7.42 [PH] (ref 7.4–7.5)
PLATELET # BLD AUTO: 161 K/UL (ref 164–446)
PMV BLD AUTO: 9.8 FL (ref 9–12.9)
PO2 BLDA: 218 MMHG (ref 64–87)
PO2 BLDA: 72 MMHG (ref 64–87)
PO2 BLDA: 82 MMHG (ref 64–87)
PO2 TEMP ADJ BLDA: 70 MMHG (ref 64–87)
POTASSIUM BLD-SCNC: 4.3 MMOL/L (ref 3.6–5.5)
POTASSIUM BLD-SCNC: 4.3 MMOL/L (ref 3.6–5.5)
POTASSIUM SERPL-SCNC: 4.6 MMOL/L (ref 3.6–5.5)
POTASSIUM SERPL-SCNC: 4.7 MMOL/L (ref 3.6–5.5)
RBC # BLD AUTO: 3.25 M/UL (ref 4.2–5.4)
SAO2 % BLDA: 100 % (ref 93–99)
SAO2 % BLDA: 94 % (ref 93–99)
SAO2 % BLDA: 95 % (ref 93–99)
SODIUM BLD-SCNC: 139 MMOL/L (ref 135–145)
SODIUM BLD-SCNC: 141 MMOL/L (ref 135–145)
SPECIMEN DRAWN FROM PATIENT: ABNORMAL
WBC # BLD AUTO: 10.9 K/UL (ref 4.8–10.8)

## 2022-11-11 PROCEDURE — 83735 ASSAY OF MAGNESIUM: CPT

## 2022-11-11 PROCEDURE — 700111 HCHG RX REV CODE 636 W/ 250 OVERRIDE (IP): Performed by: NURSE PRACTITIONER

## 2022-11-11 PROCEDURE — 85027 COMPLETE CBC AUTOMATED: CPT

## 2022-11-11 PROCEDURE — 93010 ELECTROCARDIOGRAM REPORT: CPT | Performed by: INTERNAL MEDICINE

## 2022-11-11 PROCEDURE — 94150 VITAL CAPACITY TEST: CPT

## 2022-11-11 PROCEDURE — 93312 ECHO TRANSESOPHAGEAL: CPT

## 2022-11-11 PROCEDURE — 94799 UNLISTED PULMONARY SVC/PX: CPT

## 2022-11-11 PROCEDURE — 82962 GLUCOSE BLOOD TEST: CPT | Mod: 91

## 2022-11-11 PROCEDURE — 70450 CT HEAD/BRAIN W/O DYE: CPT

## 2022-11-11 PROCEDURE — A9270 NON-COVERED ITEM OR SERVICE: HCPCS | Performed by: INTERNAL MEDICINE

## 2022-11-11 PROCEDURE — 99223 1ST HOSP IP/OBS HIGH 75: CPT | Performed by: PSYCHIATRY & NEUROLOGY

## 2022-11-11 PROCEDURE — 94003 VENT MGMT INPAT SUBQ DAY: CPT

## 2022-11-11 PROCEDURE — 84132 ASSAY OF SERUM POTASSIUM: CPT | Mod: 91

## 2022-11-11 PROCEDURE — 71045 X-RAY EXAM CHEST 1 VIEW: CPT

## 2022-11-11 PROCEDURE — 700102 HCHG RX REV CODE 250 W/ 637 OVERRIDE(OP): Performed by: INTERNAL MEDICINE

## 2022-11-11 PROCEDURE — 94669 MECHANICAL CHEST WALL OSCILL: CPT

## 2022-11-11 PROCEDURE — 37799 UNLISTED PX VASCULAR SURGERY: CPT

## 2022-11-11 PROCEDURE — 94760 N-INVAS EAR/PLS OXIMETRY 1: CPT

## 2022-11-11 PROCEDURE — 99024 POSTOP FOLLOW-UP VISIT: CPT | Performed by: CLINICAL NURSE SPECIALIST

## 2022-11-11 PROCEDURE — 93005 ELECTROCARDIOGRAM TRACING: CPT | Performed by: NURSE PRACTITIONER

## 2022-11-11 PROCEDURE — 99291 CRITICAL CARE FIRST HOUR: CPT | Performed by: INTERNAL MEDICINE

## 2022-11-11 PROCEDURE — 700111 HCHG RX REV CODE 636 W/ 250 OVERRIDE (IP): Performed by: INTERNAL MEDICINE

## 2022-11-11 PROCEDURE — 770022 HCHG ROOM/CARE - ICU (200)

## 2022-11-11 PROCEDURE — 82803 BLOOD GASES ANY COMBINATION: CPT

## 2022-11-11 RX ORDER — AMOXICILLIN 250 MG
2 CAPSULE ORAL 2 TIMES DAILY
Status: DISCONTINUED | OUTPATIENT
Start: 2022-11-11 | End: 2022-11-14

## 2022-11-11 RX ORDER — CARVEDILOL 6.25 MG/1
6.25 TABLET ORAL 2 TIMES DAILY WITH MEALS
Status: DISCONTINUED | OUTPATIENT
Start: 2022-11-11 | End: 2022-11-11

## 2022-11-11 RX ORDER — CLOPIDOGREL BISULFATE 75 MG/1
75 TABLET ORAL DAILY
Status: DISCONTINUED | OUTPATIENT
Start: 2022-11-11 | End: 2022-11-15

## 2022-11-11 RX ORDER — ACETAMINOPHEN 650 MG/1
650 SUPPOSITORY RECTAL EVERY 4 HOURS PRN
Status: DISCONTINUED | OUTPATIENT
Start: 2022-11-11 | End: 2022-11-22

## 2022-11-11 RX ORDER — ALUMINA, MAGNESIA, AND SIMETHICONE 2400; 2400; 240 MG/30ML; MG/30ML; MG/30ML
30 SUSPENSION ORAL EVERY 4 HOURS PRN
Status: DISCONTINUED | OUTPATIENT
Start: 2022-11-11 | End: 2022-11-22

## 2022-11-11 RX ORDER — FUROSEMIDE 10 MG/ML
40 INJECTION INTRAMUSCULAR; INTRAVENOUS
Status: DISCONTINUED | OUTPATIENT
Start: 2022-11-11 | End: 2022-11-15

## 2022-11-11 RX ORDER — POLYETHYLENE GLYCOL 3350 17 G/17G
1 POWDER, FOR SOLUTION ORAL DAILY
Status: DISCONTINUED | OUTPATIENT
Start: 2022-11-12 | End: 2022-11-14

## 2022-11-11 RX ORDER — TRAMADOL HYDROCHLORIDE 50 MG/1
50 TABLET ORAL EVERY 4 HOURS PRN
Status: DISCONTINUED | OUTPATIENT
Start: 2022-11-11 | End: 2022-11-22

## 2022-11-11 RX ORDER — OXYCODONE HYDROCHLORIDE 5 MG/1
5 TABLET ORAL
Status: DISCONTINUED | OUTPATIENT
Start: 2022-11-11 | End: 2022-11-22

## 2022-11-11 RX ORDER — CARVEDILOL 3.12 MG/1
3.12 TABLET ORAL ONCE
Status: DISCONTINUED | OUTPATIENT
Start: 2022-11-11 | End: 2022-11-11

## 2022-11-11 RX ORDER — ACETAMINOPHEN 325 MG/1
650 TABLET ORAL EVERY 4 HOURS PRN
Status: DISCONTINUED | OUTPATIENT
Start: 2022-11-11 | End: 2022-11-22

## 2022-11-11 RX ORDER — OXYCODONE HYDROCHLORIDE 10 MG/1
10 TABLET ORAL
Status: DISCONTINUED | OUTPATIENT
Start: 2022-11-11 | End: 2022-11-22

## 2022-11-11 RX ORDER — ACETAMINOPHEN 500 MG
1000 TABLET ORAL EVERY 6 HOURS PRN
Status: DISCONTINUED | OUTPATIENT
Start: 2022-11-15 | End: 2022-11-25

## 2022-11-11 RX ORDER — BISACODYL 10 MG
10 SUPPOSITORY, RECTAL RECTAL
Status: DISCONTINUED | OUTPATIENT
Start: 2022-11-11 | End: 2022-11-14

## 2022-11-11 RX ORDER — ACETAMINOPHEN 500 MG
1000 TABLET ORAL EVERY 6 HOURS
Status: DISPENSED | OUTPATIENT
Start: 2022-11-11 | End: 2022-11-15

## 2022-11-11 RX ORDER — ASPIRIN 81 MG/1
81 TABLET, CHEWABLE ORAL DAILY
Status: DISCONTINUED | OUTPATIENT
Start: 2022-11-11 | End: 2022-11-25

## 2022-11-11 RX ADMIN — OXYCODONE HYDROCHLORIDE 10 MG: 10 TABLET ORAL at 16:17

## 2022-11-11 RX ADMIN — MAGNESIUM SULFATE HEPTAHYDRATE 1 G: 1 INJECTION, SOLUTION INTRAVENOUS at 08:32

## 2022-11-11 RX ADMIN — FUROSEMIDE 40 MG: 10 INJECTION, SOLUTION INTRAMUSCULAR; INTRAVENOUS at 08:37

## 2022-11-11 RX ADMIN — ASPIRIN 81 MG 81 MG: 81 TABLET ORAL at 14:28

## 2022-11-11 RX ADMIN — SENNOSIDES AND DOCUSATE SODIUM 2 TABLET: 50; 8.6 TABLET ORAL at 17:31

## 2022-11-11 RX ADMIN — TRAMADOL HYDROCHLORIDE 50 MG: 50 TABLET, COATED ORAL at 14:28

## 2022-11-11 RX ADMIN — FENTANYL CITRATE 50 MCG: 50 INJECTION, SOLUTION INTRAMUSCULAR; INTRAVENOUS at 13:28

## 2022-11-11 RX ADMIN — CLOPIDOGREL BISULFATE 75 MG: 75 TABLET ORAL at 14:28

## 2022-11-11 RX ADMIN — ACETAMINOPHEN 1000 MG: 500 TABLET ORAL at 17:31

## 2022-11-11 ASSESSMENT — PAIN DESCRIPTION - PAIN TYPE: TYPE: ACUTE PAIN

## 2022-11-11 ASSESSMENT — COPD QUESTIONNAIRES
HAVE YOU SMOKED AT LEAST 100 CIGARETTES IN YOUR ENTIRE LIFE: NO/DON'T KNOW
COPD SCREENING SCORE: 3
DURING THE PAST 4 WEEKS HOW MUCH DID YOU FEEL SHORT OF BREATH: SOME OF THE TIME
DO YOU EVER COUGH UP ANY MUCUS OR PHLEGM?: NO/ONLY WITH OCCASIONAL COLDS OR INFECTIONS

## 2022-11-11 ASSESSMENT — FIBROSIS 4 INDEX: FIB4 SCORE: 4.04

## 2022-11-11 ASSESSMENT — PULMONARY FUNCTION TESTS: FVC: 0.68

## 2022-11-11 NOTE — PROGRESS NOTES
Monitor Summary  Rhythm: SR with LBBB  HR: 70s-85s  Ectopy: non sustained runs of SVT and vtach. 3 episodes total.   Measurements: 0.19/0.14/0.47            Pacer settings: VVI  Rate: 50  Output: 5  Sensitivity: 2    No pacing required throughout this shift.     12 hour chart check

## 2022-11-11 NOTE — PROGRESS NOTES
CRITICAL CARE MEDICINE ATTENDING PROGRESS NOTE    Date of admission  11/10/2022    Chief Complaint  72 y.o. female admitted 11/10/2022 for elective CABG.  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    Hospital Course      11/11 -    vent day 2.  Increase carvedilol, 6.25 mg twice daily.  Begin furosemide, 40 mg daily.  CT head for right leg weakness.      Interval Problem Update  Reviewed last 24 hour events:      SR  She follows  She seems to have poor coordination of both arms  She does not move her right leg or withdraw to pain in the right leg  98.8  +3577 mL in the last 24 and since admission      Review of Systems  Review of Systems   Unable to perform ROS: Acuity of condition     Vital Signs for the last 24 hours  Temp:  [35.6 °C (96.1 °F)-37 °C (98.6 °F)] 36.4 °C (97.5 °F)  Pulse:  [26-98] 85  Resp:  [15-44] 15  BP: ()/(51-65) 106/56  SpO2:  [95 %-100 %] 97 %    Hemodynamic parameters for the last 24 hours  CVP:  [3 MM HG-8 MM HG] 8 MM HG    Vent Settings for the last 24 hours  Vent Mode: Spont  PEEP/CPAP: 8  P Support: 5  MAP: 9.9  Control VTE (exp VT): 316    Physical Exam  Physical Exam  Constitutional:       Appearance: She is not diaphoretic.      Comments: On ventilator   HENT:      Head: Normocephalic.      Mouth/Throat:      Pharynx: Oropharynx is clear.   Eyes:      Pupils: Pupils are equal, round, and reactive to light.   Cardiovascular:      Comments: Sinus rhythm  Pulmonary:      Breath sounds: Rales (Few scattered crackles) present. No wheezing.   Abdominal:      General: There is no distension.      Tenderness: There is no abdominal tenderness.   Musculoskeletal:      Cervical back: Normal range of motion.      Right lower leg: No edema.      Left lower leg: No edema.   Skin:     General: Skin is warm.      Capillary Refill: Capillary refill takes less than 2 seconds.    Neurological:      Comments: She moves both arms, but does not appear to be coordinated.  She moves the left leg.  She does not move the right leg and the right leg does not withdraw to pain.  She follows commands by moving all extremities, except the right leg.       Medications  Current Facility-Administered Medications   Medication Dose Route Frequency Provider Last Rate Last Admin    carvedilol (COREG) tablet 6.25 mg  6.25 mg Oral BID WITH MEALS Kamron Back M.D.        furosemide (LASIX) injection 40 mg  40 mg Intravenous Q DAY Kamron Back M.D.        Respiratory Therapy Consult   Nebulization Continuous RT Kevyn Kramer, A.P.R.N.        NS infusion   Intravenous Continuous Kevyn Kramer, A.P.R.N.   Stopped at 11/10/22 1729    electrolyte-A (PLASMALYTE-A) infusion   Intravenous PRN Kevyn Kramer, A.P.R.N. 999 mL/hr at 11/10/22 1619 New Bag at 11/10/22 1619    calcium CHLORIDE 1,000 mg in dextrose 5% 100 mL IVPB  1,000 mg Intravenous Once PRN Kevyn Kramer, A.P.R.N.        magnesium sulfate in D5W IVPB premix 1 g  1 g Intravenous DAILY Kevyn Kramer, A.P.R.N.   Held at 11/10/22 1345    K+ Scale: Goal of 4.5  1 Each Intravenous Q6HRS Kevyn Kramer, A.P.R.N.        aspirin EC (ECOTRIN) tablet 81 mg  81 mg Oral DAILY Kevyn Kramer, A.P.R.N.        clopidogrel (PLAVIX) tablet 75 mg  75 mg Oral DAILY Kevyn Kramer, A.P.R.N.        clevidipine (Cleviprex) IV emulsion  0-21 mg/hr Intravenous Continuous Kevyn Kramer, A.P.R.N. 4 mL/hr at 11/11/22 0525 2 mg/hr at 11/11/22 0525    nitroglycerin 50 mg in D5W 250 ml infusion  0-100 mcg/min Intravenous Continuous Kevyn Kramer, A.P.R.N.   Dose not Required at 11/10/22 1345    Pharmacy Consult Request ...Pain Management Review 1 Each  1 Each Other PHARMACY TO DOSE Kevyn Kramer A.P.R.N.        acetaminophen (TYLENOL) tablet 1,000 mg  1,000 mg Oral Q6HRS ROMAIN KownRBetiNBeti        Followed by    [START ON 11/15/2022] acetaminophen (TYLENOL) tablet  1,000 mg  1,000 mg Oral Q6HRS PRN Kevyn Kramer, A.P.R.N.        oxyCODONE immediate-release (ROXICODONE) tablet 5 mg  5 mg Oral Q3HRS PRN Kevyn Kramer, A.P.R.N.        Or    oxyCODONE immediate release (ROXICODONE) tablet 10 mg  10 mg Oral Q3HRS PRN Kevyn Kramer, A.P.R.N.        Or    fentaNYL (SUBLIMAZE) injection 50 mcg  50 mcg Intravenous Q3HRS PRN Kevyn Kramer, A.P.R.N.        traMADol (Ultram) 50 MG tablet 50 mg  50 mg Oral Q4HRS PRN Kevyn Kramer, A.P.R.N.        midazolam (Versed) injection 2 mg  2 mg Intravenous Q HOUR PRN Kevyn Kramer, A.P.R.N.   2 mg at 11/10/22 2204    dexmedetomidine (PRECEDEX) 400 mcg/100mL NS premix infusion  0-1.5 mcg/kg/hr Intravenous Continuous Kevyn Kramer, A.P.R.N.   Stopped at 11/11/22 0440    sodium bicarbonate 8.4 % injection 50 mEq  50 mEq Intravenous Q HOUR PRN Kevyn Kramer, A.P.R.N.        morphine 4 MG/ML injection 4 mg  4 mg Intravenous Q HOUR PRN Kevyn Kramer, A.P.R.N.        ondansetron (ZOFRAN) syringe/vial injection 8 mg  8 mg Intravenous Q6HRS PRN Kevyn Kramer, A.P.R.N.        Or    prochlorperazine (COMPAZINE) injection 10 mg  10 mg Intravenous Q6HRS PRN Kevyn Kramer, A.P.R.N.        Or    promethazine (PHENERGAN) suppository 25 mg  25 mg Rectal Q6HRS PRN Kevyn Kramer, A.P.R.N.        acetaminophen (Tylenol) tablet 650 mg  650 mg Oral Q4HRS PRN Kevyn Kramer, A.P.R.N.        Or    acetaminophen (TYLENOL) suppository 650 mg  650 mg Rectal Q4HRS PRN Kevyn Kramer, A.P.R.N.        senna-docusate (PERICOLACE or SENOKOT S) 8.6-50 MG per tablet 2 Tablet  2 Tablet Oral BID Kevyn Kramer A.P.R.N.        And    polyethylene glycol/lytes (MIRALAX) PACKET 1 Packet  1 Packet Oral DAILY YASMANY Kwon.P.R.N.        And    [START ON 11/12/2022] magnesium hydroxide (MILK OF MAGNESIA) suspension 30 mL  30 mL Oral DAILY Kevyn Kramer A.P.R.N.        And    bisacodyl (DULCOLAX) suppository 10 mg  10 mg Rectal QDAY PRN Kevyn Kramer A.P.R.N.        omeprazole (PRILOSEC)  capsule 20 mg  20 mg Oral DAILY YASMANY Kwon.P.RTRESSA        mag hydrox-al hydrox-simeth (MAALOX PLUS ES or MYLANTA DS) suspension 30 mL  30 mL Oral Q4HRS PRN YASMANY Kwon.P.R.NBeti        insulin regular (HumuLIN R,NovoLIN R) injection  0-14 Units Intravenous Once YASMANY Kwon.P.RTRESSA        insulin lispro (AdmeLOG,HumaLOG) injection  0-14 Units Subcutaneous TID AC YASMANY Kwon.P.R.NBeti        insulin regular (HumuLIN R) 100 Units in  mL Infusion  0-29 Units/hr Intravenous Continuous YASMANY Kwon.P.RTRESSA   Stopped. (Insulin or Heparin) at 11/11/22 0310    dextrose 10 % BOLUS 12.5-25 g  12.5-25 g Intravenous PRN FENG Kwon.RTRESSA PEARSON Alert...Pharmacy to initiate transition from insulin infusion to subcutaneous insulin for cardiothoracic surgery 1 Each  1 Each Other Continuous YASMANY Kwon.P.R.NBeti        EPINEPHrine (Adrenalin) infusion 4 mg/250 mL (premix)  0-0.2 mcg/kg/min Intravenous Continuous Kevyn Kramer A.P.R.N.   Stopped at 11/10/22 1529    norepinephrine (Levophed) 8 mg in 250 mL NS infusion (premix)  0-30 mcg/min Intravenous Continuous Kevyn Kramer A.P.R.N.   Dose not Required at 11/10/22 1345       Fluids    Intake/Output Summary (Last 24 hours) at 11/11/2022 0731  Last data filed at 11/11/2022 0600  Gross per 24 hour   Intake 6275.62 ml   Output 2699 ml   Net 3576.62 ml       Laboratory  Recent Labs     11/10/22  1206 11/10/22  1335 11/10/22  1443 11/10/22  1709 11/11/22  0410   ISTATAPH 7.425   < > 7.413 7.311* 7.408   ISTATAPCO2 37.5*   < > 34.3 47.2* 38.1*   ISTATAPO2 137*   < > 104* 82 72   ISTATATCO2 26   < > 23 25 25   RRAIFKI9FKZ 99   < > 98 95 94   ISTATARTHCO3 24.6   < > 21.9 23.8 24.0   ISTATARTBE 0   < > -2 -3 -1   ISTATTEMP 37.0 C   < > 35.5 C 36.1 C 36.5 C   ISTATFIO2  --    < > 50 30 30   ISTATSPEC Arterial   < > Arterial Arterial Arterial   ISTATAPHTC 7.425  --  7.435  --  7.415   HXMCJKMP7WK 137*  --  95*  --  70    < > = values in this interval not  displayed.     Recent Labs     11/10/22  1330 11/10/22  1935 11/11/22  0005 11/11/22  0550   POTASSIUM 4.5 4.1 4.6 4.7   MAGNESIUM 3.1*  --   --  2.4     No results for input(s): ALTSGPT, ASTSGOT, ALKPHOSPHAT, TBILIRUBIN, DBILIRUBIN, GAMMAGT, AMYLASE, LIPASE, ALB, PREALBUMIN, GLUCOSE in the last 72 hours.  Recent Labs     11/11/22  0109   WBC 10.9*     Recent Labs     11/10/22  1330 11/11/22  0109   RBC  --  3.25*   HEMOGLOBIN 10.8* 10.0*   HEMATOCRIT 32.1* 29.6*   PLATELETCT 160* 161*   PROTHROMBTM 15.0*  --    APTT 34.8  --    INR 1.20*  --        Imaging  X-Ray:  I have personally reviewed the images and compared with prior images. and My impression is: Increased left lower lobe opacification    Assessment/Plan      Acute hypoxemic respiratory failure             Intubated 11/10 for CABG             ABCDEF bundle  SAT/SBT as appropriate   Mobility level 1-2     S/P two-vessel CABG             LIMA-LAD, RSVG-OM2, RSVG-RCA   Continue aspirin and statin     Chronic systolic heart failure             LVEF in following CABG 40% per anesthesiology   Increase carvedilol, 6.25 mg twice daily   Begin furosemide, 40 mg daily    Poor coordination in her arms and no movement of right leg   Query acute ischemic stroke   Head CT     Primary hypertension   Goal SBP less than 120   Increase carvedilol, 6.25 mg twice daily   I am titrating clevidipine to achieve blood pressure goals     History of right breast cancer, stage IA with prior lumpectomy and adjuvant chemoradiation     Hypoadrenalism on chronic corticosteroid therapy              Review of the medication reconciliation does not show any corticosteroids - will clarify with the patient/family when able     History of seronegative rheumatoid arthritis              On hydroxychloroquine prior to admission     History of gout     Dyslipidemia              Intolerant to statins      VTE:  Contraindicated  Ulcer: PPI  Lines: Central Line  Ongoing indication addressed,  Arterial Line  Ongoing indication addressed, and Berger Catheter  Ongoing indication addressed    I have performed a physical exam and reviewed and updated ROS and Plan today (11/11/2022). In review of yesterday's note (11/10/2022), there are no changes except as documented above.     I have assessed and reassessed her respiratory status with spontaneous breathing trials and ventilator adjustments, airway mechanics, ventilator waveforms, blood pressure, hemodynamics, cardiovascular status and her neurologic status.  She is at increased risk for worsening respiratory, cardiovascular and CNS system dysfunction.    Discussed patient condition and risk of morbidity and/or mortality with RN, RT, Pharmacy, Charge nurse / hot rounds, and QA team    The patient remains critically ill.  Critical care time = 40 minutes in directly providing and coordinating critical care and extensive data review.  No time overlap and excludes procedures.    A Critical Care Medicine progress note may have been authored by a resident physician or advanced practitioner of nursing under my direct supervision on this date of service.  As the supervising and attending physician, I have either attested to or cosigned that document.  IN THE EVENT THAT DISCREPANCIES EXIST BETWEEN THIS DOCUMENT AND ANY DOCUMENT THAT I HAVE ATTESTED TO OR COSIGNED ON THIS DATE OF SERVICE, THEN THIS DOCUMENT REMAINS THE FINAL AUTHORITY AS TO MY ASSESSMENT AND PLAN REGARDING THE CARE OF THIS PATIENT.    Kamron Back MD  Pulmonary and Critical Care Medicine

## 2022-11-11 NOTE — ANESTHESIA POSTPROCEDURE EVALUATION
Patient: Lauren Dave    Procedure Summary     Date: 11/10/22 Room / Location: [facility] OR 03 / SURGERY [facility]    Anesthesia Start: 0748 Anesthesia Stop: 1331    Procedures:       CORONARY ARTERY BYPASS GRAFTING X 3, WITH LEFT INTERNAL MAMMARY ARTERY TAKEDOWN AND ENDOSCOPIC VEIN HARVEST LEFT GREATER SAPHENOUS VEIN, AND TRANSESOPHAGEAL ECHOCARDIOGRAM (Chest)      ECHOCARDIOGRAM, TRANSESOPHAGEAL, INTRAOPERATIVE (Esophagus) Diagnosis: (CORONARY ARTERY DISEASE)    Surgeons: Padmini Mac M.D. Responsible Provider: Kenneth Foley M.D.    Anesthesia Type: general ASA Status: 4          Final Anesthesia Type: general  Last vitals  BP   Blood Pressure : (!) 140/65, Arterial BP: (!) 90/67    Temp   36.4 °C (97.5 °F)    Pulse   (!) 106   Resp   (!) 25    SpO2   97 %      Anesthesia Post Evaluation    Patient location during evaluation: ICU  Patient participation: waiting for patient participation  Level of consciousness: obtunded/minimal responses    Airway patency: patent  Anesthetic complications: no  Cardiovascular status: hemodynamically stable  Respiratory status: acceptable, intubated, ETT and ventilator  Hydration status: euvolemic  Comments: Remains intubated.  Neuro deficit noted in R. Leg.  Depressed mental function.    PONV: none          No notable events documented.     Nurse Pain Score: 0 (NPRS)

## 2022-11-11 NOTE — FLOWSHEET NOTE
11/11/22 0627   Weaning Parameters   RR (bpm) 18   $ FVC / Vital Capacity (liters)  0.68   NIF (cm H2O)  -25   Rapid Shallow Breathing Index (RR/VT) 55   Spontaneous VE 7.2   Spontaneous

## 2022-11-11 NOTE — CARE PLAN
Problem: Day of surgery post CABG/Heart valve replacement  Goal: Stabilization in immediate post op period  Outcome: Progressing  Intervention: VS q 15 min x 4 hours, then q 1 hour. Include temperature immediately upon arrival. Check CO/CI q 2-4 hours and PRN  Note: [place] N/A  Arterial line for continuous BP monitoring   Intervention: If radial artery used, elevate arm, no BP checks or needle sticks from affected arm, monitor ulnar pulse and capillary refill  Note: N/A  Intervention: First post op hour labs and EKG per order  Note: Completed  EKG shows BBB  Intervention: Serum K q 6 hours x 24 hours.  ABG and CBC prn.  Note: ABg Q1 hour until stable   K+ Q6 hours for K scale, no replacement required  Intervention: Initiate post cardiac insulin infusion protocol orders for FSBS greater than 140 and check frequency per protocol  Note: Insulin started in OR   Intervention: FSBS frequency as per Cardiac Surgery Insulin Drip Protocol  Note: Q1 hour  Intervention: For patients on Beta Blockers: verify dose given prior to surgery or within 6 hours after arrival to the unit  Note: Completed in pre-op  Intervention: Chest tube to 20 cm suction, record CT drainage with VS, and check for air leak  Note: Mediastinal CT output: 160  Alli CT output: 90  Intervention: For CT drainage >300 mL in first hour post op and/or 150 mL in subsequent hours: Stat platelets, PT, INR, TEG, iSTAT, and H&H per order  Note: N/A  Intervention: Titrate and wean off vasoactive drips per patient's condition and per MD order while maintaining SBP  mmHg per MD order  Note: Pt currently off vasoactive medications  Intervention: VAP protocol in place  Note: HOB 30 degrees  Oral care Q2 hours  SCDs on   Intervention: Wean from Vent per protocol (see protocol), extubation goal within 6 hours post op  Note: Extubation goal 1922, pt remains intubated  Intervention: IS q 1 hour while awake post extubation  Note: N/A  Intervention: Bedrest until  extubated and groin lines out  Note: Completed. Bedrest until 4 hours post extubation   Intervention: Maintain all original surgical dressings per provider orders and specifications  Note: Completed  Prevena over midline sternal incision  L Leg EVH wrapped with ace      The patient is Watcher - Medium risk of patient condition declining or worsening         Progress made toward(s) clinical / shift goals:  Pt off vasoactive medications    Patient is not progressing towards the following goals: Pt remains intubated

## 2022-11-11 NOTE — THERAPY
SLP CONTACT NOTE     11/11/22 8542   Treatment Variance   Reason For Missed Therapy Medical - Patient Unarousable   Interdisciplinary Plan of Care Collaboration   IDT Collaboration with  Nursing   Patient Position at End of Therapy In Bed;Edge of Bed;Call Light within Reach   Collaboration Comments Orders received for clinical swallow evaluation. Pt lethargic and unable to follow directions. Will re-attempt when able appropriate.

## 2022-11-11 NOTE — PROGRESS NOTES
Patient remains restless this am, unable to follow commands completely, awaiting respiratory parameters. Right leg significantly weaker than left in strength. Upper extremities continuing to get stronger. MD Back made aware.     Eye bilaterally severely edematous.      Pt tolerated EOB with maximum staff, patient unable to assist.

## 2022-11-11 NOTE — THERAPY
PT Contact Note    PT Consult received/acknowledged. Pt POD #1 O[facility]. Plan to follow-up 11/12 for PT eval as appropriate medically.    More Traore, PT, DPT  Ext. [phone]

## 2022-11-11 NOTE — CONSULTS
Referring Physician: Dr. Kamron Back and AYAKA Lambert    Referral Reason:    Stroke    HPI:  Ms. Lauren Dave is a 72 y.o. 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  Today after she was extubated, it was noted she has no movement of right leg and has decreased movement of left upper extremity with right gaze deviation.  Brain CT obtained which revealed subacute infarcts in the bilateral parietal-occipital head region.  There was no evidence of hemorrhage.  I was asked to see her with regard to post surgical stroke.      ROS:   Unable to obtain    Past Medical History:   Past Medical History:   Diagnosis Date    Arthritis     Breast cancer (HCC) 08/07/2013    Bronchitis 2005    Cancer (HCC)     right breast cancer     Cataract     removed    Chronic systolic congestive heart failure (HCC) EF 35% 10/12/2022    Coronary artery disease due to calcified coronary lesion - Calcifications seen on CT scan also wtih aortic ulceration     Dental disorder     tooth infection    Discoid lupus     Dyslipidemia     Tried atorvastatin, pravastatin, lovastatin, and Zetia.  All causes severe myalgias.  Just taking fish oil.      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     Ischemic heart disease due to coronary artery obstruction (HCC) - Severe 3vD on cath 10/19/2022    LBBB (left bundle branch block) 12/16/2014    Noted on prechemo EKG 9/2014, MUGA WNL    Pneumonia     Pseudogout     Scarlet fever     Unspecified hemorrhagic conditions     gums       Past Surgical History:   Past Surgical History:   Procedure Laterality Date    MULTIPLE CORONARY ARTERY BYPASS ENDO VEIN HARVEST  11/10/2022    Procedure: CORONARY ARTERY BYPASS  GRAFTING X 3, WITH LEFT INTERNAL MAMMARY ARTERY TAKEDOWN AND ENDOSCOPIC VEIN HARVEST LEFT GREATER SAPHENOUS VEIN, AND TRANSESOPHAGEAL ECHOCARDIOGRAM;  Surgeon: Padmini Mac M.D.;  Location: SURGERY [facility];  Service: Cardiothoracic    ECHOCARDIOGRAM, TRANSESOPHAGEAL, INTRAOPERATIVE  11/10/2022    Procedure: ECHOCARDIOGRAM, TRANSESOPHAGEAL, INTRAOPERATIVE;  Surgeon: Padmini Mac M.D.;  Location: SURGERY [facility];  Service: Cardiothoracic    OTHER CARDIAC SURGERY  2022    angiogram    CATH REMOVAL  2014    Performed by Aggie Burns M.D. at [facility]    MASTECTOMY  2013    Performed by Aggie Burns M.D. at [facility]    NODE BIOPSY SENTINEL  2013    Performed by Aggie Burns M.D. at [facility]    CATH PLACEMENT  2013    Performed by Aggie Burns M.D. at [facility]    US-NEEDLE CORE BX-BREAST PANEL  2013    right breast    COLONOSCOPY  2003    BREAST BIOPSY      TONSILLECTOMY         Social History:   Social History     Socioeconomic History    Marital status:      Spouse name: Not on file    Number of children: 2    Years of education: Not on file    Highest education level: Not on file   Occupational History     Employer: [employer]   Tobacco Use    Smoking status: Former     Packs/day: 1.00     Years: 0.00     Pack years: 0.00     Types: Cigarettes     Quit date: 10/1/2013     Years since quittin.1    Smokeless tobacco: Never   Vaping Use    Vaping Use: Never used   Substance and Sexual Activity    Alcohol use: No     Alcohol/week: 0.0 oz    Drug use: No    Sexual activity: Not on file     Comment: , 2 children, enemployed   Other Topics Concern    Not on file   Social History Narrative    ** Merged History Encounter **         Patient is a . Patient has 2 adult children. She is  from .           Social Determinants of  Health     Financial Resource Strain: Not on file   Food Insecurity: Not on file   Transportation Needs: Not on file   Physical Activity: Not on file   Stress: Not on file   Social Connections: Not on file   Intimate Partner Violence: Not on file   Housing Stability: Not on file       Family Hx:   Family History   Problem Relation Age of Onset    Cancer Mother         possible thyroid and gynecological    Multiple Sclerosis Mother     Arthritis Father     Multiple Sclerosis Brother     Gout Brother     Heart Disease Paternal Grandmother     Diabetes Paternal Grandmother     Cancer Maternal Aunt         breast cancer- 60s    Diabetes Maternal Uncle     Heart Disease Maternal Grandmother     Heart Disease Maternal Grandfather         MI    Stroke Paternal Grandfather     Other Son         breast mass    Heart Disease Son         HEART MURMUR    Gout Son        Current Medications:   Current Facility-Administered Medications   Medication Dose Route Frequency Provider Last Rate Last Admin    furosemide (LASIX) injection 40 mg  40 mg Intravenous Q DAY Kamron Back M.D.   40 mg at 11/11/22 0837    insulin regular (HumuLIN R,NovoLIN R) injection  2-9 Units Subcutaneous 4X/DAY ACHS Kamron Back M.D.        And    dextrose 10 % BOLUS 25 g  25 g Intravenous Q15 MIN PRN Kamron Back M.D.        Pharmacy Consult: Enteral tube insertion - review meds/change route/product selection   Other PHARMACY TO DOSE Kamron Back M.D.        acetaminophen (TYLENOL) tablet 1,000 mg  1,000 mg Enteral Tube Q6HRS Kamron Back M.D.        Followed by    [START ON 11/15/2022] acetaminophen (TYLENOL) tablet 1,000 mg  1,000 mg Enteral Tube Q6HRS PRN Kamron Back M.D.        aspirin (ASA) chewable tab 81 mg  81 mg Enteral Tube DAILY Kamron Back M.D.        clopidogrel (PLAVIX) tablet 75 mg  75 mg Enteral Tube DAILY Kamron Back M.D.        [START ON 11/12/2022]  omeprazole (FIRST-OMEPRAZOLE) 2 mg/mL oral susp 40 mg  40 mg Enteral Tube DAILY Kamron Back M.D.        senna-docusate (PERICOLACE or SENOKOT S) 8.6-50 MG per tablet 2 Tablet  2 Tablet Enteral Tube BID Kamron Back M.D.        And    [START ON 11/12/2022] polyethylene glycol/lytes (MIRALAX) PACKET 1 Packet  1 Packet Enteral Tube DAILY Kamron Back M.D.        And    [START ON 11/12/2022] magnesium hydroxide (MILK OF MAGNESIA) suspension 30 mL  30 mL Enteral Tube DAILY Kamron Back M.D.        And    bisacodyl (DULCOLAX) suppository 10 mg  10 mg Rectal QDAY PRN Kamron Back M.D.        acetaminophen (Tylenol) tablet 650 mg  650 mg Enteral Tube Q4HRS PRN Kamron Back M.D.        Or    acetaminophen (TYLENOL) suppository 650 mg  650 mg Rectal Q4HRS PRN Kamron Back M.D.        mag hydrox-al hydrox-simeth (MAALOX PLUS ES or MYLANTA DS) suspension 30 mL  30 mL Enteral Tube Q4HRS PRN Kamron Back M.D.        oxyCODONE immediate-release (ROXICODONE) tablet 5 mg  5 mg Enteral Tube Q3HRS PRN Kamron Back M.D.        Or    oxyCODONE immediate release (ROXICODONE) tablet 10 mg  10 mg Enteral Tube Q3HRS PRN Kamron Back M.D.        Or    fentaNYL (SUBLIMAZE) injection 50 mcg  50 mcg Intravenous Q3HRS PRN Kamron Back M.D.        traMADol (Ultram) 50 MG tablet 50 mg  50 mg Enteral Tube Q4HRS PRN Kamron Back M.D.        Respiratory Therapy Consult   Nebulization Continuous RT SHAKNAR KwonPBetiRBetiNBeti        NS infusion   Intravenous Continuous ROMAIN KwonRTRESSA   Stopped at 11/10/22 1729    electrolyte-A (PLASMALYTE-A) infusion   Intravenous PRN SHANKAR KwonPBetiRBetiNBeti 999 mL/hr at 11/10/22 1619 New Bag at 11/10/22 1619    magnesium sulfate in D5W IVPB premix 1 g  1 g Intravenous DAILY YULISSA Kwon   Stopped at 11/11/22 0932    clevidipine (Cleviprex) IV emulsion  0-21 mg/hr Intravenous  "Continuous Kamron Back M.D. 2 mL/hr at 11/11/22 1407 1 mg/hr at 11/11/22 1407    Pharmacy Consult Request ...Pain Management Review 1 Each  1 Each Other PHARMACY TO DOSE YASMANY Kwon.P.R.MIGUEL.        ondansetron (ZOFRAN) syringe/vial injection 8 mg  8 mg Intravenous Q6HRS PRN Kevyn Kramer A.P.R.N.        Or    prochlorperazine (COMPAZINE) injection 10 mg  10 mg Intravenous Q6HRS PRN Kevyn Kramer, A.P.R.N.        Or    promethazine (PHENERGAN) suppository 25 mg  25 mg Rectal Q6HRS PRN Kevyn Kramer A.P.R.N.           Allergies:   Allergies   Allergen Reactions    Statins [Hmg-Coa-R Inhibitors] Myalgia     Muscle Spasms   RXN=unknown    Atorvastatin Myalgia    Codeine Vomiting and Nausea     Clarified with patient - states that she has an \"upset stomach\" when she takes it    Eggs Diarrhea     Pt states that she is allergic to eggs per her mother.  Tolerated clevidipine 11/2022    Lisinopril Cough    Losartan Unspecified     Tried in 2017  Cannot recall reaction    Penicillins Unspecified     \"does not work\" .'IMMUNE TO PENICILLIN,AMPICILLIN IS THE ONLY THING THAT WORKS'       Physical Exam:   Vitals:    11/11/22 1328 11/11/22 1330 11/11/22 1345 11/11/22 1400   BP: 131/62 131/62 125/60 123/58   Pulse:    (!) 109   Resp:    (!) 30   Temp:       TempSrc:       SpO2:    95%   Weight:       Height:           Physical Exam   GENERAL:  Lying in the hospital bed in no apparent distress, mildly sedated and very drowsy.  She was just extubated..  Head: Normocephalic and atraumatic.   Eyes: Pupils are equal, round, and reactive to light. EOM are intact although she has mild right gaze preference..   Cardiovascular: Normal rate and regular rhythm.    Pulmonary/Chest: Breath sounds normal.   Abdominal: Soft. Bowel sounds are normal. He exhibits no distension. There is no tenderness.   Skin: Skin is warm and dry. No rash noted. No erythema.  Neuro Exam  MENTAL STATUS:  Awake, but very drowsy.  She was able to tell " me her name but could not tell me where she is could not tell me what year, month it is.  Speech: She has limited verbal output to her name only.  CRANIAL NERVES:  PERRL, EOMI with mild right gaze preference and no nystagmus.  She has mild left facial weakness, facial sensation could not be evaluated.  MOTOR:  Motor examination showed generalized weakness in addition to profound weakness of right lower extremity and proximal weakness of left upper extremity.  She had good hand  on the left but could not lift his arm off the bed.  She has antigravity in right upper extremity and left lower extremity.  SENSATION: Difficult to assess due to drowsiness.  REFLEXES:  1+ and symmetric, toes are upgoing bilaterally  COORDINATION: Could not be tested  GAIT:  Deferred       NIH Stroke Scale:    1a. Level of Consciousness (Alert, drowsy, etc): 1= Drowsy    1b. LOC Questions (Month, age): 2= Incorrect    1c. LOC Commands (Open/close eyes make fist/let go): 0= Obeys both correctly    2.   Best Gaze (Eyes open - patient follows examiner's finger on face): 1= Partial gaze palay    3.   Visual Fields (introduce visual stimulus/threat to patient's field quadrants): 1= Partial Hemiania    4.   Facial Paresis (Show teeth, raise eyebrows and squeeze eyes shut): 1= Minor     5a. Motor Arm - Left (Elevate arm to 90 degrees if patient is sitting, 45 degrees if  supine): 2= Can't resist gravity    5b. Motor Arm - Right (Elevate arm to 90 degrees if patient is sitting, 45 degrees if supine): 1= Drift    6a. Motor Leg - Left (Elevate leg 30 degrees with patient supine): 1= Drift    6b. Motor Leg - Right  (Elevate leg 30 degrees with patient supine): 4= No movement    7.   Limb Ataxia (Finger-nose, heel down shin): 0= No ataxia    8.   Sensory (Pin prick to face, arm, trunk and leg - compare side to side): 1= Partial loss    9.  Best Language (Name item, describe a picture and read sentences): 2= Severe aphasia    10. Dysarthria (Evaluate  speech clarity by patient repeating listed words): 2= Near to unintelligible or worse    11. Extinction and Inattention (Use information from prior testing to identify neglect or  double simultaneous stimuli testing): 1= Partial neglect    Total NIH Score: 20     Prehospital modified [place] Scale (MRS): 0 = No symptoms      Labs:  Recent Labs     11/10/22  1330 11/11/22  0109   WBC  --  10.9*   RBC  --  3.25*   HEMOGLOBIN 10.8* 10.0*   HEMATOCRIT 32.1* 29.6*   MCV  --  91.1   MCH  --  30.8   MCHC  --  33.8   RDW  --  46.1   PLATELETCT 160* 161*   MPV  --  9.8     Recent Labs     11/10/22  1935 11/11/22  0005 11/11/22  0550   POTASSIUM 4.1 4.6 4.7     Recent Labs     11/10/22  1330   APTT 34.8   INR 1.20*                 Recent Labs     11/10/22  1935 11/11/22  0005 11/11/22  0550   POTASSIUM 4.1 4.6 4.7     Recent Labs     11/10/22  1330 11/10/22  1935 11/11/22  0005 11/11/22  0550   POTASSIUM 4.5 4.1 4.6 4.7   MAGNESIUM 3.1*  --   --  2.4     Recent Labs     11/10/22  1330   APTT 34.8   INR 1.20*     Results for orders placed or performed during the hospital encounter of 12/22/14   Echocardiogram Comp w/o Cont   Result Value Ref Range    Eject.Frac. MOD BP 72.36     Eject.Frac. MOD 4C 72.36     Eject.Frac. MOD 2C 75.97          Imaging reviewed:    CT-HEAD W/O   Final Result      1.  Likely subacute infarcts in the bilateral parieto-occipital regions.   2.  No acute intracranial hemorrhage.   3.  Bilateral maxillary sinus disease.      EC-DAVE W/O CONT   Final Result      DX-CHEST-PORTABLE (1 VIEW)   Final Result      Stable enlargement of the cardiomediastinal silhouette, mild diffuse interstitial edema and bilateral basilar atelectasis and/or consolidation.      DX-CHEST-PORTABLE (1 VIEW)   Final Result      Satisfactory postoperative appearance of the chest      DX-ABDOMEN FOR TUBE PLACEMENT    (Results Pending)   MR-BRAIN-W/O    (Results Pending)          Assessment/Plan:  72 y.o. 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  Today after she was extubated, it was noted she has no movement of right leg and has decreased movement of left upper extremity with right gaze deviation.  Brain CT obtained which revealed subacute infarcts in the bilateral parietal-occipital head region.  There was no evidence of hemorrhage.    This is likely preprocedural embolic infarcts.  No acute stroke intervention is indicated as there is evidence of completed embolic infarcts scattered throughout the brain (shower of emboli)  Recommend to obtain brain MRI without contrast.  She already had postop echocardiogram which revealed ejection fraction of 35% with normal left atrial size and no evidence of thrombus.  Carotid ultrasound on 11/2/2022 revealed heterogeneous plaque of the bifurcation extending into the internal carotid artery with less than 50% internal carotid stenosis bilaterally.  Continue with every 2-hour neuro check.  On dual antiplatelet therapy with aspirin and Plavix per cardiology.  Continue statin therapy with a goal of LDL less than 70.  Obtain physical therapy, Occupational Therapy and speech therapy.  Discussed with Dr. Back    Please note that this dictation was created using voice recognition software. The accuracy of the dictation is limited to the abilities of the software. I have made every reasonable attempt to correct obvious errors, but I expect that there are errors of grammar and possibly content that I did not discover before finalizing the note.

## 2022-11-11 NOTE — CARE PLAN
The patient is Watcher - Medium risk of patient condition declining or worsening    Shift Goals  Clinical Goals: hemodynamic stability, extubate, wean pressors, mobilize  Patient Goals: SUNDAR  Family Goals: SUNDAR    Progress made toward(s) clinical / shift goals:   Problem: Day of surgery post CABG/Heart valve replacement  Goal: Stabilization in immediate post op period  Outcome: Progressing  Intervention: VS q 15 min x 4 hours, then q 1 hour. Include temperature immediately upon arrival. Check CO/CI q 2-4 hours and PRN  Note: Vitals obtained per protocol, no swan in place. Afebrile.  Intervention: If radial artery used, elevate arm, no BP checks or needle sticks from affected arm, monitor ulnar pulse and capillary refill  Note: N/a  Intervention: Serum K q 6 hours x 24 hours.  ABG and CBC prn.  Note: K: stable, no repletion required.   Intervention: Initiate post cardiac insulin infusion protocol orders for FSBS greater than 140 and check frequency per protocol  Note: Insulin gtt continued, FSBG checks per protocol.   Intervention: FSBS frequency as per Cardiac Surgery Insulin Drip Protocol  Note:  FSBG checks per protocol, need decreased this am.   Intervention: Chest tube to 20 cm suction, record CT drainage with VS, and check for air leak  Note: Chest tubes to suction, adequate output. M: 350ml total and B: 154 total.   Intervention: For CT drainage >300 mL in first hour post op and/or 150 mL in subsequent hours: Stat platelets, PT, INR, TEG, iSTAT, and H&H per order  Note: adequate output in chest tubes, no follow-up labs needed.   Intervention: Titrate and wean off vasoactive drips per patient's condition and per MD order while maintaining SBP  mmHg per MD order  Note: Patient remains on a little Clevi to maintain SBP <120.   Intervention: VAP protocol in place  Note: Oral care provided and HOB elevated.   Intervention: Wean from Vent per protocol (see protocol), extubation goal within 6 hours post op  Note:  Unable to extubate, patient unable to follow commands.  Intervention: IS q 1 hour while awake post extubation  Note: Pt not yet extubated.   Intervention: Bedrest until extubated and groin lines out  Note: Dangled EOB while intubated, no groin lines present.   Intervention: Dangle within 4 hours post extubation  Note: Dangled EOB - pt remains intubated for vent parameters, unable to to hold self up.   Intervention: Up in chair 4 hours, day of extubation  Note: N/a  Intervention: Maintain all original surgical dressings per provider orders and specifications  Note: Dressings C/D/I, Chest tube dressing redressed.   Intervention: Clear liquids post extubation, order carbohydrate free (post cardiac surgery) diet, advance as tolerated  Note: N/a  Intervention: Discontinue [place] pushpa and arterial line 12-18 hours post op if hemodynamically stable and off vasoactive drips  Note: NO swan pushpa catheter in place.   Intervention: A-Fib and DVT prophylaxis per MD order or contraindications documented (refer to DVT/VTE problem on Care Plan)  Note: Electrolytes replaced, SCDs on, patient mobilized.  Intervention: Amiodarone protocol per MD order  Note: N/a     Problem: Pain - Standard  Goal: Alleviation of pain or a reduction in pain to the patient’s comfort goal  Outcome: Progressing     Problem: Fall Risk  Goal: Patient will remain free from falls  Outcome: Progressing     Problem: Communication  Goal: The ability to communicate needs accurately and effectively will improve  Outcome: Progressing  Flowsheets (Taken 11/11/2022 0609)  Communication:   Assessed patient's ability to understand and communicate   Oriented patient to call light     Problem: Hemodynamics  Goal: Patient's hemodynamics, fluid balance and neurologic status will be stable or improve  Outcome: Progressing

## 2022-11-11 NOTE — PROGRESS NOTES
AYAKA Jose notified that patient is still lightly sedated, Rass -2. Pt wakes to voice, however not remaining awake to be extubated at this time. Hemodynamically stable. Will give patient more time to clear sedation

## 2022-11-11 NOTE — PROGRESS NOTES
Patient unable to follow commands fully or nod appropriately. Left arm flaccid and right leg flaccid. Right arm and left leg strength and mobility remain intact and strong.   Patient restless and unable to follow commands to pull parameters on ventilator.   AYAKA Jose made aware.       Holding off on pts steroid dose per AYAKA Jose.

## 2022-11-11 NOTE — CARE PLAN
Problem: Ventilation  Goal: Ability to achieve and maintain unassisted ventilation or tolerate decreased levels of ventilator support  Description: Target End Date:  4 days     Document on Vent flowsheet    1.  Support and monitor invasive and noninvasive mechanical ventilation  2.  Monitor ventilator weaning response  3.  Perform ventilator associated pneumonia prevention interventions  4.  Manage ventilation therapy by monitoring diagnostic test results  Outcome: Progressing  Vent day #2  /8/30%

## 2022-11-11 NOTE — PROGRESS NOTES
Cardiovascular Surgery Progress Note    Name: Lauren Dave  MRN: [id number]  : 1950  Admit Date: 11/10/2022  5:08 AM  1 Day Post-Op     Procedure:  Procedure(s) and Anesthesia Type:     * CORONARY ARTERY BYPASS GRAFTING X 3, WITH LEFT INTERNAL MAMMARY ARTERY TAKEDOWN AND ENDOSCOPIC VEIN HARVEST LEFT GREATER SAPHENOUS VEIN, AND TRANSESOPHAGEAL ECHOCARDIOGRAM - General     * ECHOCARDIOGRAM, TRANSESOPHAGEAL, INTRAOPERATIVE - General    Vitals:  Vitals:    22 0645 22 0700 22 0715 22 0730   BP: 118/60 113/57 111/59 111/56   Pulse:  88     Resp:  (!) 26     Temp:       TempSrc:       SpO2:  97%     Weight:       Height:          Temp (24hrs), Av.2 °C (97.2 °F), Min:35.6 °C (96.1 °F), Max:37 °C (98.6 °F)      Respiratory:  Vent Mode: Spont, PEEP/CPAP: 8, PIP: 14, MAP: 9.9 Respiration: (!) 26, Pulse Oximetry: 97 %       Fluids:    Intake/Output Summary (Last 24 hours) at 2022 0825  Last data filed at 2022 0600  Gross per 24 hour   Intake 6275.62 ml   Output 2699 ml   Net 3576.62 ml     Admit weight: Weight: 67.1 kg (147 lb 14.9 oz)  Current weight: Weight: 74.8 kg (164 lb 14.5 oz) (22 0450)    Labs:  Recent Labs     11/10/22  1330 22  0109   WBC  --  10.9*   RBC  --  3.25*   HEMOGLOBIN 10.8* 10.0*   HEMATOCRIT 32.1* 29.6*   MCV  --  91.1   MCH  --  30.8   MCHC  --  33.8   RDW  --  46.1   PLATELETCT 160* 161*   MPV  --  9.8     Recent Labs     11/10/22  1935 22  0005 22  0550   POTASSIUM 4.1 4.6 4.7     Recent Labs     11/10/22  1330   APTT 34.8   INR 1.20*           Medications:  Scheduled Medications   Medication Dose Frequency    carvedilol  6.25 mg BID WITH MEALS    furosemide  40 mg Q DAY    magnesium sulfate  1 g DAILY    K+ Scale: Goal of 4.5  1 Each Q6HRS    aspirin EC  81 mg DAILY    clopidogrel  75 mg DAILY    Pharmacy Consult Request  1 Each PHARMACY TO DOSE    acetaminophen  1,000 mg Q6HRS    senna-docusate  2 Tablet BID    And     polyethylene glycol/lytes  1 Packet DAILY    And    [START ON 11/12/2022] magnesium hydroxide  30 mL DAILY    omeprazole  20 mg DAILY    insulin regular  0-14 Units Once    insulin lispro  0-14 Units TID AC        Exam:   Physical Exam  Vitals and nursing note reviewed.   Constitutional:       Appearance: Normal appearance.      Interventions: She is intubated.   HENT:      Head: Normocephalic and atraumatic.   Eyes:      Pupils: Pupils are equal, round, and reactive to light.   Cardiovascular:      Rate and Rhythm: Normal rate and regular rhythm.   Pulmonary:      Effort: She is intubated.   Abdominal:      Palpations: Abdomen is soft.   Skin:     General: Skin is warm and dry.   Neurological:      Comments: Not moving right leg.       Cardiac Medications:    ASA - Yes    Plavix - Yes    Post-operative Beta Blockers - No; contraindicated because of Hypotension    Ace/ARB- No; contraindicated because of Hypotension    Statin - No; contraindicated because of Allergy/intolerance    Aldactone- No; contraindicated because of Hypotension    Ejection Fraction:  35%    Telemetry:   11/11 SR    Assessment/Plan:  POD 1  HDS, SR, neuro not moving right leg but follows commands, cleviprex drip, wounds CDI, abdomen soft, fluid balance positive, wt up,  chest tube output moderate.  CT head this AM.  Plan:  wean vent as tolerated.  Allow permissive hypertension, OK for systolic 120-140's, CPM.     Disposition:  [facility]

## 2022-11-12 ENCOUNTER — APPOINTMENT (OUTPATIENT)
Dept: RADIOLOGY | Facility: MEDICAL CENTER | Age: 72
DRG: 235 | End: 2022-11-12
Attending: INTERNAL MEDICINE
Payer: MEDICARE

## 2022-11-12 LAB
ANION GAP SERPL CALC-SCNC: 12 MMOL/L (ref 7–16)
BUN SERPL-MCNC: 27 MG/DL (ref 8–22)
CALCIUM SERPL-MCNC: 8.4 MG/DL (ref 8.5–10.5)
CHLORIDE SERPL-SCNC: 103 MMOL/L (ref 96–112)
CO2 SERPL-SCNC: 25 MMOL/L (ref 20–33)
CREAT SERPL-MCNC: 1.18 MG/DL (ref 0.5–1.4)
ERYTHROCYTE [DISTWIDTH] IN BLOOD BY AUTOMATED COUNT: 48.4 FL (ref 35.9–50)
GFR SERPLBLD CREATININE-BSD FMLA CKD-EPI: 49 ML/MIN/1.73 M 2
GLUCOSE BLD STRIP.AUTO-MCNC: 109 MG/DL (ref 65–99)
GLUCOSE BLD STRIP.AUTO-MCNC: 111 MG/DL (ref 65–99)
GLUCOSE BLD STRIP.AUTO-MCNC: 128 MG/DL (ref 65–99)
GLUCOSE BLD STRIP.AUTO-MCNC: 135 MG/DL (ref 65–99)
GLUCOSE SERPL-MCNC: 151 MG/DL (ref 65–99)
HCT VFR BLD AUTO: 28.3 % (ref 37–47)
HGB BLD-MCNC: 9.2 G/DL (ref 12–16)
MCH RBC QN AUTO: 30.9 PG (ref 27–33)
MCHC RBC AUTO-ENTMCNC: 32.5 G/DL (ref 33.6–35)
MCV RBC AUTO: 95 FL (ref 81.4–97.8)
PLATELET # BLD AUTO: 174 K/UL (ref 164–446)
PMV BLD AUTO: 10.3 FL (ref 9–12.9)
POTASSIUM SERPL-SCNC: 3.9 MMOL/L (ref 3.6–5.5)
RBC # BLD AUTO: 2.98 M/UL (ref 4.2–5.4)
SODIUM SERPL-SCNC: 140 MMOL/L (ref 135–145)
WBC # BLD AUTO: 15.3 K/UL (ref 4.8–10.8)

## 2022-11-12 PROCEDURE — 85027 COMPLETE CBC AUTOMATED: CPT

## 2022-11-12 PROCEDURE — 99291 CRITICAL CARE FIRST HOUR: CPT | Performed by: INTERNAL MEDICINE

## 2022-11-12 PROCEDURE — 80048 BASIC METABOLIC PNL TOTAL CA: CPT

## 2022-11-12 PROCEDURE — C9248 INJ, CLEVIDIPINE BUTYRATE: HCPCS | Performed by: INTERNAL MEDICINE

## 2022-11-12 PROCEDURE — 700111 HCHG RX REV CODE 636 W/ 250 OVERRIDE (IP): Performed by: INTERNAL MEDICINE

## 2022-11-12 PROCEDURE — 94669 MECHANICAL CHEST WALL OSCILL: CPT

## 2022-11-12 PROCEDURE — 700111 HCHG RX REV CODE 636 W/ 250 OVERRIDE (IP): Performed by: NURSE PRACTITIONER

## 2022-11-12 PROCEDURE — 99024 POSTOP FOLLOW-UP VISIT: CPT | Performed by: NURSE PRACTITIONER

## 2022-11-12 PROCEDURE — A9270 NON-COVERED ITEM OR SERVICE: HCPCS | Performed by: INTERNAL MEDICINE

## 2022-11-12 PROCEDURE — 700102 HCHG RX REV CODE 250 W/ 637 OVERRIDE(OP): Performed by: INTERNAL MEDICINE

## 2022-11-12 PROCEDURE — C9248 INJ, CLEVIDIPINE BUTYRATE: HCPCS | Performed by: NURSE PRACTITIONER

## 2022-11-12 PROCEDURE — 97167 OT EVAL HIGH COMPLEX 60 MIN: CPT

## 2022-11-12 PROCEDURE — 71045 X-RAY EXAM CHEST 1 VIEW: CPT

## 2022-11-12 PROCEDURE — 97163 PT EVAL HIGH COMPLEX 45 MIN: CPT

## 2022-11-12 PROCEDURE — 770022 HCHG ROOM/CARE - ICU (200)

## 2022-11-12 PROCEDURE — 82962 GLUCOSE BLOOD TEST: CPT | Mod: 91

## 2022-11-12 RX ORDER — LORAZEPAM 2 MG/ML
1 INJECTION INTRAMUSCULAR
Status: DISCONTINUED | OUTPATIENT
Start: 2022-11-12 | End: 2022-11-15

## 2022-11-12 RX ADMIN — SENNOSIDES AND DOCUSATE SODIUM 2 TABLET: 50; 8.6 TABLET ORAL at 17:07

## 2022-11-12 RX ADMIN — TRAMADOL HYDROCHLORIDE 50 MG: 50 TABLET, COATED ORAL at 19:04

## 2022-11-12 RX ADMIN — CLOPIDOGREL BISULFATE 75 MG: 75 TABLET ORAL at 05:59

## 2022-11-12 RX ADMIN — ACETAMINOPHEN 1000 MG: 500 TABLET ORAL at 05:57

## 2022-11-12 RX ADMIN — ACETAMINOPHEN 1000 MG: 500 TABLET ORAL at 00:05

## 2022-11-12 RX ADMIN — MAGNESIUM HYDROXIDE 30 ML: 400 SUSPENSION ORAL at 12:39

## 2022-11-12 RX ADMIN — TRAMADOL HYDROCHLORIDE 50 MG: 50 TABLET, COATED ORAL at 00:08

## 2022-11-12 RX ADMIN — ONDANSETRON 8 MG: 2 INJECTION INTRAMUSCULAR; INTRAVENOUS at 01:00

## 2022-11-12 RX ADMIN — LORAZEPAM 1 MG: 2 INJECTION INTRAMUSCULAR; INTRAVENOUS at 23:23

## 2022-11-12 RX ADMIN — CLEVIPIDINE 2 MG/HR: 0.5 EMULSION INTRAVENOUS at 16:29

## 2022-11-12 RX ADMIN — FUROSEMIDE 40 MG: 10 INJECTION, SOLUTION INTRAMUSCULAR; INTRAVENOUS at 05:59

## 2022-11-12 RX ADMIN — ACETAMINOPHEN 1000 MG: 500 TABLET ORAL at 17:07

## 2022-11-12 RX ADMIN — ASPIRIN 81 MG 81 MG: 81 TABLET ORAL at 06:00

## 2022-11-12 RX ADMIN — ACETAMINOPHEN 1000 MG: 500 TABLET ORAL at 23:23

## 2022-11-12 RX ADMIN — MAGNESIUM SULFATE HEPTAHYDRATE 1 G: 1 INJECTION, SOLUTION INTRAVENOUS at 06:09

## 2022-11-12 RX ADMIN — POLYETHYLENE GLYCOL 3350 1 PACKET: 17 POWDER, FOR SOLUTION ORAL at 05:57

## 2022-11-12 RX ADMIN — ACETAMINOPHEN 1000 MG: 500 TABLET ORAL at 12:39

## 2022-11-12 RX ADMIN — SENNOSIDES AND DOCUSATE SODIUM 2 TABLET: 50; 8.6 TABLET ORAL at 06:00

## 2022-11-12 RX ADMIN — OMEPRAZOLE 40 MG: KIT at 05:59

## 2022-11-12 RX ADMIN — CLEVIPIDINE 2 MG/HR: 0.5 EMULSION INTRAVENOUS at 00:39

## 2022-11-12 ASSESSMENT — COGNITIVE AND FUNCTIONAL STATUS - GENERAL
HELP NEEDED FOR BATHING: TOTAL
DRESSING REGULAR UPPER BODY CLOTHING: A LOT
TOILETING: TOTAL
MOBILITY SCORE: 6
STANDING UP FROM CHAIR USING ARMS: TOTAL
MOBILITY SCORE: 7
DRESSING REGULAR UPPER BODY CLOTHING: TOTAL
PERSONAL GROOMING: TOTAL
SUGGESTED CMS G CODE MODIFIER MOBILITY: CM
SUGGESTED CMS G CODE MODIFIER MOBILITY: CN
DRESSING REGULAR LOWER BODY CLOTHING: TOTAL
TURNING FROM BACK TO SIDE WHILE IN FLAT BAD: UNABLE
PERSONAL GROOMING: A LOT
TURNING FROM BACK TO SIDE WHILE IN FLAT BAD: A LOT
CLIMB 3 TO 5 STEPS WITH RAILING: TOTAL
SUGGESTED CMS G CODE MODIFIER DAILY ACTIVITY: CM
STANDING UP FROM CHAIR USING ARMS: TOTAL
CLIMB 3 TO 5 STEPS WITH RAILING: TOTAL
SUGGESTED CMS G CODE MODIFIER DAILY ACTIVITY: CN
DAILY ACTIVITIY SCORE: 8
TOILETING: TOTAL
WALKING IN HOSPITAL ROOM: TOTAL
DAILY ACTIVITIY SCORE: 6
MOVING TO AND FROM BED TO CHAIR: UNABLE
MOVING FROM LYING ON BACK TO SITTING ON SIDE OF FLAT BED: UNABLE
MOVING FROM LYING ON BACK TO SITTING ON SIDE OF FLAT BED: UNABLE
EATING MEALS: TOTAL
EATING MEALS: TOTAL
HELP NEEDED FOR BATHING: TOTAL
WALKING IN HOSPITAL ROOM: TOTAL
MOVING TO AND FROM BED TO CHAIR: UNABLE
DRESSING REGULAR LOWER BODY CLOTHING: TOTAL

## 2022-11-12 ASSESSMENT — PAIN DESCRIPTION - PAIN TYPE
TYPE: ACUTE PAIN
TYPE: ACUTE PAIN;SURGICAL PAIN
TYPE: ACUTE PAIN
TYPE: ACUTE PAIN;SURGICAL PAIN
TYPE: ACUTE PAIN
TYPE: ACUTE PAIN
TYPE: ACUTE PAIN;SURGICAL PAIN
TYPE: ACUTE PAIN
TYPE: ACUTE PAIN

## 2022-11-12 ASSESSMENT — FIBROSIS 4 INDEX: FIB4 SCORE: 3.74

## 2022-11-12 ASSESSMENT — GAIT ASSESSMENTS: GAIT LEVEL OF ASSIST: UNABLE TO PARTICIPATE

## 2022-11-12 NOTE — THERAPY
"Physical Therapy   Initial Evaluation     Patient Name: Lauren Dave  Age:  72 y.o., Sex:  female  Medical Record #: [id number]  Today's Date: 11/12/2022     Precautions  Precautions: (P) Sternal Precautions (See Comments);Cardiac Precautions (See Comments);Fall Risk;Chest Tube;Nasogastric Tube  Comments: CABGx3, CVA    Assessment  Patient is 72 y.o. female with CABGx3 11/10/22 and CVA with complex presentation with rigidity RLE, flaccidity LUE, and weakness RUE. Other PMH includes HTN, HLD, heart failure, CAD, Hx breast cancer, hypoadrenalism, RA, gout. Baseline mobility is unclear, as pt was unable to provide details. Pt required max Ax2 with bed mobility, and required dependent assist to maintain sitting posture. She was able to hold up her head and look left and right. No significant side lean or push was noted. Pt with limited ability to follow commands (<10% of opportunities), but was able to extend left knee in sitting. No active motion was noted in RLE (hypertonic) or LUE (flaccid). Pt sat EOB approximately 10 minutes with declining performance with fatigue. Pt will benefit from continued skilled PT to address deficits while she remains in the acute setting. Recommend further PT in the post-acute setting prior to D/C home.    Plan    Recommend Physical Therapy 5 times per week until therapy goals are met for the following treatments:  Bed Mobility, Gait Training, Neuro Re-Education / Balance, Stair Training, Therapeutic Activities, and Therapeutic Exercises    DC Equipment Recommendations: (P) Unable to determine at this time  Discharge Recommendations: (P) Recommend post-acute placement for additional physical therapy services prior to discharge home       Subjective    Pt able to state her location and reason for admit, but otherwise with difficulty answering questions. She frequently said, \" Help me.\"     Objective       11/12/22 1440   Precautions   Precautions Sternal Precautions (See " Comments);Cardiac Precautions (See Comments);Fall Risk;Chest Tube;Nasogastric Tube   Comments CABGx3, CVA   Vitals   Pulse 94   Non Verbal Descriptors   Non Verbal Scale  Restlessness   Prior Living Situation   Prior Services Unable To Determine At This Time   Housing / Facility Unable To Determine At This Time   Prior Level of Functional Mobility   Bed Mobility Unable To Determine At This Time   Transfer Status Unable To Determine At This Time   Ambulation Unable To Determine At This Time   History of Falls   History of Falls   (unknown)   Cognition    Cognition / Consciousness X   Speech/ Communication Delayed Responses;Slurred   Level of Consciousness Responds to voice   Ability To Follow Commands Unable to Follow 1 Step Commands   New Learning Impaired   Attention Impaired   Sequencing Impaired   Initiation Impaired   Comments drowsy during PT eval; intermittent appropriate reponses to questions, approx <10% of attempts   Active ROM Lower Body    Active ROM Lower Body  X   Comments extends left knee with cues against gravity, but unable to flex left hip; no active control right LE   Sensation Lower Body   Comments unable to assess   Lower Body Muscle Tone   Lower Body Muscle Tone  X   Rt Lower Extremity Muscle Tone Hypertonic   Upper Body Muscle Tone   Upper Body Muscle Tone  X   Lt Upper Extremity Muscle Tone Hypotonic   Neurological Concerns   Neurological Concerns Yes   Sitting Posture During ADL's Other   (requires total support for sitting)   Equilibrium Reaction Impaired   Comments absent equilibrium response   Vision   Vision Comments unfocused gaze   Balance Assessment   Sitting Balance (Static) Dependent   Sitting Balance (Dynamic) Dependent   Weight Shift Sitting Absent   Gait Analysis   Gait Level Of Assist Unable to Participate   Bed Mobility    Supine to Sit Total Assist   Sit to Supine Total Assist   Scooting Total Assist   Rolling Total Assist to Rt.   Comments x2 assist   Functional Mobility    Sit to Stand Unable to Participate   Mobility supine<->sit   Comments dependent   ICU Target Mobility Level   ICU Mobility - Targeted Level Level 1   How much difficulty does the patient currently have...   Turning over in bed (including adjusting bedclothes, sheets and blankets)? 1   Sitting down on and standing up from a chair with arms (e.g., wheelchair, bedside commode, etc.) 1   Moving from lying on back to sitting on the side of the bed? 1   How much help from another person does the patient currently need...   Moving to and from a bed to a chair (including a wheelchair)? 1   Need to walk in a hospital room? 1   Climbing 3-5 steps with a railing? 1   6 clicks Mobility Score 6   Activity Tolerance   Sitting in Chair unable   Sitting Edge of Bed 10 min approx   Standing unable   Comments limited by fatigue   Patient / Family Goals    Patient / Family Goal #1 not stated   Short Term Goals    Short Term Goal # 1 Pt will transfer supine<->sit with mod A within 6 visits to promote return home   Short Term Goal # 2 Pt will sit at EOB 15 min with min A within 6 visits to promote return home   Short Term Goal # 3 Pt will transfer bed<->chair with mod A within 6 visits in order to promote return home   Education Group   Education Provided Role of Physical Therapist   Role of Physical Therapist Patient Response Patient;Acceptance;Explanation;No Learning Evidence   Problem List    Problems Impaired Bed Mobility;Impaired Transfers;Impaired Ambulation;Functional ROM Deficit;Functional Strength Deficit;Impaired Balance;Impaired Coordination;Impaired Vision;Decreased Activity Tolerance;Safety Awareness Deficits / Cognition;Limited Knowledge of Post-Op Precautions;Motor Planning / Sequencing;Pain   Anticipated Discharge Equipment and Recommendations   DC Equipment Recommendations Unable to determine at this time   Discharge Recommendations Recommend post-acute placement for additional physical therapy services prior to  discharge home

## 2022-11-12 NOTE — THERAPY
SLP CONTACT NOTE     11/12/22 1059   Treatment Variance   Reason For Missed Therapy Medical - Patient Unarousable   Total Treatment Time   SLP Time Spent Yes  (5min)   Vitals   O2 (LPM) 4   O2 Delivery Device Silicone Nasal Cannula   Interdisciplinary Plan of Care Collaboration   IDT Collaboration with  Nursing   Patient Position at End of Therapy In Bed;Bed Alarm On;Call Light within Reach   Collaboration Comments Attempted to see pt for clinical swallow evaluation. NG placed on 11/11. Pt continues w/altered mentation, lethargic, unable to follow directions. RN requested to hold CSE until Monday, 11/14.

## 2022-11-12 NOTE — CARE PLAN
Problem: Skin Integrity  Goal: Skin integrity is maintained or improved  Outcome: Progressing     Problem: Fall Risk  Goal: Patient will remain free from falls  Outcome: Progressing     Problem: Pain - Standard  Goal: Alleviation of pain or a reduction in pain to the patient’s comfort goal  Outcome: Progressing     Problem: Post Op Day 1 CABG/Heart Valve Replacement  Goal: Optimal care of the post op CABG/heart valve replacement Post Op Day 1  Intervention: All valve patients: PT/INR daily  Note: NA  Intervention: Antibiotics are discontinued within 24 hours of anesthesia end time unless indication documented for continuation beyond 24 hours  Note: Done per protocol  Intervention: Up in chair for all meals  Note: unable  Intervention: Ambulate in am if stable. First ambulation 25 feet. Repeat x 3 as tolerated  Note: unable  Intervention: Discontinue bernal catheter unless documented reason for continuation  Note: NA  Intervention: Assess surgical dressing and check provider orders for potential removal  Note: NA  Intervention: Ensure referal to intensive cardiac rehab is ordered, and smoking cessation education if appropriate  Note: Done per protocol  Intervention: [facility] trained RN to remove chest tubes if ordered by provider  Note: Not ordered  Intervention: IS q 1 hour while awake and record best IS volume  Note: Unable to preform  Intervention: Knee high RADHA hose, on during the day, off at night  Note: Done per protocol  Intervention: Saline lock IV  Note: Done per protocol  Intervention: Transfer to [facility] status, begin VS q 4 hours  Note: NA  Intervention: After 24th hour post-anesthesia end time, transition patient to Cardiac Surgery SQ Insulin Protocol  Note: Done per protocol  Intervention: If patient is CABG or on home beta-blocker, start/resume beta-blocker on POD 1 or POD 2 or document contraindication  Note: Done per protocol

## 2022-11-12 NOTE — PROGRESS NOTES
Cardiovascular Surgery Progress Note    Name: Lauren Dave  MRN: [id number]  : 1950  Admit Date: 11/10/2022  5:08 AM  2 Days Post-Op     Procedure:  Procedure(s) and Anesthesia Type:     * CORONARY ARTERY BYPASS GRAFTING X 3, WITH LEFT INTERNAL MAMMARY ARTERY TAKEDOWN AND ENDOSCOPIC VEIN HARVEST LEFT GREATER SAPHENOUS VEIN, AND TRANSESOPHAGEAL ECHOCARDIOGRAM - General     * ECHOCARDIOGRAM, TRANSESOPHAGEAL, INTRAOPERATIVE - General    Vitals:  Vitals:    22 0400 22 0500 22 0600 22 0709   BP: 115/56 118/56 123/60    Pulse: 99 95 (!) 107 98   Resp: (!) 33 13 20 19   Temp: 37.7 °C (99.9 °F)      TempSrc: Bladder      SpO2: 90% 94% 92% 94%   Weight:   70.4 kg (155 lb 3.3 oz)    Height:          Temp (24hrs), Av.7 °C (99.9 °F), Min:37.7 °C (99.9 °F), Max:37.7 °C (99.9 °F)      Respiratory:    Respiration: 19, Pulse Oximetry: 94 %       Fluids:    Intake/Output Summary (Last 24 hours) at 2022 0849  Last data filed at 2022 0600  Gross per 24 hour   Intake 122.09 ml   Output 3250 ml   Net -3127.91 ml       Admit weight: Weight: 67.1 kg (147 lb 14.9 oz)  Current weight: Weight: 70.4 kg (155 lb 3.3 oz) (22 0600)    Labs:  Recent Labs     11/10/22  1330 22  0109 22  0024   WBC  --  10.9* 15.3*   RBC  --  3.25* 2.98*   HEMOGLOBIN 10.8* 10.0* 9.2*   HEMATOCRIT 32.1* 29.6* 28.3*   MCV  --  91.1 95.0   MCH  --  30.8 30.9   MCHC  --  33.8 32.5*   RDW  --  46.1 48.4   PLATELETCT 160* 161* 174   MPV  --  9.8 10.3       Recent Labs     22  0005 22  0550 22  0024   SODIUM  --   --  140   POTASSIUM 4.6 4.7 3.9   CHLORIDE  --   --  103   CO2  --   --  25   GLUCOSE  --   --  151*   BUN  --   --  27*   CREATININE  --   --  1.18   CALCIUM  --   --  8.4*       Recent Labs     11/10/22  1330   APTT 34.8   INR 1.20*             Medications:  Scheduled Medications   Medication Dose Frequency    furosemide  40 mg Q DAY    insulin regular  2-9 Units 4X/DAY  [facility]    Pharmacy   PHARMACY TO DOSE    acetaminophen  1,000 mg Q6HRS    aspirin  81 mg DAILY    clopidogrel  75 mg DAILY    omeprazole  40 mg DAILY    senna-docusate  2 Tablet BID    And    polyethylene glycol/lytes  1 Packet DAILY    And    magnesium hydroxide  30 mL DAILY    magnesium sulfate  1 g DAILY    Pharmacy Consult Request  1 Each PHARMACY TO DOSE        Exam:   Physical Exam  Vitals and nursing note reviewed.   Constitutional:       Appearance: Normal appearance.      Interventions: She is intubated.   HENT:      Head: Normocephalic and atraumatic.   Eyes:      Pupils: Pupils are equal, round, and reactive to light.   Cardiovascular:      Rate and Rhythm: Normal rate and regular rhythm.   Pulmonary:      Effort: She is intubated.   Abdominal:      Palpations: Abdomen is soft.   Skin:     General: Skin is warm and dry.   Neurological:      Motor: Weakness present.      Comments: Left upper arm weakness.  Not moving right leg.       Cardiac Medications:    ASA - Yes    Plavix - Yes    Post-operative Beta Blockers - No; contraindicated because of Hypotension    Ace/ARB- No; contraindicated because of Hypotension    Statin - No; contraindicated because of Allergy/intolerance    Aldactone- No; contraindicated because of Hypotension    Ejection Fraction:  35%    Telemetry:   11/11 SR  11/12 SR/ST 90-100s    Assessment/Plan:  POD 1  HDS, SR, neuro not moving right leg but follows commands, cleviprex drip, wounds CDI, abdomen soft, fluid balance positive, wt up,  chest tube output moderate.  CT head this AM.  Plan:  wean vent as tolerated.  Allow permissive hypertension, OK for systolic 120-140's, CPM.     POD 2 HDS, SR/ST.  Neuro can say name, no movement in right leg.  Weaker left upper extremity, following commands intermittently, pulling at NG/central line.  Wounds CDI, prevena in tact.  Moderate serosanguinous output from chest tube.  Cr 1.18 Hgb 9.2.  Neurology recommending MRI brain, pending currently.   Plan: MRI when available.  Ok for permissive HTN of SBP of 140 or less.  Encourage IS.  Restraint for RUE due to trying to remove central line and NG tubes with impulsivity.      Disposition:  TBD

## 2022-11-12 NOTE — DIETARY
Nutrition Services: Nutrition Education Consult   Day 2 of admit.  Lauren Dave is a 72 y.o. female with admitting DX of Atherosclerotic heart disease of native coronary artery without angina pectoris [I25.10]    RD attempted to meet with pt at bedside for nutrition education for heart-health. Pt is s/p CABG x3 on 11/10 and acute ischemic stoke following CABG per MD note. Nutrition education not appropriate at this time as pt appears to be confused and per MD note pt is only able to say her name.       Please re-consult RD as indicated.

## 2022-11-12 NOTE — CARE PLAN
The patient is Watcher - Medium risk of patient condition declining or worsening    Shift Goals  Clinical Goals: improve neuro status  Patient Goals: SUNDAR  Family Goals: SUNDAR    Progress made toward(s) clinical / shift goals:    Problem: Pain - Standard  Goal: Alleviation of pain or a reduction in pain to the patient’s comfort goal  Outcome: Progressing         Problem: Post op day 2 CABG/Heart Valve Replacement  Goal: Optimal care of the post op CABG/heart valve replacement post op day 2  Intervention: Daily weights in the morning  Note: Pt unable to stand. Bed scale used  Intervention: Up in chair for all meals  Note: Pt not able to stand or stay in chair. Pt has non purposeful movements  Intervention: Ambulate 4 times daily, increasing the distance each time  Note: N/A  Intervention: Stand at sink and wash up with assistance.  Clean incisions twice daily with soap and water.  Note: N/A

## 2022-11-12 NOTE — PROGRESS NOTES
CRITICAL CARE MEDICINE ATTENDING PROGRESS NOTE    Date of admission  11/10/2022    Chief Complaint  72 y.o. female admitted 11/10/2022 for elective CABG.  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    Hospital Course      11/11 -    vent day 2.  Increase carvedilol, 6.25 mg twice daily.  Begin furosemide, 40 mg daily.  CT head for right leg weakness.  11/12 -    continue diuresis.  Continue furosemide.  Continue aspirin and clopidogrel.  MRI brain when able.  Titrating clevidipine for blood pressure goals.      Interval Problem Update  Reviewed last 24 hour events:      SR  100.0  -3237 mL in the last 24  +340 mL since admit      Review of Systems  Review of Systems   Unable to perform ROS: Acuity of condition     Vital Signs for the last 24 hours  Temp:  [37.7 °C (99.9 °F)] 37.7 °C (99.9 °F)  Pulse:  [] 100  Resp:  [12-33] 26  BP: (113-147)/(53-67) 132/58  SpO2:  [83 %-99 %] 97 %    Hemodynamic parameters for the last 24 hours       Vent Settings for the last 24 hours       Physical Exam  Physical Exam  Constitutional:       Appearance: She is not diaphoretic.   HENT:      Head: Normocephalic.      Mouth/Throat:      Pharynx: Oropharynx is clear.   Eyes:      Pupils: Pupils are equal, round, and reactive to light.   Cardiovascular:      Comments: Sinus rhythm  Pulmonary:      Breath sounds: Rales (Few crackles at the bases) present. No wheezing.   Abdominal:      General: There is no distension.      Tenderness: There is no abdominal tenderness.   Musculoskeletal:      Cervical back: Normal range of motion.      Right lower leg: No edema.      Left lower leg: No edema.   Skin:     General: Skin is warm.      Capillary Refill: Capillary refill takes less than 2 seconds.   Neurological:      Comments: She is weak in the left arm.  She has minimal movement of the right leg.       Medications  Current  Facility-Administered Medications   Medication Dose Route Frequency Provider Last Rate Last Admin    furosemide (LASIX) injection 40 mg  40 mg Intravenous Q DAY Kamron Back M.D.   40 mg at 11/12/22 0559    insulin regular (HumuLIN R,NovoLIN R) injection  2-9 Units Subcutaneous 4X/DAY ACHS Kamron Back M.D.        And    dextrose 10 % BOLUS 25 g  25 g Intravenous Q15 MIN PRN Kamron Back M.D.        Pharmacy Consult: Enteral tube insertion - review meds/change route/product selection   Other PHARMACY TO DOSE Kamron Back M.D.        acetaminophen (TYLENOL) tablet 1,000 mg  1,000 mg Enteral Tube Q6HRS Kamron Back M.D.   1,000 mg at 11/12/22 0557    Followed by    [START ON 11/15/2022] acetaminophen (TYLENOL) tablet 1,000 mg  1,000 mg Enteral Tube Q6HRS PRN Kamron Back M.D.        aspirin (ASA) chewable tab 81 mg  81 mg Enteral Tube DAILY Kamron Back M.D.   81 mg at 11/12/22 0600    clopidogrel (PLAVIX) tablet 75 mg  75 mg Enteral Tube DAILY Kamron Back M.D.   75 mg at 11/12/22 0559    omeprazole (FIRST-OMEPRAZOLE) 2 mg/mL oral susp 40 mg  40 mg Enteral Tube DAILY Kamron Back M.D.   40 mg at 11/12/22 0559    senna-docusate (PERICOLACE or SENOKOT S) 8.6-50 MG per tablet 2 Tablet  2 Tablet Enteral Tube BID Kamron Back M.D.   2 Tablet at 11/12/22 0600    And    polyethylene glycol/lytes (MIRALAX) PACKET 1 Packet  1 Packet Enteral Tube DAILY Kamron Back M.D.   1 Packet at 11/12/22 0557    And    magnesium hydroxide (MILK OF MAGNESIA) suspension 30 mL  30 mL Enteral Tube DAILY Kamron Back M.D.        And    bisacodyl (DULCOLAX) suppository 10 mg  10 mg Rectal QDAY PRN Kamron Back M.D.        acetaminophen (Tylenol) tablet 650 mg  650 mg Enteral Tube Q4HRS PRN Kamron Back M.D.        Or    acetaminophen (TYLENOL) suppository 650 mg  650 mg Rectal Q4HRS PRN Kamron Resendiz  TERENCE Wong        mag hydrox-al hydrox-simeth (MAALOX PLUS ES or MYLANTA DS) suspension 30 mL  30 mL Enteral Tube Q4HRS PRN Kamron Back M.D.        oxyCODONE immediate-release (ROXICODONE) tablet 5 mg  5 mg Enteral Tube Q3HRS PRN Kamron Back M.D.        Or    oxyCODONE immediate release (ROXICODONE) tablet 10 mg  10 mg Enteral Tube Q3HRS PRN Kamron Back M.D.   10 mg at 11/11/22 1617    Or    fentaNYL (SUBLIMAZE) injection 50 mcg  50 mcg Intravenous Q3HRS PRN Kamron Back M.D.        traMADol (Ultram) 50 MG tablet 50 mg  50 mg Enteral Tube Q4HRS PRN Kamron Back M.D.   50 mg at 11/12/22 0008    Respiratory Therapy Consult   Nebulization Continuous RT YASMANY Kwon.P.R.N.        NS infusion   Intravenous Continuous YASMANY Kwon.P.R.N.   Stopped at 11/10/22 1729    electrolyte-A (PLASMALYTE-A) infusion   Intravenous PRN Kevyn Kramer A.P.R.N. 999 mL/hr at 11/10/22 1619 New Bag at 11/10/22 1619    magnesium sulfate in D5W IVPB premix 1 g  1 g Intravenous DAILY Kevyn Kramer A.P.R.N.   Stopped at 11/12/22 0709    clevidipine (Cleviprex) IV emulsion  0-21 mg/hr Intravenous Continuous Kevyn Kramer A.P.R.N.   Stopped at 11/12/22 0729    Pharmacy Consult Request ...Pain Management Review 1 Each  1 Each Other PHARMACY TO DOSE YASMANY Kwon.P.R.N.        ondansetron (ZOFRAN) syringe/vial injection 8 mg  8 mg Intravenous Q6HRS PRN Kevyn Kramer A.P.R.N.   8 mg at 11/12/22 0100    Or    prochlorperazine (COMPAZINE) injection 10 mg  10 mg Intravenous Q6HRS PRN Kevyn Kramer A.P.R.N.        Or    promethazine (PHENERGAN) suppository 25 mg  25 mg Rectal Q6HRS PRN SHANKAR KwonP.RBetiN.           Fluids    Intake/Output Summary (Last 24 hours) at 11/12/2022 1109  Last data filed at 11/12/2022 0840  Gross per 24 hour   Intake 19.63 ml   Output 2950 ml   Net -2930.37 ml       Laboratory  Recent Labs     11/10/22  1206 11/10/22  1335 11/10/22  1443 11/10/22  1709  11/11/22  0410   ISTATAPH 7.425   < > 7.413 7.311* 7.408   ISTATAPCO2 37.5*   < > 34.3 47.2* 38.1*   ISTATAPO2 137*   < > 104* 82 72   ISTATATCO2 26   < > 23 25 25   [id number] 99   < > 98 95 94   ISTATARTHCO3 24.6   < > 21.9 23.8 24.0   ISTATARTBE 0   < > -2 -3 -1   ISTATTEMP 37.0 C   < > 35.5 C 36.1 C 36.5 C   ISTATFIO2  --    < > 50 30 30   ISTATSPEC Arterial   < > Arterial Arterial Arterial   ISTATAPHTC 7.425  --  7.435  --  7.415   [id number] 137*  --  95*  --  70    < > = values in this interval not displayed.     Recent Labs     11/10/22  1330 11/10/22  1935 11/11/22  0005 11/11/22  0550 11/12/22  0024   SODIUM  --   --   --   --  140   POTASSIUM 4.5   < > 4.6 4.7 3.9   CHLORIDE  --   --   --   --  103   CO2  --   --   --   --  25   BUN  --   --   --   --  27*   CREATININE  --   --   --   --  1.18   MAGNESIUM 3.1*  --   --  2.4  --    CALCIUM  --   --   --   --  8.4*    < > = values in this interval not displayed.     Recent Labs     11/12/22  0024   GLUCOSE 151*     Recent Labs     11/11/22 0109 11/12/22  0024   WBC 10.9* 15.3*     Recent Labs     11/10/22  1330 11/11/22 0109 11/12/22  0024   RBC  --  3.25* 2.98*   HEMOGLOBIN 10.8* 10.0* 9.2*   HEMATOCRIT 32.1* 29.6* 28.3*   PLATELETCT 160* 161* 174   PROTHROMBTM 15.0*  --   --    APTT 34.8  --   --    INR 1.20*  --   --        Imaging  X-Ray:  I have personally reviewed the images and compared with prior images. and My impression is: Mild bibasilar opacities    Assessment/Plan      Acute hypoxemic respiratory failure             Intubated 11/10 for CABG - liberated 11/11   Continue oxygen   Continue incentive spirometry, PEP, deep breathing exercises     S/P two-vessel CABG             LIMA-LAD, RSVG-OM2, RSVG-RCA   Continue aspirin and clopidogrel     Chronic systolic heart failure             LVEF in following CABG 35%   Continue furosemide, 40 mg daily    Acute ischemic embolic strokes   Head CT with subacute infarcts in bilateral parieto-occipital  regions   Continue aspirin and clopidogrel   Intolerant to statins   MRI brain   Neuro checks every 2 hours     Primary hypertension   Goal SBP less than 140   I am titrating clevidipine to achieve blood pressure goals     History of right breast cancer, stage IA with prior lumpectomy and adjuvant chemoradiation     Hypoadrenalism on chronic corticosteroid therapy   She was taking steroids for gout tapered off prior to surgery     History of seronegative rheumatoid arthritis              On hydroxychloroquine prior to admission     History of gout     Dyslipidemia              Intolerant to statins      VTE:  Contraindicated  Ulcer: PPI  Lines: Central Line  Ongoing indication addressed and Berger Catheter  Ongoing indication addressed    I have performed a physical exam and reviewed and updated ROS and Plan today (11/12/2022). In review of yesterday's note (11/11/2022), there are no changes except as documented above.     I have assessed and reassessed her respiratory status, blood pressure, hemodynamics, cardiovascular status with titration of clevidipine and her neurologic status.  This lady is critically ill with acute ischemic stroke following CABG.  She requires very strict blood pressure control and close neurological monitoring.  She is at increased risk for worsening cardiovascular and CNS system dysfunction.    Discussed patient condition and risk of morbidity and/or mortality with RN, RT, Pharmacy, Charge nurse / hot rounds, and QA team    The patient remains critically ill.  Critical care time = 35 minutes in directly providing and coordinating critical care and extensive data review.  No time overlap and excludes procedures.    A Critical Care Medicine progress note may have been authored by a resident physician or advanced practitioner of nursing under my direct supervision on this date of service.  As the supervising and attending physician, I have either attested to or cosigned that document.  IN THE  EVENT THAT DISCREPANCIES EXIST BETWEEN THIS DOCUMENT AND ANY DOCUMENT THAT I HAVE ATTESTED TO OR COSIGNED ON THIS DATE OF SERVICE, THEN THIS DOCUMENT REMAINS THE FINAL AUTHORITY AS TO MY ASSESSMENT AND PLAN REGARDING THE CARE OF THIS PATIENT.    Kamron Back MD  Pulmonary and Critical Care Medicine

## 2022-11-13 ENCOUNTER — APPOINTMENT (OUTPATIENT)
Dept: RADIOLOGY | Facility: MEDICAL CENTER | Age: 72
DRG: 235 | End: 2022-11-13
Attending: PSYCHIATRY & NEUROLOGY
Payer: MEDICARE

## 2022-11-13 PROBLEM — D64.89 OTHER SPECIFIED ANEMIAS: Status: ACTIVE | Noted: 2022-11-13

## 2022-11-13 PROBLEM — I63.9 ACUTE ISCHEMIC STROKE (HCC): Status: ACTIVE | Noted: 2022-11-13

## 2022-11-13 LAB
ANION GAP SERPL CALC-SCNC: 12 MMOL/L (ref 7–16)
BUN SERPL-MCNC: 28 MG/DL (ref 8–22)
CALCIUM SERPL-MCNC: 8.5 MG/DL (ref 8.5–10.5)
CHLORIDE SERPL-SCNC: 104 MMOL/L (ref 96–112)
CO2 SERPL-SCNC: 27 MMOL/L (ref 20–33)
CREAT SERPL-MCNC: 1.14 MG/DL (ref 0.5–1.4)
ERYTHROCYTE [DISTWIDTH] IN BLOOD BY AUTOMATED COUNT: 49 FL (ref 35.9–50)
GFR SERPLBLD CREATININE-BSD FMLA CKD-EPI: 51 ML/MIN/1.73 M 2
GLUCOSE BLD STRIP.AUTO-MCNC: 82 MG/DL (ref 65–99)
GLUCOSE BLD STRIP.AUTO-MCNC: 99 MG/DL (ref 65–99)
GLUCOSE SERPL-MCNC: 95 MG/DL (ref 65–99)
HCT VFR BLD AUTO: 28.2 % (ref 37–47)
HEMOCCULT STL QL: POSITIVE
HGB BLD-MCNC: 8.8 G/DL (ref 12–16)
MAGNESIUM SERPL-MCNC: 2.7 MG/DL (ref 1.5–2.5)
MCH RBC QN AUTO: 30.6 PG (ref 27–33)
MCHC RBC AUTO-ENTMCNC: 31.2 G/DL (ref 33.6–35)
MCV RBC AUTO: 97.9 FL (ref 81.4–97.8)
PLATELET # BLD AUTO: 176 K/UL (ref 164–446)
PMV BLD AUTO: 10.6 FL (ref 9–12.9)
POTASSIUM SERPL-SCNC: 3.9 MMOL/L (ref 3.6–5.5)
POTASSIUM SERPL-SCNC: 3.9 MMOL/L (ref 3.6–5.5)
RBC # BLD AUTO: 2.88 M/UL (ref 4.2–5.4)
SODIUM SERPL-SCNC: 143 MMOL/L (ref 135–145)
WBC # BLD AUTO: 15.2 K/UL (ref 4.8–10.8)

## 2022-11-13 PROCEDURE — 70551 MRI BRAIN STEM W/O DYE: CPT

## 2022-11-13 PROCEDURE — 99223 1ST HOSP IP/OBS HIGH 75: CPT | Performed by: HOSPITALIST

## 2022-11-13 PROCEDURE — 700111 HCHG RX REV CODE 636 W/ 250 OVERRIDE (IP): Performed by: INTERNAL MEDICINE

## 2022-11-13 PROCEDURE — 85027 COMPLETE CBC AUTOMATED: CPT

## 2022-11-13 PROCEDURE — 92610 EVALUATE SWALLOWING FUNCTION: CPT

## 2022-11-13 PROCEDURE — 700102 HCHG RX REV CODE 250 W/ 637 OVERRIDE(OP): Performed by: HOSPITALIST

## 2022-11-13 PROCEDURE — A9270 NON-COVERED ITEM OR SERVICE: HCPCS | Performed by: INTERNAL MEDICINE

## 2022-11-13 PROCEDURE — 302136 NUTRITION PUMP: Performed by: INTERNAL MEDICINE

## 2022-11-13 PROCEDURE — 83735 ASSAY OF MAGNESIUM: CPT

## 2022-11-13 PROCEDURE — 700101 HCHG RX REV CODE 250: Performed by: STUDENT IN AN ORGANIZED HEALTH CARE EDUCATION/TRAINING PROGRAM

## 2022-11-13 PROCEDURE — 700111 HCHG RX REV CODE 636 W/ 250 OVERRIDE (IP): Performed by: NURSE PRACTITIONER

## 2022-11-13 PROCEDURE — 700105 HCHG RX REV CODE 258: Performed by: STUDENT IN AN ORGANIZED HEALTH CARE EDUCATION/TRAINING PROGRAM

## 2022-11-13 PROCEDURE — 99233 SBSQ HOSP IP/OBS HIGH 50: CPT | Performed by: INTERNAL MEDICINE

## 2022-11-13 PROCEDURE — 82272 OCCULT BLD FECES 1-3 TESTS: CPT

## 2022-11-13 PROCEDURE — 93005 ELECTROCARDIOGRAM TRACING: CPT | Performed by: THORACIC SURGERY (CARDIOTHORACIC VASCULAR SURGERY)

## 2022-11-13 PROCEDURE — 302136 NUTRITION PUMP: Performed by: THORACIC SURGERY (CARDIOTHORACIC VASCULAR SURGERY)

## 2022-11-13 PROCEDURE — 770020 HCHG ROOM/CARE - TELE (206)

## 2022-11-13 PROCEDURE — 84132 ASSAY OF SERUM POTASSIUM: CPT

## 2022-11-13 PROCEDURE — 700105 HCHG RX REV CODE 258: Performed by: NURSE PRACTITIONER

## 2022-11-13 PROCEDURE — 82962 GLUCOSE BLOOD TEST: CPT

## 2022-11-13 PROCEDURE — 80048 BASIC METABOLIC PNL TOTAL CA: CPT

## 2022-11-13 PROCEDURE — A9270 NON-COVERED ITEM OR SERVICE: HCPCS | Performed by: HOSPITALIST

## 2022-11-13 PROCEDURE — 700111 HCHG RX REV CODE 636 W/ 250 OVERRIDE (IP): Performed by: HOSPITALIST

## 2022-11-13 PROCEDURE — 700102 HCHG RX REV CODE 250 W/ 637 OVERRIDE(OP): Performed by: INTERNAL MEDICINE

## 2022-11-13 PROCEDURE — 99024 POSTOP FOLLOW-UP VISIT: CPT | Performed by: NURSE PRACTITIONER

## 2022-11-13 RX ORDER — LORAZEPAM 2 MG/ML
2 INJECTION INTRAMUSCULAR
Status: COMPLETED | OUTPATIENT
Start: 2022-11-13 | End: 2022-11-13

## 2022-11-13 RX ORDER — DEXMEDETOMIDINE HYDROCHLORIDE 4 UG/ML
.1-1.5 INJECTION, SOLUTION INTRAVENOUS CONTINUOUS
Status: DISCONTINUED | OUTPATIENT
Start: 2022-11-13 | End: 2022-11-13

## 2022-11-13 RX ORDER — FAMOTIDINE 20 MG/1
20 TABLET, FILM COATED ORAL 2 TIMES DAILY
Status: DISCONTINUED | OUTPATIENT
Start: 2022-11-13 | End: 2022-11-13

## 2022-11-13 RX ADMIN — OMEPRAZOLE 40 MG: KIT at 05:28

## 2022-11-13 RX ADMIN — SENNOSIDES AND DOCUSATE SODIUM 2 TABLET: 50; 8.6 TABLET ORAL at 18:40

## 2022-11-13 RX ADMIN — SODIUM CHLORIDE: 9 INJECTION, SOLUTION INTRAVENOUS at 02:09

## 2022-11-13 RX ADMIN — ACETAMINOPHEN 1000 MG: 500 TABLET ORAL at 05:29

## 2022-11-13 RX ADMIN — ASPIRIN 81 MG 81 MG: 81 TABLET ORAL at 05:29

## 2022-11-13 RX ADMIN — FUROSEMIDE 40 MG: 10 INJECTION, SOLUTION INTRAMUSCULAR; INTRAVENOUS at 05:29

## 2022-11-13 RX ADMIN — SENNOSIDES AND DOCUSATE SODIUM 2 TABLET: 50; 8.6 TABLET ORAL at 05:29

## 2022-11-13 RX ADMIN — AMIODARONE HYDROCHLORIDE 150 MG: 1.5 INJECTION, SOLUTION INTRAVENOUS at 22:22

## 2022-11-13 RX ADMIN — ACETAMINOPHEN 1000 MG: 500 TABLET ORAL at 18:40

## 2022-11-13 RX ADMIN — ACETAMINOPHEN 1000 MG: 500 TABLET ORAL at 23:41

## 2022-11-13 RX ADMIN — METOPROLOL TARTRATE 12.5 MG: 25 TABLET, FILM COATED ORAL at 15:57

## 2022-11-13 RX ADMIN — ACETAMINOPHEN 1000 MG: 500 TABLET ORAL at 12:46

## 2022-11-13 RX ADMIN — MAGNESIUM HYDROXIDE 30 ML: 400 SUSPENSION ORAL at 05:28

## 2022-11-13 RX ADMIN — POLYETHYLENE GLYCOL 3350 1 PACKET: 17 POWDER, FOR SOLUTION ORAL at 05:28

## 2022-11-13 RX ADMIN — LORAZEPAM 2 MG: 2 INJECTION INTRAMUSCULAR; INTRAVENOUS at 13:07

## 2022-11-13 RX ADMIN — CLOPIDOGREL BISULFATE 75 MG: 75 TABLET ORAL at 05:29

## 2022-11-13 RX ADMIN — AMIODARONE HYDROCHLORIDE 1 MG/MIN: 1.8 INJECTION, SOLUTION INTRAVENOUS at 22:29

## 2022-11-13 RX ADMIN — DEXMEDETOMIDINE 0.2 MCG/KG/HR: 200 INJECTION, SOLUTION INTRAVENOUS at 02:12

## 2022-11-13 RX ADMIN — LORAZEPAM 1 MG: 2 INJECTION INTRAMUSCULAR; INTRAVENOUS at 21:46

## 2022-11-13 ASSESSMENT — PAIN DESCRIPTION - PAIN TYPE
TYPE: ACUTE PAIN

## 2022-11-13 ASSESSMENT — FIBROSIS 4 INDEX: FIB4 SCORE: 3.74

## 2022-11-13 NOTE — CONSULTS
Hospital Medicine Consultation    Date of Service  11/13/2022    Referring Physician  Padmini Mac M.D.    Consulting Physician  Alcides Castañeda M.D.    Reason for Consultation  Hospital medicine consultation in a postoperative patient undergoing coronary artery bypass grafting , did suffered a neurologic insult perioperative    History of Presenting Illness  72 y.o. 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  The patient is found lethargic, she arouses on verbal command, she is confused, she does have weakness in the right lower extremity as well as left upper extremity, the patient's sister is at the bedside assisting the history taking.  The patient is being prepared for transfer to telemetry, chest tubes are being removed, a pacer wire has been removed, a MRI is underway and later reports multiple areas of watershed type infarctions.  There is no bleeding apparent.  A postoperative echo shows a Jek fraction of 35%, the patient is being diuresed, she is on GDMT post surgery.  The patient unfortunately is intolerant to statin medication.  Neurology was consulted after patient was identified to have a CVA.  Review of Systems  Review of Systems   Unable to perform ROS: Medical condition     Past Medical History   has a past medical history of Arthritis, Breast cancer (HCC) (08/07/2013), Bronchitis (2005), Cancer (HCC), Cataract, Chronic systolic congestive  heart failure (HCC) EF 35% (10/12/2022), Coronary artery disease due to calcified coronary lesion - Calcifications seen on CT scan also wtih aortic ulceration, Dental disorder, Discoid lupus, Dyslipidemia, Essential hypertension, Heart burn, Hypoadrenalism (HCC) - need for chronic steroids, Ischemic heart disease due to coronary artery obstruction (HCC) - Severe 3vD on cath (10/19/2022), LBBB (left bundle branch block) (12/16/2014), Pneumonia, Pseudogout, Scarlet fever, and Unspecified hemorrhagic conditions.    Surgical History   has a past surgical history that includes colonoscopy (01/01/2003); tonsillectomy; breast biopsy; mastectomy (08/22/2013); node biopsy sentinel (08/22/2013); cath placement (08/22/2013); cath removal (02/24/2014); us-needle core bx-breast panel (01/01/2013); other cardiac surgery (11/2022); multiple coronary artery bypass endo vein harvest (11/10/2022); and echocardiogram, transesophageal, intraoperative (11/10/2022).    Family History  family history includes Arthritis in her father; Cancer in her maternal aunt and mother; Diabetes in her maternal uncle and paternal grandmother; Gout in her brother and son; Heart Disease in her maternal grandfather, maternal grandmother, paternal grandmother, and son; Multiple Sclerosis in her brother and mother; Other in her son; Stroke in her paternal grandfather.    Social History   reports that she quit smoking about 9 years ago. Her smoking use included cigarettes. She smoked an average of 1.00 packs per day. She has never used smokeless tobacco. She reports that she does not drink alcohol and does not use drugs.    Medications  Prior to Admission Medications   Prescriptions Last Dose Informant Patient Reported? Taking?   Cholecalciferol (VITAMIN D3) 5000 units Cap 11/9/2022 at 0800 Patient Yes No   Sig: Take 1 Capsule by mouth every day.   Cyanocobalamin (VITAMIN B-12 PO) 11/9/2022 at 0800 Patient Yes No   Sig: Take 1 Tablet by mouth every morning.  "  amLODIPine (NORVASC) 2.5 MG Tab 11/10/2022 at 0400 Patient No No   Sig: Take 1 Tablet by mouth 2 times a day.   ampicillin (PRINCIPEN) 500 MG Cap 11/9/2022 at 2000 Patient Yes No   Sig: Take 1 Capsule by mouth 4 times a day. Twice a day for 7 days   aspirin 81 MG EC tablet 11/9/2022 at 0800 Patient No No   Sig: Take 1 Tablet by mouth every day.   famotidine (PEPCID) 40 MG Tab 11/8/2022 at PRN Patient No No   Sig: Take 1 tablet by mouth twice daily   Patient taking differently: Take 1 Tablet by mouth as needed.   hydrALAZINE (APRESOLINE) 10 MG Tab 2 weeks ago at UNK Patient No No   Sig: Take 1 Tablet by mouth 3 times a day as needed (/100).   hydroxychloroquine (PLAQUENIL) 200 MG Tab 11/9/2022 at 0800 Patient No No   Sig: Take 1 tablet by mouth every day.   ibuprofen (MOTRIN) 800 MG Tab Not started yet at N/A Patient No No   Sig: TAKE 1 TABLET BY MOUTH EVERY 8 HOURS AS NEEDED FOR MILD PAIN   metoprolol SR (TOPROL XL) 50 MG TABLET SR 24 HR 11/9/2022 at 2000 Patient No No   Sig: Take 1 Tablet by mouth every day.      Facility-Administered Medications: None       Allergies  Allergies   Allergen Reactions    Statins [Hmg-Coa-R Inhibitors] Myalgia     Muscle Spasms   RXN=unknown    Atorvastatin Myalgia    Codeine Vomiting and Nausea     Clarified with patient - states that she has an \"upset stomach\" when she takes it    Eggs Diarrhea     Pt states that she is allergic to eggs per her mother.  Tolerated clevidipine 11/2022    Lisinopril Cough    Losartan Unspecified     Tried in 2017  Cannot recall reaction    Penicillins Unspecified     \"does not work\" .'IMMUNE TO PENICILLIN,AMPICILLIN IS THE ONLY THING THAT WORKS'       Physical Exam  Temp:  [37.3 °C (99.1 °F)-37.9 °C (100.2 °F)] 37.3 °C (99.1 °F)  Pulse:  [] 102  Resp:  [11-35] 18  BP: ()/(48-67) 110/52  SpO2:  [92 %-100 %] 92 %    Physical Exam  Vitals and nursing note reviewed.   Constitutional:       Appearance: She is well-developed. She is " ill-appearing. She is not diaphoretic.      Interventions: Nasal cannula in place.   HENT:      Head: Normocephalic and atraumatic.      Nose: Nose normal.      Mouth/Throat:      Mouth: Mucous membranes are dry.   Eyes:      Conjunctiva/sclera: Conjunctivae normal.      Pupils: Pupils are equal, round, and reactive to light.   Neck:      Thyroid: No thyromegaly.      Vascular: No JVD.   Cardiovascular:      Rate and Rhythm: Regular rhythm. Tachycardia present.      Heart sounds: Normal heart sounds.     No friction rub. No gallop.   Pulmonary:      Effort: Pulmonary effort is normal.      Breath sounds: Rhonchi and rales present. No wheezing.   Abdominal:      General: Bowel sounds are normal. There is no distension.      Palpations: Abdomen is soft. There is no mass.      Tenderness: There is no abdominal tenderness. There is no guarding or rebound.   Musculoskeletal:         General: No tenderness. Normal range of motion.      Cervical back: Normal range of motion and neck supple.   Lymphadenopathy:      Cervical: No cervical adenopathy.   Skin:     General: Skin is warm and dry.      Coloration: Skin is pale.   Neurological:      Mental Status: She is lethargic and disoriented.      Cranial Nerves: No cranial nerve deficit.      Sensory: Sensory deficit present.      Motor: Weakness present.      Coordination: Coordination abnormal.       Fluids  Date 11/13/22 0700 - 11/14/22 0659   Shift 1689-4439 9691-9582 6512-0423 24 Hour Total   INTAKE   I.V. 7.8   7.8   Enteral 30   30   Shift Total 37.8   37.8   OUTPUT   Urine 690   690   Chest Tube 20   20   Shift Total 710   710   Weight (kg) 72 72 72 72       Laboratory  Recent Labs     11/11/22  0109 11/12/22  0024 11/13/22  0140   WBC 10.9* 15.3* 15.2*   RBC 3.25* 2.98* 2.88*   HEMOGLOBIN 10.0* 9.2* 8.8*   HEMATOCRIT 29.6* 28.3* 28.2*   MCV 91.1 95.0 97.9*   MCH 30.8 30.9 30.6   MCHC 33.8 32.5* 31.2*   RDW 46.1 48.4 49.0   PLATELETCT 161* 174 176   MPV 9.8 10.3 10.6      Recent Labs     11/11/22  0550 11/12/22  0024 11/13/22  0140   SODIUM  --  140 143   POTASSIUM 4.7 3.9 3.9   CHLORIDE  --  103 104   CO2  --  25 27   GLUCOSE  --  151* 95   BUN  --  27* 28*   CREATININE  --  1.18 1.14   CALCIUM  --  8.4* 8.5                     Imaging  MR-BRAIN-W/O   Final Result      1.  BILATERAL mostly supratentorial areas of ischemia as described above. These appear to be primarily watershed zone infarctions.   2.  No hemorrhage   3.  No significant mass effect      DX-CHEST-PORTABLE (1 VIEW)   Final Result         1. No significant interval change.      DX-ABDOMEN FOR TUBE PLACEMENT   Final Result      Interval placement of enteric tube with its tip over the midline epigastrium compatible with position at the level of the gastric body.      CT-HEAD W/O   Final Result      1.  Likely subacute infarcts in the bilateral parieto-occipital regions.   2.  No acute intracranial hemorrhage.   3.  Bilateral maxillary sinus disease.      EC-DAVE W/O CONT   Final Result      DX-CHEST-PORTABLE (1 VIEW)   Final Result      Stable enlargement of the cardiomediastinal silhouette, mild diffuse interstitial edema and bilateral basilar atelectasis and/or consolidation.      DX-CHEST-PORTABLE (1 VIEW)   Final Result      Satisfactory postoperative appearance of the chest          Assessment/Plan  Other specified anemias  Assessment & Plan  Normocytic, postoperative, monitor    Acute ischemic stroke (HCC)  Assessment & Plan  Post/perioperative event with multiple areas of watershed type infarction  Neurology consulted  DAPT for coronary, cardiology reasons already ordered  No evidence of hemorrhagic conversion  Neurology consultation was obtained  PT/OT/SLP and rehabilitation  Seizure precaution, aspiration precaution, fall precaution    S/P CABG x 3  Assessment & Plan  As per cardiac surgery, optimization, forced diuresis, mobilization as tolerated,  GDMT  Telemetry    Chronic obstructive pulmonary disease  (COPD) (HCC)  Assessment & Plan  Pre-existing, respiratory protocol,    Chronic systolic congestive heart failure (HCC) EF 35%- (present on admission)  Assessment & Plan  Treated to euvolemia, GDMT as tolerated      Dyslipidemia  Assessment & Plan  The patient with prior severe intolerance, apparently failed multiple regimens  Consider outpatient referral to PCSK9 treatment    Plan  Continue with postoperative care as per cardiothoracic surgery  Continue with dual antiplatelet therapy  Forced diuresis  Pulmonary toilet, respiratory care  PT/OT/SLP  Unfortunately intolerant to statins  Postacute care referral  Rehab evaluation  See orders  Medically complex high-risk patient    Thank you for allowing us to participate in the patient's care, we will follow along closely and hopefully be able to transition the patient to the outpatient versus inpatient postacute care      Please note that this dictation was created using voice recognition software. I have made every reasonable attempt to correct obvious errors, but I expect that there are errors of grammar and possibly context that I did not discover before finalizing the note.

## 2022-11-13 NOTE — CARE PLAN
The patient is Watcher - Medium risk of patient condition declining or worsening    Shift Goals  Clinical Goals: improve neuro status  Patient Goals: SUNDAR  Family Goals: SUNDAR    Progress made toward(s) clinical / shift goals:      Problem: Post Op Day 3 CABG/Heart Valve replacement  Goal: Optimal care of the post op CABG/Heart Valve replacement post op day 3  Intervention: Daily weights in the morning  Note: Weight obtained via bed scale  Intervention: Shower daily and clean incisions twice daily with soap and water  Note: Partial bed bath and incision cleaned w/ soap and water  Intervention: Up in chair for all meals  Note: N/A  Intervention: Ambulate 4 times daily, increasing the distance each time  Note: EOB x1 on night shift  Intervention: IS q 1 hour while awake and record best IS volume  Note: Best      Problem: Safety - Medical Restraint  Goal: Remains free of injury from restraints (Restraint for Interference with Medical Device)  Outcome: Progressing     Problem: Skin Integrity  Goal: Skin integrity is maintained or improved  Outcome: Progressing          Patient is not progressing towards the following goals:      Problem: Pain - Standard  Goal: Alleviation of pain or a reduction in pain to the patient’s comfort goal  Outcome: Not Progressing

## 2022-11-13 NOTE — PROGRESS NOTES
Cardiovascular Surgery Progress Note    Name: Lauren Dave  MRN: [id number]  : 1950  Admit Date: 11/10/2022  5:08 AM  3 Days Post-Op     Procedure:  Procedure(s) and Anesthesia Type:     * CORONARY ARTERY BYPASS GRAFTING X 3, WITH LEFT INTERNAL MAMMARY ARTERY TAKEDOWN AND ENDOSCOPIC VEIN HARVEST LEFT GREATER SAPHENOUS VEIN, AND TRANSESOPHAGEAL ECHOCARDIOGRAM - General     * ECHOCARDIOGRAM, TRANSESOPHAGEAL, INTRAOPERATIVE - General    Vitals:  Vitals:    22 0300 22 0400 22 0500 22 0600   BP: (!) 146/66 (!) 141/64 (!) 93/54 105/54   Pulse: 96 80 78 77   Resp: (!) 33 (!) 11 (!) 11 12   Temp:    37.3 °C (99.1 °F)   TempSrc:    Bladder   SpO2: 100% 99% 98% 99%   Weight:       Height:          Temp (24hrs), Av.6 °C (99.7 °F), Min:37.3 °C (99.1 °F), Max:37.9 °C (100.2 °F)      Respiratory:    Respiration: 12, Pulse Oximetry: 99 %       Fluids:    Intake/Output Summary (Last 24 hours) at 2022 0819  Last data filed at 2022 0800  Gross per 24 hour   Intake 307.39 ml   Output 2125 ml   Net -1817.61 ml       Admit weight: Weight: 67.1 kg (147 lb 14.9 oz)  Current weight: Weight: 72 kg (158 lb 11.7 oz) (22 0100)    Labs:  Recent Labs     22  0109 22  0024 22  0140   WBC 10.9* 15.3* 15.2*   RBC 3.25* 2.98* 2.88*   HEMOGLOBIN 10.0* 9.2* 8.8*   HEMATOCRIT 29.6* 28.3* 28.2*   MCV 91.1 95.0 97.9*   MCH 30.8 30.9 30.6   MCHC 33.8 32.5* 31.2*   RDW 46.1 48.4 49.0   PLATELETCT 161* 174 176   MPV 9.8 10.3 10.6       Recent Labs     22  0550 22  0024 22  0140   SODIUM  --  140 143   POTASSIUM 4.7 3.9 3.9   CHLORIDE  --  103 104   CO2  --  25 27   GLUCOSE  --  151* 95   BUN  --  27* 28*   CREATININE  --  1.18 1.14   CALCIUM  --  8.4* 8.5       Recent Labs     11/10/22  1330   APTT 34.8   INR 1.20*             Medications:  Scheduled Medications   Medication Dose Frequency    furosemide  40 mg Q DAY    insulin regular  2-9 Units 4X/DAY ACHS     Pharmacy   PHARMACY TO DOSE    acetaminophen  1,000 mg Q6HRS    aspirin  81 mg DAILY    clopidogrel  75 mg DAILY    omeprazole  40 mg DAILY    senna-docusate  2 Tablet BID    And    polyethylene glycol/lytes  1 Packet DAILY    And    magnesium hydroxide  30 mL DAILY    Pharmacy Consult Request  1 Each PHARMACY TO DOSE        Exam:   Physical Exam  Vitals and nursing note reviewed.   Constitutional:       Appearance: Normal appearance.      Interventions: She is intubated.   HENT:      Head: Normocephalic and atraumatic.   Eyes:      Pupils: Pupils are equal, round, and reactive to light.   Cardiovascular:      Rate and Rhythm: Normal rate and regular rhythm.   Pulmonary:      Effort: She is intubated.   Abdominal:      Palpations: Abdomen is soft.   Skin:     General: Skin is warm and dry.   Neurological:      Motor: Weakness present.      Comments: Left upper arm weakness.  Not moving right leg.       Cardiac Medications:    ASA - Yes    Plavix - Yes    Post-operative Beta Blockers - No; contraindicated because of Hypotension    Ace/ARB- No; contraindicated because of Hypotension    Statin - No; contraindicated because of Allergy/intolerance    Aldactone- No; contraindicated because of Hypotension    Ejection Fraction:  35%    Telemetry:   11/11 SR  11/12 SR/ST 90-100s  11/13 SR    Assessment/Plan:  POD 1  HDS, SR, neuro not moving right leg but follows commands, cleviprex drip, wounds CDI, abdomen soft, fluid balance positive, wt up,  chest tube output moderate.  CT head this AM.  Plan:  wean vent as tolerated.  Allow permissive hypertension, OK for systolic 120-140's, CPM.     POD 2 HDS, SR/ST.  Neuro can say name, no movement in right leg.  Weaker left upper extremity, following commands intermittently, pulling at NG/central line.  Wounds CDI, prevena in tact.  Moderate serosanguinous output from chest tube.  Cr 1.18 Hgb 9.2.  Neurology recommending MRI brain, pending currently.  Plan: MRI when available.  Ok  for permissive HTN of SBP of 140 or less.  Encourage IS.  Restraint for RUE due to trying to remove central line and NG tubes with impulsivity.      POD 3 HDS, SR.  Neuro aware to self and place.  Good strength in right hand and left leg.  Left arm weak.  No movement right leg.  Pacer wires removed. Pt tolerated well.  Cr 1.14, Hgb 8.8. Plan: MRI brain today, pacer wires out.  DC chest tubes.  Encourage IS.  Work with PT as tolerated.    Disposition:  TBD

## 2022-11-13 NOTE — ASSESSMENT & PLAN NOTE
Pre-existing, respiratory protocol,  Continuous respiratory therapy protocol.  Without exacerbation during admit  Quit smoking early 20's

## 2022-11-13 NOTE — PROGRESS NOTES
Pt is thrashing, yelling, and redirection and distraction is not working. MD notified. Orders given.

## 2022-11-13 NOTE — THERAPY
"Occupational Therapy   Initial Evaluation     Patient Name: Lauren Dave  Age:  72 y.o., Sex:  female  Medical Record #: [id number]  Today's Date: 11/12/2022    Precautions: Fall Risk, Cardiac Precautions (See Comments), Sternal Precautions (See Comments), Nasogastric Tube, Chest Tube  Comments: CT x 2, CVA    Assessment    Patient is 72 y.o. female with h/o CAD, CHF, HTN, HLD, breast cancer, RA, gout, admitted for elective CABG. Pt presented post-op with neurological deficits. Work-up revealed B parieto-occipital subacute infarcts. Pt seen for OT eval. Pt somnolent with very limited command following. Weak head turn to R and L to voice command (\"look over here\"), but poor eye contact B directions. LUE presents as flaccid. RUE with spontaneous movement, but limited to command. Pt required total A to mobilize to EOB, then full support for balance. No righting reactions. Use of UEs for propping contraindicated due to sternal precautions. Pt is total A for all care at this time. Will benefit from ongoing acute OT. Anticipate post-acute placement.     Plan    Recommend Occupational Therapy 3 times per week until therapy goals are met for the following treatments:  Adaptive Equipment, Cognitive Skill Development, Manual Therapy Techniques, Neuro Re-Education / Balance, Orthotics Treatment, Self Care/Activities of Daily Living, Sensory Integration Techniques, Therapeutic Activities, and Therapeutic Exercises.    DC Equipment Recommendations: Unable to determine at this time  Discharge Recommendations: Recommend post-acute placement for additional occupational therapy services prior to discharge home     Subjective    \"Help me.\"     Objective       11/12/22 1501   Prior Living Situation   Prior Services Unable To Determine At This Time   Housing / Facility Unable To Determine At This Time   Comments Pt is very limited historian and unable to provide any details of home or PLOF   Cognition    Speech/ Communication " "Delayed Responses;Slurred  (mostly unresponsive or saying \"help me\")   Orientation Level Not Oriented to Time;Not Oriented to Day;Not Oriented to Year;Not Oriented to Month   Level of Consciousness Responds to voice  (lethargic)   Ability To Follow Commands Unable to Follow 1 Step Commands  (following ~10%)   New Learning Impaired   Attention Impaired   Sequencing Impaired   Initiation Impaired   Comments Pt very somnolent with minimal command following; stated \"[facility]\" and \"[facility]\" for place and reason for admit   Active ROM Upper Body   Dominant Hand Right   Comments LUE flaccid, moves RUE spontaneously but minimally to command   Strength Upper Body   Comments LUE flaccid, moves RUE spontaneously but minimally to command   Upper Body Muscle Tone   Lt Upper Extremity Muscle Tone Hypotonic   Neurological Concerns   Sitting Posture During ADL's Posterior Lean   Rt Upper Extremity Gross Motor Control Impaired   Rt Upper Extremity Fine Motor Control Impaired   Lt Upper Extremity Gross Motor Control Absent   Lt Upper Extremity Fine Motor Control Absent   Coordination Upper Body   Comments Impaired RUE; absent LUE   Balance Assessment   Sitting Balance (Static) Dependent   Sitting Balance (Dynamic) Dependent   Weight Shift Sitting Absent   Comments Unable to stand   Bed Mobility    Supine to Sit Total Assist   Sit to Supine Total Assist   Scooting Total Assist   Rolling Total Assist to Rt.   ADL Assessment   Grooming Total Assist  (hand-over-hand face wiping (R hand))   Lower Body Dressing Total Assist  (don/doff B socks)   Toileting   (NT; Berger, no BM)   Functional Mobility   Sit to Stand Unable to Participate   Bed, Chair, Wheelchair Transfer Unable to Participate   Mobility EOB only   Visual Perception   Comments Limited eye contact to L or R field   Short Term Goals   Short Term Goal # 1 Pt will follow 1-step command 75% during functional tasks   Short Term Goal # 2 Pt will sit EOB with CGA >5 min to participate in " ADL task   Short Term Goal # 3 Pt will wipe face with SBA using dominant R hand

## 2022-11-13 NOTE — ASSESSMENT & PLAN NOTE
Treated to euvolemia, GDMT as tolerated  Ejection fraction 35% on DAVE.  Coreg and rowena   -- cards following

## 2022-11-13 NOTE — THERAPY
"Speech Language Pathology   Clinical Swallow Evaluation     Patient Name: Lauren Dave  AGE:  72 y.o., SEX:  female  Medical Record #: [id number]  Today's Date: 11/13/2022     Precautions  Precautions: (P) Fall Risk, Swallow Precautions ( See Comments), Nasogastric Tube, Cardiac Precautions (See Comments), Sternal Precautions (See Comments), Chest Tube  Comments: (P) CTx2, CVA    HPI:73 YO female admitted 11/10 2/2 chest pain. PMHx: HTN, HLD, breast cancer s/p chemorad, long term steroid use, HFrEF, CAD. CMHx: acute hypoxemic respiratory failure, s/p CABG 2 vessel CABG, 11/10, intubated 11/10-11/11, chronic systoilc heart failure, acute ischemic embolic strokes, primary HTN, hypoadrenalism, DLD. CXR 11/12 \"No significant interval change.\" Head CT 11/11 \" Likely subacute infarcts in the bilateral parieto-occipital regions.  2.  No acute intracranial hemorrhage.3.  Bilateral maxillary sinus disease.\" NG placed but no orders for TF.     Level of Consciousness: Awake, Drowsy  Affect/Behavior: Calm, Cooperative  Follows Directives: Yes - simple commands only  Orientation: Self  Hearing: Functional hearing  Vision: Functional vision    Prior Living Situation & Level of Function:  Pt endorsed hx of GERD and reports taking daily Rx meds to treat it. Denies hx of dysphagia.     Oral Mechanism Evaluation  Facial Symmetry:  asymmetry at rest, delayed movement of left labial structures but WNL ROM  Facial Sensation: Pt did not follow commands to assess  Labial Observations: WFL  Lingual Observations: Midline  Dentition: Fair  Comments:    Voice  Quality: WFL  Resonance: WFL  Intensity: Soft  Cough: Perceptually weak  Comments:    Current Method of Nutrition   NPO until cleared by speech pathology, Pt has NG in place, but no TF orders.     Subjective  Pt awake, followed basic directions,      Assessment  Positioning: Grant's (60-90 degrees)  Bolus Administration: SLP  Oxygen Requirements:  6 L Nasal Cannula  Factor(s) " Affecting Performance: Impaired endurance, Impaired mental status, Impaired command following    Swallowing Trials  Ice: WFL  Thin Liquid (TN0): Impaired  Liquidised (LQ3): WFL  Pureed (PU4): WFL    Comments:  Impaired bolus acceptance characterized by limited jaw opening to receive utensil requiring both tactile and verbal cues to accept anterior portion of spoon. Adequate oral containment. Intact transit characterized by no oral residue after the swallow. Timely swallow initiation and presumed weak hyolaryngeal elevation to palpation noted. Throat clearing and delayed coughing appreciated with thins, which can be concerning for airway invasion.     Clinical Impressions  Clinical signs of oropharyngeal dysphagia include impaired oral acceptance, coughing and throat clearing. Dysphagia is likely acute related to CABGx2, multiple embolic strokes and prolonged NPO status. Instrumental swallow study is indicated to objectively assess swallow function and further direct POC. Short term non oral nutrition/hydration/meds is indicated.     Recommendations  NPO/ TF DIET  Instrumentation: FEES with improved endurance/alertness  Swallowing Instructions & Precautions:   Supervision: 1:1 feeding with constant supervision  Positioning: Fully upright and midline during oral intake  Medication: Non Oral   Strategies: Small bites/sips, No straws  Oral Care: Q4h  4. 10 single ice chips per hour after oral care 1:1 with nursing to reduce xerostomia and maintain the integrity of the swallow musculature.       SLP following.       Plan    Recommend Speech Therapy 4 times per week until therapy goals are met for the following treatments:  Dysphagia Training and Patient / Family / Caregiver Education.    Discharge Recommendations: (P) Recommend post-acute placement for additional speech therapy services prior to discharge home     Objective       11/13/22 1005   Charge Group   SLP Oral Pharyngeal Evaluation Oral Pharyngeal Evaluation    Total Treatment Time   SLP Time Spent Yes   SLP Oral Pharyngeal Evaluation (Mins) 17   Initial Contact Note    Initial Contact Note  Order Received and Verified, Speech Therapy Evaluation in Progress with Full Report to Follow.   Precautions   Precautions Fall Risk;Swallow Precautions ( See Comments);Nasogastric Tube;Cardiac Precautions (See Comments);Sternal Precautions (See Comments);Chest Tube   Comments CTx2, CVA   Vitals   O2 (LPM) 6   O2 Delivery Device Silicone Nasal Cannula   Pain 0 - 10 Group   Therapist Pain Assessment Post Activity Pain Same as Prior to Activity;Nurse Notified;0   Prior Living Situation   Prior Services Unable To Determine At This Time   Housing / Facility Unable To Determine At This Time   Comments Pt is very limited historian and unable to provide any details of home or PLOF   Prior Level Of Function   Communication Within Functional Limits   Swallow Within Functional Limits   Dentition Intact   Dentures None   Hearing Within Functional Limits for Evaluation   Hearing Aid None   Vision Within Functional Limits for Evaluation   Patient's Primary Language English   Dysphagia Rating   Nutritional Liquid Intake Rating Scale Nothing by mouth   Nutritional Food Intake Rating Scale Tube dependent with minimal attempts of oral intake   Patient / Family Goals   Patient / Family Goal #1 none stated   Short Term Goals   Short Term Goal # 1 Pt will consume prefeeding trials 1:1 with SLP with no s/sx of aspiration and min cues.   Education Group   Education Provided Dysphagia   Dysphagia Patient Response Patient;Other;Acceptance;Explanation;Demonstration;Verbal Demonstration;Action Demonstration;Reinforcement Needed   Additional Comments Pt's friend verbalized good understanding, pt will require reinforcement   Problem List   Problem List Dysphagia   Anticipated Discharge Needs   Discharge Recommendations Recommend post-acute placement for additional speech therapy services prior to discharge home    Therapy Recommendations Upon DC Dysphagia Training;Patient / Family / Caregiver Education;Community Re-Integration   Interdisciplinary Plan of Care Collaboration   IDT Collaboration with  Nursing   Patient Position at End of Therapy Bed Alarm On;Call Light within Reach;Tray Table within Reach;Family / Friend in Room;In Bed;Seated   Collaboration Comments RN aware of results/recs

## 2022-11-13 NOTE — PROGRESS NOTES
T increasingly agitated and restless, screaming and thrashing, and redirection is not working. CTS notified. Orders given.

## 2022-11-13 NOTE — PROGRESS NOTES
Received bedside report from RN, pt care assumed. Pt responsive to voice, does not answer orientation questions. Follows simple commands to smile, squeeze hands/lift arm. No signs of acute distress noted at this time. Shakes head no when asked if in plain. Bed locked/in lowest position, bed alarm on, hourly rounding initiated.

## 2022-11-13 NOTE — CARE PLAN
The patient is Stable - Low risk of patient condition declining or worsening      Problem: Skin Integrity  Goal: Skin integrity is maintained or improved  Outcome: Progressing     Problem: Fall Risk  Goal: Patient will remain free from falls  Outcome: Progressing     Problem: Post op day 2 CABG/Heart Valve Replacement  Goal: Optimal care of the post op CABG/heart valve replacement post op day 2  Outcome: Progressing  Intervention: FSBS: when 2 consecutive BS < 130 after post op day 2, discontinue FSBS unless patient is insulin dependent diabetic  Note: Patient NPO, FSBG ACHS  Intervention: Up in chair for all meals  Note: Not completed, patient only able to mobilize to EOB  Intervention: Ambulate 4 times daily, increasing the distance each time  Note: Not completed, patient only able to mobilize to EOB  Intervention: Stand at sink and wash up with assistance.  Clean incisions twice daily with soap and water.  Note: Not completed, patient only able to mobilize to EOB  Intervention: IS q 1 hour while awake and record best IS volume  Note: Not completed, unable to complete hourly due to patient's neuro status/ability to follow commands  Intervention: Consider pacer wire removal by MD  Note: Not completed  Intervention: Consider removal of bernal and chest tube if not already done  Note: Not completed

## 2022-11-13 NOTE — ASSESSMENT & PLAN NOTE
Post/perioperative event with multiple areas of watershed type infarction   --MRI obtained on 11/11, neurology consulted, recommended Eliquis.  Patient is intolerant to statin.  .  PT/OT/SLP evaluated, recommended postacute, physiatry referral in place

## 2022-11-13 NOTE — PROGRESS NOTES
CRITICAL CARE MEDICINE ATTENDING PROGRESS NOTE    Date of admission  11/10/2022    Chief Complaint  72 y.o. female admitted 11/10/2022 for elective CABG.  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    Hospital Course      11/11 -    vent day 2.  Increase carvedilol, 6.25 mg twice daily.  Begin furosemide, 40 mg daily.  CT head for right leg weakness.  11/12 -    continue diuresis.  Continue furosemide.  Continue aspirin and clopidogrel.  MRI brain when able.  Titrating clevidipine for blood pressure goals.  11/13 -    okay to transfer to telemetry.      Interval Problem Update  Reviewed last 24 hour events:      SR  100.2  -1520 mL in the last 24      Review of Systems  Review of Systems   Unable to perform ROS: Acuity of condition     Vital Signs for the last 24 hours  Temp:  [37.7 °C (99.9 °F)-37.9 °C (100.2 °F)] 37.9 °C (100.2 °F)  Pulse:  [] 103  Resp:  [8-35] 35  BP: (103-157)/(52-67) 129/58  SpO2:  [90 %-100 %] 100 %    Hemodynamic parameters for the last 24 hours       Vent Settings for the last 24 hours       Physical Exam  Physical Exam  Constitutional:       General: She is not in acute distress.     Appearance: She is not toxic-appearing or diaphoretic.   HENT:      Head: Normocephalic.      Nose: Nose normal.      Mouth/Throat:      Pharynx: Oropharynx is clear.   Eyes:      Pupils: Pupils are equal, round, and reactive to light.   Cardiovascular:      Comments: Sinus rhythm  Pulmonary:      Effort: Pulmonary effort is normal. No respiratory distress.      Breath sounds: No stridor. Rales (Few crackles) present. No wheezing.   Abdominal:      General: There is no distension.      Tenderness: There is no abdominal tenderness. There is no guarding or rebound.   Musculoskeletal:      Cervical back: Normal range of motion.      Right lower leg: No edema.      Left lower leg: No edema.    Skin:     General: Skin is warm.      Capillary Refill: Capillary refill takes less than 2 seconds.   Neurological:      Comments: She is weak in the left arm.  She has minimal movement of the right leg.       Medications  Current Facility-Administered Medications   Medication Dose Route Frequency Provider Last Rate Last Admin    dexmedetomidine (PRECEDEX) 400 mcg/100mL NS premix infusion  0.1-1.5 mcg/kg/hr Intravenous Continuous Laura Dillon M.D. 10.8 mL/hr at 11/13/22 0315 0.6 mcg/kg/hr at 11/13/22 0315    LORazepam (ATIVAN) injection 1 mg  1 mg Intravenous Q3HRS PRN Kevyn Kramer A.P.R.NBeti   1 mg at 11/12/22 2323    furosemide (LASIX) injection 40 mg  40 mg Intravenous Q DAY Kamron Back M.D.   40 mg at 11/12/22 0559    insulin regular (HumuLIN R,NovoLIN R) injection  2-9 Units Subcutaneous 4X/DAY ACHS Kamron Back M.D.        And    dextrose 10 % BOLUS 25 g  25 g Intravenous Q15 MIN PRN Kamron Back M.D.        Pharmacy Consult: Enteral tube insertion - review meds/change route/product selection   Other PHARMACY TO DOSE Kamron Back M.D.        acetaminophen (TYLENOL) tablet 1,000 mg  1,000 mg Enteral Tube Q6HRS Kamron Back M.D.   1,000 mg at 11/12/22 2323    Followed by    [START ON 11/15/2022] acetaminophen (TYLENOL) tablet 1,000 mg  1,000 mg Enteral Tube Q6HRS PRN Kamron Back M.D.        aspirin (ASA) chewable tab 81 mg  81 mg Enteral Tube DAILY Kamron Back M.D.   81 mg at 11/12/22 0600    clopidogrel (PLAVIX) tablet 75 mg  75 mg Enteral Tube DAILY Kamron Back M.D.   75 mg at 11/12/22 0559    omeprazole (FIRST-OMEPRAZOLE) 2 mg/mL oral susp 40 mg  40 mg Enteral Tube DAILY Kamron Back M.D.   40 mg at 11/12/22 0559    senna-docusate (PERICOLACE or SENOKOT S) 8.6-50 MG per tablet 2 Tablet  2 Tablet Enteral Tube BID Kamron Back M.D.   2 Tablet at 11/12/22 2917    And    polyethylene glycol/lytes  (MIRALAX) PACKET 1 Packet  1 Packet Enteral Tube DAILY Kamron Back M.D.   1 Packet at 11/12/22 0557    And    magnesium hydroxide (MILK OF MAGNESIA) suspension 30 mL  30 mL Enteral Tube DAILY Kamron Back M.D.   30 mL at 11/12/22 1239    And    bisacodyl (DULCOLAX) suppository 10 mg  10 mg Rectal QDAY PRN Kamron Back M.D.        acetaminophen (Tylenol) tablet 650 mg  650 mg Enteral Tube Q4HRS PRN Kamron Back M.D.        Or    acetaminophen (TYLENOL) suppository 650 mg  650 mg Rectal Q4HRS PRN Kamron Back M.D.        mag hydrox-al hydrox-simeth (MAALOX PLUS ES or MYLANTA DS) suspension 30 mL  30 mL Enteral Tube Q4HRS PRN Kamron Back M.D.        oxyCODONE immediate-release (ROXICODONE) tablet 5 mg  5 mg Enteral Tube Q3HRS PRN Kamron Back M.D.        Or    oxyCODONE immediate release (ROXICODONE) tablet 10 mg  10 mg Enteral Tube Q3HRS PRN Kamron Back M.D.   10 mg at 11/11/22 1617    Or    fentaNYL (SUBLIMAZE) injection 50 mcg  50 mcg Intravenous Q3HRS PRN Kamron Back M.D.        traMADol (Ultram) 50 MG tablet 50 mg  50 mg Enteral Tube Q4HRS PRN Kamron Back M.D.   50 mg at 11/12/22 1904    Respiratory Therapy Consult   Nebulization Continuous RT YASMANY Kwon.P.R.N.        NS infusion   Intravenous Continuous SHANKAR KwonP.R.N. 10 mL/hr at 11/13/22 0209 New Bag at 11/13/22 0209    electrolyte-A (PLASMALYTE-A) infusion   Intravenous PRN SHANKAR KwonP.R.N. 999 mL/hr at 11/10/22 1619 New Bag at 11/10/22 1619    magnesium sulfate in D5W IVPB premix 1 g  1 g Intravenous DAILY YASMANY Kwon.P.R.N.   Stopped at 11/12/22 0709    clevidipine (Cleviprex) IV emulsion  0-21 mg/hr Intravenous Continuous DAILY Kwon.   Stopped at 11/12/22 1716    Pharmacy Consult Request ...Pain Management Review 1 Each  1 Each Other PHARMACY TO DOSE YULISSA Kwon        ondansetron (ZOFRAN) syringe/vial  injection 8 mg  8 mg Intravenous Q6HRS PRN Kevyn Kramer, A.P.R.N.   8 mg at 11/12/22 0100    Or    prochlorperazine (COMPAZINE) injection 10 mg  10 mg Intravenous Q6HRS PRN Kevyn Kramer, A.P.R.N.        Or    promethazine (PHENERGAN) suppository 25 mg  25 mg Rectal Q6HRS PRN Kevyn Kramer, A.P.R.N.           Fluids    Intake/Output Summary (Last 24 hours) at 11/13/2022 0352  Last data filed at 11/13/2022 0216  Gross per 24 hour   Intake 180.06 ml   Output 1939 ml   Net -1758.94 ml         Laboratory  Recent Labs     11/10/22  1206 11/10/22  1335 11/10/22  1443 11/10/22  1709 11/11/22  0410   ISTATAPH 7.425   < > 7.413 7.311* 7.408   ISTATAPCO2 37.5*   < > 34.3 47.2* 38.1*   ISTATAPO2 137*   < > 104* 82 72   ISTATATCO2 26   < > 23 25 25   GGPDFCP6QJM 99   < > 98 95 94   ISTATARTHCO3 24.6   < > 21.9 23.8 24.0   ISTATARTBE 0   < > -2 -3 -1   ISTATTEMP 37.0 C   < > 35.5 C 36.1 C 36.5 C   ISTATFIO2  --    < > 50 30 30   ISTATSPEC Arterial   < > Arterial Arterial Arterial   ISTATAPHTC 7.425  --  7.435  --  7.415   NFZAFBRQ9AM 137*  --  95*  --  70    < > = values in this interval not displayed.       Recent Labs     11/10/22  1330 11/10/22  1935 11/11/22  0550 11/12/22  0024 11/13/22  0140   SODIUM  --   --   --  140 143   POTASSIUM 4.5   < > 4.7 3.9 3.9   CHLORIDE  --   --   --  103 104   CO2  --   --   --  25 27   BUN  --   --   --  27* 28*   CREATININE  --   --   --  1.18 1.14   MAGNESIUM 3.1*  --  2.4  --   --    CALCIUM  --   --   --  8.4* 8.5    < > = values in this interval not displayed.       Recent Labs     11/12/22 0024 11/13/22 0140   GLUCOSE 151* 95       Recent Labs     11/11/22 0109 11/12/22 0024 11/13/22 0140   WBC 10.9* 15.3* 15.2*       Recent Labs     11/10/22  1330 11/11/22 0109 11/12/22 0024 11/13/22 0140   RBC  --  3.25* 2.98* 2.88*   HEMOGLOBIN 10.8* 10.0* 9.2* 8.8*   HEMATOCRIT 32.1* 29.6* 28.3* 28.2*   PLATELETCT 160* 161* 174 176   PROTHROMBTM 15.0*  --   --   --    APTT 34.8  --   --    --    INR 1.20*  --   --   --          Imaging  None    Assessment/Plan      Acute hypoxemic respiratory failure             Intubated 11/10 for CABG - liberated 11/11   Continue oxygen   Continue incentive spirometry, PEP, deep breathing exercises     S/P two-vessel CABG             LIMA-LAD, RSVG-OM2, RSVG-RCA   Continue aspirin and clopidogrel     Chronic systolic heart failure             LVEF in following CABG 35%   Continue furosemide, 40 mg daily    Acute ischemic embolic strokes   Head CT with subacute infarcts in bilateral parieto-occipital regions   Continue aspirin and clopidogrel   Intolerant to statins   MRI brain   Neuro checks every 4 hours     Primary hypertension   Goal SBP less than 140     History of right breast cancer, stage IA with prior lumpectomy and adjuvant chemoradiation     Hypoadrenalism on chronic corticosteroid therapy   She was taking steroids for gout tapered off prior to surgery     History of seronegative rheumatoid arthritis              On hydroxychloroquine prior to admission     History of gout     Dyslipidemia              Intolerant to statins      VTE:  Contraindicated  Ulcer: PPI  Lines: Central Line  Ongoing indication addressed and Berger Catheter  Ongoing indication addressed    I have performed a physical exam and reviewed and updated ROS and Plan today (11/13/2022). In review of yesterday's note (11/12/2022), there are no changes except as documented above.     OK to transfer out of ICU.  [facility] will sign off.  Please call if you have any questions.    Discussed patient condition and risk of morbidity and/or mortality with RN, RT, Pharmacy, Charge nurse / hot rounds, and QA team    A Critical Care Medicine progress note may have been authored by a resident physician or advanced practitioner of nursing under my direct supervision on this date of service.  As the supervising and attending physician, I have either attested to or cosigned that document.  IN  THE EVENT THAT DISCREPANCIES EXIST BETWEEN THIS DOCUMENT AND ANY DOCUMENT THAT I HAVE ATTESTED TO OR COSIGNED ON THIS DATE OF SERVICE, THEN THIS DOCUMENT REMAINS THE FINAL AUTHORITY AS TO MY ASSESSMENT AND PLAN REGARDING THE CARE OF THIS PATIENT.    Kamron Back MD  Pulmonary and Critical Care Medicine

## 2022-11-13 NOTE — DIETARY
"Nutrition Support Assessment   Day 3 of admit.  Lauren Dave is a 72 y.o. female with admitting DX of coronary artery disease.     Current problem list:  Acute ischemic stroke  Chronic obstructive pulmonary disease   S/p CABG     Assessment:  Estimated Nutritional Needs: based on:   Height: 160 cm (5' 3\")  Weight: 72 kg (158 lb 11.7 oz)  Weight to Use in Calculations: 67.2 kg (148 lb 2.4 oz) - admit stand up scale wt  Ideal Body Weight: 52.2 kg (115 lb)  Percent Ideal Body Weight: 128.8  Body mass index is 26.26 kg/m²., BMI classification: overweight    Calculation/Equation: MSJ x 1.2 = 1383 kcals/day  Total Calories / day: 1382 - 1546 (Calories / k - 23)  Total Grams Protein / day: 81 - 94 (Grams Protein / k.2 - 1.4)     Evaluation:   Admitted for CABG x 3 11/10.  Pt transferred to the ICU post op. Pt was extubated .  Swallow evaluation by SLP today. Recommended NPO with alternate source of nutrition. FEES f/u as pt's endurance/alertness improves.  NG tube placed, verified in the stomach.  BUN 28  Omeprazole, bowel protocol per MAR.   Specialized tube feeding formula indicated to best meet pt's estimated kcal and protein needs.      Malnutrition Risk: Does not meet criteria per ASPEN guidelines at this time.     Recommendations/Plan:  Start Replete with Fiber @ 25 mL/hr. Advance per protocol to goal rate of 60 mL/hr, providing 1440 kcals, 92 grams of protein and 1195 mL of free water per day.  Fluids per MD.  PO diet per SLP/MD.    RD will continue to follow.               "

## 2022-11-13 NOTE — ASSESSMENT & PLAN NOTE
Acute blood loss due to hematuria  Hb has been kady thus stop daily phlebotomy and check as indicated.  Transfuse RBCs for hemoglobin less than 8.0 as she is s/p CABG.

## 2022-11-13 NOTE — ASSESSMENT & PLAN NOTE
The patient with prior severe intolerance, apparently failed multiple regimens  Consider outpatient referral to PCSK9 treatment

## 2022-11-14 ENCOUNTER — APPOINTMENT (OUTPATIENT)
Dept: RADIOLOGY | Facility: MEDICAL CENTER | Age: 72
DRG: 235 | End: 2022-11-14
Attending: NURSE PRACTITIONER
Payer: MEDICARE

## 2022-11-14 ENCOUNTER — APPOINTMENT (OUTPATIENT)
Dept: RADIOLOGY | Facility: MEDICAL CENTER | Age: 72
DRG: 235 | End: 2022-11-14
Attending: THORACIC SURGERY (CARDIOTHORACIC VASCULAR SURGERY)
Payer: MEDICARE

## 2022-11-14 ENCOUNTER — HOSPITAL ENCOUNTER (INPATIENT)
Facility: REHABILITATION | Age: 72
End: 2022-11-14
Attending: PHYSICAL MEDICINE & REHABILITATION | Admitting: PHYSICAL MEDICINE & REHABILITATION
Payer: MEDICARE

## 2022-11-14 LAB
ABO GROUP BLD: NORMAL
ANION GAP SERPL CALC-SCNC: 11 MMOL/L (ref 7–16)
APPEARANCE UR: CLEAR
BACTERIA #/AREA URNS HPF: NEGATIVE /HPF
BASOPHILS # BLD AUTO: 0.4 % (ref 0–1.8)
BASOPHILS # BLD AUTO: 0.4 % (ref 0–1.8)
BASOPHILS # BLD AUTO: 0.5 % (ref 0–1.8)
BASOPHILS # BLD: 0.05 K/UL (ref 0–0.12)
BILIRUB UR QL STRIP.AUTO: ABNORMAL
BLD GP AB SCN SERPL QL: NORMAL
BUN SERPL-MCNC: 31 MG/DL (ref 8–22)
CALCIUM SERPL-MCNC: 8.6 MG/DL (ref 8.5–10.5)
CHLORIDE SERPL-SCNC: 101 MMOL/L (ref 96–112)
CHOLEST SERPL-MCNC: 161 MG/DL (ref 100–199)
CO2 SERPL-SCNC: 31 MMOL/L (ref 20–33)
COLOR UR: YELLOW
CREAT SERPL-MCNC: 1.15 MG/DL (ref 0.5–1.4)
CRP SERPL HS-MCNC: 19.8 MG/DL (ref 0–0.75)
EKG IMPRESSION: NORMAL
EOSINOPHIL # BLD AUTO: 0.08 K/UL (ref 0–0.51)
EOSINOPHIL # BLD AUTO: 0.15 K/UL (ref 0–0.51)
EOSINOPHIL # BLD AUTO: 0.15 K/UL (ref 0–0.51)
EOSINOPHIL NFR BLD: 0.6 % (ref 0–6.9)
EOSINOPHIL NFR BLD: 1.2 % (ref 0–6.9)
EOSINOPHIL NFR BLD: 1.4 % (ref 0–6.9)
EPI CELLS #/AREA URNS HPF: NEGATIVE /HPF
ERYTHROCYTE [DISTWIDTH] IN BLOOD BY AUTOMATED COUNT: 47.3 FL (ref 35.9–50)
ERYTHROCYTE [DISTWIDTH] IN BLOOD BY AUTOMATED COUNT: 47.4 FL (ref 35.9–50)
ERYTHROCYTE [DISTWIDTH] IN BLOOD BY AUTOMATED COUNT: 47.9 FL (ref 35.9–50)
ERYTHROCYTE [DISTWIDTH] IN BLOOD BY AUTOMATED COUNT: 48.4 FL (ref 35.9–50)
EST. AVERAGE GLUCOSE BLD GHB EST-MCNC: 117 MG/DL
GFR SERPLBLD CREATININE-BSD FMLA CKD-EPI: 50 ML/MIN/1.73 M 2
GLUCOSE SERPL-MCNC: 146 MG/DL (ref 65–99)
GLUCOSE UR STRIP.AUTO-MCNC: NEGATIVE MG/DL
HBA1C MFR BLD: 5.7 % (ref 4–5.6)
HCT VFR BLD AUTO: 26.5 % (ref 37–47)
HCT VFR BLD AUTO: 27.7 % (ref 37–47)
HCT VFR BLD AUTO: 27.8 % (ref 37–47)
HCT VFR BLD AUTO: 28.4 % (ref 37–47)
HDLC SERPL-MCNC: 54 MG/DL
HGB BLD-MCNC: 8.2 G/DL (ref 12–16)
HGB BLD-MCNC: 8.7 G/DL (ref 12–16)
HGB BLD-MCNC: 8.9 G/DL (ref 12–16)
HGB BLD-MCNC: 9.1 G/DL (ref 12–16)
HYALINE CASTS #/AREA URNS LPF: ABNORMAL /LPF
IMM GRANULOCYTES # BLD AUTO: 0.06 K/UL (ref 0–0.11)
IMM GRANULOCYTES # BLD AUTO: 0.09 K/UL (ref 0–0.11)
IMM GRANULOCYTES # BLD AUTO: 0.09 K/UL (ref 0–0.11)
IMM GRANULOCYTES NFR BLD AUTO: 0.5 % (ref 0–0.9)
IMM GRANULOCYTES NFR BLD AUTO: 0.6 % (ref 0–0.9)
IMM GRANULOCYTES NFR BLD AUTO: 0.7 % (ref 0–0.9)
KETONES UR STRIP.AUTO-MCNC: NEGATIVE MG/DL
LDLC SERPL CALC-MCNC: 76 MG/DL
LEUKOCYTE ESTERASE UR QL STRIP.AUTO: ABNORMAL
LYMPHOCYTES # BLD AUTO: 1.06 K/UL (ref 1–4.8)
LYMPHOCYTES # BLD AUTO: 1.3 K/UL (ref 1–4.8)
LYMPHOCYTES # BLD AUTO: 1.53 K/UL (ref 1–4.8)
LYMPHOCYTES NFR BLD: 12.6 % (ref 22–41)
LYMPHOCYTES NFR BLD: 9.3 % (ref 22–41)
LYMPHOCYTES NFR BLD: 9.5 % (ref 22–41)
MAGNESIUM SERPL-MCNC: 2.6 MG/DL (ref 1.5–2.5)
MCH RBC QN AUTO: 30.6 PG (ref 27–33)
MCH RBC QN AUTO: 30.9 PG (ref 27–33)
MCH RBC QN AUTO: 31.1 PG (ref 27–33)
MCH RBC QN AUTO: 31.5 PG (ref 27–33)
MCHC RBC AUTO-ENTMCNC: 30.9 G/DL (ref 33.6–35)
MCHC RBC AUTO-ENTMCNC: 31.4 G/DL (ref 33.6–35)
MCHC RBC AUTO-ENTMCNC: 32 G/DL (ref 33.6–35)
MCHC RBC AUTO-ENTMCNC: 32 G/DL (ref 33.6–35)
MCV RBC AUTO: 97.2 FL (ref 81.4–97.8)
MCV RBC AUTO: 98.2 FL (ref 81.4–97.8)
MCV RBC AUTO: 98.3 FL (ref 81.4–97.8)
MCV RBC AUTO: 98.9 FL (ref 81.4–97.8)
MICRO URNS: ABNORMAL
MONOCYTES # BLD AUTO: 0.84 K/UL (ref 0–0.85)
MONOCYTES # BLD AUTO: 0.9 K/UL (ref 0–0.85)
MONOCYTES # BLD AUTO: 1 K/UL (ref 0–0.85)
MONOCYTES NFR BLD AUTO: 7.2 % (ref 0–13.4)
MONOCYTES NFR BLD AUTO: 7.4 % (ref 0–13.4)
MONOCYTES NFR BLD AUTO: 7.6 % (ref 0–13.4)
NEUTROPHILS # BLD AUTO: 11.43 K/UL (ref 2–7.15)
NEUTROPHILS # BLD AUTO: 8.94 K/UL (ref 2–7.15)
NEUTROPHILS # BLD AUTO: 9.46 K/UL (ref 2–7.15)
NEUTROPHILS NFR BLD: 77.7 % (ref 44–72)
NEUTROPHILS NFR BLD: 80.5 % (ref 44–72)
NEUTROPHILS NFR BLD: 81.9 % (ref 44–72)
NITRITE UR QL STRIP.AUTO: NEGATIVE
NRBC # BLD AUTO: 0 K/UL
NRBC # BLD AUTO: 0 K/UL
NRBC # BLD AUTO: 0.02 K/UL
NRBC BLD-RTO: 0 /100 WBC
NRBC BLD-RTO: 0 /100 WBC
NRBC BLD-RTO: 0.2 /100 WBC
PH UR STRIP.AUTO: 6.5 [PH] (ref 5–8)
PLATELET # BLD AUTO: 209 K/UL (ref 164–446)
PLATELET # BLD AUTO: 226 K/UL (ref 164–446)
PLATELET # BLD AUTO: 234 K/UL (ref 164–446)
PLATELET # BLD AUTO: 243 K/UL (ref 164–446)
PMV BLD AUTO: 10 FL (ref 9–12.9)
PMV BLD AUTO: 10.1 FL (ref 9–12.9)
PMV BLD AUTO: 10.2 FL (ref 9–12.9)
PMV BLD AUTO: 10.3 FL (ref 9–12.9)
POTASSIUM SERPL-SCNC: 3.6 MMOL/L (ref 3.6–5.5)
PREALB SERPL-MCNC: 12.4 MG/DL (ref 18–38)
PROT UR QL STRIP: 30 MG/DL
RBC # BLD AUTO: 2.68 M/UL (ref 4.2–5.4)
RBC # BLD AUTO: 2.82 M/UL (ref 4.2–5.4)
RBC # BLD AUTO: 2.86 M/UL (ref 4.2–5.4)
RBC # BLD AUTO: 2.89 M/UL (ref 4.2–5.4)
RBC # URNS HPF: ABNORMAL /HPF
RBC UR QL AUTO: ABNORMAL
RH BLD: NORMAL
SODIUM SERPL-SCNC: 143 MMOL/L (ref 135–145)
SP GR UR STRIP.AUTO: 1.02
TRIGL SERPL-MCNC: 154 MG/DL (ref 0–149)
UROBILINOGEN UR STRIP.AUTO-MCNC: 0.2 MG/DL
WBC # BLD AUTO: 11.1 K/UL (ref 4.8–10.8)
WBC # BLD AUTO: 12.2 K/UL (ref 4.8–10.8)
WBC # BLD AUTO: 13.3 K/UL (ref 4.8–10.8)
WBC # BLD AUTO: 14 K/UL (ref 4.8–10.8)
WBC #/AREA URNS HPF: ABNORMAL /HPF

## 2022-11-14 PROCEDURE — 302146: Performed by: THORACIC SURGERY (CARDIOTHORACIC VASCULAR SURGERY)

## 2022-11-14 PROCEDURE — 84134 ASSAY OF PREALBUMIN: CPT

## 2022-11-14 PROCEDURE — 700102 HCHG RX REV CODE 250 W/ 637 OVERRIDE(OP): Performed by: INTERNAL MEDICINE

## 2022-11-14 PROCEDURE — 770000 HCHG ROOM/CARE - INTERMEDIATE ICU *

## 2022-11-14 PROCEDURE — 700102 HCHG RX REV CODE 250 W/ 637 OVERRIDE(OP): Performed by: NURSE PRACTITIONER

## 2022-11-14 PROCEDURE — 83735 ASSAY OF MAGNESIUM: CPT

## 2022-11-14 PROCEDURE — A9270 NON-COVERED ITEM OR SERVICE: HCPCS | Performed by: NURSE PRACTITIONER

## 2022-11-14 PROCEDURE — 80048 BASIC METABOLIC PNL TOTAL CA: CPT

## 2022-11-14 PROCEDURE — 71045 X-RAY EXAM CHEST 1 VIEW: CPT

## 2022-11-14 PROCEDURE — 93010 ELECTROCARDIOGRAM REPORT: CPT | Performed by: STUDENT IN AN ORGANIZED HEALTH CARE EDUCATION/TRAINING PROGRAM

## 2022-11-14 PROCEDURE — 99233 SBSQ HOSP IP/OBS HIGH 50: CPT | Performed by: STUDENT IN AN ORGANIZED HEALTH CARE EDUCATION/TRAINING PROGRAM

## 2022-11-14 PROCEDURE — 700102 HCHG RX REV CODE 250 W/ 637 OVERRIDE(OP): Performed by: STUDENT IN AN ORGANIZED HEALTH CARE EDUCATION/TRAINING PROGRAM

## 2022-11-14 PROCEDURE — 700111 HCHG RX REV CODE 636 W/ 250 OVERRIDE (IP): Performed by: INTERNAL MEDICINE

## 2022-11-14 PROCEDURE — 700102 HCHG RX REV CODE 250 W/ 637 OVERRIDE(OP): Performed by: HOSPITALIST

## 2022-11-14 PROCEDURE — A9270 NON-COVERED ITEM OR SERVICE: HCPCS | Performed by: INTERNAL MEDICINE

## 2022-11-14 PROCEDURE — A9270 NON-COVERED ITEM OR SERVICE: HCPCS | Performed by: HOSPITALIST

## 2022-11-14 PROCEDURE — 87077 CULTURE AEROBIC IDENTIFY: CPT

## 2022-11-14 PROCEDURE — 99223 1ST HOSP IP/OBS HIGH 75: CPT | Performed by: PHYSICAL MEDICINE & REHABILITATION

## 2022-11-14 PROCEDURE — 87086 URINE CULTURE/COLONY COUNT: CPT

## 2022-11-14 PROCEDURE — 86850 RBC ANTIBODY SCREEN: CPT

## 2022-11-14 PROCEDURE — 86900 BLOOD TYPING SEROLOGIC ABO: CPT

## 2022-11-14 PROCEDURE — A9270 NON-COVERED ITEM OR SERVICE: HCPCS | Performed by: THORACIC SURGERY (CARDIOTHORACIC VASCULAR SURGERY)

## 2022-11-14 PROCEDURE — 99024 POSTOP FOLLOW-UP VISIT: CPT | Performed by: THORACIC SURGERY (CARDIOTHORACIC VASCULAR SURGERY)

## 2022-11-14 PROCEDURE — 99223 1ST HOSP IP/OBS HIGH 75: CPT | Performed by: INTERNAL MEDICINE

## 2022-11-14 PROCEDURE — 700102 HCHG RX REV CODE 250 W/ 637 OVERRIDE(OP): Performed by: THORACIC SURGERY (CARDIOTHORACIC VASCULAR SURGERY)

## 2022-11-14 PROCEDURE — 700111 HCHG RX REV CODE 636 W/ 250 OVERRIDE (IP): Performed by: NURSE PRACTITIONER

## 2022-11-14 PROCEDURE — A9270 NON-COVERED ITEM OR SERVICE: HCPCS | Performed by: STUDENT IN AN ORGANIZED HEALTH CARE EDUCATION/TRAINING PROGRAM

## 2022-11-14 PROCEDURE — 80061 LIPID PANEL: CPT

## 2022-11-14 PROCEDURE — 86901 BLOOD TYPING SEROLOGIC RH(D): CPT

## 2022-11-14 PROCEDURE — 81001 URINALYSIS AUTO W/SCOPE: CPT

## 2022-11-14 PROCEDURE — 85027 COMPLETE CBC AUTOMATED: CPT

## 2022-11-14 PROCEDURE — 85025 COMPLETE CBC W/AUTO DIFF WBC: CPT

## 2022-11-14 PROCEDURE — 87186 SC STD MICRODIL/AGAR DIL: CPT

## 2022-11-14 PROCEDURE — 83036 HEMOGLOBIN GLYCOSYLATED A1C: CPT

## 2022-11-14 PROCEDURE — 86140 C-REACTIVE PROTEIN: CPT

## 2022-11-14 RX ORDER — QUETIAPINE FUMARATE 25 MG/1
25 TABLET, FILM COATED ORAL NIGHTLY PRN
Status: DISCONTINUED | OUTPATIENT
Start: 2022-11-14 | End: 2022-11-25

## 2022-11-14 RX ORDER — AMIODARONE HYDROCHLORIDE 200 MG/1
400 TABLET ORAL TWICE DAILY
Status: DISCONTINUED | OUTPATIENT
Start: 2022-11-14 | End: 2022-11-14

## 2022-11-14 RX ORDER — QUETIAPINE FUMARATE 25 MG/1
25 TABLET, FILM COATED ORAL NIGHTLY
Status: DISCONTINUED | OUTPATIENT
Start: 2022-11-14 | End: 2022-11-14

## 2022-11-14 RX ORDER — LOPERAMIDE HCL 1 MG/7.5ML
2 SUSPENSION ORAL 4 TIMES DAILY PRN
Status: DISCONTINUED | OUTPATIENT
Start: 2022-11-14 | End: 2022-11-22

## 2022-11-14 RX ORDER — AMIODARONE HYDROCHLORIDE 200 MG/1
400 TABLET ORAL TWICE DAILY
Status: DISCONTINUED | OUTPATIENT
Start: 2022-11-14 | End: 2022-11-20

## 2022-11-14 RX ORDER — SODIUM CHLORIDE 9 MG/ML
INJECTION, SOLUTION INTRAVENOUS CONTINUOUS
Status: ACTIVE | OUTPATIENT
Start: 2022-11-14 | End: 2022-11-14

## 2022-11-14 RX ADMIN — ACETAMINOPHEN 1000 MG: 500 TABLET ORAL at 12:31

## 2022-11-14 RX ADMIN — AMIODARONE HYDROCHLORIDE 1 MG/MIN: 1.8 INJECTION, SOLUTION INTRAVENOUS at 03:08

## 2022-11-14 RX ADMIN — ACETAMINOPHEN 1000 MG: 500 TABLET ORAL at 04:35

## 2022-11-14 RX ADMIN — QUETIAPINE FUMARATE 25 MG: 25 TABLET, FILM COATED ORAL at 23:56

## 2022-11-14 RX ADMIN — ASPIRIN 81 MG 81 MG: 81 TABLET ORAL at 04:35

## 2022-11-14 RX ADMIN — TRAMADOL HYDROCHLORIDE 50 MG: 50 TABLET, COATED ORAL at 23:56

## 2022-11-14 RX ADMIN — MAGNESIUM HYDROXIDE 30 ML: 400 SUSPENSION ORAL at 04:41

## 2022-11-14 RX ADMIN — METOPROLOL TARTRATE 12.5 MG: 25 TABLET, FILM COATED ORAL at 17:03

## 2022-11-14 RX ADMIN — AMIODARONE HYDROCHLORIDE 400 MG: 200 TABLET ORAL at 12:30

## 2022-11-14 RX ADMIN — METOPROLOL TARTRATE 12.5 MG: 25 TABLET, FILM COATED ORAL at 04:35

## 2022-11-14 RX ADMIN — SENNOSIDES AND DOCUSATE SODIUM 2 TABLET: 50; 8.6 TABLET ORAL at 04:35

## 2022-11-14 RX ADMIN — OMEPRAZOLE 40 MG: KIT at 04:46

## 2022-11-14 RX ADMIN — ACETAMINOPHEN 1000 MG: 500 TABLET ORAL at 23:56

## 2022-11-14 RX ADMIN — POLYETHYLENE GLYCOL 3350 1 PACKET: 17 POWDER, FOR SOLUTION ORAL at 04:41

## 2022-11-14 RX ADMIN — QUETIAPINE FUMARATE 25 MG: 25 TABLET ORAL at 09:15

## 2022-11-14 RX ADMIN — FUROSEMIDE 40 MG: 10 INJECTION, SOLUTION INTRAMUSCULAR; INTRAVENOUS at 04:36

## 2022-11-14 RX ADMIN — AMIODARONE HYDROCHLORIDE 400 MG: 200 TABLET ORAL at 17:03

## 2022-11-14 RX ADMIN — LORAZEPAM 1 MG: 2 INJECTION INTRAMUSCULAR; INTRAVENOUS at 02:11

## 2022-11-14 RX ADMIN — LORAZEPAM 1 MG: 2 INJECTION INTRAMUSCULAR; INTRAVENOUS at 06:37

## 2022-11-14 RX ADMIN — CLOPIDOGREL BISULFATE 75 MG: 75 TABLET ORAL at 04:35

## 2022-11-14 NOTE — CARE PLAN
The patient is Watcher - Medium risk of patient condition declining or worsening    Shift Goals  Clinical Goals: Monitor neuro status  Patient Goals: SUNDAR  Family Goals: not present    Progress made toward(s) clinical / shift goals:      Problem: Skin Integrity  Goal: Skin integrity is maintained or improved  Outcome: Progressing  Note: 2 RN completed upon arrival to unit. Q2 turns in place. Mepilex in place.      Problem: Post Op Day 3 CABG/Heart Valve replacement  Goal: Optimal care of the post op CABG/Heart Valve replacement post op day 3  Outcome: Progressing  Intervention: Daily weights in the morning  Note: Weight completed in AM.   Intervention: Shower daily and clean incisions twice daily with soap and water  Note: Incision care comepleted, prevena in place over midline. Pt unable to shower, partial bed bath completed.  Intervention: Up in chair for all meals  Note: Pt NPO  Intervention: Ambulate 4 times daily, increasing the distance each time  Note: Pt up to edge of  bed for 5 minutes once during shift.  Intervention: Consider removal of beranl, chest tube and pacer wires if not already done  Note: Bernal in place. CT and pacer wires removed     Problem: Communication  Goal: The ability to communicate needs accurately and effectively will improve  Outcome: Progressing     Problem: Safety - Medical Restraint  Goal: Remains free of injury from restraints (Restraint for Interference with Medical Device)  Outcome: Progressing       Patient is not progressing towards the following goals:

## 2022-11-14 NOTE — DISCHARGE PLANNING
[facility] Transitional Care Coordination    Referral from:  Dr. Castañeda  Insurance Provider on Facesheet:[facility]  Potential Rehab Diagnosis: CABGx3 Post surgical stroke NIH 20    Chart review indicates patient may have on going medical management and may have therapy needs to possibly meet inpatient rehab facility criteria with the goal of returning to community.    D/C support: per facesheet, sister and son/ will need to verify support     Physiatry consultation  per protocol.          Thank you for the referral.

## 2022-11-14 NOTE — HOSPITAL COURSE
This is a 72-year-old female with past medical significant for CAD, hypertension, history of breast cancer, dyslipidemia, chronic systolic heart failure, hypertension and chronic steroid, seronegative RA, gout was admitted for elective CABG on 11/10/2022.    Surgery went well but she was noted to have right lower extremity weakness after the surgery, CT head showed multiple areas of likely cerebral infarction.  MRI showed multiple areas of watershed type infarction, echocardiogram showed EF of 35%.  Neurology was consulted, neurology recommended Eliquis, patient is intolerant to statin.      While her being monitored in tele, she was  noted to have a second pause, cardiology consulted, patient underwent placement of PPM on 11/29/2022    Hospital course was complicated with acute blood loss anemia s/p transfusion, monitor hemoglobin, if less than 8, transfuse considering history of CAD.  Hospital course was complicated with paroxysmal atrial fibrillation, will continue amiodarone along with Eliquis 5 mg p.o. twice daily.    Interval events:  -- No acute events overnight, medicine has been stable, heart rate of 85, blood pressure 97/50, sats are 92% on room air.  Patient is alert, awake, answering questions appropriately.  -- Patient will be continued elevated at 15.7

## 2022-11-14 NOTE — WOUND TEAM
"Wound consult placed for BMS placement. Confirmed \"Insert rectal tube\" in place. Confirmed Rectal tube placed by super-user and appropriate LDA opened. This RN confirmed/placed \"rectal tube care\" order. Wound consult then completed and pt on appropriate follow up lists.     "

## 2022-11-14 NOTE — PROGRESS NOTES
"MD order and indication verified. Rectal tone assessed.  Balloon inflated with 43 mL of water and patency assessed. BMS then flushed with 100 mL of water. Stool return present. Bedside RN educated regarding flushing BMS Qshift and need for BMS supersuser or wound care RN for balloon adjustments. Rectal tube order set in place.         1. Nursing to turn patient to their left side and irrigate BMS q shift with  ml or until clear warm tap water in the irrigation port (light blue).   2. Nursing to check the retention balloon weekly. The balloon should have < or = 45 ml of tap water stopping when raised bubble is flush. If amount is not within the parameters, please remove excess fluid or instill the remaining amount.  3. If leaking, try gently pulling tube to seat on sphincter and make sure tubing not twisted. Can also check retention balloon volume (white port)--if <45 ml, may increase to 45ml checking raised bubble is flush on white port if leaking.  4. If BMS falls out, please cleanse with warm water and save tubing, then leave a message for wound team at [phone] and place a wound consult for: \"re-insert BMS\".  5. Order new bags in EPIC: Bag 2000 ml collection  6. Check bag frequently and change PRN      "

## 2022-11-14 NOTE — PROGRESS NOTES
Hospital Medicine Daily Progress Note    Date of Service  11/14/2022    Chief Complaint  Lauren Dave is a 72 y.o. female admitted 11/10/2022 with elective coronary artery bypass grafting secondary to coronary artery disease    Hospital Course  72 y.o. 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  The patient is found lethargic, she arouses on verbal command, she is confused, she does have weakness in the right lower extremity as well as left upper extremity, the patient's sister is at the bedside assisting the history taking.  The patient is being prepared for transfer to telemetry, chest tubes are being removed, a pacer wire has been removed, a MRI is underway and later reports multiple areas of watershed type infarctions.  There is no bleeding apparent.  A postoperative echo shows a Jek fraction of 35%, the patient is being diuresed, she is on GDMT post surgery.  The patient unfortunately is intolerant to statin medication.  Neurology was consulted after patient was identified to have a CVA.    Interval Problem Update  11/14/2022:  Evaluated patient at bedside today patient appears to be without movement of right lower extremity difficult to arouse weak left upper extremity.  Trending back looks like she has had increasing oxygen demand in addition chest tubes removed on 11/13/2022.  Lastly patient is also had decreasing hemoglobin at present we will  trend hemoglobin every 6 hours for 24 hours standing transfusion order has been placed to transfuse 1 unit PRBC if repeat hemoglobin less than 8.0.  Ejection fraction reviewed from DAVE remains at 35% cardiology medicine recommendations appreciated.  Referral to acute rehabilitation has been placed.  We will continue to follow along closely with cardiovascular thoracic surgery as primary.  Present patient has persistent deficit right lower extremity no movement left upper extremity is weak.  Watershed infarct noted above.  Consider discussion of initiation of amantadine with neurology and cardiology service.    I have discussed this patient's plan of care and discharge plan at IDT rounds today with Case Management, Nursing, Nursing leadership, and other members of the IDT team.    Consultants/Specialty  cardiology, cardiovascular surgeon, and neurology    Code Status  Full Code    Disposition  Patient is not medically cleared for discharge.   Anticipate discharge to to an inpatient rehabilitation hospital.  I have placed the appropriate orders for post-discharge needs.    Review of Systems  ROS     Physical Exam  Temp:  [36.5 °C (97.7 °F)-37.4 °C (99.3 °F)] 36.5 °C (97.7 °F)  Pulse:  [] 82  Resp:  [12-20] 16  BP: ()/(48-77) 92/48  SpO2:  [92 %-99 %] 96 %    Physical Exam    Fluids    Intake/Output Summary (Last 24 hours) at 11/14/2022 1145  Last data filed at 11/14/2022 0900  Gross per 24 hour   Intake 220 ml   Output 343 ml   Net -123 ml       Laboratory  Recent Labs     11/13/22 0140 11/14/22  0226 11/14/22  0900   WBC 15.2* 13.3* 14.0*   RBC 2.88* 2.68* 2.89*   HEMOGLOBIN 8.8* 8.2* 9.1*   HEMATOCRIT 28.2* 26.5* 28.4*   MCV 97.9* 98.9* 98.3*   MCH 30.6 30.6 31.5   MCHC 31.2* 30.9* 32.0*   RDW 49.0 48.4 47.9   PLATELETCT 176 209 226   MPV 10.6 10.1 10.0     Recent Labs     11/12/22  0024 11/13/22  0140 11/13/22  2226 11/14/22  0226   SODIUM 140 143  --  143   POTASSIUM 3.9 3.9 3.9 3.6   CHLORIDE 103 104   --  101   CO2 25 27  --  31   GLUCOSE 151* 95  --  146*   BUN 27* 28*  --  31*   CREATININE 1.18 1.14  --  1.15   CALCIUM 8.4* 8.5  --  8.6             Recent Labs     11/14/22  0226   TRIGLYCERIDE 154*   HDL 54   LDL 76       Imaging  DX-ABDOMEN FOR TUBE PLACEMENT   Final Result      Enteric tube tip projects over the stomach antrum      MR-BRAIN-W/O   Final Result      1.  BILATERAL mostly supratentorial areas of ischemia as described above. These appear to be primarily watershed zone infarctions.   2.  No hemorrhage   3.  No significant mass effect      DX-CHEST-PORTABLE (1 VIEW)   Final Result         1. No significant interval change.      DX-ABDOMEN FOR TUBE PLACEMENT   Final Result      Interval placement of enteric tube with its tip over the midline epigastrium compatible with position at the level of the gastric body.      CT-HEAD W/O   Final Result      1.  Likely subacute infarcts in the bilateral parieto-occipital regions.   2.  No acute intracranial hemorrhage.   3.  Bilateral maxillary sinus disease.      EC-DAVE W/O CONT   Final Result      DX-CHEST-PORTABLE (1 VIEW)   Final Result      Stable enlargement of the cardiomediastinal silhouette, mild diffuse interstitial edema and bilateral basilar atelectasis and/or consolidation.      DX-CHEST-PORTABLE (1 VIEW)   Final Result      Satisfactory postoperative appearance of the chest      DX-CHEST-PORTABLE (1 VIEW)    (Results Pending)        Assessment/Plan  Other specified anemias  Assessment & Plan  Normocytic, postoperative, monitor  Plans  Trend hemoglobin every 6 hours  Transfuse 1 unit PRBC if repeat hemoglobin less than 8.0.    Acute ischemic stroke (HCC)  Assessment & Plan  Post/perioperative event with multiple areas of watershed type infarction  Neurology consulted  DAPT for coronary, cardiology reasons already ordered  No evidence of hemorrhagic conversion  Neurology consultation was obtained  PT/OT/SLP and rehabilitation  Seizure precaution,  aspiration precaution, fall precaution  Plan:  1.  Persistent deficit right lower extremity flaccid left upper extremity weakness  2.  Appreciate neurology recommendations may discuss idea of initiation of amantadine.    S/P CABG x 3  Assessment & Plan  As per cardiac surgery, optimization, forced diuresis, mobilization as tolerated,  GDMT  Telemetry    Chronic obstructive pulmonary disease (COPD) ([facility])  Assessment & Plan  Pre-existing, respiratory protocol,  Continuous respiratory therapy protocol.    Chronic systolic congestive heart failure (HCC) EF 35%- (present on admission)  Assessment & Plan  Treated to euvolemia, GDMT as tolerated  Ejection fraction 35% on DAVE.  Plan:   1.  Titrated GDMT medications as tolerated cardiology recommendations appreciated  2.  Fluid restriction to present 1.5 L/day in addition to salt restriction less than 2 g.    Dyslipidemia  Assessment & Plan  The patient with prior severe intolerance, apparently failed multiple regimens  Consider outpatient referral to PCSK9 treatment     Please note that this dictation was created using voice recognition software. I have made every reasonable attempt to correct obvious errors, but I expect that there are errors of grammar and possibly context that I did not discover before finalizing the note.    VTE prophylaxis: SCDs/TEDs and pharmacologic prophylaxis contraindicated due to increased risk of bleeding    I have performed a physical exam and reviewed and updated ROS and Plan today (11/14/2022). In review of yesterday's note (11/13/2022), there are no changes except as documented above.

## 2022-11-14 NOTE — CONSULTS
Reason for Consult:  Asked by Dr Alcides Castañeda M.D. to see this patient with CAD s/p CABG, ICM    CC: ICM    HPI:      72 year old woman with PMH CAD, HTN, hx breast CA, HLD, ICM HFrEF 35% presents for coronary bypass surgery completed 11/10/22 (LIMA to LAD, SVG to OM2, SVG to dRCA) c/b cva. 11/14/22 return to ICU for increased oxygen requirements and AMS. Family bedside suspect anxiolytic used for MRI imaging. Patient unable to participate in interview. Per family, more altered than yesterday. Consult for HFrEF medical optimization.     Medications / Drug list prior to admission:  No current facility-administered medications on file prior to encounter.     Current Outpatient Medications on File Prior to Encounter   Medication Sig Dispense Refill    amLODIPine (NORVASC) 2.5 MG Tab Take 1 Tablet by mouth 2 times a day. 60 Tablet 3    metoprolol SR (TOPROL XL) 50 MG TABLET SR 24 HR Take 1 Tablet by mouth every day. 90 Tablet 3    ampicillin (PRINCIPEN) 500 MG Cap Take 1 Capsule by mouth 4 times a day. Twice a day for 7 days      aspirin 81 MG EC tablet Take 1 Tablet by mouth every day. 100 Tablet 0    hydrALAZINE (APRESOLINE) 10 MG Tab Take 1 Tablet by mouth 3 times a day as needed (/100). 25 Tablet 11    hydroxychloroquine (PLAQUENIL) 200 MG Tab Take 1 tablet by mouth every day. 30 tablet 0    famotidine (PEPCID) 40 MG Tab Take 1 tablet by mouth twice daily (Patient taking differently: Take 1 Tablet by mouth as needed.) 180 tablet 1    Cyanocobalamin (VITAMIN B-12 PO) Take 1 Tablet by mouth every morning.      ibuprofen (MOTRIN) 800 MG Tab TAKE 1 TABLET BY MOUTH EVERY 8 HOURS AS NEEDED FOR MILD PAIN 90 Tab 0    Cholecalciferol (VITAMIN D3) 5000 units Cap Take 1 Capsule by mouth every day.         Current list of administered Medications:    Current Facility-Administered Medications:     loperamide (IMODIUM) oral suspension 2 mg, 2 mg, Enteral Tube, 4X/DAY PRN, Vinicius Walden M.D.    NS infusion, ,  Intravenous, Continuous, Vinicius Walden M.D.    QUEtiapine (Seroquel) tablet 25 mg, 25 mg, Enteral Tube, HS PRN, ROMAIN WheelerRTRESSA    amiodarone (Cordarone) tablet 400 mg, 400 mg, Oral, TWICE DAILY, YULISSA Wheeler    Pharmacy Consult: Enteral tube insertion - review meds/change route/product selection, 1 Each, Other, PHARMACY TO DOSE, Alcides Castañeda M.D.    metoprolol tartrate (LOPRESSOR) tablet 12.5 mg, 12.5 mg, Enteral Tube, TWICE DAILY, Alcides Castañeda M.D., 12.5 mg at 11/14/22 0435    LORazepam (ATIVAN) injection 1 mg, 1 mg, Intravenous, Q3HRS PRN, ROMAIN KwonRTRESSA, 1 mg at 11/14/22 0637    furosemide (LASIX) injection 40 mg, 40 mg, Intravenous, Q DAY, Kamron Back M.D., 40 mg at 11/14/22 0436    acetaminophen (TYLENOL) tablet 1,000 mg, 1,000 mg, Enteral Tube, Q6HRS, 1,000 mg at 11/14/22 0435 **FOLLOWED BY** [START ON 11/15/2022] acetaminophen (TYLENOL) tablet 1,000 mg, 1,000 mg, Enteral Tube, Q6HRS PRN, Kamron Back M.D.    aspirin (ASA) chewable tab 81 mg, 81 mg, Enteral Tube, DAILY, Kamron Back M.D., 81 mg at 11/14/22 0435    clopidogrel (PLAVIX) tablet 75 mg, 75 mg, Enteral Tube, DAILY, Kamron Back M.D., 75 mg at 11/14/22 0435    omeprazole (FIRST-OMEPRAZOLE) 2 mg/mL oral susp 40 mg, 40 mg, Enteral Tube, DAILY, Kamron Back M.D., 40 mg at 11/14/22 0446    acetaminophen (Tylenol) tablet 650 mg, 650 mg, Enteral Tube, Q4HRS PRN **OR** acetaminophen (TYLENOL) suppository 650 mg, 650 mg, Rectal, Q4HRS PRN, Kamron Back M.D.    mag hydrox-al hydrox-simeth (MAALOX PLUS ES or MYLANTA DS) suspension 30 mL, 30 mL, Enteral Tube, Q4HRS PRN, Kamron Back M.D.    oxyCODONE immediate-release (ROXICODONE) tablet 5 mg, 5 mg, Enteral Tube, Q3HRS PRN **OR** oxyCODONE immediate release (ROXICODONE) tablet 10 mg, 10 mg, Enteral Tube, Q3HRS PRN, 10 mg at 11/11/22 1617 **OR** fentaNYL (SUBLIMAZE) injection 50 mcg, 50  mcg, Intravenous, Q3HRS PRN, Kamron Back M.D.    traMADol (Ultram) 50 MG tablet 50 mg, 50 mg, Enteral Tube, Q4HRS PRN, Kamron Back M.D., 50 mg at 11/12/22 1904    Respiratory Therapy Consult, , Nebulization, Continuous RT, ROMAIN KwonRTRESSA    electrolyte-A (PLASMALYTE-A) infusion, , Intravenous, PRN, SHANKAR KwonP.RCLEMENCIA., Last Rate: 999 mL/hr at 11/10/22 1619, New Bag at 11/10/22 1619    Pharmacy Consult Request ...Pain Management Review 1 Each, 1 Each, Other, PHARMACY TO DOSE, ROMAIN KwonRTRESSA    ondansetron (ZOFRAN) syringe/vial injection 8 mg, 8 mg, Intravenous, Q6HRS PRN, 8 mg at 11/12/22 0100 **OR** prochlorperazine (COMPAZINE) injection 10 mg, 10 mg, Intravenous, Q6HRS PRN **OR** promethazine (PHENERGAN) suppository 25 mg, 25 mg, Rectal, Q6HRS PRN, SHANKAR KwonP.R.JUAN C    Past Medical History:   Diagnosis Date    Arthritis     Breast cancer (HCC) 08/07/2013    Bronchitis 2005    Cancer (HCC)     right breast cancer     Cataract     removed    Chronic systolic congestive heart failure (HCC) EF 35% 10/12/2022    Coronary artery disease due to calcified coronary lesion - Calcifications seen on CT scan also wtih aortic ulceration     Dental disorder     tooth infection    Discoid lupus     Dyslipidemia     Tried atorvastatin, pravastatin, lovastatin, and Zetia.  All causes severe myalgias.  Just taking fish oil.      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     Ischemic heart disease due to coronary artery obstruction (HCC) - Severe 3vD on cath 10/19/2022    LBBB (left bundle branch block) 12/16/2014    Noted on prechemo EKG 9/2014, MUGA WNL    Pneumonia     Pseudogout     Scarlet fever     Unspecified hemorrhagic conditions     gums       Past Surgical History:   Procedure Laterality Date    MULTIPLE CORONARY ARTERY BYPASS ENDO VEIN HARVEST  11/10/2022    Procedure: CORONARY ARTERY BYPASS GRAFTING X 3, WITH LEFT INTERNAL  MAMMARY ARTERY TAKEDOWN AND ENDOSCOPIC VEIN HARVEST LEFT GREATER SAPHENOUS VEIN, AND TRANSESOPHAGEAL ECHOCARDIOGRAM;  Surgeon: Padmini Mac M.D.;  Location: SURGERY [facility];  Service: Cardiothoracic    ECHOCARDIOGRAM, TRANSESOPHAGEAL, INTRAOPERATIVE  11/10/2022    Procedure: ECHOCARDIOGRAM, TRANSESOPHAGEAL, INTRAOPERATIVE;  Surgeon: Padmini Mac M.D.;  Location: SURGERY [facility];  Service: Cardiothoracic    OTHER CARDIAC SURGERY  11/2022    angiogram    CATH REMOVAL  02/24/2014    Performed by Aggie Burns M.D. at [facility]    MASTECTOMY  08/22/2013    Performed by Aggie Burns M.D. at [facility]    NODE BIOPSY SENTINEL  08/22/2013    Performed by Aggie Burns M.D. at [facility]    CATH PLACEMENT  08/22/2013    Performed by Aggie Burns M.D. at [facility]    US-NEEDLE CORE BX-BREAST PANEL  01/01/2013    right breast    COLONOSCOPY  01/01/2003    BREAST BIOPSY      TONSILLECTOMY         Family History   Problem Relation Age of Onset    Cancer Mother         possible thyroid and gynecological    Multiple Sclerosis Mother     Arthritis Father     Multiple Sclerosis Brother     Gout Brother     Heart Disease Paternal Grandmother     Diabetes Paternal Grandmother     Cancer Maternal Aunt         breast cancer- 60s    Diabetes Maternal Uncle     Heart Disease Maternal Grandmother     Heart Disease Maternal Grandfather         MI    Stroke Paternal Grandfather     Other Son         breast mass    Heart Disease Son         HEART MURMUR    Gout Son      Patient family history was personally reviewed, no pertinent family history to current presentation    Social History     Socioeconomic History    Marital status:      Spouse name: Not on file    Number of children: 2    Years of education: Not on file    Highest education level: Not on file   Occupational History     Employer: [employer]   Tobacco Use  "   Smoking status: Former     Packs/day: 1.00     Years: 0.00     Pack years: 0.00     Types: Cigarettes     Quit date: 10/1/2013     Years since quittin.1    Smokeless tobacco: Never   Vaping Use    Vaping Use: Never used   Substance and Sexual Activity    Alcohol use: No     Alcohol/week: 0.0 oz    Drug use: No    Sexual activity: Not on file     Comment: , 2 children, enemployed   Other Topics Concern    Not on file   Social History Narrative    ** Merged History Encounter **         Patient is a . Patient has 2 adult children. She is  from .           Social Determinants of Health     Financial Resource Strain: Not on file   Food Insecurity: Not on file   Transportation Needs: Not on file   Physical Activity: Not on file   Stress: Not on file   Social Connections: Not on file   Intimate Partner Violence: Not on file   Housing Stability: Not on file       ALLERGIES:  Allergies   Allergen Reactions    Statins [Hmg-Coa-R Inhibitors] Myalgia     Muscle Spasms   RXN=unknown    Atorvastatin Myalgia    Codeine Vomiting and Nausea     Clarified with patient - states that she has an \"upset stomach\" when she takes it    Eggs Diarrhea     Pt states that she is allergic to eggs per her mother.  Tolerated clevidipine 2022    Lisinopril Cough    Losartan Unspecified     Tried in 2017  Cannot recall reaction    Penicillins Unspecified     \"does not work\" .'IMMUNE TO PENICILLIN,AMPICILLIN IS THE ONLY THING THAT WORKS'       Review of systems:  A complete review of symptoms was reviewed with patient. This is reviewed in H&P and PMH. ALL OTHERS reviewed and negative    Physical exam:  Patient Vitals for the past 24 hrs:   BP Temp Temp src Pulse Resp SpO2   22 0723 (!) 92/48 36.5 °C (97.7 °F) Temporal 82 16 96 %   22 035 129/75 36.6 °C (97.9 °F) Temporal 92 17 96 %   22 0211 (!) 147/65 -- -- 98 18 --   22 118/77 -- -- -- -- --   22 119/61 37.4 °C (99.3 " °F) Temporal 91 20 95 %   22 1607 112/54 -- -- (!) 106 15 98 %   22 1557 (!) 160/66 -- -- (!) 107 -- 94 %   22 1419 110/52 -- -- (!) 102 18 92 %   22 1200 -- -- -- 97 12 99 %     General: drowsy  EYES: no jaundice  HEENT: OP clear   Neck: No bruits No JVD.   CVS: RRR. S1 + S2. No M/R/G. Trace edema.  Resp: CTAB. No wheezing or crackles/rhonchi.  Abdomen: Soft, NT, ND,  Skin: Grossly nothing acute no obvious rashes  Neurological: sedated, not oriented  Extremities: Pulse 2+ in b/l LE. No cyanosis.     Data:  Laboratory studies personally reviewed by me:  Recent Results (from the past 24 hour(s))   EKG    Collection Time: 22 10:09 PM   Result Value Ref Range    Report       [facility]    Test Date:  2022  Pt Name:    DMITRI LI              Department: 183  MRN:        [id number]                      Room:       [facility]  Gender:     Female                       Technician: [facility]  :        1950                   Requested By:YUDELKA KIM  Order #:    [id number]                    Reading MD: Anthony Wills MD    Measurements  Intervals                                Axis  Rate:       154                          P:  ND:                                      QRS:        0  QRSD:       143                          T:          174  QT:         311  QTc:        498    Interpretive Statements  ATRIAL FIBRILLATION WITH RAPID VENTRICULAR RESPONSE  LEFT BUNDLE BRANCH BLOCK  Compared to ECG 2022 00:29:32  Sinus rhythm no longer present  Electronically Signed On 2022 0:30:16 PST by Anthony Wills MD     Magnesium    Collection Time: 22 10:26 PM   Result Value Ref Range    Magnesium 2.7 (H) 1.5 - 2.5 mg/dL   Potassium Serum (K)    Collection Time: 22 10:26 PM   Result Value Ref Range    Potassium 3.9 3.6 - 5.5 mmol/L   CBC without Differential Critical Care 0130    Collection Time: 22  2:26 AM   Result Value Ref Range    WBC 13.3 (H) 4.8 - 10.8 K/uL    RBC  2.68 (L) 4.20 - 5.40 M/uL    Hemoglobin 8.2 (L) 12.0 - 16.0 g/dL    Hematocrit 26.5 (L) 37.0 - 47.0 %    MCV 98.9 (H) 81.4 - 97.8 fL    MCH 30.6 27.0 - 33.0 pg    MCHC 30.9 (L) 33.6 - 35.0 g/dL    RDW 48.4 35.9 - 50.0 fL    Platelet Count 209 164 - 446 K/uL    MPV 10.1 9.0 - 12.9 fL   Basic Metabolic Panel (BMP) Critical Care 0130    Collection Time: 11/14/22  2:26 AM   Result Value Ref Range    Sodium 143 135 - 145 mmol/L    Potassium 3.6 3.6 - 5.5 mmol/L    Chloride 101 96 - 112 mmol/L    Co2 31 20 - 33 mmol/L    Glucose 146 (H) 65 - 99 mg/dL    Bun 31 (H) 8 - 22 mg/dL    Creatinine 1.15 0.50 - 1.40 mg/dL    Calcium 8.6 8.5 - 10.5 mg/dL    Anion Gap 11.0 7.0 - 16.0   CRP Quantitive (Non-Cardiac)    Collection Time: 11/14/22  2:26 AM   Result Value Ref Range    Stat C-Reactive Protein 19.80 (H) 0.00 - 0.75 mg/dL   Prealbumin    Collection Time: 11/14/22  2:26 AM   Result Value Ref Range    Pre-Albumin 12.4 (L) 18.0 - 38.0 mg/dL   MAGNESIUM    Collection Time: 11/14/22  2:26 AM   Result Value Ref Range    Magnesium 2.6 (H) 1.5 - 2.5 mg/dL   Lipid Profile    Collection Time: 11/14/22  2:26 AM   Result Value Ref Range    Cholesterol,Tot 161 100 - 199 mg/dL    Triglycerides 154 (H) 0 - 149 mg/dL    HDL 54 >=40 mg/dL    LDL 76 <100 mg/dL   HEMOGLOBIN A1C    Collection Time: 11/14/22  2:26 AM   Result Value Ref Range    Glycohemoglobin 5.7 (H) 4.0 - 5.6 %    Est Avg Glucose 117 mg/dL   ESTIMATED GFR    Collection Time: 11/14/22  2:26 AM   Result Value Ref Range    GFR (CKD-EPI) 50 (A) >60 mL/min/1.73 m 2   CBC WITH DIFFERENTIAL    Collection Time: 11/14/22  9:00 AM   Result Value Ref Range    WBC 14.0 (H) 4.8 - 10.8 K/uL    RBC 2.89 (L) 4.20 - 5.40 M/uL    Hemoglobin 9.1 (L) 12.0 - 16.0 g/dL    Hematocrit 28.4 (L) 37.0 - 47.0 %    MCV 98.3 (H) 81.4 - 97.8 fL    MCH 31.5 27.0 - 33.0 pg    MCHC 32.0 (L) 33.6 - 35.0 g/dL    RDW 47.9 35.9 - 50.0 fL    Platelet Count 226 164 - 446 K/uL    MPV 10.0 9.0 - 12.9 fL     Neutrophils-Polys 81.90 (H) 44.00 - 72.00 %    Lymphocytes 9.30 (L) 22.00 - 41.00 %    Monocytes 7.20 0.00 - 13.40 %    Eosinophils 0.60 0.00 - 6.90 %    Basophils 0.40 0.00 - 1.80 %    Immature Granulocytes 0.60 0.00 - 0.90 %    Nucleated RBC 0.00 /100 WBC    Neutrophils (Absolute) 11.43 (H) 2.00 - 7.15 K/uL    Lymphs (Absolute) 1.30 1.00 - 4.80 K/uL    Monos (Absolute) 1.00 (H) 0.00 - 0.85 K/uL    Eos (Absolute) 0.08 0.00 - 0.51 K/uL    Baso (Absolute) 0.05 0.00 - 0.12 K/uL    Immature Granulocytes (abs) 0.09 0.00 - 0.11 K/uL    NRBC (Absolute) 0.00 K/uL   COD - Adult (Type and Screen)    Collection Time: 11/14/22  9:00 AM   Result Value Ref Range    ABO Grouping Only A     Rh Grouping Only POS     Antibody Screen-Cod NEG        EKG 11/13/22 interpreted by me AF RVR, LBBB    All pertinent features of laboratory and imaging reviewed including primary images where applicable    TTE 10/12/22  CONCLUSIONS  Compared to the images of the prior study 08/25/2020, there is now   reduced ejection fraction and wall motion abnormalities, previously   normal.     Moderately reduced left ventricular systolic function.  The left ventricular ejection fraction is visually estimated to be 35%.  Hypokinesis of the inferior and septal walls.  Grade I diastolic dysfunction.  The right ventricle is normal in size and systolic function.  No significant valvular abnormalities.      TTE 11/11/22  CONCLUSIONS  Decreased LV function due to septal hypokinesis.  EF 35%. Post op LV   function unchanged from preop function.    MRI brain  IMPRESSION:  1.  BILATERAL mostly supratentorial areas of ischemia as described above. These appear to be primarily watershed zone infarctions.  2.  No hemorrhage  3.  No significant mass effect    Active Problems:    Chronic systolic congestive heart failure (HCC) EF 35% (Chronic) POA: Yes    Chronic obstructive pulmonary disease (COPD) (HCC) POA: Unknown    S/P CABG x 3 POA: Unknown    Encounter for weaning  from ventilator (HCC) POA: Unknown    Acute ischemic stroke (HCC) POA: Unknown    Other specified anemias POA: Unknown  Resolved Problems:    * No resolved hospital problems. *      Assessment / Plan:  72 year old woman with PMH CAD, HTN, hx breast CA, HLD, ICM HFrEF 35% presents for coronary bypass surgery completed 11/10/22 (LIMA to LAD, SVG to OM2, SVG to dRCA) c/b cva. Consult for heart failure medical optimization.     -coordinate with neurology BP goals. If permissive hypertension period complete, start HFOMT.  -when able please start ACE/ARB  -consider low dose carvedilol thereafter if tolerates. If BP soft can use metoprolol succinate.  -IV lasix for diuresis. Consider trending CVP to guide diuresis.  -doac when able from postop and post stroke perspective. Chadsvasc 7.  -ok to continue amiodarone   -high intensity statin     I personally discussed her case with Dr Castañeda    It is my pleasure to participate in the care of Ms. Dave.  Please do not hesitate to contact me with questions or concerns.    Velasquez Hamilton MD  Cardiologist [facility]

## 2022-11-14 NOTE — PROGRESS NOTES
CTS requests transfer to [facility] for increasing hypoxia. The patient is not in distress, reassuring v/s. Dr. Castañeda has accepted to manage per CTS; critical care available upon request.    Blue Boyd M.D.

## 2022-11-14 NOTE — PROGRESS NOTES
Monitor Summary     .15/.13/.42  SR, convert to a fib 115-186, converted back to SR 83  No ectopy

## 2022-11-14 NOTE — PROGRESS NOTES
4 Eyes Skin Assessment Completed by KATY Cobos and KATY Clarke.    Head WDL  Ears WDL  Nose WDL, NG L nare  Mouth dryness  Neck WDL  Breast/Chest Incision, Midline incision, 2x CT sites  Shoulder Blades WDL  Spine WDL  (R) Arm/Elbow/Hand WDL  (L) Arm/Elbow/Hand WDL  Abdomen WDL  Groin WDL  Scrotum/Coccyx/Buttocks WDL  (R) Leg Swelling  (L) Leg Bruising, Swelling, and Incision  (R) Heel/Foot/Toe Redness and Blanching  (L) Heel/Foot/Toe Redness and Blanching          Devices In Places Tele Box, Blood Pressure Cuff, Pulse Ox, Berger, SCD's, OG/NG, Central Line, and Nasal Cannula, restraint R wrist      Interventions In Place Gray Ear Foams, Heel Mepilex, Heel Float Boots, Pillows, Q2 Turns, Barrier Cream, and Pressure Redistribution Mattress    Possible Skin Injury No    Pictures Uploaded Into Epic N/A  Wound Consult Placed N/A  RN Wound Prevention Protocol Ordered Yes

## 2022-11-14 NOTE — THERAPY
Missed Therapy     Patient Name: Lauren Dave  Age:  72 y.o., Sex:  female  Medical Record #: [id number]  Today's Date: 11/14/2022    Discussed missed therapy with RN       11/14/22 0906   Interdisciplinary Plan of Care Collaboration   IDT Collaboration with  Nursing   Collaboration Comments Attempted dysphagia therapy. Patient currently restless, agitated, not following commands. Does not appear appropriate for PO intake. RN aware. Will hold dysphagia therapy until appropriate.

## 2022-11-14 NOTE — CONSULTS
Physical Medicine and Rehabilitation Consultation              Date of initial consultation: 11/14/2022  Consulting provider: Alcides Castañeda M.D.  Reason for consultation: assess for acute inpatient rehab appropriateness  LOS: 4 Day(s)    Chief complaint: CABG, strokes    HPI: The patient is a 72 y.o. 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  Patient was found to have right lower extremity and left upper extremity weakness following surgery, CT showed multiple areas of likely cerebral infarction.  Follow-up MR brain found bilateral watershed zone infarctions, mainly affecting the bilateral occipital horns, also noted is left cerebellar infarct, and scattered infarcts throughout the frontal lobes bilaterally.    The patient currently is minimally responsive.  Patient's sister and son at bedside report that she received Ativan for MR brain yesterday around 2 PM and has not been the same since, very minimally responsive.  Patient's sister reports increased  strength prior to MRI.  Patient is currently in bilateral soft wrist restraints. Son states patient was previously healthy, very active, is an avid dancer and will be extremely motivated to recover    ROS  Pertinent positives are mentioned in the HPI, all others reviewed and are negative.    Social Hx:  1 SH  3 OMID  With: Ex-.  Patient [place] in 40 foot, 2 bedroom, 5th [place].  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  Patient's son and sister both live locally.  Patient's son has a two-story home and all bedrooms are upstairs.    THERAPY:  Restrictions: Fall risk, swallow precautions  PT: Functional mobility   11/12: No gait, total assist bed mobility    OT:  ADLs  11/12: Total assist ADLs    SLP:   11/13: N.p.o. tube feeds    IMAGING:  MR brain 11/13/2022  1.  BILATERAL mostly supratentorial areas of ischemia as described above. These appear to be primarily watershed zone infarctions.  2.  No hemorrhage  3.  No significant mass effect    PROCEDURES:  Padmini Mac MD 11/10/2022  Coronary artery bypass grafting x3  Left internal mammary artery to left anterior descending artery  Reversed saphenous vein graft to obtuse marginal #2  Reversed saphenous vein graft to distal right coronary artery  Endo-vein procurement    PMH:  Past Medical History:   Diagnosis Date    Arthritis     Breast cancer (HCC) 08/07/2013    Bronchitis 2005    Cancer (HCC)     right breast cancer     Cataract     removed    Chronic systolic congestive heart failure (HCC) EF 35% 10/12/2022    Coronary artery disease due to calcified coronary lesion - Calcifications seen on CT scan also wtih aortic ulceration     Dental disorder     tooth infection    Discoid lupus     Dyslipidemia     Tried atorvastatin, pravastatin, lovastatin, and Zetia.  All causes severe myalgias.  Just taking fish oil.      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     Ischemic heart disease due to coronary artery obstruction (HCC) - Severe 3vD on cath 10/19/2022    LBBB (left bundle branch block) 12/16/2014    Noted on prechemo EKG 9/2014, MUGA WNL    Pneumonia     Pseudogout     Scarlet fever     Unspecified hemorrhagic conditions     gums       PSH:  Past Surgical History:   Procedure Laterality Date    MULTIPLE CORONARY ARTERY BYPASS ENDO VEIN HARVEST  11/10/2022    Procedure: CORONARY ARTERY BYPASS GRAFTING X 3, WITH LEFT INTERNAL MAMMARY ARTERY TAKEDOWN AND ENDOSCOPIC VEIN HARVEST LEFT GREATER SAPHENOUS VEIN, AND TRANSESOPHAGEAL ECHOCARDIOGRAM;  Surgeon: Padmini Mac M.D.;  Location: SURGERY [facility];  Service: Cardiothoracic    ECHOCARDIOGRAM, TRANSESOPHAGEAL,  INTRAOPERATIVE  11/10/2022    Procedure: ECHOCARDIOGRAM, TRANSESOPHAGEAL, INTRAOPERATIVE;  Surgeon: Padmini Mac M.D.;  Location: SURGERY [facility];  Service: Cardiothoracic    OTHER CARDIAC SURGERY  11/2022    angiogram    CATH REMOVAL  02/24/2014    Performed by Aggie Burns M.D. at [facility]    MASTECTOMY  08/22/2013    Performed by Aggie Burns M.D. at [facility]    NODE BIOPSY SENTINEL  08/22/2013    Performed by Aggie Burns M.D. at [facility]    CATH PLACEMENT  08/22/2013    Performed by Aggie Burns M.D. at [facility]    US-NEEDLE CORE BX-BREAST PANEL  01/01/2013    right breast    COLONOSCOPY  01/01/2003    BREAST BIOPSY      TONSILLECTOMY         FHX:  Family History   Problem Relation Age of Onset    Cancer Mother         possible thyroid and gynecological    Multiple Sclerosis Mother     Arthritis Father     Multiple Sclerosis Brother     Gout Brother     Heart Disease Paternal Grandmother     Diabetes Paternal Grandmother     Cancer Maternal Aunt         breast cancer- 60s    Diabetes Maternal Uncle     Heart Disease Maternal Grandmother     Heart Disease Maternal Grandfather         MI    Stroke Paternal Grandfather     Other Son         breast mass    Heart Disease Son         HEART MURMUR    Gout Son        Medications:  Current Facility-Administered Medications   Medication Dose    Pharmacy Consult: Enteral tube insertion - review meds/change route/product selection  1 Each    metoprolol tartrate (LOPRESSOR) tablet 12.5 mg  12.5 mg    amiodarone (Nexterone) 360 mg/200 mL infusion  0.5-1 mg/min    LORazepam (ATIVAN) injection 1 mg  1 mg    furosemide (LASIX) injection 40 mg  40 mg    acetaminophen (TYLENOL) tablet 1,000 mg  1,000 mg    Followed by    [START ON 11/15/2022] acetaminophen (TYLENOL) tablet 1,000 mg  1,000 mg    aspirin (ASA) chewable tab 81 mg  81 mg    clopidogrel (PLAVIX) tablet 75 mg  " 75 mg    omeprazole (FIRST-OMEPRAZOLE) 2 mg/mL oral susp 40 mg  40 mg    senna-docusate (PERICOLACE or SENOKOT S) 8.6-50 MG per tablet 2 Tablet  2 Tablet    And    polyethylene glycol/lytes (MIRALAX) PACKET 1 Packet  1 Packet    And    magnesium hydroxide (MILK OF MAGNESIA) suspension 30 mL  30 mL    And    bisacodyl (DULCOLAX) suppository 10 mg  10 mg    acetaminophen (Tylenol) tablet 650 mg  650 mg    Or    acetaminophen (TYLENOL) suppository 650 mg  650 mg    mag hydrox-al hydrox-simeth (MAALOX PLUS ES or MYLANTA DS) suspension 30 mL  30 mL    oxyCODONE immediate-release (ROXICODONE) tablet 5 mg  5 mg    Or    oxyCODONE immediate release (ROXICODONE) tablet 10 mg  10 mg    Or    fentaNYL (SUBLIMAZE) injection 50 mcg  50 mcg    traMADol (Ultram) 50 MG tablet 50 mg  50 mg    Respiratory Therapy Consult      electrolyte-A (PLASMALYTE-A) infusion      Pharmacy Consult Request ...Pain Management Review 1 Each  1 Each    ondansetron (ZOFRAN) syringe/vial injection 8 mg  8 mg    Or    prochlorperazine (COMPAZINE) injection 10 mg  10 mg    Or    promethazine (PHENERGAN) suppository 25 mg  25 mg       Allergies:  Allergies   Allergen Reactions    Statins [Hmg-Coa-R Inhibitors] Myalgia     Muscle Spasms   RXN=unknown    Atorvastatin Myalgia    Codeine Vomiting and Nausea     Clarified with patient - states that she has an \"upset stomach\" when she takes it    Eggs Diarrhea     Pt states that she is allergic to eggs per her mother.  Tolerated clevidipine 11/2022    Lisinopril Cough    Losartan Unspecified     Tried in 2017  Cannot recall reaction    Penicillins Unspecified     \"does not work\" .'IMMUNE TO PENICILLIN,AMPICILLIN IS THE ONLY THING THAT WORKS'         Physical Exam:  Vitals: BP (!) 92/48   Pulse 82   Temp 36.5 °C (97.7 °F) (Temporal)   Resp 16   Ht 1.6 m (5' 3\")   Wt 72 kg (158 lb 11.7 oz)   SpO2 96%   Gen: NAD  Head:  NC/AT  Eyes/ Nose/ Mouth: PERRLA, moist mucous membranes  Cardio: RRR, good distal " perfusion, warm extremities  Pulm: normal respiratory effort, no cyanosis   Abd: Soft NTND, negative borborygmi   Ext: No peripheral edema. No calf tenderness. No clubbing.    Mental status: Minimally responsive  Speech: None    Motor: Unable to complete exam       DTRs:  Right  Left    Brachioradialis  2+  2+   Patella tendon  2+ 2+     No clonus at bilateral ankles  Negative babinski b/l  Negative Torres b/l     Tone: no spasticity noted, no cogwheeling noted      Labs: Reviewed and significant for   Recent Labs     22   RBC 2.98* 2.88* 2.68*   HEMOGLOBIN 9.2* 8.8* 8.2*   HEMATOCRIT 28.3* 28.2* 26.5*   PLATELETCT 174 176 209     Recent Labs     22   SODIUM 140 143  --  143   POTASSIUM 3.9 3.9 3.9 3.6   CHLORIDE 103 104  --  101   CO2 25 27  --  31   GLUCOSE 151* 95  --  146*   BUN 27* 28*  --  31*   CREATININE 1.18 1.14  --  1.15   CALCIUM 8.4* 8.5  --  8.6     Recent Results (from the past 24 hour(s))   OCCULT BLOOD STOOL    Collection Time: 22 10:25 AM   Result Value Ref Range    Occult Blood Feces Positive (A) Negative   EKG    Collection Time: 22 10:09 PM   Result Value Ref Range    Report       [facility]    Test Date:  2022  Pt Name:    DMITRI LI              Department: [id number]  MRN:        [id number]                      Room:       [facility]  Gender:     Female                       Technician: TXM  :        [id number]                   Requested By:YUDELKA KIM  Order #:    [id number]                    Reading MD: Anthony Wills MD    Measurements  Intervals                                Axis  Rate:       154                          P:  MD:                                      QRS:        0  QRSD:       143                          T:          174  QT:         311  QTc:        498    Interpretive Statements  ATRIAL FIBRILLATION WITH RAPID VENTRICULAR RESPONSE  LEFT BUNDLE BRANCH  BLOCK  Compared to ECG 11/11/2022 00:29:32  Sinus rhythm no longer present  Electronically Signed On 11- 0:30:16 PST by Anthony Wills MD     Magnesium    Collection Time: 11/13/22 10:26 PM   Result Value Ref Range    Magnesium 2.7 (H) 1.5 - 2.5 mg/dL   Potassium Serum (K)    Collection Time: 11/13/22 10:26 PM   Result Value Ref Range    Potassium 3.9 3.6 - 5.5 mmol/L   CBC without Differential Critical Care 0130    Collection Time: 11/14/22  2:26 AM   Result Value Ref Range    WBC 13.3 (H) 4.8 - 10.8 K/uL    RBC 2.68 (L) 4.20 - 5.40 M/uL    Hemoglobin 8.2 (L) 12.0 - 16.0 g/dL    Hematocrit 26.5 (L) 37.0 - 47.0 %    MCV 98.9 (H) 81.4 - 97.8 fL    MCH 30.6 27.0 - 33.0 pg    MCHC 30.9 (L) 33.6 - 35.0 g/dL    RDW 48.4 35.9 - 50.0 fL    Platelet Count 209 164 - 446 K/uL    MPV 10.1 9.0 - 12.9 fL   Basic Metabolic Panel (BMP) Critical Care 0130    Collection Time: 11/14/22  2:26 AM   Result Value Ref Range    Sodium 143 135 - 145 mmol/L    Potassium 3.6 3.6 - 5.5 mmol/L    Chloride 101 96 - 112 mmol/L    Co2 31 20 - 33 mmol/L    Glucose 146 (H) 65 - 99 mg/dL    Bun 31 (H) 8 - 22 mg/dL    Creatinine 1.15 0.50 - 1.40 mg/dL    Calcium 8.6 8.5 - 10.5 mg/dL    Anion Gap 11.0 7.0 - 16.0   CRP Quantitive (Non-Cardiac)    Collection Time: 11/14/22  2:26 AM   Result Value Ref Range    Stat C-Reactive Protein 19.80 (H) 0.00 - 0.75 mg/dL   Prealbumin    Collection Time: 11/14/22  2:26 AM   Result Value Ref Range    Pre-Albumin 12.4 (L) 18.0 - 38.0 mg/dL   MAGNESIUM    Collection Time: 11/14/22  2:26 AM   Result Value Ref Range    Magnesium 2.6 (H) 1.5 - 2.5 mg/dL   Lipid Profile    Collection Time: 11/14/22  2:26 AM   Result Value Ref Range    Cholesterol,Tot 161 100 - 199 mg/dL    Triglycerides 154 (H) 0 - 149 mg/dL    HDL 54 >=40 mg/dL    LDL 76 <100 mg/dL   HEMOGLOBIN A1C    Collection Time: 11/14/22  2:26 AM   Result Value Ref Range    Glycohemoglobin 5.7 (H) 4.0 - 5.6 %    Est Avg Glucose 117 mg/dL   ESTIMATED GFR     Collection Time: 11/14/22  2:26 AM   Result Value Ref Range    GFR (CKD-EPI) 50 (A) >60 mL/min/1.73 m 2         ASSESSMENT:  Patient is a 72 y.o. female admitted for elective three-vessel CABG with Dr. Mac, complicated by watershed strokes to the bilateral occipital horns, left cerebellum, left temporal lobe, and supratentorial areas of the bilateral frontal and parietal lobes.    [facility] Code / Diagnosis to Support: 0001.3 - Stroke: Bilateral Involvement    Rehabilitation: Impaired ADLs and mobility  Patient lacks appropriate discharge support for her current level of function    All cases are subject to administrative review and recommendations may change    Disposition recommendations:  -Anticipate patient will need SNF placement due to her current functional needs, which would necessitate to caregiver assistance 24/7 at this level.  If patient improves, her best path to recovery would be to attend IPR and discharge home with her son to his two-story home, 1 FOS required inside, and have the son take time off work to care for his mother.  -PMR to follow in the periphery for rehab appropriateness, please reach out with questions or request for medical management    Medical Complexity:    Bilateral strokes  -Patient expected to have increased lethargy due to the extent of infarcts, left incoordination, and bilateral visual impairments.  Previously noted was left upper extremity and right lower extremity weakness.  -Etiology is suspected to be secondary to recent cardiac procedure  -Tight BP control 100-130/  -Avoid benzodiazepines and Haldol  -Okay to continue Seroquel 25 mg nightly  -PT OT and SLP as able  -Likely will need SNF placement    CAD status post three-vessel CABG  -Performed by Dr. Mac on 11/10/2022  -Aspirin 81 mg daily  -Plavix 75 mg daily    A. fib with RVR  -Amiodarone 400 mg p.o. twice daily    DVT PPX: SCDs      Thank you for allowing us to participate in the care of this patient.      Patient was seen for 90 minutes on unit/floor of which > 50% of time was spent on counseling and coordination of care regarding the above, including prognosis, risk reduction, benefits of treatment, and options for next stage of care.    Pedro Geller, DO   Physical Medicine and Rehabilitation     Please note that this dictation was created using voice recognition software. I have made every reasonable attempt to correct obvious errors, but there may be errors of grammar and possibly content that I did not discover before finalizing the note.

## 2022-11-14 NOTE — PROGRESS NOTES
Bedside report received from night RN, pt care assumed, assessment completed. Pt is A&O1 to self, nonverbal pain calm and resting, SR on the monitor. Updated on POC, questions answered. Bed in lowest, locked position, treaded socks on, call light and belongings within reach.   Midline incision with previna in place. CT sites with pressure bandage. LLE harvest site CDI, do drainage. Pt on 5L NC.     Pt pulled out NG tube prior to start of shift. Replaced this morning.

## 2022-11-14 NOTE — PROGRESS NOTES
Cardiovascular Surgery Progress Note    Name: Lauren Dave  MRN: [id number]  : 1950  Admit Date: 11/10/2022  5:08 AM  4 Days Post-Op     Procedure:  Procedure(s) and Anesthesia Type:     * CORONARY ARTERY BYPASS GRAFTING X 3, WITH LEFT INTERNAL MAMMARY ARTERY TAKEDOWN AND ENDOSCOPIC VEIN HARVEST LEFT GREATER SAPHENOUS VEIN, AND TRANSESOPHAGEAL ECHOCARDIOGRAM - General     * ECHOCARDIOGRAM, TRANSESOPHAGEAL, INTRAOPERATIVE - General    Vitals:  Vitals:    22 2237 22 0211 22 0358 22 0723   BP: 118/77 (!) 147/65 129/75 (!) 92/48   Pulse:  98 92 82   Resp:  18 17 16   Temp:   36.6 °C (97.9 °F) 36.5 °C (97.7 °F)   TempSrc:   Temporal Temporal   SpO2:   96% 96%   Weight:       Height:          Temp (24hrs), Av.8 °C (98.3 °F), Min:36.5 °C (97.7 °F), Max:37.4 °C (99.3 °F)      Respiratory:    Respiration: 16, Pulse Oximetry: 96 %       Fluids:    Intake/Output Summary (Last 24 hours) at 2022 1011  Last data filed at 2022 2300  Gross per 24 hour   Intake 190 ml   Output 363 ml   Net -173 ml       Admit weight: Weight: 67.1 kg (147 lb 14.9 oz)  Current weight: Weight: 72 kg (158 lb 11.7 oz) (22 0100)    Labs:  Recent Labs     22  0140 22  0226 22  0900   WBC 15.2* 13.3* 14.0*   RBC 2.88* 2.68* 2.89*   HEMOGLOBIN 8.8* 8.2* 9.1*   HEMATOCRIT 28.2* 26.5* 28.4*   MCV 97.9* 98.9* 98.3*   MCH 30.6 30.6 31.5   MCHC 31.2* 30.9* 32.0*   RDW 49.0 48.4 47.9   PLATELETCT 176 209 226   MPV 10.6 10.1 10.0       Recent Labs     22  0024 22  0140 22  2226 22  0226   SODIUM 140 143  --  143   POTASSIUM 3.9 3.9 3.9 3.6   CHLORIDE 103 104  --  101   CO2 25 27  --  31   GLUCOSE 151* 95  --  146*   BUN 27* 28*  --  31*   CREATININE 1.18 1.14  --  1.15   CALCIUM 8.4* 8.5  --  8.6                   Medications:  Scheduled Medications   Medication Dose Frequency    QUEtiapine  25 mg Nightly    Pharmacy  1 Each PHARMACY TO DOSE    metoprolol tartrate   12.5 mg TWICE DAILY    furosemide  40 mg Q DAY    acetaminophen  1,000 mg Q6HRS    aspirin  81 mg DAILY    clopidogrel  75 mg DAILY    omeprazole  40 mg DAILY    Pharmacy Consult Request  1 Each PHARMACY TO DOSE        Exam:   Physical Exam  Vitals and nursing note reviewed.   Constitutional:       Appearance: Normal appearance.   HENT:      Head: Normocephalic and atraumatic.   Eyes:      Pupils: Pupils are equal, round, and reactive to light.   Cardiovascular:      Rate and Rhythm: Normal rate and regular rhythm.   Abdominal:      Palpations: Abdomen is soft.   Skin:     General: Skin is warm and dry.   Neurological:      Motor: Weakness present.      Comments: Left upper arm weakness.  Not moving right leg.       Cardiac Medications:    ASA - Yes    Plavix - Yes    Post-operative Beta Blockers - Yes    Ace/ARB- No; contraindicated because of Hypotension    Statin - No; contraindicated because of Allergy/intolerance    Aldactone- No; contraindicated because of Hypotension    Ejection Fraction:  35%    Telemetry:   11/11 SR  11/12 SR/ST 90-100s  11/13 SR  11/14 SR, a fib last night    Assessment/Plan:  POD 1  HDS, SR, neuro not moving right leg but follows commands, cleviprex drip, wounds CDI, abdomen soft, fluid balance positive, wt up,  chest tube output moderate.  CT head this AM.  Plan:  wean vent as tolerated.  Allow permissive hypertension, OK for systolic 120-140's, CPM.     POD 2 HDS, SR/ST.  Neuro can say name, no movement in right leg.  Weaker left upper extremity, following commands intermittently, pulling at NG/central line.  Wounds CDI, prevena in tact.  Moderate serosanguinous output from chest tube.  Cr 1.18 Hgb 9.2.  Neurology recommending MRI brain, pending currently.  Plan: MRI when available.  Ok for permissive HTN of SBP of 140 or less.  Encourage IS.  Restraint for RUE due to trying to remove central line and NG tubes with impulsivity.      POD 3 HDS. SR.  Neuro aware to self and place.  Good  strength in right hand and left leg.  Left arm weak.  No movement right leg.  Pacer wires removed. Pt tolerated well.  Cr 1.14, Hgb 8.8. Plan: MRI brain today, pacer wires out.  DC chest tubes.  Encourage IS.  Work with PT as tolerated.    POD 4 BP 90s-140s, SR, a fib overnight, lethargic this morning, flaccid right leg, weak left arm, wounds CDI, abdomen soft, fluid balance down, wt up, 5L NC. On tube feeding. Plan: UA and culture today, transfer to IMCU, continue diuresis, amio gtt to PO, IS/ambulate.     Disposition:  Therapies recommending post acute placement, rehab referral

## 2022-11-15 PROBLEM — I48.0 PAF (PAROXYSMAL ATRIAL FIBRILLATION) (HCC): Status: ACTIVE | Noted: 2022-11-15

## 2022-11-15 LAB
ANION GAP SERPL CALC-SCNC: 6 MMOL/L (ref 7–16)
BASOPHILS # BLD AUTO: 0.3 % (ref 0–1.8)
BASOPHILS # BLD: 0.03 K/UL (ref 0–0.12)
BUN SERPL-MCNC: 31 MG/DL (ref 8–22)
CALCIUM SERPL-MCNC: 8.6 MG/DL (ref 8.5–10.5)
CHLORIDE SERPL-SCNC: 98 MMOL/L (ref 96–112)
CO2 SERPL-SCNC: 35 MMOL/L (ref 20–33)
CREAT SERPL-MCNC: 1.19 MG/DL (ref 0.5–1.4)
EKG IMPRESSION: NORMAL
EOSINOPHIL # BLD AUTO: 0.09 K/UL (ref 0–0.51)
EOSINOPHIL NFR BLD: 1 % (ref 0–6.9)
ERYTHROCYTE [DISTWIDTH] IN BLOOD BY AUTOMATED COUNT: 47.7 FL (ref 35.9–50)
GFR SERPLBLD CREATININE-BSD FMLA CKD-EPI: 48 ML/MIN/1.73 M 2
GLUCOSE SERPL-MCNC: 165 MG/DL (ref 65–99)
HCT VFR BLD AUTO: 27.2 % (ref 37–47)
HGB BLD-MCNC: 8.3 G/DL (ref 12–16)
IMM GRANULOCYTES # BLD AUTO: 0.08 K/UL (ref 0–0.11)
IMM GRANULOCYTES NFR BLD AUTO: 0.9 % (ref 0–0.9)
LYMPHOCYTES # BLD AUTO: 1.09 K/UL (ref 1–4.8)
LYMPHOCYTES NFR BLD: 12.1 % (ref 22–41)
MAGNESIUM SERPL-MCNC: 2.4 MG/DL (ref 1.5–2.5)
MCH RBC QN AUTO: 30.5 PG (ref 27–33)
MCHC RBC AUTO-ENTMCNC: 30.5 G/DL (ref 33.6–35)
MCV RBC AUTO: 100 FL (ref 81.4–97.8)
MONOCYTES # BLD AUTO: 0.76 K/UL (ref 0–0.85)
MONOCYTES NFR BLD AUTO: 8.4 % (ref 0–13.4)
NEUTROPHILS # BLD AUTO: 6.99 K/UL (ref 2–7.15)
NEUTROPHILS NFR BLD: 77.3 % (ref 44–72)
NRBC # BLD AUTO: 0.02 K/UL
NRBC BLD-RTO: 0.2 /100 WBC
PLATELET # BLD AUTO: 230 K/UL (ref 164–446)
PMV BLD AUTO: 10 FL (ref 9–12.9)
POTASSIUM SERPL-SCNC: 3.5 MMOL/L (ref 3.6–5.5)
RBC # BLD AUTO: 2.72 M/UL (ref 4.2–5.4)
SODIUM SERPL-SCNC: 139 MMOL/L (ref 135–145)
WBC # BLD AUTO: 9 K/UL (ref 4.8–10.8)

## 2022-11-15 PROCEDURE — 99233 SBSQ HOSP IP/OBS HIGH 50: CPT | Performed by: HOSPITALIST

## 2022-11-15 PROCEDURE — 770000 HCHG ROOM/CARE - INTERMEDIATE ICU *

## 2022-11-15 PROCEDURE — 700102 HCHG RX REV CODE 250 W/ 637 OVERRIDE(OP): Performed by: CLINICAL NURSE SPECIALIST

## 2022-11-15 PROCEDURE — 97112 NEUROMUSCULAR REEDUCATION: CPT

## 2022-11-15 PROCEDURE — A9270 NON-COVERED ITEM OR SERVICE: HCPCS | Performed by: CLINICAL NURSE SPECIALIST

## 2022-11-15 PROCEDURE — 99233 SBSQ HOSP IP/OBS HIGH 50: CPT | Performed by: INTERNAL MEDICINE

## 2022-11-15 PROCEDURE — 700102 HCHG RX REV CODE 250 W/ 637 OVERRIDE(OP): Performed by: HOSPITALIST

## 2022-11-15 PROCEDURE — 93010 ELECTROCARDIOGRAM REPORT: CPT | Performed by: INTERNAL MEDICINE

## 2022-11-15 PROCEDURE — 700102 HCHG RX REV CODE 250 W/ 637 OVERRIDE(OP): Performed by: NURSE PRACTITIONER

## 2022-11-15 PROCEDURE — 700102 HCHG RX REV CODE 250 W/ 637 OVERRIDE(OP): Performed by: INTERNAL MEDICINE

## 2022-11-15 PROCEDURE — A9270 NON-COVERED ITEM OR SERVICE: HCPCS | Performed by: NURSE PRACTITIONER

## 2022-11-15 PROCEDURE — A9270 NON-COVERED ITEM OR SERVICE: HCPCS | Performed by: INTERNAL MEDICINE

## 2022-11-15 PROCEDURE — 83735 ASSAY OF MAGNESIUM: CPT

## 2022-11-15 PROCEDURE — A9270 NON-COVERED ITEM OR SERVICE: HCPCS | Performed by: HOSPITALIST

## 2022-11-15 PROCEDURE — 99024 POSTOP FOLLOW-UP VISIT: CPT

## 2022-11-15 PROCEDURE — 700102 HCHG RX REV CODE 250 W/ 637 OVERRIDE(OP): Performed by: THORACIC SURGERY (CARDIOTHORACIC VASCULAR SURGERY)

## 2022-11-15 PROCEDURE — 93005 ELECTROCARDIOGRAM TRACING: CPT | Performed by: INTERNAL MEDICINE

## 2022-11-15 PROCEDURE — 700111 HCHG RX REV CODE 636 W/ 250 OVERRIDE (IP): Performed by: INTERNAL MEDICINE

## 2022-11-15 PROCEDURE — A9270 NON-COVERED ITEM OR SERVICE: HCPCS | Performed by: THORACIC SURGERY (CARDIOTHORACIC VASCULAR SURGERY)

## 2022-11-15 PROCEDURE — 92526 ORAL FUNCTION THERAPY: CPT

## 2022-11-15 PROCEDURE — 85025 COMPLETE CBC W/AUTO DIFF WBC: CPT

## 2022-11-15 PROCEDURE — 80048 BASIC METABOLIC PNL TOTAL CA: CPT

## 2022-11-15 RX ORDER — SULFAMETHOXAZOLE AND TRIMETHOPRIM 800; 160 MG/1; MG/1
1 TABLET ORAL EVERY 12 HOURS
Status: COMPLETED | OUTPATIENT
Start: 2022-11-15 | End: 2022-11-18

## 2022-11-15 RX ORDER — FUROSEMIDE 20 MG/1
20 TABLET ORAL
Status: DISCONTINUED | OUTPATIENT
Start: 2022-11-16 | End: 2022-11-17

## 2022-11-15 RX ORDER — HALOPERIDOL 5 MG/ML
1 INJECTION INTRAMUSCULAR EVERY 4 HOURS PRN
Status: DISCONTINUED | OUTPATIENT
Start: 2022-11-15 | End: 2022-11-28

## 2022-11-15 RX ADMIN — ACETAMINOPHEN 1000 MG: 500 TABLET ORAL at 05:58

## 2022-11-15 RX ADMIN — OXYCODONE HYDROCHLORIDE 10 MG: 10 TABLET ORAL at 21:53

## 2022-11-15 RX ADMIN — OXYCODONE 5 MG: 5 TABLET ORAL at 13:47

## 2022-11-15 RX ADMIN — APIXABAN 5 MG: 5 TABLET, FILM COATED ORAL at 17:02

## 2022-11-15 RX ADMIN — CLOPIDOGREL BISULFATE 75 MG: 75 TABLET ORAL at 05:58

## 2022-11-15 RX ADMIN — OMEPRAZOLE 40 MG: KIT at 05:58

## 2022-11-15 RX ADMIN — AMIODARONE HYDROCHLORIDE 400 MG: 200 TABLET ORAL at 17:02

## 2022-11-15 RX ADMIN — OXYCODONE 5 MG: 5 TABLET ORAL at 17:06

## 2022-11-15 RX ADMIN — FUROSEMIDE 40 MG: 10 INJECTION, SOLUTION INTRAMUSCULAR; INTRAVENOUS at 05:58

## 2022-11-15 RX ADMIN — METOPROLOL TARTRATE 12.5 MG: 25 TABLET, FILM COATED ORAL at 17:02

## 2022-11-15 RX ADMIN — ACETAMINOPHEN 1000 MG: 500 TABLET ORAL at 11:49

## 2022-11-15 RX ADMIN — QUETIAPINE FUMARATE 25 MG: 25 TABLET, FILM COATED ORAL at 19:23

## 2022-11-15 RX ADMIN — OXYCODONE 5 MG: 5 TABLET ORAL at 05:58

## 2022-11-15 RX ADMIN — METOPROLOL TARTRATE 12.5 MG: 25 TABLET, FILM COATED ORAL at 05:57

## 2022-11-15 RX ADMIN — AMIODARONE HYDROCHLORIDE 400 MG: 200 TABLET ORAL at 05:57

## 2022-11-15 RX ADMIN — ASPIRIN 81 MG 81 MG: 81 TABLET ORAL at 05:57

## 2022-11-15 RX ADMIN — SULFAMETHOXAZOLE AND TRIMETHOPRIM 1 TABLET: 800; 160 TABLET ORAL at 11:49

## 2022-11-15 ASSESSMENT — CHA2DS2 SCORE
VASCULAR DISEASE: YES
DIABETES: NO
PRIOR STROKE OR TIA OR THROMBOEMBOLISM: YES
CHF OR LEFT VENTRICULAR DYSFUNCTION: YES
AGE 75 OR GREATER: NO
AGE 65 TO 74: YES
CHA2DS2 VASC SCORE: 7
SEX: FEMALE
HYPERTENSION: YES

## 2022-11-15 ASSESSMENT — GAIT ASSESSMENTS: GAIT LEVEL OF ASSIST: UNABLE TO PARTICIPATE

## 2022-11-15 ASSESSMENT — COGNITIVE AND FUNCTIONAL STATUS - GENERAL
SUGGESTED CMS G CODE MODIFIER MOBILITY: CM
TURNING FROM BACK TO SIDE WHILE IN FLAT BAD: A LOT
MOVING TO AND FROM BED TO CHAIR: A LOT
MOBILITY SCORE: 8
CLIMB 3 TO 5 STEPS WITH RAILING: TOTAL
MOVING FROM LYING ON BACK TO SITTING ON SIDE OF FLAT BED: UNABLE
STANDING UP FROM CHAIR USING ARMS: TOTAL
WALKING IN HOSPITAL ROOM: TOTAL

## 2022-11-15 ASSESSMENT — FIBROSIS 4 INDEX: FIB4 SCORE: 2.83

## 2022-11-15 ASSESSMENT — PAIN DESCRIPTION - PAIN TYPE: TYPE: ACUTE PAIN

## 2022-11-15 NOTE — CARE PLAN
The patient is Watcher - Medium risk of patient condition declining or worsening    Shift Goals  Clinical Goals: Stable neuro's vitals and monitor heart rhythm.  Patient Goals: SUNDAR  Family Goals: SUNDAR    Progress made toward(s) clinical / shift goals:  Q4 neuro's, NIH 10. Pt alert and intermittently lethargic overnight, able to follow commands. Orientation varies from 1-3. Pupils equal and reactive. RLE and ALIZE weakness, noted pt able to wiggle right toes. SINA and LLE w/o defecit. Q2 turns. Minimal output from BMS. 575 out urinary catheter. Vitals stable.    Problem: Knowledge Deficit - Standard  Goal: Patient and family/care givers will demonstrate understanding of plan of care, disease process/condition, diagnostic tests and medications  Outcome: Progressing     Problem: Skin Integrity  Goal: Skin integrity is maintained or improved  Outcome: Progressing     Problem: Pain - Standard  Goal: Alleviation of pain or a reduction in pain to the patient’s comfort goal  Outcome: Progressing     Problem: Post Op Day 5 CABG/Heart Valve Replacement  Goal: Optimal care of the Post Op CABG/Heart Valve replacement Post Op Day 5  Outcome: Progressing     Problem: Communication  Goal: The ability to communicate needs accurately and effectively will improve  Outcome: Progressing     Problem: Hemodynamics  Goal: Patient's hemodynamics, fluid balance and neurologic status will be stable or improve  Outcome: Progressing     Problem: Safety - Medical Restraint  Goal: Remains free of injury from restraints (Restraint for Interference with Medical Device)  Outcome: Progressing  Goal: Free from restraint(s) (Restraint for Interference with Medical Device)  Outcome: Progressing       Patient is not progressing towards the following goals:

## 2022-11-15 NOTE — PROGRESS NOTES
Neurology note    Discussed case with Dr. Nelson.  Neurology asked to comment on timing of transition from DAPT to DOAC given history of pAF for long-term stroke prevention, as well as BP goals.     Regarding BP goals- she does not need permissive hypertension, as previously documented, this is a completed stroke, likely perioperative.  Long-term BP goal is normotension.  The overall stroke burden is relatively small, and she has been on DAPT which actually confers higher risk of hemorrhage than DOAC monotherapy.  From a stroke perspective- she may transition from DAPT to DOAC (recommend apixaban) at any time deemed safe by the primary service.    Blue Gallagher MD  Neurology

## 2022-11-15 NOTE — PROGRESS NOTES
Cardiovascular Surgery Progress Note    Name: Lauren Dave  MRN: [id number]  : 1950  Admit Date: 11/10/2022  5:08 AM  5 Days Post-Op     Procedure:  Procedure(s) and Anesthesia Type:     * CORONARY ARTERY BYPASS GRAFTING X 3, WITH LEFT INTERNAL MAMMARY ARTERY TAKEDOWN AND ENDOSCOPIC VEIN HARVEST LEFT GREATER SAPHENOUS VEIN, AND TRANSESOPHAGEAL ECHOCARDIOGRAM - General     * ECHOCARDIOGRAM, TRANSESOPHAGEAL, INTRAOPERATIVE - General    Vitals:  Vitals:    11/15/22 0500 11/15/22 0600 11/15/22 0700 11/15/22 0800   BP: (!) 98/55 113/54 135/61 (!) 91/50   Pulse: 96 99 75 80   Resp: 16 18 (!) 11 (!) 11   Temp:   36.5 °C (97.7 °F)    TempSrc:   Temporal    SpO2: 91% 96% 93% 96%   Weight:       Height:          Temp (24hrs), Av.5 °C (97.7 °F), Min:36.3 °C (97.4 °F), Max:36.9 °C (98.4 °F)      Respiratory:    Respiration: (!) 11, Pulse Oximetry: 96 %       Fluids:    Intake/Output Summary (Last 24 hours) at 11/15/2022 0936  Last data filed at 11/15/2022 0707  Gross per 24 hour   Intake 613 ml   Output 705 ml   Net -92 ml       Admit weight: Weight: 67.1 kg (147 lb 14.9 oz)  Current weight: Weight: 69.8 kg (153 lb 14.1 oz) (11/15/22 0400)    Labs:  Recent Labs     22  1452 222 11/15/22  0228   WBC 12.2* 11.1* 9.0   RBC 2.86* 2.82* 2.72*   HEMOGLOBIN 8.9* 8.7* 8.3*   HEMATOCRIT 27.8* 27.7* 27.2*   MCV 97.2 98.2* 100.0*   MCH 31.1 30.9 30.5   MCHC 32.0* 31.4* 30.5*   RDW 47.4 47.3 47.7   PLATELETCT 234 243 230   MPV 10.3 10.2 10.0       Recent Labs     22  0140 22  2226 226 11/15/22  0228   SODIUM 143  --  143 139   POTASSIUM 3.9 3.9 3.6 3.5*   CHLORIDE 104  --  101 98   CO2 27  --  31 35*   GLUCOSE 95  --  146* 165*   BUN 28*  --  31* 31*   CREATININE 1.14  --  1.15 1.19   CALCIUM 8.5  --  8.6 8.6                   Medications:  Scheduled Medications   Medication Dose Frequency    [START ON 2022] furosemide  20 mg Q DAY    amiodarone  400 mg TWICE DAILY    Pharmacy   1 Each PHARMACY TO DOSE    metoprolol tartrate  12.5 mg TWICE DAILY    acetaminophen  1,000 mg Q6HRS    aspirin  81 mg DAILY    clopidogrel  75 mg DAILY    omeprazole  40 mg DAILY    Pharmacy Consult Request  1 Each PHARMACY TO DOSE        Exam:   Physical Exam  Vitals and nursing note reviewed.   Constitutional:       Appearance: Normal appearance.   HENT:      Head: Normocephalic and atraumatic.   Eyes:      Pupils: Pupils are equal, round, and reactive to light.   Cardiovascular:      Rate and Rhythm: Normal rate and regular rhythm.   Pulmonary:      Breath sounds: Examination of the right-lower field reveals decreased breath sounds. Examination of the left-lower field reveals decreased breath sounds. Decreased breath sounds and rales present.   Chest:      Comments: Prevena midline intact.  Abdominal:      Palpations: Abdomen is soft.   Skin:     General: Skin is warm and dry.   Neurological:      Mental Status: She is lethargic.      Motor: Weakness present.      Comments: Not moving right leg. Minimall arousal.       Cardiac Medications:    ASA - Yes    Plavix - Yes    Post-operative Beta Blockers - Yes    Ace/ARB- No; contraindicated because of Hypotension    Statin - No; contraindicated because of Allergy/intolerance    Aldactone- No; contraindicated because of Hypotension    Ejection Fraction:  35%    Telemetry:   11/11 SR  11/12 SR/ST 90-100s  11/13 SR  11/14 SR, a fib last night  11/15 SR     Assessment/Plan:  POD 1  HDS, SR, neuro not moving right leg but follows commands, cleviprex drip, wounds CDI, abdomen soft, fluid balance positive, wt up,  chest tube output moderate.  CT head this AM.  Plan:  wean vent as tolerated.  Allow permissive hypertension, OK for systolic 120-140's, CPM.     POD 2 HDS, SR/ST.  Neuro can say name, no movement in right leg.  Weaker left upper extremity, following commands intermittently, pulling at NG/central line.  Wounds CDI, prevena in tact.  Moderate serosanguinous  output from chest tube.  Cr 1.18 Hgb 9.2.  Neurology recommending MRI brain, pending currently.  Plan: MRI when available.  Ok for permissive HTN of SBP of 140 or less.  Encourage IS.  Restraint for RUE due to trying to remove central line and NG tubes with impulsivity.      POD 3 HDS. SR.  Neuro aware to self and place.  Good strength in right hand and left leg.  Left arm weak.  No movement right leg.  Pacer wires removed. Pt tolerated well.  Cr 1.14, Hgb 8.8. Plan: MRI brain today, pacer wires out.  DC chest tubes.  Encourage IS.  Work with PT as tolerated.    POD 4 BP 90s-140s, SR, a fib overnight, lethargic this morning, flaccid right leg, weak left arm, wounds CDI, abdomen soft, fluid balance down, wt up, 5L NC. On tube feeding. Plan: UA and culture today, transfer to IMCU, continue diuresis, amio gtt to PO, IS/ambulate.     POD 5  HDS, SR on PO amio, neuro lethargic moving three extremities on command 5/5  strength uppers, not able to get her to move RLE, wounds Prevena intact, abdomen soft n/t BMS in place for incontinence, Hgb 8.3, Cr 1.19,  fluid balance negative, wt down,  BP labile this AM. Plan:  decrease diuresis for labile BP, fluid balance negative, Wean Oxygen, Encourage IS/ambulate.     Disposition:  Therapies recommending post acute placement, rehab referral

## 2022-11-15 NOTE — DISCHARGE PLANNING
[facility] Transitional Care Coordination    Physiatry consult complete.  Dr. Geller recommending -    Patient lacks appropriate discharge support for her current level of function    Disposition recommendations:  -Anticipate patient will need SNF placement due to her current functional needs, which would necessitate to caregiver assistance 24/7 at this level.  If patient improves, her best path to recovery would be to attend IPR and discharge home with her son to his two-story home, 1 FOS required inside, and have the son take time off work to care for his mother.  -PMR to follow in the periphery for rehab appropriateness, please reach out with questions or request for medical management      Anticipate skilled nursing for post acute care.  TCC will no longer follow.  If there are interval changes, please reach out to Rehab TCC team.    Thank you for the referral.

## 2022-11-15 NOTE — THERAPY
"Physical Therapy   Daily Treatment     Patient Name: Lauren Dave  Age:  72 y.o., Sex:  female  Medical Record #: [id number]  Today's Date: 11/15/2022     Precautions  Precautions: Fall Risk;Cardiac Precautions (See Comments);Sternal Precautions (See Comments);Nasogastric Tube;Swallow Precautions ( See Comments)  Comments: CABG and CVA    Assessment    Patient seen for follow up PT treatment session and continues to have impaired command following. She has global weakness and impaired tracking. When asked how many fingers this PT was holding up she replied \"14\".  Patient was total assist for bed mobility and able to sit for 15 minutes with moments of CGA.  Patient following 75% of commands with repeated verbal and tactile cueing.  Patient will need placement for further therapy.     Plan    Treatment plan modified to 4 times per week until therapy goals are met for the following treatments:  Bed Mobility, Gait Training, Manual Therapy, Neuro Re-Education / Balance, Stair Training, Therapeutic Activities, and Therapeutic Exercises.    DC Equipment Recommendations: Unable to determine at this time  Discharge Recommendations: Recommend post-acute placement for additional physical therapy services prior to discharge home      Subjective    \"Help me\"     Objective       11/15/22 1300   Precautions   Precautions Fall Risk;Cardiac Precautions (See Comments);Sternal Precautions (See Comments);Nasogastric Tube;Swallow Precautions ( See Comments)   Comments CABG and CVA   Vitals   Pulse 91   Blood Pressure  (!) 98/51   Respiration 15   Pulse Oximetry 88 %   Pain 0 - 10 Group   Therapist Pain Assessment 0;Post Activity Pain Same as Prior to Activity   Cognition    Cognition / Consciousness X   Speech/ Communication Delayed Responses   Orientation Level Not Oriented to Time   Level of Consciousness Responds to voice   Ability To Follow Commands 1 Step  (75% of the time)   New Learning Impaired   Attention Impaired "   Sequencing Impaired   Initiation Impaired   Comments Patient  lethargic and needs repeated cueing to follow commands.   Active ROM Lower Body    Active ROM Lower Body  X   Comments limited by weakness and impaired command following   Strength Lower Body   Lower Body Strength  X   Comments R LE flaccid, L LE 3-/5   Lower Body Muscle Tone   Lower Body Muscle Tone  X   Rt Lower Extremity Muscle Tone Hypotonic   Sitting Lower Body Exercises   Sitting Lower Body Exercises Yes   Long Arc Quad 1 set of 10;Bilateral   Comments AAROM, limited by poor command following   Neuro-Muscular Treatments   Neuro-Muscular Treatments Postural Changes;Postural Facilitation;Verbal Cuing;Weight Shift Right;Weight Shift Left;Anterior weight shift   Comments seated EOB cueing for upright posture   Vision   Vision Comments not tracking   Other Treatments   Other Treatments Provided worked on seated balance with Bfeet supported at EOB.   Neurological Concerns   Neurological Concerns Yes   Sitting Posture During ADL's Posterior Lean   Equilibrium Reaction Impaired   Balance   Sitting Balance (Static) Poor -   Sitting Balance (Dynamic) Trace   Weight Shift Sitting Absent   Gait Analysis   Gait Level Of Assist Unable to Participate   Bed Mobility    Supine to Sit Total Assist   Sit to Supine Total Assist   Scooting Total Assist   Skilled Intervention Postural Facilitation;Sequencing;Tactile Cuing;Verbal Cuing   Functional Mobility   Sit to Stand Unable to Participate   Bed, Chair, Wheelchair Transfer Unable to Participate   How much difficulty does the patient currently have...   Turning over in bed (including adjusting bedclothes, sheets and blankets)? 2   Sitting down on and standing up from a chair with arms (e.g., wheelchair, bedside commode, etc.) 1   Moving from lying on back to sitting on the side of the bed? 2   How much help from another person does the patient currently need...   Moving to and from a bed to a chair (including a  wheelchair)? 1   Need to walk in a hospital room? 1   Climbing 3-5 steps with a railing? 1   6 clicks Mobility Score 8   Activity Tolerance   Sitting Edge of Bed 15 min   Short Term Goals    Short Term Goal # 1 Pt will transfer supine<->sit with mod A within 6 visits to promote return home   Goal Outcome # 1 Progressing as expected   Short Term Goal # 2 Pt will sit at EOB 15 min with min A within 6 visits to promote return home   Goal Outcome # 2 Progressing as expected   Short Term Goal # 3 Pt will transfer bed<->chair with mod A within 6 visits in order to promote return home   Goal Outcome # 3 Goal not met   Anticipated Discharge Equipment and Recommendations   DC Equipment Recommendations Unable to determine at this time   Discharge Recommendations Recommend post-acute placement for additional physical therapy services prior to discharge home     Erika Peter, PT, DPT, GCS

## 2022-11-15 NOTE — PROGRESS NOTES
Hospital Medicine Daily Progress Note    Date of Service  11/15/2022    Chief Complaint  Lauren Dave is a 72 y.o. female admitted 11/10/2022 with elective surgery    Hospital Course  72 y.o. 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  The patient is found lethargic, she arouses on verbal command, she is confused, she does have weakness in the right lower extremity as well as left upper extremity, the patient's sister is at the bedside assisting the history taking.  The patient is being prepared for transfer to telemetry, chest tubes are being removed, a pacer wire has been removed, a MRI is underway and later reports multiple areas of watershed type infarctions.  There is no bleeding apparent.  A postoperative echo shows an ejection fraction of 35%, the patient is being diuresed, she is on GDMT post surgery.  The patient unfortunately is intolerant to statin medication.  Neurology was consulted after patient was identified to have a CVA.    Interval Problem Update  11/14/2022: Evaluated patient at bedside today patient appears to be without movement of right lower extremity difficult to arouse weak left upper extremity.  Trending back looks like she has had increasing oxygen demand in addition chest tubes removed on 11/13/2022.  Lastly patient is also had decreasing hemoglobin at present we will trend hemoglobin every 6 hours for 24 hours standing  transfusion order has been placed to transfuse 1 unit PRBC if repeat hemoglobin less than 8.0.  Ejection fraction reviewed from DAVE remains at 35% cardiology medicine recommendations appreciated.  Referral to acute rehabilitation has been placed.  We will continue to follow along closely with cardiovascular thoracic surgery as primary.  Present patient has persistent deficit right lower extremity no movement left upper extremity is weak.  Watershed infarct noted above.  Consider discussion of initiation of amantadine with neurology and cardiology service.  11/15: Ms. Dave was evaluated and examined in the IMCU. Hb this morning is 8.3. I discussed with Dr. Gallagher neurology about +/- anticoagulation given her transient afib and he recommends Apixaban which has been started. She remain on NG feeding and in soft wrist restraints. Her sister is at bedside. IV lasix will be stopped due to a CVP 3 and change to oral.     I have discussed this patient's plan of care and discharge plan at IDT rounds today with Case Management, Nursing, Nursing leadership, and other members of the IDT team.    Consultants/Specialty  critical care  Neurology  Physiatry  Cardiology I discussed with Dr. Hamilton this morning about goal directed therapy  Code Status  Full Code    Disposition  Patient is not medically cleared for discharge.   Anticipate discharge to to an inpatient rehabilitation hospital.  I have placed the appropriate orders for post-discharge needs.    Review of Systems  Review of Systems   Unable to perform ROS: Mental acuity      Physical Exam  Temp:  [36.3 °C (97.4 °F)-36.9 °C (98.4 °F)] 36.5 °C (97.7 °F)  Pulse:  [] 75  Resp:  [11-48] 11  BP: ()/(52-71) 135/61  SpO2:  [84 %-97 %] 93 %    Physical Exam  Vitals and nursing note reviewed.   Constitutional:       General: She is in acute distress.      Appearance: She is ill-appearing.   HENT:      Head: Normocephalic and atraumatic.      Nose:      Comments: CHARLA  tube     Mouth/Throat:      Mouth: Mucous membranes are dry.      Pharynx: Oropharynx is clear.   Eyes:      General: No scleral icterus.     Conjunctiva/sclera: Conjunctivae normal.   Cardiovascular:      Rate and Rhythm: Normal rate and regular rhythm.      Comments: Sternal VAC on  Pulmonary:      Effort: Pulmonary effort is normal.      Breath sounds: Normal breath sounds.   Abdominal:      General: There is no distension.      Tenderness: There is no abdominal tenderness.   Genitourinary:     Comments: Berger and BMS  Musculoskeletal:      Cervical back: Normal range of motion and neck supple.      Right lower leg: No edema.      Left lower leg: No edema.   Skin:     General: Skin is warm and dry.   Neurological:      Mental Status: She is alert.      Comments: Oriented to person and hospital  Right leg no movement  She moves the left intermittently though not to command  She  weakly with both hands and moves the right arm though not to command   Psychiatric:      Comments: Anxious, agitated at times       Fluids    Intake/Output Summary (Last 24 hours) at 11/15/2022 0815  Last data filed at 11/15/2022 0707  Gross per 24 hour   Intake 643 ml   Output 705 ml   Net -62 ml       Laboratory  Recent Labs     11/14/22  1452 11/14/22  2032 11/15/22  0228   WBC 12.2* 11.1* 9.0   RBC 2.86* 2.82* 2.72*   HEMOGLOBIN 8.9* 8.7* 8.3*   HEMATOCRIT 27.8* 27.7* 27.2*   MCV 97.2 98.2* 100.0*   MCH 31.1 30.9 30.5   MCHC 32.0* 31.4* 30.5*   RDW 47.4 47.3 47.7   PLATELETCT 234 243 230   MPV 10.3 10.2 10.0     Recent Labs     11/13/22  0140 11/13/22  2226 11/14/22 0226 11/15/22  0228   SODIUM 143  --  143 139   POTASSIUM 3.9 3.9 3.6 3.5*   CHLORIDE 104  --  101 98   CO2 27  --  31 35*   GLUCOSE 95  --  146* 165*   BUN 28*  --  31* 31*   CREATININE 1.14  --  1.15 1.19   CALCIUM 8.5  --  8.6 8.6             Recent Labs     11/14/22 0226   TRIGLYCERIDE 154*   HDL 54   LDL 76       Imaging  DX-CHEST-PORTABLE (1 VIEW)   Final  Result      1.  Removal of right IJ catheter.      2.  Right subclavian catheter unchanged in position.      3.  Mild cardiomegaly.      4.  Blunting of left costophrenic angle consistent with atelectasis, pneumonitis, and/or pleural effusion.      DX-ABDOMEN FOR TUBE PLACEMENT   Final Result      Enteric tube tip projects over the stomach antrum      MR-BRAIN-W/O   Final Result      1.  BILATERAL mostly supratentorial areas of ischemia as described above. These appear to be primarily watershed zone infarctions.   2.  No hemorrhage   3.  No significant mass effect      DX-CHEST-PORTABLE (1 VIEW)   Final Result         1. No significant interval change.      DX-ABDOMEN FOR TUBE PLACEMENT   Final Result      Interval placement of enteric tube with its tip over the midline epigastrium compatible with position at the level of the gastric body.      CT-HEAD W/O   Final Result      1.  Likely subacute infarcts in the bilateral parieto-occipital regions.   2.  No acute intracranial hemorrhage.   3.  Bilateral maxillary sinus disease.      EC-DAVE W/O CONT   Final Result      DX-CHEST-PORTABLE (1 VIEW)   Final Result      Stable enlargement of the cardiomediastinal silhouette, mild diffuse interstitial edema and bilateral basilar atelectasis and/or consolidation.      DX-CHEST-PORTABLE (1 VIEW)   Final Result      Satisfactory postoperative appearance of the chest           Assessment/Plan  * Acute ischemic stroke (HCC)  Assessment & Plan  Post/perioperative event with multiple areas of watershed type infarction  Neurology consulted  DAPT for coronary, cardiology reasons already ordered. She is at risk of hemorrhagic conversion. Due to recent afib, neurology has recommended Eliquis.  Neurology consultation was obtained  PT/OT/SLP and rehabilitation  Seizure precaution, aspiration precaution, fall precaution  Plan:  1.  Persistent deficit right lower extremity flaccid left upper extremity weakness  2.  Appreciate neurology  recommendations may discuss idea of initiation of amantadine.    Other specified anemias  Assessment & Plan  Normocytic, postoperative, monitor  Plans  Trend hemoglobin every 6 hours  Transfuse 1 unit PRBC if repeat hemoglobin less than 8.0.    S/P CABG x 3  Assessment & Plan  As per cardiac surgery, optimization,, mobilization as tolerated,  GDMT  Telemetry    Chronic obstructive pulmonary disease (COPD) ([facility])  Assessment & Plan  Pre-existing, respiratory protocol,  Continuous respiratory therapy protocol.    Chronic systolic congestive heart failure (HCC) EF 35%- (present on admission)  Assessment & Plan  Treated to euvolemia, GDMT as tolerated  Ejection fraction 35% on DAVE.  Plan:   1.  Titrated GDMT medications as her blood pressure tolerates per cardiology recommendations       Dyslipidemia  Assessment & Plan  The patient with prior severe intolerance, apparently failed multiple regimens  Consider outpatient referral to PCSK9 treatment       VTE prophylaxis: therapeutic anticoagulation with Eliquis    I have performed a physical exam and reviewed and updated ROS and Plan today (11/15/2022). In review of yesterday's note (11/14/2022), there are no changes except as documented above.

## 2022-11-15 NOTE — PROGRESS NOTES
Interval:  No acute events overnight per bedside RN. Improved mentation this morning and able to greet and deny active cardiac complaints. Sinus rhythm on telemetry.     Medications / Drug list prior to admission:  No current facility-administered medications on file prior to encounter.     Current Outpatient Medications on File Prior to Encounter   Medication Sig Dispense Refill    amLODIPine (NORVASC) 2.5 MG Tab Take 1 Tablet by mouth 2 times a day. 60 Tablet 3    metoprolol SR (TOPROL XL) 50 MG TABLET SR 24 HR Take 1 Tablet by mouth every day. 90 Tablet 3    ampicillin (PRINCIPEN) 500 MG Cap Take 1 Capsule by mouth 4 times a day. Twice a day for 7 days      aspirin 81 MG EC tablet Take 1 Tablet by mouth every day. 100 Tablet 0    hydrALAZINE (APRESOLINE) 10 MG Tab Take 1 Tablet by mouth 3 times a day as needed (/100). 25 Tablet 11    hydroxychloroquine (PLAQUENIL) 200 MG Tab Take 1 tablet by mouth every day. 30 tablet 0    famotidine (PEPCID) 40 MG Tab Take 1 tablet by mouth twice daily (Patient taking differently: Take 1 Tablet by mouth as needed.) 180 tablet 1    Cyanocobalamin (VITAMIN B-12 PO) Take 1 Tablet by mouth every morning.      ibuprofen (MOTRIN) 800 MG Tab TAKE 1 TABLET BY MOUTH EVERY 8 HOURS AS NEEDED FOR MILD PAIN 90 Tab 0    Cholecalciferol (VITAMIN D3) 5000 units Cap Take 1 Capsule by mouth every day.         Current list of administered Medications:    Current Facility-Administered Medications:     loperamide (IMODIUM) oral suspension 2 mg, 2 mg, Enteral Tube, 4X/DAY PRN, Vinicius Walden M.D.    QUEtiapine (Seroquel) tablet 25 mg, 25 mg, Enteral Tube, HS PRN, YULISSA Wheeler, 25 mg at 11/14/22 2771    amiodarone (Cordarone) tablet 400 mg, 400 mg, Enteral Tube, TWICE DAILY, Padmini Mac M.D., 400 mg at 11/15/22 0570    Pharmacy Consult: Enteral tube insertion - review meds/change route/product selection, 1 Each, Other, PHARMACY TO DOSE, Alcides Castañeda M.D.     metoprolol tartrate (LOPRESSOR) tablet 12.5 mg, 12.5 mg, Enteral Tube, TWICE DAILY, Alcides Castañeda M.D., 12.5 mg at 11/15/22 0557    LORazepam (ATIVAN) injection 1 mg, 1 mg, Intravenous, Q3HRS PRN, YULISSA Kwon, 1 mg at 11/14/22 0637    furosemide (LASIX) injection 40 mg, 40 mg, Intravenous, Q DAY, Kamron Back M.D., 40 mg at 11/15/22 0558    acetaminophen (TYLENOL) tablet 1,000 mg, 1,000 mg, Enteral Tube, Q6HRS, 1,000 mg at 11/15/22 0558 **FOLLOWED BY** acetaminophen (TYLENOL) tablet 1,000 mg, 1,000 mg, Enteral Tube, Q6HRS PRN, Kamron Back M.D.    aspirin (ASA) chewable tab 81 mg, 81 mg, Enteral Tube, DAILY, Kamron Back M.D., 81 mg at 11/15/22 0557    clopidogrel (PLAVIX) tablet 75 mg, 75 mg, Enteral Tube, DAILY, Kamron Back M.D., 75 mg at 11/15/22 0558    omeprazole (FIRST-OMEPRAZOLE) 2 mg/mL oral susp 40 mg, 40 mg, Enteral Tube, DAILY, Kamron Back M.D., 40 mg at 11/15/22 0558    acetaminophen (Tylenol) tablet 650 mg, 650 mg, Enteral Tube, Q4HRS PRN **OR** acetaminophen (TYLENOL) suppository 650 mg, 650 mg, Rectal, Q4HRS PRN, Kamron Back M.D.    mag hydrox-al hydrox-simeth (MAALOX PLUS ES or MYLANTA DS) suspension 30 mL, 30 mL, Enteral Tube, Q4HRS PRN, Kamron Back M.D.    oxyCODONE immediate-release (ROXICODONE) tablet 5 mg, 5 mg, Enteral Tube, Q3HRS PRN, 5 mg at 11/15/22 0558 **OR** oxyCODONE immediate release (ROXICODONE) tablet 10 mg, 10 mg, Enteral Tube, Q3HRS PRN, 10 mg at 11/11/22 1617 **OR** fentaNYL (SUBLIMAZE) injection 50 mcg, 50 mcg, Intravenous, Q3HRS PRN, Kamron Back M.D.    traMADol (Ultram) 50 MG tablet 50 mg, 50 mg, Enteral Tube, Q4HRS PRN, Kamron Back M.D., 50 mg at 11/14/22 6142    Respiratory Therapy Consult, , Nebulization, Continuous RT, YASMANY Kwon.P.R.N.    electrolyte-A (PLASMALYTE-A) infusion, , Intravenous, PRN, YASMANY Kwon.P.R.N., Last Rate: 999 mL/hr at 11/10/22  1619, New Bag at 11/10/22 1619    Pharmacy Consult Request ...Pain Management Review 1 Each, 1 Each, Other, PHARMACY TO DOSE, DAILY Kwon.    ondansetron (ZOFRAN) syringe/vial injection 8 mg, 8 mg, Intravenous, Q6HRS PRN, 8 mg at 11/12/22 0100 **OR** prochlorperazine (COMPAZINE) injection 10 mg, 10 mg, Intravenous, Q6HRS PRN **OR** promethazine (PHENERGAN) suppository 25 mg, 25 mg, Rectal, Q6HRS PRN, FENG Kwon.RCLEMENCIA.    Past Medical History:   Diagnosis Date    Arthritis     Breast cancer (HCC) 08/07/2013    Bronchitis 2005    Cancer (HCC)     right breast cancer     Cataract     removed    Chronic systolic congestive heart failure (HCC) EF 35% 10/12/2022    Coronary artery disease due to calcified coronary lesion - Calcifications seen on CT scan also wtih aortic ulceration     Dental disorder     tooth infection    Discoid lupus     Dyslipidemia     Tried atorvastatin, pravastatin, lovastatin, and Zetia.  All causes severe myalgias.  Just taking fish oil.      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     Ischemic heart disease due to coronary artery obstruction (HCC) - Severe 3vD on cath 10/19/2022    LBBB (left bundle branch block) 12/16/2014    Noted on prechemo EKG 9/2014, MUGA WNL    Pneumonia     Pseudogout     Scarlet fever     Unspecified hemorrhagic conditions     gums       Past Surgical History:   Procedure Laterality Date    MULTIPLE CORONARY ARTERY BYPASS ENDO VEIN HARVEST  11/10/2022    Procedure: CORONARY ARTERY BYPASS GRAFTING X 3, WITH LEFT INTERNAL MAMMARY ARTERY TAKEDOWN AND ENDOSCOPIC VEIN HARVEST LEFT GREATER SAPHENOUS VEIN, AND TRANSESOPHAGEAL ECHOCARDIOGRAM;  Surgeon: Padmini Mac M.D.;  Location: SURGERY [facility];  Service: Cardiothoracic    ECHOCARDIOGRAM, TRANSESOPHAGEAL, INTRAOPERATIVE  11/10/2022    Procedure: ECHOCARDIOGRAM, TRANSESOPHAGEAL, INTRAOPERATIVE;  Surgeon: Padmini Mac M.D.;  Location: [facility]  [facility];  Service: Cardiothoracic    OTHER CARDIAC SURGERY  2022    angiogram    CATH REMOVAL  2014    Performed by Aggie Burns M.D. at [facility]    MASTECTOMY  2013    Performed by Aggie Burns M.D. at [facility]    NODE BIOPSY SENTINEL  2013    Performed by Aggie Burns M.D. at [facility]    CATH PLACEMENT  2013    Performed by Aggie Burns M.D. at [facility]    US-NEEDLE CORE BX-BREAST PANEL  2013    right breast    COLONOSCOPY  2003    BREAST BIOPSY      TONSILLECTOMY         Family History   Problem Relation Age of Onset    Cancer Mother         possible thyroid and gynecological    Multiple Sclerosis Mother     Arthritis Father     Multiple Sclerosis Brother     Gout Brother     Heart Disease Paternal Grandmother     Diabetes Paternal Grandmother     Cancer Maternal Aunt         breast cancer- 60s    Diabetes Maternal Uncle     Heart Disease Maternal Grandmother     Heart Disease Maternal Grandfather         MI    Stroke Paternal Grandfather     Other Son         breast mass    Heart Disease Son         HEART MURMUR    Gout Son      Patient family history was personally reviewed, no pertinent family history to current presentation    Social History     Socioeconomic History    Marital status:      Spouse name: Not on file    Number of children: 2    Years of education: Not on file    Highest education level: Not on file   Occupational History     Employer: [employer]   Tobacco Use    Smoking status: Former     Packs/day: 1.00     Years: 0.00     Pack years: 0.00     Types: Cigarettes     Quit date: 10/1/2013     Years since quittin.1    Smokeless tobacco: Never   Vaping Use    Vaping Use: Never used   Substance and Sexual Activity    Alcohol use: No     Alcohol/week: 0.0 oz    Drug use: No    Sexual activity: Not on file     Comment: , 2 children,  "enemployed   Other Topics Concern    Not on file   Social History Narrative    ** Merged History Encounter **         Patient is a . Patient has 2 adult children. She is  from .           Social Determinants of Health     Financial Resource Strain: Not on file   Food Insecurity: Not on file   Transportation Needs: Not on file   Physical Activity: Not on file   Stress: Not on file   Social Connections: Not on file   Intimate Partner Violence: Not on file   Housing Stability: Not on file       ALLERGIES:  Allergies   Allergen Reactions    Statins [Hmg-Coa-R Inhibitors] Myalgia     Muscle Spasms   RXN=unknown    Atorvastatin Myalgia    Codeine Vomiting and Nausea     Clarified with patient - states that she has an \"upset stomach\" when she takes it    Eggs Diarrhea     Pt states that she is allergic to eggs per her mother.  Tolerated clevidipine 11/2022    Lisinopril Cough    Losartan Unspecified     Tried in 2017  Cannot recall reaction    Penicillins Unspecified     \"does not work\" .'IMMUNE TO PENICILLIN,AMPICILLIN IS THE ONLY THING THAT WORKS'       Review of systems:  A complete review of symptoms was reviewed with patient. This is reviewed in H&P and PMH. ALL OTHERS reviewed and negative    Physical exam:  Vitals:    11/15/22 0400 11/15/22 0500 11/15/22 0600 11/15/22 0700   BP: 100/52 (!) 98/55 113/54 135/61   Pulse: 87 96 99 75   Resp: 16 16 18 (!) 11   Temp: 36.4 °C (97.5 °F)   36.5 °C (97.7 °F)   TempSrc: Temporal   Temporal   SpO2: 96% 91% 96% 93%   Weight: 69.8 kg (153 lb 14.1 oz)      Height:             General: No acute distress.  EYES: no jaundice  HEENT: OP clear   Neck: No bruits No JVD.   CVS: RRR. S1 + S2. No M/R/G. Trace edema.  Resp: CTAB. No wheezing or crackles/rhonchi.  Abdomen: Soft, NT, ND,  Skin: Grossly nothing acute no obvious rashes  Neurological: Alert  Extremities: Pulse 2+ in b/l LE. No cyanosis.     Data:  Laboratory studies personally reviewed by me:  Recent " Results (from the past 24 hour(s))   CBC WITH DIFFERENTIAL    Collection Time: 11/14/22  9:00 AM   Result Value Ref Range    WBC 14.0 (H) 4.8 - 10.8 K/uL    RBC 2.89 (L) 4.20 - 5.40 M/uL    Hemoglobin 9.1 (L) 12.0 - 16.0 g/dL    Hematocrit 28.4 (L) 37.0 - 47.0 %    MCV 98.3 (H) 81.4 - 97.8 fL    MCH 31.5 27.0 - 33.0 pg    MCHC 32.0 (L) 33.6 - 35.0 g/dL    RDW 47.9 35.9 - 50.0 fL    Platelet Count 226 164 - 446 K/uL    MPV 10.0 9.0 - 12.9 fL    Neutrophils-Polys 81.90 (H) 44.00 - 72.00 %    Lymphocytes 9.30 (L) 22.00 - 41.00 %    Monocytes 7.20 0.00 - 13.40 %    Eosinophils 0.60 0.00 - 6.90 %    Basophils 0.40 0.00 - 1.80 %    Immature Granulocytes 0.60 0.00 - 0.90 %    Nucleated RBC 0.00 /100 WBC    Neutrophils (Absolute) 11.43 (H) 2.00 - 7.15 K/uL    Lymphs (Absolute) 1.30 1.00 - 4.80 K/uL    Monos (Absolute) 1.00 (H) 0.00 - 0.85 K/uL    Eos (Absolute) 0.08 0.00 - 0.51 K/uL    Baso (Absolute) 0.05 0.00 - 0.12 K/uL    Immature Granulocytes (abs) 0.09 0.00 - 0.11 K/uL    NRBC (Absolute) 0.00 K/uL   COD - Adult (Type and Screen)    Collection Time: 11/14/22  9:00 AM   Result Value Ref Range    ABO Grouping Only A     Rh Grouping Only POS     Antibody Screen-Cod NEG    URINALYSIS    Collection Time: 11/14/22 12:15 PM    Specimen: Urine, Berger Cath   Result Value Ref Range    Color Yellow     Character Clear     Specific Gravity 1.020 <1.035    Ph 6.5 5.0 - 8.0    Glucose Negative Negative mg/dL    Ketones Negative Negative mg/dL    Protein 30 (A) Negative mg/dL    Bilirubin Moderate (A) Negative    Urobilinogen, Urine 0.2 Negative    Nitrite Negative Negative    Leukocyte Esterase Trace (A) Negative    Occult Blood Moderate (A) Negative    Micro Urine Req Microscopic    URINE MICROSCOPIC (W/UA)    Collection Time: 11/14/22 12:15 PM   Result Value Ref Range    WBC 10-20 (A) /hpf    RBC 2-5 (A) /hpf    Bacteria Negative None /hpf    Epithelial Cells Negative /hpf    Hyaline Cast 3-5 (A) /lpf   CBC WITH DIFFERENTIAL     Collection Time: 11/14/22  2:52 PM   Result Value Ref Range    WBC 12.2 (H) 4.8 - 10.8 K/uL    RBC 2.86 (L) 4.20 - 5.40 M/uL    Hemoglobin 8.9 (L) 12.0 - 16.0 g/dL    Hematocrit 27.8 (L) 37.0 - 47.0 %    MCV 97.2 81.4 - 97.8 fL    MCH 31.1 27.0 - 33.0 pg    MCHC 32.0 (L) 33.6 - 35.0 g/dL    RDW 47.4 35.9 - 50.0 fL    Platelet Count 234 164 - 446 K/uL    MPV 10.3 9.0 - 12.9 fL    Neutrophils-Polys 77.70 (H) 44.00 - 72.00 %    Lymphocytes 12.60 (L) 22.00 - 41.00 %    Monocytes 7.40 0.00 - 13.40 %    Eosinophils 1.20 0.00 - 6.90 %    Basophils 0.40 0.00 - 1.80 %    Immature Granulocytes 0.70 0.00 - 0.90 %    Nucleated RBC 0.00 /100 WBC    Neutrophils (Absolute) 9.46 (H) 2.00 - 7.15 K/uL    Lymphs (Absolute) 1.53 1.00 - 4.80 K/uL    Monos (Absolute) 0.90 (H) 0.00 - 0.85 K/uL    Eos (Absolute) 0.15 0.00 - 0.51 K/uL    Baso (Absolute) 0.05 0.00 - 0.12 K/uL    Immature Granulocytes (abs) 0.09 0.00 - 0.11 K/uL    NRBC (Absolute) 0.00 K/uL   CBC WITH DIFFERENTIAL    Collection Time: 11/14/22  8:32 PM   Result Value Ref Range    WBC 11.1 (H) 4.8 - 10.8 K/uL    RBC 2.82 (L) 4.20 - 5.40 M/uL    Hemoglobin 8.7 (L) 12.0 - 16.0 g/dL    Hematocrit 27.7 (L) 37.0 - 47.0 %    MCV 98.2 (H) 81.4 - 97.8 fL    MCH 30.9 27.0 - 33.0 pg    MCHC 31.4 (L) 33.6 - 35.0 g/dL    RDW 47.3 35.9 - 50.0 fL    Platelet Count 243 164 - 446 K/uL    MPV 10.2 9.0 - 12.9 fL    Neutrophils-Polys 80.50 (H) 44.00 - 72.00 %    Lymphocytes 9.50 (L) 22.00 - 41.00 %    Monocytes 7.60 0.00 - 13.40 %    Eosinophils 1.40 0.00 - 6.90 %    Basophils 0.50 0.00 - 1.80 %    Immature Granulocytes 0.50 0.00 - 0.90 %    Nucleated RBC 0.20 /100 WBC    Neutrophils (Absolute) 8.94 (H) 2.00 - 7.15 K/uL    Lymphs (Absolute) 1.06 1.00 - 4.80 K/uL    Monos (Absolute) 0.84 0.00 - 0.85 K/uL    Eos (Absolute) 0.15 0.00 - 0.51 K/uL    Baso (Absolute) 0.05 0.00 - 0.12 K/uL    Immature Granulocytes (abs) 0.06 0.00 - 0.11 K/uL    NRBC (Absolute) 0.02 K/uL   Basic Metabolic Panel (BMP)  Critical Care 0130    Collection Time: 11/15/22  2:28 AM   Result Value Ref Range    Sodium 139 135 - 145 mmol/L    Potassium 3.5 (L) 3.6 - 5.5 mmol/L    Chloride 98 96 - 112 mmol/L    Co2 35 (H) 20 - 33 mmol/L    Glucose 165 (H) 65 - 99 mg/dL    Bun 31 (H) 8 - 22 mg/dL    Creatinine 1.19 0.50 - 1.40 mg/dL    Calcium 8.6 8.5 - 10.5 mg/dL    Anion Gap 6.0 (L) 7.0 - 16.0   MAGNESIUM    Collection Time: 11/15/22  2:28 AM   Result Value Ref Range    Magnesium 2.4 1.5 - 2.5 mg/dL   CBC WITH DIFFERENTIAL    Collection Time: 11/15/22  2:28 AM   Result Value Ref Range    WBC 9.0 4.8 - 10.8 K/uL    RBC 2.72 (L) 4.20 - 5.40 M/uL    Hemoglobin 8.3 (L) 12.0 - 16.0 g/dL    Hematocrit 27.2 (L) 37.0 - 47.0 %    .0 (H) 81.4 - 97.8 fL    MCH 30.5 27.0 - 33.0 pg    MCHC 30.5 (L) 33.6 - 35.0 g/dL    RDW 47.7 35.9 - 50.0 fL    Platelet Count 230 164 - 446 K/uL    MPV 10.0 9.0 - 12.9 fL    Neutrophils-Polys 77.30 (H) 44.00 - 72.00 %    Lymphocytes 12.10 (L) 22.00 - 41.00 %    Monocytes 8.40 0.00 - 13.40 %    Eosinophils 1.00 0.00 - 6.90 %    Basophils 0.30 0.00 - 1.80 %    Immature Granulocytes 0.90 0.00 - 0.90 %    Nucleated RBC 0.20 /100 WBC    Neutrophils (Absolute) 6.99 2.00 - 7.15 K/uL    Lymphs (Absolute) 1.09 1.00 - 4.80 K/uL    Monos (Absolute) 0.76 0.00 - 0.85 K/uL    Eos (Absolute) 0.09 0.00 - 0.51 K/uL    Baso (Absolute) 0.03 0.00 - 0.12 K/uL    Immature Granulocytes (abs) 0.08 0.00 - 0.11 K/uL    NRBC (Absolute) 0.02 K/uL   ESTIMATED GFR    Collection Time: 11/15/22  2:28 AM   Result Value Ref Range    GFR (CKD-EPI) 48 (A) >60 mL/min/1.73 m 2   EKG    Collection Time: 11/15/22  8:12 AM   Result Value Ref Range    Report       [facility]    Test Date:  2022-11-15  Pt Name:    DMITRI GUILLERMO              Department: [facility]  MRN:        [id number]                      Room:       [facility]  Gender:     Female                       Technician: CANDACE  :        [id number]                   Requested By:SOFI JADE  RADPAL  Order #:    [id number]                    Reading MD:    Measurements  Intervals                                Axis  Rate:       79                           P:          -2  ND:         182                          QRS:        -29  QRSD:       160                          T:          136  QT:         462  QTc:        530    Interpretive Statements  Sinus rhythm  Left bundle branch block  Compared to ECG 11/13/2022 22:09:48  Atrial fibrillation no longer present         EKG 11/13/22 interpreted by me AF RVR, LBBB    All pertinent features of laboratory and imaging reviewed including primary images where applicable    TTE 10/12/22  CONCLUSIONS  Compared to the images of the prior study 08/25/2020, there is now   reduced ejection fraction and wall motion abnormalities, previously   normal.     Moderately reduced left ventricular systolic function.  The left ventricular ejection fraction is visually estimated to be 35%.  Hypokinesis of the inferior and septal walls.  Grade I diastolic dysfunction.  The right ventricle is normal in size and systolic function.  No significant valvular abnormalities.      TTE 11/11/22  CONCLUSIONS  Decreased LV function due to septal hypokinesis.  EF 35%. Post op LV   function unchanged from preop function.    MRI brain  IMPRESSION:  1.  BILATERAL mostly supratentorial areas of ischemia as described above. These appear to be primarily watershed zone infarctions.  2.  No hemorrhage  3.  No significant mass effect    Active Problems:    Chronic systolic congestive heart failure (HCC) EF 35% (Chronic) POA: Yes    Chronic obstructive pulmonary disease (COPD) (HCC) POA: Unknown    S/P CABG x 3 POA: Unknown    Encounter for weaning from ventilator (HCC) POA: Unknown    Acute ischemic stroke (HCC) POA: Unknown    Other specified anemias POA: Unknown  Resolved Problems:    * No resolved hospital problems. *      Assessment / Plan:  72 year old woman with PMH CAD, HTN, hx breast CA, HLD,  ICM HFrEF 35% presents for coronary bypass surgery completed 11/10/22 (LIMA to LAD, SVG to OM2, SVG to dRCA) c/b cva. Consult for heart failure medical optimization.     -coordinate with neurology BP goals. If permissive hypertension period complete, start HFOMT.  -when able please start ACE/ARB  -consider low dose carvedilol thereafter if tolerates. If BP soft can use metoprolol succinate.  -IV lasix for diuresis. Consider trending CVP to guide diuresis.  -doac when able from postop and post stroke perspective. Chadsvasc 7.  -ok to continue amiodarone   -high intensity statin     It is my pleasure to participate in the care of Ms. Dave.  Please do not hesitate to contact me with questions or concerns.    Velasquez Hamilton MD  Cardiologist [facility]    A total of 42 minutes of time was spent on day of encounter reviewing medical record, performing history and examination, counseling, ordering medication/test/consults and documentation.

## 2022-11-15 NOTE — THERAPY
"Speech Language Pathology  Daily Treatment     Patient Name: Lauren Dave  Age:  72 y.o., Sex:  female  Medical Record #: [id number]  Today's Date: 11/15/2022     Precautions  Precautions: (P) Fall Risk, Swallow Precautions ( See Comments), Nasogastric Tube, Sternal Precautions (See Comments)  Comments: CTx2, CVA    Assessment    Pt seen this date for dysphagia intervention. Oral care provided prior to PO trials of ice, thins by tsp and liquidized. Delayed swallow initiation and presumed weak/incomplete hyolaryngeal elevation to palpation. Impaired bolus acceptance and containment, characterized by requiring mod verbal and tactile cueing for adequate jaw opening and bolus stripping from utensil, and moderate anterior spillage. Throat clearing in 1/3 trials of thins, which can be concerning for airway invasion. No s/sx of aspiration with liquidized. No oral residue noted.     Given severe lethargy, intermittent s/sx of aspiration, pt is not appropriate for initiation of a PO diet at this time.     Recommend continuation of NPO/TF with ice chips when awake and alert.  Meds via gastric tube  Diligent oral care    SLP following.     Plan    Continue current treatment plan.    Discharge Recommendations: (P) Recommend post-acute placement for additional speech therapy services prior to discharge home    Subjective    Pt lethargic, required mod-max cues to maintain alertness, did not follow directions, opened eyes but no tracking noted. Repeated \"mama\" and \"I need help\". Did not appear to recognize friend at bedside.      Objective       11/15/22 1036   Charge Group   SLP Swallowing Dysfunction Treatment Swallowing Dysfunction Treatment   Total Treatment Time   SLP Time Spent Yes   SLP Swallowing Dysfunction Treatment (Mins) 15   Precautions   Precautions Fall Risk;Swallow Precautions ( See Comments);Nasogastric Tube;Sternal Precautions (See Comments)   Vitals   O2 (LPM) 5   O2 Delivery Device Oxymask   Pain 0 - 10 " "Group   Therapist Pain Assessment Post Activity Pain Same as Prior to Activity;Nurse Notified;0   Dysphagia    Dysphagia X   Positioning / Behavior Modification Self Monitoring   Other Treatments PO trials   Diet / Liquid Recommendation NPO;Pre-Feeding Trials with SLP Only   Nutritional Liquid Intake Rating Scale Nothing by mouth   Nutritional Food Intake Rating Scale Tube dependent with minimal attempts of oral intake   Skilled Intervention Compensatory Strategies;Tactile Cueing;Verbal Cueing   Recommended Route of Medication Administration   Medication Administration  Via Gastric Tube   Patient / Family Goals   Patient / Family Goal #1 \"that was really good\"   Goal #1 Outcome Progressing as expected   Short Term Goals   Short Term Goal # 1 Pt will consume prefeeding trials 1:1 with SLP with no s/sx of aspiration and min cues.   Goal Outcome # 1 Progressing slower than expected   Education Group   Education Provided Dysphagia   Dysphagia Patient Response Patient;Other;Acceptance;Explanation;Demonstration;Verbal Demonstration;Action Demonstration;Reinforcement Needed   Additional Comments Pt's friend verbalized good understanding, pt will require reinforcement   Anticipated Discharge Needs   Discharge Recommendations Recommend post-acute placement for additional speech therapy services prior to discharge home   Therapy Recommendations Upon DC Dysphagia Training;Patient / Family / Caregiver Education   Interdisciplinary Plan of Care Collaboration   IDT Collaboration with  Nursing   Patient Position at End of Therapy Seated;In Bed;Bed Alarm On;Call Light within Reach;Tray Table within Reach;Phone within Reach   Collaboration Comments RN aware of session     "

## 2022-11-15 NOTE — PROGRESS NOTES
..4 Eyes Skin Assessment Completed by KATY Mayfield and KATY Gordon.    Head WDL  Ears WDL  Nose WDL  Mouth WDL  Neck Discoloration  Breast/Chest Redness and Incision chest tube holes.  Shoulder Blades WDL  Spine WDL  (R) Arm/Elbow/Hand Discoloration  (L) Arm/Elbow/Hand Discoloration  Abdomen WDL  Groin WDL  Scrotum/Coccyx/Buttocks Redness and Blanching  (R) Leg WDL PT has harvest surgical sight here  (L) Leg WDL  (R) Heel/Foot/Toe WDL  (L) Heel/Foot/Toe WDL          Devices In Places ECG, Tele Box, Blood Pressure Cuff, Pulse Ox, Berger, Central Line, and Oxy Mask      Interventions In Place Gray Ear Foams, Sacral Mepilex, Q2 Turns, Low Air Loss Mattress, Heels Loaded W/Pillows, and Pressure Redistribution Mattress    Possible Skin Injury No    Pictures Uploaded Into Epic N/A  Wound Consult Placed N/A  RN Wound Prevention Protocol Ordered Yes

## 2022-11-15 NOTE — DIETARY
Nutrition Services Brief Update:    Problem: Nutritional:  Goal: Nutrition support tolerated and meeting greater than 85% of estimated needs  Outcome: Per EMR tube feed at goal. Replete Fiber, goal rate 60 ml/hr, providing 1440 kcals, 92 grams protein, 1195 mL free water.

## 2022-11-16 ENCOUNTER — APPOINTMENT (OUTPATIENT)
Dept: RADIOLOGY | Facility: MEDICAL CENTER | Age: 72
DRG: 235 | End: 2022-11-16
Attending: HOSPITALIST
Payer: MEDICARE

## 2022-11-16 ENCOUNTER — APPOINTMENT (OUTPATIENT)
Dept: RADIOLOGY | Facility: MEDICAL CENTER | Age: 72
DRG: 235 | End: 2022-11-16
Attending: INTERNAL MEDICINE
Payer: MEDICARE

## 2022-11-16 PROBLEM — J96.01 ACUTE RESPIRATORY FAILURE WITH HYPOXIA (HCC): Status: ACTIVE | Noted: 2022-11-16

## 2022-11-16 PROBLEM — I50.43 CHF (CONGESTIVE HEART FAILURE), NYHA CLASS III, ACUTE ON CHRONIC, COMBINED (HCC): Status: ACTIVE | Noted: 2022-11-16

## 2022-11-16 PROBLEM — G93.40 ENCEPHALOPATHY ACUTE: Status: ACTIVE | Noted: 2022-11-16

## 2022-11-16 LAB
ANION GAP SERPL CALC-SCNC: 6 MMOL/L (ref 7–16)
BACTERIA UR CULT: ABNORMAL
BACTERIA UR CULT: ABNORMAL
BARCODED ABORH UBTYP: 6200
BARCODED ABORH UBTYP: 6200
BARCODED PRD CODE UBPRD: NORMAL
BARCODED PRD CODE UBPRD: NORMAL
BARCODED UNIT NUM UBUNT: NORMAL
BARCODED UNIT NUM UBUNT: NORMAL
BASE EXCESS BLDA CALC-SCNC: 15 MMOL/L (ref -4–3)
BASE EXCESS BLDA CALC-SCNC: 17 MMOL/L (ref -4–3)
BODY TEMPERATURE: ABNORMAL DEGREES
BODY TEMPERATURE: ABNORMAL DEGREES
BUN SERPL-MCNC: 40 MG/DL (ref 8–22)
CALCIUM SERPL-MCNC: 8.6 MG/DL (ref 8.5–10.5)
CHLORIDE SERPL-SCNC: 96 MMOL/L (ref 96–112)
CO2 BLDA-SCNC: 41 MMOL/L (ref 20–33)
CO2 BLDA-SCNC: 44 MMOL/L (ref 20–33)
CO2 SERPL-SCNC: 37 MMOL/L (ref 20–33)
COMPONENT R 8504R: NORMAL
COMPONENT R 8504R: NORMAL
CREAT SERPL-MCNC: 1.35 MG/DL (ref 0.5–1.4)
DELSYS IDSYS: ABNORMAL
DELSYS IDSYS: ABNORMAL
EKG IMPRESSION: NORMAL
ERYTHROCYTE [DISTWIDTH] IN BLOOD BY AUTOMATED COUNT: 46.6 FL (ref 35.9–50)
GFR SERPLBLD CREATININE-BSD FMLA CKD-EPI: 42 ML/MIN/1.73 M 2
GLUCOSE SERPL-MCNC: 139 MG/DL (ref 65–99)
HCO3 BLDA-SCNC: 39.2 MMOL/L (ref 17–25)
HCO3 BLDA-SCNC: 42.2 MMOL/L (ref 17–25)
HCT VFR BLD AUTO: 24.7 % (ref 37–47)
HGB BLD-MCNC: 7.6 G/DL (ref 12–16)
HOROWITZ INDEX BLDA+IHG-RTO: 87 MM[HG]
LPM ILPM: 10 LPM
LPM ILPM: 15 LPM
MCH RBC QN AUTO: 30.3 PG (ref 27–33)
MCHC RBC AUTO-ENTMCNC: 30.8 G/DL (ref 33.6–35)
MCV RBC AUTO: 98.4 FL (ref 81.4–97.8)
O2/TOTAL GAS SETTING VFR VENT: 100 %
PCO2 BLDA: 47.5 MMHG (ref 26–37)
PCO2 BLDA: 48.8 MMHG (ref 26–37)
PCO2 TEMP ADJ BLDA: 45.7 MMHG (ref 26–37)
PCO2 TEMP ADJ BLDA: 50.4 MMHG (ref 26–37)
PH BLDA: 7.53 [PH] (ref 7.4–7.5)
PH BLDA: 7.54 [PH] (ref 7.4–7.5)
PH TEMP ADJ BLDA: 7.53 [PH] (ref 7.4–7.5)
PH TEMP ADJ BLDA: 7.54 [PH] (ref 7.4–7.5)
PLATELET # BLD AUTO: 229 K/UL (ref 164–446)
PMV BLD AUTO: 10.4 FL (ref 9–12.9)
PO2 BLDA: 68 MMHG (ref 64–87)
PO2 BLDA: 87 MMHG (ref 64–87)
PO2 TEMP ADJ BLDA: 64 MMHG (ref 64–87)
PO2 TEMP ADJ BLDA: 91 MMHG (ref 64–87)
POTASSIUM SERPL-SCNC: 3.8 MMOL/L (ref 3.6–5.5)
PRODUCT TYPE UPROD: NORMAL
PRODUCT TYPE UPROD: NORMAL
RBC # BLD AUTO: 2.51 M/UL (ref 4.2–5.4)
SAO2 % BLDA: 95 % (ref 93–99)
SAO2 % BLDA: 98 % (ref 93–99)
SIGNIFICANT IND 70042: ABNORMAL
SITE SITE: ABNORMAL
SODIUM SERPL-SCNC: 139 MMOL/L (ref 135–145)
SOURCE SOURCE: ABNORMAL
SPECIMEN DRAWN FROM PATIENT: ABNORMAL
SPECIMEN DRAWN FROM PATIENT: ABNORMAL
UNIT STATUS USTAT: NORMAL
UNIT STATUS USTAT: NORMAL
WBC # BLD AUTO: 8.7 K/UL (ref 4.8–10.8)

## 2022-11-16 PROCEDURE — 71045 X-RAY EXAM CHEST 1 VIEW: CPT

## 2022-11-16 PROCEDURE — 700102 HCHG RX REV CODE 250 W/ 637 OVERRIDE(OP): Performed by: HOSPITALIST

## 2022-11-16 PROCEDURE — 94640 AIRWAY INHALATION TREATMENT: CPT

## 2022-11-16 PROCEDURE — 700111 HCHG RX REV CODE 636 W/ 250 OVERRIDE (IP): Performed by: STUDENT IN AN ORGANIZED HEALTH CARE EDUCATION/TRAINING PROGRAM

## 2022-11-16 PROCEDURE — 700102 HCHG RX REV CODE 250 W/ 637 OVERRIDE(OP): Performed by: THORACIC SURGERY (CARDIOTHORACIC VASCULAR SURGERY)

## 2022-11-16 PROCEDURE — A9270 NON-COVERED ITEM OR SERVICE: HCPCS | Performed by: INTERNAL MEDICINE

## 2022-11-16 PROCEDURE — 99291 CRITICAL CARE FIRST HOUR: CPT | Performed by: INTERNAL MEDICINE

## 2022-11-16 PROCEDURE — 30233N1 TRANSFUSION OF NONAUTOLOGOUS RED BLOOD CELLS INTO PERIPHERAL VEIN, PERCUTANEOUS APPROACH: ICD-10-PCS | Performed by: THORACIC SURGERY (CARDIOTHORACIC VASCULAR SURGERY)

## 2022-11-16 PROCEDURE — A9270 NON-COVERED ITEM OR SERVICE: HCPCS | Performed by: NURSE PRACTITIONER

## 2022-11-16 PROCEDURE — A9270 NON-COVERED ITEM OR SERVICE: HCPCS | Performed by: HOSPITALIST

## 2022-11-16 PROCEDURE — 700111 HCHG RX REV CODE 636 W/ 250 OVERRIDE (IP): Performed by: HOSPITALIST

## 2022-11-16 PROCEDURE — P9016 RBC LEUKOCYTES REDUCED: HCPCS

## 2022-11-16 PROCEDURE — A9270 NON-COVERED ITEM OR SERVICE: HCPCS | Performed by: THORACIC SURGERY (CARDIOTHORACIC VASCULAR SURGERY)

## 2022-11-16 PROCEDURE — 700102 HCHG RX REV CODE 250 W/ 637 OVERRIDE(OP): Performed by: INTERNAL MEDICINE

## 2022-11-16 PROCEDURE — 99233 SBSQ HOSP IP/OBS HIGH 50: CPT | Performed by: INTERNAL MEDICINE

## 2022-11-16 PROCEDURE — 700102 HCHG RX REV CODE 250 W/ 637 OVERRIDE(OP): Performed by: NURSE PRACTITIONER

## 2022-11-16 PROCEDURE — 80048 BASIC METABOLIC PNL TOTAL CA: CPT

## 2022-11-16 PROCEDURE — 85027 COMPLETE CBC AUTOMATED: CPT

## 2022-11-16 PROCEDURE — 700111 HCHG RX REV CODE 636 W/ 250 OVERRIDE (IP): Performed by: INTERNAL MEDICINE

## 2022-11-16 PROCEDURE — 82803 BLOOD GASES ANY COMBINATION: CPT | Mod: 91

## 2022-11-16 PROCEDURE — 93005 ELECTROCARDIOGRAM TRACING: CPT | Performed by: THORACIC SURGERY (CARDIOTHORACIC VASCULAR SURGERY)

## 2022-11-16 PROCEDURE — 770022 HCHG ROOM/CARE - ICU (200)

## 2022-11-16 PROCEDURE — 93010 ELECTROCARDIOGRAM REPORT: CPT | Performed by: STUDENT IN AN ORGANIZED HEALTH CARE EDUCATION/TRAINING PROGRAM

## 2022-11-16 PROCEDURE — 86923 COMPATIBILITY TEST ELECTRIC: CPT

## 2022-11-16 PROCEDURE — 99024 POSTOP FOLLOW-UP VISIT: CPT | Performed by: NURSE PRACTITIONER

## 2022-11-16 PROCEDURE — 36430 TRANSFUSION BLD/BLD COMPNT: CPT

## 2022-11-16 PROCEDURE — 36600 WITHDRAWAL OF ARTERIAL BLOOD: CPT

## 2022-11-16 PROCEDURE — 99233 SBSQ HOSP IP/OBS HIGH 50: CPT | Performed by: HOSPITALIST

## 2022-11-16 RX ORDER — METOPROLOL TARTRATE 1 MG/ML
5 INJECTION, SOLUTION INTRAVENOUS
Status: DISCONTINUED | OUTPATIENT
Start: 2022-11-16 | End: 2022-12-05 | Stop reason: HOSPADM

## 2022-11-16 RX ORDER — FUROSEMIDE 10 MG/ML
40 INJECTION INTRAMUSCULAR; INTRAVENOUS ONCE
Status: COMPLETED | OUTPATIENT
Start: 2022-11-16 | End: 2022-11-16

## 2022-11-16 RX ORDER — ACETAZOLAMIDE 500 MG/5ML
500 INJECTION, POWDER, LYOPHILIZED, FOR SOLUTION INTRAVENOUS ONCE
Status: COMPLETED | OUTPATIENT
Start: 2022-11-16 | End: 2022-11-16

## 2022-11-16 RX ORDER — QUETIAPINE FUMARATE 25 MG/1
12.5 TABLET, FILM COATED ORAL NIGHTLY
Status: DISCONTINUED | OUTPATIENT
Start: 2022-11-17 | End: 2022-11-20

## 2022-11-16 RX ORDER — HALOPERIDOL 5 MG/ML
4 INJECTION INTRAMUSCULAR ONCE
Status: COMPLETED | OUTPATIENT
Start: 2022-11-16 | End: 2022-11-16

## 2022-11-16 RX ORDER — QUETIAPINE FUMARATE 25 MG/1
12.5 TABLET, FILM COATED ORAL NIGHTLY
Status: DISCONTINUED | OUTPATIENT
Start: 2022-11-16 | End: 2022-11-16

## 2022-11-16 RX ADMIN — HALOPERIDOL LACTATE 4 MG: 5 INJECTION, SOLUTION INTRAMUSCULAR at 05:40

## 2022-11-16 RX ADMIN — QUETIAPINE FUMARATE 25 MG: 25 TABLET, FILM COATED ORAL at 22:47

## 2022-11-16 RX ADMIN — HALOPERIDOL LACTATE 1 MG: 5 INJECTION, SOLUTION INTRAMUSCULAR at 05:11

## 2022-11-16 RX ADMIN — AMIODARONE HYDROCHLORIDE 400 MG: 200 TABLET ORAL at 17:04

## 2022-11-16 RX ADMIN — OMEPRAZOLE 40 MG: KIT at 10:17

## 2022-11-16 RX ADMIN — FUROSEMIDE 40 MG: 10 INJECTION, SOLUTION INTRAMUSCULAR; INTRAVENOUS at 17:23

## 2022-11-16 RX ADMIN — SULFAMETHOXAZOLE AND TRIMETHOPRIM 1 TABLET: 800; 160 TABLET ORAL at 11:16

## 2022-11-16 RX ADMIN — FUROSEMIDE 20 MG: 20 TABLET ORAL at 10:17

## 2022-11-16 RX ADMIN — OXYCODONE HYDROCHLORIDE 10 MG: 10 TABLET ORAL at 10:17

## 2022-11-16 RX ADMIN — AMIODARONE HYDROCHLORIDE 400 MG: 200 TABLET ORAL at 10:17

## 2022-11-16 RX ADMIN — METOPROLOL TARTRATE 12.5 MG: 25 TABLET, FILM COATED ORAL at 17:04

## 2022-11-16 RX ADMIN — METOPROLOL TARTRATE 12.5 MG: 25 TABLET, FILM COATED ORAL at 10:18

## 2022-11-16 RX ADMIN — FUROSEMIDE 40 MG: 10 INJECTION, SOLUTION INTRAMUSCULAR; INTRAVENOUS at 15:25

## 2022-11-16 RX ADMIN — ACETAZOLAMIDE 500 MG: 500 INJECTION, POWDER, LYOPHILIZED, FOR SOLUTION INTRAVENOUS at 20:32

## 2022-11-16 RX ADMIN — SULFAMETHOXAZOLE AND TRIMETHOPRIM 1 TABLET: 800; 160 TABLET ORAL at 00:00

## 2022-11-16 RX ADMIN — HALOPERIDOL LACTATE 1 MG: 5 INJECTION, SOLUTION INTRAMUSCULAR at 01:16

## 2022-11-16 ASSESSMENT — CHA2DS2 SCORE
PRIOR STROKE OR TIA OR THROMBOEMBOLISM: YES
AGE 75 OR GREATER: NO
HYPERTENSION: YES
DIABETES: NO
VASCULAR DISEASE: YES
CHF OR LEFT VENTRICULAR DYSFUNCTION: YES
CHA2DS2 VASC SCORE: 7
AGE 65 TO 74: YES
SEX: FEMALE

## 2022-11-16 ASSESSMENT — FIBROSIS 4 INDEX: FIB4 SCORE: 2.84

## 2022-11-16 NOTE — PROGRESS NOTES
Hospital Medicine Daily Progress Note    Date of Service  11/16/2022    Chief Complaint  Lauren Dave is a 72 y.o. female admitted 11/10/2022 with elective surgery    Hospital Course  72 y.o. 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  The patient is found lethargic, she arouses on verbal command, she is confused, she does have weakness in the right lower extremity as well as left upper extremity, the patient's sister is at the bedside assisting the history taking.  The patient is being prepared for transfer to telemetry, chest tubes are being removed, a pacer wire has been removed, a MRI is underway and later reports multiple areas of watershed type infarctions.  There is no bleeding apparent.  A postoperative echo shows an ejection fraction of 35%, the patient is being diuresed, she is on GDMT post surgery.  The patient unfortunately is intolerant to statin medication.  Neurology was consulted after patient was identified to have a CVA.    Interval Problem Update  11/14/2022: Evaluated patient at bedside today patient appears to be without movement of right lower extremity difficult to arouse weak left upper extremity.  Trending back looks like she has had increasing oxygen demand in addition chest tubes removed on 11/13/2022.  Lastly patient is also had decreasing hemoglobin at present we will trend hemoglobin every 6 hours for 24 hours standing  transfusion order has been placed to transfuse 1 unit PRBC if repeat hemoglobin less than 8.0.  Ejection fraction reviewed from DAVE remains at 35% cardiology medicine recommendations appreciated.  Referral to acute rehabilitation has been placed.  We will continue to follow along closely with cardiovascular thoracic surgery as primary.  Present patient has persistent deficit right lower extremity no movement left upper extremity is weak.  Watershed infarct noted above.  Consider discussion of initiation of amantadine with neurology and cardiology service.  11/15: Ms. Dave was evaluated and examined in the IMCU. Hb this morning is 8.3. I discussed with Dr. Gallagher neurology about +/- anticoagulation given her transient afib and he recommends Apixaban which has been started. She remain on NG feeding and in soft wrist restraints. Her sister is at bedside. IV lasix will be stopped due to a CVP 3 and change to oral.   11/16: Ms. Dave was seen and evaluated in the IMCU. Hb 7.6 today. UA + for E coli. Her blood pressure has fluctuated significantly. She is on 5 liters of oxygen. She has been started on Eliquis in addendum to aspirin.     I have discussed this patient's plan of care and discharge plan at IDT rounds today with Case Management, Nursing, Nursing leadership, and other members of the IDT team.    Consultants/Specialty  critical care  Neurology  Physiatry  Cardiology directed therapy  Code Status  Full Code    Disposition  Patient is not medically cleared for discharge.   Anticipate discharge to to an inpatient rehabilitation hospital.  I have placed the appropriate orders for post-discharge needs.    Review of Systems  Review of Systems   Unable to perform ROS: Mental acuity      Physical Exam  Temp:  [36.4 °C (97.6 °F)-36.7 °C (98 °F)] 36.4 °C (97.6 °F)  Pulse:  [] 112  Resp:  [11-33] 12  BP: ()/(50-67) 148/67  SpO2:  [88 %-96 %] 94 %    Physical Exam  Vitals and nursing note reviewed.    Constitutional:       General: She is in acute distress.      Appearance: She is ill-appearing.   HENT:      Head: Normocephalic and atraumatic.      Nose:      Comments: NG tube     Mouth/Throat:      Mouth: Mucous membranes are dry.      Pharynx: Oropharynx is clear.   Eyes:      General: No scleral icterus.     Conjunctiva/sclera: Conjunctivae normal.   Cardiovascular:      Rate and Rhythm: Normal rate and regular rhythm.      Comments: Sternal VAC on  Pulmonary:      Effort: Pulmonary effort is normal.      Breath sounds: Normal breath sounds.   Abdominal:      General: There is no distension.      Tenderness: There is no abdominal tenderness.   Genitourinary:     Comments: Berger and BMS  Musculoskeletal:      Cervical back: Normal range of motion and neck supple.      Right lower leg: No edema.      Left lower leg: No edema.   Skin:     General: Skin is warm and dry.   Neurological:      Mental Status: She is alert.      Comments: Oriented to person and hospital  Right leg no movement  She moves the left intermittently though not to command  She  weakly with both hands and moves the right arm though not to command   Psychiatric:      Comments: Anxious, agitated and yelling out       Fluids    Intake/Output Summary (Last 24 hours) at 11/16/2022 0738  Last data filed at 11/15/2022 2000  Gross per 24 hour   Intake 810 ml   Output 1000 ml   Net -190 ml         Laboratory  Recent Labs     11/14/22  2032 11/15/22  0228 11/16/22  0000   WBC 11.1* 9.0 8.7   RBC 2.82* 2.72* 2.51*   HEMOGLOBIN 8.7* 8.3* 7.6*   HEMATOCRIT 27.7* 27.2* 24.7*   MCV 98.2* 100.0* 98.4*   MCH 30.9 30.5 30.3   MCHC 31.4* 30.5* 30.8*   RDW 47.3 47.7 46.6   PLATELETCT 243 230 229   MPV 10.2 10.0 10.4       Recent Labs     11/14/22  0226 11/15/22  0228 11/16/22  0000   SODIUM 143 139 139   POTASSIUM 3.6 3.5* 3.8   CHLORIDE 101 98 96   CO2 31 35* 37*   GLUCOSE 146* 165* 139*   BUN 31* 31* 40*   CREATININE 1.15 1.19 1.35   CALCIUM 8.6 8.6 8.6                Recent Labs     11/14/22  0226   TRIGLYCERIDE 154*   HDL 54   LDL 76         Imaging  DX-CHEST-PORTABLE (1 VIEW)   Final Result      1.  Removal of right IJ catheter.      2.  Right subclavian catheter unchanged in position.      3.  Mild cardiomegaly.      4.  Blunting of left costophrenic angle consistent with atelectasis, pneumonitis, and/or pleural effusion.      DX-ABDOMEN FOR TUBE PLACEMENT   Final Result      Enteric tube tip projects over the stomach antrum      MR-BRAIN-W/O   Final Result      1.  BILATERAL mostly supratentorial areas of ischemia as described above. These appear to be primarily watershed zone infarctions.   2.  No hemorrhage   3.  No significant mass effect      DX-CHEST-PORTABLE (1 VIEW)   Final Result         1. No significant interval change.      DX-ABDOMEN FOR TUBE PLACEMENT   Final Result      Interval placement of enteric tube with its tip over the midline epigastrium compatible with position at the level of the gastric body.      CT-HEAD W/O   Final Result      1.  Likely subacute infarcts in the bilateral parieto-occipital regions.   2.  No acute intracranial hemorrhage.   3.  Bilateral maxillary sinus disease.      EC-DAVE W/O CONT   Final Result      DX-CHEST-PORTABLE (1 VIEW)   Final Result      Stable enlargement of the cardiomediastinal silhouette, mild diffuse interstitial edema and bilateral basilar atelectasis and/or consolidation.      DX-CHEST-PORTABLE (1 VIEW)   Final Result      Satisfactory postoperative appearance of the chest             Assessment/Plan  * Acute ischemic stroke (HCC)  Assessment & Plan  Post/perioperative event with multiple areas of watershed type infarction  Neurology consulted  DAPT had been given then due to recent afib, neurology recommended Eliquis which was given. Due to hematuria, Eliquis will be held.   Neurology consultation was obtained  PT/OT/SLP and rehabilitation  Seizure precaution, aspiration precaution, fall  precaution  Plan:  1.  Persistent deficit right lower extremity flaccid left upper extremity weakness  2.  Appreciate neurology recommendations may discuss idea of initiation of amantadine.    Encephalopathy acute  Assessment & Plan  Acute encephalopathy with consideration of ICU delirium post-stroke  Nightly Seroquel is appropriate and likely will need to be titrated up.     Acute respiratory failure with hypoxia (HCC)  Assessment & Plan  Requiring 5 liters of oxygen  Chest xray ordered.     PAF (paroxysmal atrial fibrillation) ([facility])- (present on admission)  Assessment & Plan  Post-operative  Refrain from anticoag given hematuria requiring transfusion.    Other specified anemias  Assessment & Plan  Acute blood loss  Hold Eliquis due to hematuria  Check Hb in the morning  Transfuse 1 unit PRBC if repeat hemoglobin less than 8.0.    S/P CABG x 3  Assessment & Plan  As per cardiac surgery, optimization,, mobilization as tolerated,  GDMT  Telemetry    Chronic obstructive pulmonary disease (COPD) ([facility])  Assessment & Plan  Pre-existing, respiratory protocol,  Continuous respiratory therapy protocol.    Chronic systolic congestive heart failure (HCC) EF 35%- (present on admission)  Assessment & Plan  Treated to euvolemia, GDMT as tolerated  Ejection fraction 35% on DAEV.  Plan:   1.  Titrated GDMT medications as her blood pressure tolerates per cardiology recommendations       Dyslipidemia  Assessment & Plan  The patient with prior severe intolerance, apparently failed multiple regimens  Consider outpatient referral to PCSK9 treatment       VTE prophylaxis: therapeutic anticoagulation with Eliquis    I have performed a physical exam and reviewed and updated ROS and Plan today (11/16/2022). In review of yesterday's note (11/15/2022), there are no changes except as documented above.

## 2022-11-16 NOTE — PROGRESS NOTES
Interval:  Started doac yesterday. Hematuria this morning. On telemetry 30 min episode atrial fibrillation rapid ventricular rate. More delirius this morning compared to yesterday. Patient pulled her NGT innapropriately.     Medications / Drug list prior to admission:  No current facility-administered medications on file prior to encounter.     Current Outpatient Medications on File Prior to Encounter   Medication Sig Dispense Refill    amLODIPine (NORVASC) 2.5 MG Tab Take 1 Tablet by mouth 2 times a day. 60 Tablet 3    metoprolol SR (TOPROL XL) 50 MG TABLET SR 24 HR Take 1 Tablet by mouth every day. 90 Tablet 3    ampicillin (PRINCIPEN) 500 MG Cap Take 1 Capsule by mouth 4 times a day. Twice a day for 7 days      aspirin 81 MG EC tablet Take 1 Tablet by mouth every day. 100 Tablet 0    hydrALAZINE (APRESOLINE) 10 MG Tab Take 1 Tablet by mouth 3 times a day as needed (/100). 25 Tablet 11    hydroxychloroquine (PLAQUENIL) 200 MG Tab Take 1 tablet by mouth every day. 30 tablet 0    famotidine (PEPCID) 40 MG Tab Take 1 tablet by mouth twice daily (Patient taking differently: Take 1 Tablet by mouth as needed.) 180 tablet 1    Cyanocobalamin (VITAMIN B-12 PO) Take 1 Tablet by mouth every morning.      ibuprofen (MOTRIN) 800 MG Tab TAKE 1 TABLET BY MOUTH EVERY 8 HOURS AS NEEDED FOR MILD PAIN 90 Tab 0    Cholecalciferol (VITAMIN D3) 5000 units Cap Take 1 Capsule by mouth every day.         Current list of administered Medications:    Current Facility-Administered Medications:     Metoprolol Tartrate (LOPRESSOR) injection 5 mg, 5 mg, Intravenous, Q5 MIN PRN, Kendall Yeboah M.D.    furosemide (LASIX) tablet 20 mg, 20 mg, Enteral Tube, Q DAY, Velasquez Hamilton M.D.    sulfamethoxazole-trimethoprim (BACTRIM DS) 800-160 MG tablet 1 Tablet, 1 Tablet, Enteral Tube, Q12HRS, Ava Jose, A.P.N., 1 Tablet at 11/16/22 0000    apixaban (ELIQUIS) tablet 5 mg, 5 mg, Oral, BID, Hayde Dudley A.P.N., 5 mg at 11/15/22 1702     haloperidol lactate (HALDOL) injection 1 mg, 1 mg, Intravenous, Q4HRS PRN, Kenneth Pugh M.D., 1 mg at 22 0511    loperamide (IMODIUM) oral suspension 2 mg, 2 mg, Enteral Tube, 4X/DAY PRN, Vinicius Walden M.D.    QUEtiapine (Seroquel) tablet 25 mg, 25 mg, Enteral Tube, HS PRN, YULISSA Wheeler, 25 mg at 11/15/22 1923    amiodarone (Cordarone) tablet 400 mg, 400 mg, Enteral Tube, TWICE DAILY, Padmini Mac M.D., 400 mg at 11/15/22 1702    Pharmacy Consult: Enteral tube insertion - review meds/change route/product selection, 1 Each, Other, PHARMACY TO DOSE, Alcides Castañeda M.D.    metoprolol tartrate (LOPRESSOR) tablet 12.5 mg, 12.5 mg, Enteral Tube, TWICE DAILY, Alcides Castañeda M.D., 12.5 mg at 11/15/22 1702    [] acetaminophen (TYLENOL) tablet 1,000 mg, 1,000 mg, Enteral Tube, Q6HRS, 1,000 mg at 11/15/22 1149 **FOLLOWED BY** acetaminophen (TYLENOL) tablet 1,000 mg, 1,000 mg, Enteral Tube, Q6HRS PRN, Kamron Back M.D.    [Held by provider] aspirin (ASA) chewable tab 81 mg, 81 mg, Enteral Tube, DAILY, Kamron Back M.D., 81 mg at 11/15/22 0557    omeprazole (FIRST-OMEPRAZOLE) 2 mg/mL oral susp 40 mg, 40 mg, Enteral Tube, DAILY, Kamron Back M.D., 40 mg at 11/15/22 0558    acetaminophen (Tylenol) tablet 650 mg, 650 mg, Enteral Tube, Q4HRS PRN **OR** acetaminophen (TYLENOL) suppository 650 mg, 650 mg, Rectal, Q4HRS PRN, Kamron Back M.D.    mag hydrox-al hydrox-simeth (MAALOX PLUS ES or MYLANTA DS) suspension 30 mL, 30 mL, Enteral Tube, Q4HRS PRN, Kamron Back M.D.    oxyCODONE immediate-release (ROXICODONE) tablet 5 mg, 5 mg, Enteral Tube, Q3HRS PRN, 5 mg at 11/15/22 1706 **OR** oxyCODONE immediate release (ROXICODONE) tablet 10 mg, 10 mg, Enteral Tube, Q3HRS PRN, 10 mg at 11/15/22 2153 **OR** fentaNYL (SUBLIMAZE) injection 50 mcg, 50 mcg, Intravenous, Q3HRS PRN, Kamron Back M.D.    traMADol (Ultram) 50 MG tablet 50 mg,  50 mg, Enteral Tube, Q4HRS PRN, Kamron Back M.D., 50 mg at 11/14/22 0862    Respiratory Therapy Consult, , Nebulization, Continuous RT, ROMAIN KwonRTRESSA    electrolyte-A (PLASMALYTE-A) infusion, , Intravenous, PRN, FENG Kwon.RTRESSA, Last Rate: 999 mL/hr at 11/10/22 1619, New Bag at 11/10/22 1619    Pharmacy Consult Request ...Pain Management Review 1 Each, 1 Each, Other, PHARMACY TO DOSE, YULISSA Kwon    ondansetron (ZOFRAN) syringe/vial injection 8 mg, 8 mg, Intravenous, Q6HRS PRN, 8 mg at 11/12/22 0100 **OR** prochlorperazine (COMPAZINE) injection 10 mg, 10 mg, Intravenous, Q6HRS PRN **OR** promethazine (PHENERGAN) suppository 25 mg, 25 mg, Rectal, Q6HRS PRN, SHANKAR KwonP.R.MIGUEL.    Past Medical History:   Diagnosis Date    Arthritis     Breast cancer (HCC) 08/07/2013    Bronchitis 2005    Cancer (HCC)     right breast cancer     Cataract     removed    Chronic systolic congestive heart failure (HCC) EF 35% 10/12/2022    Coronary artery disease due to calcified coronary lesion - Calcifications seen on CT scan also wtih aortic ulceration     Dental disorder     tooth infection    Discoid lupus     Dyslipidemia     Tried atorvastatin, pravastatin, lovastatin, and Zetia.  All causes severe myalgias.  Just taking fish oil.      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     Ischemic heart disease due to coronary artery obstruction (HCC) - Severe 3vD on cath 10/19/2022    LBBB (left bundle branch block) 12/16/2014    Noted on prechemo EKG 9/2014, MUGA WNL    Pneumonia     Pseudogout     Scarlet fever     Unspecified hemorrhagic conditions     gums       Past Surgical History:   Procedure Laterality Date    MULTIPLE CORONARY ARTERY BYPASS ENDO VEIN HARVEST  11/10/2022    Procedure: CORONARY ARTERY BYPASS GRAFTING X 3, WITH LEFT INTERNAL MAMMARY ARTERY TAKEDOWN AND ENDOSCOPIC VEIN HARVEST LEFT GREATER SAPHENOUS VEIN, AND  TRANSESOPHAGEAL ECHOCARDIOGRAM;  Surgeon: Padmini Mac M.D.;  Location: SURGERY [facility];  Service: Cardiothoracic    ECHOCARDIOGRAM, TRANSESOPHAGEAL, INTRAOPERATIVE  11/10/2022    Procedure: ECHOCARDIOGRAM, TRANSESOPHAGEAL, INTRAOPERATIVE;  Surgeon: Padmini Mac M.D.;  Location: SURGERY [facility];  Service: Cardiothoracic    OTHER CARDIAC SURGERY  11/2022    angiogram    CATH REMOVAL  02/24/2014    Performed by Aggie Burns M.D. at [facility]    MASTECTOMY  08/22/2013    Performed by Aggie Burns M.D. at [facility]    NODE BIOPSY SENTINEL  08/22/2013    Performed by Aggie Burns M.D. at [facility]    CATH PLACEMENT  08/22/2013    Performed by Aggie Burns M.D. at [facility]    US-NEEDLE CORE BX-BREAST PANEL  01/01/2013    right breast    COLONOSCOPY  01/01/2003    BREAST BIOPSY      TONSILLECTOMY         Family History   Problem Relation Age of Onset    Cancer Mother         possible thyroid and gynecological    Multiple Sclerosis Mother     Arthritis Father     Multiple Sclerosis Brother     Gout Brother     Heart Disease Paternal Grandmother     Diabetes Paternal Grandmother     Cancer Maternal Aunt         breast cancer- 60s    Diabetes Maternal Uncle     Heart Disease Maternal Grandmother     Heart Disease Maternal Grandfather         MI    Stroke Paternal Grandfather     Other Son         breast mass    Heart Disease Son         HEART MURMUR    Gout Son      Patient family history was personally reviewed, no pertinent family history to current presentation    Social History     Socioeconomic History    Marital status:      Spouse name: Not on file    Number of children: 2    Years of education: Not on file    Highest education level: Not on file   Occupational History     Employer: [employer]   Tobacco Use    Smoking status: Former     Packs/day: 1.00     Years: 0.00     Pack years: 0.00      "Types: Cigarettes     Quit date: 10/1/2013     Years since quittin.1    Smokeless tobacco: Never   Vaping Use    Vaping Use: Never used   Substance and Sexual Activity    Alcohol use: No     Alcohol/week: 0.0 oz    Drug use: No    Sexual activity: Not on file     Comment: , 2 children, enemployed   Other Topics Concern    Not on file   Social History Narrative    ** Merged History Encounter **         Patient is a . Patient has 2 adult children. She is  from .           Social Determinants of Health     Financial Resource Strain: Not on file   Food Insecurity: Not on file   Transportation Needs: Not on file   Physical Activity: Not on file   Stress: Not on file   Social Connections: Not on file   Intimate Partner Violence: Not on file   Housing Stability: Not on file       ALLERGIES:  Allergies   Allergen Reactions    Statins [Hmg-Coa-R Inhibitors] Myalgia     Muscle Spasms   RXN=unknown    Atorvastatin Myalgia    Codeine Vomiting and Nausea     Clarified with patient - states that she has an \"upset stomach\" when she takes it    Eggs Diarrhea     Pt states that she is allergic to eggs per her mother.  Tolerated clevidipine 2022    Lisinopril Cough    Losartan Unspecified     Tried in 2017  Cannot recall reaction    Penicillins Unspecified     \"does not work\" .'IMMUNE TO PENICILLIN,AMPICILLIN IS THE ONLY THING THAT WORKS'       Review of systems:  A complete review of symptoms was reviewed with patient. This is reviewed in H&P and PMH. ALL OTHERS reviewed and negative    Physical exam:  Vitals:    11/15/22 2200 22 0000 22 0300 22 0700   BP: 128/60 120/56  (!) 148/67   Pulse: (!) 108 94  (!) 112   Resp: 18 12     Temp:  36.6 °C (97.9 °F)  36.4 °C (97.6 °F)   TempSrc:  Temporal  Temporal   SpO2: 90% 93%  94%   Weight:   67.6 kg (149 lb 0.5 oz)    Height:             General: No acute distress.  EYES: no jaundice  HEENT: OP clear   Neck: No bruits No JVD.   CVS: " RRR. S1 + S2. No M/R/G. Trace edema.  Resp: CTAB. No wheezing or crackles/rhonchi.  Abdomen: Soft, NT, ND,  Skin: Grossly nothing acute no obvious rashes  Neurological: Alert  Extremities: Pulse 2+ in b/l LE. No cyanosis.     Data:  Laboratory studies personally reviewed by me:  Recent Results (from the past 24 hour(s))   EKG    Collection Time: 11/15/22  8:12 AM   Result Value Ref Range    Report       [facility]    Test Date:  2022-11-15  Pt Name:    DMITRI LI              Department: [facility] [place]  MRN:        [id number]                      [place]       [facility] [place]:     Female                       Technician: CANDACE  :        1950                   Requested By:SOFI WAY  Order #:    [id number]                    Reading MD: Gagandeep Welsh MD    Measurements  Intervals                                Axis  Rate:       79                           P:          -2  MT:         182                          QRS:        -29  QRSD:       160                          T:          136  QT:         462  QTc:        530    Interpretive Statements  Sinus rhythm  Left bundle branch block  Compared to ECG 2022 22:09:48  Atrial fibrillation no longer present  Electronically Signed On 11- 14:54:19 PST by Gagandeep Welsh MD     CBC without Differential Critical Care 0130    Collection Time: 22 12:00 AM   Result Value Ref Range    WBC 8.7 4.8 - 10.8 K/uL    RBC 2.51 (L) 4.20 - 5.40 M/uL    Hemoglobin 7.6 (L) 12.0 - 16.0 g/dL    Hematocrit 24.7 (L) 37.0 - 47.0 %    MCV 98.4 (H) 81.4 - 97.8 fL    MCH 30.3 27.0 - 33.0 pg    MCHC 30.8 (L) 33.6 - 35.0 g/dL    RDW 46.6 35.9 - 50.0 fL    Platelet Count 229 164 - 446 K/uL    MPV 10.4 9.0 - 12.9 fL   Basic Metabolic Panel (BMP) Critical Care 0130    Collection Time: 22 12:00 AM   Result Value Ref Range    Sodium 139 135 - 145 mmol/L    Potassium 3.8 3.6 - 5.5 mmol/L    Chloride 96 96 - 112 mmol/L    Co2 37 (H) 20 - 33 mmol/L    Glucose 139 (H) 65 - 99  mg/dL    Bun 40 (H) 8 - 22 mg/dL    Creatinine 1.35 0.50 - 1.40 mg/dL    Calcium 8.6 8.5 - 10.5 mg/dL    Anion Gap 6.0 (L) 7.0 - 16.0   ESTIMATED GFR    Collection Time: 22 12:00 AM   Result Value Ref Range    GFR (CKD-EPI) 42 (A) >60 mL/min/1.73 m 2   EKG    Collection Time: 22  5:22 AM   Result Value Ref Range    Report       [facility]    Test Date:  2022  Pt Name:    DMITRI LI              Department: [facility]  MRN:        [id number]                      Room:       [facility] [place]  Gender:     Female                       Technician: IRMA  :        1950                   Requested By:YUDELKA KIM  Order #:    [id number]                    Reading MD:    Measurements  Intervals                                Axis  Rate:       110                          P:          28  IL:         150                          QRS:        -9  QRSD:       150                          T:          131  QT:         339  QTc:        459    Interpretive Statements  Sinus tachycardia  IVCD, consider atypical LBBB  Inferior infarct, acute  Lateral leads are also involved  Compared to ECG 11/15/2022 08:12:08  Myocardial infarct finding now present  Sinus rhythm no longer present     POCT arterial blood gas device results    Collection Time: 22  6:32 AM   Result Value Ref Range    Ph 7.525 (H) 7.400 - 7.500    Pco2 47.5 (H) 26.0 - 37.0 mmHg    Po2 68 64 - 87 mmHg    Tco2 41 (HH) 20 - 33 mmol/L    S02 95 93 - 99 %    Hco3 39.2 (H) 17.0 - 25.0 mmol/L    BE 15 (H) -4 - 3 mmol/L    Body Temp 97.0 F degrees    Ph Temp Keila 7.539 (H) 7.400 - 7.500    Pco2 Temp Co 45.7 (H) 26.0 - 37.0 mmHg    Po2 Temp Cor 64 64 - 87 mmHg    Specimen Arterial     DelSys Other     LPM 10 lpm       EKG 22 interpreted by me AF RVR, LBBB    All pertinent features of laboratory and imaging reviewed including primary images where applicable    TTE 10/12/22  CONCLUSIONS  Compared to the images of the prior study 2020, there is  now   reduced ejection fraction and wall motion abnormalities, previously   normal.     Moderately reduced left ventricular systolic function.  The left ventricular ejection fraction is visually estimated to be 35%.  Hypokinesis of the inferior and septal walls.  Grade I diastolic dysfunction.  The right ventricle is normal in size and systolic function.  No significant valvular abnormalities.      TTE 11/11/22  CONCLUSIONS  Decreased LV function due to septal hypokinesis.  EF 35%. Post op LV   function unchanged from preop function.    MRI brain  IMPRESSION:  1.  BILATERAL mostly supratentorial areas of ischemia as described above. These appear to be primarily watershed zone infarctions.  2.  No hemorrhage  3.  No significant mass effect    Principal Problem:    Acute ischemic stroke (HCC) POA: Unknown  Active Problems:    Chronic systolic congestive heart failure ([facility]) EF 35% (Chronic) POA: Yes    Chronic obstructive pulmonary disease (COPD) ([facility]) POA: Unknown    S/P CABG x 3 POA: Unknown    Encounter for weaning from ventilator ([facility]) POA: Unknown    Other specified anemias POA: Unknown    PAF (paroxysmal atrial fibrillation) ([facility]) POA: Yes  Resolved Problems:    * No resolved hospital problems. *      Assessment / Plan:  72 year old woman with PMH CAD, HTN, hx breast CA, HLD, ICM HFrEF 35% presents for coronary bypass surgery completed 11/10/22 (LIMA to LAD, SVG to OM2, SVG to dRCA) c/b CVA and paroxysmal AF. Consult for heart failure medical optimization.     -appreciate neurology input. Agree for doac when no contraindications without antiplatelets to reduce risk of bleeding.     -HFOMT when no contraindications  - - low dose lisinopril, can start 2.5mg daily and titrate as tolerated  - - farxiga 10mg daily. Can then hold loop diuretic and dose as needed for goal CVP 2-5  - - eventually beta blocker such as metoprolol succinate or carvedilol  - - eventually MRA such as spironolactone  -AF  - - doac when able  from post operative, stroke, and hematuria. Chadsvasc 7.  - - ok to continue rhythm control with amiodarone. 10g load via 400mg bid ok then 200mg daily.  -CAD  - - high intensity statin   - - ok to hold further antiplatelets while on doac to reduce bleeding risk  - - PPI for ppx    It is my pleasure to participate in the care of Ms. Dave.  Please do not hesitate to contact me with questions or concerns.    Velasquez Hamilton MD  Cardiologist [facility]    A total of 46 minutes of time was spent on day of encounter reviewing medical record, performing history and examination, counseling, ordering medication/test/consults and documentation.

## 2022-11-16 NOTE — DISCHARGE PLANNING
JACQUELINE spoke with pt's nephew Sung via phone. JACQUELINE provided information AHCD/DPOA paperwork and temporary guardianship, as pt's family has concerns about accessing pt's [employer] accounts to pay bills and manage other affairs. JACQUELINE provided information on how to pursue guardianship if pt does not improve enough to manage her owns affairs.    JACQUELINE also provided Sung with a SNF choice form via email. He will review with pt's other family and son Tommy, and respond back via phone with SNF choice.     Care Transition Team Assessment    Pt's son, Tommy, and son, Jesse, are legal NOK. Per nephew Sung, Tommy is best point of contact for medical decision making.     Information Source  Orientation Level: Oriented to person, Oriented to situation, Oriented to place  Who is responsible for making decisions for patient? : Patient    Readmission Evaluation  Is this a readmission?: No    Elopement Risk  Legal Hold: No  Ambulatory or Self Mobile in Wheelchair: No-Not an Elopement Risk  Elopement Risk: Not at Risk for Elopement    Interdisciplinary Discharge Planning  Housing / Facility: Unable To Determine At This Time  Prior Services: Unable To Determine At This Time    Discharge Preparedness  What is your plan after discharge?: Skilled nursing facility  What are your discharge supports?: Child, Sibling  Prior Functional Level: Ambulatory, Independent with Activities of Daily Living, Independent with Medication Management  Difficulity with ADLs: None  Difficulity with IADLs: None    Functional Assesment  Prior Functional Level: Ambulatory, Independent with Activities of Daily Living, Independent with Medication Management    Finances  Financial Barriers to Discharge: No  Prescription Coverage: Yes    Vision / Hearing Impairment  Right Eye Vision: Wears Glasses  Left Eye Vision: Wears Glasses         Advance Directive  Advance Directive?: None    Domestic Abuse  Physical Abuse or Sexual Abuse: No  Verbal Abuse or Emotional Abuse:  No  Possible Abuse/Neglect Reported to:: Not Applicable    Psychological Assessment  History of Substance Abuse: None  History of Psychiatric Problems: No  Non-compliant with Treatment: No  Newly Diagnosed Illness: No    Discharge Risks or Barriers  Discharge risks or barriers?: No    Anticipated Discharge Information  Discharge Disposition: D/T to SNF with Medicare cert in anticipation of skilled care (03)

## 2022-11-16 NOTE — PROGRESS NOTES
Patient converted to Sinus Tachycardia VS. Atrial Fibrillation sustaining in 140s-160s 0500.  STAT EKG ordered. 5mg halidol given for agitation per hospitalist at bedside and patient converted back to ST sustaining 110s prior to running EKG.. BPS stable throughout rhythmic episode and CT surgery was updated; and will be rounding soon in AM. ABG ordered per protocol.

## 2022-11-16 NOTE — PROGRESS NOTES
Cardiovascular Surgery Progress Note    Name: Lauren Dave  MRN: [id number]  : 1950  Admit Date: 11/10/2022  5:08 AM  6 Days Post-Op     Procedure:  Procedure(s) and Anesthesia Type:     * CORONARY ARTERY BYPASS GRAFTING X 3, WITH LEFT INTERNAL MAMMARY ARTERY TAKEDOWN AND ENDOSCOPIC VEIN HARVEST LEFT GREATER SAPHENOUS VEIN, AND TRANSESOPHAGEAL ECHOCARDIOGRAM - General     * ECHOCARDIOGRAM, TRANSESOPHAGEAL, INTRAOPERATIVE - General    Vitals:  Vitals:    22 0700 22 0800 22 0845 22 0919   BP: (!) 148/67 (!) 146/64  (!) 150/67   Pulse: (!) 112 (!) 110 (!) 108 (!) 107   Resp:  16 (!)  18   Temp: 36.4 °C (97.6 °F)   36.5 °C (97.7 °F)   TempSrc: Temporal      SpO2: 94% 94% 97% 95%   Weight:       Height:          Temp (24hrs), Av.6 °C (97.8 °F), Min:36.4 °C (97.6 °F), Max:36.7 °C (98 °F)      Respiratory:    Respiration: 18, Pulse Oximetry: 95 %       Fluids:    Intake/Output Summary (Last 24 hours) at 2022 0929  Last data filed at 11/15/2022 2000  Gross per 24 hour   Intake 810 ml   Output 1000 ml   Net -190 ml       Admit weight: Weight: 67.1 kg (147 lb 14.9 oz)  Current weight: Weight: 67.6 kg (149 lb 0.5 oz) (22 0300)    Labs:  Recent Labs     11/14/22  2032 11/15/22  0228 22  0000   WBC 11.1* 9.0 8.7   RBC 2.82* 2.72* 2.51*   HEMOGLOBIN 8.7* 8.3* 7.6*   HEMATOCRIT 27.7* 27.2* 24.7*   MCV 98.2* 100.0* 98.4*   MCH 30.9 30.5 30.3   MCHC 31.4* 30.5* 30.8*   RDW 47.3 47.7 46.6   PLATELETCT 243 230 229   MPV 10.2 10.0 10.4       Recent Labs     22  0226 11/15/22  0228 22  0000   SODIUM 143 139 139   POTASSIUM 3.6 3.5* 3.8   CHLORIDE 101 98 96   CO2 31 35* 37*   GLUCOSE 146* 165* 139*   BUN 31* 31* 40*   CREATININE 1.15 1.19 1.35   CALCIUM 8.6 8.6 8.6                   Medications:  Scheduled Medications   Medication Dose Frequency    QUEtiapine  12.5 mg Nightly    furosemide  20 mg Q DAY    sulfamethoxazole-trimethoprim  1 Tablet Q12HRS    apixaban   5 mg BID    amiodarone  400 mg TWICE DAILY    Pharmacy  1 Each PHARMACY TO DOSE    metoprolol tartrate  12.5 mg TWICE DAILY    [Held by provider] aspirin  81 mg DAILY    omeprazole  40 mg DAILY    Pharmacy Consult Request  1 Each PHARMACY TO DOSE        Exam:   Physical Exam  Constitutional:       General: She is not in acute distress.  HENT:      Mouth/Throat:      Mouth: Mucous membranes are moist.   Eyes:      Pupils: Pupils are equal, round, and reactive to light.   Cardiovascular:      Rate and Rhythm: Normal rate and regular rhythm.      Pulses: Normal pulses.      Heart sounds: Normal heart sounds.   Pulmonary:      Effort: No respiratory distress.      Breath sounds: Examination of the right-lower field reveals decreased breath sounds. Examination of the left-lower field reveals decreased breath sounds. Decreased breath sounds present.   Abdominal:      Palpations: Abdomen is soft.   Musculoskeletal:      Right lower leg: Edema present.      Left lower leg: Edema present.   Skin:     General: Skin is warm and dry.      Comments: Sternum- prevena  RSVG site- c/d/i   Neurological:      Mental Status: She is alert. She is disoriented.   Psychiatric:         Behavior: Behavior is slowed.         Judgment: Judgment is impulsive.       Cardiac Medications:    ASA - Yes    Plavix - Yes    Post-operative Beta Blockers - Yes    Ace/ARB- No; contraindicated because of Hypotension    Statin - No; contraindicated because of Allergy/intolerance    Aldactone- No; contraindicated because of Hypotension    Ejection Fraction:  35%    Telemetry:   11/11 SR  11/12 SR/ST 90-100s  11/13 SR  11/14 SR, a fib last night  11/15 SR   11/16 SR     Assessment/Plan:  POD 1  HDS, SR, neuro not moving right leg but follows commands, cleviprex drip, wounds CDI, abdomen soft, fluid balance positive, wt up,  chest tube output moderate.  CT head this AM.  Plan:  wean vent as tolerated.  Allow permissive hypertension, OK for systolic  120-140's, CPM.     POD 2 HDS, SR/ST.  Neuro can say name, no movement in right leg.  Weaker left upper extremity, following commands intermittently, pulling at NG/central line.  Wounds CDI, prevena in tact.  Moderate serosanguinous output from chest tube.  Cr 1.18 Hgb 9.2.  Neurology recommending MRI brain, pending currently.  Plan: MRI when available.  Ok for permissive HTN of SBP of 140 or less.  Encourage IS.  Restraint for RUE due to trying to remove central line and NG tubes with impulsivity.      POD 3 HDS. SR.  Neuro aware to self and place.  Good strength in right hand and left leg.  Left arm weak.  No movement right leg.  Pacer wires removed. Pt tolerated well.  Cr 1.14, Hgb 8.8. Plan: MRI brain today, pacer wires out.  DC chest tubes.  Encourage IS.  Work with PT as tolerated.    POD 4 BP 90s-140s, SR, a fib overnight, lethargic this morning, flaccid right leg, weak left arm, wounds CDI, abdomen soft, fluid balance down, wt up, 5L NC. On tube feeding. Plan: UA and culture today, transfer to IMCU, continue diuresis, amio gtt to PO, IS/ambulate.     POD 5  HDS, SR on PO amio, neuro lethargic moving three extremities on command 5/5  strength uppers, not able to get her to move RLE, wounds Prevena intact, abdomen soft n/t BMS in place for incontinence, Hgb 8.3, Cr 1.19,  fluid balance negative, wt down,  BP labile this AM. Plan:  decrease diuresis for labile BP, fluid balance negative, Wean Oxygen, Encourage IS/ambulate.     POD 6 HDS, SR- on po amio- eliquis being held for anemia and hematuria, neuro - unchanged- agitated overnight given haldol- start nightly seroquel 12.5 mg, acute blood loss anemia- transfuse    Disposition:  Therapies recommending post acute placement, rehab referral

## 2022-11-16 NOTE — PROGRESS NOTES
Patient vitals not slaving over due to tech issues. Patients blood pressures ranged from 130s-160s with HR anywhere from  with SR-ST and into Afib RVR.    Benzoyl Peroxide Counseling: Patient counseled that medicine may cause skin irritation and bleach clothing.  In the event of skin irritation, the patient was advised to reduce the amount of the drug applied or use it less frequently.   The patient verbalized understanding of the proper use and possible adverse effects of benzoyl peroxide.  All of the patient's questions and concerns were addressed.

## 2022-11-16 NOTE — CARE PLAN
The patient is Watcher - Medium risk of patient condition declining or worsening    Shift Goals  Clinical Goals: stable neuros and vitals  Patient Goals: SUNDAR  Family Goals: SUNDAR    Progress made toward(s) clinical / shift goals:      Problem: Knowledge Deficit - Standard  Goal: Patient and family/care givers will demonstrate understanding of plan of care, disease process/condition, diagnostic tests and medications  Outcome: Progressing; Education provided to patient     Problem: Skin Integrity  Goal: Skin integrity is maintained or improved  Outcome: Progressing; frequently repositioned throughout shift, skin precautions in place.      Problem: Pain - Standard  Goal: Alleviation of pain or a reduction in pain to the patient’s comfort goal  Outcome: Progressing, Pain managed with medication active in MAR.     Problem: Safety - Medical Restraint  Goal: Remains free of injury from restraints (Restraint for Interference with Medical Device)  Outcome: Progressing; frequent range of motion provided, attempting to remove lines and tubes.

## 2022-11-17 ENCOUNTER — APPOINTMENT (OUTPATIENT)
Dept: RADIOLOGY | Facility: MEDICAL CENTER | Age: 72
DRG: 235 | End: 2022-11-17
Attending: INTERNAL MEDICINE
Payer: MEDICARE

## 2022-11-17 ENCOUNTER — APPOINTMENT (OUTPATIENT)
Dept: RADIOLOGY | Facility: MEDICAL CENTER | Age: 72
DRG: 235 | End: 2022-11-17
Attending: EMERGENCY MEDICINE
Payer: MEDICARE

## 2022-11-17 ENCOUNTER — APPOINTMENT (OUTPATIENT)
Dept: CARDIOLOGY | Facility: MEDICAL CENTER | Age: 72
DRG: 235 | End: 2022-11-17
Attending: NURSE PRACTITIONER
Payer: MEDICARE

## 2022-11-17 ENCOUNTER — APPOINTMENT (OUTPATIENT)
Dept: CARDIOLOGY | Facility: MEDICAL CENTER | Age: 72
DRG: 235 | End: 2022-11-17
Attending: INTERNAL MEDICINE
Payer: MEDICARE

## 2022-11-17 PROBLEM — I50.20 HFREF (HEART FAILURE WITH REDUCED EJECTION FRACTION) (HCC): Status: ACTIVE | Noted: 2022-11-17

## 2022-11-17 PROBLEM — D62 ACUTE BLOOD LOSS ANEMIA: Status: ACTIVE | Noted: 2022-11-17

## 2022-11-17 LAB
ABO GROUP BLD: NORMAL
ANION GAP SERPL CALC-SCNC: 11 MMOL/L (ref 7–16)
APPEARANCE UR: CLEAR
BACTERIA #/AREA URNS HPF: NEGATIVE /HPF
BASE EXCESS BLDA CALC-SCNC: 15 MMOL/L (ref -4–3)
BILIRUB UR QL STRIP.AUTO: NEGATIVE
BODY TEMPERATURE: ABNORMAL DEGREES
BUN SERPL-MCNC: 37 MG/DL (ref 8–22)
CALCIUM SERPL-MCNC: 9.1 MG/DL (ref 8.5–10.5)
CHLORIDE SERPL-SCNC: 96 MMOL/L (ref 96–112)
CHLORIDE UR-SCNC: 33 MMOL/L
CO2 BLDA-SCNC: 41 MMOL/L (ref 20–33)
CO2 SERPL-SCNC: 35 MMOL/L (ref 20–33)
COLOR UR: YELLOW
CREAT SERPL-MCNC: 1.57 MG/DL (ref 0.5–1.4)
DAT C3D-SP REAG RBC QL: NORMAL
DAT IGG-SP REAG RBC QL: NORMAL
EPI CELLS #/AREA URNS HPF: NEGATIVE /HPF
ERYTHROCYTE [DISTWIDTH] IN BLOOD BY AUTOMATED COUNT: 52 FL (ref 35.9–50)
GFR SERPLBLD CREATININE-BSD FMLA CKD-EPI: 35 ML/MIN/1.73 M 2
GLUCOSE BLD STRIP.AUTO-MCNC: 152 MG/DL (ref 65–99)
GLUCOSE SERPL-MCNC: 168 MG/DL (ref 65–99)
GLUCOSE UR STRIP.AUTO-MCNC: NEGATIVE MG/DL
HCO3 BLDA-SCNC: 39.9 MMOL/L (ref 17–25)
HCT VFR BLD AUTO: 35.6 % (ref 37–47)
HGB BLD-MCNC: 11.4 G/DL (ref 12–16)
HOROWITZ INDEX BLDA+IHG-RTO: 114 MM[HG]
HYALINE CASTS #/AREA URNS LPF: NORMAL /LPF
KETONES UR STRIP.AUTO-MCNC: NEGATIVE MG/DL
LACTATE BLD-SCNC: 0.7 MMOL/L (ref 0.5–2)
LEUKOCYTE ESTERASE UR QL STRIP.AUTO: NEGATIVE
LV EJECT FRACT  99904: 30
LV EJECT FRACT MOD 2C 99903: 5.29
LV EJECT FRACT MOD 4C 99902: 34.62
LV EJECT FRACT MOD BP 99901: 24.33
MAGNESIUM SERPL-MCNC: 2 MG/DL (ref 1.5–2.5)
MCH RBC QN AUTO: 29.5 PG (ref 27–33)
MCHC RBC AUTO-ENTMCNC: 32 G/DL (ref 33.6–35)
MCV RBC AUTO: 92.2 FL (ref 81.4–97.8)
MICRO URNS: ABNORMAL
NITRITE UR QL STRIP.AUTO: NEGATIVE
O2/TOTAL GAS SETTING VFR VENT: 90 %
PATHOLOGIST INTERPRETATION PTRX: NORMAL
PCO2 BLDA: 49.4 MMHG (ref 26–37)
PCO2 TEMP ADJ BLDA: 49.4 MMHG (ref 26–37)
PH BLDA: 7.51 [PH] (ref 7.4–7.5)
PH TEMP ADJ BLDA: 7.51 [PH] (ref 7.4–7.5)
PH UR STRIP.AUTO: 8.5 [PH] (ref 5–8)
PLATELET # BLD AUTO: 242 K/UL (ref 164–446)
PMV BLD AUTO: 11.2 FL (ref 9–12.9)
PO2 BLDA: 103 MMHG (ref 64–87)
PO2 TEMP ADJ BLDA: 103 MMHG (ref 64–87)
POTASSIUM SERPL-SCNC: 3.7 MMOL/L (ref 3.6–5.5)
PROT UR QL STRIP: 100 MG/DL
RBC # BLD AUTO: 3.86 M/UL (ref 4.2–5.4)
RBC # URNS HPF: NORMAL /HPF
RBC UR QL AUTO: ABNORMAL
RH BLD: NORMAL
SAO2 % BLDA: 98 % (ref 93–99)
SODIUM SERPL-SCNC: 142 MMOL/L (ref 135–145)
SP GR UR STRIP.AUTO: 1.02
SPECIMEN DRAWN FROM PATIENT: ABNORMAL
STAT TRANS INVEST 8506STI: NORMAL
UROBILINOGEN UR STRIP.AUTO-MCNC: 0.2 MG/DL
WBC # BLD AUTO: 9.3 K/UL (ref 4.8–10.8)
WBC #/AREA URNS HPF: NORMAL /HPF

## 2022-11-17 PROCEDURE — 700102 HCHG RX REV CODE 250 W/ 637 OVERRIDE(OP): Performed by: STUDENT IN AN ORGANIZED HEALTH CARE EDUCATION/TRAINING PROGRAM

## 2022-11-17 PROCEDURE — 99291 CRITICAL CARE FIRST HOUR: CPT | Performed by: EMERGENCY MEDICINE

## 2022-11-17 PROCEDURE — 82803 BLOOD GASES ANY COMBINATION: CPT

## 2022-11-17 PROCEDURE — 99292 CRITICAL CARE ADDL 30 MIN: CPT | Performed by: EMERGENCY MEDICINE

## 2022-11-17 PROCEDURE — 86900 BLOOD TYPING SEROLOGIC ABO: CPT

## 2022-11-17 PROCEDURE — 97530 THERAPEUTIC ACTIVITIES: CPT

## 2022-11-17 PROCEDURE — A9270 NON-COVERED ITEM OR SERVICE: HCPCS | Performed by: NURSE PRACTITIONER

## 2022-11-17 PROCEDURE — 770022 HCHG ROOM/CARE - ICU (200)

## 2022-11-17 PROCEDURE — 82962 GLUCOSE BLOOD TEST: CPT

## 2022-11-17 PROCEDURE — 82436 ASSAY OF URINE CHLORIDE: CPT

## 2022-11-17 PROCEDURE — A9270 NON-COVERED ITEM OR SERVICE: HCPCS | Performed by: STUDENT IN AN ORGANIZED HEALTH CARE EDUCATION/TRAINING PROGRAM

## 2022-11-17 PROCEDURE — 83735 ASSAY OF MAGNESIUM: CPT

## 2022-11-17 PROCEDURE — 99024 POSTOP FOLLOW-UP VISIT: CPT | Performed by: NURSE PRACTITIONER

## 2022-11-17 PROCEDURE — A9270 NON-COVERED ITEM OR SERVICE: HCPCS | Performed by: INTERNAL MEDICINE

## 2022-11-17 PROCEDURE — 85027 COMPLETE CBC AUTOMATED: CPT

## 2022-11-17 PROCEDURE — 80048 BASIC METABOLIC PNL TOTAL CA: CPT

## 2022-11-17 PROCEDURE — 81001 URINALYSIS AUTO W/SCOPE: CPT

## 2022-11-17 PROCEDURE — 700102 HCHG RX REV CODE 250 W/ 637 OVERRIDE(OP): Performed by: INTERNAL MEDICINE

## 2022-11-17 PROCEDURE — 86901 BLOOD TYPING SEROLOGIC RH(D): CPT

## 2022-11-17 PROCEDURE — 86880 COOMBS TEST DIRECT: CPT | Mod: 91

## 2022-11-17 PROCEDURE — 36600 WITHDRAWAL OF ARTERIAL BLOOD: CPT

## 2022-11-17 PROCEDURE — 97535 SELF CARE MNGMENT TRAINING: CPT

## 2022-11-17 PROCEDURE — 71045 X-RAY EXAM CHEST 1 VIEW: CPT

## 2022-11-17 PROCEDURE — 93308 TTE F-UP OR LMTD: CPT | Mod: 26 | Performed by: INTERNAL MEDICINE

## 2022-11-17 PROCEDURE — 700102 HCHG RX REV CODE 250 W/ 637 OVERRIDE(OP): Performed by: THORACIC SURGERY (CARDIOTHORACIC VASCULAR SURGERY)

## 2022-11-17 PROCEDURE — 93308 TTE F-UP OR LMTD: CPT

## 2022-11-17 PROCEDURE — 99233 SBSQ HOSP IP/OBS HIGH 50: CPT | Performed by: INTERNAL MEDICINE

## 2022-11-17 PROCEDURE — 83605 ASSAY OF LACTIC ACID: CPT

## 2022-11-17 PROCEDURE — A9270 NON-COVERED ITEM OR SERVICE: HCPCS | Performed by: THORACIC SURGERY (CARDIOTHORACIC VASCULAR SURGERY)

## 2022-11-17 PROCEDURE — 700102 HCHG RX REV CODE 250 W/ 637 OVERRIDE(OP): Performed by: NURSE PRACTITIONER

## 2022-11-17 PROCEDURE — 94640 AIRWAY INHALATION TREATMENT: CPT

## 2022-11-17 RX ORDER — CARVEDILOL 3.12 MG/1
3.12 TABLET ORAL 2 TIMES DAILY WITH MEALS
Status: DISCONTINUED | OUTPATIENT
Start: 2022-11-17 | End: 2022-11-18

## 2022-11-17 RX ORDER — FUROSEMIDE 10 MG/ML
20 INJECTION INTRAMUSCULAR; INTRAVENOUS
Status: DISCONTINUED | OUTPATIENT
Start: 2022-11-17 | End: 2022-11-17

## 2022-11-17 RX ADMIN — SULFAMETHOXAZOLE AND TRIMETHOPRIM 1 TABLET: 800; 160 TABLET ORAL at 14:41

## 2022-11-17 RX ADMIN — SULFAMETHOXAZOLE AND TRIMETHOPRIM 1 TABLET: 800; 160 TABLET ORAL at 00:44

## 2022-11-17 RX ADMIN — APIXABAN 5 MG: 5 TABLET, FILM COATED ORAL at 18:00

## 2022-11-17 RX ADMIN — AMIODARONE HYDROCHLORIDE 400 MG: 200 TABLET ORAL at 05:44

## 2022-11-17 RX ADMIN — OXYCODONE HYDROCHLORIDE 10 MG: 10 TABLET ORAL at 03:33

## 2022-11-17 RX ADMIN — OMEPRAZOLE 40 MG: KIT at 05:45

## 2022-11-17 RX ADMIN — FUROSEMIDE 20 MG: 20 TABLET ORAL at 05:43

## 2022-11-17 RX ADMIN — CARVEDILOL 3.12 MG: 3.12 TABLET, FILM COATED ORAL at 09:30

## 2022-11-17 RX ADMIN — CARVEDILOL 3.12 MG: 3.12 TABLET, FILM COATED ORAL at 17:02

## 2022-11-17 RX ADMIN — AMIODARONE HYDROCHLORIDE 400 MG: 200 TABLET ORAL at 17:02

## 2022-11-17 RX ADMIN — APIXABAN 5 MG: 5 TABLET, FILM COATED ORAL at 05:44

## 2022-11-17 RX ADMIN — QUETIAPINE FUMARATE 12.5 MG: 25 TABLET, FILM COATED ORAL at 22:39

## 2022-11-17 RX ADMIN — POTASSIUM BICARBONATE 25 MEQ: 978 TABLET, EFFERVESCENT ORAL at 08:00

## 2022-11-17 ASSESSMENT — CHA2DS2 SCORE
PRIOR STROKE OR TIA OR THROMBOEMBOLISM: YES
CHA2DS2 VASC SCORE: 7
AGE 75 OR GREATER: NO
CHF OR LEFT VENTRICULAR DYSFUNCTION: YES
AGE 65 TO 74: YES
DIABETES: NO
HYPERTENSION: YES
SEX: FEMALE
VASCULAR DISEASE: YES

## 2022-11-17 ASSESSMENT — COGNITIVE AND FUNCTIONAL STATUS - GENERAL
DRESSING REGULAR LOWER BODY CLOTHING: TOTAL
DRESSING REGULAR UPPER BODY CLOTHING: TOTAL
WALKING IN HOSPITAL ROOM: TOTAL
MOBILITY SCORE: 7
DAILY ACTIVITIY SCORE: 7
CLIMB 3 TO 5 STEPS WITH RAILING: TOTAL
MOVING FROM LYING ON BACK TO SITTING ON SIDE OF FLAT BED: UNABLE
PERSONAL GROOMING: A LOT
HELP NEEDED FOR BATHING: TOTAL
STANDING UP FROM CHAIR USING ARMS: TOTAL
TURNING FROM BACK TO SIDE WHILE IN FLAT BAD: A LOT
SUGGESTED CMS G CODE MODIFIER MOBILITY: CM
EATING MEALS: TOTAL
MOVING TO AND FROM BED TO CHAIR: UNABLE
TOILETING: TOTAL
SUGGESTED CMS G CODE MODIFIER DAILY ACTIVITY: CM

## 2022-11-17 ASSESSMENT — GAIT ASSESSMENTS: GAIT LEVEL OF ASSIST: UNABLE TO PARTICIPATE

## 2022-11-17 NOTE — HOSPITAL COURSE
11/17: Admit ICU for AHRF/HFNO, diuresed, low CVP, ?aspiration pneumonitis, ?TRALI, 2PRBC yesterday for hematuria, apixiban held, now restarted (AF, embolic CVA). Delirium, Seroquel overnight.  11/18: Kcl given, oxygentaiton and metabolic alkalosis improved, mental status improved, PT OOB. HFNO 35/50%.

## 2022-11-17 NOTE — ASSESSMENT & PLAN NOTE
Stable in outpatient setting, no concern for acute exacerbation, PFTs not available in chart  -Continue respiratory protocols as needed bronchodilators if needed

## 2022-11-17 NOTE — ASSESSMENT & PLAN NOTE
Working diagnosis acute toxic encephalopathy secondary to multifactorial causes including probable delirium, volume overload, infectious etiologies felt to be unlikely    Delirium precautions  PT OT, attempt out of bed   Minimize deliriogenic medications including opioids  Maintaining sleep-wake cycles as best possible

## 2022-11-17 NOTE — PROGRESS NOTES
Transferred from IM to ICU. Patient is lethargic, responds to voice, AOx3, denies pain. SR 90, 149/65. 15L via NRB mask.     ABG drawn. RT reported results to Dr Kraft.

## 2022-11-17 NOTE — PROGRESS NOTES
CVP monitoring initiated per verbal order from Dr Hamilton. CVP currently 1, lasix held per Dr Hamilton.

## 2022-11-17 NOTE — ASSESSMENT & PLAN NOTE
Patient developed hematuria following the initiation of DOAC therapy for atrial fibrillation  -Transfused 2 units of PRBCs 10/16  -Follow H&H every 6 transfuse as needed

## 2022-11-17 NOTE — ASSESSMENT & PLAN NOTE
Patient with escalating oxygen requirements 11/17 transferred back to ICU requiring high flow nasal oxygen, etiology uncertain, differential includes acute cardiogenic versus noncardiogenic pulmonary edema, infection less likely in the absence of signs and symptoms of infection or sputum production, also recent feeding tube removal, with possible aspiration though no witnessed.  Additional likely possibilities include TRALI versus TACO.    Chest x-ray  with bilateral effusions with adjacent airspace disease.    -HFNO for oxygenation support  -Diurese gently as right atrial pressures are low, follow CVP  -TTE  -NPO for now  -Enteral feeds if tolerated

## 2022-11-17 NOTE — PROGRESS NOTES
Critical Care Progress Note    Date of admission  11/10/2022    Chief Complaint  72 y.o. female admitted 11/10/2022 for elective CABG x3 c/b AIS.  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  Course c/b CVA, pAF, hematuria requiring transfusion, HFrEF (nonischemic CM LVEF 35% post op)    Hospital Course  11/10: Elective CABG x3 (LIMA->LAD, RSVG->OM2, RVSG->distal RCA)  11/11 -   vent day 2.  Extubated. RLE weakness, NCHCT subacute AIS in bilateral parietal-occipital region, embolic likely.   11/12 -    continue diuresis.  Continue furosemide.  Continue aspirin and clopidogrel. MRI brain when able.  Titrating clevidipine for blood pressure goals.  11/13 -transfer to telemetry  11/17: Transferred to ICU for AHRF, lethargy, diuresed placed on HFNO.  Chest x-ray with patchy infiltrates bilaterally, differential ARDS, TRALI/TACO, PNA, ACPE.    Interval Problem Update  Reviewed last 24 hour events:  Pt oriented, says she doesn't feel well, but cant explain, denies CP, sob or pain    RASS +1  Oxy 10/24 hrs    SR  SBP 90s-140s  Amio     HFNO 40L/80%, sP02 98%  CXR B effusion, pulm edema  7.5/50/103/98%    I/O:  -2.5L  UOP 4.3L  Lasix 40 iv x 2 yesterday  sCr 1.6 (1.3 yesterday)    2U PRBC yesterday    AF  WBC 9.3  Hb 11.4 (7.6) post tx  Ucx Ecoli (10-50 cfu)  Trim/sulfa d3/3    AC-apixiban (AF)      Review of Systems  Review of Systems   All other systems reviewed and are negative.     Vital Signs for last 24 hours   Temp:  [36.2 °C (97.2 °F)-37.7 °C (99.9 °F)] 37.2 °C (98.9 °F)  Pulse:  [] 95  Resp:  [9-58] 18  BP: ()/(50-78) 139/60  SpO2:  [85 %-98 %] 90 %    Hemodynamic parameters for last 24 hours  CVP:  [1 MM HG] 1 MM HG    Respiratory Information for the last 24 hours       Physical Exam   Physical Exam  Constitutional:       Appearance: She is ill-appearing. She is not diaphoretic.   HENT:       Head: Normocephalic and atraumatic.      Nose: Nose normal.      Mouth/Throat:      Mouth: Mucous membranes are dry.      Pharynx: Oropharynx is clear. No oropharyngeal exudate.   Eyes:      Conjunctiva/sclera: Conjunctivae normal.      Pupils: Pupils are equal, round, and reactive to light.   Cardiovascular:      Rate and Rhythm: Normal rate and regular rhythm.      Pulses: Normal pulses.   Pulmonary:      Effort: Pulmonary effort is normal.      Comments: Crackles bilateral  Abdominal:      General: Abdomen is flat.      Palpations: Abdomen is soft.   Musculoskeletal:         General: Tenderness present.      Cervical back: Neck supple. No rigidity or tenderness.      Right lower leg: No edema.      Left lower leg: No edema.   Skin:     General: Skin is warm and dry.   Neurological:      Comments: Right gaze preference, absent BBT on left, some left neglect    BUE strong , equal, moves BUE to command  RLE responds to pain but no movement in RLE  LLE normal strength    Brisk DTRs BLE       Medications  Current Facility-Administered Medications   Medication Dose Route Frequency Provider Last Rate Last Admin    potassium bicarbonate (KLYTE) effervescent tablet 25 mEq  25 mEq Enteral Tube Once Ying Sotomayor M.D.        furosemide (LASIX) injection 20 mg  20 mg Intravenous Q DAY Velasquez Hamilton M.D.        carvedilol (COREG) tablet 3.125 mg  3.125 mg Enteral Tube BID WITH MEALS YASMANY Zapata.P.JUAN C        Metoprolol Tartrate (LOPRESSOR) injection 5 mg  5 mg Intravenous Q5 MIN PRN Kendall Yeboah M.D.        Pharmacy Consult: Enteral tube insertion - review meds/change route/product selection   Other PHARMACY TO DOSE Darrick Kraft M.D.        QUEtiapine (Seroquel) tablet 12.5 mg  12.5 mg Enteral Tube Nightly Darrick Kraft M.D.        apixaban (ELIQUIS) tablet 5 mg  5 mg Enteral Tube BID Darrick Kraft M.D.   5 mg at 11/17/22 0544    sulfamethoxazole-trimethoprim (BACTRIM DS) 800-160 MG  tablet 1 Tablet  1 Tablet Enteral Tube Q12HRS Ava Jose, SHANKARPBetiNBeti   1 Tablet at 11/17/22 0044    haloperidol lactate (HALDOL) injection 1 mg  1 mg Intravenous Q4HRS PRN Kenneth Pugh M.D.   1 mg at 11/16/22 0511    loperamide (IMODIUM) oral suspension 2 mg  2 mg Enteral Tube 4X/DAY PRN Vinicius Walden M.D.        QUEtiapine (Seroquel) tablet 25 mg  25 mg Enteral Tube HS PRN ROMAIN WheelerRBetiNBeti   25 mg at 11/16/22 2247    amiodarone (Cordarone) tablet 400 mg  400 mg Enteral Tube TWICE DAILY Padmini Mac M.D.   400 mg at 11/17/22 0544    acetaminophen (TYLENOL) tablet 1,000 mg  1,000 mg Enteral Tube Q6HRS PRN Kamron Back M.D.        [Held by provider] aspirin (ASA) chewable tab 81 mg  81 mg Enteral Tube DAILY Kamron Back M.D.   81 mg at 11/15/22 0557    omeprazole (FIRST-OMEPRAZOLE) 2 mg/mL oral susp 40 mg  40 mg Enteral Tube DAILY Kamron Back M.D.   40 mg at 11/17/22 0545    acetaminophen (Tylenol) tablet 650 mg  650 mg Enteral Tube Q4HRS PRN Kamron Back M.D.        Or    acetaminophen (TYLENOL) suppository 650 mg  650 mg Rectal Q4HRS PRN Kamron Back M.D.        mag hydrox-al hydrox-simeth (MAALOX PLUS ES or MYLANTA DS) suspension 30 mL  30 mL Enteral Tube Q4HRS PRN Kamron Back M.D.        oxyCODONE immediate-release (ROXICODONE) tablet 5 mg  5 mg Enteral Tube Q3HRS PRN Kamron Back M.D.   5 mg at 11/15/22 1706    Or    oxyCODONE immediate release (ROXICODONE) tablet 10 mg  10 mg Enteral Tube Q3HRS PRN Kamron Back M.D.   10 mg at 11/17/22 0333    Or    fentaNYL (SUBLIMAZE) injection 50 mcg  50 mcg Intravenous Q3HRS PRN Kamron Back M.D.        traMADol (Ultram) 50 MG tablet 50 mg  50 mg Enteral Tube Q4HRS PRN Kamron Back M.D.   50 mg at 11/14/22 1312    Respiratory Therapy Consult   Nebulization Continuous RT Kevyn Kramer A.P.R.NBeti        electrolyte-A (PLASMALYTE-A) infusion   Intravenous  PRN Kevyn Kramer A.P.R.N. 999 mL/hr at 11/10/22 1619 New Bag at 11/10/22 1619    Pharmacy Consult Request ...Pain Management Review 1 Each  1 Each Other PHARMACY TO DOSE Kevyn Kramer, A.P.R.N.        ondansetron (ZOFRAN) syringe/vial injection 8 mg  8 mg Intravenous Q6HRS PRN Kevyn Kramer A.P.R.N.   8 mg at 11/12/22 0100    Or    prochlorperazine (COMPAZINE) injection 10 mg  10 mg Intravenous Q6HRS PRN Kevyn Kramer A.P.R.N.        Or    promethazine (PHENERGAN) suppository 25 mg  25 mg Rectal Q6HRS PRN Kevyn Kramer, A.P.R.N.           Fluids    Intake/Output Summary (Last 24 hours) at 11/17/2022 1019  Last data filed at 11/17/2022 0600  Gross per 24 hour   Intake 2329 ml   Output 4375 ml   Net -2046 ml       Laboratory  Recent Labs     11/16/22  0632 11/16/22  1842 11/17/22  0054   ISTATAPH 7.525* 7.544* 7.515*   ISTATAPCO2 47.5* 48.8* 49.4*   ISTATAPO2 68 87 103*   ISTATATCO2 41* 44* 41*   WIBZCGE1OOP 95 98 98   ISTATARTHCO3 39.2* 42.2* 39.9*   ISTATARTBE 15* 17* 15*   ISTATTEMP 97.0 F 37.7 C 98.6 F   ISTATFIO2  --  100 90   ISTATSPEC Arterial Arterial Arterial   ISTATAPHTC 7.539* 7.533* 7.515*   CMPFGRSR0CN 64 91* 103*         Recent Labs     11/15/22  0228 11/16/22  0000 11/17/22  0039   SODIUM 139 139 142   POTASSIUM 3.5* 3.8 3.7   CHLORIDE 98 96 96   CO2 35* 37* 35*   BUN 31* 40* 37*   CREATININE 1.19 1.35 1.57*   MAGNESIUM 2.4  --  2.0   CALCIUM 8.6 8.6 9.1     Recent Labs     11/15/22  0228 11/16/22  0000 11/17/22  0039   GLUCOSE 165* 139* 168*     Recent Labs     11/14/22  1452 11/14/22  2032 11/15/22  0228 11/16/22  0000 11/17/22 0039   WBC 12.2* 11.1* 9.0 8.7 9.3   NEUTSPOLYS 77.70* 80.50* 77.30*  --   --    LYMPHOCYTES 12.60* 9.50* 12.10*  --   --    MONOCYTES 7.40 7.60 8.40  --   --    EOSINOPHILS 1.20 1.40 1.00  --   --    BASOPHILS 0.40 0.50 0.30  --   --      Recent Labs     11/15/22  0228 11/16/22  0000 11/17/22 0039   RBC 2.72* 2.51* 3.86*   HEMOGLOBIN 8.3* 7.6* 11.4*   HEMATOCRIT 27.2*  24.7* 35.6*   PLATELETCT 230 229 242       Imaging  X-Ray:  I have personally reviewed the images and compared with prior images.  EKG:  I have personally reviewed the images and compared with prior images.  Echo:   Reviewed    Assessment/Plan  * Acute respiratory failure with hypoxia (HCC)  Assessment & Plan  Patient with escalating oxygen requirements 11/17 transferred back to ICU requiring high flow nasal oxygen, etiology uncertain, differential includes acute cardiogenic versus noncardiogenic pulmonary edema, infection less likely in the absence of signs and symptoms of infection or sputum production, also recent feeding tube removal, with possible aspiration though no witnessed.  Additional likely possibilities include TRALI versus TACO.    Chest x-ray  with bilateral effusions with adjacent airspace disease.    -HFNO for oxygenation support  -Diurese gently as right atrial pressures are low, follow CVP  -TTE  -NPO for now  -Enteral feeds if tolerated          Acute blood loss anemia  Assessment & Plan  Patient developed hematuria following the initiation of DOAC therapy for atrial fibrillation  -Transfused 2 units of PRBCs 10/16  -Follow H&H every 6 transfuse as needed      HFrEF (heart failure with reduced ejection fraction) (HCC)  Assessment & Plan  TTE 10/12: LVEF 35%. Hypokinesis of the inferior and septal walls. Grade I diastolic dysfunction.  RV is normal in size and systolic function. No significant valvular abnormalities  LHC: 10/19 Calcific multivessel CAD (ostial LAD, mid LCx, pRCA), LVEDP 12 mmHg    -Repeat TTE   - AC: apixiban (AF, AIS)  - Antiplatelet: ASA (held for now iso bleeding)   - Trend hemodynamics, CVO2, and lactate  -GDMT as able when stabilized        Encephalopathy acute  Assessment & Plan  Working diagnosis acute toxic encephalopathy secondary to multifactorial causes including probable delirium, volume overload, infectious etiologies felt to be unlikely    Delirium precautions  PT  OT, attempt out of bed   Minimize deliriogenic medications including opioids  Maintaining sleep-wake cycles as best possible    PAF (paroxysmal atrial fibrillation) ([facility])- (present on admission)  Assessment & Plan  Perioperative atrial fibrillation, unknown chronicity though possible given subacute infarcts on CT scan.  [id number]  -Transitioned DAPT to DOAC, currently on apixaban  -Rhythm control strategy with amiodarone for now, receiving enterally  -On carvedilol 3.125 twice daily per CTS      Acute ischemic stroke ([facility])  Assessment & Plan  Post/perioperative event with multiple areas of watershed type infarction  NCT 1/11 subacute infarcts occipital-parietal region  Deficits: Left neglect, right gaze preference, absent blink to threat on left, flaccid right lower extremity, moving bilateral upper extremities strong , left lower extremity normal  Appreciate neuro input  DAPT had been given then due to recent afib, neurology recommended transition to apixaban, started been held due to hematuria requiring transfusion.  PT/OT/SLP and rehabilitation  Seizure precaution, aspiration precaution, fall precaution  High risk aspiration      Chronic obstructive pulmonary disease (COPD) ([facility])  Assessment & Plan  Stable in outpatient setting, no concern for acute exacerbation, PFTs not available in chart  -Continue respiratory protocols as needed bronchodilators if needed    Coronary artery disease due to calcified coronary lesion - Calcifications seen on CT scan also wtih aortic ulceration- (present on admission)  Assessment & Plan  11/10: Elective CABG x3 (LIMA->LAD, RSVG->OM2, RVSG->distal RCA)  -High intensity statin  -Holding antiplatelets in the setting of transitioning to DOAC for atrial fibrillation and acute ischemic stroke  -PPI prophylaxis    Gastroesophageal reflux disease without esophagitis- (present on admission)  Assessment & Plan  On PPI       VTE:  NOAC  Ulcer: PPI  Lines: Central Line  Ongoing  indication addressed and Berger Catheter  Ongoing indication addressed    I have performed a physical exam and reviewed and updated ROS and Plan today (11/17/2022). In review of yesterday's note (11/16/2022), there are no changes except as documented above.     Discussed patient condition and risk of morbidity and/or mortality with Hospitalist, RN, RT, Therapies, Pharmacy, and [facility]  The patient remains critically ill.  Critical care time = 110 minutes in directly providing and coordinating critical care and extensive data review.  No time overlap and excludes procedures.

## 2022-11-17 NOTE — PROGRESS NOTES
Followed up with PT/OT and requested they work with this patient today, as per the request of Lena Mac and Anastacio.

## 2022-11-17 NOTE — PROGRESS NOTES
Notified Bobby MARLEY about patient being lethargic, Aox0, incomprehensible sounds, unable to follow commands but movement to painful stimulus. As well as ABG results. Per Martin, continue current plan of care.

## 2022-11-17 NOTE — ASSESSMENT & PLAN NOTE
Perioperative atrial fibrillation, unknown chronicity though possible given subacute infarcts on CT scan.  [id number]  -Transitioned DAPT to DOAC, currently on apixaban  -Rhythm control strategy with amiodarone for now, receiving enterally  -On carvedilol 3.125 twice daily per CTS

## 2022-11-17 NOTE — PROGRESS NOTES
Cardiovascular Surgery Progress Note    Name: Lauren Dave  MRN: [id number]  : 1950  Admit Date: 11/10/2022  5:08 AM  7 Days Post-Op     Procedure:  Procedure(s) and Anesthesia Type:     * CORONARY ARTERY BYPASS GRAFTING X 3, WITH LEFT INTERNAL MAMMARY ARTERY TAKEDOWN AND ENDOSCOPIC VEIN HARVEST LEFT GREATER SAPHENOUS VEIN, AND TRANSESOPHAGEAL ECHOCARDIOGRAM - General     * ECHOCARDIOGRAM, TRANSESOPHAGEAL, INTRAOPERATIVE - General    Vitals:  Vitals:    22 0600 22 0700 22 0729 22 0845   BP: (!) 93/54 (!) 97/55  139/60   Pulse: 83 86 92 95   Resp: (!) 11 (!) 11 13 (!) 22   Temp:       TempSrc:       SpO2: 97% 96% 92% 91%   Weight:       Height:          Temp (24hrs), Av.9 °C (98.4 °F), Min:36.2 °C (97.2 °F), Max:37.7 °C (99.9 °F)      Respiratory:    Respiration: (!) 22, Pulse Oximetry: 91 %       Fluids:    Intake/Output Summary (Last 24 hours) at 2022 0910  Last data filed at 2022 0600  Gross per 24 hour   Intake 2329 ml   Output 4375 ml   Net -2046 ml       Admit weight: Weight: 67.1 kg (147 lb 14.9 oz)  Current weight: Weight: 67.6 kg (149 lb 0.5 oz) (22 0300)    Labs:  Recent Labs     11/15/22  0228 22  0000 22  0039   WBC 9.0 8.7 9.3   RBC 2.72* 2.51* 3.86*   HEMOGLOBIN 8.3* 7.6* 11.4*   HEMATOCRIT 27.2* 24.7* 35.6*   .0* 98.4* 92.2   MCH 30.5 30.3 29.5   MCHC 30.5* 30.8* 32.0*   RDW 47.7 46.6 52.0*   PLATELETCT 230 229 242   MPV 10.0 10.4 11.2       Recent Labs     11/15/22  0228 22  0000 22  0039   SODIUM 139 139 142   POTASSIUM 3.5* 3.8 3.7   CHLORIDE 98 96 96   CO2 35* 37* 35*   GLUCOSE 165* 139* 168*   BUN 31* 40* 37*   CREATININE 1.19 1.35 1.57*   CALCIUM 8.6 8.6 9.1                   Medications:  Scheduled Medications   Medication Dose Frequency    potassium bicarbonate  25 mEq Once    furosemide  20 mg Q DAY    carvedilol  3.125 mg BID WITH MEALS    Pharmacy   PHARMACY TO DOSE    QUEtiapine  12.5 mg Nightly     apixaban  5 mg BID    sulfamethoxazole-trimethoprim  1 Tablet Q12HRS    amiodarone  400 mg TWICE DAILY    [Held by provider] aspirin  81 mg DAILY    omeprazole  40 mg DAILY    Pharmacy Consult Request  1 Each PHARMACY TO DOSE        Exam:   Physical Exam  Constitutional:       General: She is not in acute distress.  HENT:      Mouth/Throat:      Mouth: Mucous membranes are moist.   Eyes:      Pupils: Pupils are equal, round, and reactive to light.   Cardiovascular:      Rate and Rhythm: Normal rate and regular rhythm.      Pulses: Normal pulses.      Heart sounds: Normal heart sounds.   Pulmonary:      Effort: No respiratory distress.      Breath sounds: Examination of the right-lower field reveals decreased breath sounds. Examination of the left-lower field reveals decreased breath sounds. Decreased breath sounds present.   Abdominal:      Palpations: Abdomen is soft.   Musculoskeletal:      Right lower leg: Edema present.      Left lower leg: Edema present.   Skin:     General: Skin is warm and dry.      Comments: Sternum- prevena  RSVG site- c/d/i   Neurological:      Mental Status: She is alert. She is disoriented.   Psychiatric:         Behavior: Behavior is slowed.         Judgment: Judgment is impulsive.       Cardiac Medications:    ASA - Yes    Plavix - Yes    Post-operative Beta Blockers - Yes    Ace/ARB- No; contraindicated because of Increased creatinine/CKD    Statin - No; contraindicated because of Allergy/intolerance    Aldactone- No; contraindicated because of Increased creatinine/CKD    Ejection Fraction:  35%    Telemetry:   11/11 SR  11/12 SR/ST 90-100s  11/13 SR  11/14 SR, a fib last night  11/15 SR   11/16 SR   11/17 SR    Assessment/Plan:  POD 1  HDS, SR, neuro not moving right leg but follows commands, cleviprex drip, wounds CDI, abdomen soft, fluid balance positive, wt up,  chest tube output moderate.  CT head this AM.  Plan:  wean vent as tolerated.  Allow permissive hypertension, OK for  systolic 120-140's, CPM.     POD 2 HDS, SR/ST.  Neuro can say name, no movement in right leg.  Weaker left upper extremity, following commands intermittently, pulling at NG/central line.  Wounds CDI, prevena in tact.  Moderate serosanguinous output from chest tube.  Cr 1.18 Hgb 9.2.  Neurology recommending MRI brain, pending currently.  Plan: MRI when available.  Ok for permissive HTN of SBP of 140 or less.  Encourage IS.  Restraint for RUE due to trying to remove central line and NG tubes with impulsivity.      POD 3 HDS. SR.  Neuro aware to self and place.  Good strength in right hand and left leg.  Left arm weak.  No movement right leg.  Pacer wires removed. Pt tolerated well.  Cr 1.14, Hgb 8.8. Plan: MRI brain today, pacer wires out.  DC chest tubes.  Encourage IS.  Work with PT as tolerated.    POD 4 BP 90s-140s, SR, a fib overnight, lethargic this morning, flaccid right leg, weak left arm, wounds CDI, abdomen soft, fluid balance down, wt up, 5L NC. On tube feeding. Plan: UA and culture today, transfer to IMCU, continue diuresis, amio gtt to PO, IS/ambulate.     POD 5  HDS, SR on PO amio, neuro lethargic moving three extremities on command 5/5  strength uppers, not able to get her to move RLE, wounds Prevena intact, abdomen soft n/t BMS in place for incontinence, Hgb 8.3, Cr 1.19,  fluid balance negative, wt down,  BP labile this AM. Plan:  decrease diuresis for labile BP, fluid balance negative, Wean Oxygen, Encourage IS/ambulate.     POD 6 HDS, SR- on po amio- eliquis being held for anemia and hematuria, neuro - unchanged- agitated overnight given haldol- start nightly seroquel 12.5 mg, acute blood loss anemia- transfuse    POD 7 HDS- add low dose beta blocker, SR on PO amio- resumed eliquis, patient appears euvolemic by weight and I&O's CVP 0- lasix being held, Resp insuff- high flow nasal cannula- rpt CXR- poss aspiration pneumonia after NGT pulled, ck echo    Disposition:  Therapies recommending post  acute placement, rehab referral

## 2022-11-17 NOTE — ASSESSMENT & PLAN NOTE
11/10: Elective CABG x3 (LIMA->LAD, RSVG->OM2, RVSG->distal RCA)  -High intensity statin  -Holding antiplatelets in the setting of transitioning to DOAC for atrial fibrillation and acute ischemic stroke  -PPI prophylaxis

## 2022-11-17 NOTE — PROGRESS NOTES
Cardiovascular Surgery Progress Note    Name: Lauren Dave  MRN: [id number]  : 1950  Admit Date: 11/10/2022  5:08 AM  7 Days Post-Op     Procedure:  Procedure(s) and Anesthesia Type:     * CORONARY ARTERY BYPASS GRAFTING X 3, WITH LEFT INTERNAL MAMMARY ARTERY TAKEDOWN AND ENDOSCOPIC VEIN HARVEST LEFT GREATER SAPHENOUS VEIN, AND TRANSESOPHAGEAL ECHOCARDIOGRAM - General     * ECHOCARDIOGRAM, TRANSESOPHAGEAL, INTRAOPERATIVE - General    Vitals:  Vitals:    22 0600 22 0700 22 0729 22 0845   BP: (!) 93/54 (!) 97/55  139/60   Pulse: 83 86 92 95   Resp: (!) 11 (!) 11 13 (!) 22   Temp:       TempSrc:       SpO2: 97% 96% 92% 91%   Weight:       Height:          Temp (24hrs), Av.9 °C (98.4 °F), Min:36.2 °C (97.2 °F), Max:37.7 °C (99.9 °F)      Respiratory:    Respiration: (!) 22, Pulse Oximetry: 91 %       Fluids:    Intake/Output Summary (Last 24 hours) at 2022 0917  Last data filed at 2022 0600  Gross per 24 hour   Intake 2329 ml   Output 4375 ml   Net -2046 ml       Admit weight: Weight: 67.1 kg (147 lb 14.9 oz)  Current weight: Weight: 67.6 kg (149 lb 0.5 oz) (22 0300)    Labs:  Recent Labs     11/15/22  0228 22  0000 22  0039   WBC 9.0 8.7 9.3   RBC 2.72* 2.51* 3.86*   HEMOGLOBIN 8.3* 7.6* 11.4*   HEMATOCRIT 27.2* 24.7* 35.6*   .0* 98.4* 92.2   MCH 30.5 30.3 29.5   MCHC 30.5* 30.8* 32.0*   RDW 47.7 46.6 52.0*   PLATELETCT 230 229 242   MPV 10.0 10.4 11.2       Recent Labs     11/15/22  0228 22  0000 22  0039   SODIUM 139 139 142   POTASSIUM 3.5* 3.8 3.7   CHLORIDE 98 96 96   CO2 35* 37* 35*   GLUCOSE 165* 139* 168*   BUN 31* 40* 37*   CREATININE 1.19 1.35 1.57*   CALCIUM 8.6 8.6 9.1                   Medications:  Scheduled Medications   Medication Dose Frequency    potassium bicarbonate  25 mEq Once    furosemide  20 mg Q DAY    carvedilol  3.125 mg BID WITH MEALS    Pharmacy   PHARMACY TO DOSE    QUEtiapine  12.5 mg Nightly     apixaban  5 mg BID    sulfamethoxazole-trimethoprim  1 Tablet Q12HRS    amiodarone  400 mg TWICE DAILY    [Held by provider] aspirin  81 mg DAILY    omeprazole  40 mg DAILY    Pharmacy Consult Request  1 Each PHARMACY TO DOSE        Exam:   Physical Exam  Constitutional:       General: She is not in acute distress.  HENT:      Mouth/Throat:      Mouth: Mucous membranes are moist.   Eyes:      Pupils: Pupils are equal, round, and reactive to light.   Cardiovascular:      Rate and Rhythm: Normal rate and regular rhythm.      Pulses: Normal pulses.      Heart sounds: Normal heart sounds.   Pulmonary:      Effort: No respiratory distress.      Breath sounds: Examination of the right-lower field reveals decreased breath sounds. Examination of the left-lower field reveals decreased breath sounds. Decreased breath sounds present.   Abdominal:      Palpations: Abdomen is soft.   Skin:     General: Skin is warm and dry.      Comments: Sternum- prevena  RSVG site- c/d/i   Neurological:      Mental Status: She is alert. She is disoriented.   Psychiatric:         Behavior: Behavior is slowed.         Judgment: Judgment is impulsive.       Cardiac Medications:    ASA - Yes    Plavix - Yes    Post-operative Beta Blockers - Yes    Ace/ARB- No; contraindicated because of Increased creatinine/CKD    Statin - No; contraindicated because of Allergy/intolerance    Aldactone- No; contraindicated because of Increased creatinine/CKD    Ejection Fraction:  35%    Telemetry:   11/11 SR  11/12 SR/ST 90-100s  11/13 SR  11/14 SR, a fib last night  11/15 SR   11/16 SR   11/17 SR    Assessment/Plan:  POD 1  HDS, SR, neuro not moving right leg but follows commands, cleviprex drip, wounds CDI, abdomen soft, fluid balance positive, wt up,  chest tube output moderate.  CT head this AM.  Plan:  wean vent as tolerated.  Allow permissive hypertension, OK for systolic 120-140's, CPM.     POD 2 HDS, SR/ST.  Neuro can say name, no movement in right leg.   Weaker left upper extremity, following commands intermittently, pulling at NG/central line.  Wounds CDI, prevena in tact.  Moderate serosanguinous output from chest tube.  Cr 1.18 Hgb 9.2.  Neurology recommending MRI brain, pending currently.  Plan: MRI when available.  Ok for permissive HTN of SBP of 140 or less.  Encourage IS.  Restraint for RUE due to trying to remove central line and NG tubes with impulsivity.      POD 3 HDS. SR.  Neuro aware to self and place.  Good strength in right hand and left leg.  Left arm weak.  No movement right leg.  Pacer wires removed. Pt tolerated well.  Cr 1.14, Hgb 8.8. Plan: MRI brain today, pacer wires out.  DC chest tubes.  Encourage IS.  Work with PT as tolerated.    POD 4 BP 90s-140s, SR, a fib overnight, lethargic this morning, flaccid right leg, weak left arm, wounds CDI, abdomen soft, fluid balance down, wt up, 5L NC. On tube feeding. Plan: UA and culture today, transfer to IMCU, continue diuresis, amio gtt to PO, IS/ambulate.     POD 5  HDS, SR on PO amio, neuro lethargic moving three extremities on command 5/5  strength uppers, not able to get her to move RLE, wounds Prevena intact, abdomen soft n/t BMS in place for incontinence, Hgb 8.3, Cr 1.19,  fluid balance negative, wt down,  BP labile this AM. Plan:  decrease diuresis for labile BP, fluid balance negative, Wean Oxygen, Encourage IS/ambulate.     POD 6 HDS, SR- on po amio- eliquis being held for anemia and hematuria, neuro - unchanged- agitated overnight given haldol- start nightly seroquel 12.5 mg, acute blood loss anemia- transfuse    POD 7 HDS- add low dose beta blocker, SR on PO amio- resumed eliquis, patient appears euvolemic by weight and I&O's CVP 0- lasix being held, Resp insuff- high flow nasal cannula- rpt CXR- poss aspiration pneumonia after NGT pulled, ck echo    Disposition:  Therapies recommending post acute placement, rehab referral

## 2022-11-17 NOTE — PROGRESS NOTES
Critical Care Progress Note    This patient is well-known to our service.  She has been on critical care service up until a few days back.    This is a 72-year-old lady has a past medical history significant for coronary artery disease, hypertension, history of breast cancer, dyslipidemia, ischemic cardiomyopathy resulting in heart failure with reduced ejection fraction with an EF of 35% who presented for an elective coronary artery bypass graft completed on 11/10/2022.  Her postoperative course was complicated by development of CVA and paroxysmal atrial fibrillation in addition to bleeding once she was started on oral anticoagulation.    This evening hospitalist physician asked me to review her.  Her oxygen requirements have been increasing.  She is currently on 15 L via nonrebreather.  Labs showed hemoglobin 7.6, WBC 8.7, platelets 229, sodium 139, potassium 3.8, chloride 96, bicarb 37, BUN 40, creatinine 1.35, magnesium 2.4.  Imaging including the CT head that was done on November 11, 2022 that showed subacute infarcts in bilateral parieto-occipital regions, chest radiograph that showed significantly worsening layering bilateral pleural effusions with adjacent airspace process likely secondary to atelectasis and significantly increased interstitial edema.    On physical examination she appears somnolent. Responsive to voice. Reduced breath sounds at lung bases. On NRB saturating 96%.     Impression:  Worsening heart failure with reduced ejection fraction  Ischemic cardiomyopathy s/p CABG x3  Paroxysmal atrial fibrillation  Increasing somnolence can be secondary to ventilatory failure    Plan:  My impression is that this patient has developed progressive systolic heart failure.  I think we need to diurese her a little bit more aggressively to bring her heart in a more favorable area on the Jhonny-Starling curve.  I would give her another dose of Lasix.  I agree with transfer to the ICU to monitor her more closely.   I also feel that with progressive hypoxic respiratory failure she probably is getting more tired and exhausted.  I would also obtain an arterial blood gas.    I would give her another dose of Lasix now.  Monitor I's and O's cautiously.  Obtain arterial blood gas.  Hold off on beta-blockers initiation.  Can consider it down the line.  Will defer atrial fibrillation management to cardiology.  Noted plans to start amiodarone.  Continue PPI prophylaxis.  Monitor for bleeding closely. Will monitor for timing to start DOAC.   Statins and ACE inhibitors if tolerated.    Chart and note reviewed. Data reviewed.   Total of 34 min critical care time spent at bedside during the course of care providing   evaluation, management and care decisions and ordering appropriate treatment related to critical care problems exclusive of procedures.     Darrick Kraft MD  Pulmonary and Critical Care Physician

## 2022-11-17 NOTE — ASSESSMENT & PLAN NOTE
TTE 10/12: LVEF 35%. Hypokinesis of the inferior and septal walls. Grade I diastolic dysfunction.  RV is normal in size and systolic function. No significant valvular abnormalities  LHC: 10/19 Calcific multivessel CAD (ostial LAD, mid LCx, pRCA), LVEDP 12 mmHg    -Repeat TTE   - AC: apixiban (AF, AIS)  - Antiplatelet: ASA (held for now iso bleeding)   - Trend hemodynamics, CVO2, and lactate  -GDMT as able when stabilized

## 2022-11-17 NOTE — CARE PLAN
The patient is Watcher - Medium risk of patient condition declining or worsening    Shift Goals  Clinical Goals: o2, neuro, and hemodynamic stability  Patient Goals: SUNDAR  Family Goals: SUNDAR    Progress made toward(s) clinical / shift goals:        Problem: Pain - Standard  Goal: Alleviation of pain or a reduction in pain to the patient’s comfort goal  Outcome: Progressing     Problem: Safety - Medical Restraint  Goal: Remains free of injury from restraints (Restraint for Interference with Medical Device)  Outcome: Progressing     Problem: Neuro Status  Goal: Neuro status will remain stable or improve  Outcome: Progressing     Problem: Respiratory - Stroke Patient  Goal: Patient will achieve/maintain optimum respiratory rate/effort  Outcome: Progressing       Patient is not progressing towards the following goals:

## 2022-11-17 NOTE — PROGRESS NOTES
Interval:  Hematuria this resolved. Responded appropriately to 2u PRBC. Overnight complicated by hypoxia treated with lasix. Sinus rhythm on telemetry.    Medications / Drug list prior to admission:  No current facility-administered medications on file prior to encounter.     Current Outpatient Medications on File Prior to Encounter   Medication Sig Dispense Refill    amLODIPine (NORVASC) 2.5 MG Tab Take 1 Tablet by mouth 2 times a day. 60 Tablet 3    metoprolol SR (TOPROL XL) 50 MG TABLET SR 24 HR Take 1 Tablet by mouth every day. 90 Tablet 3    ampicillin (PRINCIPEN) 500 MG Cap Take 1 Capsule by mouth 4 times a day. Twice a day for 7 days      aspirin 81 MG EC tablet Take 1 Tablet by mouth every day. 100 Tablet 0    hydrALAZINE (APRESOLINE) 10 MG Tab Take 1 Tablet by mouth 3 times a day as needed (/100). 25 Tablet 11    hydroxychloroquine (PLAQUENIL) 200 MG Tab Take 1 tablet by mouth every day. 30 tablet 0    famotidine (PEPCID) 40 MG Tab Take 1 tablet by mouth twice daily (Patient taking differently: Take 1 Tablet by mouth as needed.) 180 tablet 1    Cyanocobalamin (VITAMIN B-12 PO) Take 1 Tablet by mouth every morning.      ibuprofen (MOTRIN) 800 MG Tab TAKE 1 TABLET BY MOUTH EVERY 8 HOURS AS NEEDED FOR MILD PAIN 90 Tab 0    Cholecalciferol (VITAMIN D3) 5000 units Cap Take 1 Capsule by mouth every day.         Current list of administered Medications:    Current Facility-Administered Medications:     potassium bicarbonate (KLYTE) effervescent tablet 25 mEq, 25 mEq, Enteral Tube, Once, Ying Sotomayor M.D.    furosemide (LASIX) injection 20 mg, 20 mg, Intravenous, Q DAY, Velasquez Hamilton M.D.    Metoprolol Tartrate (LOPRESSOR) injection 5 mg, 5 mg, Intravenous, Q5 MIN PRN, Kendall Yeboah M.D.    Pharmacy Consult: Enteral tube insertion - review meds/change route/product selection, , Other, PHARMACY TO DOSE, Darrick Kraft M.D.    QUEtiapine (Seroquel) tablet 12.5 mg, 12.5 mg, Enteral Tube,  Nightly, Darrick Kraft M.D.    apixaban (ELIQUIS) tablet 5 mg, 5 mg, Enteral Tube, BID, Darrick Kraft M.D., 5 mg at 22 0544    sulfamethoxazole-trimethoprim (BACTRIM DS) 800-160 MG tablet 1 Tablet, 1 Tablet, Enteral Tube, Q12HRS, Ava Jose, A.P.N., 1 Tablet at 22 0044    haloperidol lactate (HALDOL) injection 1 mg, 1 mg, Intravenous, Q4HRS PRN, Kenneth Pugh M.D., 1 mg at 22 0511    loperamide (IMODIUM) oral suspension 2 mg, 2 mg, Enteral Tube, 4X/DAY PRN, Vinicius Walden M.D.    QUEtiapine (Seroquel) tablet 25 mg, 25 mg, Enteral Tube, HS PRN, SHANKAR WheelerP.R.NBeti, 25 mg at 22 2247    amiodarone (Cordarone) tablet 400 mg, 400 mg, Enteral Tube, TWICE DAILY, Padmini Mac M.D., 400 mg at 22 0544    [] acetaminophen (TYLENOL) tablet 1,000 mg, 1,000 mg, Enteral Tube, Q6HRS, 1,000 mg at 11/15/22 1149 **FOLLOWED BY** acetaminophen (TYLENOL) tablet 1,000 mg, 1,000 mg, Enteral Tube, Q6HRS PRN, Kamron Back M.D.    [Held by provider] aspirin (ASA) chewable tab 81 mg, 81 mg, Enteral Tube, DAILY, Kamron Back M.D., 81 mg at 11/15/22 0557    omeprazole (FIRST-OMEPRAZOLE) 2 mg/mL oral susp 40 mg, 40 mg, Enteral Tube, DAILY, Kamron Back M.D., 40 mg at 22 0545    acetaminophen (Tylenol) tablet 650 mg, 650 mg, Enteral Tube, Q4HRS PRN **OR** acetaminophen (TYLENOL) suppository 650 mg, 650 mg, Rectal, Q4HRS PRN, Kamron Back M.D.    mag hydrox-al hydrox-simeth (MAALOX PLUS ES or MYLANTA DS) suspension 30 mL, 30 mL, Enteral Tube, Q4HRS PRN, Kamron Back M.D.    oxyCODONE immediate-release (ROXICODONE) tablet 5 mg, 5 mg, Enteral Tube, Q3HRS PRN, 5 mg at 11/15/22 1706 **OR** oxyCODONE immediate release (ROXICODONE) tablet 10 mg, 10 mg, Enteral Tube, Q3HRS PRN, 10 mg at 22 0333 **OR** fentaNYL (SUBLIMAZE) injection 50 mcg, 50 mcg, Intravenous, Q3HRS PRN, Kamron Back M.D.    traMADol (Ultram) 50 MG  tablet 50 mg, 50 mg, Enteral Tube, Q4HRS PRN, Kamron Back M.D., 50 mg at 11/14/22 2855    Respiratory Therapy Consult, , Nebulization, Continuous RT, ROMAIN KwonRTRESSA    electrolyte-A (PLASMALYTE-A) infusion, , Intravenous, PRN, SHANKAR KwonP.RTRESSA, Last Rate: 999 mL/hr at 11/10/22 1619, New Bag at 11/10/22 1619    Pharmacy Consult Request ...Pain Management Review 1 Each, 1 Each, Other, PHARMACY TO DOSE, ROMAIN KwonRTRESSA    ondansetron (ZOFRAN) syringe/vial injection 8 mg, 8 mg, Intravenous, Q6HRS PRN, 8 mg at 11/12/22 0100 **OR** prochlorperazine (COMPAZINE) injection 10 mg, 10 mg, Intravenous, Q6HRS PRN **OR** promethazine (PHENERGAN) suppository 25 mg, 25 mg, Rectal, Q6HRS PRN, YASMANY Kwon.P.R.MIGUEL.    Past Medical History:   Diagnosis Date    Arthritis     Breast cancer (HCC) 08/07/2013    Bronchitis 2005    Cancer (HCC)     right breast cancer     Cataract     removed    Chronic systolic congestive heart failure (HCC) EF 35% 10/12/2022    Coronary artery disease due to calcified coronary lesion - Calcifications seen on CT scan also wtih aortic ulceration     Dental disorder     tooth infection    Discoid lupus     Dyslipidemia     Tried atorvastatin, pravastatin, lovastatin, and Zetia.  All causes severe myalgias.  Just taking fish oil.      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     Ischemic heart disease due to coronary artery obstruction (HCC) - Severe 3vD on cath 10/19/2022    LBBB (left bundle branch block) 12/16/2014    Noted on prechemo EKG 9/2014, MUGA WNL    Pneumonia     Pseudogout     Scarlet fever     Unspecified hemorrhagic conditions     gums       Past Surgical History:   Procedure Laterality Date    MULTIPLE CORONARY ARTERY BYPASS ENDO VEIN HARVEST  11/10/2022    Procedure: CORONARY ARTERY BYPASS GRAFTING X 3, WITH LEFT INTERNAL MAMMARY ARTERY TAKEDOWN AND ENDOSCOPIC VEIN HARVEST LEFT GREATER SAPHENOUS VEIN, AND  TRANSESOPHAGEAL ECHOCARDIOGRAM;  Surgeon: Padmini Mac M.D.;  Location: SURGERY [facility];  Service: Cardiothoracic    ECHOCARDIOGRAM, TRANSESOPHAGEAL, INTRAOPERATIVE  11/10/2022    Procedure: ECHOCARDIOGRAM, TRANSESOPHAGEAL, INTRAOPERATIVE;  Surgeon: Padmini Mac M.D.;  Location: SURGERY [facility];  Service: Cardiothoracic    OTHER CARDIAC SURGERY  11/2022    angiogram    CATH REMOVAL  02/24/2014    Performed by Aggie Burns M.D. at [facility]    MASTECTOMY  08/22/2013    Performed by Aggie Burns M.D. at [facility]    NODE BIOPSY SENTINEL  08/22/2013    Performed by Aggie Burns M.D. at [facility]    CATH PLACEMENT  08/22/2013    Performed by Aggie Burns M.D. at [facility]    US-NEEDLE CORE BX-BREAST PANEL  01/01/2013    right breast    COLONOSCOPY  01/01/2003    BREAST BIOPSY      TONSILLECTOMY         Family History   Problem Relation Age of Onset    Cancer Mother         possible thyroid and gynecological    Multiple Sclerosis Mother     Arthritis Father     Multiple Sclerosis Brother     Gout Brother     Heart Disease Paternal Grandmother     Diabetes Paternal Grandmother     Cancer Maternal Aunt         breast cancer- 60s    Diabetes Maternal Uncle     Heart Disease Maternal Grandmother     Heart Disease Maternal Grandfather         MI    Stroke Paternal Grandfather     Other Son         breast mass    Heart Disease Son         HEART MURMUR    Gout Son      Patient family history was personally reviewed, no pertinent family history to current presentation    Social History     Socioeconomic History    Marital status:      Spouse name: Not on file    Number of children: 2    Years of education: Not on file    Highest education level: Not on file   Occupational History     Employer: [employer]   Tobacco Use    Smoking status: Former     Packs/day: 1.00     Years: 0.00     Pack years: 0.00      "Types: Cigarettes     Quit date: 10/1/2013     Years since quittin.1    Smokeless tobacco: Never   Vaping Use    Vaping Use: Never used   Substance and Sexual Activity    Alcohol use: No     Alcohol/week: 0.0 oz    Drug use: No    Sexual activity: Not on file     Comment: , 2 children, enemployed   Other Topics Concern    Not on file   Social History Narrative    ** Merged History Encounter **         Patient is a . Patient has 2 adult children. She is  from .           Social Determinants of Health     Financial Resource Strain: Not on file   Food Insecurity: Not on file   Transportation Needs: Not on file   Physical Activity: Not on file   Stress: Not on file   Social Connections: Not on file   Intimate Partner Violence: Not on file   Housing Stability: Not on file       ALLERGIES:  Allergies   Allergen Reactions    Statins [Hmg-Coa-R Inhibitors] Myalgia     Muscle Spasms   RXN=unknown    Atorvastatin Myalgia    Codeine Vomiting and Nausea     Clarified with patient - states that she has an \"upset stomach\" when she takes it    Eggs Diarrhea     Pt states that she is allergic to eggs per her mother.  Tolerated clevidipine 2022    Lisinopril Cough    Losartan Unspecified     Tried in 2017  Cannot recall reaction    Penicillins Unspecified     \"does not work\" .'IMMUNE TO PENICILLIN,AMPICILLIN IS THE ONLY THING THAT WORKS'       Review of systems:  A complete review of symptoms was reviewed with patient. This is reviewed in H&P and PMH. ALL OTHERS reviewed and negative    Physical exam:  Vitals:    22 0543 22 0600 22 0700 22 0729   BP: (!) 93/54 (!) 93/54 (!) 97/55    Pulse: 84 83 86 92   Resp:  (!) 11 (!) 11 13   Temp:       TempSrc:       SpO2:  97% 96% 92%   Weight:       Height:             General: No acute distress.  EYES: no jaundice  HEENT: OP clear   Neck: No bruits No JVD.   CVS: RRR. S1 + S2. No M/R/G. Trace edema.  Resp: CTAB. No wheezing or " crackles/rhonchi.  Abdomen: Soft, NT, ND,  Skin: Grossly nothing acute no obvious rashes  Neurological: Alert  Extremities: Pulse 2+ in b/l LE. No cyanosis.     Data:  Laboratory studies personally reviewed by me:  Recent Results (from the past 24 hour(s))   POCT arterial blood gas device results    Collection Time: 11/16/22  6:42 PM   Result Value Ref Range    Ph 7.544 (H) 7.400 - 7.500    Pco2 48.8 (H) 26.0 - 37.0 mmHg    Po2 87 64 - 87 mmHg    Tco2 44 (HH) 20 - 33 mmol/L    S02 98 93 - 99 %    Hco3 42.2 (H) 17.0 - 25.0 mmol/L    BE 17 (H) -4 - 3 mmol/L    Body Temp 37.7 C degrees    O2 Therapy 100 %    iPF Ratio 87     Ph Temp Keila 7.533 (H) 7.400 - 7.500    Pco2 Temp Co 50.4 (H) 26.0 - 37.0 mmHg    Po2 Temp Cor 91 (H) 64 - 87 mmHg    Specimen Arterial     DelSys Other     LPM 15 lpm   CBC without Differential Critical Care 0130    Collection Time: 11/17/22 12:39 AM   Result Value Ref Range    WBC 9.3 4.8 - 10.8 K/uL    RBC 3.86 (L) 4.20 - 5.40 M/uL    Hemoglobin 11.4 (L) 12.0 - 16.0 g/dL    Hematocrit 35.6 (L) 37.0 - 47.0 %    MCV 92.2 81.4 - 97.8 fL    MCH 29.5 27.0 - 33.0 pg    MCHC 32.0 (L) 33.6 - 35.0 g/dL    RDW 52.0 (H) 35.9 - 50.0 fL    Platelet Count 242 164 - 446 K/uL    MPV 11.2 9.0 - 12.9 fL   Basic Metabolic Panel (BMP) Critical Care 0130    Collection Time: 11/17/22 12:39 AM   Result Value Ref Range    Sodium 142 135 - 145 mmol/L    Potassium 3.7 3.6 - 5.5 mmol/L    Chloride 96 96 - 112 mmol/L    Co2 35 (H) 20 - 33 mmol/L    Glucose 168 (H) 65 - 99 mg/dL    Bun 37 (H) 8 - 22 mg/dL    Creatinine 1.57 (H) 0.50 - 1.40 mg/dL    Calcium 9.1 8.5 - 10.5 mg/dL    Anion Gap 11.0 7.0 - 16.0   ESTIMATED GFR    Collection Time: 11/17/22 12:39 AM   Result Value Ref Range    GFR (CKD-EPI) 35 (A) >60 mL/min/1.73 m 2   MAGNESIUM    Collection Time: 11/17/22 12:39 AM   Result Value Ref Range    Magnesium 2.0 1.5 - 2.5 mg/dL   POCT glucose device results    Collection Time: 11/17/22 12:48 AM   Result Value Ref Range     POC Glucose, Blood 152 (H) 65 - 99 mg/dL   POCT arterial blood gas device results    Collection Time: 11/17/22 12:54 AM   Result Value Ref Range    Ph 7.515 (H) 7.400 - 7.500    Pco2 49.4 (H) 26.0 - 37.0 mmHg    Po2 103 (H) 64 - 87 mmHg    Tco2 41 (HH) 20 - 33 mmol/L    S02 98 93 - 99 %    Hco3 39.9 (H) 17.0 - 25.0 mmol/L    BE 15 (H) -4 - 3 mmol/L    Body Temp 98.6 F degrees    O2 Therapy 90 %    iPF Ratio 114     Ph Temp Keila 7.515 (H) 7.400 - 7.500    Pco2 Temp Co 49.4 (H) 26.0 - 37.0 mmHg    Po2 Temp Cor 103 (H) 64 - 87 mmHg    Specimen Arterial    POCT lactate device results    Collection Time: 11/17/22 12:54 AM   Result Value Ref Range    iStat Lactate 0.7 0.5 - 2.0 mmol/L       EKG 11/13/22 interpreted by me AF RVR, LBBB    All pertinent features of laboratory and imaging reviewed including primary images where applicable    TTE 10/12/22  CONCLUSIONS  Compared to the images of the prior study 08/25/2020, there is now   reduced ejection fraction and wall motion abnormalities, previously   normal.     Moderately reduced left ventricular systolic function.  The left ventricular ejection fraction is visually estimated to be 35%.  Hypokinesis of the inferior and septal walls.  Grade I diastolic dysfunction.  The right ventricle is normal in size and systolic function.  No significant valvular abnormalities.      TTE 11/11/22  CONCLUSIONS  Decreased LV function due to septal hypokinesis.  EF 35%. Post op LV   function unchanged from preop function.    MRI brain  IMPRESSION:  1.  BILATERAL mostly supratentorial areas of ischemia as described above. These appear to be primarily watershed zone infarctions.  2.  No hemorrhage  3.  No significant mass effect    Principal Problem:    Acute ischemic stroke (HCC) POA: Unknown  Active Problems:    Chronic systolic congestive heart failure (HCC) EF 35% (Chronic) POA: Yes    Chronic obstructive pulmonary disease (COPD) (HCC) POA: Unknown    S/P CABG x 3 POA: Unknown     Encounter for weaning from ventilator ([facility]) POA: Unknown    Other specified anemias POA: Unknown    PAF (paroxysmal atrial fibrillation) ([facility]) POA: Yes    Acute respiratory failure with hypoxia ([facility]) POA: Unknown    Encephalopathy acute POA: Unknown    CHF (congestive heart failure), NYHA class III, acute on chronic, combined ([facility]) POA: Yes  Resolved Problems:    * No resolved hospital problems. *      Assessment / Plan:  72 year old woman with PMH CAD, HTN, hx breast CA, HLD, ICM HFrEF 35% presents for coronary bypass surgery completed 11/10/22 (LIMA to LAD, SVG to OM2, SVG to dRCA) c/b CVA and paroxysmal AF. Consult for heart failure medical optimization.     -appreciate neurology input. Agree for doac when no contraindications without antiplatelets to reduce risk of bleeding.     -HFOMT when no contraindications  - - low dose lisinopril, can start 2.5mg daily and titrate as tolerated  - - farxiga 10mg daily. PRN loop diuretic for goal CVP 2-5  - - eventually beta blocker such as metoprolol succinate or carvedilol when more euvolemic  - - eventually MRA such as spironolactone  -AF  - - eliquis for cva prevention setting paroxysmal AF. Chadsvasc 7.  - - ok to continue rhythm control with amiodarone. 10g load via 400mg bid ok then 200mg daily.  -CAD  - - high intensity statin   - - ok to hold further antiplatelets while on doac to reduce bleeding risk  - - PPI for ppx    It is my pleasure to participate in the care of Ms. Dave.  Please do not hesitate to contact me with questions or concerns.    Velasquez Hamilton MD  Cardiologist [facility]    A total of 40 minutes of time was spent on day of encounter reviewing medical record, performing history and examination, counseling, ordering medication/test/consults and documentation.

## 2022-11-17 NOTE — ASSESSMENT & PLAN NOTE
Post/perioperative event with multiple areas of watershed type infarction  NCHCT 1/11 subacute infarcts occipital-parietal region  Deficits: Left neglect, right gaze preference, absent blink to threat on left, flaccid right lower extremity, moving bilateral upper extremities strong , left lower extremity normal  Appreciate neuro input  DAPT had been given then due to recent afib, neurology recommended transition to apixaban, started been held due to hematuria requiring transfusion.  PT/OT/SLP and rehabilitation  Seizure precaution, aspiration precaution, fall precaution  High risk aspiration

## 2022-11-18 PROBLEM — E87.3 METABOLIC ALKALEMIA: Status: ACTIVE | Noted: 2022-11-18

## 2022-11-18 LAB
ANION GAP SERPL CALC-SCNC: 10 MMOL/L (ref 7–16)
ANION GAP SERPL CALC-SCNC: 11 MMOL/L (ref 7–16)
BASE EXCESS BLDV CALC-SCNC: 7 MMOL/L (ref -4–3)
BODY TEMPERATURE: ABNORMAL DEGREES
BUN SERPL-MCNC: 44 MG/DL (ref 8–22)
BUN SERPL-MCNC: 45 MG/DL (ref 8–22)
CALCIUM SERPL-MCNC: 9.2 MG/DL (ref 8.5–10.5)
CALCIUM SERPL-MCNC: 9.6 MG/DL (ref 8.5–10.5)
CHLORIDE SERPL-SCNC: 98 MMOL/L (ref 96–112)
CHLORIDE SERPL-SCNC: 98 MMOL/L (ref 96–112)
CO2 BLDV-SCNC: 34 MMOL/L (ref 20–33)
CO2 SERPL-SCNC: 28 MMOL/L (ref 20–33)
CO2 SERPL-SCNC: 32 MMOL/L (ref 20–33)
CREAT SERPL-MCNC: 1.44 MG/DL (ref 0.5–1.4)
CREAT SERPL-MCNC: 1.52 MG/DL (ref 0.5–1.4)
DELSYS IDSYS: ABNORMAL
ERYTHROCYTE [DISTWIDTH] IN BLOOD BY AUTOMATED COUNT: 50.5 FL (ref 35.9–50)
GFR SERPLBLD CREATININE-BSD FMLA CKD-EPI: 36 ML/MIN/1.73 M 2
GFR SERPLBLD CREATININE-BSD FMLA CKD-EPI: 38 ML/MIN/1.73 M 2
GLUCOSE SERPL-MCNC: 135 MG/DL (ref 65–99)
GLUCOSE SERPL-MCNC: 184 MG/DL (ref 65–99)
HCO3 BLDV-SCNC: 32.3 MMOL/L (ref 24–28)
HCT VFR BLD AUTO: 34.5 % (ref 37–47)
HGB BLD-MCNC: 11.1 G/DL (ref 12–16)
HOROWITZ INDEX BLDV+IHG-RTO: 71 MM[HG]
LPM ILPM: 35 LPM
MAGNESIUM SERPL-MCNC: 2.1 MG/DL (ref 1.5–2.5)
MCH RBC QN AUTO: 29.8 PG (ref 27–33)
MCHC RBC AUTO-ENTMCNC: 32.2 G/DL (ref 33.6–35)
MCV RBC AUTO: 92.7 FL (ref 81.4–97.8)
O2/TOTAL GAS SETTING VFR VENT: 45 %
PCO2 BLDV: 46.9 MMHG (ref 41–51)
PCO2 TEMP ADJ BLDV: 46.5 MMHG (ref 41–51)
PH BLDV: 7.45 [PH] (ref 7.31–7.45)
PH TEMP ADJ BLDV: 7.45 [PH] (ref 7.31–7.45)
PLATELET # BLD AUTO: 265 K/UL (ref 164–446)
PMV BLD AUTO: 10.9 FL (ref 9–12.9)
PO2 BLDV: 32 MMHG (ref 25–40)
PO2 TEMP ADJ BLDV: 31 MMHG (ref 25–40)
POTASSIUM SERPL-SCNC: 3.5 MMOL/L (ref 3.6–5.5)
POTASSIUM SERPL-SCNC: 3.9 MMOL/L (ref 3.6–5.5)
RBC # BLD AUTO: 3.72 M/UL (ref 4.2–5.4)
SAO2 % BLDV: 63 %
SODIUM SERPL-SCNC: 137 MMOL/L (ref 135–145)
SODIUM SERPL-SCNC: 140 MMOL/L (ref 135–145)
SPECIMEN DRAWN FROM PATIENT: ABNORMAL
WBC # BLD AUTO: 10 K/UL (ref 4.8–10.8)

## 2022-11-18 PROCEDURE — A9270 NON-COVERED ITEM OR SERVICE: HCPCS | Performed by: THORACIC SURGERY (CARDIOTHORACIC VASCULAR SURGERY)

## 2022-11-18 PROCEDURE — 83735 ASSAY OF MAGNESIUM: CPT

## 2022-11-18 PROCEDURE — 99292 CRITICAL CARE ADDL 30 MIN: CPT | Performed by: EMERGENCY MEDICINE

## 2022-11-18 PROCEDURE — 99291 CRITICAL CARE FIRST HOUR: CPT | Performed by: EMERGENCY MEDICINE

## 2022-11-18 PROCEDURE — 700111 HCHG RX REV CODE 636 W/ 250 OVERRIDE (IP): Performed by: EMERGENCY MEDICINE

## 2022-11-18 PROCEDURE — 700102 HCHG RX REV CODE 250 W/ 637 OVERRIDE(OP): Performed by: INTERNAL MEDICINE

## 2022-11-18 PROCEDURE — 700102 HCHG RX REV CODE 250 W/ 637 OVERRIDE(OP): Performed by: THORACIC SURGERY (CARDIOTHORACIC VASCULAR SURGERY)

## 2022-11-18 PROCEDURE — A9270 NON-COVERED ITEM OR SERVICE: HCPCS | Performed by: NURSE PRACTITIONER

## 2022-11-18 PROCEDURE — 99233 SBSQ HOSP IP/OBS HIGH 50: CPT | Performed by: INTERNAL MEDICINE

## 2022-11-18 PROCEDURE — 99024 POSTOP FOLLOW-UP VISIT: CPT | Performed by: THORACIC SURGERY (CARDIOTHORACIC VASCULAR SURGERY)

## 2022-11-18 PROCEDURE — 770022 HCHG ROOM/CARE - ICU (200)

## 2022-11-18 PROCEDURE — 80048 BASIC METABOLIC PNL TOTAL CA: CPT

## 2022-11-18 PROCEDURE — 82803 BLOOD GASES ANY COMBINATION: CPT

## 2022-11-18 PROCEDURE — 700102 HCHG RX REV CODE 250 W/ 637 OVERRIDE(OP): Performed by: NURSE PRACTITIONER

## 2022-11-18 PROCEDURE — A9270 NON-COVERED ITEM OR SERVICE: HCPCS | Performed by: INTERNAL MEDICINE

## 2022-11-18 PROCEDURE — 94640 AIRWAY INHALATION TREATMENT: CPT

## 2022-11-18 PROCEDURE — 85027 COMPLETE CBC AUTOMATED: CPT

## 2022-11-18 RX ORDER — CARVEDILOL 6.25 MG/1
6.25 TABLET ORAL 2 TIMES DAILY WITH MEALS
Status: DISCONTINUED | OUTPATIENT
Start: 2022-11-18 | End: 2022-11-25

## 2022-11-18 RX ORDER — POTASSIUM CHLORIDE 7.45 MG/ML
10 INJECTION INTRAVENOUS
Status: COMPLETED | OUTPATIENT
Start: 2022-11-18 | End: 2022-11-18

## 2022-11-18 RX ADMIN — OMEPRAZOLE 40 MG: KIT at 05:19

## 2022-11-18 RX ADMIN — SULFAMETHOXAZOLE AND TRIMETHOPRIM 1 TABLET: 800; 160 TABLET ORAL at 00:27

## 2022-11-18 RX ADMIN — APIXABAN 5 MG: 5 TABLET, FILM COATED ORAL at 05:19

## 2022-11-18 RX ADMIN — POTASSIUM CHLORIDE 10 MEQ: 7.46 INJECTION, SOLUTION INTRAVENOUS at 10:11

## 2022-11-18 RX ADMIN — APIXABAN 5 MG: 5 TABLET, FILM COATED ORAL at 17:33

## 2022-11-18 RX ADMIN — POTASSIUM CHLORIDE 10 MEQ: 7.46 INJECTION, SOLUTION INTRAVENOUS at 08:58

## 2022-11-18 RX ADMIN — CARVEDILOL 6.25 MG: 6.25 TABLET, FILM COATED ORAL at 17:34

## 2022-11-18 RX ADMIN — AMIODARONE HYDROCHLORIDE 400 MG: 200 TABLET ORAL at 17:34

## 2022-11-18 RX ADMIN — CARVEDILOL 3.12 MG: 3.12 TABLET, FILM COATED ORAL at 08:58

## 2022-11-18 RX ADMIN — QUETIAPINE FUMARATE 12.5 MG: 25 TABLET, FILM COATED ORAL at 20:16

## 2022-11-18 RX ADMIN — AMIODARONE HYDROCHLORIDE 400 MG: 200 TABLET ORAL at 05:19

## 2022-11-18 ASSESSMENT — FIBROSIS 4 INDEX: FIB4 SCORE: 2.46

## 2022-11-18 NOTE — DISCHARGE PLANNING
Case Management Discharge Planning    Admission Date: 11/10/2022  GMLOS: 8.3  ALOS: 8    6-Clicks ADL Score: 7  6-Clicks Mobility Score: 7  PT and/or OT Eval ordered: Yes  Post-acute Referrals Ordered: No  Post-acute Choice Obtained: No  Has referral(s) been sent to post-acute provider:  No      Anticipated Discharge Dispo: Discharge Disposition: D/T to SNF with Medicare cert in anticipation of skilled care (03)    DME Needed: No    Action(s) Taken: Choice obtained  CM called to patient son Tommy to discuss discharge, phone number [phone], Tommy and family have been discussing skilled for his mom CM offered to send a choice form to family so they can look at facilities for patient.  E-mail sent to  [email or web address]  Giovanni wife email.  They will look it over and call CM with the choice.      Escalations Completed: None    Medically Clear: No    Next Steps: follow up with family for choice.  Patient has transfer orders.      Barriers to Discharge: Medical clearance    Is the patient up for discharge tomorrow: No

## 2022-11-18 NOTE — DIETARY
Nutrition Services Brief Update:    Day 8 of admit.  Lauren Dave is a 72 y.o. female with admitting DX of Atherosclerotic heart disease of native coronary artery without angina pectoris , CHF (congestive heart failure), NYHA class III, acute on chronic, combined.     Per ADL, TF running with goal feeds: Replete with Fiber @ 60 ml/hr.     Problem: Nutritional:  Goal: Achieve adequate nutritional intake  Description: TF to meet 100% of nutritional needs.   Outcome: Goal Met.

## 2022-11-18 NOTE — CARE PLAN
Problem: Knowledge Deficit - Standard  Goal: Patient and family/care givers will demonstrate understanding of plan of care, disease process/condition, diagnostic tests and medications  Outcome: Not Progressing     Problem: Skin Integrity  Goal: Skin integrity is maintained or improved  Outcome: Progressing     Problem: Fall Risk  Goal: Patient will remain free from falls  Outcome: Progressing     Problem: Pain - Standard  Goal: Alleviation of pain or a reduction in pain to the patient’s comfort goal  Outcome: Progressing   The patient is Watcher - Medium risk of patient condition declining or worsening    Shift Goals  Clinical Goals: o2, neuro, and hemodynamic stability  Patient Goals: SUNDAR  Family Goals: SUNDAR    Progress made toward(s) clinical / shift goals:  maintaining      Patient is not progressing towards the following goals:      Problem: Knowledge Deficit - Standard  Goal: Patient and family/care givers will demonstrate understanding of plan of care, disease process/condition, diagnostic tests and medications  Outcome: Not Progressing      Problem: Skin Integrity  Goal: Skin integrity is maintained or improved  Outcome: Progressing     Problem: Fall Risk  Goal: Patient will remain free from falls  Outcome: Progressing     Problem: Pain - Standard  Goal: Alleviation of pain or a reduction in pain to the patient’s comfort goal  Outcome: Progressing

## 2022-11-18 NOTE — PROGRESS NOTES
Cardiovascular Surgery Progress Note    Name: Lauren Dave  MRN: [id number]  : 1950  Admit Date: 11/10/2022  5:08 AM  8 Days Post-Op     Procedure:  Procedure(s) and Anesthesia Type:     * CORONARY ARTERY BYPASS GRAFTING X 3, WITH LEFT INTERNAL MAMMARY ARTERY TAKEDOWN AND ENDOSCOPIC VEIN HARVEST LEFT GREATER SAPHENOUS VEIN, AND TRANSESOPHAGEAL ECHOCARDIOGRAM - General     * ECHOCARDIOGRAM, TRANSESOPHAGEAL, INTRAOPERATIVE - General    Vitals:  Vitals:    22 0656 22 0700 22 0800 22 0900   BP:  (!) 143/61 (!) 157/70 (!) 146/66   Pulse: 94 95 96 96   Resp: (!) 22 (!) 31 (!) 25 (!) 54   Temp:    36.9 °C (98.4 °F)   TempSrc:    Temporal   SpO2: 93% 92% 93% 95%   Weight:       Height:          Temp (24hrs), Av.8 °C (98.2 °F), Min:36.7 °C (98 °F), Max:36.9 °C (98.4 °F)      Respiratory:    Respiration: (!) 54, Pulse Oximetry: 95 %       Fluids:    Intake/Output Summary (Last 24 hours) at 2022 0933  Last data filed at 2022 0600  Gross per 24 hour   Intake 1200 ml   Output 1550 ml   Net -350 ml       Admit weight: Weight: 67.1 kg (147 lb 14.9 oz)  Current weight: Weight: 68 kg (149 lb 14.6 oz) (22 0600)    Labs:  Recent Labs     22  0000 22  0039 22  0115   WBC 8.7 9.3 10.0   RBC 2.51* 3.86* 3.72*   HEMOGLOBIN 7.6* 11.4* 11.1*   HEMATOCRIT 24.7* 35.6* 34.5*   MCV 98.4* 92.2 92.7   MCH 30.3 29.5 29.8   MCHC 30.8* 32.0* 32.2*   RDW 46.6 52.0* 50.5*   PLATELETCT 229 242 265   MPV 10.4 11.2 10.9       Recent Labs     22  0000 22  0039 22  0115   SODIUM 139 142 140   POTASSIUM 3.8 3.7 3.5*   CHLORIDE 96 96 98   CO2 37* 35* 32   GLUCOSE 139* 168* 135*   BUN 40* 37* 44*   CREATININE 1.35 1.57* 1.52*   CALCIUM 8.6 9.1 9.2                   Medications:  Scheduled Medications   Medication Dose Frequency    potassium chloride  10 mEq Q HOUR    carvedilol  3.125 mg BID WITH MEALS    Pharmacy   PHARMACY TO DOSE    QUEtiapine  12.5 mg Nightly     apixaban  5 mg BID    amiodarone  400 mg TWICE DAILY    [Held by provider] aspirin  81 mg DAILY    omeprazole  40 mg DAILY    Pharmacy Consult Request  1 Each PHARMACY TO DOSE        Exam:   Physical Exam  Constitutional:       General: She is not in acute distress.  HENT:      Mouth/Throat:      Mouth: Mucous membranes are moist.   Eyes:      Pupils: Pupils are equal, round, and reactive to light.   Cardiovascular:      Rate and Rhythm: Normal rate and regular rhythm.      Pulses: Normal pulses.      Heart sounds: Normal heart sounds.   Pulmonary:      Effort: No respiratory distress.      Breath sounds: Examination of the right-lower field reveals decreased breath sounds. Examination of the left-lower field reveals decreased breath sounds. Decreased breath sounds present.   Abdominal:      Palpations: Abdomen is soft.   Skin:     General: Skin is warm and dry.      Comments: Sternum- prevena  RSVG site- c/d/i   Neurological:      Mental Status: She is alert. She is disoriented.   Psychiatric:         Behavior: Behavior is slowed.         Judgment: Judgment is impulsive.       Cardiac Medications:    ASA - Yes    Plavix - Yes    Post-operative Beta Blockers - Yes    Ace/ARB- No; contraindicated because of Increased creatinine/CKD    Statin - No; contraindicated because of Allergy/intolerance    Aldactone- No; contraindicated because of Increased creatinine/CKD    Ejection Fraction:  35%    Telemetry:   11/11 SR  11/12 SR/ST 90-100s  11/13 SR  11/14 SR, a fib last night  11/15 SR   11/16 SR   11/17 SR  11/18 SR    Assessment/Plan:  POD 1  HDS, SR, neuro not moving right leg but follows commands, cleviprex drip, wounds CDI, abdomen soft, fluid balance positive, wt up,  chest tube output moderate.  CT head this AM.  Plan:  wean vent as tolerated.  Allow permissive hypertension, OK for systolic 120-140's, CPM.     POD 2 HDS, SR/ST.  Neuro can say name, no movement in right leg.  Weaker left upper extremity,  following commands intermittently, pulling at NG/central line.  Wounds CDI, prevena in tact.  Moderate serosanguinous output from chest tube.  Cr 1.18 Hgb 9.2.  Neurology recommending MRI brain, pending currently.  Plan: MRI when available.  Ok for permissive HTN of SBP of 140 or less.  Encourage IS.  Restraint for RUE due to trying to remove central line and NG tubes with impulsivity.      POD 3 HDS. SR.  Neuro aware to self and place.  Good strength in right hand and left leg.  Left arm weak.  No movement right leg.  Pacer wires removed. Pt tolerated well.  Cr 1.14, Hgb 8.8. Plan: MRI brain today, pacer wires out.  DC chest tubes.  Encourage IS.  Work with PT as tolerated.    POD 4 BP 90s-140s, SR, a fib overnight, lethargic this morning, flaccid right leg, weak left arm, wounds CDI, abdomen soft, fluid balance down, wt up, 5L NC. On tube feeding. Plan: UA and culture today, transfer to IMCU, continue diuresis, amio gtt to PO, IS/ambulate.     POD 5  HDS, SR on PO amio, neuro lethargic moving three extremities on command 5/5  strength uppers, not able to get her to move RLE, wounds Prevena intact, abdomen soft n/t BMS in place for incontinence, Hgb 8.3, Cr 1.19,  fluid balance negative, wt down,  BP labile this AM. Plan:  decrease diuresis for labile BP, fluid balance negative, Wean Oxygen, Encourage IS/ambulate.     POD 6 HDS, SR- on po amio- eliquis being held for anemia and hematuria, neuro - unchanged- agitated overnight given haldol- start nightly seroquel 12.5 mg, acute blood loss anemia- transfuse    POD 7 HDS- add low dose beta blocker, SR on PO amio- resumed eliquis, patient appears euvolemic by weight and I&O's CVP 0- lasix being held, Resp insuff- high flow nasal cannula- rpt CXR- poss aspiration pneumonia after NGT pulled, ck echo    POD 8  HDS- hypertensive overnight, SR, AOx4, flaccid right leg, on HFNC, prevena in place, abdomen soft, fluid balance negative, wt down,  Cr 1.53.  Plan: increase  coreg, add lisinopril when renal function improves, IS/ambulate.     Disposition:  Therapies recommending post acute placement, rehab referral

## 2022-11-18 NOTE — THERAPY
Occupational Therapy  Daily Treatment     Patient Name: Lauren Dave  Age:  72 y.o., Sex:  female  Medical Record #: [id number]  Today's Date: 11/17/2022     Precautions  Precautions: Fall Risk, Cardiac Precautions (See Comments), Sternal Precautions (See Comments), Nasogastric Tube  Comments: s/p CABG, CVA, BL wrist restraints    Assessment    Pt seen for OT session. Progressing with command following and activity tolerance. Pt has been moved to ICU for closer monitoring d/t AHRF and placed on HFNC; Now with B pleural effusion. Pt cooperative with therapy, but lethargic with minimal verbalization during session. Req repeated v/cs for command following during activity and grossly delayed. Pt appears to have some L inattention; R gaze preference; provided with Z flow pillow. Educated friend, Haleigh, on having pt speak as much as possible, sit on L side to facilitate L attention, supporting LUE with pillow to prevent further sublux, and having pt follow eyes/face across midline to L when alert. Continues to be limited by decreased functional mobility, activity tolerance, cognition, strength, AROM, coordination, balance, adherence to precautions, and pain which are currently affecting pt's ability to complete ADLs/IADLs at baseline. Will continue to follow.     Plan    Continue current treatment plan.    DC Equipment Recommendations: Unable to determine at this time  Discharge Recommendations: Recommend post-acute placement for additional occupational therapy services prior to discharge home     Objective     11/17/22 1405   Precautions   Precautions Fall Risk;Cardiac Precautions (See Comments);Sternal Precautions (See Comments);Nasogastric Tube   Comments s/p CABG, CVA, BL wrist restraints   Vitals   O2 Delivery Device High Flow Nasal Cannula  (unchanged during session)   Pain 0 - 10 Group   Therapist Pain Assessment Post Activity Pain Same as Prior to Activity;During Activity;Nurse Notified  (no s/s of pain)    Cognition    Cognition / Consciousness X   Speech/ Communication Delayed Responses   Level of Consciousness Responds to voice   Ability To Follow Commands 1 Step  (~75% of the time)   Safety Awareness Impaired  (intermittent BLE extension during session at EOB)   New Learning Impaired   Attention Impaired   Sequencing Impaired   Initiation Impaired   Comments cooperative with therapy, but lethargic with minimal verbalization during session. Req repeated v/cs for command following. Encouraged to speak as much as possible and sit on left. Pt appears to have some L inattention; R gaze preference   Passive ROM Upper Body   Passive ROM Upper Body WDL   Active ROM Upper Body   Active ROM Upper Body  X   Dominant Hand Right   Comments LUE with spontaneous movement at hand and wrist. RUE followed some commands as hand/wrist, but minimally at elbow/shoulder   Strength Upper Body   Upper Body Strength  X   Comments LUE with trace in bicep/tricep, none in L shoulder, beginning to get a sublux. RUE with 2/5 strength in hand and wrist and trace in elbow and shoulder   Upper Body Muscle Tone   Upper Body Muscle Tone  X   Comments BUE hypotonic   Neuro-Muscular Treatments   Neuro-Muscular Treatments Anterior weight shift;Weight Shift Left;Weight Shift Right;Verbal Cuing;Tactile Cuing;Sequencing;Facilitation;Joint Approximation;Postural Facilitation   Comments at EOB; reaching forward out of KHOI and facilitating sitting balance. Pt with heavy L lateral lean   Tone   Pt with 1 finger sublux on L shoulder; educated RN on supporting with pillow to prevent worsening   Other Treatments   Other Treatments Provided Educated friend, Haleigh, on having pt speak as much as possible, sit on L side to facilitate L attention, supporting LUE with pillow to prevent further sublux, and having pt follow eyes/face across midline to L   Balance   Sitting Balance (Static) Trace +   Sitting Balance (Dynamic) Trace   Standing Balance (Static) Trace    Standing Balance (Dynamic) Dependent   Weight Shift Sitting Absent   Weight Shift Standing Poor   Skilled Intervention Verbal Cuing;Tactile Cuing;Facilitation;Compensatory Strategies;Sequencing;Postural Facilitation   Comments total A with B HHA and B knee blocking   Bed Mobility    Supine to Sit Total Assist   Sit to Supine Total Assist   Scooting Total Assist   Rolling Total Assist to Rt.;Total Assist to Lt.   Skilled Intervention Compensatory Strategies;Facilitation;Postural Facilitation;Sequencing;Tactile Cuing;Verbal Cuing   Comments x2 person assist   Activities of Daily Living   Eating Total Assist  (NG tube)   Grooming Maximal Assist;Seated  (brushing teeth req hand over hand and max v/cs)   Lower Body Dressing Total Assist   Toileting Total Assist  (bernal and BMS)   Skilled Intervention Verbal Cuing;Tactile Cuing;Sequencing;Postural Facilitation;Compensatory Strategies;Facilitation   Functional Mobility   Sit to Stand Total Assist   Toilet Transfers Unable to Participate   Mobility EOB and 1 STS only   Skilled Intervention Verbal Cuing;Tactile Cuing;Sequencing;Postural Facilitation;Facilitation;Compensatory Strategies   ICU Target Mobility Level   ICU Mobility - Targeted Level Level 2   Visual Perception   Visual Perception  X   Comments R gaze preference, decreased eye contact, but better following with friend's face, difficulty crossing midline to L, able to hold ~5 secs   Activity Tolerance   Comments limtied by balance and fatigue   Patient / Family Goals   Patient / Family Goal #1 Unable to state; no family present   Short Term Goals   Short Term Goal # 1 Pt will follow 1-step command 75% during functional tasks   Goal Outcome # 1 Goal met, new goal added   Short Term Goal # 1 B  will follow 100% of commands w/o req repetition   Short Term Goal # 2 Pt will sit EOB with CGA >5 min to participate in ADL task   Goal Outcome # 2 Progressing slower than expected   Short Term Goal # 3 Pt will wipe face  with SBA using dominant R hand   Goal Outcome # 3 Progressing slower than expected   Education Group   Education Provided Role of Occupational Therapist   Role of Occupational Therapist Patient Response Patient;Acceptance;Explanation;Reinforcement Needed;No Learning Evidence

## 2022-11-18 NOTE — PROGRESS NOTES
Interval:  No acute events.    Medications / Drug list prior to admission:  No current facility-administered medications on file prior to encounter.     Current Outpatient Medications on File Prior to Encounter   Medication Sig Dispense Refill    amLODIPine (NORVASC) 2.5 MG Tab Take 1 Tablet by mouth 2 times a day. 60 Tablet 3    metoprolol SR (TOPROL XL) 50 MG TABLET SR 24 HR Take 1 Tablet by mouth every day. 90 Tablet 3    ampicillin (PRINCIPEN) 500 MG Cap Take 1 Capsule by mouth 4 times a day. Twice a day for 7 days      aspirin 81 MG EC tablet Take 1 Tablet by mouth every day. 100 Tablet 0    hydrALAZINE (APRESOLINE) 10 MG Tab Take 1 Tablet by mouth 3 times a day as needed (/100). 25 Tablet 11    hydroxychloroquine (PLAQUENIL) 200 MG Tab Take 1 tablet by mouth every day. 30 tablet 0    famotidine (PEPCID) 40 MG Tab Take 1 tablet by mouth twice daily (Patient taking differently: Take 1 Tablet by mouth as needed.) 180 tablet 1    Cyanocobalamin (VITAMIN B-12 PO) Take 1 Tablet by mouth every morning.      ibuprofen (MOTRIN) 800 MG Tab TAKE 1 TABLET BY MOUTH EVERY 8 HOURS AS NEEDED FOR MILD PAIN 90 Tab 0    Cholecalciferol (VITAMIN D3) 5000 units Cap Take 1 Capsule by mouth every day.         Current list of administered Medications:    Current Facility-Administered Medications:     carvedilol (COREG) tablet 6.25 mg, 6.25 mg, Enteral Tube, BID WITH MEALS, YULISSA Wheeler    Metoprolol Tartrate (LOPRESSOR) injection 5 mg, 5 mg, Intravenous, Q5 MIN PRN, Kendall Yeboah M.D.    Pharmacy Consult: Enteral tube insertion - review meds/change route/product selection, , Other, PHARMACY TO DOSE, Darrick Kraft M.D.    QUEtiapine (Seroquel) tablet 12.5 mg, 12.5 mg, Enteral Tube, Nightly, Darrick Kraft M.D., 12.5 mg at 11/17/22 8477    apixaban (ELIQUIS) tablet 5 mg, 5 mg, Enteral Tube, BID, Darrick Kraft M.D., 5 mg at 11/18/22 8019    haloperidol lactate (HALDOL) injection 1 mg, 1 mg,  Intravenous, Q4HRS PRN, Kenneth Pugh M.D., 1 mg at 22 0511    loperamide (IMODIUM) oral suspension 2 mg, 2 mg, Enteral Tube, 4X/DAY PRN, Vinicius Walden M.D.    QUEtiapine (Seroquel) tablet 25 mg, 25 mg, Enteral Tube, HS PRN, YULISSA Wheeler, 25 mg at 22 2247    amiodarone (Cordarone) tablet 400 mg, 400 mg, Enteral Tube, TWICE DAILY, Padmini Mac M.D., 400 mg at 22 0519    [] acetaminophen (TYLENOL) tablet 1,000 mg, 1,000 mg, Enteral Tube, Q6HRS, 1,000 mg at 11/15/22 1149 **FOLLOWED BY** acetaminophen (TYLENOL) tablet 1,000 mg, 1,000 mg, Enteral Tube, Q6HRS PRN, Kamron Back M.D.    [Held by provider] aspirin (ASA) chewable tab 81 mg, 81 mg, Enteral Tube, DAILY, Kamron Back M.D., 81 mg at 11/15/22 0557    omeprazole (FIRST-OMEPRAZOLE) 2 mg/mL oral susp 40 mg, 40 mg, Enteral Tube, DAILY, Kamron Back M.D., 40 mg at 22 0519    acetaminophen (Tylenol) tablet 650 mg, 650 mg, Enteral Tube, Q4HRS PRN **OR** acetaminophen (TYLENOL) suppository 650 mg, 650 mg, Rectal, Q4HRS PRN, Kamron Back M.D.    mag hydrox-al hydrox-simeth (MAALOX PLUS ES or MYLANTA DS) suspension 30 mL, 30 mL, Enteral Tube, Q4HRS PRN, Kamron Back M.D.    oxyCODONE immediate-release (ROXICODONE) tablet 5 mg, 5 mg, Enteral Tube, Q3HRS PRN, 5 mg at 11/15/22 1706 **OR** oxyCODONE immediate release (ROXICODONE) tablet 10 mg, 10 mg, Enteral Tube, Q3HRS PRN, 10 mg at 22 0333 **OR** fentaNYL (SUBLIMAZE) injection 50 mcg, 50 mcg, Intravenous, Q3HRS PRN, Kamron Back M.D.    traMADol (Ultram) 50 MG tablet 50 mg, 50 mg, Enteral Tube, Q4HRS PRN, Kamron Back M.D., 50 mg at 22 3228    Respiratory Therapy Consult, , Nebulization, Continuous RT, YASMANY Kwon.P.R.N.    electrolyte-A (PLASMALYTE-A) infusion, , Intravenous, PRN, SHANKAR KwonP.R.N., Last Rate: 999 mL/hr at 11/10/22 1619, New Bag at 11/10/22 1619    Pharmacy  Consult Request ...Pain Management Review 1 Each, 1 Each, Other, PHARMACY TO DOSE, SHANKAR KwonP.RTRESSA    ondansetron (ZOFRAN) syringe/vial injection 8 mg, 8 mg, Intravenous, Q6HRS PRN, 8 mg at 11/12/22 0100 **OR** prochlorperazine (COMPAZINE) injection 10 mg, 10 mg, Intravenous, Q6HRS PRN **OR** promethazine (PHENERGAN) suppository 25 mg, 25 mg, Rectal, Q6HRS PRN, YASMANY Kwon.P.R.MIGUEL.    Past Medical History:   Diagnosis Date    Arthritis     Breast cancer (HCC) 08/07/2013    Bronchitis 2005    Cancer (HCC)     right breast cancer     Cataract     removed    Chronic systolic congestive heart failure (HCC) EF 35% 10/12/2022    Coronary artery disease due to calcified coronary lesion - Calcifications seen on CT scan also wtih aortic ulceration     Dental disorder     tooth infection    Discoid lupus     Dyslipidemia     Tried atorvastatin, pravastatin, lovastatin, and Zetia.  All causes severe myalgias.  Just taking fish oil.      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     Ischemic heart disease due to coronary artery obstruction (HCC) - Severe 3vD on cath 10/19/2022    LBBB (left bundle branch block) 12/16/2014    Noted on prechemo EKG 9/2014, MUGA WNL    Pneumonia     Pseudogout     Scarlet fever     Unspecified hemorrhagic conditions     gums       Past Surgical History:   Procedure Laterality Date    MULTIPLE CORONARY ARTERY BYPASS ENDO VEIN HARVEST  11/10/2022    Procedure: CORONARY ARTERY BYPASS GRAFTING X 3, WITH LEFT INTERNAL MAMMARY ARTERY TAKEDOWN AND ENDOSCOPIC VEIN HARVEST LEFT GREATER SAPHENOUS VEIN, AND TRANSESOPHAGEAL ECHOCARDIOGRAM;  Surgeon: Padmini Mac M.D.;  Location: SURGERY [facility];  Service: Cardiothoracic    ECHOCARDIOGRAM, TRANSESOPHAGEAL, INTRAOPERATIVE  11/10/2022    Procedure: ECHOCARDIOGRAM, TRANSESOPHAGEAL, INTRAOPERATIVE;  Surgeon: Padmini Mac M.D.;  Location: SURGERY [facility];  Service: Cardiothoracic    OTHER  CARDIAC SURGERY  2022    angiogram    CATH REMOVAL  2014    Performed by Aggie Burns M.D. at [facility]    MASTECTOMY  2013    Performed by Aggie Burns M.D. at [facility]    NODE BIOPSY SENTINEL  2013    Performed by Aggie Burns M.D. at [facility]    CATH PLACEMENT  2013    Performed by Aggie Burns M.D. at [facility]    US-NEEDLE CORE BX-BREAST PANEL  2013    right breast    COLONOSCOPY  2003    BREAST BIOPSY      TONSILLECTOMY         Family History   Problem Relation Age of Onset    Cancer Mother         possible thyroid and gynecological    Multiple Sclerosis Mother     Arthritis Father     Multiple Sclerosis Brother     Gout Brother     Heart Disease Paternal Grandmother     Diabetes Paternal Grandmother     Cancer Maternal Aunt         breast cancer- 60s    Diabetes Maternal Uncle     Heart Disease Maternal Grandmother     Heart Disease Maternal Grandfather         MI    Stroke Paternal Grandfather     Other Son         breast mass    Heart Disease Son         HEART MURMUR    Gout Son      Patient family history was personally reviewed, no pertinent family history to current presentation    Social History     Socioeconomic History    Marital status:      Spouse name: Not on file    Number of children: 2    Years of education: Not on file    Highest education level: Not on file   Occupational History     Employer: [employer]   Tobacco Use    Smoking status: Former     Packs/day: 1.00     Years: 0.00     Pack years: 0.00     Types: Cigarettes     Quit date: 10/1/2013     Years since quittin.1    Smokeless tobacco: Never   Vaping Use    Vaping Use: Never used   Substance and Sexual Activity    Alcohol use: No     Alcohol/week: 0.0 oz    Drug use: No    Sexual activity: Not on file     Comment: , 2 children, enemployed   Other Topics Concern    Not on file  "  Social History Narrative    ** Merged History Encounter **         Patient is a . Patient has 2 adult children. She is  from .           Social Determinants of Health     Financial Resource Strain: Not on file   Food Insecurity: Not on file   Transportation Needs: Not on file   Physical Activity: Not on file   Stress: Not on file   Social Connections: Not on file   Intimate Partner Violence: Not on file   Housing Stability: Not on file       ALLERGIES:  Allergies   Allergen Reactions    Statins [Hmg-Coa-R Inhibitors] Myalgia     Muscle Spasms   RXN=unknown    Atorvastatin Myalgia    Codeine Vomiting and Nausea     Clarified with patient - states that she has an \"upset stomach\" when she takes it    Eggs Diarrhea     Pt states that she is allergic to eggs per her mother.  Tolerated clevidipine 11/2022    Lisinopril Cough    Losartan Unspecified     Tried in 2017  Cannot recall reaction    Penicillins Unspecified     \"does not work\" .'IMMUNE TO PENICILLIN,AMPICILLIN IS THE ONLY THING THAT WORKS'       Review of systems:  A complete review of symptoms was reviewed with patient. This is reviewed in H&P and PMH. ALL OTHERS reviewed and negative    Physical exam:  Vitals:    11/18/22 1100 11/18/22 1200 11/18/22 1300 11/18/22 1416   BP: 135/64 127/60 138/65    Pulse: 84 86 86    Resp: (!) 22 (!) 23 (!) 23    Temp:       TempSrc:       SpO2: 95% 94% 95% 95%   Weight:       Height:             General: No acute distress.  EYES: no jaundice  HEENT: OP clear   Neck: No bruits No JVD.   CVS: RRR. S1 + S2. No M/R/G. Trace edema.  Resp: CTAB. No wheezing or crackles/rhonchi.  Abdomen: Soft, NT, ND,  Skin: Grossly nothing acute no obvious rashes  Neurological: Alert  Extremities: Pulse 2+ in b/l LE. No cyanosis.     Data:  Laboratory studies personally reviewed by me:  Recent Results (from the past 24 hour(s))   URINE CHLORIDE RANDOM    Collection Time: 11/17/22  7:29 PM   Result Value Ref Range    " Chloride, Urine-per volume 33 mmol/L   URINALYSIS    Collection Time: 11/17/22  7:30 PM    Specimen: Urine, Berger Cath   Result Value Ref Range    Color Yellow     Character Clear     Specific Gravity 1.017 <1.035    Ph 8.5 (A) 5.0 - 8.0    Glucose Negative Negative mg/dL    Ketones Negative Negative mg/dL    Protein 100 (A) Negative mg/dL    Bilirubin Negative Negative    Urobilinogen, Urine 0.2 Negative    Nitrite Negative Negative    Leukocyte Esterase Negative Negative    Occult Blood Small (A) Negative    Micro Urine Req Microscopic    URINE MICROSCOPIC (W/UA)    Collection Time: 11/17/22  7:30 PM   Result Value Ref Range    WBC 0-2 /hpf    RBC 0-2 /hpf    Bacteria Negative None /hpf    Epithelial Cells Negative /hpf    Hyaline Cast 0-2 /lpf   CBC without Differential Critical Care 0130    Collection Time: 11/18/22  1:15 AM   Result Value Ref Range    WBC 10.0 4.8 - 10.8 K/uL    RBC 3.72 (L) 4.20 - 5.40 M/uL    Hemoglobin 11.1 (L) 12.0 - 16.0 g/dL    Hematocrit 34.5 (L) 37.0 - 47.0 %    MCV 92.7 81.4 - 97.8 fL    MCH 29.8 27.0 - 33.0 pg    MCHC 32.2 (L) 33.6 - 35.0 g/dL    RDW 50.5 (H) 35.9 - 50.0 fL    Platelet Count 265 164 - 446 K/uL    MPV 10.9 9.0 - 12.9 fL   Basic Metabolic Panel (BMP) Critical Care 0130    Collection Time: 11/18/22  1:15 AM   Result Value Ref Range    Sodium 140 135 - 145 mmol/L    Potassium 3.5 (L) 3.6 - 5.5 mmol/L    Chloride 98 96 - 112 mmol/L    Co2 32 20 - 33 mmol/L    Glucose 135 (H) 65 - 99 mg/dL    Bun 44 (H) 8 - 22 mg/dL    Creatinine 1.52 (H) 0.50 - 1.40 mg/dL    Calcium 9.2 8.5 - 10.5 mg/dL    Anion Gap 10.0 7.0 - 16.0   MAGNESIUM    Collection Time: 11/18/22  1:15 AM   Result Value Ref Range    Magnesium 2.1 1.5 - 2.5 mg/dL   ESTIMATED GFR    Collection Time: 11/18/22  1:15 AM   Result Value Ref Range    GFR (CKD-EPI) 36 (A) >60 mL/min/1.73 m 2       EKG 11/13/22 interpreted by me AF RVR, LBBB    All pertinent features of laboratory and imaging reviewed including primary  images where applicable    TTE 10/12/22  CONCLUSIONS  Compared to the images of the prior study 08/25/2020, there is now   reduced ejection fraction and wall motion abnormalities, previously   normal.     Moderately reduced left ventricular systolic function.  The left ventricular ejection fraction is visually estimated to be 35%.  Hypokinesis of the inferior and septal walls.  Grade I diastolic dysfunction.  The right ventricle is normal in size and systolic function.  No significant valvular abnormalities.      TTE 11/11/22  CONCLUSIONS  Decreased LV function due to septal hypokinesis.  EF 35%. Post op LV   function unchanged from preop function.    MRI brain  IMPRESSION:  1.  BILATERAL mostly supratentorial areas of ischemia as described above. These appear to be primarily watershed zone infarctions.  2.  No hemorrhage  3.  No significant mass effect    Principal Problem:    Acute respiratory failure with hypoxia ([facility]) POA: Unknown  Active Problems:    Gastroesophageal reflux disease without esophagitis POA: Yes    Coronary artery disease due to calcified coronary lesion - Calcifications seen on CT scan also wtih aortic ulceration (Chronic) POA: Yes    Chronic systolic congestive heart failure (HCC) EF 35% (Chronic) POA: Yes    Chronic obstructive pulmonary disease (COPD) ([facility]) POA: Unknown    S/P CABG x 3 POA: Unknown    Encounter for weaning from ventilator ([facility]) POA: Unknown    Acute ischemic stroke ([facility]) POA: Unknown    Other specified anemias POA: Unknown    PAF (paroxysmal atrial fibrillation) ([facility]) POA: Yes    Encephalopathy acute POA: Unknown    CHF (congestive heart failure), NYHA class III, acute on chronic, combined ([facility]) POA: Yes    HFrEF (heart failure with reduced ejection fraction) ([facility]) POA: Unknown    Acute blood loss anemia POA: Unknown    Metabolic alkalemia POA: Unknown  Resolved Problems:    * No resolved hospital problems. *      Assessment / Plan:  72 year old woman with PMH CAD, HTN, hx  breast CA, HLD, ICM HFrEF 35% presents for coronary bypass surgery completed 11/10/22 (LIMA to LAD, SVG to OM2, SVG to dRCA) c/b CVA and paroxysmal AF. Consult for heart failure medical optimization.     -appreciate neurology input. Agree for doac when no contraindications without antiplatelets to reduce risk of bleeding.     -HFOMT when no contraindications  - - low dose lisinopril, can start 2.5mg daily and titrate as tolerated  - - farxiga 10mg daily. PRN loop diuretic for goal CVP 2-5  - - eventually beta blocker such as metoprolol succinate or carvedilol when more euvolemic  - - eventually MRA such as spironolactone, can trial 12.5 mg daily   -AF  - - eliquis for cva prevention setting paroxysmal AF. Chadsvasc 7.  - - ok to continue rhythm control with amiodarone. 10g load via 400mg bid ok then 200mg daily.  -CAD  - - high intensity statin   - - ok to hold further antiplatelets while on doac to reduce bleeding risk  - - PPI for ppx    It is my pleasure to participate in the care of Ms. Dave.  Please do not hesitate to contact me with questions or concerns.    Velasquez Hamilton MD  Cardiologist [facility]    A total of 42 minutes of time was spent on day of encounter reviewing medical record, performing history and examination, counseling, ordering medication/test/consults and documentation.

## 2022-11-18 NOTE — CARE PLAN
The patient is Watcher - Medium risk of patient condition declining or worsening    Shift Goals  Clinical Goals: o2, neuro, and hemodynamic stability  Patient Goals: Ice chips  Family Goals: SUNDAR    Progress made toward(s) clinical / shift goals:      Problem: Skin Integrity  Goal: Skin integrity is maintained or improved  Outcome: Progressing     Problem: Fall Risk  Goal: Patient will remain free from falls  Outcome: Progressing     Problem: Pain - Standard  Goal: Alleviation of pain or a reduction in pain to the patient’s comfort goal  Outcome: Progressing     Problem: Hemodynamics  Goal: Patient's hemodynamics, fluid balance and neurologic status will be stable or improve  Outcome: Progressing     Problem: Safety - Medical Restraint  Goal: Remains free of injury from restraints (Restraint for Interference with Medical Device)  Outcome: Progressing     Problem: Neuro Status  Goal: Neuro status will remain stable or improve  Outcome: Progressing       Patient is not progressing towards the following goals:      Problem: Knowledge Deficit - Standard  Goal: Patient and family/care givers will demonstrate understanding of plan of care, disease process/condition, diagnostic tests and medications  Outcome: Not Progressing

## 2022-11-18 NOTE — PROGRESS NOTES
Critical Care Progress Note    Date of admission  11/10/2022    Chief Complaint  72 y.o. female admitted 11/10/2022 for elective CABG x3 c/b AIS.  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  Course c/b CVA, pAF, hematuria requiring transfusion, HFrEF (nonischemic CM LVEF 35% post op)    Hospital Course  11/17: Admit ICU for AHRF/HFNO, diuresed, low CVP, ?aspiration pneumonitis, ?TRALI, 2PRBC yesterday for hematuria, apixiban held, now restarted (AF, embolic CVA). Delirium, Seroquel overnight.  11/18: TTE limited, LVH, EF 30%    Interval Problem Update  Reviewed last 24 hour events:  Pulled NGT out again in spite of being restrained, intermittent agitation o/n, pulled nasal cannula out and desat quickly, no overt aspiration but was reportedly choking momentarily    Overnight with delirium, slept for about an hour, had family member at the bedside.  Continued on high flow at 30/60% sats in the mid 90s.    Quetiapine 12.5 given x1 last night  Apixaban x2 yesterday, hemoglobin stable at 11.1 today    SR 90s  SBP 140s-150s  CVP 1-4  Amio PO  Coreg 3.125 bid    HFNO 30/0.6 sP02 93-96    I/O: - 230 (-4.3L on stay)  UOP 1500  NGT 1250    sNa 140  K 3.5  sCr 1.5    TF replete fibre    AF  WBC 10  Hb 11    Abx off  AC- apix (AF/CVA)      Review of Systems  Review of Systems   Unable to perform ROS: Critical illness      Vital Signs for last 24 hours   Temp:  [36.7 °C (98 °F)-36.8 °C (98.2 °F)] 36.7 °C (98 °F)  Pulse:  [] 94  Resp:  [11-50] 22  BP: ()/(42-74) 146/68  SpO2:  [90 %-97 %] 93 %    Hemodynamic parameters for last 24 hours  CVP:  [0 MM HG-4 MM HG] 1 MM HG    Respiratory Information for the last 24 hours       Physical Exam   Physical Exam  Constitutional:       General: She is not in acute distress.     Appearance: She is not diaphoretic.   HENT:      Head: Normocephalic and atraumatic.       Mouth/Throat:      Mouth: Mucous membranes are dry.      Pharynx: Oropharynx is clear. No posterior oropharyngeal erythema.   Eyes:      Extraocular Movements: Extraocular movements intact.      Pupils: Pupils are equal, round, and reactive to light.   Cardiovascular:      Rate and Rhythm: Normal rate and regular rhythm.      Heart sounds: No murmur heard.  Pulmonary:      Effort: Pulmonary effort is normal.      Comments: Mild crackles in the bases, effort and work reduced from yesterday  Abdominal:      General: Abdomen is flat.      Palpations: Abdomen is soft.   Musculoskeletal:         General: No tenderness.      Cervical back: Neck supple.      Right lower leg: No edema.      Left lower leg: No edema.   Skin:     General: Skin is warm.      Capillary Refill: Capillary refill takes less than 2 seconds.   Neurological:      Mental Status: She is alert.      Comments: Today with symmetric face, subtle left sided weakness facial muscles, now crossing midline with eyes and no suggestion of field cut  RLE moving now, though weakness persists, equal  BUE  AxO  Mental status ok, somewhat agitated but directable this AM       Medications  Current Facility-Administered Medications   Medication Dose Route Frequency Provider Last Rate Last Admin    potassium chloride (KCL) ivpb 10 mEq  10 mEq Intravenous Q HOUR Claudio Chaves M.D.        carvedilol (COREG) tablet 3.125 mg  3.125 mg Enteral Tube BID WITH MEALS Ava Jose, A.P.NBeti   3.125 mg at 11/17/22 1702    Metoprolol Tartrate (LOPRESSOR) injection 5 mg  5 mg Intravenous Q5 MIN PRN Kendall Yeboah M.D.        Pharmacy Consult: Enteral tube insertion - review meds/change route/product selection   Other PHARMACY TO DOSE Darrick Kraft M.D.        QUEtiapine (Seroquel) tablet 12.5 mg  12.5 mg Enteral Tube Nightly Darrick Kraft M.D.   12.5 mg at 11/17/22 2239    apixaban (ELIQUIS) tablet 5 mg  5 mg Enteral Tube BID Darrick Kraft M.D.   5 mg at  11/18/22 0519    haloperidol lactate (HALDOL) injection 1 mg  1 mg Intravenous Q4HRS PRN Kenneth Pugh M.D.   1 mg at 11/16/22 0511    loperamide (IMODIUM) oral suspension 2 mg  2 mg Enteral Tube 4X/DAY PRN Vinicius Walden M.D.        QUEtiapine (Seroquel) tablet 25 mg  25 mg Enteral Tube HS PRN YULISSA Wheeler   25 mg at 11/16/22 2247    amiodarone (Cordarone) tablet 400 mg  400 mg Enteral Tube TWICE DAILY Padmini Mac M.D.   400 mg at 11/18/22 0519    acetaminophen (TYLENOL) tablet 1,000 mg  1,000 mg Enteral Tube Q6HRS PRN Kamron Back M.D.        [Held by provider] aspirin (ASA) chewable tab 81 mg  81 mg Enteral Tube DAILY Kamron Back M.D.   81 mg at 11/15/22 0557    omeprazole (FIRST-OMEPRAZOLE) 2 mg/mL oral susp 40 mg  40 mg Enteral Tube DAILY Kamron Back M.D.   40 mg at 11/18/22 0519    acetaminophen (Tylenol) tablet 650 mg  650 mg Enteral Tube Q4HRS PRN Kamron Back M.D.        Or    acetaminophen (TYLENOL) suppository 650 mg  650 mg Rectal Q4HRS PRN Kamron Back M.D.        mag hydrox-al hydrox-simeth (MAALOX PLUS ES or MYLANTA DS) suspension 30 mL  30 mL Enteral Tube Q4HRS PRN Kamron Back M.D.        oxyCODONE immediate-release (ROXICODONE) tablet 5 mg  5 mg Enteral Tube Q3HRS PRN Kamron Back M.D.   5 mg at 11/15/22 1706    Or    oxyCODONE immediate release (ROXICODONE) tablet 10 mg  10 mg Enteral Tube Q3HRS PRN Kamron Back M.D.   10 mg at 11/17/22 0333    Or    fentaNYL (SUBLIMAZE) injection 50 mcg  50 mcg Intravenous Q3HRS PRN Kamron Back M.D.        traMADol (Ultram) 50 MG tablet 50 mg  50 mg Enteral Tube Q4HRS PRN Kamron Back M.D.   50 mg at 11/14/22 2779    Respiratory Therapy Consult   Nebulization Continuous RT ROMAIN KwonRBetiNBeti        electrolyte-A (PLASMALYTE-A) infusion   Intravenous PRN Kevyn Kramer A.P.R.NBeti 999 mL/hr at 11/10/22 1619 New Bag at 11/10/22 1614     Pharmacy Consult Request ...Pain Management Review 1 Each  1 Each Other PHARMACY TO DOSE SHANKAR KwonP.R.NBeti        ondansetron (ZOFRAN) syringe/vial injection 8 mg  8 mg Intravenous Q6HRS PRN YASMANY Kwon.P.R.N.   8 mg at 11/12/22 0100    Or    prochlorperazine (COMPAZINE) injection 10 mg  10 mg Intravenous Q6HRS PRN Kevyn Kramer A.P.R.N.        Or    promethazine (PHENERGAN) suppository 25 mg  25 mg Rectal Q6HRS PRN Kevyn Kramer A.P.R.N.           Fluids    Intake/Output Summary (Last 24 hours) at 11/18/2022 0738  Last data filed at 11/18/2022 0600  Gross per 24 hour   Intake 1320 ml   Output 1550 ml   Net -230 ml       Laboratory  Recent Labs     11/16/22  0632 11/16/22  1842 11/17/22  0054   ISTATAPH 7.525* 7.544* 7.515*   ISTATAPCO2 47.5* 48.8* 49.4*   ISTATAPO2 68 87 103*   ISTATATCO2 41* 44* 41*   CRVKOIO4OOG 95 98 98   ISTATARTHCO3 39.2* 42.2* 39.9*   ISTATARTBE 15* 17* 15*   ISTATTEMP 97.0 F 37.7 C 98.6 F   ISTATFIO2  --  100 90   ISTATSPEC Arterial Arterial Arterial   ISTATAPHTC 7.539* 7.533* 7.515*   [id number] 64 91* 103*         Recent Labs     11/16/22  0000 11/17/22  0039 11/18/22  0115   SODIUM 139 142 140   POTASSIUM 3.8 3.7 3.5*   CHLORIDE 96 96 98   CO2 37* 35* 32   BUN 40* 37* 44*   CREATININE 1.35 1.57* 1.52*   MAGNESIUM  --  2.0 2.1   CALCIUM 8.6 9.1 9.2     Recent Labs     11/16/22  0000 11/17/22  0039 11/18/22  0115   GLUCOSE 139* 168* 135*     Recent Labs     11/16/22  0000 11/17/22  0039 11/18/22  0115   WBC 8.7 9.3 10.0     Recent Labs     11/16/22  0000 11/17/22  0039 11/18/22  0115   RBC 2.51* 3.86* 3.72*   HEMOGLOBIN 7.6* 11.4* 11.1*   HEMATOCRIT 24.7* 35.6* 34.5*   PLATELETCT 229 242 265       Imaging  X-Ray:  I have personally reviewed the images and compared with prior images.  Echo:   Reviewed    Assessment/Plan  * Acute respiratory failure with hypoxia (HCC)  Assessment & Plan  Patient with escalating oxygen requirements 11/17 transferred back to ICU requiring high  flow nasal oxygen, etiology uncertain, differential includes acute cardiogenic versus noncardiogenic pulmonary edema, infection less likely in the absence of signs and symptoms of infection or sputum production, also recent feeding tube removal, with possible aspiration though no witnessed.  Additional likely possibilities include TRALI versus TACO.    Chest x-ray  with bilateral effusions with adjacent airspace disease.    -HFNO for oxygenation support  -Diurese gently as right atrial pressures are low, follow CVP  -TTE  -NPO for now  -Enteral feeds if tolerated          Metabolic alkalemia  Assessment & Plan  Etiology likely multifactorial, including volume depletion and chloride deficiency, relative hypokalemia, and probably neuro hormonally mediated in the setting of heart failure.  -Acetazolamide x2 given  -Follow lites/urine output  -Replete potassium with KCl, not potassium bicarbonate or acetate  -Consider hyperaldosterone work-up versus empiric addition of spironolactone     Acute blood loss anemia  Assessment & Plan  Patient developed hematuria following the initiation of DOAC therapy for atrial fibrillation  -Transfused 2 units of PRBCs 10/16  -Follow H&H every 6 transfuse as needed      HFrEF (heart failure with reduced ejection fraction) (HCC)  Assessment & Plan  TTE 10/12: LVEF 35%. Hypokinesis of the inferior and septal walls. Grade I diastolic dysfunction.  RV is normal in size and systolic function. No significant valvular abnormalities  LHC: 10/19 Calcific multivessel CAD (ostial LAD, mid LCx, pRCA), LVEDP 12 mmHg    -Repeat TTE   - AC: apixiban (AF, AIS)  - Antiplatelet: ASA (held for now iso bleeding)   - Trend hemodynamics, CVO2, and lactate  -GDMT as able when stabilized        Encephalopathy acute  Assessment & Plan  Working diagnosis acute toxic encephalopathy secondary to multifactorial causes including probable delirium, volume overload, infectious etiologies felt to be unlikely    Delirium  precautions  PT OT, attempt out of bed   Minimize deliriogenic medications including opioids  Maintaining sleep-wake cycles as best possible    PAF (paroxysmal atrial fibrillation) ([facility])- (present on admission)  Assessment & Plan  Perioperative atrial fibrillation, unknown chronicity though possible given subacute infarcts on CT scan.  [id number]  -Transitioned DAPT to DOAC, currently on apixaban  -Rhythm control strategy with amiodarone for now, receiving enterally  -On carvedilol 3.125 twice daily per CTS      Acute ischemic stroke ([facility])  Assessment & Plan  Post/perioperative event with multiple areas of watershed type infarction  NCT 1/11 subacute infarcts occipital-parietal region  Deficits: Left neglect, right gaze preference, absent blink to threat on left, flaccid right lower extremity, moving bilateral upper extremities strong , left lower extremity normal  Appreciate neuro input  DAPT had been given then due to recent afib, neurology recommended transition to apixaban, started been held due to hematuria requiring transfusion.  PT/OT/SLP and rehabilitation  Seizure precaution, aspiration precaution, fall precaution  High risk aspiration      Chronic obstructive pulmonary disease (COPD) ([facility])  Assessment & Plan  Stable in outpatient setting, no concern for acute exacerbation, PFTs not available in chart  -Continue respiratory protocols as needed bronchodilators if needed    Coronary artery disease due to calcified coronary lesion - Calcifications seen on CT scan also wtih aortic ulceration- (present on admission)  Assessment & Plan  11/10: Elective CABG x3 (LIMA->LAD, RSVG->OM2, RVSG->distal RCA)  -High intensity statin  -Holding antiplatelets in the setting of transitioning to DOAC for atrial fibrillation and acute ischemic stroke  -PPI prophylaxis    Gastroesophageal reflux disease without esophagitis- (present on admission)  Assessment & Plan  On PPI       VTE:  NOAC  Ulcer: Not Indicated  Lines:  Central Line  Ongoing indication addressed and Berger Catheter  Ongoing indication addressed    I have performed a physical exam and reviewed and updated ROS and Plan today (11/18/2022). In review of yesterday's note (11/17/2022), there are no changes except as documented above.     Discussed patient condition and risk of morbidity and/or mortality with Family, RN, RT, Therapies, Pharmacy, Patient, and cardiology and CVS  The patient remains critically ill.  Critical care time = 75 minutes in directly providing and coordinating critical care and extensive data review.  No time overlap and excludes procedures.

## 2022-11-18 NOTE — ASSESSMENT & PLAN NOTE
Etiology likely multifactorial, including volume depletion and chloride deficiency, relative hypokalemia, and probably neuro hormonally mediated in the setting of heart failure.  -Acetazolamide x2 given  -Follow lites/urine output  -Replete potassium with KCl, not potassium bicarbonate or acetate  -Consider hyperaldosterone work-up versus empiric addition of spironolactone   11/19-improved following cessation of loop diuretics, acetazolamide and K/chloride repletion

## 2022-11-18 NOTE — THERAPY
"Physical Therapy   Daily Treatment     Patient Name: Lauren Dave  Age:  72 y.o., Sex:  female  Medical Record #: [id number]  Today's Date: 11/17/2022     Precautions  Precautions: Fall Risk;Cardiac Precautions (See Comments);Sternal Precautions (See Comments);Nasogastric Tube  Comments: s/p CABG, CVA, BL wrist restraints    Assessment    Pt agreeable to PT session today, friend Roseline present throughout treatment, states that pt is eager to get out of bed and has been \"moving around a lot\". Pt appropriately follows 75% of functional commands with repeated verbal cueing. Demonstrates ability to visually track past midline, however response is delayed. Pt requires total A x2 for all mobility, requires total A to maintain balance seated EOB 2/2 strong posterior lean and occasional listing to the L. Pt requires total A x 2 for STS, blocking RLE and with facilitation at pelvis and shoulders for upright posture, pt unable to maintain head in upright position, resting it on therapist's shoulder, tolerated standing for approx. 5 min. Pt tolerated session well, vitals remained stable throughout. Pt will continue to benefit from skilled PT while in the acute setting. Recommend SNF placement for additional physical therapy services prior to discharge home.     Plan    Continue current treatment plan.    DC Equipment Recommendations: Unable to determine at this time  Discharge Recommendations: Recommend post-acute placement for additional physical therapy services prior to discharge home      Abridged Subjective/Objective     11/17/22 1420   Cognition    Cognition / Consciousness X   Speech/ Communication Delayed Responses   Level of Consciousness Responds to voice   Ability To Follow Commands 1 Step  (75% of functional commands)   New Learning Impaired   Attention Impaired   Sequencing Impaired   Initiation Impaired   Comments Pt requires repeated verbal cueing to follow commands, however does follow 75% of functional " commands. Pt encouraged to vocalize as much as possible.   Active ROM Lower Body    Active ROM Lower Body  X   Comments no initiation of movement RLE, has active control of LLE   Strength Lower Body   Lower Body Strength  X   Comments RLE flaccid   Lower Body Muscle Tone   Lower Body Muscle Tone  X   Rt Lower Extremity Muscle Tone Hypotonic   Neurological Concerns   Sitting Posture During ADL's Posterior Lean   Comments Pt with patellar DTR present on R, no ankle DTR's present at this time.   Balance   Sitting Balance (Static) Trace +   Sitting Balance (Dynamic) Trace   Standing Balance (Static) Trace   Standing Balance (Dynamic) Trace   Weight Shift Sitting Absent   Weight Shift Standing Absent   Skilled Intervention Compensatory Strategies;Facilitation;Postural Facilitation;Tactile Cuing;Verbal Cuing;Sequencing   Comments Pt total A for all balance   Gait Analysis   Gait Level Of Assist Unable to Participate   Bed Mobility    Supine to Sit Total Assist   Sit to Supine Total Assist   Scooting Total Assist   Rolling Total Assist to Rt.;Total Assist to Lt.   Skilled Intervention Postural Facilitation;Sequencing;Tactile Cuing;Verbal Cuing   Comments x 2 assist   Functional Mobility   Sit to Stand Total Assist   Bed, Chair, Wheelchair Transfer Unable to Participate   Skilled Intervention Facilitation;Postural Facilitation;Sequencing;Tactile Cuing;Verbal Cuing   Comments Blocking at RLE, pt able to initiate some movement through LLE, pt unable to maintain upright head positioning, rested head on PT shoulder   Patient / Family Goals    Patient / Family Goal #1 not stated   Short Term Goals    Short Term Goal # 1 Pt will transfer supine<->sit with mod A within 6 visits to promote return home   Goal Outcome # 1 Progressing slower than expected   Short Term Goal # 2 Pt will sit at EOB 15 min with min A within 6 visits to promote return home   Goal Outcome # 2 Progressing as expected   Short Term Goal # 3 Pt will transfer  bed<->chair with mod A within 6 visits in order to promote return home   Goal Outcome # 3 Goal not met   Education Group   Education Provided Role of Physical Therapist   Role of Physical Therapist Patient Response Patient;Other;Acceptance;Explanation;Action Demonstration

## 2022-11-19 ENCOUNTER — APPOINTMENT (OUTPATIENT)
Dept: RADIOLOGY | Facility: MEDICAL CENTER | Age: 72
DRG: 235 | End: 2022-11-19
Attending: EMERGENCY MEDICINE
Payer: MEDICARE

## 2022-11-19 LAB
ANION GAP SERPL CALC-SCNC: 11 MMOL/L (ref 7–16)
BUN SERPL-MCNC: 48 MG/DL (ref 8–22)
CALCIUM SERPL-MCNC: 9.7 MG/DL (ref 8.5–10.5)
CHLORIDE SERPL-SCNC: 99 MMOL/L (ref 96–112)
CO2 SERPL-SCNC: 28 MMOL/L (ref 20–33)
CREAT SERPL-MCNC: 1.47 MG/DL (ref 0.5–1.4)
ERYTHROCYTE [DISTWIDTH] IN BLOOD BY AUTOMATED COUNT: 48.1 FL (ref 35.9–50)
GFR SERPLBLD CREATININE-BSD FMLA CKD-EPI: 38 ML/MIN/1.73 M 2
GLUCOSE SERPL-MCNC: 131 MG/DL (ref 65–99)
HCT VFR BLD AUTO: 35.8 % (ref 37–47)
HGB BLD-MCNC: 11.5 G/DL (ref 12–16)
MCH RBC QN AUTO: 29.6 PG (ref 27–33)
MCHC RBC AUTO-ENTMCNC: 32.1 G/DL (ref 33.6–35)
MCV RBC AUTO: 92.3 FL (ref 81.4–97.8)
PLATELET # BLD AUTO: 331 K/UL (ref 164–446)
PMV BLD AUTO: 10.8 FL (ref 9–12.9)
POTASSIUM SERPL-SCNC: 3.9 MMOL/L (ref 3.6–5.5)
RBC # BLD AUTO: 3.88 M/UL (ref 4.2–5.4)
SODIUM SERPL-SCNC: 138 MMOL/L (ref 135–145)
WBC # BLD AUTO: 11.4 K/UL (ref 4.8–10.8)

## 2022-11-19 PROCEDURE — 700102 HCHG RX REV CODE 250 W/ 637 OVERRIDE(OP): Performed by: INTERNAL MEDICINE

## 2022-11-19 PROCEDURE — A9270 NON-COVERED ITEM OR SERVICE: HCPCS | Performed by: THORACIC SURGERY (CARDIOTHORACIC VASCULAR SURGERY)

## 2022-11-19 PROCEDURE — 99024 POSTOP FOLLOW-UP VISIT: CPT | Performed by: THORACIC SURGERY (CARDIOTHORACIC VASCULAR SURGERY)

## 2022-11-19 PROCEDURE — 99233 SBSQ HOSP IP/OBS HIGH 50: CPT | Performed by: INTERNAL MEDICINE

## 2022-11-19 PROCEDURE — 85027 COMPLETE CBC AUTOMATED: CPT

## 2022-11-19 PROCEDURE — 700102 HCHG RX REV CODE 250 W/ 637 OVERRIDE(OP): Performed by: NURSE PRACTITIONER

## 2022-11-19 PROCEDURE — 94640 AIRWAY INHALATION TREATMENT: CPT

## 2022-11-19 PROCEDURE — 99291 CRITICAL CARE FIRST HOUR: CPT | Performed by: EMERGENCY MEDICINE

## 2022-11-19 PROCEDURE — A9270 NON-COVERED ITEM OR SERVICE: HCPCS | Performed by: INTERNAL MEDICINE

## 2022-11-19 PROCEDURE — 700102 HCHG RX REV CODE 250 W/ 637 OVERRIDE(OP): Performed by: THORACIC SURGERY (CARDIOTHORACIC VASCULAR SURGERY)

## 2022-11-19 PROCEDURE — 770000 HCHG ROOM/CARE - INTERMEDIATE ICU *

## 2022-11-19 PROCEDURE — A9270 NON-COVERED ITEM OR SERVICE: HCPCS | Performed by: NURSE PRACTITIONER

## 2022-11-19 PROCEDURE — 80048 BASIC METABOLIC PNL TOTAL CA: CPT

## 2022-11-19 PROCEDURE — 99233 SBSQ HOSP IP/OBS HIGH 50: CPT | Performed by: HOSPITALIST

## 2022-11-19 RX ORDER — VALSARTAN 80 MG/1
20 TABLET ORAL
Status: DISCONTINUED | OUTPATIENT
Start: 2022-11-19 | End: 2022-11-20

## 2022-11-19 RX ADMIN — OXYCODONE 5 MG: 5 TABLET ORAL at 09:08

## 2022-11-19 RX ADMIN — VALSARTAN 20 MG: 80 TABLET, FILM COATED ORAL at 09:10

## 2022-11-19 RX ADMIN — OXYCODONE HYDROCHLORIDE 10 MG: 10 TABLET ORAL at 21:18

## 2022-11-19 RX ADMIN — QUETIAPINE FUMARATE 12.5 MG: 25 TABLET, FILM COATED ORAL at 21:18

## 2022-11-19 RX ADMIN — CARVEDILOL 6.25 MG: 6.25 TABLET, FILM COATED ORAL at 18:19

## 2022-11-19 RX ADMIN — AMIODARONE HYDROCHLORIDE 400 MG: 200 TABLET ORAL at 18:19

## 2022-11-19 RX ADMIN — CARVEDILOL 6.25 MG: 6.25 TABLET, FILM COATED ORAL at 06:42

## 2022-11-19 RX ADMIN — APIXABAN 5 MG: 5 TABLET, FILM COATED ORAL at 05:18

## 2022-11-19 RX ADMIN — APIXABAN 5 MG: 5 TABLET, FILM COATED ORAL at 18:19

## 2022-11-19 RX ADMIN — AMIODARONE HYDROCHLORIDE 400 MG: 200 TABLET ORAL at 05:18

## 2022-11-19 RX ADMIN — OMEPRAZOLE 40 MG: KIT at 05:18

## 2022-11-19 ASSESSMENT — PAIN DESCRIPTION - PAIN TYPE
TYPE: ACUTE PAIN

## 2022-11-19 ASSESSMENT — FIBROSIS 4 INDEX: FIB4 SCORE: 1.97

## 2022-11-19 ASSESSMENT — ENCOUNTER SYMPTOMS: NERVOUS/ANXIOUS: 0

## 2022-11-19 NOTE — PROGRESS NOTES
Hospital Medicine Daily Progress Note    Date of Service  11/19/2022    Chief Complaint  Lauren Dave is a 72 y.o. female admitted 11/10/2022 with elective surgery    Hospital Course  72 y.o. 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  The patient is found lethargic, she arouses on verbal command, she is confused, she does have weakness in the right lower extremity as well as left upper extremity, the patient's sister is at the bedside assisting the history taking.  The patient is being prepared for transfer to telemetry, chest tubes are being removed, a pacer wire has been removed, a MRI is underway and later reports multiple areas of watershed type infarctions.  There is no bleeding apparent.  A postoperative echo shows an ejection fraction of 35%, the patient is being diuresed, she is on GDMT post surgery.  The patient unfortunately is intolerant to statin medication.  Neurology was consulted after patient was identified to have a CVA.    Interval Problem Update  11/14/2022: Evaluated patient at bedside today patient appears to be without movement of right lower extremity difficult to arouse weak left upper extremity.  Trending back looks like she has had increasing oxygen demand in addition chest tubes removed on 11/13/2022.  Lastly patient is also had decreasing hemoglobin at present we will trend hemoglobin every 6 hours for 24 hours standing  transfusion order has been placed to transfuse 1 unit PRBC if repeat hemoglobin less than 8.0.  Ejection fraction reviewed from DAVE remains at 35% cardiology medicine recommendations appreciated.  Referral to acute rehabilitation has been placed.  We will continue to follow along closely with cardiovascular thoracic surgery as primary.  Present patient has persistent deficit right lower extremity no movement left upper extremity is weak.  Watershed infarct noted above.  Consider discussion of initiation of amantadine with neurology and cardiology service.  11/15: Ms. Dave was evaluated and examined in the IMCU. Hb this morning is 8.3. I discussed with Dr. Elliott basurto about +/- anticoagulation given her transient afib and he recommends Apixaban which has been started. She remain on NG feeding and in soft wrist restraints. Her sister is at bedside. IV lasix will be stopped due to a CVP 3 and change to oral.   11/16: Ms. Dave was seen and evaluated in the IMCU. Hb 7.6 today. UA + for E coli. Her blood pressure has fluctuated significantly. She is on 5 liters of oxygen. She has been started on Eliquis in addendum to aspirin.   11/17-11/18 she was transferred to the ICU due to respiratory failure and decreased level of consciousness. Due to pulmonary edema after blood transfusions, she was given IV lasix. She had a metabolic alkalosis.   11/19: Ms. Dave was evaluated and examined in the ICU. She is moving both legs today and is more lucid. She remains on NG feeding. Her sister is at bedside. She is on Eliquis for anticoagulation and Hb is holding stable. She will go to the IMCU. Labs ordered for the morning.    I have discussed this patient's plan of care and discharge plan at IDT rounds today with Case Management, Nursing, Nursing leadership, and other members of the IDT team.    Consultants/Specialty  critical care I discussed with Dr. Chaves in person today.   Neurology  Physiatry  Cardiology   Code  Status  Full Code    Disposition  Patient is not medically cleared for discharge.   Anticipate discharge to to an inpatient rehabilitation hospital.  I have placed the appropriate orders for post-discharge needs.    Review of Systems  Review of Systems   Unable to perform ROS: Mental acuity      Physical Exam  Temp:  [36.2 °C (97.1 °F)-36.9 °C (98.4 °F)] 36.7 °C (98.1 °F)  Pulse:  [76-96] 79  Resp:  [18-54] 19  BP: (113-167)/(56-71) 113/62  SpO2:  [90 %-97 %] 97 %    Physical Exam  Vitals and nursing note reviewed.   Constitutional:       General: She is not in acute distress.     Appearance: She is ill-appearing.   HENT:      Head: Normocephalic and atraumatic.      Nose:      Comments: NG tube     Mouth/Throat:      Mouth: Mucous membranes are dry.      Pharynx: Oropharynx is clear.   Eyes:      General: No scleral icterus.     Conjunctiva/sclera: Conjunctivae normal.   Cardiovascular:      Rate and Rhythm: Normal rate and regular rhythm.      Comments: Sternal incision without infection  Pulmonary:      Effort: Pulmonary effort is normal.      Breath sounds: Normal breath sounds.   Abdominal:      General: There is no distension.      Tenderness: There is no abdominal tenderness.   Musculoskeletal:      Cervical back: Normal range of motion and neck supple.      Right lower leg: No edema.      Left lower leg: No edema.   Skin:     General: Skin is warm and dry.   Neurological:      Mental Status: She is alert.      Comments: Oriented to person and hospital not year  She moves her legs equally   strength is about equal though left arm is not as strong as the right   Psychiatric:      Comments: Calm  Speech is clear       Fluids    Intake/Output Summary (Last 24 hours) at 11/19/2022 0852  Last data filed at 11/19/2022 0800  Gross per 24 hour   Intake 1870.9 ml   Output 1775 ml   Net 95.9 ml         Laboratory  Recent Labs     11/17/22  0039 11/18/22  0115 11/19/22  0110   WBC 9.3 10.0 11.4*   RBC 3.86* 3.72*  3.88*   HEMOGLOBIN 11.4* 11.1* 11.5*   HEMATOCRIT 35.6* 34.5* 35.8*   MCV 92.2 92.7 92.3   MCH 29.5 29.8 29.6   MCHC 32.0* 32.2* 32.1*   RDW 52.0* 50.5* 48.1   PLATELETCT 242 265 331   MPV 11.2 10.9 10.8       Recent Labs     11/18/22  0115 11/18/22  1455 11/19/22  0110   SODIUM 140 137 138   POTASSIUM 3.5* 3.9 3.9   CHLORIDE 98 98 99   CO2 32 28 28   GLUCOSE 135* 184* 131*   BUN 44* 45* 48*   CREATININE 1.52* 1.44* 1.47*   CALCIUM 9.2 9.6 9.7                       Imaging  DX-ABDOMEN FOR TUBE PLACEMENT   Final Result      Enteric tube tip projects over the stomach      DX-CHEST-PORTABLE (1 VIEW)   Final Result      1.  Tubes and lines in good position.      2.  Mild cardiomegaly with mild diffuse pulmonary edema with slight improvement since previous exam.      3.  Small left pleural effusion.      EC-ECHOCARDIOGRAM LTD W/O CONT   Final Result      DX-ABDOMEN FOR TUBE PLACEMENT   Final Result      Enteric tube tip projects over the stomach      DX-CHEST-PORTABLE (1 VIEW)   Final Result      1.  Layering bilateral pleural effusions with adjacent airspace disease.   2.  Increased interstitial edema.      DX-ABDOMEN FOR TUBE PLACEMENT   Final Result      Orogastric tube tip at the distal stomach.      DX-CHEST-PORTABLE (1 VIEW)   Final Result      1.  Removal of right IJ catheter.      2.  Right subclavian catheter unchanged in position.      3.  Mild cardiomegaly.      4.  Blunting of left costophrenic angle consistent with atelectasis, pneumonitis, and/or pleural effusion.      DX-ABDOMEN FOR TUBE PLACEMENT   Final Result      Enteric tube tip projects over the stomach antrum      MR-BRAIN-W/O   Final Result      1.  BILATERAL mostly supratentorial areas of ischemia as described above. These appear to be primarily watershed zone infarctions.   2.  No hemorrhage   3.  No significant mass effect      DX-CHEST-PORTABLE (1 VIEW)   Final Result         1. No significant interval change.      DX-ABDOMEN FOR TUBE PLACEMENT    Final Result      Interval placement of enteric tube with its tip over the midline epigastrium compatible with position at the level of the gastric body.      CT-HEAD W/O   Final Result      1.  Likely subacute infarcts in the bilateral parieto-occipital regions.   2.  No acute intracranial hemorrhage.   3.  Bilateral maxillary sinus disease.      EC-DAVE W/O CONT   Final Result      DX-CHEST-PORTABLE (1 VIEW)   Final Result      Stable enlargement of the cardiomediastinal silhouette, mild diffuse interstitial edema and bilateral basilar atelectasis and/or consolidation.      DX-CHEST-PORTABLE (1 VIEW)   Final Result      Satisfactory postoperative appearance of the chest             Assessment/Plan  * Acute respiratory failure with hypoxia (HCC)  Assessment & Plan  Requiring 2 liters of oxygen  .     Acute blood loss anemia- (present on admission)  Assessment & Plan  Requiring transfusion    Encephalopathy acute  Assessment & Plan  Acute encephalopathy with consideration of ICU delirium post-stroke  This is slowly improving    PAF (paroxysmal atrial fibrillation) (HCC)- (present on admission)  Assessment & Plan  Post-operative  eliquis    Other specified anemias  Assessment & Plan  Acute blood loss due to hematuria  Check Hb in the morning  Transfuse RBCs for hemoglobin less than 8.0 as she is s/p CABG.    Acute ischemic stroke (HCC)  Assessment & Plan  Post/perioperative event with multiple areas of watershed type infarction  Neurology consulted  DAPT had been given then due to recent afib, neurology recommended Eliquis which was started  Neurology consultation was obtained  PT/OT/SLP and rehabilitation  Seizure precaution, aspiration precaution, fall precaution  She is now moving all extremities  She will be an excellent candidate for rehab    S/P CABG x 3  Assessment & Plan  As per cardiac surgery, optimization,, mobilization as tolerated,  GDMT  Telemetry    Chronic obstructive pulmonary disease (COPD)  (HCC)  Assessment & Plan  Pre-existing, respiratory protocol,  Continuous respiratory therapy protocol.    Chronic systolic congestive heart failure (HCC) EF 35%- (present on admission)  Assessment & Plan  Treated to euvolemia, GDMT as tolerated  Ejection fraction 35% on DAVE.  Coreg and diovan      Coronary artery disease due to calcified coronary lesion - Calcifications seen on CT scan also wtih aortic ulceration- (present on admission)  Assessment & Plan  S/p CABG    Dyslipidemia  Assessment & Plan  The patient with prior severe intolerance, apparently failed multiple regimens  Consider outpatient referral to PCSK9 treatment       VTE prophylaxis: therapeutic anticoagulation with Eliquis    I have performed a physical exam and reviewed and updated ROS and Plan today (11/19/2022). In review of yesterday's note (11/18/2022), there are no changes except as documented above.

## 2022-11-19 NOTE — PROGRESS NOTES
Cardiovascular Surgery Progress Note    Name: Lauren Dave  MRN: [id number]  : 1950  Admit Date: 11/10/2022  5:08 AM  9 Days Post-Op     Procedure:  Procedure(s) and Anesthesia Type:     * CORONARY ARTERY BYPASS GRAFTING X 3, WITH LEFT INTERNAL MAMMARY ARTERY TAKEDOWN AND ENDOSCOPIC VEIN HARVEST LEFT GREATER SAPHENOUS VEIN, AND TRANSESOPHAGEAL ECHOCARDIOGRAM - General     * ECHOCARDIOGRAM, TRANSESOPHAGEAL, INTRAOPERATIVE - General    Vitals:  Vitals:    22 0642 22 0735 22 0748 22 0800   BP: 113/62      Pulse: 85 80 79    Resp:     Temp:    36.7 °C (98.1 °F)   TempSrc:    Temporal   SpO2: 92% 94% 97%    Weight:       Height:          Temp (24hrs), Av.6 °C (97.8 °F), Min:36.2 °C (97.1 °F), Max:36.7 °C (98.1 °F)      Respiratory:    Respiration: 19, Pulse Oximetry: 97 %       Fluids:    Intake/Output Summary (Last 24 hours) at 2022 0914  Last data filed at 2022 0800  Gross per 24 hour   Intake 1870.9 ml   Output 1775 ml   Net 95.9 ml       Admit weight: Weight: 67.1 kg (147 lb 14.9 oz)  Current weight: Weight: 65.6 kg (144 lb 10 oz) (22 0600)    Labs:  Recent Labs     22  0039 22  0115 22  0110   WBC 9.3 10.0 11.4*   RBC 3.86* 3.72* 3.88*   HEMOGLOBIN 11.4* 11.1* 11.5*   HEMATOCRIT 35.6* 34.5* 35.8*   MCV 92.2 92.7 92.3   MCH 29.5 29.8 29.6   MCHC 32.0* 32.2* 32.1*   RDW 52.0* 50.5* 48.1   PLATELETCT 242 265 331   MPV 11.2 10.9 10.8       Recent Labs     22  0115 22  1455 22  0110   SODIUM 140 137 138   POTASSIUM 3.5* 3.9 3.9   CHLORIDE 98 98 99   CO2 32 28 28   GLUCOSE 135* 184* 131*   BUN 44* 45* 48*   CREATININE 1.52* 1.44* 1.47*   CALCIUM 9.2 9.6 9.7                   Medications:  Scheduled Medications   Medication Dose Frequency    valsartan  20 mg Q DAY    carvedilol  6.25 mg BID WITH MEALS    Pharmacy   PHARMACY TO DOSE    QUEtiapine  12.5 mg Nightly    apixaban  5 mg BID    amiodarone  400 mg TWICE DAILY     [Held by provider] aspirin  81 mg DAILY    omeprazole  40 mg DAILY    Pharmacy Consult Request  1 Each PHARMACY TO DOSE        Exam:   Physical Exam  Constitutional:       General: She is not in acute distress.  HENT:      Head: Normocephalic and atraumatic.      Nose: Nose normal. No congestion.      Mouth/Throat:      Mouth: Mucous membranes are moist.   Eyes:      Pupils: Pupils are equal, round, and reactive to light.   Cardiovascular:      Rate and Rhythm: Normal rate and regular rhythm.      Pulses: Normal pulses.      Heart sounds: Normal heart sounds.   Pulmonary:      Effort: No respiratory distress.      Breath sounds: Examination of the right-lower field reveals decreased breath sounds. Examination of the left-lower field reveals decreased breath sounds. Decreased breath sounds present.   Abdominal:      General: There is distension.      Palpations: Abdomen is soft.   Musculoskeletal:      Cervical back: Neck supple. No tenderness.   Skin:     General: Skin is warm and dry.      Comments: Surgical incisions CDI   Neurological:      Mental Status: She is alert and oriented to person, place, and time.   Psychiatric:         Behavior: Behavior is slowed.         Judgment: Judgment is impulsive.       Cardiac Medications:    ASA - Yes    Plavix - Yes    Post-operative Beta Blockers - Yes    Ace/ARB- No; contraindicated because of Increased creatinine/CKD    Statin - No; contraindicated because of Allergy/intolerance    Aldactone- No; contraindicated because of Increased creatinine/CKD    Ejection Fraction:  35%    Telemetry:   11/11 SR  11/12 SR/ST 90-100s  11/13 SR  11/14 SR, a fib last night  11/15 SR   11/16 SR   11/17 SR  11/18 SR  11/19 SR    Assessment/Plan:  POD 1  HDS, SR, neuro not moving right leg but follows commands, cleviprex drip, wounds CDI, abdomen soft, fluid balance positive, wt up,  chest tube output moderate.  CT head this AM.  Plan:  wean vent as tolerated.  Allow permissive  hypertension, OK for systolic 120-140's, CPM.     POD 2 HDS, SR/ST.  Neuro can say name, no movement in right leg.  Weaker left upper extremity, following commands intermittently, pulling at NG/central line.  Wounds CDI, prevena in tact.  Moderate serosanguinous output from chest tube.  Cr 1.18 Hgb 9.2.  Neurology recommending MRI brain, pending currently.  Plan: MRI when available.  Ok for permissive HTN of SBP of 140 or less.  Encourage IS.  Restraint for RUE due to trying to remove central line and NG tubes with impulsivity.      POD 3 HDS. SR.  Neuro aware to self and place.  Good strength in right hand and left leg.  Left arm weak.  No movement right leg.  Pacer wires removed. Pt tolerated well.  Cr 1.14, Hgb 8.8. Plan: MRI brain today, pacer wires out.  DC chest tubes.  Encourage IS.  Work with PT as tolerated.    POD 4 BP 90s-140s, SR, a fib overnight, lethargic this morning, flaccid right leg, weak left arm, wounds CDI, abdomen soft, fluid balance down, wt up, 5L NC. On tube feeding. Plan: UA and culture today, transfer to IMCU, continue diuresis, amio gtt to PO, IS/ambulate.     POD 5  HDS, SR on PO amio, neuro lethargic moving three extremities on command 5/5  strength uppers, not able to get her to move RLE, wounds Prevena intact, abdomen soft n/t BMS in place for incontinence, Hgb 8.3, Cr 1.19,  fluid balance negative, wt down,  BP labile this AM. Plan:  decrease diuresis for labile BP, fluid balance negative, Wean Oxygen, Encourage IS/ambulate.     POD 6 HDS, SR- on po amio- eliquis being held for anemia and hematuria, neuro - unchanged- agitated overnight given haldol- start nightly seroquel 12.5 mg, acute blood loss anemia- transfuse    POD 7 HDS- add low dose beta blocker, SR on PO amio- resumed eliquis, patient appears euvolemic by weight and I&O's CVP 0- lasix being held, Resp insuff- high flow nasal cannula- rpt CXR- poss aspiration pneumonia after NGT pulled, ck echo    POD 8  HDS-  hypertensive overnight, SR, AOx4, flaccid right leg, on HFNC, prevena in place, abdomen soft, fluid balance negative, wt down,  Cr 1.53.  Plan: increase coreg, add lisinopril when renal function improves, IS/ambulate.     POD 9  HDS- HTN overnight, SR, mentation improving, right leg flaccid, sternal incision open to air, abdomen soft, fluid balance negative, wt down,  HFNC to 5L NC this morning. Cr  1.47. Plan:  Transfer to [facility], low dose valsartan per cardiology, IS/ambulate. Continue SLP.      Disposition:  Therapies recommending post acute placement, rehab referral

## 2022-11-19 NOTE — CARE PLAN
Problem: Humidified High Flow Nasal Cannula  Goal: Maintain adequate oxygenation dependent on patient condition  Description: Target End Date:  resolve prior to discharge or when underlying condition is resolved/stabilized    1.  Implement humidified high flow oxygen therapy  2.  Titrate high flow oxygen to maintain appropriate SpO2  Outcome: Not Met  Flowsheets (Taken 11/18/2022 1416)  O2 (LPM): 35  FiO2%: 45 %

## 2022-11-19 NOTE — CARE PLAN
The patient is Watcher - Medium risk of patient condition declining or worsening    Shift Goals  Clinical Goals: Stable o2 and vitals, improve/stable neuro  Patient Goals: Remove restraints, ice chips  Family Goals: SUNDAR    Progress made toward(s) clinical / shift goals:      Problem: Respiratory  Goal: Patient will achieve/maintain optimum respiratory ventilation and gas exchange  Outcome: Progressing     Problem: Pain - Standard  Goal: Alleviation of pain or a reduction in pain to the patient’s comfort goal  Outcome: Progressing     Problem: Communication  Goal: The ability to communicate needs accurately and effectively will improve  Outcome: Progressing     Problem: Hemodynamics  Goal: Patient's hemodynamics, fluid balance and neurologic status will be stable or improve  Outcome: Progressing     Problem: Safety - Medical Restraint  Goal: Remains free of injury from restraints (Restraint for Interference with Medical Device)  Outcome: Progressing     Problem: Neuro Status  Goal: Neuro status will remain stable or improve  Outcome: Progressing         Patient is not progressing towards the following goals:

## 2022-11-19 NOTE — PROGRESS NOTES
Monitor Summary    Rhythm: SR w/BBB  Heart Rate: 70-80s  Ectopy: rare PVCs  Measurements:   .18/.18/.45              12 hr chart check

## 2022-11-19 NOTE — PROGRESS NOTES
Given verbal to d/c subclavian CVC and bernal. Purewick placed. Unsuccessful PIV access x2. Order for VAT placed.

## 2022-11-19 NOTE — CARE PLAN
The patient is Stable - Low risk of patient condition declining or worsening    Shift Goals  Clinical Goals: Wean O2 requirements, Encourage Sleep/wake cycle to decrease delirium  Patient Goals: Comfort  Family Goals: SUNDAR    Progress made toward(s) clinical / shift goals:    Problem: Knowledge Deficit - Standard  Goal: Patient and family/care givers will demonstrate understanding of plan of care, disease process/condition, diagnostic tests and medications  Outcome: Progressing     Problem: Skin Integrity  Goal: Skin integrity is maintained or improved  Outcome: Progressing     Problem: Fall Risk  Goal: Patient will remain free from falls  Outcome: Progressing     Problem: Pain - Standard  Goal: Alleviation of pain or a reduction in pain to the patient’s comfort goal  Outcome: Progressing     Problem: Safety - Medical Restraint  Goal: Remains free of injury from restraints (Restraint for Interference with Medical Device)  Outcome: Progressing  Goal: Free from restraint(s) (Restraint for Interference with Medical Device)  Outcome: Progressing     Problem: Optimal Care of the Stroke Patient  Goal: Optimal emergency care for the stroke patient  Outcome: Progressing  Goal: Optimal acute care for the stroke patient  Outcome: Progressing     Problem: Knowledge Deficit - Stroke Education  Goal: Patient's knowledge of stroke and risk factors will improve  Outcome: Progressing     Problem: Psychosocial - Patient Condition  Goal: Patient's ability to verbalize feelings about condition will improve  Outcome: Progressing     Problem: Respiratory - Stroke Patient  Goal: Patient will achieve/maintain optimum respiratory rate/effort  Outcome: Progressing     Problem: Risk for Aspiration  Goal: Patient's risk for aspiration will be absent or decrease  Outcome: Progressing

## 2022-11-19 NOTE — CARE PLAN
Problem: Knowledge Deficit - Standard  Goal: Patient and family/care givers will demonstrate understanding of plan of care, disease process/condition, diagnostic tests and medications  Outcome: Progressing     Problem: Pain - Standard  Goal: Alleviation of pain or a reduction in pain to the patient’s comfort goal  Outcome: Progressing   The patient is Stable - Low risk of patient condition declining or worsening    Shift Goals  Clinical Goals: hemodynamic stability, maintain oxygen saturation >90%  Patient Goals: remove restraints, ice chips  Family Goals: sergey    Progress made toward(s) clinical / shift goals:  a+o x 4 throughout shift, follows commands-restless at times, neuros q 4 =see flowsheet, tube feed at goal - no residual, vss, afebrile, on HHFNC with pulse ox >90%, +productive cough - patient suctioned frequently throughout shift, bernal patent and draining - urine noted to be slightly pink tinged around 1645 when emptied, got patient to edge of bed for 10 minutes, iv K x 2 admin a/o this am, right foot in float boot - +foot drop, no needs at this time, wctm     Patient is not progressing towards the following goals: n/a    Fall precautions/hourly rounding maintained, call light within reach and functioning, all items within reach.  Patient encouraged to call for assistance, poc reviewed with patient, ?'s/concerns answered.   Bed alarm active. Sitter at bedside for safety.

## 2022-11-19 NOTE — PROGRESS NOTES
Interval:  No acute events. Alert and oriented this morning.     Medications / Drug list prior to admission:  No current facility-administered medications on file prior to encounter.     Current Outpatient Medications on File Prior to Encounter   Medication Sig Dispense Refill    amLODIPine (NORVASC) 2.5 MG Tab Take 1 Tablet by mouth 2 times a day. 60 Tablet 3    metoprolol SR (TOPROL XL) 50 MG TABLET SR 24 HR Take 1 Tablet by mouth every day. 90 Tablet 3    ampicillin (PRINCIPEN) 500 MG Cap Take 1 Capsule by mouth 4 times a day. Twice a day for 7 days      aspirin 81 MG EC tablet Take 1 Tablet by mouth every day. 100 Tablet 0    hydrALAZINE (APRESOLINE) 10 MG Tab Take 1 Tablet by mouth 3 times a day as needed (/100). 25 Tablet 11    hydroxychloroquine (PLAQUENIL) 200 MG Tab Take 1 tablet by mouth every day. 30 tablet 0    famotidine (PEPCID) 40 MG Tab Take 1 tablet by mouth twice daily (Patient taking differently: Take 1 Tablet by mouth as needed.) 180 tablet 1    Cyanocobalamin (VITAMIN B-12 PO) Take 1 Tablet by mouth every morning.      ibuprofen (MOTRIN) 800 MG Tab TAKE 1 TABLET BY MOUTH EVERY 8 HOURS AS NEEDED FOR MILD PAIN 90 Tab 0    Cholecalciferol (VITAMIN D3) 5000 units Cap Take 1 Capsule by mouth every day.         Current list of administered Medications:    Current Facility-Administered Medications:     valsartan (DIOVAN) tablet 20 mg, 20 mg, Oral, Q DAY, Velasquez Hamilton M.D.    carvedilol (COREG) tablet 6.25 mg, 6.25 mg, Enteral Tube, BID WITH MEALS, YULISSA Wheeler, 6.25 mg at 11/19/22 0642    Metoprolol Tartrate (LOPRESSOR) injection 5 mg, 5 mg, Intravenous, Q5 MIN PRN, Kendall Yeboah M.D.    Pharmacy Consult: Enteral tube insertion - review meds/change route/product selection, , Other, PHARMACY TO DOSE, Darrick Kraft M.D.    QUEtiapine (Seroquel) tablet 12.5 mg, 12.5 mg, Enteral Tube, Nightly, Darrick Kraft M.D., 12.5 mg at 11/18/22 2016    apixaban (ELIQUIS) tablet 5  mg, 5 mg, Enteral Tube, BID, Darrick Kraft M.D., 5 mg at 22 0518    haloperidol lactate (HALDOL) injection 1 mg, 1 mg, Intravenous, Q4HRS PRN, Kenneth Pugh M.D., 1 mg at 22 0511    loperamide (IMODIUM) oral suspension 2 mg, 2 mg, Enteral Tube, 4X/DAY PRN, Vinicius Walden M.D.    QUEtiapine (Seroquel) tablet 25 mg, 25 mg, Enteral Tube, HS PRN, YULISSA Wheeler, 25 mg at 22 2247    amiodarone (Cordarone) tablet 400 mg, 400 mg, Enteral Tube, TWICE DAILY, Padmini Mac M.D., 400 mg at 22 0518    [] acetaminophen (TYLENOL) tablet 1,000 mg, 1,000 mg, Enteral Tube, Q6HRS, 1,000 mg at 11/15/22 1149 **FOLLOWED BY** acetaminophen (TYLENOL) tablet 1,000 mg, 1,000 mg, Enteral Tube, Q6HRS PRN, Kamron Back M.D.    [Held by provider] aspirin (ASA) chewable tab 81 mg, 81 mg, Enteral Tube, DAILY, Kamron Back M.D., 81 mg at 11/15/22 0557    omeprazole (FIRST-OMEPRAZOLE) 2 mg/mL oral susp 40 mg, 40 mg, Enteral Tube, DAILY, Kamron Back M.D., 40 mg at 22 0518    acetaminophen (Tylenol) tablet 650 mg, 650 mg, Enteral Tube, Q4HRS PRN **OR** acetaminophen (TYLENOL) suppository 650 mg, 650 mg, Rectal, Q4HRS PRN, Kamron Back M.D.    mag hydrox-al hydrox-simeth (MAALOX PLUS ES or MYLANTA DS) suspension 30 mL, 30 mL, Enteral Tube, Q4HRS PRN, Kamron Back M.D.    oxyCODONE immediate-release (ROXICODONE) tablet 5 mg, 5 mg, Enteral Tube, Q3HRS PRN, 5 mg at 11/15/22 1706 **OR** oxyCODONE immediate release (ROXICODONE) tablet 10 mg, 10 mg, Enteral Tube, Q3HRS PRN, 10 mg at 22 0333 **OR** fentaNYL (SUBLIMAZE) injection 50 mcg, 50 mcg, Intravenous, Q3HRS PRN, Kamron Back M.D.    traMADol (Ultram) 50 MG tablet 50 mg, 50 mg, Enteral Tube, Q4HRS PRN, Kamron Back M.D., 50 mg at 22 0225    Respiratory Therapy Consult, , Nebulization, Continuous RT, YULISSA Kwon    electrolyte-A (PLASMALYTE-A)  infusion, , Intravenous, PRN, Kevyn Kramer A.P.R.N., Last Rate: 999 mL/hr at 11/10/22 1619, New Bag at 11/10/22 1619    Pharmacy Consult Request ...Pain Management Review 1 Each, 1 Each, Other, PHARMACY TO DOSE, SHANKAR KwonP.R.N.    ondansetron (ZOFRAN) syringe/vial injection 8 mg, 8 mg, Intravenous, Q6HRS PRN, 8 mg at 11/12/22 0100 **OR** prochlorperazine (COMPAZINE) injection 10 mg, 10 mg, Intravenous, Q6HRS PRN **OR** promethazine (PHENERGAN) suppository 25 mg, 25 mg, Rectal, Q6HRS PRN, YASMANY Kwon.P.R.MIGUEL.    Past Medical History:   Diagnosis Date    Arthritis     Breast cancer (HCC) 08/07/2013    Bronchitis 2005    Cancer (HCC)     right breast cancer     Cataract     removed    Chronic systolic congestive heart failure (HCC) EF 35% 10/12/2022    Coronary artery disease due to calcified coronary lesion - Calcifications seen on CT scan also wtih aortic ulceration     Dental disorder     tooth infection    Discoid lupus     Dyslipidemia     Tried atorvastatin, pravastatin, lovastatin, and Zetia.  All causes severe myalgias.  Just taking fish oil.      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     Ischemic heart disease due to coronary artery obstruction (HCC) - Severe 3vD on cath 10/19/2022    LBBB (left bundle branch block) 12/16/2014    Noted on prechemo EKG 9/2014, MUGA WNL    Pneumonia     Pseudogout     Scarlet fever     Unspecified hemorrhagic conditions     gums       Past Surgical History:   Procedure Laterality Date    MULTIPLE CORONARY ARTERY BYPASS ENDO VEIN HARVEST  11/10/2022    Procedure: CORONARY ARTERY BYPASS GRAFTING X 3, WITH LEFT INTERNAL MAMMARY ARTERY TAKEDOWN AND ENDOSCOPIC VEIN HARVEST LEFT GREATER SAPHENOUS VEIN, AND TRANSESOPHAGEAL ECHOCARDIOGRAM;  Surgeon: Padmini Mac M.D.;  Location: SURGERY [facility];  Service: Cardiothoracic    ECHOCARDIOGRAM, TRANSESOPHAGEAL, INTRAOPERATIVE  11/10/2022    Procedure: ECHOCARDIOGRAM,  TRANSESOPHAGEAL, INTRAOPERATIVE;  Surgeon: Padmini Mac M.D.;  Location: SURGERY [facility];  Service: Cardiothoracic    OTHER CARDIAC SURGERY  2022    angiogram    CATH REMOVAL  2014    Performed by Aggie Burns M.D. at [facility]    MASTECTOMY  2013    Performed by Aggie Burns M.D. at [facility]    NODE BIOPSY SENTINEL  2013    Performed by Aggie Burns M.D. at [facility]    CATH PLACEMENT  2013    Performed by Aggie Burns M.D. at [facility]    US-NEEDLE CORE BX-BREAST PANEL  2013    right breast    COLONOSCOPY  2003    BREAST BIOPSY      TONSILLECTOMY         Family History   Problem Relation Age of Onset    Cancer Mother         possible thyroid and gynecological    Multiple Sclerosis Mother     Arthritis Father     Multiple Sclerosis Brother     Gout Brother     Heart Disease Paternal Grandmother     Diabetes Paternal Grandmother     Cancer Maternal Aunt         breast cancer- 60s    Diabetes Maternal Uncle     Heart Disease Maternal Grandmother     Heart Disease Maternal Grandfather         MI    Stroke Paternal Grandfather     Other Son         breast mass    Heart Disease Son         HEART MURMUR    Gout Son      Patient family history was personally reviewed, no pertinent family history to current presentation    Social History     Socioeconomic History    Marital status:      Spouse name: Not on file    Number of children: 2    Years of education: Not on file    Highest education level: Not on file   Occupational History     Employer: [employer]   Tobacco Use    Smoking status: Former     Packs/day: 1.00     Years: 0.00     Pack years: 0.00     Types: Cigarettes     Quit date: 10/1/2013     Years since quittin.1    Smokeless tobacco: Never   Vaping Use    Vaping Use: Never used   Substance and Sexual Activity    Alcohol use: No     Alcohol/week: 0.0  "oz    Drug use: No    Sexual activity: Not on file     Comment: , 2 children, enemployed   Other Topics Concern    Not on file   Social History Narrative    ** Merged History Encounter **         Patient is a . Patient has 2 adult children. She is  from .           Social Determinants of Health     Financial Resource Strain: Not on file   Food Insecurity: Not on file   Transportation Needs: Not on file   Physical Activity: Not on file   Stress: Not on file   Social Connections: Not on file   Intimate Partner Violence: Not on file   Housing Stability: Not on file       ALLERGIES:  Allergies   Allergen Reactions    Statins [Hmg-Coa-R Inhibitors] Myalgia     Muscle Spasms   RXN=unknown    Atorvastatin Myalgia    Codeine Vomiting and Nausea     Clarified with patient - states that she has an \"upset stomach\" when she takes it    Eggs Diarrhea     Pt states that she is allergic to eggs per her mother.  Tolerated clevidipine 11/2022    Lisinopril Cough    Losartan Unspecified     Tried in 2017  Cannot recall reaction    Penicillins Unspecified     \"does not work\" .'IMMUNE TO PENICILLIN,AMPICILLIN IS THE ONLY THING THAT WORKS'       Review of systems:  A complete review of symptoms was reviewed with patient. This is reviewed in H&P and PMH. ALL OTHERS reviewed and negative    Physical exam:  Vitals:    11/19/22 0600 11/19/22 0642 11/19/22 0735 11/19/22 0748   BP: (!) 158/71 113/62     Pulse: 89 85 80 79   Resp: 20 20 18 19   Temp:       TempSrc:       SpO2: 96% 92% 94% 97%   Weight: 65.6 kg (144 lb 10 oz)      Height:             General: No acute distress.  EYES: no jaundice  HEENT: OP clear   Neck: No bruits No JVD.   CVS: RRR. S1 + S2. No M/R/G. Trace edema.  Resp: CTAB. No wheezing or crackles/rhonchi.  Abdomen: Soft, NT, ND,  Skin: Grossly nothing acute no obvious rashes  Neurological: Alert  Extremities: Pulse 2+ in b/l LE. No cyanosis.     Data:  Laboratory studies personally reviewed by " me:  Recent Results (from the past 24 hour(s))   Basic Metabolic Panel    Collection Time: 11/18/22  2:55 PM   Result Value Ref Range    Sodium 137 135 - 145 mmol/L    Potassium 3.9 3.6 - 5.5 mmol/L    Chloride 98 96 - 112 mmol/L    Co2 28 20 - 33 mmol/L    Glucose 184 (H) 65 - 99 mg/dL    Bun 45 (H) 8 - 22 mg/dL    Creatinine 1.44 (H) 0.50 - 1.40 mg/dL    Calcium 9.6 8.5 - 10.5 mg/dL    Anion Gap 11.0 7.0 - 16.0   ESTIMATED GFR    Collection Time: 11/18/22  2:55 PM   Result Value Ref Range    GFR (CKD-EPI) 38 (A) >60 mL/min/1.73 m 2   POCT venous blood gas device results    Collection Time: 11/18/22  2:58 PM   Result Value Ref Range    Ph 7.446 7.310 - 7.450    Pco2 46.9 41.0 - 51.0 mmHg    Po2 32 25 - 40 mmHg    Tco2 34 (H) 20 - 33 mmol/L    SO2 63 %    Hco3 32.3 (H) 24.0 - 28.0 mmol/L    BE 7 (H) -4 - 3 mmol/L    Body Temp 36.8 C degrees    O2 Therapy 45 %    iPF Ratio 71     Ph Temp Correc 7.449 7.310 - 7.450    Pco2 Temp Keila 46.5 41.0 - 51.0 mmHg    Po2 Temp Corre 31 25 - 40 mmHg    Specimen Venous     DelSys HHFNC     LPM 35 lpm   CBC without Differential Critical Care 0130    Collection Time: 11/19/22  1:10 AM   Result Value Ref Range    WBC 11.4 (H) 4.8 - 10.8 K/uL    RBC 3.88 (L) 4.20 - 5.40 M/uL    Hemoglobin 11.5 (L) 12.0 - 16.0 g/dL    Hematocrit 35.8 (L) 37.0 - 47.0 %    MCV 92.3 81.4 - 97.8 fL    MCH 29.6 27.0 - 33.0 pg    MCHC 32.1 (L) 33.6 - 35.0 g/dL    RDW 48.1 35.9 - 50.0 fL    Platelet Count 331 164 - 446 K/uL    MPV 10.8 9.0 - 12.9 fL   Basic Metabolic Panel (BMP) Critical Care 0130    Collection Time: 11/19/22  1:10 AM   Result Value Ref Range    Sodium 138 135 - 145 mmol/L    Potassium 3.9 3.6 - 5.5 mmol/L    Chloride 99 96 - 112 mmol/L    Co2 28 20 - 33 mmol/L    Glucose 131 (H) 65 - 99 mg/dL    Bun 48 (H) 8 - 22 mg/dL    Creatinine 1.47 (H) 0.50 - 1.40 mg/dL    Calcium 9.7 8.5 - 10.5 mg/dL    Anion Gap 11.0 7.0 - 16.0   ESTIMATED GFR    Collection Time: 11/19/22  1:10 AM   Result Value Ref  Range    GFR (CKD-EPI) 38 (A) >60 mL/min/1.73 m 2       EKG 11/13/22 interpreted by me AF RVR, LBBB    All pertinent features of laboratory and imaging reviewed including primary images where applicable    TTE 10/12/22  CONCLUSIONS  Compared to the images of the prior study 08/25/2020, there is now   reduced ejection fraction and wall motion abnormalities, previously   normal.     Moderately reduced left ventricular systolic function.  The left ventricular ejection fraction is visually estimated to be 35%.  Hypokinesis of the inferior and septal walls.  Grade I diastolic dysfunction.  The right ventricle is normal in size and systolic function.  No significant valvular abnormalities.      TTE 11/11/22  CONCLUSIONS  Decreased LV function due to septal hypokinesis.  EF 35%. Post op LV   function unchanged from preop function.    MRI brain  IMPRESSION:  1.  BILATERAL mostly supratentorial areas of ischemia as described above. These appear to be primarily watershed zone infarctions.  2.  No hemorrhage  3.  No significant mass effect    Principal Problem:    Acute respiratory failure with hypoxia ([facility]) POA: Unknown  Active Problems:    Gastroesophageal reflux disease without esophagitis POA: Yes    Coronary artery disease due to calcified coronary lesion - Calcifications seen on CT scan also wtih aortic ulceration (Chronic) POA: Yes    Chronic systolic congestive heart failure (HCC) EF 35% (Chronic) POA: Yes    Chronic obstructive pulmonary disease (COPD) ([facility]) POA: Unknown    S/P CABG x 3 POA: Unknown    Encounter for weaning from ventilator ([facility]) POA: Unknown    Acute ischemic stroke ([facility]) POA: Unknown    Other specified anemias POA: Unknown    PAF (paroxysmal atrial fibrillation) ([facility]) POA: Yes    Encephalopathy acute POA: Unknown    CHF (congestive heart failure), NYHA class III, acute on chronic, combined ([facility]) POA: Yes    HFrEF (heart failure with reduced ejection fraction) ([facility]) POA: Unknown    Acute blood loss  anemia POA: Unknown    Metabolic alkalemia POA: Unknown  Resolved Problems:    * No resolved hospital problems. *      Assessment / Plan:  72 year old woman with PMH CAD, HTN, hx breast CA, HLD, ICM HFrEF 35% presents for coronary bypass surgery completed 11/10/22 (LIMA to LAD, SVG to OM2, SVG to dRCA) c/b CVA and paroxysmal AF. Consult for heart failure medical optimization.     -appreciate neurology input. Agree for doac when no contraindications without antiplatelets to reduce risk of bleeding.   -HFOMT when no contraindications  - - low dose valsartan, and titrate as tolerated  - - farxiga 10mg daily when able. PRN loop diuretic for goal CVP 2-5  - - continue carvedilol and titrate as tolerated  - - eventually MRA such as spironolactone, can trial 12.5 mg daily and titrate as tolerated  -AF  - - eliquis for cva prevention setting paroxysmal AF. Chadsvasc 7.  - - ok to continue rhythm control with amiodarone. 10g total load via 400mg bid ok then 200mg daily.  -CAD  - - high intensity statin   - - ok to hold further antiplatelets while on doac to reduce bleeding risk  - - PPI for ppx    It is my pleasure to participate in the care of Ms. Dave.  Please do not hesitate to contact me with questions or concerns.    Velasquez Hamilton MD  Cardiologist [facility]    A total of 40 minutes of time was spent on day of encounter reviewing medical record, performing history and examination, counseling, ordering medication/test/consults and documentation.

## 2022-11-19 NOTE — PROGRESS NOTES
Critical Care Progress Note    Date of admission  11/10/2022    Chief Complaint  72 y.o. female admitted 11/10/2022 for elective CABG x3 c/b AIS.  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  Course c/b CVA, pAF, hematuria requiring transfusion, HFrEF (nonischemic CM LVEF 35% post op)    Hospital Course  11/17: Admit ICU for AHRF/HFNO, diuresed, low CVP, ?aspiration pneumonitis, ?TRALI, 2PRBC yesterday for hematuria, apixiban held, now restarted (AF, embolic CVA). Delirium, Seroquel overnight.  11/18: Kcl given, oxygentaiton and metabolic alkalosis improved, mental status improved, PT OOB. HFNO 35/50%.     Interval Problem Update  Reviewed last 24 hour events:  Had a good night last night  Overnight less delirium/agitation no acute events  RASS 0 this morning    Sinus rhythm 80s  MAP 70-90  CVP 1-7    HF N0 35/45%  SPO2 92-95  No chest x-ray today    I/O: +105  UOP 1675  NGT 1440  No BM    Lites improved, bicarb 28  sCr 1.5    AF  WBC 11.4  Hb 11.5  No Abx    AC-Apix bid (AF/CVA)      Review of Systems  Review of Systems   Genitourinary:  Negative for hematuria.   Psychiatric/Behavioral:  The patient is not nervous/anxious.    All other systems reviewed and are negative.     Vital Signs for last 24 hours   Temp:  [36.2 °C (97.1 °F)-36.9 °C (98.4 °F)] 36.2 °C (97.1 °F)  Pulse:  [76-96] 85  Resp:  [18-54] 20  BP: (113-167)/(56-71) 113/62  SpO2:  [90 %-97 %] 92 %    Hemodynamic parameters for last 24 hours  CVP:  [1 MM HG-15 MM HG] 2 MM HG    Respiratory Information for the last 24 hours       Physical Exam   Physical Exam  Constitutional:       General: She is not in acute distress.  HENT:      Head: Normocephalic and atraumatic.      Mouth/Throat:      Mouth: Mucous membranes are dry.      Pharynx: Oropharynx is clear.   Eyes:      Conjunctiva/sclera: Conjunctivae normal.      Pupils: Pupils are equal,  round, and reactive to light.   Cardiovascular:      Rate and Rhythm: Normal rate and regular rhythm.   Pulmonary:      Comments: Some crackles at base, WOB minimal, placed on 20/40% maintaining sat at 93  Abdominal:      Palpations: Abdomen is soft.   Musculoskeletal:      Cervical back: No tenderness.      Right lower leg: No edema.      Left lower leg: No edema.   Skin:     General: Skin is warm and dry.      Capillary Refill: Capillary refill takes less than 2 seconds.   Neurological:      Mental Status: She is alert.      Comments: Today with symmetric face, subtle left sided weakness facial muscles, now crossing midline with eyes and no suggestion of field cut  RLE moving now, though weakness persists, equal  BUE  AxO  Mental status ok, somewhat agitated but directable this AM        Medications  Current Facility-Administered Medications   Medication Dose Route Frequency Provider Last Rate Last Admin    carvedilol (COREG) tablet 6.25 mg  6.25 mg Enteral Tube BID WITH MEALS Gabriela Mercado, A.P.R.N.   6.25 mg at 11/19/22 0642    Metoprolol Tartrate (LOPRESSOR) injection 5 mg  5 mg Intravenous Q5 MIN PRN Kendall Yeboah M.D.        Pharmacy Consult: Enteral tube insertion - review meds/change route/product selection   Other PHARMACY TO DOSE Darrick Kraft M.D.        QUEtiapine (Seroquel) tablet 12.5 mg  12.5 mg Enteral Tube Nightly Darrick Kraft M.D.   12.5 mg at 11/18/22 2016    apixaban (ELIQUIS) tablet 5 mg  5 mg Enteral Tube BID Darrick Kraft M.D.   5 mg at 11/19/22 0518    haloperidol lactate (HALDOL) injection 1 mg  1 mg Intravenous Q4HRS PRN Kenneth Pugh M.D.   1 mg at 11/16/22 0511    loperamide (IMODIUM) oral suspension 2 mg  2 mg Enteral Tube 4X/DAY PRN Vinicius Walden M.D.        QUEtiapine (Seroquel) tablet 25 mg  25 mg Enteral Tube HS PRN Gabriela Mercado, A.P.R.N.   25 mg at 11/16/22 1727    amiodarone (Cordarone) tablet 400 mg  400 mg Enteral Tube TWICE DAILY Padmini  TERENCE Mac   400 mg at 11/19/22 0518    acetaminophen (TYLENOL) tablet 1,000 mg  1,000 mg Enteral Tube Q6HRS PRN Kamron Back M.D.        [Held by provider] aspirin (ASA) chewable tab 81 mg  81 mg Enteral Tube DAILY Kamron Back M.D.   81 mg at 11/15/22 0557    omeprazole (FIRST-OMEPRAZOLE) 2 mg/mL oral susp 40 mg  40 mg Enteral Tube DAILY Kamron Back M.D.   40 mg at 11/19/22 0518    acetaminophen (Tylenol) tablet 650 mg  650 mg Enteral Tube Q4HRS PRN Kamron Back M.D.        Or    acetaminophen (TYLENOL) suppository 650 mg  650 mg Rectal Q4HRS PRN Kamron Back M.D.        mag hydrox-al hydrox-simeth (MAALOX PLUS ES or MYLANTA DS) suspension 30 mL  30 mL Enteral Tube Q4HRS PRN Kamron Back M.D.        oxyCODONE immediate-release (ROXICODONE) tablet 5 mg  5 mg Enteral Tube Q3HRS PRN Kamron Back M.D.   5 mg at 11/15/22 1706    Or    oxyCODONE immediate release (ROXICODONE) tablet 10 mg  10 mg Enteral Tube Q3HRS PRN Kamron Back M.D.   10 mg at 11/17/22 0333    Or    fentaNYL (SUBLIMAZE) injection 50 mcg  50 mcg Intravenous Q3HRS PRN Kamron Back M.D.        traMADol (Ultram) 50 MG tablet 50 mg  50 mg Enteral Tube Q4HRS PRN Kamron Back M.D.   50 mg at 11/14/22 3676    Respiratory Therapy Consult   Nebulization Continuous RT YULISSA Kwon        electrolyte-A (PLASMALYTE-A) infusion   Intravenous PRN YULISSA Kwon 999 mL/hr at 11/10/22 1619 New Bag at 11/10/22 1619    Pharmacy Consult Request ...Pain Management Review 1 Each  1 Each Other PHARMACY TO DOSE YULISSA Kwon        ondansetron (ZOFRAN) syringe/vial injection 8 mg  8 mg Intravenous Q6HRS PRN Kevyn Kramer, A.P.R.N.   8 mg at 11/12/22 0100    Or    prochlorperazine (COMPAZINE) injection 10 mg  10 mg Intravenous Q6HRS PRN Kevyn Kramer, A.P.R.N.        Or    promethazine (PHENERGAN) suppository 25 mg  25 mg Rectal Q6HRS PRN Kevyn HERNANDEZ  YULISSA Kramer           Fluids    Intake/Output Summary (Last 24 hours) at 11/19/2022 0647  Last data filed at 11/19/2022 0600  Gross per 24 hour   Intake 1780.9 ml   Output 1675 ml   Net 105.9 ml       Laboratory  Recent Labs     11/16/22  1842 11/17/22  0054 11/18/22  1458   ISTATAPH 7.544* 7.515*  --    ISTATAPCO2 48.8* 49.4*  --    ISTATAPO2 87 103*  --    ISTATATCO2 44* 41*  --    HGWZNOS3MVH 98 98  --    ISTATARTHCO3 42.2* 39.9*  --    ISTATARTBE 17* 15*  --    ISTATTEMP 37.7 C 98.6 F 36.8 C   ISTATFIO2 100 90 45   ISTATSPEC Arterial Arterial Venous   ISTATAPHTC 7.533* 7.515*  --    JHZSMTVM6FR 91* 103*  --          Recent Labs     11/17/22  0039 11/18/22  0115 11/18/22  1455 11/19/22  0110   SODIUM 142 140 137 138   POTASSIUM 3.7 3.5* 3.9 3.9   CHLORIDE 96 98 98 99   CO2 35* 32 28 28   BUN 37* 44* 45* 48*   CREATININE 1.57* 1.52* 1.44* 1.47*   MAGNESIUM 2.0 2.1  --   --    CALCIUM 9.1 9.2 9.6 9.7     Recent Labs     11/18/22  0115 11/18/22  1455 11/19/22  0110   GLUCOSE 135* 184* 131*     Recent Labs     11/17/22  0039 11/18/22  0115 11/19/22  0110   WBC 9.3 10.0 11.4*     Recent Labs     11/17/22  0039 11/18/22  0115 11/19/22  0110   RBC 3.86* 3.72* 3.88*   HEMOGLOBIN 11.4* 11.1* 11.5*   HEMATOCRIT 35.6* 34.5* 35.8*   PLATELETCT 242 265 331       Imaging  Previous imaging reviewed, no chest x-ray today    Assessment/Plan  Markedly improved today, with stable hemodynamics, weaned high flow nasal oxygen this morning down to a nasal cannula tolerated, holding O2 sat mid 90s on nasal cannula.  -Start ARB today in conjunction with cardiology and add GDMT as patient tolerates  -Would continue careful diuresis only on an as-needed basis  -Etiology of acute hypoxemic respiratory failure still somewhat uncertain probably flash pulmonary edema versus taco/VOL following PRBCs, markedly improved  -Okay to transfer to [facility]      * Acute respiratory failure with hypoxia (HCC)  Assessment & Plan  Patient with escalating  oxygen requirements 11/17 transferred back to ICU requiring high flow nasal oxygen, etiology uncertain, differential includes acute cardiogenic versus noncardiogenic pulmonary edema, infection less likely in the absence of signs and symptoms of infection or sputum production, also recent feeding tube removal, with possible aspiration though no witnessed.  Additional likely possibilities include TRALI versus TACO.    Chest x-ray  with bilateral effusions with adjacent airspace disease.    -HFNO for oxygenation support  -Diurese gently as right atrial pressures are low, follow CVP  -TTE  -NPO for now  -Enteral feeds if tolerated          Metabolic alkalemia  Assessment & Plan  Etiology likely multifactorial, including volume depletion and chloride deficiency, relative hypokalemia, and probably neuro hormonally mediated in the setting of heart failure.  -Acetazolamide x2 given  -Follow lites/urine output  -Replete potassium with KCl, not potassium bicarbonate or acetate  -Consider hyperaldosterone work-up versus empiric addition of spironolactone     Acute blood loss anemia  Assessment & Plan  Patient developed hematuria following the initiation of DOAC therapy for atrial fibrillation  -Transfused 2 units of PRBCs 10/16  -Follow H&H every 6 transfuse as needed      HFrEF (heart failure with reduced ejection fraction) (HCC)  Assessment & Plan  TTE 10/12: LVEF 35%. Hypokinesis of the inferior and septal walls. Grade I diastolic dysfunction.  RV is normal in size and systolic function. No significant valvular abnormalities  LHC: 10/19 Calcific multivessel CAD (ostial LAD, mid LCx, pRCA), LVEDP 12 mmHg    -Repeat TTE   - AC: apixiban (AF, AIS)  - Antiplatelet: ASA (held for now iso bleeding)   - Trend hemodynamics, CVO2, and lactate  -GDMT as able when stabilized        Encephalopathy acute  Assessment & Plan  Working diagnosis acute toxic encephalopathy secondary to multifactorial causes including probable delirium,  volume overload, infectious etiologies felt to be unlikely    Delirium precautions  PT OT, attempt out of bed   Minimize deliriogenic medications including opioids  Maintaining sleep-wake cycles as best possible    PAF (paroxysmal atrial fibrillation) ([facility])- (present on admission)  Assessment & Plan  Perioperative atrial fibrillation, unknown chronicity though possible given subacute infarcts on CT scan.  [id number]  -Transitioned DAPT to DOAC, currently on apixaban  -Rhythm control strategy with amiodarone for now, receiving enterally  -On carvedilol 3.125 twice daily per CTS      Acute ischemic stroke ([facility])  Assessment & Plan  Post/perioperative event with multiple areas of watershed type infarction  NCT 1/11 subacute infarcts occipital-parietal region  Deficits: Left neglect, right gaze preference, absent blink to threat on left, flaccid right lower extremity, moving bilateral upper extremities strong , left lower extremity normal  Appreciate neuro input  DAPT had been given then due to recent afib, neurology recommended transition to apixaban, started been held due to hematuria requiring transfusion.  PT/OT/SLP and rehabilitation  Seizure precaution, aspiration precaution, fall precaution  High risk aspiration      Chronic obstructive pulmonary disease (COPD) ([facility])  Assessment & Plan  Stable in outpatient setting, no concern for acute exacerbation, PFTs not available in chart  -Continue respiratory protocols as needed bronchodilators if needed    Coronary artery disease due to calcified coronary lesion - Calcifications seen on CT scan also wtih aortic ulceration- (present on admission)  Assessment & Plan  11/10: Elective CABG x3 (LIMA->LAD, RSVG->OM2, RVSG->distal RCA)  -High intensity statin  -Holding antiplatelets in the setting of transitioning to DOAC for atrial fibrillation and acute ischemic stroke  -PPI prophylaxis    Gastroesophageal reflux disease without esophagitis- (present on  admission)  Assessment & Plan  On PPI         VTE:  NOAC  Ulcer: Not Indicated  Lines: Central Line  Ongoing indication addressed    I have performed a physical exam and reviewed and updated ROS and Plan today (11/19/2022). In review of yesterday's note (11/18/2022), there are no changes except as documented above.     Discussed patient condition and risk of morbidity and/or mortality with Hospitalist, Family, RN, RT, Therapies, Pharmacy, Patient, and CVS  The patient remains critically ill.  Critical care time = 40 minutes in directly providing and coordinating critical care and extensive data review.  No time overlap and excludes procedures.

## 2022-11-20 LAB
ANION GAP SERPL CALC-SCNC: 11 MMOL/L (ref 7–16)
BUN SERPL-MCNC: 60 MG/DL (ref 8–22)
CALCIUM SERPL-MCNC: 9.8 MG/DL (ref 8.5–10.5)
CHLORIDE SERPL-SCNC: 99 MMOL/L (ref 96–112)
CO2 SERPL-SCNC: 28 MMOL/L (ref 20–33)
CREAT SERPL-MCNC: 1.75 MG/DL (ref 0.5–1.4)
ERYTHROCYTE [DISTWIDTH] IN BLOOD BY AUTOMATED COUNT: 48.6 FL (ref 35.9–50)
GFR SERPLBLD CREATININE-BSD FMLA CKD-EPI: 30 ML/MIN/1.73 M 2
GLUCOSE SERPL-MCNC: 124 MG/DL (ref 65–99)
HCT VFR BLD AUTO: 38.3 % (ref 37–47)
HGB BLD-MCNC: 12.4 G/DL (ref 12–16)
MAGNESIUM SERPL-MCNC: 2.1 MG/DL (ref 1.5–2.5)
MCH RBC QN AUTO: 30.2 PG (ref 27–33)
MCHC RBC AUTO-ENTMCNC: 32.4 G/DL (ref 33.6–35)
MCV RBC AUTO: 93.4 FL (ref 81.4–97.8)
PLATELET # BLD AUTO: 378 K/UL (ref 164–446)
PMV BLD AUTO: 10.9 FL (ref 9–12.9)
POTASSIUM SERPL-SCNC: 4.1 MMOL/L (ref 3.6–5.5)
RBC # BLD AUTO: 4.1 M/UL (ref 4.2–5.4)
SODIUM SERPL-SCNC: 138 MMOL/L (ref 135–145)
WBC # BLD AUTO: 14.6 K/UL (ref 4.8–10.8)

## 2022-11-20 PROCEDURE — 99024 POSTOP FOLLOW-UP VISIT: CPT | Performed by: CLINICAL NURSE SPECIALIST

## 2022-11-20 PROCEDURE — 99233 SBSQ HOSP IP/OBS HIGH 50: CPT | Performed by: INTERNAL MEDICINE

## 2022-11-20 PROCEDURE — 99233 SBSQ HOSP IP/OBS HIGH 50: CPT | Performed by: HOSPITALIST

## 2022-11-20 PROCEDURE — 700102 HCHG RX REV CODE 250 W/ 637 OVERRIDE(OP): Performed by: INTERNAL MEDICINE

## 2022-11-20 PROCEDURE — A9270 NON-COVERED ITEM OR SERVICE: HCPCS | Performed by: INTERNAL MEDICINE

## 2022-11-20 PROCEDURE — 700111 HCHG RX REV CODE 636 W/ 250 OVERRIDE (IP): Performed by: INTERNAL MEDICINE

## 2022-11-20 PROCEDURE — 700111 HCHG RX REV CODE 636 W/ 250 OVERRIDE (IP): Performed by: STUDENT IN AN ORGANIZED HEALTH CARE EDUCATION/TRAINING PROGRAM

## 2022-11-20 PROCEDURE — 80048 BASIC METABOLIC PNL TOTAL CA: CPT

## 2022-11-20 PROCEDURE — A9270 NON-COVERED ITEM OR SERVICE: HCPCS | Performed by: NURSE PRACTITIONER

## 2022-11-20 PROCEDURE — 770000 HCHG ROOM/CARE - INTERMEDIATE ICU *

## 2022-11-20 PROCEDURE — A9270 NON-COVERED ITEM OR SERVICE: HCPCS | Performed by: THORACIC SURGERY (CARDIOTHORACIC VASCULAR SURGERY)

## 2022-11-20 PROCEDURE — 700102 HCHG RX REV CODE 250 W/ 637 OVERRIDE(OP): Performed by: THORACIC SURGERY (CARDIOTHORACIC VASCULAR SURGERY)

## 2022-11-20 PROCEDURE — 85027 COMPLETE CBC AUTOMATED: CPT

## 2022-11-20 PROCEDURE — 83735 ASSAY OF MAGNESIUM: CPT

## 2022-11-20 PROCEDURE — 700102 HCHG RX REV CODE 250 W/ 637 OVERRIDE(OP): Performed by: NURSE PRACTITIONER

## 2022-11-20 RX ORDER — POLYETHYLENE GLYCOL 3350 17 G/17G
1 POWDER, FOR SOLUTION ORAL
Status: DISCONTINUED | OUTPATIENT
Start: 2022-11-20 | End: 2022-11-22

## 2022-11-20 RX ORDER — VALSARTAN 80 MG/1
40 TABLET ORAL
Status: DISCONTINUED | OUTPATIENT
Start: 2022-11-21 | End: 2022-11-25

## 2022-11-20 RX ORDER — VALSARTAN 80 MG/1
20 TABLET ORAL
Status: DISCONTINUED | OUTPATIENT
Start: 2022-11-21 | End: 2022-11-20

## 2022-11-20 RX ORDER — BISACODYL 10 MG
10 SUPPOSITORY, RECTAL RECTAL
Status: DISCONTINUED | OUTPATIENT
Start: 2022-11-20 | End: 2022-11-22

## 2022-11-20 RX ORDER — AMIODARONE HYDROCHLORIDE 200 MG/1
200 TABLET ORAL
Status: DISCONTINUED | OUTPATIENT
Start: 2022-11-21 | End: 2022-11-25

## 2022-11-20 RX ORDER — AMOXICILLIN 250 MG
2 CAPSULE ORAL 2 TIMES DAILY
Status: DISCONTINUED | OUTPATIENT
Start: 2022-11-20 | End: 2022-11-22

## 2022-11-20 RX ADMIN — FENTANYL CITRATE 50 MCG: 50 INJECTION, SOLUTION INTRAMUSCULAR; INTRAVENOUS at 23:58

## 2022-11-20 RX ADMIN — APIXABAN 5 MG: 5 TABLET, FILM COATED ORAL at 18:14

## 2022-11-20 RX ADMIN — CARVEDILOL 6.25 MG: 6.25 TABLET, FILM COATED ORAL at 18:14

## 2022-11-20 RX ADMIN — QUETIAPINE FUMARATE 25 MG: 25 TABLET, FILM COATED ORAL at 19:28

## 2022-11-20 RX ADMIN — OXYCODONE HYDROCHLORIDE 10 MG: 10 TABLET ORAL at 19:28

## 2022-11-20 RX ADMIN — AMIODARONE HYDROCHLORIDE 400 MG: 200 TABLET ORAL at 06:00

## 2022-11-20 RX ADMIN — VALSARTAN 20 MG: 80 TABLET, FILM COATED ORAL at 06:00

## 2022-11-20 RX ADMIN — OMEPRAZOLE 40 MG: KIT at 06:00

## 2022-11-20 RX ADMIN — APIXABAN 5 MG: 5 TABLET, FILM COATED ORAL at 06:00

## 2022-11-20 RX ADMIN — HALOPERIDOL LACTATE 1 MG: 5 INJECTION, SOLUTION INTRAMUSCULAR at 03:50

## 2022-11-20 ASSESSMENT — PAIN DESCRIPTION - PAIN TYPE
TYPE: ACUTE PAIN

## 2022-11-20 ASSESSMENT — PATIENT HEALTH QUESTIONNAIRE - PHQ9
SUM OF ALL RESPONSES TO PHQ9 QUESTIONS 1 AND 2: 0
1. LITTLE INTEREST OR PLEASURE IN DOING THINGS: NOT AT ALL
2. FEELING DOWN, DEPRESSED, IRRITABLE, OR HOPELESS: NOT AT ALL

## 2022-11-20 ASSESSMENT — FIBROSIS 4 INDEX: FIB4 SCORE: 1.97

## 2022-11-20 NOTE — PROGRESS NOTES
Hospital Medicine Daily Progress Note    Date of Service  11/20/2022    Chief Complaint  Lauren Dave is a 72 y.o. female admitted 11/10/2022 with elective surgery    Hospital Course  72 y.o. 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  The patient is found lethargic, she arouses on verbal command, she is confused, she does have weakness in the right lower extremity as well as left upper extremity, the patient's sister is at the bedside assisting the history taking.  The patient is being prepared for transfer to telemetry, chest tubes are being removed, a pacer wire has been removed, a MRI is underway and later reports multiple areas of watershed type infarctions.  There is no bleeding apparent.  A postoperative echo shows an ejection fraction of 35%, the patient is being diuresed, she is on GDMT post surgery.  The patient unfortunately is intolerant to statin medication.  Neurology was consulted after patient was identified to have a CVA.    Interval Problem Update  11/14/2022: Evaluated patient at bedside today patient appears to be without movement of right lower extremity difficult to arouse weak left upper extremity.  Trending back looks like she has had increasing oxygen demand in addition chest tubes removed on 11/13/2022.  Lastly patient is also had decreasing hemoglobin at present we will trend hemoglobin every 6 hours for 24 hours standing  transfusion order has been placed to transfuse 1 unit PRBC if repeat hemoglobin less than 8.0.  Ejection fraction reviewed from DAVE remains at 35% cardiology medicine recommendations appreciated.  Referral to acute rehabilitation has been placed.  We will continue to follow along closely with cardiovascular thoracic surgery as primary.  Present patient has persistent deficit right lower extremity no movement left upper extremity is weak.  Watershed infarct noted above.  Consider discussion of initiation of amantadine with neurology and cardiology service.  11/15: Ms. Dave was evaluated and examined in the IMCU. Hb this morning is 8.3. I discussed with Dr. Gallagher neurology about +/- anticoagulation given her transient afib and he recommends Apixaban which has been started. She remain on NG feeding and in soft wrist restraints. Her sister is at bedside. IV lasix will be stopped due to a CVP 3 and change to oral.   11/16: Ms. Dave was seen and evaluated in the IMCU. Hb 7.6 today. UA + for E coli. Her blood pressure has fluctuated significantly. She is on 5 liters of oxygen. She has been started on Eliquis in addendum to aspirin.   11/17-11/18 she was transferred to the ICU due to respiratory failure and decreased level of consciousness. Due to pulmonary edema after blood transfusions, she was given IV lasix. She had a metabolic alkalosis.   11/19: Ms. Dave was evaluated and examined in the ICU. She is moving both legs today and is more lucid. She remains on NG feeding. Her sister is at bedside. She is on Eliquis for anticoagulation and Hb is holding stable. She will go to the IMCU. Labs ordered for the morning.  11/20: Ms. Dave was seen and evaluated in the IMCU. Her blood pressure has had very significant fluctuations as low at 81/47 up to 152/66. Cr today is up to 1.75 despite tube feeds and no diuretics. Her sister is at bedside. She remains on NG feeding.    I have discussed this patient's plan of  care and discharge plan at IDT rounds today with Case Management, Nursing, Nursing leadership, and other members of the IDT team.    Consultants/Specialty  critical care   Neurology  Physiatry  Cardiology   Code Status  Full Code    Disposition  Patient is not medically cleared for discharge.   Anticipate discharge to to an inpatient rehabilitation hospital.  I have placed the appropriate orders for post-discharge needs.    Review of Systems  Review of Systems   Unable to perform ROS: Mental acuity      Physical Exam  Temp:  [36.1 °C (96.9 °F)-37 °C (98.6 °F)] 36.1 °C (97 °F)  Pulse:  [67-84] 73  Resp:  [12-30] 14  BP: ()/(47-73) 152/66  SpO2:  [89 %-97 %] 92 %    Physical Exam  Vitals and nursing note reviewed.   Constitutional:       General: She is not in acute distress.     Appearance: She is ill-appearing.   HENT:      Head: Normocephalic and atraumatic.      Nose:      Comments: NG tube     Mouth/Throat:      Mouth: Mucous membranes are dry.      Pharynx: Oropharynx is clear.   Eyes:      General: No scleral icterus.     Conjunctiva/sclera: Conjunctivae normal.   Cardiovascular:      Rate and Rhythm: Normal rate and regular rhythm.      Comments: Sternal incision without infection  Pulmonary:      Effort: Pulmonary effort is normal.      Breath sounds: Normal breath sounds.      Comments: 2.5 liters of oxygen  Abdominal:      General: There is no distension.      Tenderness: There is no abdominal tenderness.   Musculoskeletal:      Cervical back: Normal range of motion and neck supple.      Right lower leg: No edema.      Left lower leg: No edema.   Skin:     General: Skin is warm and dry.   Neurological:      Mental Status: She is alert.      Comments: Generally oriented but she remains a modest historian  Left leg 5/5 right leg 3+/5   strength is about equal though left arm is not as strong as the right   Psychiatric:      Comments: Calm  Speech is clear  Flat affect  A bit impulsive        Fluids    Intake/Output Summary (Last 24 hours) at 11/20/2022 0719  Last data filed at 11/20/2022 0000  Gross per 24 hour   Intake 930 ml   Output 600 ml   Net 330 ml         Laboratory  Recent Labs     11/18/22  0115 11/19/22  0110 11/20/22  0315   WBC 10.0 11.4* 14.6*   RBC 3.72* 3.88* 4.10*   HEMOGLOBIN 11.1* 11.5* 12.4   HEMATOCRIT 34.5* 35.8* 38.3   MCV 92.7 92.3 93.4   MCH 29.8 29.6 30.2   MCHC 32.2* 32.1* 32.4*   RDW 50.5* 48.1 48.6   PLATELETCT 265 331 378   MPV 10.9 10.8 10.9       Recent Labs     11/18/22  1455 11/19/22  0110 11/20/22  0315   SODIUM 137 138 138   POTASSIUM 3.9 3.9 4.1   CHLORIDE 98 99 99   CO2 28 28 28   GLUCOSE 184* 131* 124*   BUN 45* 48* 60*   CREATININE 1.44* 1.47* 1.75*   CALCIUM 9.6 9.7 9.8                       Imaging  IR-US GUIDED PIV   Final Result    Ultrasound-guided PERIPHERAL IV INSERTION performed by    qualified nursing staff as above.      DX-ABDOMEN FOR TUBE PLACEMENT   Final Result      Enteric tube tip projects over the stomach      DX-CHEST-PORTABLE (1 VIEW)   Final Result      1.  Tubes and lines in good position.      2.  Mild cardiomegaly with mild diffuse pulmonary edema with slight improvement since previous exam.      3.  Small left pleural effusion.      EC-ECHOCARDIOGRAM LTD W/O CONT   Final Result      DX-ABDOMEN FOR TUBE PLACEMENT   Final Result      Enteric tube tip projects over the stomach      DX-CHEST-PORTABLE (1 VIEW)   Final Result      1.  Layering bilateral pleural effusions with adjacent airspace disease.   2.  Increased interstitial edema.      DX-ABDOMEN FOR TUBE PLACEMENT   Final Result      Orogastric tube tip at the distal stomach.      DX-CHEST-PORTABLE (1 VIEW)   Final Result      1.  Removal of right IJ catheter.      2.  Right subclavian catheter unchanged in position.      3.  Mild cardiomegaly.      4.  Blunting of left costophrenic angle consistent with atelectasis, pneumonitis, and/or pleural effusion.      DX-ABDOMEN FOR TUBE  PLACEMENT   Final Result      Enteric tube tip projects over the stomach antrum      MR-BRAIN-W/O   Final Result      1.  BILATERAL mostly supratentorial areas of ischemia as described above. These appear to be primarily watershed zone infarctions.   2.  No hemorrhage   3.  No significant mass effect      DX-CHEST-PORTABLE (1 VIEW)   Final Result         1. No significant interval change.      DX-ABDOMEN FOR TUBE PLACEMENT   Final Result      Interval placement of enteric tube with its tip over the midline epigastrium compatible with position at the level of the gastric body.      CT-HEAD W/O   Final Result      1.  Likely subacute infarcts in the bilateral parieto-occipital regions.   2.  No acute intracranial hemorrhage.   3.  Bilateral maxillary sinus disease.      EC-DAVE W/O CONT   Final Result      DX-CHEST-PORTABLE (1 VIEW)   Final Result      Stable enlargement of the cardiomediastinal silhouette, mild diffuse interstitial edema and bilateral basilar atelectasis and/or consolidation.      DX-CHEST-PORTABLE (1 VIEW)   Final Result      Satisfactory postoperative appearance of the chest             Assessment/Plan  * Acute ischemic stroke (HCC)  Assessment & Plan  Post/perioperative event with multiple areas of watershed type infarction  Neurology consulted  DAPT had been given then due to recent afib, neurology recommended Eliquis which was started  Neurology consultation was obtained  PT/OT/SLP and rehabilitation  NG feeding until she is cleared by speech path  She is now moving all extremities though right leg remains weak  She will be an excellent candidate for rehab    S/P CABG x 3  Assessment & Plan  As per cardiac surgery, optimization,, mobilization as tolerated,  Telemetry    PAF (paroxysmal atrial fibrillation) (HCC)- (present on admission)  Assessment & Plan  Post-operative  Decrease the amiodarone dose as she has been loaded.  eliquis    Acute blood loss anemia- (present on admission)  Assessment &  Plan  From hematuria  Requiring transfusion  Hb 12.4    Encephalopathy acute  Assessment & Plan  Acute encephalopathy with consideration of ICU delirium post-stroke  This is slowly improving    Acute respiratory failure with hypoxia (HCC)  Assessment & Plan  She has had volume overload requiring high flow oxygen  Now on 2.5 liters of oxygen  .     Other specified anemias  Assessment & Plan  Acute blood loss due to hematuria  Check Hb in the morning  Transfuse RBCs for hemoglobin less than 8.0 as she is s/p CABG.    Chronic obstructive pulmonary disease (COPD) (HCC)  Assessment & Plan  Pre-existing, respiratory protocol,  Continuous respiratory therapy protocol.    Chronic systolic congestive heart failure (HCC) EF 35%- (present on admission)  Assessment & Plan  Treated to euvolemia, GDMT as tolerated  Ejection fraction 35% on DAVE.  Coreg and diovan      Coronary artery disease due to calcified coronary lesion - Calcifications seen on CT scan also wtih aortic ulceration- (present on admission)  Assessment & Plan  S/p CABG    Dyslipidemia  Assessment & Plan  The patient with prior severe intolerance, apparently failed multiple regimens  Consider outpatient referral to PCSK9 treatment       VTE prophylaxis: therapeutic anticoagulation with Eliquis    I have performed a physical exam and reviewed and updated ROS and Plan today (11/20/2022). In review of yesterday's note (11/19/2022), there are no changes except as documented above.

## 2022-11-20 NOTE — DISCHARGE PLANNING
Case Management Discharge Planning    Admission Date: 11/10/2022  GMLOS: 8.3  ALOS: 10    6-Clicks ADL Score: 7  6-Clicks Mobility Score: 7  PT and/or OT Eval ordered: Yes  Post-acute Referrals Ordered: Yes  Post-acute Choice Obtained: No  Has referral(s) been sent to post-acute provider:  No      Anticipated Discharge Dispo: Discharge Disposition: D/T to SNF with Medicare cert in anticipation of skilled care (03)    DME Needed: No    Action(s) Taken: OTHER    This LSW called the pt's son to follow up regarding SNF choice. Pt's son stated he is meeting with family today and will discuss possible SNF choices. Pt's son stated he should have form completed within the next day or two.     Escalations Completed: None    Medically Clear: No    Next Steps: LSW will follow up with the pt's son tomorrow regarding the SNF choice form.     Barriers to Discharge: Medical clearance and Pending Placement    Is the patient up for discharge tomorrow: No

## 2022-11-20 NOTE — CARE PLAN
The patient is Watcher - Medium risk of patient condition declining or worsening    Shift Goals  Clinical Goals: Wean O2 requirements, Encourage Sleep/wake cycle to decrease delirium  Patient Goals: Comfort  Family Goals: SUNDAR    Progress made toward(s) clinical / shift goals:    Problem: Knowledge Deficit - Standard  Goal: Patient and family/care givers will demonstrate understanding of plan of care, disease process/condition, diagnostic tests and medications  11/19/2022 2325 by Claudio Junior R.N.  Outcome: Progressing  11/19/2022 2325 by Claudio Junior R.N.  Outcome: Progressing     Problem: Skin Integrity  Goal: Skin integrity is maintained or improved  11/19/2022 2325 by Claudio Junior R.N.  Outcome: Progressing  11/19/2022 2325 by Claudio Junior R.N.  Outcome: Progressing     Problem: Fall Risk  Goal: Patient will remain free from falls  11/19/2022 2325 by Claudio Junior R.N.  Outcome: Progressing  11/19/2022 2325 by Claudio Junior R.N.  Outcome: Progressing     Problem: Pain - Standard  Goal: Alleviation of pain or a reduction in pain to the patient’s comfort goal  11/19/2022 2325 by Claudio Junior R.N.  Outcome: Progressing  11/19/2022 2325 by Claudio Junior R.N.  Outcome: Progressing     Problem: Day of surgery post CABG/Heart valve replacement  Goal: Stabilization in immediate post op period  11/19/2022 2325 by Claudio Junior R.N.  Outcome: Progressing  11/19/2022 2325 by Claudio Junior R.N.  Outcome: Progressing     Problem: Post Op Day 1 CABG/Heart Valve Replacement  Goal: Optimal care of the post op CABG/heart valve replacement Post Op Day 1  11/19/2022 2325 by Claudio Junior R.N.  Outcome: Progressing  11/19/2022 2325 by Claudio Junior R.N.  Outcome: Progressing     Problem: Post op day 2 CABG/Heart Valve Replacement  Goal: Optimal care of the post op CABG/heart valve replacement post op day 2  11/19/2022 2325 by Claudio Junior R.N.  Outcome: Progressing  11/19/2022 2325 by Claudio Junior R.N.  Outcome: Progressing      Problem: Post Op Day 3 CABG/Heart Valve replacement  Goal: Optimal care of the post op CABG/Heart Valve replacement post op day 3  11/19/2022 2325 by Claudio Junior R.N.  Outcome: Progressing  11/19/2022 2325 by Claudio Junior R.N.  Outcome: Progressing     Problem: Post Op Day 4 CABG/Heart Valve Replacement  Goal: Optimal care of the Post Op CABG/Heart Valve replacement Post Op Day 4  11/19/2022 2325 by Claudio Junior R.N.  Outcome: Progressing  11/19/2022 2325 by Claudio Junior R.N.  Outcome: Progressing     Problem: Post Op Day 5 CABG/Heart Valve Replacement  Goal: Optimal care of the Post Op CABG/Heart Valve replacement Post Op Day 5  11/19/2022 2325 by Claudio Junior R.N.  Outcome: Progressing  11/19/2022 2325 by Claudio Junior R.N.  Outcome: Progressing     Problem: Communication  Goal: The ability to communicate needs accurately and effectively will improve  11/19/2022 2325 by Claudio Junior R.N.  Outcome: Progressing  11/19/2022 2325 by Claudio Junior R.N.  Outcome: Progressing     Problem: Hemodynamics  Goal: Patient's hemodynamics, fluid balance and neurologic status will be stable or improve  11/19/2022 2325 by Claudio Junior R.N.  Outcome: Progressing  11/19/2022 2325 by Claudio Junior R.N.  Outcome: Progressing     Problem: Safety - Medical Restraint  Goal: Remains free of injury from restraints (Restraint for Interference with Medical Device)  Outcome: Progressing  Goal: Free from restraint(s) (Restraint for Interference with Medical Device)  Outcome: Progressing     Problem: Optimal Care of the Stroke Patient  Goal: Optimal emergency care for the stroke patient  Outcome: Progressing  Goal: Optimal acute care for the stroke patient  Outcome: Progressing     Problem: Knowledge Deficit - Stroke Education  Goal: Patient's knowledge of stroke and risk factors will improve  Outcome: Progressing     Problem: Psychosocial - Patient Condition  Goal: Patient's ability to verbalize feelings about condition will  improve  Outcome: Progressing  Goal: Patient's ability to re-evaluate and adapt role responsibilities will improve  Outcome: Progressing     Problem: Discharge Planning - Stroke  Goal: Ensure Stroke Core Measures are met prior to discharge  Outcome: Progressing  Goal: Patient’s continuum of care needs will be met  Outcome: Progressing     Problem: Neuro Status  Goal: Neuro status will remain stable or improve  Outcome: Progressing     Problem: Hemodynamic Monitoring  Goal: Patient's hemodynamics, fluid balance and neurologic status will be stable or improve  Outcome: Progressing     Problem: Respiratory - Stroke Patient  Goal: Patient will achieve/maintain optimum respiratory rate/effort  Outcome: Progressing     Problem: Dysphagia  Goal: Dysphagia will improve  Outcome: Progressing     Problem: Risk for Aspiration  Goal: Patient's risk for aspiration will be absent or decrease  Outcome: Progressing     Problem: Urinary Elimination  Goal: Establish and maintain regular urinary output  Outcome: Progressing     Problem: Bowel Elimination  Goal: Establish and maintain regular bowel function  Outcome: Progressing     Problem: Mobility - Stroke  Goal: Patient's capacity to carry out activities will improve  Outcome: Progressing  Goal: Spasticity will be prevented or improved  Outcome: Progressing  Goal: Subluxation will be prevented or improved  Outcome: Progressing     Problem: Self Care  Goal: Patient will have the ability to perform ADLs independently or with assistance (bathe, groom, dress, toilet and feed)  Outcome: Progressing     Problem: Respiratory  Goal: Patient will achieve/maintain optimum respiratory ventilation and gas exchange  Outcome: Progressing       Patient is not progressing towards the following goals:

## 2022-11-20 NOTE — PROGRESS NOTES
Cardiovascular Surgery Progress Note    Name: Lauren Dave  MRN: [id number]  : 1950  Admit Date: 11/10/2022  5:08 AM  10 Days Post-Op     Procedure:  Procedure(s) and Anesthesia Type:     * CORONARY ARTERY BYPASS GRAFTING X 3, WITH LEFT INTERNAL MAMMARY ARTERY TAKEDOWN AND ENDOSCOPIC VEIN HARVEST LEFT GREATER SAPHENOUS VEIN, AND TRANSESOPHAGEAL ECHOCARDIOGRAM - General     * ECHOCARDIOGRAM, TRANSESOPHAGEAL, INTRAOPERATIVE - General    Vitals:  Vitals:    22 0000 22 0100 22 0200 22 0800   BP: (!) 96/54 (!) 90/51 (!) 152/66 126/58   Pulse: 76 80 73 69   Resp: 14 17 14 14   Temp: 36.1 °C (97 °F)   36.3 °C (97.4 °F)   TempSrc:    Temporal   SpO2: 90% 92% 92% 97%   Weight: 68.9 kg (151 lb 14.4 oz)      Height:          Temp (24hrs), Av.5 °C (97.7 °F), Min:36.1 °C (96.9 °F), Max:37 °C (98.6 °F)      Respiratory:    Respiration: 14, Pulse Oximetry: 97 %       Fluids:    Intake/Output Summary (Last 24 hours) at 2022 0902  Last data filed at 2022 0400  Gross per 24 hour   Intake 750 ml   Output 500 ml   Net 250 ml     Admit weight: Weight: 67.1 kg (147 lb 14.9 oz)  Current weight: Weight: 68.9 kg (151 lb 14.4 oz) (22 0000)    Labs:  Recent Labs     22  0115 22  0110 22  0315   WBC 10.0 11.4* 14.6*   RBC 3.72* 3.88* 4.10*   HEMOGLOBIN 11.1* 11.5* 12.4   HEMATOCRIT 34.5* 35.8* 38.3   MCV 92.7 92.3 93.4   MCH 29.8 29.6 30.2   MCHC 32.2* 32.1* 32.4*   RDW 50.5* 48.1 48.6   PLATELETCT 265 331 378   MPV 10.9 10.8 10.9     Recent Labs     22  1455 22  0110 22  0315   SODIUM 137 138 138   POTASSIUM 3.9 3.9 4.1   CHLORIDE 98 99 99   CO2 28 28 28   GLUCOSE 184* 131* 124*   BUN 45* 48* 60*   CREATININE 1.44* 1.47* 1.75*   CALCIUM 9.6 9.7 9.8               Medications:  Scheduled Medications   Medication Dose Frequency    [START ON 2022] valsartan  40 mg Q DAY    carvedilol  6.25 mg BID WITH MEALS    Pharmacy   PHARMACY TO DOSE     QUEtiapine  12.5 mg Nightly    apixaban  5 mg BID    amiodarone  400 mg TWICE DAILY    [Held by provider] aspirin  81 mg DAILY    omeprazole  40 mg DAILY    Pharmacy Consult Request  1 Each PHARMACY TO DOSE        Exam:   Physical Exam    Cardiac Medications:    ASA - Yes     Plavix - Yes     Post-operative Beta Blockers - Yes     Ace/ARB- No; contraindicated because of Increased creatinine/CKD     Statin - No; contraindicated because of Allergy/intolerance     Aldactone- No; contraindicated because of Increased creatinine/CKD     Ejection Fraction:  35%     Telemetry:   11/11 SR  11/12 SR/ST 90-100s  11/13 SR  11/14 SR, a fib last night  11/15 SR   11/16 SR   11/17 SR  11/18 SR  11/19 SR     Assessment/Plan:  POD 1  HDS, SR, neuro not moving right leg but follows commands, cleviprex drip, wounds CDI, abdomen soft, fluid balance positive, wt up,  chest tube output moderate.  CT head this AM.  Plan:  wean vent as tolerated.  Allow permissive hypertension, OK for systolic 120-140's, CPM.      POD 2 HDS, SR/ST.  Neuro can say name, no movement in right leg.  Weaker left upper extremity, following commands intermittently, pulling at NG/central line.  Wounds CDI, prevena in tact.  Moderate serosanguinous output from chest tube.  Cr 1.18 Hgb 9.2.  Neurology recommending MRI brain, pending currently.  Plan: MRI when available.  Ok for permissive HTN of SBP of 140 or less.  Encourage IS.  Restraint for RUE due to trying to remove central line and NG tubes with impulsivity.       POD 3 HDS. SR.  Neuro aware to self and place.  Good strength in right hand and left leg.  Left arm weak.  No movement right leg.  Pacer wires removed. Pt tolerated well.  Cr 1.14, Hgb 8.8. Plan: MRI brain today, pacer wires out.  DC chest tubes.  Encourage IS.  Work with PT as tolerated.     POD 4 BP 90s-140s, SR, a fib overnight, lethargic this morning, flaccid right leg, weak left arm, wounds CDI, abdomen soft, fluid balance down, wt up, 5L NC.  On tube feeding. Plan: UA and culture today, transfer to [facility], continue diuresis, amio gtt to PO, IS/ambulate.      POD 5  HDS, SR on PO amio, neuro lethargic moving three extremities on command 5/5  strength uppers, not able to get her to move RLE, wounds Prevena intact, abdomen soft n/t BMS in place for incontinence, Hgb 8.3, Cr 1.19,  fluid balance negative, wt down,  BP labile this AM. Plan:  decrease diuresis for labile BP, fluid balance negative, Wean Oxygen, Encourage IS/ambulate.      POD 6 HDS, SR- on po amio- eliquis being held for anemia and hematuria, neuro - unchanged- agitated overnight given haldol- start nightly seroquel 12.5 mg, acute blood loss anemia- transfuse     POD 7 HDS- add low dose beta blocker, SR on PO amio- resumed eliquis, patient appears euvolemic by weight and I&O's CVP 0- lasix being held, Resp insuff- high flow nasal cannula- rpt CXR- poss aspiration pneumonia after NGT pulled, ck echo     POD 8  HDS- hypertensive overnight, SR, AOx4, flaccid right leg, on HFNC, prevena in place, abdomen soft, fluid balance negative, wt down,  Cr 1.53.  Plan: increase coreg, add lisinopril when renal function improves, IS/ambulate.      POD 9  HDS- HTN overnight, SR, mentation improving, right leg flaccid, sternal incision open to air, abdomen soft, fluid balance negative, wt down,  HFNC to 5L NC this morning. Cr  1.47. Plan:  Transfer to [facility], low dose valsartan per cardiology, IS/ambulate. Continue SLP.     POD 10  HDS, SR w/BBB, neuro intact, wounds CDI, abdomen soft, fluid balance negative, wt up,  creatinine 1.75.  cc. BP improved.  Plan:  watch creatinine, PT/OT, IS/ambulate. CPM.     Disposition:  Therapies recommending post acute placement, rehab referral

## 2022-11-20 NOTE — CARE PLAN
Problem: Humidified High Flow Nasal Cannula  Goal: Maintain adequate oxygenation dependent on patient condition  Description: Target End Date:  resolve prior to discharge or when underlying condition is resolved/stabilized    1.  Implement humidified high flow oxygen therapy  2.  Titrate high flow oxygen to maintain appropriate SpO2  Outcome: Met

## 2022-11-20 NOTE — PROGRESS NOTES
4 Eyes Skin Assessment Completed by KATY Angela and KATY Acosta.    Head WDL  Ears WDL  Nose WDL  Mouth WDL  Neck WDL  Breast/Chest Bruising and Incision  Shoulder Blades WDL  Spine WDL  (R) Arm/Elbow/Hand Abrasion  (L) Arm/Elbow/Hand Abrasion  Abdomen Incision  Groin WDL  Scrotum/Coccyx/Buttocks WDL  (R) Leg Incision  (L) Leg WDL  (R) Heel/Foot/Toe WDL  (L) Heel/Foot/Toe WDL          Devices In Places ECG, Blood Pressure Cuff, Pulse Ox, OG/NG, and Nasal Cannula      Interventions In Place NC W/Ear Foams, Pillows, Elbow Mepilex, and Low Air Loss Mattress    Possible Skin Injury No    Pictures Uploaded Into Epic N/A  Wound Consult Placed N/A  RN Wound Prevention Protocol Ordered No

## 2022-11-20 NOTE — PROGRESS NOTES
Interval:  No acute events. Alert and oriented this morning. Denies active cardiac complaints.    Medications / Drug list prior to admission:  No current facility-administered medications on file prior to encounter.     Current Outpatient Medications on File Prior to Encounter   Medication Sig Dispense Refill    amLODIPine (NORVASC) 2.5 MG Tab Take 1 Tablet by mouth 2 times a day. 60 Tablet 3    metoprolol SR (TOPROL XL) 50 MG TABLET SR 24 HR Take 1 Tablet by mouth every day. 90 Tablet 3    ampicillin (PRINCIPEN) 500 MG Cap Take 1 Capsule by mouth 4 times a day. Twice a day for 7 days      aspirin 81 MG EC tablet Take 1 Tablet by mouth every day. 100 Tablet 0    hydrALAZINE (APRESOLINE) 10 MG Tab Take 1 Tablet by mouth 3 times a day as needed (/100). 25 Tablet 11    hydroxychloroquine (PLAQUENIL) 200 MG Tab Take 1 tablet by mouth every day. 30 tablet 0    famotidine (PEPCID) 40 MG Tab Take 1 tablet by mouth twice daily (Patient taking differently: Take 1 Tablet by mouth as needed.) 180 tablet 1    Cyanocobalamin (VITAMIN B-12 PO) Take 1 Tablet by mouth every morning.      ibuprofen (MOTRIN) 800 MG Tab TAKE 1 TABLET BY MOUTH EVERY 8 HOURS AS NEEDED FOR MILD PAIN 90 Tab 0    Cholecalciferol (VITAMIN D3) 5000 units Cap Take 1 Capsule by mouth every day.         Current list of administered Medications:    Current Facility-Administered Medications:     [START ON 11/21/2022] valsartan (DIOVAN) tablet 40 mg, 40 mg, Enteral Tube, Q DAY, Velasquez Hamilton M.D.    carvedilol (COREG) tablet 6.25 mg, 6.25 mg, Enteral Tube, BID WITH MEALS, YULISSA Wheeler, 6.25 mg at 11/19/22 1819    Metoprolol Tartrate (LOPRESSOR) injection 5 mg, 5 mg, Intravenous, Q5 MIN PRN, Kendall Yeboah M.D.    Pharmacy Consult: Enteral tube insertion - review meds/change route/product selection, , Other, PHARMACY TO DOSE, Darrick Kraft M.D.    QUEtiapine (Seroquel) tablet 12.5 mg, 12.5 mg, Enteral Tube, Nightly, Darrick Kraft  M.D., 12.5 mg at 228    apixaban (ELIQUIS) tablet 5 mg, 5 mg, Enteral Tube, BID, Darrick Kraft M.D., 5 mg at 22 0600    haloperidol lactate (HALDOL) injection 1 mg, 1 mg, Intravenous, Q4HRS PRN, Kenneth Pugh M.D., 1 mg at 22 0350    loperamide (IMODIUM) oral suspension 2 mg, 2 mg, Enteral Tube, 4X/DAY PRN, Vinicius Walden M.D.    QUEtiapine (Seroquel) tablet 25 mg, 25 mg, Enteral Tube, HS PRN, YULISSA Wheeler, 25 mg at 22 2247    amiodarone (Cordarone) tablet 400 mg, 400 mg, Enteral Tube, TWICE DAILY, Padmini Mac M.D., 400 mg at 22 0600    [] acetaminophen (TYLENOL) tablet 1,000 mg, 1,000 mg, Enteral Tube, Q6HRS, 1,000 mg at 11/15/22 1149 **FOLLOWED BY** acetaminophen (TYLENOL) tablet 1,000 mg, 1,000 mg, Enteral Tube, Q6HRS PRN, Kamron Back M.D.    [Held by provider] aspirin (ASA) chewable tab 81 mg, 81 mg, Enteral Tube, DAILY, Kamron Back M.D., 81 mg at 11/15/22 0557    omeprazole (FIRST-OMEPRAZOLE) 2 mg/mL oral susp 40 mg, 40 mg, Enteral Tube, DAILY, Kamron Back M.D., 40 mg at 22 0600    acetaminophen (Tylenol) tablet 650 mg, 650 mg, Enteral Tube, Q4HRS PRN **OR** acetaminophen (TYLENOL) suppository 650 mg, 650 mg, Rectal, Q4HRS PRN, Kamron Back M.D.    mag hydrox-al hydrox-simeth (MAALOX PLUS ES or MYLANTA DS) suspension 30 mL, 30 mL, Enteral Tube, Q4HRS PRN, Kamron Back M.D.    oxyCODONE immediate-release (ROXICODONE) tablet 5 mg, 5 mg, Enteral Tube, Q3HRS PRN, 5 mg at 22 0908 **OR** oxyCODONE immediate release (ROXICODONE) tablet 10 mg, 10 mg, Enteral Tube, Q3HRS PRN, 10 mg at 22 2118 **OR** fentaNYL (SUBLIMAZE) injection 50 mcg, 50 mcg, Intravenous, Q3HRS PRN, Kamron Back M.D.    traMADol (Ultram) 50 MG tablet 50 mg, 50 mg, Enteral Tube, Q4HRS PRN, Kamron Back M.D., 50 mg at 22 7781    Respiratory Therapy Consult, , Nebulization, Continuous RT,  YULISSA Kwon    electrolyte-A (PLASMALYTE-A) infusion, , Intravenous, PRN, YULISSA Kwon, Last Rate: 999 mL/hr at 11/10/22 1619, New Bag at 11/10/22 1619    Pharmacy Consult Request ...Pain Management Review 1 Each, 1 Each, Other, PHARMACY TO DOSE, YULISSA Kwon    ondansetron (ZOFRAN) syringe/vial injection 8 mg, 8 mg, Intravenous, Q6HRS PRN, 8 mg at 11/12/22 0100 **OR** prochlorperazine (COMPAZINE) injection 10 mg, 10 mg, Intravenous, Q6HRS PRN **OR** promethazine (PHENERGAN) suppository 25 mg, 25 mg, Rectal, Q6HRS PRN, YULISSA Kwon    Past Medical History:   Diagnosis Date    Arthritis     Breast cancer (HCC) 08/07/2013    Bronchitis 2005    Cancer (HCC)     right breast cancer     Cataract     removed    Chronic systolic congestive heart failure (HCC) EF 35% 10/12/2022    Coronary artery disease due to calcified coronary lesion - Calcifications seen on CT scan also wtih aortic ulceration     Dental disorder     tooth infection    Discoid lupus     Dyslipidemia     Tried atorvastatin, pravastatin, lovastatin, and Zetia.  All causes severe myalgias.  Just taking fish oil.      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     Ischemic heart disease due to coronary artery obstruction (HCC) - Severe 3vD on cath 10/19/2022    LBBB (left bundle branch block) 12/16/2014    Noted on prechemo EKG 9/2014, MUGA WNL    Pneumonia     Pseudogout     Scarlet fever     Unspecified hemorrhagic conditions     gums       Past Surgical History:   Procedure Laterality Date    MULTIPLE CORONARY ARTERY BYPASS ENDO VEIN HARVEST  11/10/2022    Procedure: CORONARY ARTERY BYPASS GRAFTING X 3, WITH LEFT INTERNAL MAMMARY ARTERY TAKEDOWN AND ENDOSCOPIC VEIN HARVEST LEFT GREATER SAPHENOUS VEIN, AND TRANSESOPHAGEAL ECHOCARDIOGRAM;  Surgeon: Padmini Mac M.D.;  Location: SURGERY [facility];  Service: Cardiothoracic    ECHOCARDIOGRAM, TRANSESOPHAGEAL,  INTRAOPERATIVE  11/10/2022    Procedure: ECHOCARDIOGRAM, TRANSESOPHAGEAL, INTRAOPERATIVE;  Surgeon: Padmini Mac M.D.;  Location: SURGERY [facility];  Service: Cardiothoracic    OTHER CARDIAC SURGERY  2022    angiogram    CATH REMOVAL  2014    Performed by Aggie Burns M.D. at [facility]    MASTECTOMY  2013    Performed by Aggie Burns M.D. at [facility]    NODE BIOPSY SENTINEL  2013    Performed by Aggie Burns M.D. at [facility]    CATH PLACEMENT  2013    Performed by Aggie Burns M.D. at [facility]    US-NEEDLE CORE BX-BREAST PANEL  2013    right breast    COLONOSCOPY  2003    BREAST BIOPSY      TONSILLECTOMY         Family History   Problem Relation Age of Onset    Cancer Mother         possible thyroid and gynecological    Multiple Sclerosis Mother     Arthritis Father     Multiple Sclerosis Brother     Gout Brother     Heart Disease Paternal Grandmother     Diabetes Paternal Grandmother     Cancer Maternal Aunt         breast cancer- 60s    Diabetes Maternal Uncle     Heart Disease Maternal Grandmother     Heart Disease Maternal Grandfather         MI    Stroke Paternal Grandfather     Other Son         breast mass    Heart Disease Son         HEART MURMUR    Gout Son      Patient family history was personally reviewed, no pertinent family history to current presentation    Social History     Socioeconomic History    Marital status:      Spouse name: Not on file    Number of children: 2    Years of education: Not on file    Highest education level: Not on file   Occupational History     Employer: [employer]   Tobacco Use    Smoking status: Former     Packs/day: 1.00     Years: 0.00     Pack years: 0.00     Types: Cigarettes     Quit date: 10/1/2013     Years since quittin.1    Smokeless tobacco: Never   Vaping Use    Vaping Use: Never used   Substance and  "Sexual Activity    Alcohol use: No     Alcohol/week: 0.0 oz    Drug use: No    Sexual activity: Not on file     Comment: , 2 children, enemployed   Other Topics Concern    Not on file   Social History Narrative    ** Merged History Encounter **         Patient is a . Patient has 2 adult children. She is  from .           Social Determinants of Health     Financial Resource Strain: Not on file   Food Insecurity: Not on file   Transportation Needs: Not on file   Physical Activity: Not on file   Stress: Not on file   Social Connections: Not on file   Intimate Partner Violence: Not on file   Housing Stability: Not on file       ALLERGIES:  Allergies   Allergen Reactions    Statins [Hmg-Coa-R Inhibitors] Myalgia     Muscle Spasms   RXN=unknown    Atorvastatin Myalgia    Codeine Vomiting and Nausea     Clarified with patient - states that she has an \"upset stomach\" when she takes it    Eggs Diarrhea     Pt states that she is allergic to eggs per her mother.  Tolerated clevidipine 11/2022    Lisinopril Cough    Losartan Unspecified     Tried in 2017  Cannot recall reaction    Penicillins Unspecified     \"does not work\" .'IMMUNE TO PENICILLIN,AMPICILLIN IS THE ONLY THING THAT WORKS'       Review of systems:  A complete review of symptoms was reviewed with patient. This is reviewed in H&P and PMH. ALL OTHERS reviewed and negative    Physical exam:  Vitals:    11/20/22 0000 11/20/22 0100 11/20/22 0200 11/20/22 0800   BP: (!) 96/54 (!) 90/51 (!) 152/66 126/58   Pulse: 76 80 73 69   Resp: 14 17 14 14   Temp: 36.1 °C (97 °F)   36.3 °C (97.4 °F)   TempSrc:    Temporal   SpO2: 90% 92% 92% 97%   Weight: 68.9 kg (151 lb 14.4 oz)      Height:             General: No acute distress.  EYES: no jaundice  HEENT: OP clear   Neck: No bruits No JVD.   CVS: RRR. S1 + S2. No M/R/G. Trace edema.  Resp: CTAB. No wheezing or crackles/rhonchi.  Abdomen: Soft, NT, ND,  Skin: Grossly nothing acute no obvious " rashes  Neurological: Alert  Extremities: Pulse 2+ in b/l LE. No cyanosis.     Data:  Laboratory studies personally reviewed by me:  Recent Results (from the past 24 hour(s))   CBC without Differential Critical Care 0130    Collection Time: 11/20/22  3:15 AM   Result Value Ref Range    WBC 14.6 (H) 4.8 - 10.8 K/uL    RBC 4.10 (L) 4.20 - 5.40 M/uL    Hemoglobin 12.4 12.0 - 16.0 g/dL    Hematocrit 38.3 37.0 - 47.0 %    MCV 93.4 81.4 - 97.8 fL    MCH 30.2 27.0 - 33.0 pg    MCHC 32.4 (L) 33.6 - 35.0 g/dL    RDW 48.6 35.9 - 50.0 fL    Platelet Count 378 164 - 446 K/uL    MPV 10.9 9.0 - 12.9 fL   Basic Metabolic Panel (BMP) Critical Care 0130    Collection Time: 11/20/22  3:15 AM   Result Value Ref Range    Sodium 138 135 - 145 mmol/L    Potassium 4.1 3.6 - 5.5 mmol/L    Chloride 99 96 - 112 mmol/L    Co2 28 20 - 33 mmol/L    Glucose 124 (H) 65 - 99 mg/dL    Bun 60 (H) 8 - 22 mg/dL    Creatinine 1.75 (H) 0.50 - 1.40 mg/dL    Calcium 9.8 8.5 - 10.5 mg/dL    Anion Gap 11.0 7.0 - 16.0   MAGNESIUM    Collection Time: 11/20/22  3:15 AM   Result Value Ref Range    Magnesium 2.1 1.5 - 2.5 mg/dL   ESTIMATED GFR    Collection Time: 11/20/22  3:15 AM   Result Value Ref Range    GFR (CKD-EPI) 30 (A) >60 mL/min/1.73 m 2       EKG 11/13/22 interpreted by me AF RVR, LBBB    All pertinent features of laboratory and imaging reviewed including primary images where applicable    TTE 10/12/22  CONCLUSIONS  Compared to the images of the prior study 08/25/2020, there is now   reduced ejection fraction and wall motion abnormalities, previously   normal.     Moderately reduced left ventricular systolic function.  The left ventricular ejection fraction is visually estimated to be 35%.  Hypokinesis of the inferior and septal walls.  Grade I diastolic dysfunction.  The right ventricle is normal in size and systolic function.  No significant valvular abnormalities.      TTE 11/11/22  CONCLUSIONS  Decreased LV function due to septal hypokinesis.  EF  35%. Post op LV   function unchanged from preop function.    MRI brain  IMPRESSION:  1.  BILATERAL mostly supratentorial areas of ischemia as described above. These appear to be primarily watershed zone infarctions.  2.  No hemorrhage  3.  No significant mass effect    Principal Problem:    Acute respiratory failure with hypoxia ([facility]) POA: Unknown  Active Problems:    Gastroesophageal reflux disease without esophagitis POA: Yes    Coronary artery disease due to calcified coronary lesion - Calcifications seen on CT scan also wtih aortic ulceration (Chronic) POA: Yes    Chronic systolic congestive heart failure (HCC) EF 35% (Chronic) POA: Yes    Chronic obstructive pulmonary disease (COPD) ([facility]) POA: Unknown    S/P CABG x 3 POA: Unknown    Encounter for weaning from ventilator ([facility]) POA: Unknown    Acute ischemic stroke ([facility]) POA: Unknown    Other specified anemias POA: Unknown    PAF (paroxysmal atrial fibrillation) ([facility]) POA: Yes    Encephalopathy acute POA: Unknown    CHF (congestive heart failure), NYHA class III, acute on chronic, combined ([facility]) POA: Yes    HFrEF (heart failure with reduced ejection fraction) ([facility]) POA: Unknown    Acute blood loss anemia POA: Yes    Metabolic alkalemia POA: Unknown  Resolved Problems:    * No resolved hospital problems. *      Assessment / Plan:  72 year old woman with PMH CAD, HTN, hx breast CA, HLD, ICM HFrEF 35% presents for coronary bypass surgery completed 11/10/22 (LIMA to LAD, SVG to OM2, SVG to dRCA) c/b CVA and paroxysmal AF. Consult for heart failure medical optimization.     -appreciate neurology input. Agree for doac when no contraindications without antiplatelets to reduce risk of bleeding.   -HFOMT when no contraindications  - - valsartan, and titrate as tolerated. Eventually BID.   - - farxiga 10mg daily when able.   - - continue carvedilol and titrate as tolerated  - - eventually MRA such as spironolactone when creatinine returns to baseline, can trial 12.5 mg  daily and titrate as tolerated  -AF  - - eliquis for cva prevention setting paroxysmal AF. Chadsvasc 7.  - - ok to continue rhythm control with amiodarone. 10g total load via 400mg bid ok then 200mg daily.  -CAD  - - high intensity statin   - - ok to hold further antiplatelets while on doac to reduce bleeding risk  - - PPI for ppx    It is my pleasure to participate in the care of Ms. Dave.  Please do not hesitate to contact me with questions or concerns.    Velasquez Hamilton MD  Cardiologist [facility]    A total of 41 minutes of time was spent on day of encounter reviewing medical record, performing history and examination, counseling, ordering medication/test/consults and documentation.

## 2022-11-21 PROBLEM — N17.9 AKI (ACUTE KIDNEY INJURY) (HCC): Status: ACTIVE | Noted: 2022-11-21

## 2022-11-21 PROBLEM — R13.10 DYSPHAGIA: Status: ACTIVE | Noted: 2022-11-21

## 2022-11-21 LAB
ANION GAP SERPL CALC-SCNC: 11 MMOL/L (ref 7–16)
BUN SERPL-MCNC: 72 MG/DL (ref 8–22)
CALCIUM SERPL-MCNC: 9.4 MG/DL (ref 8.5–10.5)
CHLORIDE SERPL-SCNC: 99 MMOL/L (ref 96–112)
CO2 SERPL-SCNC: 28 MMOL/L (ref 20–33)
CREAT SERPL-MCNC: 1.77 MG/DL (ref 0.5–1.4)
CRP SERPL HS-MCNC: 6.73 MG/DL (ref 0–0.75)
ERYTHROCYTE [DISTWIDTH] IN BLOOD BY AUTOMATED COUNT: 47.1 FL (ref 35.9–50)
GFR SERPLBLD CREATININE-BSD FMLA CKD-EPI: 30 ML/MIN/1.73 M 2
GLUCOSE SERPL-MCNC: 142 MG/DL (ref 65–99)
HCT VFR BLD AUTO: 35 % (ref 37–47)
HGB BLD-MCNC: 11.2 G/DL (ref 12–16)
MAGNESIUM SERPL-MCNC: 2.2 MG/DL (ref 1.5–2.5)
MCH RBC QN AUTO: 29.9 PG (ref 27–33)
MCHC RBC AUTO-ENTMCNC: 32 G/DL (ref 33.6–35)
MCV RBC AUTO: 93.6 FL (ref 81.4–97.8)
PLATELET # BLD AUTO: 430 K/UL (ref 164–446)
PMV BLD AUTO: 10.8 FL (ref 9–12.9)
POTASSIUM SERPL-SCNC: 4 MMOL/L (ref 3.6–5.5)
PREALB SERPL-MCNC: 21.2 MG/DL (ref 18–38)
RBC # BLD AUTO: 3.74 M/UL (ref 4.2–5.4)
SODIUM SERPL-SCNC: 138 MMOL/L (ref 135–145)
WBC # BLD AUTO: 14.9 K/UL (ref 4.8–10.8)

## 2022-11-21 PROCEDURE — 99024 POSTOP FOLLOW-UP VISIT: CPT

## 2022-11-21 PROCEDURE — 700102 HCHG RX REV CODE 250 W/ 637 OVERRIDE(OP): Performed by: INTERNAL MEDICINE

## 2022-11-21 PROCEDURE — 700102 HCHG RX REV CODE 250 W/ 637 OVERRIDE(OP): Performed by: NURSE PRACTITIONER

## 2022-11-21 PROCEDURE — 97535 SELF CARE MNGMENT TRAINING: CPT

## 2022-11-21 PROCEDURE — 92526 ORAL FUNCTION THERAPY: CPT

## 2022-11-21 PROCEDURE — A9270 NON-COVERED ITEM OR SERVICE: HCPCS | Performed by: INTERNAL MEDICINE

## 2022-11-21 PROCEDURE — A9270 NON-COVERED ITEM OR SERVICE: HCPCS | Performed by: HOSPITALIST

## 2022-11-21 PROCEDURE — 85027 COMPLETE CBC AUTOMATED: CPT

## 2022-11-21 PROCEDURE — 97112 NEUROMUSCULAR REEDUCATION: CPT

## 2022-11-21 PROCEDURE — 770020 HCHG ROOM/CARE - TELE (206)

## 2022-11-21 PROCEDURE — 99233 SBSQ HOSP IP/OBS HIGH 50: CPT | Performed by: INTERNAL MEDICINE

## 2022-11-21 PROCEDURE — 700102 HCHG RX REV CODE 250 W/ 637 OVERRIDE(OP): Performed by: HOSPITALIST

## 2022-11-21 PROCEDURE — 83735 ASSAY OF MAGNESIUM: CPT

## 2022-11-21 PROCEDURE — 84134 ASSAY OF PREALBUMIN: CPT

## 2022-11-21 PROCEDURE — A9270 NON-COVERED ITEM OR SERVICE: HCPCS | Performed by: NURSE PRACTITIONER

## 2022-11-21 PROCEDURE — 99233 SBSQ HOSP IP/OBS HIGH 50: CPT | Performed by: HOSPITALIST

## 2022-11-21 PROCEDURE — 86140 C-REACTIVE PROTEIN: CPT

## 2022-11-21 PROCEDURE — 92612 ENDOSCOPY SWALLOW (FEES) VID: CPT

## 2022-11-21 PROCEDURE — 80048 BASIC METABOLIC PNL TOTAL CA: CPT

## 2022-11-21 RX ADMIN — APIXABAN 5 MG: 5 TABLET, FILM COATED ORAL at 17:25

## 2022-11-21 RX ADMIN — CARVEDILOL 6.25 MG: 6.25 TABLET, FILM COATED ORAL at 17:26

## 2022-11-21 RX ADMIN — SENNOSIDES AND DOCUSATE SODIUM 2 TABLET: 50; 8.6 TABLET ORAL at 06:27

## 2022-11-21 RX ADMIN — OMEPRAZOLE 40 MG: KIT at 06:28

## 2022-11-21 RX ADMIN — VALSARTAN 40 MG: 80 TABLET, FILM COATED ORAL at 06:27

## 2022-11-21 RX ADMIN — QUETIAPINE FUMARATE 25 MG: 25 TABLET, FILM COATED ORAL at 23:12

## 2022-11-21 RX ADMIN — SENNOSIDES AND DOCUSATE SODIUM 2 TABLET: 50; 8.6 TABLET ORAL at 17:25

## 2022-11-21 RX ADMIN — APIXABAN 5 MG: 5 TABLET, FILM COATED ORAL at 06:27

## 2022-11-21 ASSESSMENT — ENCOUNTER SYMPTOMS
HEADACHES: 0
PALPITATIONS: 0
FEVER: 0
EYES NEGATIVE: 1
DIZZINESS: 0
VOMITING: 0
NERVOUS/ANXIOUS: 0
SHORTNESS OF BREATH: 0
COUGH: 0
ABDOMINAL PAIN: 0
NEUROLOGICAL NEGATIVE: 1
CARDIOVASCULAR NEGATIVE: 1
CONSTITUTIONAL NEGATIVE: 1
MUSCULOSKELETAL NEGATIVE: 1
MYALGIAS: 0
NAUSEA: 0
PSYCHIATRIC NEGATIVE: 1
CHILLS: 0
GASTROINTESTINAL NEGATIVE: 1
BRUISES/BLEEDS EASILY: 0
WEAKNESS: 0
RESPIRATORY NEGATIVE: 1

## 2022-11-21 ASSESSMENT — PATIENT HEALTH QUESTIONNAIRE - PHQ9
1. LITTLE INTEREST OR PLEASURE IN DOING THINGS: NOT AT ALL
SUM OF ALL RESPONSES TO PHQ9 QUESTIONS 1 AND 2: 0
2. FEELING DOWN, DEPRESSED, IRRITABLE, OR HOPELESS: NOT AT ALL

## 2022-11-21 ASSESSMENT — COGNITIVE AND FUNCTIONAL STATUS - GENERAL
CLIMB 3 TO 5 STEPS WITH RAILING: TOTAL
STANDING UP FROM CHAIR USING ARMS: TOTAL
DRESSING REGULAR UPPER BODY CLOTHING: A LOT
PERSONAL GROOMING: A LOT
DRESSING REGULAR LOWER BODY CLOTHING: TOTAL
SUGGESTED CMS G CODE MODIFIER MOBILITY: CN
TOILETING: TOTAL
WALKING IN HOSPITAL ROOM: TOTAL
HELP NEEDED FOR BATHING: TOTAL
TURNING FROM BACK TO SIDE WHILE IN FLAT BAD: UNABLE
MOVING FROM LYING ON BACK TO SITTING ON SIDE OF FLAT BED: UNABLE
SUGGESTED CMS G CODE MODIFIER DAILY ACTIVITY: CM
EATING MEALS: TOTAL
MOBILITY SCORE: 6
DAILY ACTIVITIY SCORE: 8
MOVING TO AND FROM BED TO CHAIR: UNABLE

## 2022-11-21 ASSESSMENT — PAIN DESCRIPTION - PAIN TYPE
TYPE: ACUTE PAIN

## 2022-11-21 ASSESSMENT — FIBROSIS 4 INDEX: FIB4 SCORE: 1.51

## 2022-11-21 ASSESSMENT — GAIT ASSESSMENTS: GAIT LEVEL OF ASSIST: UNABLE TO PARTICIPATE

## 2022-11-21 NOTE — DIETARY
Nutrition support weekly update:  Day 11 of admit.  Lauren Dave is a 72 y.o. female with admitting DX of Atherosclerotic heart disease of native coronary artery without angina pectoris, CHF (congestive heart failure), NYHA class III, acute on chronic, combined (HCC).      Tube feeding initiated on 11/13. Current TF via NG tube is Replete Fiber, goal rate 60 ml/hr, providing 1440 kcals, 92 grams protein, 1195 mL free water.     Assessment:  Weight: 71 kg on 11/21 via bed scale. + 4 kg.  Re-estimate of nutritional needs as indicated not indicated.     Evaluation:   Pt has FEES pending today.  Per APRN, Pt likely to d/c to SNF when ready.  Labs: Glucose 142 (H). Bun 72 (H). Creatinine 1.77 (H). GFR 30 (L). Bun and creatinine have increased, MD added free water flushes.  Meds: BM protocol.   Last BM: 11/20  Current standard formula remains appropriate.      Malnutrition risk: No new criteria identified.    Recommendations/Plan:  Continue current TF formula of Replete Fiber, goal rate 60 ml/hr, providing 1440 kcals, 92 grams protein, 1195 mL free water.  Fluids per MD.  Monitor weight.    RD following.

## 2022-11-21 NOTE — PROGRESS NOTES
Hospital Medicine Daily Progress Note    Date of Service  11/21/2022    Chief Complaint  Lauren Dave is a 72 y.o. female admitted 11/10/2022 with elective surgery    Hospital Course  72 y.o. 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  The patient is found lethargic, she arouses on verbal command, she is confused, she does have weakness in the right lower extremity as well as left upper extremity, the patient's sister is at the bedside assisting the history taking.  The patient is being prepared for transfer to telemetry, chest tubes are being removed, a pacer wire has been removed, a MRI is underway and later reports multiple areas of watershed type infarctions.  There is no bleeding apparent.  A postoperative echo shows an ejection fraction of 35%, the patient is being diuresed, she is on GDMT post surgery.  The patient unfortunately is intolerant to statin medication.  Neurology was consulted after patient was identified to have a CVA.    Interval Problem Update  11/14/2022: Evaluated patient at bedside today patient appears to be without movement of right lower extremity difficult to arouse weak left upper extremity.  Trending back looks like she has had increasing oxygen demand in addition chest tubes removed on 11/13/2022.  Lastly patient is also had decreasing hemoglobin at present we will trend hemoglobin every 6 hours for 24 hours standing  transfusion order has been placed to transfuse 1 unit PRBC if repeat hemoglobin less than 8.0.  Ejection fraction reviewed from DAVE remains at 35% cardiology medicine recommendations appreciated.  Referral to acute rehabilitation has been placed.  We will continue to follow along closely with cardiovascular thoracic surgery as primary.  Present patient has persistent deficit right lower extremity no movement left upper extremity is weak.  Watershed infarct noted above.  Consider discussion of initiation of amantadine with neurology and cardiology service.  11/15: Ms. Dave was evaluated and examined in the IMCU. Hb this morning is 8.3. I discussed with Dr. Gallagher neurology about +/- anticoagulation given her transient afib and he recommends Apixaban which has been started. She remain on NG feeding and in soft wrist restraints. Her sister is at bedside. IV lasix will be stopped due to a CVP 3 and change to oral.   11/16: Ms. Dave was seen and evaluated in the IMCU. Hb 7.6 today. UA + for E coli. Her blood pressure has fluctuated significantly. She is on 5 liters of oxygen. She has been started on Eliquis in addendum to aspirin.   11/17-11/18 she was transferred to the ICU due to respiratory failure and decreased level of consciousness. Due to pulmonary edema after blood transfusions, she was given IV lasix. She had a metabolic alkalosis.   11/19: Ms. Dave was evaluated and examined in the ICU. She is moving both legs today and is more lucid. She remains on NG feeding. Her sister is at bedside. She is on Eliquis for anticoagulation and Hb is holding stable. She will go to the IMCU. Labs ordered for the morning.  11/20: Ms. Dave was seen and evaluated in the IMCU. Her blood pressure has had very significant fluctuations as low at 81/47 up to 152/66. Cr today is up to 1.75 despite tube feeds and no diuretics. Her sister is at bedside. She remains on NG feeding.  11/21: Ms. Dave was evaluated and  examined in the IMCU. Her Cr remains high at 1.7 and her BUN has gone up to 72. She is off diuretics. She has been on tube feeds. Her blood pressure has continued to fluctuate from systolic in the low 80's to systolic 150's. Free water added 250 ml q4 hours. A FEES has been ordered for today. She had a 6.2 second pause last night while coughing that was asymptomatic. Her sister is at bedside.    I have discussed this patient's plan of care and discharge plan at IDT rounds today with Case Management, Nursing, Nursing leadership, and other members of the IDT team.    Consultants/Specialty  critical care   Neurology  Physiatry  Cardiology   CT surgery  Code Status  Full Code    Disposition  Patient is not medically cleared for discharge.   Anticipate discharge to to an inpatient rehabilitation hospital.  I have placed the appropriate orders for post-discharge needs.    Review of Systems  Review of Systems   Unable to perform ROS: Mental acuity      Physical Exam  Temp:  [36.3 °C (97.4 °F)-36.7 °C (98.1 °F)] 36.7 °C (98.1 °F)  Pulse:  [68-87] 85  Resp:  [11-40] 23  BP: ()/() 150/68  SpO2:  [90 %-97 %] 96 %    Physical Exam  Vitals and nursing note reviewed.   Constitutional:       General: She is not in acute distress.     Appearance: She is ill-appearing.   HENT:      Head: Normocephalic and atraumatic.      Nose:      Comments: NG tube     Mouth/Throat:      Mouth: Mucous membranes are dry.      Pharynx: Oropharynx is clear.   Eyes:      General: No scleral icterus.     Conjunctiva/sclera: Conjunctivae normal.   Cardiovascular:      Rate and Rhythm: Normal rate and regular rhythm.      Comments: Sternal incision without infection  Pulmonary:      Effort: Pulmonary effort is normal.      Breath sounds: Normal breath sounds.      Comments: 3 liters of oxygen  Abdominal:      General: There is no distension.      Tenderness: There is no abdominal tenderness.   Musculoskeletal:      Cervical back: Normal range  of motion and neck supple.      Right lower leg: No edema.      Left lower leg: No edema.   Skin:     General: Skin is warm and dry.   Neurological:      Mental Status: She is alert.      Comments: Confused   Left leg 5/5 right leg 3+/5  Arms/hands move generally equally   Psychiatric:      Comments:   Voice is a bit hoarse  Flat affect  A bit impulsive and easily distracted       Fluids    Intake/Output Summary (Last 24 hours) at 11/21/2022 0720  Last data filed at 11/21/2022 0346  Gross per 24 hour   Intake 750 ml   Output --   Net 750 ml         Laboratory  Recent Labs     11/19/22  0110 11/20/22 0315 11/21/22 0315   WBC 11.4* 14.6* 14.9*   RBC 3.88* 4.10* 3.74*   HEMOGLOBIN 11.5* 12.4 11.2*   HEMATOCRIT 35.8* 38.3 35.0*   MCV 92.3 93.4 93.6   MCH 29.6 30.2 29.9   MCHC 32.1* 32.4* 32.0*   RDW 48.1 48.6 47.1   PLATELETCT 331 378 430   MPV 10.8 10.9 10.8       Recent Labs     11/19/22  0110 11/20/22 0315 11/21/22 0315   SODIUM 138 138 138   POTASSIUM 3.9 4.1 4.0   CHLORIDE 99 99 99   CO2 28 28 28   GLUCOSE 131* 124* 142*   BUN 48* 60* 72*   CREATININE 1.47* 1.75* 1.77*   CALCIUM 9.7 9.8 9.4                       Imaging  IR-US GUIDED PIV   Final Result    Ultrasound-guided PERIPHERAL IV INSERTION performed by    qualified nursing staff as above.      DX-ABDOMEN FOR TUBE PLACEMENT   Final Result      Enteric tube tip projects over the stomach      DX-CHEST-PORTABLE (1 VIEW)   Final Result      1.  Tubes and lines in good position.      2.  Mild cardiomegaly with mild diffuse pulmonary edema with slight improvement since previous exam.      3.  Small left pleural effusion.      EC-ECHOCARDIOGRAM LTD W/O CONT   Final Result      DX-ABDOMEN FOR TUBE PLACEMENT   Final Result      Enteric tube tip projects over the stomach      DX-CHEST-PORTABLE (1 VIEW)   Final Result      1.  Layering bilateral pleural effusions with adjacent airspace disease.   2.  Increased interstitial edema.      DX-ABDOMEN FOR TUBE PLACEMENT    Final Result      Orogastric tube tip at the distal stomach.      DX-CHEST-PORTABLE (1 VIEW)   Final Result      1.  Removal of right IJ catheter.      2.  Right subclavian catheter unchanged in position.      3.  Mild cardiomegaly.      4.  Blunting of left costophrenic angle consistent with atelectasis, pneumonitis, and/or pleural effusion.      DX-ABDOMEN FOR TUBE PLACEMENT   Final Result      Enteric tube tip projects over the stomach antrum      MR-BRAIN-W/O   Final Result      1.  BILATERAL mostly supratentorial areas of ischemia as described above. These appear to be primarily watershed zone infarctions.   2.  No hemorrhage   3.  No significant mass effect      DX-CHEST-PORTABLE (1 VIEW)   Final Result         1. No significant interval change.      DX-ABDOMEN FOR TUBE PLACEMENT   Final Result      Interval placement of enteric tube with its tip over the midline epigastrium compatible with position at the level of the gastric body.      CT-HEAD W/O   Final Result      1.  Likely subacute infarcts in the bilateral parieto-occipital regions.   2.  No acute intracranial hemorrhage.   3.  Bilateral maxillary sinus disease.      EC-DAVE W/O CONT   Final Result      DX-CHEST-PORTABLE (1 VIEW)   Final Result      Stable enlargement of the cardiomediastinal silhouette, mild diffuse interstitial edema and bilateral basilar atelectasis and/or consolidation.      DX-CHEST-PORTABLE (1 VIEW)   Final Result      Satisfactory postoperative appearance of the chest             Assessment/Plan  * Acute ischemic stroke (HCC)  Assessment & Plan  Post/perioperative event with multiple areas of watershed type infarction  Neurology consulted  DAPT had been given then due to recent afib, neurology recommended Eliquis which was started  Neurology consultation was obtained  PT/OT/SLP and rehabilitation  NG feeding until she is cleared by speech path  She is now moving all extremities though right leg remains weak  She will be an  excellent candidate for rehab    S/P CABG x 3  Assessment & Plan  As per cardiac surgery, optimization,, mobilization as tolerated,  Telemetry    TAN (acute kidney injury) ([facility])  Assessment & Plan  Cr has gone up to 1.7 and BUN is up to 72  She is now off diuretics  Free water 250 mL will be added to NG feeding q4 hours.  Follow urine output and check BMP in the morning.     PAF (paroxysmal atrial fibrillation) ([facility])- (present on admission)  Assessment & Plan  Post-operative  Decrease the amiodarone dose as she has been loaded.  eliquis    Dysphagia- (present on admission)  Assessment & Plan  She has been on NG feeding  FEES ordered on 11/21    Acute blood loss anemia- (present on admission)  Assessment & Plan  From hematuria  Requiring transfusion  Hb 12.4    Encephalopathy acute  Assessment & Plan  Acute encephalopathy with consideration of ICU delirium post-stroke  This is slowly improving though she remains encephalopathic    Acute respiratory failure with hypoxia ([facility])  Assessment & Plan  She has had volume overload requiring high flow oxygen  Now on 2.5 liters of oxygen  .     Other specified anemias  Assessment & Plan  Acute blood loss due to hematuria  Hb has been kady thus stop daily phlebotomy and check as indicated.  Transfuse RBCs for hemoglobin less than 8.0 as she is s/p CABG.    Chronic systolic congestive heart failure ([facility]) EF 35%- (present on admission)  Assessment & Plan  Treated to euvolemia, GDMT as tolerated  Ejection fraction 35% on DAVE.  Coreg and diovan      Coronary artery disease due to calcified coronary lesion - Calcifications seen on CT scan also wtih aortic ulceration- (present on admission)  Assessment & Plan  S/p CABG    Dyslipidemia  Assessment & Plan  The patient with prior severe intolerance, apparently failed multiple regimens  Consider outpatient referral to PCSK9 treatment    Chronic obstructive pulmonary disease (COPD) ([facility])  Assessment & Plan  Pre-existing, respiratory  protocol,  Continuous respiratory therapy protocol.  Without exacerbation       VTE prophylaxis: therapeutic anticoagulation with Eliquis    I have performed a physical exam and reviewed and updated ROS and Plan today (11/21/2022). In review of yesterday's note (11/20/2022), there are no changes except as documented above.

## 2022-11-21 NOTE — THERAPY
Speech Language Pathology  Daily Treatment     Patient Name: Lauren Dave  Age:  72 y.o., Sex:  female  Medical Record #: [id number]  Today's Date: 11/21/2022     Precautions  Precautions: (P) Fall Risk, Nasogastric Tube, Cardiac Precautions (See Comments), Sternal Precautions (See Comments)  Comments: (P) s/p CABG, CVA    Assessment    Pt seen this date for swallow reassessment. Pt seen with HOB at 90*, on 3L O2 via nasal cannula and NGT in right naris. PO trials of ice, thins by tsp and cup, and liquidized assessed. Of note, pt presenting with congested cough prior to PO trials, but this did appear exacerbated intermittently with PO trials. Pt required min verbal cuing for appropriate bolus stripping from utensil. Timely swallow initiation and presumed complete hyolaryngeal elevation to palpation. Immediate coughing with thins by tsp, which is concerning for possible airway invasion. With cues for 3 second prep, no s/sx of aspiration appreciated with thins. Delayed coughing appreciated with liquidized, pt denied globus sensation.     Given prolonged NPO status, s/p CABG, brief endotracheal intubations and multiple embolic CVAs, recommend diagnostic swallow study prior to initiation of a PO diet.     NPO/TF pending FEES  Okay for 10 single ice chips per hour with nursing after oral care and with HOB at 90* to reduce xerostomia and prevent muscle disuse atrophy.  Meds via gastric tube.   SLP following.     Plan    Continue current treatment plan.    Discharge Recommendations: (P) Recommend post-acute placement for additional speech therapy services prior to discharge home    Subjective    Pt alert, followed directions and participated fully. Pt eager for PO and motivated. Pt's friend at bedside and very supportive.      Objective       11/21/22 0907   Charge Group   SLP Swallowing Dysfunction Treatment Swallowing Dysfunction Treatment   Total Treatment Time   SLP Time Spent Yes   SLP Swallowing Dysfunction  "Treatment (Mins) 16   Precautions   Precautions Fall Risk;Nasogastric Tube;Cardiac Precautions (See Comments);Sternal Precautions (See Comments)   Comments s/p CABG, CVA   Vitals   O2 (LPM) 3   O2 Delivery Device Silicone Nasal Cannula   Pain 0 - 10 Group   Therapist Pain Assessment Post Activity Pain Same as Prior to Activity;Nurse Notified;0   Dysphagia    Dysphagia X   Positioning / Behavior Modification Self Monitoring;Modulate Rate or Bite Size;3 Second Prep   Other Treatments PO trials   Diet / Liquid Recommendation NPO;Pre-Feeding Trials with SLP Only   Nutritional Liquid Intake Rating Scale Nothing by mouth   Nutritional Food Intake Rating Scale Tube dependent with minimal attempts of oral intake   Skilled Intervention Compensatory Strategies;Verbal Cueing   Recommended Route of Medication Administration   Medication Administration  Via Gastric Tube   Patient / Family Goals   Patient / Family Goal #1 \"that was really good\"   Goal #1 Outcome Progressing as expected   Short Term Goals   Short Term Goal # 1 Pt will consume prefeeding trials 1:1 with SLP with no s/sx of aspiration and min cues.   Goal Outcome # 1 Progressing as expected   Short Term Goal # 2 Pt will participate in diagnostic swallow study to objectively assess oropharyngeal swallow function and determine least restrictive diet texture with min cues.   Education Group   Education Provided Dysphagia   Dysphagia Patient Response Patient;Other;Acceptance;Explanation;Demonstration;Verbal Demonstration;Reinforcement Needed;Action Demonstration   Additional Comments Pt's friend verbalized good understanding, pt will require reinforcement   Anticipated Discharge Needs   Discharge Recommendations Recommend post-acute placement for additional speech therapy services prior to discharge home   Therapy Recommendations Upon DC Dysphagia Training;Community Re-Integration;Patient / Family / Caregiver Education   Interdisciplinary Plan of Care Collaboration "   IDT Collaboration with  Nursing;Physician   Patient Position at End of Therapy Seated;In Bed;Bed Alarm On;Call Light within Reach;Tray Table within Reach;Family / Friend in Room;Other (Comments)  (1:1 sitter)   Collaboration Comments RN and MD aware of results/recs

## 2022-11-21 NOTE — PROGRESS NOTES
Cardiovascular Surgery Progress Note    Name: Lauren Dave  MRN: [id number]  : 1950  Admit Date: 11/10/2022  5:08 AM  11 Days Post-Op     Procedure:  Procedure(s) and Anesthesia Type:     * CORONARY ARTERY BYPASS GRAFTING X 3, WITH LEFT INTERNAL MAMMARY ARTERY TAKEDOWN AND ENDOSCOPIC VEIN HARVEST LEFT GREATER SAPHENOUS VEIN, AND TRANSESOPHAGEAL ECHOCARDIOGRAM - General     * ECHOCARDIOGRAM, TRANSESOPHAGEAL, INTRAOPERATIVE - General    Vitals:  Vitals:    22 0400 22 0500 22 0600 22 0800   BP: (!) 149/65 133/60 (!) 150/68 (!) 141/65   Pulse: 87 83 85 88   Resp: 19 (!) 40 (!) 23 19   Temp: 36.7 °C (98.1 °F)      TempSrc:       SpO2: 95% 95% 96% 97%   Weight:   71 kg (156 lb 8.4 oz)    Height:          Temp (24hrs), Av.6 °C (97.9 °F), Min:36.4 °C (97.6 °F), Max:36.7 °C (98.1 °F)      Respiratory:    Respiration: 19, Pulse Oximetry: 97 %       Fluids:    Intake/Output Summary (Last 24 hours) at 2022 1000  Last data filed at 2022 0346  Gross per 24 hour   Intake 570 ml   Output --   Net 570 ml       Admit weight: Weight: 67.1 kg (147 lb 14.9 oz)  Current weight: Weight: 71 kg (156 lb 8.4 oz) (22 0600)    Labs:  Recent Labs     22  0110 22  0315 22  0315   WBC 11.4* 14.6* 14.9*   RBC 3.88* 4.10* 3.74*   HEMOGLOBIN 11.5* 12.4 11.2*   HEMATOCRIT 35.8* 38.3 35.0*   MCV 92.3 93.4 93.6   MCH 29.6 30.2 29.9   MCHC 32.1* 32.4* 32.0*   RDW 48.1 48.6 47.1   PLATELETCT 331 378 430   MPV 10.8 10.9 10.8       Recent Labs     22  0110 225 22   SODIUM 138 138 138   POTASSIUM 3.9 4.1 4.0   CHLORIDE 99 99 99   CO2 28 28 28   GLUCOSE 131* 124* 142*   BUN 48* 60* 72*   CREATININE 1.47* 1.75* 1.77*   CALCIUM 9.7 9.8 9.4                 Medications:  Scheduled Medications   Medication Dose Frequency    valsartan  40 mg Q DAY    amiodarone  200 mg Q DAY    senna-docusate  2 Tablet BID    carvedilol  6.25 mg BID WITH MEALS    Pharmacy    PHARMACY TO DOSE    apixaban  5 mg BID    [Held by provider] aspirin  81 mg DAILY    omeprazole  40 mg DAILY    Pharmacy Consult Request  1 Each PHARMACY TO DOSE        Exam:   Physical Exam  Vitals and nursing note reviewed.   HENT:      Head: Normocephalic.      Nose: No congestion.      Mouth/Throat:      Mouth: Mucous membranes are moist.   Cardiovascular:      Rate and Rhythm: Normal rate and regular rhythm.   Pulmonary:      Breath sounds: Rales present.   Abdominal:      General: Abdomen is flat. Bowel sounds are normal.      Palpations: Abdomen is soft.   Musculoskeletal:         General: Normal range of motion.   Skin:     General: Skin is warm and dry.   Neurological:      Mental Status: She is alert.      Motor: Weakness present.   Psychiatric:         Mood and Affect: Mood normal.         Behavior: Behavior normal.       Cardiac Medications:    ASA - Yes     Plavix - Yes     Post-operative Beta Blockers - Yes     Ace/ARB- No; contraindicated because of Increased creatinine/CKD     Statin - No; contraindicated because of Allergy/intolerance     Aldactone- No; contraindicated because of Increased creatinine/CKD     Ejection Fraction:  35%     Telemetry:   11/11 SR  11/12 SR/ST 90-100s  11/13 SR  11/14 SR, a fib last night  11/15 SR   11/16 SR   11/17 SR  11/18 SR  11/19 SR     Assessment/Plan:  POD 1  HDS, SR, neuro not moving right leg but follows commands, cleviprex drip, wounds CDI, abdomen soft, fluid balance positive, wt up,  chest tube output moderate.  CT head this AM.  Plan:  wean vent as tolerated.  Allow permissive hypertension, OK for systolic 120-140's, CPM.      POD 2 HDS, SR/ST.  Neuro can say name, no movement in right leg.  Weaker left upper extremity, following commands intermittently, pulling at NG/central line.  Wounds CDI, prevena in tact.  Moderate serosanguinous output from chest tube.  Cr 1.18 Hgb 9.2.  Neurology recommending MRI brain, pending currently.  Plan: MRI when available.   Ok for permissive HTN of SBP of 140 or less.  Encourage IS.  Restraint for RUE due to trying to remove central line and NG tubes with impulsivity.       POD 3 HDS. SR.  Neuro aware to self and place.  Good strength in right hand and left leg.  Left arm weak.  No movement right leg.  Pacer wires removed. Pt tolerated well.  Cr 1.14, Hgb 8.8. Plan: MRI brain today, pacer wires out.  DC chest tubes.  Encourage IS.  Work with PT as tolerated.     POD 4 BP 90s-140s, SR, a fib overnight, lethargic this morning, flaccid right leg, weak left arm, wounds CDI, abdomen soft, fluid balance down, wt up, 5L NC. On tube feeding. Plan: UA and culture today, transfer to IMCU, continue diuresis, amio gtt to PO, IS/ambulate.      POD 5  HDS, SR on PO amio, neuro lethargic moving three extremities on command 5/5  strength uppers, not able to get her to move RLE, wounds Prevena intact, abdomen soft n/t BMS in place for incontinence, Hgb 8.3, Cr 1.19,  fluid balance negative, wt down,  BP labile this AM. Plan:  decrease diuresis for labile BP, fluid balance negative, Wean Oxygen, Encourage IS/ambulate.      POD 6 HDS, SR- on po amio- eliquis being held for anemia and hematuria, neuro - unchanged- agitated overnight given haldol- start nightly seroquel 12.5 mg, acute blood loss anemia- transfuse     POD 7 HDS- add low dose beta blocker, SR on PO amio- resumed eliquis, patient appears euvolemic by weight and I&O's CVP 0- lasix being held, Resp insuff- high flow nasal cannula- rpt CXR- poss aspiration pneumonia after NGT pulled, ck echo     POD 8  HDS- hypertensive overnight, SR, AOx4, flaccid right leg, on HFNC, prevena in place, abdomen soft, fluid balance negative, wt down,  Cr 1.53.  Plan: increase coreg, add lisinopril when renal function improves, IS/ambulate.      POD 9  HDS- HTN overnight, SR, mentation improving, right leg flaccid, sternal incision open to air, abdomen soft, fluid balance negative, wt down,  HFNC to 5L NC this  morning. Cr  1.47. Plan:  Transfer to [facility], low dose valsartan per cardiology, IS/ambulate. Continue SLP.     POD 10  HDS, SR w/BBB, neuro intact, wounds CDI, abdomen soft, fluid balance negative, wt up,  creatinine 1.75.  cc. BP improved.  Plan:  watch creatinine, PT/OT, IS/ambulate. CPM.     POD 11  HDS, SR, neuro continues to improve, wounds CDI midline PANCHITO no erythema, no drainage, abdomen soft , fluid balance negative, wt down. Plan:  Wounds clean and dry open to air, continue cleansing twice per day with soap and water. Hospitalist following. Follow up arranged. Likely discharge to SNF when ready. FEES study today. Encourage IS/ambulate. CT surgery will sign off. Please call with any questions or concerns.    Disposition:  Therapies recommending post acute placement, rehab referral

## 2022-11-21 NOTE — PROGRESS NOTES
Cardiology Follow Up Progress Note    Date of Service  11/21/2022    Attending Physician  Padmini Mac M.D.    Chief Complaint   Status post coronary artery bypass graft surgery.    HPI  Lauren Dave is a 72 y.o. female admitted 11/10/2022 with above.    Interim Events  No significant changes noted from cardiac standpoint within the past 24 hours.     Review of Systems  Review of Systems   Constitutional: Negative.  Negative for chills and fever.   HENT: Negative.  Negative for hearing loss.    Eyes: Negative.    Respiratory: Negative.  Negative for cough and shortness of breath.    Cardiovascular: Negative.  Negative for chest pain, palpitations and leg swelling.   Gastrointestinal: Negative.  Negative for abdominal pain, nausea and vomiting.   Genitourinary: Negative.  Negative for dysuria and urgency.   Musculoskeletal: Negative.  Negative for myalgias.   Skin: Negative.  Negative for rash.   Neurological: Negative.  Negative for dizziness, weakness and headaches.   Hematological:  Does not bruise/bleed easily.   Psychiatric/Behavioral: Negative.  The patient is not nervous/anxious.      Vital signs in last 24 hours  Temp:  [36.4 °C (97.6 °F)-36.7 °C (98.1 °F)] 36.7 °C (98.1 °F)  Pulse:  [68-88] 88  Resp:  [11-40] 19  BP: ()/() 141/65  SpO2:  [90 %-97 %] 97 %    Physical Exam  Physical Exam  Constitutional:       Appearance: Normal appearance. She is well-developed and normal weight.   HENT:      Head: Normocephalic and atraumatic.      Mouth/Throat:      Mouth: Mucous membranes are moist.   Eyes:      Extraocular Movements: Extraocular movements intact.      Conjunctiva/sclera: Conjunctivae normal.   Cardiovascular:      Rate and Rhythm: Normal rate and regular rhythm.      Pulses: Normal pulses.      Heart sounds: Normal heart sounds.   Pulmonary:      Effort: Pulmonary effort is normal.      Breath sounds: Normal breath sounds.   Abdominal:      General: Bowel sounds are normal.       Palpations: Abdomen is soft.   Musculoskeletal:         General: Normal range of motion.      Cervical back: Normal range of motion and neck supple.   Skin:     General: Skin is warm and dry.   Neurological:      General: No focal deficit present.      Mental Status: She is alert and oriented to person, place, and time. Mental status is at baseline.   Psychiatric:         Mood and Affect: Mood normal.         Behavior: Behavior normal.         Thought Content: Thought content normal.         Judgment: Judgment normal.       Lab Review  Lab Results   Component Value Date/Time    WBC 14.9 (H) 11/21/2022 03:15 AM    RBC 3.74 (L) 11/21/2022 03:15 AM    HEMOGLOBIN 11.2 (L) 11/21/2022 03:15 AM    HEMATOCRIT 35.0 (L) 11/21/2022 03:15 AM    MCV 93.6 11/21/2022 03:15 AM    MCH 29.9 11/21/2022 03:15 AM    MCHC 32.0 (L) 11/21/2022 03:15 AM    MPV 10.8 11/21/2022 03:15 AM      Lab Results   Component Value Date/Time    SODIUM 138 11/21/2022 03:15 AM    POTASSIUM 4.0 11/21/2022 03:15 AM    CHLORIDE 99 11/21/2022 03:15 AM    CO2 28 11/21/2022 03:15 AM    GLUCOSE 142 (H) 11/21/2022 03:15 AM    BUN 72 (H) 11/21/2022 03:15 AM    CREATININE 1.77 (H) 11/21/2022 03:15 AM      Lab Results   Component Value Date/Time    ASTSGOT 35 11/07/2022 10:53 AM    ALTSGPT 15 11/07/2022 10:53 AM     Lab Results   Component Value Date/Time    CHOLSTRLTOT 161 11/14/2022 02:26 AM    LDL 76 11/14/2022 02:26 AM    HDL 54 11/14/2022 02:26 AM    TRIGLYCERIDE 154 (H) 11/14/2022 02:26 AM       No results for input(s): NTPROBNP in the last 72 hours.  (Above labs reviewed.)       Current Facility-Administered Medications:     valsartan (DIOVAN) tablet 40 mg, 40 mg, Enteral Tube, Q DAY, Velasquez Hamilton M.D., 40 mg at 11/21/22 0627    amiodarone (Cordarone) tablet 200 mg, 200 mg, Enteral Tube, Q DAY, Lester Nelson M.D.    senna-docusate (PERICOLACE or SENOKOT S) 8.6-50 MG per tablet 2 Tablet, 2 Tablet, Enteral Tube, BID, 2 Tablet at 11/21/22 0627 **AND**  polyethylene glycol/lytes (MIRALAX) PACKET 1 Packet, 1 Packet, Enteral Tube, QDAY PRN **AND** magnesium hydroxide (MILK OF MAGNESIA) suspension 30 mL, 30 mL, Enteral Tube, QDAY PRN **AND** bisacodyl (DULCOLAX) suppository 10 mg, 10 mg, Rectal, QDAY PRN, Lester Nelson M.D.    carvedilol (COREG) tablet 6.25 mg, 6.25 mg, Enteral Tube, BID WITH MEALS, YASMANY Wheeler.P.R.N., 6.25 mg at 22 1814    Metoprolol Tartrate (LOPRESSOR) injection 5 mg, 5 mg, Intravenous, Q5 MIN PRN, Kendall Yeboah M.D.    Pharmacy Consult: Enteral tube insertion - review meds/change route/product selection, , Other, PHARMACY TO DOSE, Darrick Kraft M.D.    apixaban (ELIQUIS) tablet 5 mg, 5 mg, Enteral Tube, BID, Darrick Kraft M.D., 5 mg at 22 0627    haloperidol lactate (HALDOL) injection 1 mg, 1 mg, Intravenous, Q4HRS PRN, Kenneth Pugh M.D., 1 mg at 22 0350    loperamide (IMODIUM) oral suspension 2 mg, 2 mg, Enteral Tube, 4X/DAY PRN, Vinicius Walden M.D.    QUEtiapine (Seroquel) tablet 25 mg, 25 mg, Enteral Tube, HS PRN, SHANKAR WheelerP.R.N., 25 mg at 22 1928    [] acetaminophen (TYLENOL) tablet 1,000 mg, 1,000 mg, Enteral Tube, Q6HRS, 1,000 mg at 11/15/22 1149 **FOLLOWED BY** acetaminophen (TYLENOL) tablet 1,000 mg, 1,000 mg, Enteral Tube, Q6HRS PRN, Kamron Back M.D.    aspirin (ASA) chewable tab 81 mg, 81 mg, Enteral Tube, DAILY, Kamron Back M.D., 81 mg at 11/15/22 0557    omeprazole (FIRST-OMEPRAZOLE) 2 mg/mL oral susp 40 mg, 40 mg, Enteral Tube, DAILY, Kamron Back M.D., 40 mg at 22 0628    acetaminophen (Tylenol) tablet 650 mg, 650 mg, Enteral Tube, Q4HRS PRN **OR** acetaminophen (TYLENOL) suppository 650 mg, 650 mg, Rectal, Q4HRS PRN, Kamron Back M.D.    mag hydrox-al hydrox-simeth (MAALOX PLUS ES or MYLANTA DS) suspension 30 mL, 30 mL, Enteral Tube, Q4HRS PRN, Kamron Back M.D.    oxyCODONE immediate-release (ROXICODONE)  tablet 5 mg, 5 mg, Enteral Tube, Q3HRS PRN, 5 mg at 11/19/22 0908 **OR** oxyCODONE immediate release (ROXICODONE) tablet 10 mg, 10 mg, Enteral Tube, Q3HRS PRN, 10 mg at 11/20/22 1928 **OR** fentaNYL (SUBLIMAZE) injection 50 mcg, 50 mcg, Intravenous, Q3HRS PRN, Kamron Back M.D., 50 mcg at 11/20/22 2358    traMADol (Ultram) 50 MG tablet 50 mg, 50 mg, Enteral Tube, Q4HRS PRN, Kamron Back M.D., 50 mg at 11/14/22 2356    Respiratory Therapy Consult, , Nebulization, Continuous RT, YASMANY Kwon.P.R.N.    electrolyte-A (PLASMALYTE-A) infusion, , Intravenous, PRN, YASMANY Kwon.P.R.N., Last Rate: 999 mL/hr at 11/10/22 1619, New Bag at 11/10/22 1619    Pharmacy Consult Request ...Pain Management Review 1 Each, 1 Each, Other, PHARMACY TO DOSE, Kevyn Kramer, YASMANY.P.R.N.    ondansetron (ZOFRAN) syringe/vial injection 8 mg, 8 mg, Intravenous, Q6HRS PRN, 8 mg at 11/12/22 0100 **OR** prochlorperazine (COMPAZINE) injection 10 mg, 10 mg, Intravenous, Q6HRS PRN **OR** promethazine (PHENERGAN) suppository 25 mg, 25 mg, Rectal, Q6HRS PRN, Kevyn Kramer A.P.R.N.  (Medications reviewed.)    Cardiac Imaging and Procedures Review  CARDIAC STUDIES/PROCEDURES:    CARDIAC CATHETERIZATION CONCLUSIONS by Yair Altman (10/19/22)  Calcific multivessel CAD (ostial LAD, mid LCx, pRCA)  LVEDP 12 mmHg  (study result reviewed)    ECHOCARDIOGRAM CONCLUSIONS (10/12/22)  Compared to the images of the prior study 08/25/2020, there is now   reduced ejection fraction and wall motion abnormalities, previously normal.  Moderately reduced left ventricular systolic function.  The left ventricular ejection fraction is visually estimated to be 35%.  Hypokinesis of the inferior and septal walls.  Grade I diastolic dysfunction.  The right ventricle is normal in size and systolic function.  No significant valvular abnormalities.   (study result reviewed)    ECHOCARDIOGRAM CONCLUSIONS (11/1/7/22)  Compared to the prior study on  10/12/22, no major changes  The left ventricular ejection fraction is visually estimated to be 30%.  (study result reviewed)    EKG performed on (11/16/22) was reviewed: EKG personally interpreted shows sinus tachycardia with atypical left bundle branch block.    Assessment/Plan  Coronary artery disease with prior coronary bypass graft (x3 Left internal mammary artery to left anterior descending, saphenous vein graft to obtuse marginal #2, saphenous vein graft to distal right coronary artery by Padmini Montoya 11/10/22): She is clinically doing well. I will continue with current medical care including aspirin, carvedilol, valsartan.   Ischemic cardiomyopathy: The overall volume status is adequate. We will continue with current care  Hypertension: Blood pressure is well controlled. We will continue with beta blockade therapy and angiotensin receptor blockade.  Hyperlipidemia with intolerance to statin therapy: She will be referred for PCSK9 therapy as outpatient to [facility].  Atrial fibrillation on chronic anticoagulation therapy (Eliquis): Sinus rhythm is maintained without evidence of atrial fibrillation episodes amiodarone  Greater than 45 minutes of time was spent to review all above information; of which more than 50% of the time was face to face reviewing thee patient's medical issues, medication evaluation, study result review, lab results review    Thank you for allowing me to participate in the care of this patient.  Cardiology will sign off on this patient    Please contact me with any questions.    Jd Huerta M.D.   Cardiologist, [facility]  [phone]

## 2022-11-21 NOTE — THERAPY
Speech Language Pathology   Fiberoptic Endoscopic Evaluation of Swallow     Patient Name: Lauren Dave  AGE:  72 y.o., SEX:  female  Medical Record #: [id number]  Today's Date: 11/21/2022     Precautions  Precautions: (P) Fall Risk, Nasogastric Tube, Cardiac Precautions (See Comments), Sternal Precautions (See Comments)  Comments: (P) s/p CABG, CVA    Assessment    Current Method of Nutrition   NPO until cleared by speech pathology    Pertinent Information:  Affect/Behavior: Anxious, Cooperative  Oxygen Requirements:  3 L Nasal Cannula  Feeding Tube: NGT in situ to left nare  Dentition: Fair  Factor(s) Affecting Performance: None    Discussed the risks, benefits, and alternatives of the FEES procedure. Patient/family acknowledged and agreed to proceed.     Assessment  Flexible Endoscopic Evaluation of Swallowing (FEES) completed at bedside today. The endoscope was passed transnasally via right nare to evaluate the anatomy and physiology of swallowing. Pt did not tolerate the procedure well but was willing to continue with bolus trials when provided encouragement throughout the procedure.    Anatomic Findings:  Edematous arytenoids; otherwise unremarkable   Vocal Fold Motion: Bilateral movement  Secretion Management: WNL  PO trials: ice chips, thin liquids, mildly thick liquids, liquidized, puree, soft & bite sized      Consistency PAS Score Timing Comments   Ice Chips 1 N/A    Thin Liquid 8 During swallow (inferred) and Post swallow Tsp sip PAS 5  Tsp PAS 5  Cup PAS 8  Cup pas 7   Mildly Thick Liquid 2 During swallow (inferred) and Post swallow Tsp PAS 2  Cup sip PAS 1  Cup sip PAS 1  Liquid wash PAS 1   Liquidized 1 N/A    Puree 2 Pre swallow and Post swallow Moderate to severe lateral channel and vallecular residue which resulted in penetration over aryepiglottic folds    Soft & Bite Sized 8 Pre swallow and Post swallow Aspiration of liquid from peaches before and swallow and after with spillage between the  interarytenoid      Penetration-Aspiration Scale (PAS)  1     No contrast enters airway  2     Contrast enters the airway, remains above the vocal folds, and is ejected from the airway (not seen in the airway at the end of the swallow).  3     Contrast enters the airway, remains above the vocal folds, and is not ejected from the airway (is seen in the airway after the swallow).  4     Contrast enters the airway, contacts the vocal folds, and is ejected from the airway.  5     Contrast enters the airway, contacts the vocal folds, and is not ejected from the airway  6     Contrast enters the airway, crosses the plane of the vocal folds, and is ejected from the airway.  7     Contrast enters the airway, crosses the plane of the vocal folds, and is not ejected from the airway despite effort.  8     Contrast enters the airway, crosses the plane of the vocal folds, is not ejected from the airway and there is no response to aspiration.      Oral phase:    Patient with adequate oral acceptance. Poor bolus control with posterior spillage seen resulting in aspiration of thin liquids before the swallow. Attempted 3 second prep strategy but patient is unable to control bolus hold currently. Mastication was complete and functional.     Pharyngeal phase:    Patient with impaired tongue base retraction and epiglottic inversion resulted in moderate-severe vallecular residue with pureed textures. Impaired laryngeal vestibular closure resulted in aspiration of thin liquids during the swallow. Impaired pharyngeal shortening resulted in pyriform sinus residue with soft and bite sized textures. Suspect impaired pharyngeal.laryngeal sensation given patients absent response to significant residue and inconsistent cough response to aspiration of thin liquids.     Clinical Impressions  The pt presents with a moderate oropharyngeal dysphagia, likely acute related to prolonged NPO status, s/p CABG, brief endotracheal intubations and multiple  "embolic CVAs. Patient is appropriate to resume a modified diet, suspect good prognosis and mentation continues to improve. Patients swallow safety and efficiency are impaired given patients impaired airway protection and moderate-severe residue with pureed textures.    Recommendations  Liquidized with mildly thick liquids   2.  Swallowing Instructions & Precautions:   Supervision: Direct supervision during meals  Positioning: Fully upright and midline during oral intake  Medication: Crush with applesauce or puree, as appropriate  Strategies: Small bites/sips, Slow rate of intake, No straws  Oral Care: BID    Plan    Recommend Speech Therapy 3 times per week until therapy goals are met for the following treatments:  Dysphagia Training.    Discharge Recommendations: (P) Recommend post-acute placement for additional speech therapy services prior to discharge home     Objective       11/21/22 1028   Precautions   Precautions Fall Risk;Nasogastric Tube;Cardiac Precautions (See Comments);Sternal Precautions (See Comments)   Comments s/p CABG, CVA   Vitals   O2 (LPM) 3   O2 Delivery Device Silicone Nasal Cannula   Pain 0 - 10 Group   Therapist Pain Assessment Post Activity Pain Same as Prior to Activity;Nurse Notified;0   Dysphagia Rating   Nutritional Liquid Intake Rating Scale Thickened beverages (mildly thick unless otherwise specified)   Nutritional Food Intake Rating Scale Total oral diet of a single consistency   Patient / Family Goals   Patient / Family Goal #1 \"that was really good\"   Goal #1 Outcome Progressing as expected   Short Term Goals   Short Term Goal # 2 Pt will participate in diagnostic swallow study to objectively assess oropharyngeal swallow function and determine least restrictive diet texture with min cues.   Goal Outcome # 2  Progressing as expected   Education Group   Education Provided Dysphagia   Dysphagia Patient Response Patient;Other;Acceptance;Explanation;Demonstration;Verbal " Demonstration;Reinforcement Needed;Action Demonstration   Additional Comments Pt's friend verbalized good understanding, pt will require reinforcement   Anticipated Discharge Needs   Discharge Recommendations Recommend post-acute placement for additional speech therapy services prior to discharge home   Therapy Recommendations Upon DC Dysphagia Training;Community Re-Integration;Patient / Family / Caregiver Education

## 2022-11-21 NOTE — DIETARY
Nutrition Services:    Pt transitioned from TF to PO diet on 11/21. Current diet Liquidized with mildly thick. No PO intake as Pt just transitioned. Recommend keeping Tf until Pt able to have PO >50% consistently for 2+ meals. Recommend pausing TF to stimulate appetite for improved PO.    RD following.

## 2022-11-21 NOTE — CARE PLAN
The patient is Watcher - Medium risk of patient condition declining or worsening    Shift Goals  Clinical Goals: decreased delirium, stay awake during the day more  Patient Goals: comfort  Family Goals: no family present at this time    Progress made toward(s) clinical / shift goals:    Problem: Knowledge Deficit - Standard  Goal: Patient and family/care givers will demonstrate understanding of plan of care, disease process/condition, diagnostic tests and medications  Outcome: Progressing     Problem: Skin Integrity  Goal: Skin integrity is maintained or improved  Outcome: Progressing     Problem: Fall Risk  Goal: Patient will remain free from falls  Outcome: Progressing     Problem: Pain - Standard  Goal: Alleviation of pain or a reduction in pain to the patient’s comfort goal  Outcome: Progressing     Problem: Day of surgery post CABG/Heart valve replacement  Goal: Stabilization in immediate post op period  Outcome: Progressing     Problem: Post Op Day 1 CABG/Heart Valve Replacement  Goal: Optimal care of the post op CABG/heart valve replacement Post Op Day 1  Outcome: Progressing     Problem: Post op day 2 CABG/Heart Valve Replacement  Goal: Optimal care of the post op CABG/heart valve replacement post op day 2  Outcome: Progressing     Problem: Post Op Day 3 CABG/Heart Valve replacement  Goal: Optimal care of the post op CABG/Heart Valve replacement post op day 3  Outcome: Progressing     Problem: Post Op Day 4 CABG/Heart Valve Replacement  Goal: Optimal care of the Post Op CABG/Heart Valve replacement Post Op Day 4  Outcome: Progressing     Problem: Post Op Day 5 CABG/Heart Valve Replacement  Goal: Optimal care of the Post Op CABG/Heart Valve replacement Post Op Day 5  Outcome: Progressing     Problem: Communication  Goal: The ability to communicate needs accurately and effectively will improve  Outcome: Progressing     Problem: Hemodynamics  Goal: Patient's hemodynamics, fluid balance and neurologic status  will be stable or improve  Outcome: Progressing     Problem: Safety - Medical Restraint  Goal: Remains free of injury from restraints (Restraint for Interference with Medical Device)  Outcome: Progressing  Goal: Free from restraint(s) (Restraint for Interference with Medical Device)  Outcome: Progressing     Problem: Optimal Care of the Stroke Patient  Goal: Optimal emergency care for the stroke patient  Outcome: Progressing  Goal: Optimal acute care for the stroke patient  Outcome: Progressing     Problem: Knowledge Deficit - Stroke Education  Goal: Patient's knowledge of stroke and risk factors will improve  Outcome: Progressing     Problem: Psychosocial - Patient Condition  Goal: Patient's ability to verbalize feelings about condition will improve  Outcome: Progressing  Goal: Patient's ability to re-evaluate and adapt role responsibilities will improve  Outcome: Progressing     Problem: Discharge Planning - Stroke  Goal: Ensure Stroke Core Measures are met prior to discharge  Outcome: Progressing  Goal: Patient’s continuum of care needs will be met  Outcome: Progressing     Problem: Neuro Status  Goal: Neuro status will remain stable or improve  Outcome: Progressing     Problem: Hemodynamic Monitoring  Goal: Patient's hemodynamics, fluid balance and neurologic status will be stable or improve  Outcome: Progressing     Problem: Respiratory - Stroke Patient  Goal: Patient will achieve/maintain optimum respiratory rate/effort  Outcome: Progressing     Problem: Dysphagia  Goal: Dysphagia will improve  Outcome: Progressing     Problem: Risk for Aspiration  Goal: Patient's risk for aspiration will be absent or decrease  Outcome: Progressing     Problem: Urinary Elimination  Goal: Establish and maintain regular urinary output  Outcome: Progressing     Problem: Bowel Elimination  Goal: Establish and maintain regular bowel function  Outcome: Progressing     Problem: Mobility - Stroke  Goal: Patient's capacity to carry out  activities will improve  Outcome: Progressing  Goal: Spasticity will be prevented or improved  Outcome: Progressing  Goal: Subluxation will be prevented or improved  Outcome: Progressing     Problem: Self Care  Goal: Patient will have the ability to perform ADLs independently or with assistance (bathe, groom, dress, toilet and feed)  Outcome: Progressing     Problem: Respiratory  Goal: Patient will achieve/maintain optimum respiratory ventilation and gas exchange  Outcome: Progressing       Patient is not progressing towards the following goals:

## 2022-11-22 LAB
ANION GAP SERPL CALC-SCNC: 13 MMOL/L (ref 7–16)
BUN SERPL-MCNC: 56 MG/DL (ref 8–22)
CALCIUM SERPL-MCNC: 9.4 MG/DL (ref 8.5–10.5)
CHLORIDE SERPL-SCNC: 98 MMOL/L (ref 96–112)
CO2 SERPL-SCNC: 26 MMOL/L (ref 20–33)
CREAT SERPL-MCNC: 1.45 MG/DL (ref 0.5–1.4)
ERYTHROCYTE [DISTWIDTH] IN BLOOD BY AUTOMATED COUNT: 47 FL (ref 35.9–50)
GFR SERPLBLD CREATININE-BSD FMLA CKD-EPI: 38 ML/MIN/1.73 M 2
GLUCOSE BLD STRIP.AUTO-MCNC: 159 MG/DL (ref 65–99)
GLUCOSE SERPL-MCNC: 123 MG/DL (ref 65–99)
HCT VFR BLD AUTO: 35 % (ref 37–47)
HGB BLD-MCNC: 11.4 G/DL (ref 12–16)
MAGNESIUM SERPL-MCNC: 2 MG/DL (ref 1.5–2.5)
MCH RBC QN AUTO: 30.4 PG (ref 27–33)
MCHC RBC AUTO-ENTMCNC: 32.6 G/DL (ref 33.6–35)
MCV RBC AUTO: 93.3 FL (ref 81.4–97.8)
PLATELET # BLD AUTO: 481 K/UL (ref 164–446)
PMV BLD AUTO: 10.9 FL (ref 9–12.9)
POTASSIUM SERPL-SCNC: 4.8 MMOL/L (ref 3.6–5.5)
PROCALCITONIN SERPL-MCNC: 0.15 NG/ML
RBC # BLD AUTO: 3.75 M/UL (ref 4.2–5.4)
SODIUM SERPL-SCNC: 137 MMOL/L (ref 135–145)
WBC # BLD AUTO: 17.9 K/UL (ref 4.8–10.8)

## 2022-11-22 PROCEDURE — 700102 HCHG RX REV CODE 250 W/ 637 OVERRIDE(OP): Performed by: INTERNAL MEDICINE

## 2022-11-22 PROCEDURE — 700102 HCHG RX REV CODE 250 W/ 637 OVERRIDE(OP): Performed by: STUDENT IN AN ORGANIZED HEALTH CARE EDUCATION/TRAINING PROGRAM

## 2022-11-22 PROCEDURE — 36415 COLL VENOUS BLD VENIPUNCTURE: CPT

## 2022-11-22 PROCEDURE — 700102 HCHG RX REV CODE 250 W/ 637 OVERRIDE(OP): Performed by: HOSPITALIST

## 2022-11-22 PROCEDURE — A9270 NON-COVERED ITEM OR SERVICE: HCPCS | Performed by: HOSPITALIST

## 2022-11-22 PROCEDURE — 99233 SBSQ HOSP IP/OBS HIGH 50: CPT | Performed by: STUDENT IN AN ORGANIZED HEALTH CARE EDUCATION/TRAINING PROGRAM

## 2022-11-22 PROCEDURE — 82962 GLUCOSE BLOOD TEST: CPT

## 2022-11-22 PROCEDURE — A9270 NON-COVERED ITEM OR SERVICE: HCPCS | Performed by: INTERNAL MEDICINE

## 2022-11-22 PROCEDURE — 80048 BASIC METABOLIC PNL TOTAL CA: CPT

## 2022-11-22 PROCEDURE — 700102 HCHG RX REV CODE 250 W/ 637 OVERRIDE(OP): Performed by: NURSE PRACTITIONER

## 2022-11-22 PROCEDURE — 84145 PROCALCITONIN (PCT): CPT

## 2022-11-22 PROCEDURE — A9270 NON-COVERED ITEM OR SERVICE: HCPCS | Performed by: NURSE PRACTITIONER

## 2022-11-22 PROCEDURE — 83735 ASSAY OF MAGNESIUM: CPT

## 2022-11-22 PROCEDURE — 770020 HCHG ROOM/CARE - TELE (206)

## 2022-11-22 PROCEDURE — 85027 COMPLETE CBC AUTOMATED: CPT

## 2022-11-22 PROCEDURE — A9270 NON-COVERED ITEM OR SERVICE: HCPCS | Performed by: STUDENT IN AN ORGANIZED HEALTH CARE EDUCATION/TRAINING PROGRAM

## 2022-11-22 PROCEDURE — 92526 ORAL FUNCTION THERAPY: CPT

## 2022-11-22 RX ORDER — HYDROXYCHLOROQUINE SULFATE 200 MG/1
200 TABLET, FILM COATED ORAL
Status: DISCONTINUED | OUTPATIENT
Start: 2022-11-22 | End: 2022-11-25

## 2022-11-22 RX ORDER — CHOLECALCIFEROL (VITAMIN D3) 125 MCG
5 CAPSULE ORAL ONCE
Status: COMPLETED | OUTPATIENT
Start: 2022-11-22 | End: 2022-11-23

## 2022-11-22 RX ADMIN — SENNOSIDES AND DOCUSATE SODIUM 2 TABLET: 50; 8.6 TABLET ORAL at 04:59

## 2022-11-22 RX ADMIN — ASPIRIN 81 MG 81 MG: 81 TABLET ORAL at 04:59

## 2022-11-22 RX ADMIN — CARVEDILOL 6.25 MG: 6.25 TABLET, FILM COATED ORAL at 08:44

## 2022-11-22 RX ADMIN — APIXABAN 5 MG: 5 TABLET, FILM COATED ORAL at 04:59

## 2022-11-22 RX ADMIN — CARVEDILOL 6.25 MG: 6.25 TABLET, FILM COATED ORAL at 17:53

## 2022-11-22 RX ADMIN — OMEPRAZOLE 40 MG: KIT at 04:58

## 2022-11-22 RX ADMIN — QUETIAPINE FUMARATE 25 MG: 25 TABLET, FILM COATED ORAL at 21:15

## 2022-11-22 RX ADMIN — AMIODARONE HYDROCHLORIDE 200 MG: 200 TABLET ORAL at 04:58

## 2022-11-22 RX ADMIN — VALSARTAN 40 MG: 80 TABLET, FILM COATED ORAL at 04:59

## 2022-11-22 RX ADMIN — HYDROXYCHLOROQUINE SULFATE 200 MG: 200 TABLET ORAL at 17:54

## 2022-11-22 RX ADMIN — APIXABAN 5 MG: 5 TABLET, FILM COATED ORAL at 17:53

## 2022-11-22 ASSESSMENT — FIBROSIS 4 INDEX: FIB4 SCORE: 1.35

## 2022-11-22 ASSESSMENT — PAIN DESCRIPTION - PAIN TYPE: TYPE: ACUTE PAIN

## 2022-11-22 NOTE — THERAPY
Physical Therapy   Daily Treatment     Patient Name: Lauren Dave  Age:  72 y.o., Sex:  female  Medical Record #: [id number]  Today's Date: 11/21/2022    Precautions: Fall Risk;Nasogastric Tube;Cardiac Precautions;Sternal Precautions  Comments: s/p CABG, CVA    Assessment  Pt seen for PT treatment session, pt motivated to participate and demos improved command following and participation. Pt continues to require max A for mobility and to maintain balance sitting EOB. Pt attempts to assist with correcting LOB to find and maintain midline, but ultimately requires continuous posterior support to maintain sitting EOB. PT will follow, continue to recommend placement to maximize recovery and functional independence.     Plan  Continue current treatment plan.  DC Equipment Recommendations: Unable to determine at this time  Discharge Recommendations: Recommend post-acute placement for additional physical therapy services prior to discharge home     11/21/22 6262   Cognition    Speech/ Communication Delayed Responses   Level of Consciousness Alert   Ability To Follow Commands 1 Step   Attention Impaired   Sequencing Impaired   Initiation Impaired   Comments pt with improved command following and attempts to follow almost all functional commands. pt engaged during session and talking with therapist. pt does appea distracted by dtr, dtr left partway through session and pt was able to engage more.   Active ROM Lower Body    Comments no volitional movement RLE except able to wiggle toes on R side slightly, absent DF   Neuro-Muscular Treatments   Neuro-Muscular Treatments Anterior weight shift;Compensatory Strategies;Facilitation;Joint Approximation;Postural Changes;Postural Facilitation;Sequencing;Tactile Cuing;Verbal Cuing;Weight Shift Right;Weight Shift Left   Comments session focused on finding and maintaining midline balance sitting EOB, pt presents with posterior and R lean/LOB and requires continuous posterior  support. pt is aware she is falling to the R but has difficulty correcting. practiced weight shifting laterally onto forearms, max-total A to return midline. pt with R gaze and head turn preference, is able to bring head to midline but does not maintain, pt rests with icnr cervical flexion and is unable to extend without manual assist.   Balance   Sitting Balance (Static) Trace +   Sitting Balance (Dynamic) Trace   Standing Balance (Static) Trace   Standing Balance (Dynamic) Trace   Weight Shift Sitting Poor   Weight Shift Standing Absent   Skilled Intervention Verbal Cuing;Tactile Cuing;Sequencing;Postural Facilitation;Facilitation;Compensatory Strategies   Comments standing with HHA   Gait Analysis   Gait Level Of Assist Unable to Participate   Bed Mobility    Supine to Sit Maximal Assist   Sit to Supine Total Assist   Scooting Maximal Assist   Skilled Intervention Verbal Cuing;Sequencing   Comments cues to use LLE to help with RLE, pt attempts to initiate with rolling for supine > sit   Functional Mobility   Sit to Stand Maximal Assist   Bed, Chair, Wheelchair Transfer Unable to Participate   Skilled Intervention Verbal Cuing;Sequencing   Comments BLE buckling   Short Term Goals    Short Term Goal # 1 Pt will transfer supine<->sit with mod A within 6 visits to promote return home   Goal Outcome # 1 goal not met   Short Term Goal # 2 Pt will sit at EOB 15 min with min A within 6 visits to promote return home   Goal Outcome # 2 Goal not met   Short Term Goal # 3 Pt will transfer bed<->chair with mod A within 6 visits in order to promote return home   Goal Outcome # 3 Goal not met

## 2022-11-22 NOTE — PROGRESS NOTES
Hospital Medicine Daily Progress Note    Date of Service  11/22/2022    Chief Complaint  Lauren Dave is a 72 y.o. female admitted 11/10/2022 with elective surgery    Hospital Course  72 y.o. 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  The patient is found lethargic, she arouses on verbal command, she is confused, she does have weakness in the right lower extremity as well as left upper extremity, the patient's sister is at the bedside assisting the history taking.  The patient is being prepared for transfer to telemetry, chest tubes are being removed, a pacer wire has been removed, a MRI is underway and later reports multiple areas of watershed type infarctions.  There is no bleeding apparent.  A postoperative echo shows an ejection fraction of 35%, the patient is being diuresed, she is on GDMT post surgery.  The patient unfortunately is intolerant to statin medication.  Neurology was consulted after patient was identified to have a CVA.    11/14/2022: Evaluated patient at bedside today patient appears to be without movement of right lower extremity difficult to arouse weak left upper extremity.  Trending back looks like she has had increasing oxygen demand in addition chest tubes removed on 11/13/2022.  Lastly patient is also had decreasing hemoglobin at present we will trend hemoglobin every 6 hours for 24 hours standing transfusion order has been  placed to transfuse 1 unit PRBC if repeat hemoglobin less than 8.0.  Ejection fraction reviewed from DAVE remains at 35% cardiology medicine recommendations appreciated.  Referral to acute rehabilitation has been placed.  We will continue to follow along closely with cardiovascular thoracic surgery as primary.  Present patient has persistent deficit right lower extremity no movement left upper extremity is weak.  Watershed infarct noted above.  Consider discussion of initiation of amantadine with neurology and cardiology service.  11/15: Ms. Dave was evaluated and examined in the IMCU. Hb this morning is 8.3. I discussed with Dr. Gallagher neurology about +/- anticoagulation given her transient afib and he recommends Apixaban which has been started. She remain on NG feeding and in soft wrist restraints. Her sister is at bedside. IV lasix will be stopped due to a CVP 3 and change to oral.   11/16: Ms. Dave was seen and evaluated in the IMCU. Hb 7.6 today. UA + for E coli. Her blood pressure has fluctuated significantly. She is on 5 liters of oxygen. She has been started on Eliquis in addendum to aspirin.   11/17-11/18 she was transferred to the ICU due to respiratory failure and decreased level of consciousness. Due to pulmonary edema after blood transfusions, she was given IV lasix. She had a metabolic alkalosis.   11/19: Ms. Dave was evaluated and examined in the ICU. She is moving both legs today and is more lucid. She remains on NG feeding. Her sister is at bedside. She is on Eliquis for anticoagulation and Hb is holding stable. She will go to the IMCU. Labs ordered for the morning.  11/20: Ms. Dave was seen and evaluated in the IMCU. Her blood pressure has had very significant fluctuations as low at 81/47 up to 152/66. Cr today is up to 1.75 despite tube feeds and no diuretics. Her sister is at bedside. She remains on NG feeding.  11/21: Ms. Dave was evaluated and examined in the IMCU. Her Cr  remains high at 1.7 and her BUN has gone up to 72. She is off diuretics. She has been on tube feeds. Her blood pressure has continued to fluctuate from systolic in the low 80's to systolic 150's. Free water added 250 ml q4 hours. A FEES has been ordered for today. She had a 6.2 second pause last night while coughing that was asymptomatic. Her sister is at bedside.      Interval Problem Update  Patient was evaluated bedside  Patient no acute distress chest but presently disoriented  Stable vitals and on 3 L nasal cannula  Hemoglobin trending up slowly  Leukocytosis trending up  Creatinine better today  Diet per SPL  Bladder scan low  As needed nightly Seroquel  Pending Procal  PT/OT recommending postacute care  PMR evaluated on 11/14 and recommended SNF  Pending SNF    I have discussed this patient's plan of care and discharge plan at IDT rounds today with Case Management, Nursing, Nursing leadership, and other members of the IDT team.    Consultants/Specialty  critical care   Neurology  Physiatry  Cardiology   CT surgery  Code Status  Full Code    Disposition  Patient is not medically cleared for discharge.   Anticipate discharge to to an inpatient rehabilitation hospital.  I have placed the appropriate orders for post-discharge needs.    Review of Systems  Review of Systems   Unable to perform ROS: Mental acuity      Physical Exam  Temp:  [36.3 °C (97.3 °F)-37 °C (98.6 °F)] 37 °C (98.6 °F)  Pulse:  [76-88] 76  Resp:  [16-20] 18  BP: (119-155)/(45-66) 128/51  SpO2:  [93 %-98 %] 96 %    Physical Exam  Vitals and nursing note reviewed.   Constitutional:       General: She is not in acute distress.     Appearance: She is ill-appearing.   HENT:      Head: Normocephalic and atraumatic.      Nose:      Comments: NG tube     Mouth/Throat:      Mouth: Mucous membranes are dry.      Pharynx: Oropharynx is clear.   Eyes:      General: No scleral icterus.     Conjunctiva/sclera: Conjunctivae normal.   Cardiovascular:      Rate  and Rhythm: Normal rate and regular rhythm.      Comments: Sternal incision without infection  Pulmonary:      Effort: Pulmonary effort is normal.      Breath sounds: Normal breath sounds.      Comments: 3 liters of oxygen  Abdominal:      General: There is no distension.      Tenderness: There is no abdominal tenderness.   Musculoskeletal:      Cervical back: Normal range of motion and neck supple.      Right lower leg: No edema.      Left lower leg: No edema.   Skin:     General: Skin is warm and dry.   Neurological:      Mental Status: She is alert.      Comments: Confused   Left leg 5/5 right leg 3+/5  Arms/hands move generally equally   Psychiatric:      Comments:   Voice is a bit hoarse  Flat affect  A bit impulsive and easily distracted       Fluids    Intake/Output Summary (Last 24 hours) at 11/22/2022 1605  Last data filed at 11/22/2022 1400  Gross per 24 hour   Intake 1266 ml   Output 250 ml   Net 1016 ml       Laboratory  Recent Labs     11/20/22 0315 11/21/22 0315 11/22/22 0221   WBC 14.6* 14.9* 17.9*   RBC 4.10* 3.74* 3.75*   HEMOGLOBIN 12.4 11.2* 11.4*   HEMATOCRIT 38.3 35.0* 35.0*   MCV 93.4 93.6 93.3   MCH 30.2 29.9 30.4   MCHC 32.4* 32.0* 32.6*   RDW 48.6 47.1 47.0   PLATELETCT 378 430 481*   MPV 10.9 10.8 10.9     Recent Labs     11/20/22 0315 11/21/22 0315 11/22/22 0221   SODIUM 138 138 137   POTASSIUM 4.1 4.0 4.8   CHLORIDE 99 99 98   CO2 28 28 26   GLUCOSE 124* 142* 123*   BUN 60* 72* 56*   CREATININE 1.75* 1.77* 1.45*   CALCIUM 9.8 9.4 9.4                     Imaging  IR-US GUIDED PIV   Final Result    Ultrasound-guided PERIPHERAL IV INSERTION performed by    qualified nursing staff as above.      DX-ABDOMEN FOR TUBE PLACEMENT   Final Result      Enteric tube tip projects over the stomach      DX-CHEST-PORTABLE (1 VIEW)   Final Result      1.  Tubes and lines in good position.      2.  Mild cardiomegaly with mild diffuse pulmonary edema with slight improvement since previous exam.      3.   Small left pleural effusion.      EC-ECHOCARDIOGRAM LTD W/O CONT   Final Result      DX-ABDOMEN FOR TUBE PLACEMENT   Final Result      Enteric tube tip projects over the stomach      DX-CHEST-PORTABLE (1 VIEW)   Final Result      1.  Layering bilateral pleural effusions with adjacent airspace disease.   2.  Increased interstitial edema.      DX-ABDOMEN FOR TUBE PLACEMENT   Final Result      Orogastric tube tip at the distal stomach.      DX-CHEST-PORTABLE (1 VIEW)   Final Result      1.  Removal of right IJ catheter.      2.  Right subclavian catheter unchanged in position.      3.  Mild cardiomegaly.      4.  Blunting of left costophrenic angle consistent with atelectasis, pneumonitis, and/or pleural effusion.      DX-ABDOMEN FOR TUBE PLACEMENT   Final Result      Enteric tube tip projects over the stomach antrum      MR-BRAIN-W/O   Final Result      1.  BILATERAL mostly supratentorial areas of ischemia as described above. These appear to be primarily watershed zone infarctions.   2.  No hemorrhage   3.  No significant mass effect      DX-CHEST-PORTABLE (1 VIEW)   Final Result         1. No significant interval change.      DX-ABDOMEN FOR TUBE PLACEMENT   Final Result      Interval placement of enteric tube with its tip over the midline epigastrium compatible with position at the level of the gastric body.      CT-HEAD W/O   Final Result      1.  Likely subacute infarcts in the bilateral parieto-occipital regions.   2.  No acute intracranial hemorrhage.   3.  Bilateral maxillary sinus disease.      EC-DAVE W/O CONT   Final Result      DX-CHEST-PORTABLE (1 VIEW)   Final Result      Stable enlargement of the cardiomediastinal silhouette, mild diffuse interstitial edema and bilateral basilar atelectasis and/or consolidation.      DX-CHEST-PORTABLE (1 VIEW)   Final Result      Satisfactory postoperative appearance of the chest             Assessment/Plan  * Acute ischemic stroke (HCC)  Assessment &  Plan  Post/perioperative event with multiple areas of watershed type infarction  Neurology consulted  DAPT had been given then due to recent afib, neurology recommended Eliquis which was started  Neurology consultation was obtained  PT/OT/SLP and rehabilitation  NG feeding until she is cleared by speech path  She is now moving all extremities though right leg remains weak  She will be an excellent candidate for rehab    Dysphagia- (present on admission)  Assessment & Plan  She has been on NG feeding  FEES ordered on 11/21    TAN (acute kidney injury) (Formerly [facility])  Assessment & Plan  Cr has gone up to 1.7 and BUN is up to 72  She is now off diuretics  Free water 250 mL will be added to NG feeding q4 hours.  Follow urine output and check BMP in the morning.     Acute blood loss anemia- (present on admission)  Assessment & Plan  From hematuria  Requiring transfusion  Hb 12.4    Encephalopathy acute  Assessment & Plan  Acute encephalopathy with consideration of ICU delirium post-stroke  This is slowly improving though she remains encephalopathic    Acute respiratory failure with hypoxia ([facility])  Assessment & Plan  She has had volume overload requiring high flow oxygen  Now on 2.5 liters of oxygen  .     PAF (paroxysmal atrial fibrillation) (Former[facility])- (present on admission)  Assessment & Plan  Post-operative  Decrease the amiodarone dose as she has been loaded.  eliquis    Other specified anemias  Assessment & Plan  Acute blood loss due to hematuria  Hb has been kady thus stop daily phlebotomy and check as indicated.  Transfuse RBCs for hemoglobin less than 8.0 as she is s/p CABG.    S/P CABG x 3  Assessment & Plan  As per cardiac surgery, optimization,, mobilization as tolerated,  Telemetry    Chronic obstructive pulmonary disease (COPD) ([facility])  Assessment & Plan  Pre-existing, respiratory protocol,  Continuous respiratory therapy protocol.  Without exacerbation    Chronic systolic congestive heart failure (Former[facility]) EF 35%- (present  on admission)  Assessment & Plan  Treated to euvolemia, GDMT as tolerated  Ejection fraction 35% on DAVE.  Coreg and diovan      Coronary artery disease due to calcified coronary lesion - Calcifications seen on CT scan also wtih aortic ulceration- (present on admission)  Assessment & Plan  S/p CABG    Dyslipidemia  Assessment & Plan  The patient with prior severe intolerance, apparently failed multiple regimens  Consider outpatient referral to PCSK9 treatment       VTE prophylaxis: therapeutic anticoagulation with Eliquis    I have performed a physical exam and reviewed and updated ROS and Plan today (11/22/2022). In review of yesterday's note (11/21/2022), there are no changes except as documented above.

## 2022-11-22 NOTE — THERAPY
"Occupational Therapy  Daily Treatment     Patient Name: Lauren Dave  Age:  72 y.o., Sex:  female  Medical Record #: [id number]  Today's Date: 11/21/2022     Precautions: Fall Risk, Nasogastric Tube, Cardiac Precautions (See Comments), Sternal Precautions (See Comments)  Comments: s/p CABG, CVA    Assessment    Pt seen for OT tx concurrently with PT d/t low activity tolerance and complex medical/functional presentation requiring two skilled therapist for safe and effective treatment. Pt receptive to cues to draw attention to L however is limited by L inattention without cues. Pt requires assist for all ADLs and sitting balance at EOB. Pt is highly motivated, recommend post-acute placement for additional intensive neuro rehabilitation once medically cleared.     Plan    Continue current treatment plan.    DC Equipment Recommendations: Unable to determine at this time  Discharge Recommendations: Recommend post-acute placement for additional occupational therapy services prior to discharge home    Subjective    \"Thank you.\"     Objective       11/21/22 1458   Precautions   Precautions Fall Risk;Nasogastric Tube;Cardiac Precautions (See Comments);Sternal Precautions (See Comments)   Comments s/p CABG, CVA   Pain 0 - 10 Group   Therapist Pain Assessment Nurse Notified;Post Activity Pain Same as Prior to Activity;0   Cognition    Cognition / Consciousness X   Speech/ Communication Delayed Responses;Slurred   Level of Consciousness Alert   Ability To Follow Commands 1 Step   Safety Awareness Impaired   New Learning Impaired   Attention Impaired   Sequencing Impaired   Initiation Impaired   Comments L inattention, able to cross midline with maxA cues for attention to L side   Strength Upper Body   Upper Body Strength  X   Rt Shoulder Flexion Strength 2- (P-)   Rt Elbow Flexion Strength 2- (P-)   Rt Wrist Extension Strength 3+ (F+)   Right  Impaired   Lt Shoulder Flexion Strength 2- (P-)   Lt Elbow Flexion Strength " 2- (P-)   Lt Wrist Extension Strength 3+ (F+)   Left  Impaired   Neuro-Muscular Treatments   Neuro-Muscular Treatments Anterior weight shift;Postural Facilitation;Tapping;Verbal Cuing;Weight Shift Right;Weight Shift Left;Sequencing;Facilitation   Balance   Sitting Balance (Static) Trace +   Sitting Balance (Dynamic) Trace   Standing Balance (Static) Trace   Standing Balance (Dynamic) Dependent   Weight Shift Sitting Poor   Weight Shift Standing Poor   Skilled Intervention Tactile Cuing;Verbal Cuing;Sequencing;Postural Facilitation;Facilitation;Compensatory Strategies   Bed Mobility    Supine to Sit Total Assist   Sit to Supine Total Assist   Scooting Total Assist   Skilled Intervention Facilitation;Tactile Cuing;Verbal Cuing   Activities of Daily Living   Eating Total Assist   Grooming Maximal Assist   Upper Body Dressing Maximal Assist   Lower Body Dressing Total Assist   Toileting Total Assist   Skilled Intervention Verbal Cuing;Tactile Cuing;Sequencing;Facilitation;Compensatory Strategies   How much help from another person does the patient currently need...   Putting on and taking off regular lower body clothing? 1   Bathing (including washing, rinsing, and drying)? 1   Toileting, which includes using a toilet, bedpan, or urinal? 1   Putting on and taking off regular upper body clothing? 2   Taking care of personal grooming such as brushing teeth? 2   Eating meals? 1   6 Clicks Daily Activity Score 8   Functional Mobility   Sit to Stand Total Assist   Bed, Chair, Wheelchair Transfer Unable to Participate   Mobility STSx1 with MaxAx2   Skilled Intervention Verbal Cuing;Tactile Cuing;Sequencing;Facilitation;Compensatory Strategies   Activity Tolerance   Sitting in Chair NT   Sitting Edge of Bed 15min   Standing NT   Patient / Family Goals   Patient / Family Goal #1 Unable to state; no family present   Short Term Goals   Short Term Goal # 1 Pt will follow 1-step command 75% during functional tasks   Goal  Outcome # 1 Progressing as expected   Short Term Goal # 1 B  will follow 100% of commands w/o req repetition   Goal Outcome  # 1 B Progressing as expected   Short Term Goal # 2 Pt will sit EOB with CGA >5 min to participate in ADL task   Goal Outcome # 2 Goal not met   Short Term Goal # 3 Pt will wipe face with SBA using dominant R hand   Goal Outcome # 3 Goal not met   Education Group   Education Provided Activities of Daily Living;Role of Occupational Therapist   Role of Occupational Therapist Patient Response Patient;Acceptance;Explanation   ADL Patient Response Patient;Acceptance;Explanation   Anticipated Discharge Equipment and Recommendations   DC Equipment Recommendations Unable to determine at this time   Discharge Recommendations Recommend post-acute placement for additional occupational therapy services prior to discharge home

## 2022-11-22 NOTE — DISCHARGE PLANNING
Agency/Facility Name: Local [facility]  Outcome: DPA sent referrals to local facilities per RN CM @ [phone]

## 2022-11-22 NOTE — DIETARY
BRIEF TF UPDATE - Pt is currently receiving Replete Fiber TF @ goal rate of 60 ml/hr. [facility] TF formulary is changing, appropriate substitution Fibersource HN.     RECOMMEND - Fibersource HN @ 55 ml/hr will provide 1584 kcal, 71 g protein with 1069 ml of free water per day.     RD following.

## 2022-11-22 NOTE — THERAPY
"Speech Language Pathology  Daily Treatment     Patient Name: Lauren Dave  Age:  72 y.o., Sex:  female  Medical Record #: [id number]  Today's Date: 11/22/2022     Precautions  Precautions: (P) Fall Risk, Swallow Precautions ( See Comments), Nasogastric Tube, Sternal Precautions (See Comments), Cardiac Precautions (See Comments)  Comments: s/p CABG, CVA    Assessment    Pt seen this date for dysphagia intervention. Pt reports dislike for current texture but reports no difficulty swallowing. Effortful swallow x20 completed with trials of ice chips with \"good\" accuracy and min cues. PO trials of MTL by cup and liquidized assessed. Pt completed effortful swallow x2 per trial with min cues with \"good\" accuracy. Cough appreciated x2 throughout session, vocal quality remained clear. Pt reported odynophagia across trials, query related to NG tube. Extensive education provided re: results of FEES, importance of consuming adequate amounts of meals in order to have NG removed, and completion of exercises. Pt and friend verbalized understanding, voice preference for soups and were agreeable to trialing supplements.    Liquidized with mildly thick liquids  Swallowing Instructions & Precautions:   Supervision: Direct supervision during meals  Positioning: Fully upright and midline during oral intake  Medication: Crush with applesauce  Strategies: Small bites/sips, Slow rate of intake, No straws, effortful swallow  Oral Care: BID  3. Okay for single ice chips between meals   4. Recommend adding supplements and liquidized soups to pt's meals to increase PO intake, RD aware.     Plan    Continue current treatment plan.    Discharge Recommendations: (P) Recommend post-acute placement for additional speech therapy services prior to discharge home    Subjective    Pt alert, motivated and participated fully. Pt's friend present and supportive.     Objective       11/22/22 5290   Charge Group   SLP Swallowing Dysfunction Treatment " "Swallowing Dysfunction Treatment   Total Treatment Time   SLP Time Spent Yes   SLP Swallowing Dysfunction Treatment (Mins) 18   Precautions   Precautions Fall Risk;Swallow Precautions ( See Comments);Nasogastric Tube;Sternal Precautions (See Comments);Cardiac Precautions (See Comments)   Vitals   O2 (LPM) 3   O2 Delivery Device Silicone Nasal Cannula   Pain 0 - 10 Group   Therapist Pain Assessment Post Activity Pain Same as Prior to Activity;Nurse Notified;0   Dysphagia    Dysphagia X   Positioning / Behavior Modification Self Monitoring;Modulate Rate or Bite Size;Effortful Swallow;Multiple Swallows   Other Treatments PO trials OMEX   Diet / Liquid Recommendation Mildly Thick (2) - (Nectar Thick);Liquidised (3) - (Nectar Thick Full Liquid)   Nutritional Liquid Intake Rating Scale Thickened beverages (mildly thick unless otherwise specified)   Nutritional Food Intake Rating Scale Total oral diet of a single consistency   Nursing Communication Swallow Precaution Sign Posted at Head of Bed   Skilled Intervention Compensatory Strategies;Verbal Cueing   Recommended Route of Medication Administration   Medication Administration    (crush in applesauce)   Patient / Family Goals   Patient / Family Goal #1 \"that was really good\"   Goal #1 Outcome Progressing as expected   Short Term Goals   Short Term Goal # 1 New 11/22: Pt will consume LQ3/MT2 diet without any overt s/sx of aspiration   Goal Outcome # 1 Progressing as expected   Short Term Goal # 2 Pt will participate in diagnostic swallow study to objectively assess oropharyngeal swallow function and determine least restrictive diet texture with min cues.   Goal Outcome # 2  Goal met   Short Term Goal # 3 Pt will complete pharyngeal constriction exercises with good accuracy over 10 reps in 3 sets   Goal Outcome  # 3 Progressing as expected   Education Group   Education Provided Dysphagia   Dysphagia Patient Response Patient;Other;Acceptance;Explanation;Reinforcement " Needed;Action Demonstration;Demonstration;Verbal Demonstration   Additional Comments Pt's friend verbalized good understanding, pt will require reinforcement   Anticipated Discharge Needs   Discharge Recommendations Recommend post-acute placement for additional speech therapy services prior to discharge home   Therapy Recommendations Upon DC Dysphagia Training;Patient / Family / Caregiver Education;Community Re-Integration   Interdisciplinary Plan of Care Collaboration   IDT Collaboration with  Nursing   Patient Position at End of Therapy Seated;In Bed;Bed Alarm On;Call Light within Reach;Tray Table within Reach;Family / Friend in Room   Collaboration Comments RN aware of results/recs

## 2022-11-22 NOTE — PROGRESS NOTES
Patient arrives to room 825 via bed accompanied by acls nurse crow and telemonitor. Patient moved to bed. VS noted see flowsheet. Patient placed on 3L NC. Patient denies pain or other concerns at this time. No s/s of distress noted. Sitter at bedside. 4 eyes skin assesment complete. Bed low, call light within reach. Will monitor.

## 2022-11-22 NOTE — PROGRESS NOTES
Bedside report received. Per night RN pt had increased agitation / confusion overnight and did not sleep well. Patient A&O x 4. Per report patient's mentation waxes and wanes. VS'S. Currently requiring 3 L Via NC. SR on telemetry monitor. No complaints of pain at this time. Safety sitter at bedside. POC discussed with patient. Pt verbalizes understanding. Call light and belongings with in reach. Bed locked and in lowest position, alarm and fall precautions in place.

## 2022-11-22 NOTE — DISCHARGE PLANNING
Case Management Discharge Planning    Admission Date: 11/10/2022  GMLOS: 8.3  ALOS: 12    6-Clicks ADL Score: 8  6-Clicks Mobility Score: 6  PT and/or OT Eval ordered: Yes  Post-acute Referrals Ordered: Yes  Post-acute Choice Obtained: No  Has referral(s) been sent to post-acute provider:  Yes      Anticipated Discharge Dispo: Discharge Disposition: D/T to SNF with Medicare cert in anticipation of skilled care (03)    DME Needed: No    Action(s) Taken: Pt pending medical clearance, placement. Attempted to contact pts son, Tommy, to see if family has made SNF choice as Case Management has been trying to obtain choice for a week. No answer. This RNCM instructed DPA to send blanket SNF choice to all local facilities.     Escalations Completed: None    Medically Clear: No    Next Steps: f/u with medical team for clearance, DPA for SNF acceptance, obtain PASRR.    Barriers to Discharge: Medical clearance and Pending Placement

## 2022-11-22 NOTE — PROGRESS NOTES
Bladder scan completed with only 84 cc's. Pt has been incontinent to both stool and urine multiple times today, requiring frequent linen changes.

## 2022-11-22 NOTE — CARE PLAN
The patient is Watcher - Medium risk of patient condition declining or worsening    Shift Goals  Clinical Goals: monitor nuero status  Patient Goals: rest  Family Goals: no family present at this time    Progress made toward(s) clinical / shift goals:  patient maintaining current mentation. Confused at time suspect hospital delirium    Patient is not progressing towards the following goals: patient remains confused at time. Patient not resting properly suspect delirum.       Problem: Knowledge Deficit - Standard  Goal: Patient and family/care givers will demonstrate understanding of plan of care, disease process/condition, diagnostic tests and medications  Outcome: Not Progressing

## 2022-11-22 NOTE — PROGRESS NOTES
4 Eyes Skin Assessment Completed by KATY Clarke and KATY Isabel.    Head WDL  Ears WDL  Nose WDL  Mouth WDL  Neck Incision  Breast/Chest Incision  Shoulder Blades WDL  Spine WDL  (R) Arm/Elbow/Hand WDL  (L) Arm/Elbow/Hand WDL  Abdomen WDL  Groin WDL  Scrotum/Coccyx/Buttocks WDL  (R) Leg WDL  (L) Leg Incision  (R) Heel/Foot/Toe WDL  (L) Heel/Foot/Toe WDL          Devices In Places Tele Box, Blood Pressure Cuff, Pulse Ox, SCD's, OG/NG, Ortho Boot/Shoe, and Nasal Cannula      Interventions In Place Gray Ear Foams, Heel Mepilex, Sacral Mepilex, Heel Float Boots, Waffle Overlay, TAP System, Q2 Turns, Barrier Cream, and Pressure Redistribution Mattress    Possible Skin Injury No    Pictures Uploaded Into Epic N/A  Wound Consult Placed N/A  RN Wound Prevention Protocol Ordered Yes

## 2022-11-23 ENCOUNTER — APPOINTMENT (OUTPATIENT)
Dept: RADIOLOGY | Facility: MEDICAL CENTER | Age: 72
DRG: 235 | End: 2022-11-23
Attending: STUDENT IN AN ORGANIZED HEALTH CARE EDUCATION/TRAINING PROGRAM
Payer: MEDICARE

## 2022-11-23 LAB
ANION GAP SERPL CALC-SCNC: 9 MMOL/L (ref 7–16)
BASOPHILS # BLD AUTO: 0.4 % (ref 0–1.8)
BASOPHILS # BLD: 0.06 K/UL (ref 0–0.12)
BUN SERPL-MCNC: 51 MG/DL (ref 8–22)
CALCIUM SERPL-MCNC: 9.2 MG/DL (ref 8.5–10.5)
CHLORIDE SERPL-SCNC: 95 MMOL/L (ref 96–112)
CO2 SERPL-SCNC: 28 MMOL/L (ref 20–33)
CREAT SERPL-MCNC: 1.42 MG/DL (ref 0.5–1.4)
EOSINOPHIL # BLD AUTO: 0.31 K/UL (ref 0–0.51)
EOSINOPHIL NFR BLD: 2 % (ref 0–6.9)
ERYTHROCYTE [DISTWIDTH] IN BLOOD BY AUTOMATED COUNT: 46.8 FL (ref 35.9–50)
GFR SERPLBLD CREATININE-BSD FMLA CKD-EPI: 39 ML/MIN/1.73 M 2
GLUCOSE BLD STRIP.AUTO-MCNC: 126 MG/DL (ref 65–99)
GLUCOSE BLD STRIP.AUTO-MCNC: 159 MG/DL (ref 65–99)
GLUCOSE SERPL-MCNC: 152 MG/DL (ref 65–99)
HCT VFR BLD AUTO: 32.3 % (ref 37–47)
HGB BLD-MCNC: 10.4 G/DL (ref 12–16)
IMM GRANULOCYTES # BLD AUTO: 0.17 K/UL (ref 0–0.11)
IMM GRANULOCYTES NFR BLD AUTO: 1.1 % (ref 0–0.9)
LYMPHOCYTES # BLD AUTO: 1.43 K/UL (ref 1–4.8)
LYMPHOCYTES NFR BLD: 9.1 % (ref 22–41)
MAGNESIUM SERPL-MCNC: 1.9 MG/DL (ref 1.5–2.5)
MCH RBC QN AUTO: 29.9 PG (ref 27–33)
MCHC RBC AUTO-ENTMCNC: 32.2 G/DL (ref 33.6–35)
MCV RBC AUTO: 92.8 FL (ref 81.4–97.8)
MONOCYTES # BLD AUTO: 1.09 K/UL (ref 0–0.85)
MONOCYTES NFR BLD AUTO: 7 % (ref 0–13.4)
NEUTROPHILS # BLD AUTO: 12.57 K/UL (ref 2–7.15)
NEUTROPHILS NFR BLD: 80.4 % (ref 44–72)
NRBC # BLD AUTO: 0 K/UL
NRBC BLD-RTO: 0 /100 WBC
PLATELET # BLD AUTO: 522 K/UL (ref 164–446)
PMV BLD AUTO: 10.7 FL (ref 9–12.9)
POTASSIUM SERPL-SCNC: 4.3 MMOL/L (ref 3.6–5.5)
RBC # BLD AUTO: 3.48 M/UL (ref 4.2–5.4)
SODIUM SERPL-SCNC: 132 MMOL/L (ref 135–145)
WBC # BLD AUTO: 15.6 K/UL (ref 4.8–10.8)

## 2022-11-23 PROCEDURE — 82962 GLUCOSE BLOOD TEST: CPT

## 2022-11-23 PROCEDURE — 700102 HCHG RX REV CODE 250 W/ 637 OVERRIDE(OP): Performed by: INTERNAL MEDICINE

## 2022-11-23 PROCEDURE — 700102 HCHG RX REV CODE 250 W/ 637 OVERRIDE(OP): Performed by: NURSE PRACTITIONER

## 2022-11-23 PROCEDURE — A9270 NON-COVERED ITEM OR SERVICE: HCPCS | Performed by: NURSE PRACTITIONER

## 2022-11-23 PROCEDURE — A9270 NON-COVERED ITEM OR SERVICE: HCPCS | Performed by: INTERNAL MEDICINE

## 2022-11-23 PROCEDURE — 700111 HCHG RX REV CODE 636 W/ 250 OVERRIDE (IP): Performed by: STUDENT IN AN ORGANIZED HEALTH CARE EDUCATION/TRAINING PROGRAM

## 2022-11-23 PROCEDURE — 80048 BASIC METABOLIC PNL TOTAL CA: CPT

## 2022-11-23 PROCEDURE — A9270 NON-COVERED ITEM OR SERVICE: HCPCS

## 2022-11-23 PROCEDURE — 71045 X-RAY EXAM CHEST 1 VIEW: CPT

## 2022-11-23 PROCEDURE — 700102 HCHG RX REV CODE 250 W/ 637 OVERRIDE(OP): Performed by: STUDENT IN AN ORGANIZED HEALTH CARE EDUCATION/TRAINING PROGRAM

## 2022-11-23 PROCEDURE — A9270 NON-COVERED ITEM OR SERVICE: HCPCS | Performed by: HOSPITALIST

## 2022-11-23 PROCEDURE — 83735 ASSAY OF MAGNESIUM: CPT

## 2022-11-23 PROCEDURE — 85025 COMPLETE CBC W/AUTO DIFF WBC: CPT

## 2022-11-23 PROCEDURE — 770020 HCHG ROOM/CARE - TELE (206)

## 2022-11-23 PROCEDURE — 97112 NEUROMUSCULAR REEDUCATION: CPT

## 2022-11-23 PROCEDURE — A9270 NON-COVERED ITEM OR SERVICE: HCPCS | Performed by: STUDENT IN AN ORGANIZED HEALTH CARE EDUCATION/TRAINING PROGRAM

## 2022-11-23 PROCEDURE — 99233 SBSQ HOSP IP/OBS HIGH 50: CPT | Performed by: STUDENT IN AN ORGANIZED HEALTH CARE EDUCATION/TRAINING PROGRAM

## 2022-11-23 PROCEDURE — 97530 THERAPEUTIC ACTIVITIES: CPT

## 2022-11-23 PROCEDURE — 700102 HCHG RX REV CODE 250 W/ 637 OVERRIDE(OP)

## 2022-11-23 PROCEDURE — 700102 HCHG RX REV CODE 250 W/ 637 OVERRIDE(OP): Performed by: HOSPITALIST

## 2022-11-23 PROCEDURE — 36415 COLL VENOUS BLD VENIPUNCTURE: CPT

## 2022-11-23 RX ADMIN — HYDROXYCHLOROQUINE SULFATE 200 MG: 200 TABLET ORAL at 08:33

## 2022-11-23 RX ADMIN — Medication 5 MG: at 00:01

## 2022-11-23 RX ADMIN — ACETAMINOPHEN 1000 MG: 500 TABLET ORAL at 00:00

## 2022-11-23 RX ADMIN — QUETIAPINE FUMARATE 25 MG: 25 TABLET, FILM COATED ORAL at 23:01

## 2022-11-23 RX ADMIN — APIXABAN 5 MG: 5 TABLET, FILM COATED ORAL at 04:31

## 2022-11-23 RX ADMIN — OMEPRAZOLE 40 MG: KIT at 04:31

## 2022-11-23 RX ADMIN — CARVEDILOL 6.25 MG: 6.25 TABLET, FILM COATED ORAL at 17:44

## 2022-11-23 RX ADMIN — VALSARTAN 40 MG: 80 TABLET, FILM COATED ORAL at 04:32

## 2022-11-23 RX ADMIN — CARVEDILOL 6.25 MG: 6.25 TABLET, FILM COATED ORAL at 08:33

## 2022-11-23 RX ADMIN — ASPIRIN 81 MG 81 MG: 81 TABLET ORAL at 04:31

## 2022-11-23 RX ADMIN — APIXABAN 5 MG: 5 TABLET, FILM COATED ORAL at 17:44

## 2022-11-23 RX ADMIN — AMIODARONE HYDROCHLORIDE 200 MG: 200 TABLET ORAL at 04:31

## 2022-11-23 ASSESSMENT — COGNITIVE AND FUNCTIONAL STATUS - GENERAL
CLIMB 3 TO 5 STEPS WITH RAILING: TOTAL
MOBILITY SCORE: 6
TURNING FROM BACK TO SIDE WHILE IN FLAT BAD: UNABLE
MOVING TO AND FROM BED TO CHAIR: UNABLE
STANDING UP FROM CHAIR USING ARMS: TOTAL
MOVING FROM LYING ON BACK TO SITTING ON SIDE OF FLAT BED: UNABLE
SUGGESTED CMS G CODE MODIFIER MOBILITY: CN
WALKING IN HOSPITAL ROOM: TOTAL

## 2022-11-23 ASSESSMENT — GAIT ASSESSMENTS: GAIT LEVEL OF ASSIST: UNABLE TO PARTICIPATE

## 2022-11-23 ASSESSMENT — FIBROSIS 4 INDEX: FIB4 SCORE: 1.25

## 2022-11-23 NOTE — DISCHARGE PLANNING
Case Management Discharge Planning    Admission Date: 11/10/2022  GMLOS: 8.3  ALOS: 13    6-Clicks ADL Score: 8  6-Clicks Mobility Score: 6  Post-acute Referrals Ordered: Yes  Post-acute Choice Obtained: Yes  Has referral(s) been sent to post-acute provider:  Yes      Anticipated Discharge Dispo: Discharge Disposition: D/T to SNF with Medicare cert in anticipation of skilled care (03)    DME Needed: No      Escalations Completed: None    Medically Clear: No    Next Steps: Patient pending medical clearance at this time for post-acute transition.  Patient has been accepted at [facility].  Patient with tube feeds.  PASRR completed 11/22/22 by RNALEXANDRA Basilio.  Care management will continue to follow for discharge planning needs.      Barriers to Discharge: Medical clearance

## 2022-11-23 NOTE — PROGRESS NOTES
Hospital Medicine Daily Progress Note    Date of Service  11/23/2022    Chief Complaint  Lauren Dave is a 72 y.o. female admitted 11/10/2022 with elective surgery    Hospital Course  72 y.o. 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  The patient is found lethargic, she arouses on verbal command, she is confused, she does have weakness in the right lower extremity as well as left upper extremity, the patient's sister is at the bedside assisting the history taking.  The patient is being prepared for transfer to telemetry, chest tubes are being removed, a pacer wire has been removed, a MRI is underway and later reports multiple areas of watershed type infarctions.  There is no bleeding apparent.  A postoperative echo shows an ejection fraction of 35%, the patient is being diuresed, she is on GDMT post surgery.  The patient unfortunately is intolerant to statin medication.  Neurology was consulted after patient was identified to have a CVA.    11/14/2022: Evaluated patient at bedside today patient appears to be without movement of right lower extremity difficult to arouse weak left upper extremity.  Trending back looks like she has had increasing oxygen demand in addition chest tubes removed on 11/13/2022.  Lastly patient is also had decreasing hemoglobin at present we will trend hemoglobin every 6 hours for 24 hours standing transfusion order has been  placed to transfuse 1 unit PRBC if repeat hemoglobin less than 8.0.  Ejection fraction reviewed from DAVE remains at 35% cardiology medicine recommendations appreciated.  Referral to acute rehabilitation has been placed.  We will continue to follow along closely with cardiovascular thoracic surgery as primary.  Present patient has persistent deficit right lower extremity no movement left upper extremity is weak.  Watershed infarct noted above.  Consider discussion of initiation of amantadine with neurology and cardiology service.  11/15: Ms. Dave was evaluated and examined in the IMCU. Hb this morning is 8.3. I discussed with Dr. Gallagher neurology about +/- anticoagulation given her transient afib and he recommends Apixaban which has been started. She remain on NG feeding and in soft wrist restraints. Her sister is at bedside. IV lasix will be stopped due to a CVP 3 and change to oral.   11/16: Ms. Dave was seen and evaluated in the IMCU. Hb 7.6 today. UA + for E coli. Her blood pressure has fluctuated significantly. She is on 5 liters of oxygen. She has been started on Eliquis in addendum to aspirin.   11/17-11/18 she was transferred to the ICU due to respiratory failure and decreased level of consciousness. Due to pulmonary edema after blood transfusions, she was given IV lasix. She had a metabolic alkalosis.   11/19: Ms. Dave was evaluated and examined in the ICU. She is moving both legs today and is more lucid. She remains on NG feeding. Her sister is at bedside. She is on Eliquis for anticoagulation and Hb is holding stable. She will go to the IMCU. Labs ordered for the morning.  11/20: Ms. Dave was seen and evaluated in the IMCU. Her blood pressure has had very significant fluctuations as low at 81/47 up to 152/66. Cr today is up to 1.75 despite tube feeds and no diuretics. Her sister is at bedside. She remains on NG feeding.  11/21: Ms. Dave was evaluated and examined in the IMCU. Her Cr  remains high at 1.7 and her BUN has gone up to 72. She is off diuretics. She has been on tube feeds. Her blood pressure has continued to fluctuate from systolic in the low 80's to systolic 150's. Free water added 250 ml q4 hours. A FEES has been ordered for today. She had a 6.2 second pause last night while coughing that was asymptomatic. Her sister is at bedside.      Interval Problem Update  Patient was evaluated at bedside  Patient more awake today and participating in conversation  3 L nasal cannula  Leukocytosis lower today  Creatinine trending down slowly  Diet per SPL  Nutrition following, keep NG tube for now and monitor feeds 24-hour  PT/OT recommending postacute care  PMR evaluated on 11/14 and recommended SNF  Pending SNF    I have discussed this patient's plan of care and discharge plan at IDT rounds today with Case Management, Nursing, Nursing leadership, and other members of the IDT team.    Consultants/Specialty  critical care   Neurology  Physiatry  Cardiology   CT surgery  Code Status  Full Code    Disposition  Patient is not medically cleared for discharge.   Anticipate discharge to to an inpatient rehabilitation hospital.  I have placed the appropriate orders for post-discharge needs.    Review of Systems  Review of Systems   Unable to perform ROS: Mental acuity      Physical Exam  Temp:  [36.2 °C (97.2 °F)-36.7 °C (98.1 °F)] 36.4 °C (97.5 °F)  Pulse:  [82-89] 82  Resp:  [16-18] 18  BP: (133-144)/(59-68) 143/65  SpO2:  [93 %-98 %] 97 %    Physical Exam  Vitals and nursing note reviewed.   Constitutional:       General: She is not in acute distress.     Appearance: She is ill-appearing.   HENT:      Head: Normocephalic and atraumatic.      Nose:      Comments: NG tube     Mouth/Throat:      Mouth: Mucous membranes are dry.      Pharynx: Oropharynx is clear.   Eyes:      General: No scleral icterus.     Conjunctiva/sclera: Conjunctivae normal.   Cardiovascular:      Rate and Rhythm: Normal rate and  regular rhythm.      Comments: Sternal incision without infection  Pulmonary:      Effort: Pulmonary effort is normal.      Breath sounds: Normal breath sounds.      Comments: 3 liters of oxygen  Abdominal:      General: There is no distension.      Tenderness: There is no abdominal tenderness.   Musculoskeletal:      Cervical back: Normal range of motion and neck supple.      Right lower leg: No edema.      Left lower leg: No edema.   Skin:     General: Skin is warm and dry.   Neurological:      Mental Status: She is alert.      Comments: Confused   Left leg 5/5 right leg 3+/5  Arms/hands move generally equally   Psychiatric:      Comments:   Voice is a bit hoarse  Flat affect  A bit impulsive and easily distracted       Fluids    Intake/Output Summary (Last 24 hours) at 11/23/2022 1345  Last data filed at 11/23/2022 0300  Gross per 24 hour   Intake 1723 ml   Output --   Net 1723 ml       Laboratory  Recent Labs     11/21/22 0315 11/22/22 0221 11/23/22  0134   WBC 14.9* 17.9* 15.6*   RBC 3.74* 3.75* 3.48*   HEMOGLOBIN 11.2* 11.4* 10.4*   HEMATOCRIT 35.0* 35.0* 32.3*   MCV 93.6 93.3 92.8   MCH 29.9 30.4 29.9   MCHC 32.0* 32.6* 32.2*   RDW 47.1 47.0 46.8   PLATELETCT 430 481* 522*   MPV 10.8 10.9 10.7     Recent Labs     11/21/22 0315 11/22/22  0221 11/23/22  0134   SODIUM 138 137 132*   POTASSIUM 4.0 4.8 4.3   CHLORIDE 99 98 95*   CO2 28 26 28   GLUCOSE 142* 123* 152*   BUN 72* 56* 51*   CREATININE 1.77* 1.45* 1.42*   CALCIUM 9.4 9.4 9.2                     Imaging  DX-CHEST-LIMITED (1 VIEW)   Final Result      Stable chest with cardiac enlargement, bronchial thickening and left basilar atelectasis. Bronchial thickening most likely represents bronchitis but edema could be considered      IR-US GUIDED PIV   Final Result    Ultrasound-guided PERIPHERAL IV INSERTION performed by    qualified nursing staff as above.      DX-ABDOMEN FOR TUBE PLACEMENT   Final Result      Enteric tube tip projects over the stomach       DX-CHEST-PORTABLE (1 VIEW)   Final Result      1.  Tubes and lines in good position.      2.  Mild cardiomegaly with mild diffuse pulmonary edema with slight improvement since previous exam.      3.  Small left pleural effusion.      EC-ECHOCARDIOGRAM LTD W/O CONT   Final Result      DX-ABDOMEN FOR TUBE PLACEMENT   Final Result      Enteric tube tip projects over the stomach      DX-CHEST-PORTABLE (1 VIEW)   Final Result      1.  Layering bilateral pleural effusions with adjacent airspace disease.   2.  Increased interstitial edema.      DX-ABDOMEN FOR TUBE PLACEMENT   Final Result      Orogastric tube tip at the distal stomach.      DX-CHEST-PORTABLE (1 VIEW)   Final Result      1.  Removal of right IJ catheter.      2.  Right subclavian catheter unchanged in position.      3.  Mild cardiomegaly.      4.  Blunting of left costophrenic angle consistent with atelectasis, pneumonitis, and/or pleural effusion.      DX-ABDOMEN FOR TUBE PLACEMENT   Final Result      Enteric tube tip projects over the stomach antrum      MR-BRAIN-W/O   Final Result      1.  BILATERAL mostly supratentorial areas of ischemia as described above. These appear to be primarily watershed zone infarctions.   2.  No hemorrhage   3.  No significant mass effect      DX-CHEST-PORTABLE (1 VIEW)   Final Result         1. No significant interval change.      DX-ABDOMEN FOR TUBE PLACEMENT   Final Result      Interval placement of enteric tube with its tip over the midline epigastrium compatible with position at the level of the gastric body.      CT-HEAD W/O   Final Result      1.  Likely subacute infarcts in the bilateral parieto-occipital regions.   2.  No acute intracranial hemorrhage.   3.  Bilateral maxillary sinus disease.      EC-DAVE W/O CONT   Final Result      DX-CHEST-PORTABLE (1 VIEW)   Final Result      Stable enlargement of the cardiomediastinal silhouette, mild diffuse interstitial edema and bilateral basilar atelectasis and/or  consolidation.      DX-CHEST-PORTABLE (1 VIEW)   Final Result      Satisfactory postoperative appearance of the chest             Assessment/Plan  * Acute ischemic stroke ([facility])  Assessment & Plan  Post/perioperative event with multiple areas of watershed type infarction  Neurology consulted  DAPT had been given then due to recent afib, neurology recommended Eliquis which was started  Neurology consultation was obtained  PT/OT/SLP and rehabilitation  NG feeding until she is cleared by speech path  She is now moving all extremities though right leg remains weak  She will be an excellent candidate for rehab    Dysphagia- (present on admission)  Assessment & Plan  SPL following, NG feed held and started on diet  Nutrition following, keep NG tube for now and monitor oral feeds 24-hour    Chronic systolic congestive heart failure ([facility]) EF 35%- (present on admission)  Assessment & Plan  Treated to euvolemia, GDMT as tolerated  Ejection fraction 35% on DAVE.  Coreg and diovan    TAN (acute kidney injury) ([facility])  Assessment & Plan  Cr has gone up to 1.7 and BUN is up to 72  She is now off diuretics  Free water 250 mL will be added to NG feeding q4 hours.  Follow urine output and check BMP in the morning.     Acute blood loss anemia- (present on admission)  Assessment & Plan  From hematuria  Requiring transfusion  Hb 12.4    Encephalopathy acute  Assessment & Plan  Acute encephalopathy with consideration of ICU delirium post-stroke  This is slowly improving though she remains encephalopathic    Acute respiratory failure with hypoxia ([facility])  Assessment & Plan  She has had volume overload requiring high flow oxygen  Now on 2.5 liters of oxygen  .     PAF (paroxysmal atrial fibrillation) ([facility])- (present on admission)  Assessment & Plan  Post-operative  Decrease the amiodarone dose as she has been loaded.  eliquis    Other specified anemias  Assessment & Plan  Acute blood loss due to hematuria  Hb has been kady thus stop daily  phlebotomy and check as indicated.  Transfuse RBCs for hemoglobin less than 8.0 as she is s/p CABG.    S/P CABG x 3  Assessment & Plan  As per cardiac surgery, optimization,, mobilization as tolerated,  Telemetry    Chronic obstructive pulmonary disease (COPD) (HCC)- (present on admission)  Assessment & Plan  Pre-existing, respiratory protocol,  Continuous respiratory therapy protocol.  Without exacerbation    Dyslipidemia  Assessment & Plan  The patient with prior severe intolerance, apparently failed multiple regimens  Consider outpatient referral to PCSK9 treatment    Coronary artery disease due to calcified coronary lesion - Calcifications seen on CT scan also wtih aortic ulceration- (present on admission)  Assessment & Plan  S/p CABG       VTE prophylaxis: therapeutic anticoagulation with Eliquis    I have performed a physical exam and reviewed and updated ROS and Plan today (11/23/2022). In review of yesterday's note (11/22/2022), there are no changes except as documented above.

## 2022-11-23 NOTE — PROGRESS NOTES
CHIEF COMPLAINT: Post-op visit     PROCEDURE: Bubba 11/10/2022   Coronary artery bypass grafting x3  Left internal mammary artery to left anterior descending artery  Reversed saphenous vein graft to obtuse marginal #2  Reversed saphenous vein graft to distal right coronary artery  Endo-vein procurement    HPI: ***    There were no vitals taken for this visit.    PHYSICAL EXAM:  Physical Exam   Cardiac: S1S2  Neuro:  AAO x 3  Resp:  CTA  Wounds:  CDI  Sternum:  Stable     PLAN: ***    Continue {blood thinners:46623} for 3 months or per your Cardiologist's recommendations.  We reviewed post operative sternal precautions, weight limits and driving precautions moving forward.  We reviewed SBE prophylaxis.      Overall, we are very pleased with the patient’s progress and we have instructed the patient to follow-up with us in the future should they have any concerns related to their surgery. Otherwise, we will see the patient on a PRN basis. The patient will continue to follow-up with their Cardiologist and PCP.  The patient has been informed that any further prescription refills should be done through their primary care physician and/or cardiologist.  They acknowledged understanding.  Thank you for allowing us to participate in the care of this very pleasant patient and please let us know if there is any way we may be of further assistance.

## 2022-11-23 NOTE — DIETARY
Nutrition Services Brief Update:    Day 13 of admit.  Lauren Dave is a 72 y.o. female with admitting DX of Atherosclerotic heart disease of native coronary artery without angina pectoris [I25.10]  CHF (congestive heart failure), NYHA class III, acute on chronic, combined (HCC) [I50.43]    Current Diet: Fibersource HN; Level 3 - liquidized; Level 2 - mildly thick fluids    SLP requested supplements and liquidized soups be added to pts nourishments. Boost VHC strawberry ordered x1/d. Soups added in computrition. Holding TF for 24 hours to evaluate PO intake.       RD Following.

## 2022-11-24 ENCOUNTER — APPOINTMENT (OUTPATIENT)
Dept: RADIOLOGY | Facility: MEDICAL CENTER | Age: 72
DRG: 235 | End: 2022-11-24
Attending: STUDENT IN AN ORGANIZED HEALTH CARE EDUCATION/TRAINING PROGRAM
Payer: MEDICARE

## 2022-11-24 LAB
ANION GAP SERPL CALC-SCNC: 9 MMOL/L (ref 7–16)
BASOPHILS # BLD AUTO: 0.4 % (ref 0–1.8)
BASOPHILS # BLD: 0.07 K/UL (ref 0–0.12)
BUN SERPL-MCNC: 41 MG/DL (ref 8–22)
CALCIUM SERPL-MCNC: 9.7 MG/DL (ref 8.5–10.5)
CHLORIDE SERPL-SCNC: 99 MMOL/L (ref 96–112)
CO2 SERPL-SCNC: 27 MMOL/L (ref 20–33)
CREAT SERPL-MCNC: 1.46 MG/DL (ref 0.5–1.4)
EOSINOPHIL # BLD AUTO: 0.34 K/UL (ref 0–0.51)
EOSINOPHIL NFR BLD: 2 % (ref 0–6.9)
ERYTHROCYTE [DISTWIDTH] IN BLOOD BY AUTOMATED COUNT: 48 FL (ref 35.9–50)
GFR SERPLBLD CREATININE-BSD FMLA CKD-EPI: 38 ML/MIN/1.73 M 2
GLUCOSE SERPL-MCNC: 105 MG/DL (ref 65–99)
HCT VFR BLD AUTO: 34.2 % (ref 37–47)
HGB BLD-MCNC: 10.8 G/DL (ref 12–16)
IMM GRANULOCYTES # BLD AUTO: 0.2 K/UL (ref 0–0.11)
IMM GRANULOCYTES NFR BLD AUTO: 1.2 % (ref 0–0.9)
LYMPHOCYTES # BLD AUTO: 1.55 K/UL (ref 1–4.8)
LYMPHOCYTES NFR BLD: 9.1 % (ref 22–41)
MCH RBC QN AUTO: 30.2 PG (ref 27–33)
MCHC RBC AUTO-ENTMCNC: 31.6 G/DL (ref 33.6–35)
MCV RBC AUTO: 95.5 FL (ref 81.4–97.8)
MONOCYTES # BLD AUTO: 1.44 K/UL (ref 0–0.85)
MONOCYTES NFR BLD AUTO: 8.5 % (ref 0–13.4)
NEUTROPHILS # BLD AUTO: 13.36 K/UL (ref 2–7.15)
NEUTROPHILS NFR BLD: 78.8 % (ref 44–72)
NRBC # BLD AUTO: 0 K/UL
NRBC BLD-RTO: 0 /100 WBC
PLATELET # BLD AUTO: 599 K/UL (ref 164–446)
PMV BLD AUTO: 10.7 FL (ref 9–12.9)
POTASSIUM SERPL-SCNC: 4.8 MMOL/L (ref 3.6–5.5)
RBC # BLD AUTO: 3.58 M/UL (ref 4.2–5.4)
SODIUM SERPL-SCNC: 135 MMOL/L (ref 135–145)
WBC # BLD AUTO: 17 K/UL (ref 4.8–10.8)

## 2022-11-24 PROCEDURE — 74018 RADEX ABDOMEN 1 VIEW: CPT

## 2022-11-24 PROCEDURE — 700111 HCHG RX REV CODE 636 W/ 250 OVERRIDE (IP): Performed by: STUDENT IN AN ORGANIZED HEALTH CARE EDUCATION/TRAINING PROGRAM

## 2022-11-24 PROCEDURE — 99233 SBSQ HOSP IP/OBS HIGH 50: CPT | Performed by: STUDENT IN AN ORGANIZED HEALTH CARE EDUCATION/TRAINING PROGRAM

## 2022-11-24 PROCEDURE — A9270 NON-COVERED ITEM OR SERVICE: HCPCS | Performed by: INTERNAL MEDICINE

## 2022-11-24 PROCEDURE — 700102 HCHG RX REV CODE 250 W/ 637 OVERRIDE(OP): Performed by: INTERNAL MEDICINE

## 2022-11-24 PROCEDURE — 85025 COMPLETE CBC W/AUTO DIFF WBC: CPT

## 2022-11-24 PROCEDURE — 770020 HCHG ROOM/CARE - TELE (206)

## 2022-11-24 PROCEDURE — A9270 NON-COVERED ITEM OR SERVICE: HCPCS | Performed by: STUDENT IN AN ORGANIZED HEALTH CARE EDUCATION/TRAINING PROGRAM

## 2022-11-24 PROCEDURE — A9270 NON-COVERED ITEM OR SERVICE: HCPCS | Performed by: NURSE PRACTITIONER

## 2022-11-24 PROCEDURE — A9270 NON-COVERED ITEM OR SERVICE: HCPCS | Performed by: HOSPITALIST

## 2022-11-24 PROCEDURE — 700102 HCHG RX REV CODE 250 W/ 637 OVERRIDE(OP): Performed by: STUDENT IN AN ORGANIZED HEALTH CARE EDUCATION/TRAINING PROGRAM

## 2022-11-24 PROCEDURE — 700102 HCHG RX REV CODE 250 W/ 637 OVERRIDE(OP): Performed by: NURSE PRACTITIONER

## 2022-11-24 PROCEDURE — 80048 BASIC METABOLIC PNL TOTAL CA: CPT

## 2022-11-24 PROCEDURE — 36415 COLL VENOUS BLD VENIPUNCTURE: CPT

## 2022-11-24 PROCEDURE — 700111 HCHG RX REV CODE 636 W/ 250 OVERRIDE (IP): Performed by: NURSE PRACTITIONER

## 2022-11-24 PROCEDURE — 700102 HCHG RX REV CODE 250 W/ 637 OVERRIDE(OP): Performed by: HOSPITALIST

## 2022-11-24 RX ORDER — PROCHLORPERAZINE EDISYLATE 5 MG/ML
10 INJECTION INTRAMUSCULAR; INTRAVENOUS EVERY 6 HOURS PRN
Status: DISCONTINUED | OUTPATIENT
Start: 2022-11-24 | End: 2022-12-05 | Stop reason: HOSPADM

## 2022-11-24 RX ORDER — MIRTAZAPINE 15 MG/1
15 TABLET, FILM COATED ORAL
Status: DISCONTINUED | OUTPATIENT
Start: 2022-11-24 | End: 2022-11-25

## 2022-11-24 RX ORDER — PROMETHAZINE HYDROCHLORIDE 25 MG/1
25 SUPPOSITORY RECTAL EVERY 6 HOURS PRN
Status: DISCONTINUED | OUTPATIENT
Start: 2022-11-24 | End: 2022-12-05 | Stop reason: HOSPADM

## 2022-11-24 RX ORDER — ONDANSETRON 2 MG/ML
8 INJECTION INTRAMUSCULAR; INTRAVENOUS EVERY 6 HOURS PRN
Status: DISCONTINUED | OUTPATIENT
Start: 2022-11-24 | End: 2022-12-05 | Stop reason: HOSPADM

## 2022-11-24 RX ADMIN — MIRTAZAPINE 15 MG: 15 TABLET, FILM COATED ORAL at 20:19

## 2022-11-24 RX ADMIN — APIXABAN 5 MG: 5 TABLET, FILM COATED ORAL at 06:21

## 2022-11-24 RX ADMIN — OMEPRAZOLE 40 MG: KIT at 06:21

## 2022-11-24 RX ADMIN — APIXABAN 5 MG: 5 TABLET, FILM COATED ORAL at 17:06

## 2022-11-24 RX ADMIN — CARVEDILOL 6.25 MG: 6.25 TABLET, FILM COATED ORAL at 07:46

## 2022-11-24 RX ADMIN — HYDROXYCHLOROQUINE SULFATE 200 MG: 200 TABLET ORAL at 07:46

## 2022-11-24 RX ADMIN — ASPIRIN 81 MG 81 MG: 81 TABLET ORAL at 06:21

## 2022-11-24 RX ADMIN — VALSARTAN 40 MG: 80 TABLET, FILM COATED ORAL at 06:21

## 2022-11-24 RX ADMIN — CARVEDILOL 6.25 MG: 6.25 TABLET, FILM COATED ORAL at 17:02

## 2022-11-24 RX ADMIN — AMIODARONE HYDROCHLORIDE 200 MG: 200 TABLET ORAL at 06:20

## 2022-11-24 RX ADMIN — PROCHLORPERAZINE EDISYLATE 10 MG: 5 INJECTION INTRAMUSCULAR; INTRAVENOUS at 17:01

## 2022-11-24 RX ADMIN — QUETIAPINE FUMARATE 25 MG: 25 TABLET, FILM COATED ORAL at 23:57

## 2022-11-24 RX ADMIN — ONDANSETRON 8 MG: 2 INJECTION INTRAMUSCULAR; INTRAVENOUS at 14:17

## 2022-11-24 ASSESSMENT — ENCOUNTER SYMPTOMS
SHORTNESS OF BREATH: 1
SPEECH CHANGE: 1
SENSORY CHANGE: 1
INSOMNIA: 1
MUSCULOSKELETAL NEGATIVE: 1
CARDIOVASCULAR NEGATIVE: 1
EYES NEGATIVE: 1
GASTROINTESTINAL NEGATIVE: 1
NERVOUS/ANXIOUS: 1

## 2022-11-24 ASSESSMENT — FIBROSIS 4 INDEX: FIB4 SCORE: 1.09

## 2022-11-24 ASSESSMENT — COGNITIVE AND FUNCTIONAL STATUS - GENERAL
PERSONAL GROOMING: A LOT
HELP NEEDED FOR BATHING: TOTAL
TURNING FROM BACK TO SIDE WHILE IN FLAT BAD: UNABLE
SUGGESTED CMS G CODE MODIFIER MOBILITY: CN
TOILETING: TOTAL
DAILY ACTIVITIY SCORE: 8
MOVING FROM LYING ON BACK TO SITTING ON SIDE OF FLAT BED: UNABLE
DRESSING REGULAR LOWER BODY CLOTHING: TOTAL
STANDING UP FROM CHAIR USING ARMS: TOTAL
WALKING IN HOSPITAL ROOM: TOTAL
EATING MEALS: A LOT
DRESSING REGULAR UPPER BODY CLOTHING: TOTAL
SUGGESTED CMS G CODE MODIFIER DAILY ACTIVITY: CM
CLIMB 3 TO 5 STEPS WITH RAILING: TOTAL
MOBILITY SCORE: 6
MOVING TO AND FROM BED TO CHAIR: UNABLE

## 2022-11-24 NOTE — CARE PLAN
The patient is Stable - Low risk of patient condition declining or worsening    Shift Goals  Clinical Goals: Sleep  Patient Goals: rest  Family Goals: no family present at this time    Progress made toward(s) clinical / shift goals:    Problem: Knowledge Deficit - Standard  Goal: Patient and family/care givers will demonstrate understanding of plan of care, disease process/condition, diagnostic tests and medications  Outcome: Progressing     Problem: Skin Integrity  Goal: Skin integrity is maintained or improved  Outcome: Progressing     Problem: Fall Risk  Goal: Patient will remain free from falls  Outcome: Progressing     Problem: Pain - Standard  Goal: Alleviation of pain or a reduction in pain to the patient’s comfort goal  Outcome: Progressing     Problem: Day of surgery post CABG/Heart valve replacement  Goal: Stabilization in immediate post op period  Outcome: Progressing     Problem: Post Op Day 1 CABG/Heart Valve Replacement  Goal: Optimal care of the post op CABG/heart valve replacement Post Op Day 1  Outcome: Progressing     Problem: Post op day 2 CABG/Heart Valve Replacement  Goal: Optimal care of the post op CABG/heart valve replacement post op day 2  Outcome: Progressing     Problem: Post Op Day 3 CABG/Heart Valve replacement  Goal: Optimal care of the post op CABG/Heart Valve replacement post op day 3  Outcome: Progressing     Problem: Post Op Day 4 CABG/Heart Valve Replacement  Goal: Optimal care of the Post Op CABG/Heart Valve replacement Post Op Day 4  Outcome: Progressing     Problem: Post Op Day 5 CABG/Heart Valve Replacement  Goal: Optimal care of the Post Op CABG/Heart Valve replacement Post Op Day 5  Outcome: Progressing     Problem: Communication  Goal: The ability to communicate needs accurately and effectively will improve  Outcome: Progressing     Problem: Hemodynamics  Goal: Patient's hemodynamics, fluid balance and neurologic status will be stable or improve  Outcome: Progressing      Problem: Safety - Medical Restraint  Goal: Remains free of injury from restraints (Restraint for Interference with Medical Device)  Outcome: Progressing  Goal: Free from restraint(s) (Restraint for Interference with Medical Device)  Outcome: Progressing     Problem: Optimal Care of the Stroke Patient  Goal: Optimal emergency care for the stroke patient  Outcome: Progressing  Goal: Optimal acute care for the stroke patient  Outcome: Progressing     Problem: Knowledge Deficit - Stroke Education  Goal: Patient's knowledge of stroke and risk factors will improve  Outcome: Progressing     Problem: Psychosocial - Patient Condition  Goal: Patient's ability to verbalize feelings about condition will improve  Outcome: Progressing  Goal: Patient's ability to re-evaluate and adapt role responsibilities will improve  Outcome: Progressing     Problem: Discharge Planning - Stroke  Goal: Ensure Stroke Core Measures are met prior to discharge  Outcome: Progressing  Goal: Patient’s continuum of care needs will be met  Outcome: Progressing     Problem: Neuro Status  Goal: Neuro status will remain stable or improve  Outcome: Progressing     Problem: Hemodynamic Monitoring  Goal: Patient's hemodynamics, fluid balance and neurologic status will be stable or improve  Outcome: Progressing     Problem: Respiratory - Stroke Patient  Goal: Patient will achieve/maintain optimum respiratory rate/effort  Outcome: Progressing     Problem: Dysphagia  Goal: Dysphagia will improve  Outcome: Progressing     Problem: Risk for Aspiration  Goal: Patient's risk for aspiration will be absent or decrease  Outcome: Progressing     Problem: Urinary Elimination  Goal: Establish and maintain regular urinary output  Outcome: Progressing     Problem: Bowel Elimination  Goal: Establish and maintain regular bowel function  Outcome: Progressing     Problem: Mobility - Stroke  Goal: Patient's capacity to carry out activities will improve  Outcome:  Progressing  Goal: Spasticity will be prevented or improved  Outcome: Progressing  Goal: Subluxation will be prevented or improved  Outcome: Progressing     Problem: Self Care  Goal: Patient will have the ability to perform ADLs independently or with assistance (bathe, groom, dress, toilet and feed)  Outcome: Progressing     Problem: Respiratory  Goal: Patient will achieve/maintain optimum respiratory ventilation and gas exchange  Outcome: Progressing       Patient is not progressing towards the following goals:

## 2022-11-24 NOTE — PROGRESS NOTES
Monitor Summary:   Rhythm: SR, BBB  Rate: 85-93  Measurement: .17/.12/.48  Ectopy: NA  '

## 2022-11-24 NOTE — THERAPY
Physical Therapy   Daily Treatment     Patient Name: Lauren Dvae  Age:  72 y.o., Sex:  female  Medical Record #: [id number]  Today's Date: 11/23/2022     Precautions  Precautions: Fall Risk;Swallow Precautions ( See Comments);Cardiac Precautions (See Comments);Sternal Precautions (See Comments);Nasogastric Tube  Comments: s/p CAGB, CVA    Assessment  Pt seen for PT tx session with therapy tech to maximize pt safety and function. Pt still needing Total A for bed mobility, however she was able to tolerate sitting at EOB for over 10 min with Min-Mod A for balance as needed. Worked with pt on midline orientation and she was more receptive compared to previous tx. Pt able to complete STS x3, however she fatigued significantly by the end and had to lay down following last rep. Pt is making slow gradual progress, however she will still benefit from post acute placement at this time. Will follow.     Plan    Continue current treatment plan.    DC Equipment Recommendations: Unable to determine at this time  Discharge Recommendations: Recommend post-acute placement for additional physical therapy services prior to discharge home        Vitals   Pulse 80   Pulse Oximetry 97 %   O2 (LPM) 3   O2 Delivery Device Silicone Nasal Cannula   Pain 0 - 10 Group   Therapist Pain Assessment   (no c/o pain)   Cognition    Cognition / Consciousness X   Speech/ Communication Delayed Responses;Slurred   Level of Consciousness Alert   Ability To Follow Commands 1 Step   Safety Awareness Impaired   Attention Impaired   Sequencing Impaired   Initiation Impaired   Comments L inattention, but redirectable. able to follow commands and orient herself to midline   Neuro-Muscular Treatments   Neuro-Muscular Treatments Anterior weight shift;Postural Facilitation;Tapping;Verbal Cuing;Weight Shift Right;Weight Shift Left;Sequencing;Facilitation   Comments session focused on midline orientation as pt has significant R and posterior lean. pt able to  find midline with Min a as needed, however pt needed increased cueing to maintain correct position. pt with slight L inattention but redirectable to middle with verbal and visual cues   Balance   Sitting Balance (Static) Poor   Sitting Balance (Dynamic) Trace +   Standing Balance (Static) Trace   Standing Balance (Dynamic) Trace   Weight Shift Sitting Poor   Weight Shift Standing Poor   Skilled Intervention Verbal Cuing;Tactile Cuing;Sequencing;Facilitation   Comments HHA with FWW in standing   Gait Analysis   Gait Level Of Assist Unable to Participate   Weight Bearing Status no restrictions   Comments balance not sufficient for weightshifting   Bed Mobility    Supine to Sit Total Assist   Sit to Supine Total Assist   Scooting Total Assist   Rolling Total Assist to Rt.;Total Assist to Lt.   Skilled Intervention Verbal Cuing;Tactile Cuing;Facilitation   Comments pt able to move LE's slightly, but not able to assist significantly beyond that   Functional Mobility   Sit to Stand Maximal Assist  (x2)   Mobility STS x3   Skilled Intervention Verbal Cuing;Tactile Cuing;Facilitation   Comments pt able to stand x3 for 5-20 sec each time. pt needing B knee block due to buckling   How much difficulty does the patient currently have...   Turning over in bed (including adjusting bedclothes, sheets and blankets)? 1   Sitting down on and standing up from a chair with arms (e.g., wheelchair, bedside commode, etc.) 1   Moving from lying on back to sitting on the side of the bed? 1   How much help from another person does the patient currently need...   Moving to and from a bed to a chair (including a wheelchair)? 1   Need to walk in a hospital room? 1   Climbing 3-5 steps with a railing? 1   6 clicks Mobility Score 6   Activity Tolerance   Sitting Edge of Bed 12 min   Standing 2 min   Comments OOB activity limited by poor balance and fatigue   Short Term Goals    Short Term Goal # 1 Pt will transfer supine<->sit with mod A within 6  visits to promote return home   Goal Outcome # 1 goal not met   Short Term Goal # 2 Pt will sit at EOB 15 min with min A within 6 visits to promote return home   Goal Outcome # 2 Progressing as expected   Short Term Goal # 3 Pt will transfer bed<->chair with mod A within 6 visits in order to promote return home   Goal Outcome # 3 Goal not met   Anticipated Discharge Equipment and Recommendations   DC Equipment Recommendations Unable to determine at this time   Discharge Recommendations Recommend post-acute placement for additional physical therapy services prior to discharge home   Interdisciplinary Plan of Care Collaboration   IDT Collaboration with  Nursing;Therapy Tech   Patient Position at End of Therapy In Bed;Call Light within Reach;Tray Table within Reach;Phone within Reach;Family / Friend in Room  (1:1 sitter in room)   Collaboration Comments RN updated   Session Information   Date / Session Number  11/23- 5 (2/4, 11/25)

## 2022-11-24 NOTE — PROGRESS NOTES
Hospital Medicine Daily Progress Note    Date of Service  11/24/2022    Chief Complaint  Lauren Dave is a 72 y.o. female admitted 11/10/2022 with elective surgery    Hospital Course  72 y.o. 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  The patient is found lethargic, she arouses on verbal command, she is confused, she does have weakness in the right lower extremity as well as left upper extremity, the patient's sister is at the bedside assisting the history taking.  The patient is being prepared for transfer to telemetry, chest tubes are being removed, a pacer wire has been removed, a MRI is underway and later reports multiple areas of watershed type infarctions.  There is no bleeding apparent.  A postoperative echo shows an ejection fraction of 35%, the patient is being diuresed, she is on GDMT post surgery.  The patient unfortunately is intolerant to statin medication.  Neurology was consulted after patient was identified to have a CVA.    11/14/2022: Evaluated patient at bedside today patient appears to be without movement of right lower extremity difficult to arouse weak left upper extremity.  Trending back looks like she has had increasing oxygen demand in addition chest tubes removed on 11/13/2022.  Lastly patient is also had decreasing hemoglobin at present we will trend hemoglobin every 6 hours for 24 hours standing transfusion order has been  placed to transfuse 1 unit PRBC if repeat hemoglobin less than 8.0.  Ejection fraction reviewed from DAVE remains at 35% cardiology medicine recommendations appreciated.  Referral to acute rehabilitation has been placed.  We will continue to follow along closely with cardiovascular thoracic surgery as primary.  Present patient has persistent deficit right lower extremity no movement left upper extremity is weak.  Watershed infarct noted above.  Consider discussion of initiation of amantadine with neurology and cardiology service.  11/15: Ms. Dave was evaluated and examined in the IMCU. Hb this morning is 8.3. I discussed with Dr. Gallagher neurology about +/- anticoagulation given her transient afib and he recommends Apixaban which has been started. She remain on NG feeding and in soft wrist restraints. Her sister is at bedside. IV lasix will be stopped due to a CVP 3 and change to oral.   11/16: Ms. Dave was seen and evaluated in the IMCU. Hb 7.6 today. UA + for E coli. Her blood pressure has fluctuated significantly. She is on 5 liters of oxygen. She has been started on Eliquis in addendum to aspirin.   11/17-11/18 she was transferred to the ICU due to respiratory failure and decreased level of consciousness. Due to pulmonary edema after blood transfusions, she was given IV lasix. She had a metabolic alkalosis.   11/19: Ms. Dave was evaluated and examined in the ICU. She is moving both legs today and is more lucid. She remains on NG feeding. Her sister is at bedside. She is on Eliquis for anticoagulation and Hb is holding stable. She will go to the IMCU. Labs ordered for the morning.  11/20: Ms. Dave was seen and evaluated in the IMCU. Her blood pressure has had very significant fluctuations as low at 81/47 up to 152/66. Cr today is up to 1.75 despite tube feeds and no diuretics. Her sister is at bedside. She remains on NG feeding.  11/21: Ms. Dave was evaluated and examined in the IMCU. Her Cr  remains high at 1.7 and her BUN has gone up to 72. She is off diuretics. She has been on tube feeds. Her blood pressure has continued to fluctuate from systolic in the low 80's to systolic 150's. Free water added 250 ml q4 hours. A FEES has been ordered for today. She had a 6.2 second pause last night while coughing that was asymptomatic. Her sister is at bedside.      Interval Problem Update  Patient was evaluated at bedside  Patient more awake today and participating in conversation  Patient consuming less than 25% of meals, add mirtazapine  3 L nasal cannula  Leukocytosis lower today  Creatinine trending down slowly  Diet per SPL  Nutrition following, keep NG tube for now and monitor feeds  PT/OT recommending postacute care  PMR evaluated on 11/14 and recommended SNF  Pending SNF    I have discussed this patient's plan of care and discharge plan at IDT rounds today with Case Management, Nursing, Nursing leadership, and other members of the IDT team.    Consultants/Specialty  critical care   Neurology  Physiatry  Cardiology   CT surgery  Code Status  Full Code    Disposition  Patient is not medically cleared for discharge.   Anticipate discharge to to an inpatient rehabilitation hospital.  I have placed the appropriate orders for post-discharge needs.    Review of Systems  Review of Systems   Constitutional:  Positive for malaise/fatigue.   HENT: Negative.     Eyes: Negative.    Respiratory:  Positive for shortness of breath.    Cardiovascular: Negative.    Gastrointestinal: Negative.    Genitourinary: Negative.    Musculoskeletal: Negative.    Skin: Negative.    Neurological:  Positive for sensory change and speech change.   Endo/Heme/Allergies: Negative.    Psychiatric/Behavioral:  The patient is nervous/anxious and has insomnia.       Physical Exam  Temp:  [36.4 °C (97.5 °F)-36.5 °C (97.7 °F)] 36.5 °C (97.7 °F)  Pulse:  [80-93] 84  Resp:  [17-18] 17  BP: ()/(37-74) 114/58  SpO2:  [92 %-97 %] 96  %    Physical Exam  Vitals and nursing note reviewed.   Constitutional:       General: She is not in acute distress.     Appearance: She is ill-appearing.   HENT:      Head: Normocephalic and atraumatic.      Nose:      Comments: NG tube     Mouth/Throat:      Mouth: Mucous membranes are dry.      Pharynx: Oropharynx is clear.   Eyes:      General: No scleral icterus.     Conjunctiva/sclera: Conjunctivae normal.   Cardiovascular:      Rate and Rhythm: Normal rate and regular rhythm.      Comments: Sternal incision without infection  Pulmonary:      Effort: Pulmonary effort is normal.      Breath sounds: Normal breath sounds.      Comments: 3 liters of oxygen  Abdominal:      General: There is no distension.      Tenderness: There is no abdominal tenderness.   Musculoskeletal:      Cervical back: Normal range of motion and neck supple.      Right lower leg: No edema.      Left lower leg: No edema.   Skin:     General: Skin is warm and dry.   Neurological:      Mental Status: She is alert.      Comments: Confused   Left leg 5/5 right leg 3+/5  Arms/hands move generally equally   Psychiatric:      Comments:   Voice is a bit hoarse  Flat affect  A bit impulsive and easily distracted       Fluids    Intake/Output Summary (Last 24 hours) at 11/24/2022 1341  Last data filed at 11/24/2022 0917  Gross per 24 hour   Intake 50 ml   Output --   Net 50 ml       Laboratory  Recent Labs     11/22/22 0221 11/23/22 0134 11/24/22 0102   WBC 17.9* 15.6* 17.0*   RBC 3.75* 3.48* 3.58*   HEMOGLOBIN 11.4* 10.4* 10.8*   HEMATOCRIT 35.0* 32.3* 34.2*   MCV 93.3 92.8 95.5   MCH 30.4 29.9 30.2   MCHC 32.6* 32.2* 31.6*   RDW 47.0 46.8 48.0   PLATELETCT 481* 522* 599*   MPV 10.9 10.7 10.7     Recent Labs     11/22/22 0221 11/23/22 0134 11/24/22  0102   SODIUM 137 132* 135   POTASSIUM 4.8 4.3 4.8   CHLORIDE 98 95* 99   CO2 26 28 27   GLUCOSE 123* 152* 105*   BUN 56* 51* 41*   CREATININE 1.45* 1.42* 1.46*   CALCIUM 9.4 9.2 9.7                      Imaging  DX-CHEST-LIMITED (1 VIEW)   Final Result      Stable chest with cardiac enlargement, bronchial thickening and left basilar atelectasis. Bronchial thickening most likely represents bronchitis but edema could be considered      IR-US GUIDED PIV   Final Result    Ultrasound-guided PERIPHERAL IV INSERTION performed by    qualified nursing staff as above.      DX-ABDOMEN FOR TUBE PLACEMENT   Final Result      Enteric tube tip projects over the stomach      DX-CHEST-PORTABLE (1 VIEW)   Final Result      1.  Tubes and lines in good position.      2.  Mild cardiomegaly with mild diffuse pulmonary edema with slight improvement since previous exam.      3.  Small left pleural effusion.      EC-ECHOCARDIOGRAM LTD W/O CONT   Final Result      DX-ABDOMEN FOR TUBE PLACEMENT   Final Result      Enteric tube tip projects over the stomach      DX-CHEST-PORTABLE (1 VIEW)   Final Result      1.  Layering bilateral pleural effusions with adjacent airspace disease.   2.  Increased interstitial edema.      DX-ABDOMEN FOR TUBE PLACEMENT   Final Result      Orogastric tube tip at the distal stomach.      DX-CHEST-PORTABLE (1 VIEW)   Final Result      1.  Removal of right IJ catheter.      2.  Right subclavian catheter unchanged in position.      3.  Mild cardiomegaly.      4.  Blunting of left costophrenic angle consistent with atelectasis, pneumonitis, and/or pleural effusion.      DX-ABDOMEN FOR TUBE PLACEMENT   Final Result      Enteric tube tip projects over the stomach antrum      MR-BRAIN-W/O   Final Result      1.  BILATERAL mostly supratentorial areas of ischemia as described above. These appear to be primarily watershed zone infarctions.   2.  No hemorrhage   3.  No significant mass effect      DX-CHEST-PORTABLE (1 VIEW)   Final Result         1. No significant interval change.      DX-ABDOMEN FOR TUBE PLACEMENT   Final Result      Interval placement of enteric tube with its tip over the midline epigastrium  compatible with position at the level of the gastric body.      CT-HEAD W/O   Final Result      1.  Likely subacute infarcts in the bilateral parieto-occipital regions.   2.  No acute intracranial hemorrhage.   3.  Bilateral maxillary sinus disease.      EC-DAVE W/O CONT   Final Result      DX-CHEST-PORTABLE (1 VIEW)   Final Result      Stable enlargement of the cardiomediastinal silhouette, mild diffuse interstitial edema and bilateral basilar atelectasis and/or consolidation.      DX-CHEST-PORTABLE (1 VIEW)   Final Result      Satisfactory postoperative appearance of the chest             Assessment/Plan  * Acute ischemic stroke (HCC)  Assessment & Plan  Post/perioperative event with multiple areas of watershed type infarction  Neurology consulted  DAPT had been given then due to recent afib, neurology recommended Eliquis which was started  Neurology consultation was obtained  PT/OT/SLP and rehabilitation  NG feeding until she is cleared by speech path  She is now moving all extremities though right leg remains weak  She will be an excellent candidate for rehab    Dysphagia- (present on admission)  Assessment & Plan  SPL following, NG feed held and started on diet  Nutrition following, keep NG tube for now and monitor oral feeds 24-hour    Chronic systolic congestive heart failure (HCC) EF 35%- (present on admission)  Assessment & Plan  Treated to euvolemia, GDMT as tolerated  Ejection fraction 35% on DAVE.  Coreg and diovan    TAN (acute kidney injury) ([facility])  Assessment & Plan  Cr has gone up to 1.7 and BUN is up to 72  She is now off diuretics  Free water 250 mL will be added to NG feeding q4 hours.  Follow urine output and check BMP in the morning.     Acute blood loss anemia- (present on admission)  Assessment & Plan  From hematuria  Requiring transfusion  Hb 12.4    Encephalopathy acute  Assessment & Plan  Acute encephalopathy with consideration of ICU delirium post-stroke  This is slowly improving though she  remains encephalopathic    Acute respiratory failure with hypoxia ([facility])  Assessment & Plan  She has had volume overload requiring high flow oxygen  Now on 2.5 liters of oxygen  .     PAF (paroxysmal atrial fibrillation) ([facility])- (present on admission)  Assessment & Plan  Post-operative  Decrease the amiodarone dose as she has been loaded.  eliquis    Other specified anemias  Assessment & Plan  Acute blood loss due to hematuria  Hb has been kady thus stop daily phlebotomy and check as indicated.  Transfuse RBCs for hemoglobin less than 8.0 as she is s/p CABG.    S/P CABG x 3  Assessment & Plan  As per cardiac surgery, optimization,, mobilization as tolerated,  Telemetry    Chronic obstructive pulmonary disease (COPD) ([facility])- (present on admission)  Assessment & Plan  Pre-existing, respiratory protocol,  Continuous respiratory therapy protocol.  Without exacerbation    Dyslipidemia  Assessment & Plan  The patient with prior severe intolerance, apparently failed multiple regimens  Consider outpatient referral to PCSK9 treatment    Coronary artery disease due to calcified coronary lesion - Calcifications seen on CT scan also wtih aortic ulceration- (present on admission)  Assessment & Plan  S/p CABG       VTE prophylaxis: therapeutic anticoagulation with Eliquis    I have performed a physical exam and reviewed and updated ROS and Plan today (11/24/2022). In review of yesterday's note (11/23/2022), there are no changes except as documented above.

## 2022-11-25 LAB
ANION GAP SERPL CALC-SCNC: 13 MMOL/L (ref 7–16)
BASOPHILS # BLD AUTO: 0.6 % (ref 0–1.8)
BASOPHILS # BLD: 0.08 K/UL (ref 0–0.12)
BUN SERPL-MCNC: 45 MG/DL (ref 8–22)
CALCIUM SERPL-MCNC: 9.8 MG/DL (ref 8.5–10.5)
CHLORIDE SERPL-SCNC: 99 MMOL/L (ref 96–112)
CO2 SERPL-SCNC: 25 MMOL/L (ref 20–33)
CREAT SERPL-MCNC: 1.6 MG/DL (ref 0.5–1.4)
EOSINOPHIL # BLD AUTO: 0.44 K/UL (ref 0–0.51)
EOSINOPHIL NFR BLD: 3.5 % (ref 0–6.9)
ERYTHROCYTE [DISTWIDTH] IN BLOOD BY AUTOMATED COUNT: 46.7 FL (ref 35.9–50)
GFR SERPLBLD CREATININE-BSD FMLA CKD-EPI: 34 ML/MIN/1.73 M 2
GLUCOSE SERPL-MCNC: 103 MG/DL (ref 65–99)
HCT VFR BLD AUTO: 36.7 % (ref 37–47)
HGB BLD-MCNC: 11.6 G/DL (ref 12–16)
IMM GRANULOCYTES # BLD AUTO: 0.18 K/UL (ref 0–0.11)
IMM GRANULOCYTES NFR BLD AUTO: 1.4 % (ref 0–0.9)
LYMPHOCYTES # BLD AUTO: 1.77 K/UL (ref 1–4.8)
LYMPHOCYTES NFR BLD: 14.2 % (ref 22–41)
MCH RBC QN AUTO: 30 PG (ref 27–33)
MCHC RBC AUTO-ENTMCNC: 31.6 G/DL (ref 33.6–35)
MCV RBC AUTO: 94.8 FL (ref 81.4–97.8)
MONOCYTES # BLD AUTO: 1.18 K/UL (ref 0–0.85)
MONOCYTES NFR BLD AUTO: 9.5 % (ref 0–13.4)
NEUTROPHILS # BLD AUTO: 8.81 K/UL (ref 2–7.15)
NEUTROPHILS NFR BLD: 70.8 % (ref 44–72)
NRBC # BLD AUTO: 0 K/UL
NRBC BLD-RTO: 0 /100 WBC
PLATELET # BLD AUTO: 677 K/UL (ref 164–446)
PMV BLD AUTO: 10.6 FL (ref 9–12.9)
POTASSIUM SERPL-SCNC: 4.8 MMOL/L (ref 3.6–5.5)
RBC # BLD AUTO: 3.87 M/UL (ref 4.2–5.4)
SODIUM SERPL-SCNC: 137 MMOL/L (ref 135–145)
WBC # BLD AUTO: 12.5 K/UL (ref 4.8–10.8)

## 2022-11-25 PROCEDURE — 85025 COMPLETE CBC W/AUTO DIFF WBC: CPT

## 2022-11-25 PROCEDURE — A9270 NON-COVERED ITEM OR SERVICE: HCPCS | Performed by: INTERNAL MEDICINE

## 2022-11-25 PROCEDURE — 302146: Performed by: STUDENT IN AN ORGANIZED HEALTH CARE EDUCATION/TRAINING PROGRAM

## 2022-11-25 PROCEDURE — A9270 NON-COVERED ITEM OR SERVICE: HCPCS | Performed by: STUDENT IN AN ORGANIZED HEALTH CARE EDUCATION/TRAINING PROGRAM

## 2022-11-25 PROCEDURE — 700102 HCHG RX REV CODE 250 W/ 637 OVERRIDE(OP): Performed by: STUDENT IN AN ORGANIZED HEALTH CARE EDUCATION/TRAINING PROGRAM

## 2022-11-25 PROCEDURE — 80048 BASIC METABOLIC PNL TOTAL CA: CPT

## 2022-11-25 PROCEDURE — 770020 HCHG ROOM/CARE - TELE (206)

## 2022-11-25 PROCEDURE — 99232 SBSQ HOSP IP/OBS MODERATE 35: CPT | Performed by: STUDENT IN AN ORGANIZED HEALTH CARE EDUCATION/TRAINING PROGRAM

## 2022-11-25 PROCEDURE — 700102 HCHG RX REV CODE 250 W/ 637 OVERRIDE(OP): Performed by: HOSPITALIST

## 2022-11-25 PROCEDURE — 700111 HCHG RX REV CODE 636 W/ 250 OVERRIDE (IP): Performed by: STUDENT IN AN ORGANIZED HEALTH CARE EDUCATION/TRAINING PROGRAM

## 2022-11-25 PROCEDURE — 36415 COLL VENOUS BLD VENIPUNCTURE: CPT

## 2022-11-25 PROCEDURE — 700102 HCHG RX REV CODE 250 W/ 637 OVERRIDE(OP): Performed by: INTERNAL MEDICINE

## 2022-11-25 PROCEDURE — A9270 NON-COVERED ITEM OR SERVICE: HCPCS | Performed by: HOSPITALIST

## 2022-11-25 RX ORDER — MIRTAZAPINE 15 MG/1
15 TABLET, FILM COATED ORAL
Status: DISCONTINUED | OUTPATIENT
Start: 2022-11-25 | End: 2022-12-05 | Stop reason: HOSPADM

## 2022-11-25 RX ORDER — ACETAMINOPHEN 500 MG
1000 TABLET ORAL EVERY 8 HOURS PRN
Status: DISCONTINUED | OUTPATIENT
Start: 2022-11-25 | End: 2022-12-05 | Stop reason: HOSPADM

## 2022-11-25 RX ORDER — HYDROXYCHLOROQUINE SULFATE 200 MG/1
200 TABLET, FILM COATED ORAL
Status: DISCONTINUED | OUTPATIENT
Start: 2022-11-25 | End: 2022-12-05 | Stop reason: HOSPADM

## 2022-11-25 RX ORDER — AMIODARONE HYDROCHLORIDE 200 MG/1
200 TABLET ORAL
Status: DISCONTINUED | OUTPATIENT
Start: 2022-11-26 | End: 2022-12-05 | Stop reason: HOSPADM

## 2022-11-25 RX ORDER — QUETIAPINE FUMARATE 25 MG/1
25 TABLET, FILM COATED ORAL NIGHTLY PRN
Status: DISCONTINUED | OUTPATIENT
Start: 2022-11-25 | End: 2022-11-28

## 2022-11-25 RX ORDER — OMEPRAZOLE 20 MG/1
40 CAPSULE, DELAYED RELEASE ORAL DAILY
Status: DISCONTINUED | OUTPATIENT
Start: 2022-11-25 | End: 2022-12-05 | Stop reason: HOSPADM

## 2022-11-25 RX ORDER — ASPIRIN 81 MG/1
81 TABLET, CHEWABLE ORAL DAILY
Status: DISCONTINUED | OUTPATIENT
Start: 2022-11-26 | End: 2022-12-05 | Stop reason: HOSPADM

## 2022-11-25 RX ORDER — VALSARTAN 80 MG/1
40 TABLET ORAL
Status: DISCONTINUED | OUTPATIENT
Start: 2022-11-26 | End: 2022-11-30

## 2022-11-25 RX ORDER — CARVEDILOL 6.25 MG/1
6.25 TABLET ORAL 2 TIMES DAILY WITH MEALS
Status: DISCONTINUED | OUTPATIENT
Start: 2022-11-25 | End: 2022-11-29

## 2022-11-25 RX ORDER — ACETAMINOPHEN 500 MG
1000 TABLET ORAL EVERY 6 HOURS PRN
Status: DISCONTINUED | OUTPATIENT
Start: 2022-11-25 | End: 2022-11-25

## 2022-11-25 RX ADMIN — ASPIRIN 81 MG 81 MG: 81 TABLET ORAL at 05:59

## 2022-11-25 RX ADMIN — APIXABAN 5 MG: 5 TABLET, FILM COATED ORAL at 05:59

## 2022-11-25 RX ADMIN — APIXABAN 5 MG: 5 TABLET, FILM COATED ORAL at 17:11

## 2022-11-25 RX ADMIN — HALOPERIDOL LACTATE 1 MG: 5 INJECTION, SOLUTION INTRAMUSCULAR at 11:21

## 2022-11-25 RX ADMIN — CARVEDILOL 6.25 MG: 6.25 TABLET, FILM COATED ORAL at 08:25

## 2022-11-25 RX ADMIN — AMIODARONE HYDROCHLORIDE 200 MG: 200 TABLET ORAL at 05:59

## 2022-11-25 RX ADMIN — HYDROXYCHLOROQUINE SULFATE 200 MG: 200 TABLET ORAL at 08:26

## 2022-11-25 RX ADMIN — VALSARTAN 40 MG: 80 TABLET, FILM COATED ORAL at 05:59

## 2022-11-25 RX ADMIN — HALOPERIDOL LACTATE 1 MG: 5 INJECTION, SOLUTION INTRAMUSCULAR at 03:28

## 2022-11-25 RX ADMIN — CARVEDILOL 6.25 MG: 6.25 TABLET, FILM COATED ORAL at 17:11

## 2022-11-25 RX ADMIN — OMEPRAZOLE 40 MG: KIT at 05:59

## 2022-11-25 ASSESSMENT — ENCOUNTER SYMPTOMS
SHORTNESS OF BREATH: 1
SPEECH CHANGE: 1
NERVOUS/ANXIOUS: 1
SENSORY CHANGE: 1
GASTROINTESTINAL NEGATIVE: 1
INSOMNIA: 1
EYES NEGATIVE: 1
CARDIOVASCULAR NEGATIVE: 1
MUSCULOSKELETAL NEGATIVE: 1

## 2022-11-25 ASSESSMENT — LIFESTYLE VARIABLES
AVERAGE NUMBER OF DAYS PER WEEK YOU HAVE A DRINK CONTAINING ALCOHOL: 0
TOTAL SCORE: 0
CONSUMPTION TOTAL: NEGATIVE
ALCOHOL_USE: NO
HAVE YOU EVER FELT YOU SHOULD CUT DOWN ON YOUR DRINKING: NO
ON A TYPICAL DAY WHEN YOU DRINK ALCOHOL HOW MANY DRINKS DO YOU HAVE: 0
HOW MANY TIMES IN THE PAST YEAR HAVE YOU HAD 5 OR MORE DRINKS IN A DAY: 0
EVER FELT BAD OR GUILTY ABOUT YOUR DRINKING: NO
EVER HAD A DRINK FIRST THING IN THE MORNING TO STEADY YOUR NERVES TO GET RID OF A HANGOVER: NO
TOTAL SCORE: 0
TOTAL SCORE: 0
HAVE PEOPLE ANNOYED YOU BY CRITICIZING YOUR DRINKING: NO

## 2022-11-25 ASSESSMENT — PAIN DESCRIPTION - PAIN TYPE: TYPE: ACUTE PAIN

## 2022-11-25 NOTE — PROGRESS NOTES
Hospital Medicine Daily Progress Note    Date of Service  11/25/2022    Chief Complaint  Lauren Dave is a 72 y.o. female admitted 11/10/2022 with elective surgery    Hospital Course  72 y.o. 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  The patient is found lethargic, she arouses on verbal command, she is confused, she does have weakness in the right lower extremity as well as left upper extremity, the patient's sister is at the bedside assisting the history taking.  The patient is being prepared for transfer to telemetry, chest tubes are being removed, a pacer wire has been removed, a MRI is underway and later reports multiple areas of watershed type infarctions.  There is no bleeding apparent.  A postoperative echo shows an ejection fraction of 35%, the patient is being diuresed, she is on GDMT post surgery.  The patient unfortunately is intolerant to statin medication.  Neurology was consulted after patient was identified to have a CVA.    11/14/2022: Evaluated patient at bedside today patient appears to be without movement of right lower extremity difficult to arouse weak left upper extremity.  Trending back looks like she has had increasing oxygen demand in addition chest tubes removed on 11/13/2022.  Lastly patient is also had decreasing hemoglobin at present we will trend hemoglobin every 6 hours for 24 hours standing transfusion order has been  placed to transfuse 1 unit PRBC if repeat hemoglobin less than 8.0.  Ejection fraction reviewed from DAVE remains at 35% cardiology medicine recommendations appreciated.  Referral to acute rehabilitation has been placed.  We will continue to follow along closely with cardiovascular thoracic surgery as primary.  Present patient has persistent deficit right lower extremity no movement left upper extremity is weak.  Watershed infarct noted above.  Consider discussion of initiation of amantadine with neurology and cardiology service.  11/15: Ms. Dave was evaluated and examined in the IMCU. Hb this morning is 8.3. I discussed with Dr. Gallagher neurology about +/- anticoagulation given her transient afib and he recommends Apixaban which has been started. She remain on NG feeding and in soft wrist restraints. Her sister is at bedside. IV lasix will be stopped due to a CVP 3 and change to oral.   11/16: Ms. Dave was seen and evaluated in the IMCU. Hb 7.6 today. UA + for E coli. Her blood pressure has fluctuated significantly. She is on 5 liters of oxygen. She has been started on Eliquis in addendum to aspirin.   11/17-11/18 she was transferred to the ICU due to respiratory failure and decreased level of consciousness. Due to pulmonary edema after blood transfusions, she was given IV lasix. She had a metabolic alkalosis.   11/19: Ms. Dave was evaluated and examined in the ICU. She is moving both legs today and is more lucid. She remains on NG feeding. Her sister is at bedside. She is on Eliquis for anticoagulation and Hb is holding stable. She will go to the IMCU. Labs ordered for the morning.  11/20: Ms. Dave was seen and evaluated in the IMCU. Her blood pressure has had very significant fluctuations as low at 81/47 up to 152/66. Cr today is up to 1.75 despite tube feeds and no diuretics. Her sister is at bedside. She remains on NG feeding.  11/21: Ms. Dave was evaluated and examined in the IMCU. Her Cr  remains high at 1.7 and her BUN has gone up to 72. She is off diuretics. She has been on tube feeds. Her blood pressure has continued to fluctuate from systolic in the low 80's to systolic 150's. Free water added 250 ml q4 hours. A FEES has been ordered for today. She had a 6.2 second pause last night while coughing that was asymptomatic. Her sister is at bedside.    Interval Problem Update  Patient was evaluated at bedside  Patient removed NG tube yesterday  Marked improvement of mental status today and patient tolerating with friend  Patient reporting improvement with meals and stating that she likes the food  Patient consuming less than 25% of meals, add mirtazapine  On 1L nasal cannula  Leukocytosis trending down  Creatinine higher today  Diet per SPL  Nutrition following, keep NG tube for now and monitor feeds  PT/OT recommending postacute care  PMR evaluated on 11/14 and recommended SNF  Pending SNF    I have discussed this patient's plan of care and discharge plan at IDT rounds today with Case Management, Nursing, Nursing leadership, and other members of the IDT team.    Consultants/Specialty  critical care   Neurology  Physiatry  Cardiology   CT surgery  Code Status  Full Code    Disposition  Patient is medically cleared for discharge.   Anticipate discharge to to skilled nursing facility.  I have placed the appropriate orders for post-discharge needs.    Review of Systems  Review of Systems   Constitutional:  Positive for malaise/fatigue.   HENT: Negative.     Eyes: Negative.    Respiratory:  Positive for shortness of breath.    Cardiovascular: Negative.    Gastrointestinal: Negative.    Genitourinary: Negative.    Musculoskeletal: Negative.    Skin: Negative.    Neurological:  Positive for sensory change and speech change.   Endo/Heme/Allergies: Negative.    Psychiatric/Behavioral:  The patient is nervous/anxious and has insomnia.       Physical Exam  Temp:  [36.5 °C (97.7 °F)-36.7 °C (98.1 °F)] 36.5 °C  (97.7 °F)  Pulse:  [] 78  Resp:  [16-20] 20  BP: (103-139)/() 113/49  SpO2:  [95 %-98 %] 95 %    Physical Exam  Vitals and nursing note reviewed.   Constitutional:       General: She is not in acute distress.     Appearance: She is ill-appearing.   HENT:      Head: Normocephalic and atraumatic.      Nose:      Comments: NG tube     Mouth/Throat:      Mouth: Mucous membranes are dry.      Pharynx: Oropharynx is clear.   Eyes:      General: No scleral icterus.     Conjunctiva/sclera: Conjunctivae normal.   Cardiovascular:      Rate and Rhythm: Normal rate and regular rhythm.      Comments: Sternal incision without infection  Pulmonary:      Effort: Pulmonary effort is normal.      Breath sounds: Normal breath sounds.      Comments: 3 liters of oxygen  Abdominal:      General: There is no distension.      Tenderness: There is no abdominal tenderness.   Musculoskeletal:      Cervical back: Normal range of motion and neck supple.      Right lower leg: No edema.      Left lower leg: No edema.   Skin:     General: Skin is warm and dry.   Neurological:      Mental Status: She is alert.      Comments: Confused   Left leg 5/5 right leg 3+/5  Arms/hands move generally equally   Psychiatric:      Comments:   Voice is a bit hoarse  Flat affect  A bit impulsive and easily distracted       Fluids    Intake/Output Summary (Last 24 hours) at 11/25/2022 1232  Last data filed at 11/25/2022 0911  Gross per 24 hour   Intake 950 ml   Output --   Net 950 ml       Laboratory  Recent Labs     11/23/22 0134 11/24/22 0102 11/25/22 0134   WBC 15.6* 17.0* 12.5*   RBC 3.48* 3.58* 3.87*   HEMOGLOBIN 10.4* 10.8* 11.6*   HEMATOCRIT 32.3* 34.2* 36.7*   MCV 92.8 95.5 94.8   MCH 29.9 30.2 30.0   MCHC 32.2* 31.6* 31.6*   RDW 46.8 48.0 46.7   PLATELETCT 522* 599* 677*   MPV 10.7 10.7 10.6     Recent Labs     11/23/22 0134 11/24/22 0102 11/25/22 0134   SODIUM 132* 135 137   POTASSIUM 4.3 4.8 4.8   CHLORIDE 95* 99 99   CO2 28 27 25    GLUCOSE 152* 105* 103*   BUN 51* 41* 45*   CREATININE 1.42* 1.46* 1.60*   CALCIUM 9.2 9.7 9.8                     Imaging  [id number] VIEW   Final Result      1.  There is a nonobstructive bowel gas pattern with a moderate amount of colonic stool.      DX-CHEST-LIMITED (1 VIEW)   Final Result      Stable chest with cardiac enlargement, bronchial thickening and left basilar atelectasis. Bronchial thickening most likely represents bronchitis but edema could be considered      IR-US GUIDED PIV   Final Result    Ultrasound-guided PERIPHERAL IV INSERTION performed by    qualified nursing staff as above.      DX-ABDOMEN FOR TUBE PLACEMENT   Final Result      Enteric tube tip projects over the stomach      DX-CHEST-PORTABLE (1 VIEW)   Final Result      1.  Tubes and lines in good position.      2.  Mild cardiomegaly with mild diffuse pulmonary edema with slight improvement since previous exam.      3.  Small left pleural effusion.      EC-ECHOCARDIOGRAM LTD W/O CONT   Final Result      DX-ABDOMEN FOR TUBE PLACEMENT   Final Result      Enteric tube tip projects over the stomach      DX-CHEST-PORTABLE (1 VIEW)   Final Result      1.  Layering bilateral pleural effusions with adjacent airspace disease.   2.  Increased interstitial edema.      DX-ABDOMEN FOR TUBE PLACEMENT   Final Result      Orogastric tube tip at the distal stomach.      DX-CHEST-PORTABLE (1 VIEW)   Final Result      1.  Removal of right IJ catheter.      2.  Right subclavian catheter unchanged in position.      3.  Mild cardiomegaly.      4.  Blunting of left costophrenic angle consistent with atelectasis, pneumonitis, and/or pleural effusion.      DX-ABDOMEN FOR TUBE PLACEMENT   Final Result      Enteric tube tip projects over the stomach antrum      MR-BRAIN-W/O   Final Result      1.  BILATERAL mostly supratentorial areas of ischemia as described above. These appear to be primarily watershed zone infarctions.   2.  No hemorrhage   3.  No significant  mass effect      DX-CHEST-PORTABLE (1 VIEW)   Final Result         1. No significant interval change.      DX-ABDOMEN FOR TUBE PLACEMENT   Final Result      Interval placement of enteric tube with its tip over the midline epigastrium compatible with position at the level of the gastric body.      CT-HEAD W/O   Final Result      1.  Likely subacute infarcts in the bilateral parieto-occipital regions.   2.  No acute intracranial hemorrhage.   3.  Bilateral maxillary sinus disease.      EC-DAVE W/O CONT   Final Result      DX-CHEST-PORTABLE (1 VIEW)   Final Result      Stable enlargement of the cardiomediastinal silhouette, mild diffuse interstitial edema and bilateral basilar atelectasis and/or consolidation.      DX-CHEST-PORTABLE (1 VIEW)   Final Result      Satisfactory postoperative appearance of the chest             Assessment/Plan  * Acute ischemic stroke (HCC)  Assessment & Plan  Post/perioperative event with multiple areas of watershed type infarction  Neurology consulted  DAPT had been given then due to recent afib, neurology recommended Eliquis which was started  Neurology consultation was obtained  PT/OT/SLP and rehabilitation  NG feeding until she is cleared by speech path  She is now moving all extremities though right leg remains weak  She will be an excellent candidate for rehab    Dysphagia- (present on admission)  Assessment & Plan  SPL following, NG feed held and started on diet  Nutrition following, keep NG tube for now and monitor oral feeds 24-hour    Chronic systolic congestive heart failure (HCC) EF 35%- (present on admission)  Assessment & Plan  Treated to euvolemia, GDMT as tolerated  Ejection fraction 35% on DAVE.  Coreg and diovan    TAN (acute kidney injury) ([facility])  Assessment & Plan  Cr has gone up to 1.7 and BUN is up to 72  She is now off diuretics  Free water 250 mL will be added to NG feeding q4 hours.  Follow urine output and check BMP in the morning.     Acute blood loss anemia-  (present on admission)  Assessment & Plan  From hematuria  Requiring transfusion  Hb 12.4    Encephalopathy acute  Assessment & Plan  Acute encephalopathy with consideration of ICU delirium post-stroke  This is slowly improving though she remains encephalopathic    Acute respiratory failure with hypoxia ([facility])  Assessment & Plan  She has had volume overload requiring high flow oxygen  Now on 2.5 liters of oxygen  .     PAF (paroxysmal atrial fibrillation) ([facility])- (present on admission)  Assessment & Plan  Post-operative  Decrease the amiodarone dose as she has been loaded.  eliquis    Other specified anemias  Assessment & Plan  Acute blood loss due to hematuria  Hb has been kady thus stop daily phlebotomy and check as indicated.  Transfuse RBCs for hemoglobin less than 8.0 as she is s/p CABG.    S/P CABG x 3  Assessment & Plan  As per cardiac surgery, optimization,, mobilization as tolerated,  Telemetry    Chronic obstructive pulmonary disease (COPD) ([facility])- (present on admission)  Assessment & Plan  Pre-existing, respiratory protocol,  Continuous respiratory therapy protocol.  Without exacerbation    Dyslipidemia  Assessment & Plan  The patient with prior severe intolerance, apparently failed multiple regimens  Consider outpatient referral to PCSK9 treatment    Coronary artery disease due to calcified coronary lesion - Calcifications seen on CT scan also wtih aortic ulceration- (present on admission)  Assessment & Plan  S/p CABG       VTE prophylaxis: therapeutic anticoagulation with Eliquis    I have performed a physical exam and reviewed and updated ROS and Plan today (11/25/2022). In review of yesterday's note (11/24/2022), there are no changes except as documented above.

## 2022-11-25 NOTE — PROGRESS NOTES
Monitor Summary:   Rhythm: SR, BBB  Rate: 75-97  Measurement: .19/.13/.44  Ectopy: (R) PVC

## 2022-11-25 NOTE — DISCHARGE PLANNING
Agency/Facility Name: [facility]  Spoke To: Romy  Outcome: Facility has bed available for Pt and transport with [facility] van @ [phone].    [id number]  Agency/Facility Name: [facility]  Spoke To: Romy  Outcome: Facility needs to re-review referral and forwarded to director of PT. They confirmed Pt cannot transfer to them tomorrow, decision will be made on Monday. DPA to follow up on Monday 1541  DPA faxed referrals to [facility] per RN ALEXANDRA discussion @ [phone]

## 2022-11-25 NOTE — DIETARY
Nutrition Services Brief Update:    Day 15 of admit.  Lauren Dave is a 72 y.o. female with admitting DX of Atherosclerotic heart disease of native coronary artery without angina pectoris [I25.10]  CHF (congestive heart failure), NYHA class III, acute on chronic, combined (HCC) [I50.43]    Current Diet: Level 3 - liquidized; Level 2 - mildly thick fluids w/ Boost VHC.  Fibersource HN    11/23 TF was held for 24 hours to monitor PO intake.   11/24 Pt pulled NG. Pt has been consistently eating <50% per ADL documentation. Unknown Boost intake. Recommend placing tube again as pt is not meeting nutrient needs via PO intake.     Previous TF diet remains appropriate: Fibersource HN @ 55 ml/hr will provide 1584 kcal, 71 g protein with 1069 ml of free water per day. Until pt can consistently eat >50% for more then 2 meals, TF recommended.     Problem: Nutritional:  Goal: Achieve adequate nutritional intake  Description: Patient will consume >50% of meals  Outcome: Not progressing.     RD Following.

## 2022-11-26 LAB
ANION GAP SERPL CALC-SCNC: 11 MMOL/L (ref 7–16)
BASOPHILS # BLD AUTO: 0.7 % (ref 0–1.8)
BASOPHILS # BLD: 0.08 K/UL (ref 0–0.12)
BUN SERPL-MCNC: 40 MG/DL (ref 8–22)
CALCIUM SERPL-MCNC: 9.6 MG/DL (ref 8.5–10.5)
CHLORIDE SERPL-SCNC: 98 MMOL/L (ref 96–112)
CO2 SERPL-SCNC: 26 MMOL/L (ref 20–33)
CREAT SERPL-MCNC: 1.5 MG/DL (ref 0.5–1.4)
EOSINOPHIL # BLD AUTO: 0.47 K/UL (ref 0–0.51)
EOSINOPHIL NFR BLD: 4.1 % (ref 0–6.9)
ERYTHROCYTE [DISTWIDTH] IN BLOOD BY AUTOMATED COUNT: 46.1 FL (ref 35.9–50)
GFR SERPLBLD CREATININE-BSD FMLA CKD-EPI: 37 ML/MIN/1.73 M 2
GLUCOSE SERPL-MCNC: 118 MG/DL (ref 65–99)
HCT VFR BLD AUTO: 33.9 % (ref 37–47)
HGB BLD-MCNC: 11 G/DL (ref 12–16)
IMM GRANULOCYTES # BLD AUTO: 0.11 K/UL (ref 0–0.11)
IMM GRANULOCYTES NFR BLD AUTO: 1 % (ref 0–0.9)
LYMPHOCYTES # BLD AUTO: 1.5 K/UL (ref 1–4.8)
LYMPHOCYTES NFR BLD: 13.1 % (ref 22–41)
MCH RBC QN AUTO: 30.6 PG (ref 27–33)
MCHC RBC AUTO-ENTMCNC: 32.4 G/DL (ref 33.6–35)
MCV RBC AUTO: 94.4 FL (ref 81.4–97.8)
MONOCYTES # BLD AUTO: 0.97 K/UL (ref 0–0.85)
MONOCYTES NFR BLD AUTO: 8.5 % (ref 0–13.4)
NEUTROPHILS # BLD AUTO: 8.29 K/UL (ref 2–7.15)
NEUTROPHILS NFR BLD: 72.6 % (ref 44–72)
NRBC # BLD AUTO: 0 K/UL
NRBC BLD-RTO: 0 /100 WBC
PLATELET # BLD AUTO: 709 K/UL (ref 164–446)
PMV BLD AUTO: 10.3 FL (ref 9–12.9)
POTASSIUM SERPL-SCNC: 4.5 MMOL/L (ref 3.6–5.5)
RBC # BLD AUTO: 3.59 M/UL (ref 4.2–5.4)
SODIUM SERPL-SCNC: 135 MMOL/L (ref 135–145)
WBC # BLD AUTO: 11.4 K/UL (ref 4.8–10.8)

## 2022-11-26 PROCEDURE — 97112 NEUROMUSCULAR REEDUCATION: CPT

## 2022-11-26 PROCEDURE — 97530 THERAPEUTIC ACTIVITIES: CPT

## 2022-11-26 PROCEDURE — 99232 SBSQ HOSP IP/OBS MODERATE 35: CPT | Performed by: STUDENT IN AN ORGANIZED HEALTH CARE EDUCATION/TRAINING PROGRAM

## 2022-11-26 PROCEDURE — 85025 COMPLETE CBC W/AUTO DIFF WBC: CPT

## 2022-11-26 PROCEDURE — 770020 HCHG ROOM/CARE - TELE (206)

## 2022-11-26 PROCEDURE — 80048 BASIC METABOLIC PNL TOTAL CA: CPT

## 2022-11-26 PROCEDURE — 700102 HCHG RX REV CODE 250 W/ 637 OVERRIDE(OP): Performed by: STUDENT IN AN ORGANIZED HEALTH CARE EDUCATION/TRAINING PROGRAM

## 2022-11-26 PROCEDURE — 36415 COLL VENOUS BLD VENIPUNCTURE: CPT

## 2022-11-26 PROCEDURE — A9270 NON-COVERED ITEM OR SERVICE: HCPCS | Performed by: STUDENT IN AN ORGANIZED HEALTH CARE EDUCATION/TRAINING PROGRAM

## 2022-11-26 RX ADMIN — ACETAMINOPHEN 1000 MG: 500 TABLET ORAL at 16:32

## 2022-11-26 RX ADMIN — APIXABAN 5 MG: 5 TABLET, FILM COATED ORAL at 18:16

## 2022-11-26 RX ADMIN — MIRTAZAPINE 15 MG: 15 TABLET, FILM COATED ORAL at 18:16

## 2022-11-26 RX ADMIN — CARVEDILOL 6.25 MG: 6.25 TABLET, FILM COATED ORAL at 18:16

## 2022-11-26 ASSESSMENT — ENCOUNTER SYMPTOMS
GASTROINTESTINAL NEGATIVE: 1
CARDIOVASCULAR NEGATIVE: 1
SPEECH CHANGE: 1
EYES NEGATIVE: 1
NERVOUS/ANXIOUS: 1
SENSORY CHANGE: 1
INSOMNIA: 1
SHORTNESS OF BREATH: 1
MUSCULOSKELETAL NEGATIVE: 1

## 2022-11-26 ASSESSMENT — FIBROSIS 4 INDEX: FIB4 SCORE: 0.96

## 2022-11-26 ASSESSMENT — COGNITIVE AND FUNCTIONAL STATUS - GENERAL
CLIMB 3 TO 5 STEPS WITH RAILING: TOTAL
STANDING UP FROM CHAIR USING ARMS: TOTAL
MOVING FROM LYING ON BACK TO SITTING ON SIDE OF FLAT BED: UNABLE
MOVING TO AND FROM BED TO CHAIR: UNABLE
SUGGESTED CMS G CODE MODIFIER MOBILITY: CN
MOBILITY SCORE: 6
WALKING IN HOSPITAL ROOM: TOTAL
TURNING FROM BACK TO SIDE WHILE IN FLAT BAD: UNABLE

## 2022-11-26 ASSESSMENT — GAIT ASSESSMENTS: GAIT LEVEL OF ASSIST: UNABLE TO PARTICIPATE

## 2022-11-26 ASSESSMENT — PAIN DESCRIPTION - PAIN TYPE: TYPE: ACUTE PAIN

## 2022-11-26 NOTE — PROGRESS NOTES
Pt alert and oriented. Calls out constantly but denies needs. Pt did not eat much of her breakfast. Pt refused Am meds because she didn't feel like she could swallow them today. Pt Incontinent, turned and cleaned every hour. Uses call light appropriately.

## 2022-11-26 NOTE — CARE PLAN
Problem: Knowledge Deficit - Standard  Goal: Patient and family/care givers will demonstrate understanding of plan of care, disease process/condition, diagnostic tests and medications  Outcome: Progressing     Problem: Skin Integrity  Goal: Skin integrity is maintained or improved  Outcome: Progressing     Problem: Fall Risk  Goal: Patient will remain free from falls  Outcome: Progressing     Problem: Psychosocial - Patient Condition  Goal: Patient's ability to verbalize feelings about condition will improve  Outcome: Progressing     Problem: Respiratory - Stroke Patient  Goal: Patient will achieve/maintain optimum respiratory rate/effort  Outcome: Progressing   The patient is Watcher - Medium risk of patient condition declining or worsening    Shift Goals  Clinical Goals: safety  Patient Goals: rest  Family Goals: no family present at this time    Progress made toward(s) clinical / shift goals:  safety, sitter removed today.     Patient is not progressing towards the following goals:rest, patient did not sleep during the day.

## 2022-11-26 NOTE — PROGRESS NOTES
4 Eyes Skin Assessment Completed by Yeimy Brady, RN and More Ram, KATY.    Head WDL  Ears WDL  Nose WDL  Mouth WDL  Neck WDL  Breast/Chest Incision  Shoulder Blades WDL  Spine WDL  (R) Arm/Elbow/Hand WDL  (L) Arm/Elbow/Hand WDL  Abdomen Incision  Groin WDL  Scrotum/Coccyx/Buttocks Redness, Blanching, and Excoriation  (R) Leg WDL  (L) Leg Incision  (R) Heel/Foot/Toe WDL  (L) Heel/Foot/Toe WDL          Devices In Places Tele Box and Nasal Cannula      Interventions In Place Gray Ear Foams, Heel Mepilex, Waffle Overlay, TAP System, Pillows, Q2 Turns, Barrier Cream, Dri-Bruno Pads, and Pressure Redistribution Mattress    Possible Skin Injury Yes    Pictures Uploaded Into Epic Yes  Wound Consult Placed N/A  RN Wound Prevention Protocol Ordered Yes

## 2022-11-26 NOTE — PROGRESS NOTES
HISTORY OF PRESENTING ILLNESS      Rashawn Boswell is a 76 y.o. female recently seen in the [facility] ER for AF with RVR. Her metoprolol was increased. Hospitalized in 2008 with AF. Had recurrent AF recently that prompted visit to ER where her metoprolol dosing was increased to 50 mg. Had been on flecainide (PIP) in the past however self discontinued this earlier this year. Has been on anticoagulation for at least 1 month. EKG shows AF with RBBB today. She has noticed palpitations and SOB. She has gastric issues and is uncertain whether this could be driving her current symptoms vs the AF. PAST MEDICAL HISTORY     Past Medical History:   Diagnosis Date    ADD (attention deficit disorder with hyperactivity)     Chronic pain     GERD (gastroesophageal reflux disease)     Hypertension     Hypothyroid     Ill-defined condition     fibromyalgia    Insomnia     Obesity     PAF (paroxysmal atrial fibrillation) ([address]     initially in 2008, with more spells since then (last documented 7/13 at [facility])           PAST SURGICAL HISTORY     Past Surgical History:   Procedure Laterality Date    CARDIAC CATHETERIZATION  5/08    normal    COLONOSCOPY N/A 7/6/2018    COLONOSCOPY performed by Andree Alvarez MD at [facility] ECHO 2D ADULT  5/08    normal    HX APPENDECTOMY      HX ORTHOPAEDIC Bilateral 2006, 2005    rotator cuff    HX ORTHOPAEDIC Bilateral 2008, 2008    knee replacement    HX OTHER SURGICAL      Echo 4/6/12 - normal, LVEF 60-65%           ALLERGIES     Allergies   Allergen Reactions    Sulfa (Sulfonamide Antibiotics) Other (comments)     Reports headaches.           FAMILY HISTORY     Family History   Problem Relation Age of Onset    Cancer Mother     Cancer Father     negative for cardiac disease       SOCIAL HISTORY     Social History     Socioeconomic History    Marital status:      Spouse name: Not on file    Number of children: Not on file    Years of Monitor Summary:   Rhythm: SR  Rate: 86-95  Measurement: ..20/.14/.18  Ectopy: none             education: Not on file    Highest education level: Not on file   Tobacco Use    Smoking status: Former Smoker     Years: 10.00     Last attempt to quit: 1979     Years since quittin.7    Smokeless tobacco: Never Used   Substance and Sexual Activity    Alcohol use: Yes     Alcohol/week: 1.0 standard drinks     Types: 1 Glasses of wine per week     Comment: rare     Drug use: No         MEDICATIONS     Current Outpatient Medications   Medication Sig    potassium chloride SA (MICRO-K) 10 mEq capsule Take 10 mEq by mouth daily.  pantoprazole (PROTONIX) 20 mg tablet Take 20 mg by mouth daily.  flecainide (TAMBOCOR) 50 mg tablet Take 1 Tab by mouth two (2) times a day.  metoprolol tartrate (LOPRESSOR) 50 mg tablet Take 1 Tab by mouth two (2) times a day for 30 days.  OTHER 1 Applicator daily. Uses CBD oil once a day.  multivitamin (ONE A DAY) tablet Take 1 Tab by mouth daily.  cholecalciferol (VITAMIN D3) 1,000 unit tablet Take 3,000 Units by mouth daily.  cyanocobalamin (VITAMIN B-12) 1,000 mcg tablet Take 1,000 mcg by mouth daily.  DULoxetine (CYMBALTA) 20 mg capsule Take 20 mg by mouth daily.  hydrochlorothiazide (HYDRODIURIL) 25 mg tablet Take 25 mg by mouth two (2) times a day.  XARELTO 20 mg tab tablet TAKE 1 TABLET BY MOUTH DAILY    levothyroxine (SYNTHROID) 25 mcg tablet Take 50 mcg by mouth Daily (before breakfast). No current facility-administered medications for this visit. I have reviewed the nurses notes, vitals, problem list, allergy list, medical history, family, social history and medications. REVIEW OF SYMPTOMS      General: Pt denies excessive weight gain or loss. Pt is able to conduct ADL's  HEENT: Denies blurred vision, headaches, hearing loss, epistaxis and difficulty swallowing. Respiratory: Denies cough, congestion, shortness of breath, ENRIQUEZ, wheezing or stridor.   Cardiovascular: Denies precordial pain, palpitations, edema or PND  Gastrointestinal: Denies poor appetite, indigestion, abdominal pain or blood in stool  Genitourinary: Denies hematuria, dysuria, increased urinary frequency  Musculoskeletal: Denies joint pain or swelling from muscles or joints  Neurologic: Denies tremor, paresthesias, headache, or sensory motor disturbance  Psychiatric: Denies confusion, insomnia, depression  Integumentray: Denies rash, itching or ulcers. Hematologic: Denies easy bruising, bleeding       PHYSICAL EXAMINATION      Vitals: see vitals section  General: Well developed, in no acute distress. HEENT: No jaundice, oral mucosa moist, no oral ulcers  Neck: Supple, no stiffness, no lymphadenopathy, supple  Heart:  Normal S1/S2 negative S3 or S4. Regular, no murmur, gallop or rub, no jugular venous distention  Respiratory: Clear bilaterally x 4, no wheezing or rales  Abdomen:   Soft, non-tender, bowel sounds are active. Extremities:  No edema, normal cap refill, no cyanosis. Musculoskeletal: No clubbing, no deformities  Neuro: A&Ox3, speech clear, gait stable, cooperative, no focal neurologic deficits  Skin: Skin color is normal. No rashes or lesions.  Non diaphoretic, moist.  Vascular: 2+ pulses symmetric in all extremities       DIAGNOSTIC DATA      EKG:        LABORATORY DATA      Lab Results   Component Value Date/Time    WBC 9.4 09/29/2020 04:49 PM    Hemoglobin (POC) 13.3 07/01/2013 07:08 PM    HGB 16.2 (H) 09/29/2020 04:49 PM    Hematocrit (POC) 39 07/01/2013 07:08 PM    HCT 48.0 (H) 09/29/2020 04:49 PM    PLATELET 983 02/86/3984 04:49 PM    MCV 92.0 09/29/2020 04:49 PM      Lab Results   Component Value Date/Time    Sodium 139 09/29/2020 04:49 PM    Potassium 3.4 (L) 09/29/2020 04:49 PM    Chloride 103 09/29/2020 04:49 PM    CO2 29 09/29/2020 04:49 PM    Anion gap 7 09/29/2020 04:49 PM    Glucose 100 09/29/2020 04:49 PM    BUN 29 (H) 09/29/2020 04:49 PM    Creatinine 1.41 (H) 09/29/2020 04:49 PM    BUN/Creatinine ratio 21 (H) 09/29/2020 04:49 PM    GFR est AA 44 (L) 09/29/2020 04:49 PM    GFR est non-AA 36 (L) 09/29/2020 04:49 PM    Calcium 9.0 09/29/2020 04:49 PM    Bilirubin, total 0.4 07/01/2013 07:01 PM    Alk. phosphatase 112 07/01/2013 07:01 PM    Protein, total 7.4 07/01/2013 07:01 PM    Albumin 3.6 07/01/2013 07:01 PM    Globulin 3.8 07/01/2013 07:01 PM    A-G Ratio 0.9 (L) 07/01/2013 07:01 PM    ALT (SGPT) 39 07/01/2013 07:01 PM           ASSESSMENT      1. Atrial fibrillation  2. Hypertension  3. Obesity         PLAN     Restart flecainide; continue metoprolol/xarelto. Plan for DCCV with Dr. Severa Pester. Monitor for symptom improvement to determine whether to continue long-term AAD/consider AF ablation. Plan for DCCV with Dr. Cynthia Black. Follow up 2 weeks after DCCV. ICD-10-CM ICD-9-CM    1. PAF (paroxysmal atrial fibrillation) (HCC)  I48.0 427.31 AMB POC EKG ROUTINE W/ 12 LEADS, INTER & REP   2. Severe obesity ([address]  E66.01 278.01    3. Essential hypertension  I10 401.9      Orders Placed This Encounter    AMB POC EKG ROUTINE W/ 12 LEADS, INTER & REP     Order Specific Question:   Reason for Exam:     Answer: Afib    potassium chloride SA (MICRO-K) 10 mEq capsule     Sig: Take 10 mEq by mouth daily.  pantoprazole (PROTONIX) 20 mg tablet     Sig: Take 20 mg by mouth daily.  flecainide (TAMBOCOR) 50 mg tablet     Sig: Take 1 Tab by mouth two (2) times a day. Dispense:  60 Tab     Refill:  3          FOLLOW-UP     2 weeks after DCCV      Thank you, Hailey Maddox DO and Dr. Severa Pester for allowing me to participate in the care of this extraordinarily pleasant female. Please do not hesitate to contact me for further questions/concerns.          Sabine Villafuerte MD  Cardiac Electrophysiology / Cardiology    [address], [address] [place] [address]  [phone] / [phone] Fax   [phone] / [phone] Fax

## 2022-11-26 NOTE — PROGRESS NOTES
Hospital Medicine Daily Progress Note    Date of Service  11/26/2022    Chief Complaint  Lauren Dave is a 72 y.o. female admitted 11/10/2022 with elective surgery    Hospital Course  72 y.o. 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  The patient is found lethargic, she arouses on verbal command, she is confused, she does have weakness in the right lower extremity as well as left upper extremity, the patient's sister is at the bedside assisting the history taking.  The patient is being prepared for transfer to telemetry, chest tubes are being removed, a pacer wire has been removed, a MRI is underway and later reports multiple areas of watershed type infarctions.  There is no bleeding apparent.  A postoperative echo shows an ejection fraction of 35%, the patient is being diuresed, she is on GDMT post surgery.  The patient unfortunately is intolerant to statin medication.  Neurology was consulted after patient was identified to have a CVA.    11/14/2022: Evaluated patient at bedside today patient appears to be without movement of right lower extremity difficult to arouse weak left upper extremity.  Trending back looks like she has had increasing oxygen demand in addition chest tubes removed on 11/13/2022.  Lastly patient is also had decreasing hemoglobin at present we will trend hemoglobin every 6 hours for 24 hours standing transfusion order has been  placed to transfuse 1 unit PRBC if repeat hemoglobin less than 8.0.  Ejection fraction reviewed from DAVE remains at 35% cardiology medicine recommendations appreciated.  Referral to acute rehabilitation has been placed.  We will continue to follow along closely with cardiovascular thoracic surgery as primary.  Present patient has persistent deficit right lower extremity no movement left upper extremity is weak.  Watershed infarct noted above.  Consider discussion of initiation of amantadine with neurology and cardiology service.  11/15: Ms. Dave was evaluated and examined in the IMCU. Hb this morning is 8.3. I discussed with Dr. Gallagher neurology about +/- anticoagulation given her transient afib and he recommends Apixaban which has been started. She remain on NG feeding and in soft wrist restraints. Her sister is at bedside. IV lasix will be stopped due to a CVP 3 and change to oral.   11/16: Ms. Dave was seen and evaluated in the IMCU. Hb 7.6 today. UA + for E coli. Her blood pressure has fluctuated significantly. She is on 5 liters of oxygen. She has been started on Eliquis in addendum to aspirin.   11/17-11/18 she was transferred to the ICU due to respiratory failure and decreased level of consciousness. Due to pulmonary edema after blood transfusions, she was given IV lasix. She had a metabolic alkalosis.   11/19: Ms. Dave was evaluated and examined in the ICU. She is moving both legs today and is more lucid. She remains on NG feeding. Her sister is at bedside. She is on Eliquis for anticoagulation and Hb is holding stable. She will go to the IMCU. Labs ordered for the morning.  11/20: Ms. Dave was seen and evaluated in the IMCU. Her blood pressure has had very significant fluctuations as low at 81/47 up to 152/66. Cr today is up to 1.75 despite tube feeds and no diuretics. Her sister is at bedside. She remains on NG feeding.  11/21: Ms. Dave was evaluated and examined in the IMCU. Her Cr  remains high at 1.7 and her BUN has gone up to 72. She is off diuretics. She has been on tube feeds. Her blood pressure has continued to fluctuate from systolic in the low 80's to systolic 150's. Free water added 250 ml q4 hours. A FEES has been ordered for today. She had a 6.2 second pause last night while coughing that was asymptomatic. Her sister is at bedside.    Interval Problem Update  Patient was evaluated at bedside  Marked improvement of mental status today, patient holding conversations and looking forward to being transferred to rehab/skilled nursing facility  Satting well room air  No leukocytosis today  PT/OT recommending postacute care  PMR evaluated on 11/14 and recommended SNF  Pending SNF    I have discussed this patient's plan of care and discharge plan at IDT rounds today with Case Management, Nursing, Nursing leadership, and other members of the IDT team.    Consultants/Specialty  critical care   Neurology  Physiatry  Cardiology   CT surgery  Code Status  Full Code    Disposition  Patient is medically cleared for discharge.   Anticipate discharge to to skilled nursing facility.  I have placed the appropriate orders for post-discharge needs.    Review of Systems  Review of Systems   Constitutional:  Positive for malaise/fatigue.   HENT: Negative.     Eyes: Negative.    Respiratory:  Positive for shortness of breath.    Cardiovascular: Negative.    Gastrointestinal: Negative.    Genitourinary: Negative.    Musculoskeletal: Negative.    Skin: Negative.    Neurological:  Positive for sensory change and speech change.   Endo/Heme/Allergies: Negative.    Psychiatric/Behavioral:  The patient is nervous/anxious and has insomnia.       Physical Exam  Temp:  [36.2 °C (97.2 °F)-36.8 °C (98.2 °F)] 36.5 °C (97.7 °F)  Pulse:  [78-97] 97  Resp:  [18-20] 18  BP: (113-150)/(47-76) 137/69  SpO2:  [90 %-96 %] 96 %    Physical Exam  Vitals and nursing note reviewed.   Constitutional:       General: She is not in  acute distress.     Appearance: She is ill-appearing.   HENT:      Head: Normocephalic and atraumatic.      Nose:      Comments: NG tube     Mouth/Throat:      Mouth: Mucous membranes are dry.      Pharynx: Oropharynx is clear.   Eyes:      General: No scleral icterus.     Conjunctiva/sclera: Conjunctivae normal.   Cardiovascular:      Rate and Rhythm: Normal rate and regular rhythm.      Comments: Sternal incision without infection  Pulmonary:      Effort: Pulmonary effort is normal.      Breath sounds: Normal breath sounds.      Comments: 3 liters of oxygen  Abdominal:      General: There is no distension.      Tenderness: There is no abdominal tenderness.   Musculoskeletal:      Cervical back: Normal range of motion and neck supple.      Right lower leg: No edema.      Left lower leg: No edema.   Skin:     General: Skin is warm and dry.   Neurological:      Mental Status: She is alert.      Comments: Confused   Left leg 5/5 right leg 3+/5  Arms/hands move generally equally   Psychiatric:      Comments:   Voice is a bit hoarse  Flat affect  A bit impulsive and easily distracted       Fluids    Intake/Output Summary (Last 24 hours) at 11/26/2022 1157  Last data filed at 11/25/2022 1700  Gross per 24 hour   Intake 340 ml   Output --   Net 340 ml       Laboratory  Recent Labs     11/24/22  0102 11/25/22  0134 11/26/22  0206   WBC 17.0* 12.5* 11.4*   RBC 3.58* 3.87* 3.59*   HEMOGLOBIN 10.8* 11.6* 11.0*   HEMATOCRIT 34.2* 36.7* 33.9*   MCV 95.5 94.8 94.4   MCH 30.2 30.0 30.6   MCHC 31.6* 31.6* 32.4*   RDW 48.0 46.7 46.1   PLATELETCT 599* 677* 709*   MPV 10.7 10.6 10.3     Recent Labs     11/24/22  0102 11/25/22  0134 11/26/22  0206   SODIUM 135 137 135   POTASSIUM 4.8 4.8 4.5   CHLORIDE 99 99 98   CO2 27 25 26   GLUCOSE 105* 103* 118*   BUN 41* 45* 40*   CREATININE 1.46* 1.60* 1.50*   CALCIUM 9.7 9.8 9.6                     Imaging  [id number] [id number]   Final Result      1.  There is a nonobstructive bowel gas pattern  with a moderate amount of colonic stool.      DX-CHEST-LIMITED (1 VIEW)   Final Result      Stable chest with cardiac enlargement, bronchial thickening and left basilar atelectasis. Bronchial thickening most likely represents bronchitis but edema could be considered      IR-US GUIDED PIV   Final Result    Ultrasound-guided PERIPHERAL IV INSERTION performed by    qualified nursing staff as above.      DX-ABDOMEN FOR TUBE PLACEMENT   Final Result      Enteric tube tip projects over the stomach      DX-CHEST-PORTABLE (1 VIEW)   Final Result      1.  Tubes and lines in good position.      2.  Mild cardiomegaly with mild diffuse pulmonary edema with slight improvement since previous exam.      3.  Small left pleural effusion.      EC-ECHOCARDIOGRAM LTD W/O CONT   Final Result      DX-ABDOMEN FOR TUBE PLACEMENT   Final Result      Enteric tube tip projects over the stomach      DX-CHEST-PORTABLE (1 VIEW)   Final Result      1.  Layering bilateral pleural effusions with adjacent airspace disease.   2.  Increased interstitial edema.      DX-ABDOMEN FOR TUBE PLACEMENT   Final Result      Orogastric tube tip at the distal stomach.      DX-CHEST-PORTABLE (1 VIEW)   Final Result      1.  Removal of right IJ catheter.      2.  Right subclavian catheter unchanged in position.      3.  Mild cardiomegaly.      4.  Blunting of left costophrenic angle consistent with atelectasis, pneumonitis, and/or pleural effusion.      DX-ABDOMEN FOR TUBE PLACEMENT   Final Result      Enteric tube tip projects over the stomach antrum      MR-BRAIN-W/O   Final Result      1.  BILATERAL mostly supratentorial areas of ischemia as described above. These appear to be primarily watershed zone infarctions.   2.  No hemorrhage   3.  No significant mass effect      DX-CHEST-PORTABLE (1 VIEW)   Final Result         1. No significant interval change.      DX-ABDOMEN FOR TUBE PLACEMENT   Final Result      Interval placement of enteric tube with its tip over  the midline epigastrium compatible with position at the level of the gastric body.      CT-HEAD W/O   Final Result      1.  Likely subacute infarcts in the bilateral parieto-occipital regions.   2.  No acute intracranial hemorrhage.   3.  Bilateral maxillary sinus disease.      EC-DAVE W/O CONT   Final Result      DX-CHEST-PORTABLE (1 VIEW)   Final Result      Stable enlargement of the cardiomediastinal silhouette, mild diffuse interstitial edema and bilateral basilar atelectasis and/or consolidation.      DX-CHEST-PORTABLE (1 VIEW)   Final Result      Satisfactory postoperative appearance of the chest             Assessment/Plan  * Acute ischemic stroke (HCC)  Assessment & Plan  Post/perioperative event with multiple areas of watershed type infarction  Neurology consulted  DAPT had been given then due to recent afib, neurology recommended Eliquis which was started  Neurology consultation was obtained  PT/OT/SLP and rehabilitation  NG feeding until she is cleared by speech path  She is now moving all extremities though right leg remains weak  She will be an excellent candidate for rehab    Dysphagia- (present on admission)  Assessment & Plan  SPL following, NG feed held and started on diet  Nutrition following, keep NG tube for now and monitor oral feeds 24-hour    Chronic systolic congestive heart failure (HCC) EF 35%- (present on admission)  Assessment & Plan  Treated to euvolemia, GDMT as tolerated  Ejection fraction 35% on DAVE.  Coreg and diovan    TAN (acute kidney injury) ([facility])  Assessment & Plan  Cr has gone up to 1.7 and BUN is up to 72  She is now off diuretics  Free water 250 mL will be added to NG feeding q4 hours.  Follow urine output and check BMP in the morning.     Acute blood loss anemia- (present on admission)  Assessment & Plan  From hematuria  Requiring transfusion  Hb 12.4    Encephalopathy acute  Assessment & Plan  Acute encephalopathy with consideration of ICU delirium post-stroke  This is  slowly improving though she remains encephalopathic    Acute respiratory failure with hypoxia ([facility])  Assessment & Plan  She has had volume overload requiring high flow oxygen  Now on 2.5 liters of oxygen  .     PAF (paroxysmal atrial fibrillation) ([facility])- (present on admission)  Assessment & Plan  Post-operative  Decrease the amiodarone dose as she has been loaded.  eliquis    Other specified anemias  Assessment & Plan  Acute blood loss due to hematuria  Hb has been kady thus stop daily phlebotomy and check as indicated.  Transfuse RBCs for hemoglobin less than 8.0 as she is s/p CABG.    S/P CABG x 3  Assessment & Plan  As per cardiac surgery, optimization,, mobilization as tolerated,  Telemetry    Chronic obstructive pulmonary disease (COPD) ([facility])- (present on admission)  Assessment & Plan  Pre-existing, respiratory protocol,  Continuous respiratory therapy protocol.  Without exacerbation    Dyslipidemia  Assessment & Plan  The patient with prior severe intolerance, apparently failed multiple regimens  Consider outpatient referral to PCSK9 treatment    Coronary artery disease due to calcified coronary lesion - Calcifications seen on CT scan also wtih aortic ulceration- (present on admission)  Assessment & Plan  S/p CABG       VTE prophylaxis: therapeutic anticoagulation with Eliquis    I have performed a physical exam and reviewed and updated ROS and Plan today (11/26/2022). In review of yesterday's note (11/25/2022), there are no changes except as documented above.

## 2022-11-26 NOTE — PROGRESS NOTES
Bedside report received from day RN, pt care assumed, assessment completed. Pt is A&O3 disoriented to time, pain 0/10, SR on the monitor. Updated on POC, questions answered. Bed in lowest, locked position, treaded socks on, call light and belongings within reach.

## 2022-11-26 NOTE — CARE PLAN
Problem: Knowledge Deficit - Standard  Goal: Patient and family/care givers will demonstrate understanding of plan of care, disease process/condition, diagnostic tests and medications  Outcome: Not Met     Problem: Skin Integrity  Goal: Skin integrity is maintained or improved  Outcome: Not Met  Note: Pt refusing to use female wick for urine containment. Pt has been educated and encouraged to call prior to needing restroom.      Problem: Fall Risk  Goal: Patient will remain free from falls  Outcome: Progressing     Problem: Pain - Standard  Goal: Alleviation of pain or a reduction in pain to the patient’s comfort goal  Outcome: Progressing   The patient is Watcher - Medium risk of patient condition declining or worsening    Shift Goals  Clinical Goals: safety, skin breakdown prevention  Patient Goals: go to a facility tomorrow  Family Goals: SUNDAR    Progress made toward(s) clinical / shift goals:  Discharge planning in the works.     Patient is not progressing towards the following goals: Pt remains disoriented to time      Problem: Knowledge Deficit - Standard  Goal: Patient and family/care givers will demonstrate understanding of plan of care, disease process/condition, diagnostic tests and medications  Outcome: Not Met     Problem: Skin Integrity  Goal: Skin integrity is maintained or improved  Outcome: Not Met  Note: Pt refusing to use female wick for urine containment. Pt has been educated and encouraged to call prior to needing restroom.

## 2022-11-26 NOTE — CARE PLAN
The patient is Stable - Low risk of patient condition declining or worsening    Shift Goals  Clinical Goals: Discharge  Patient Goals: Discharge  Family Goals: SUNDAR    Progress made toward(s) clinical / shift goals:  yes  Problem: Knowledge Deficit - Standard  Goal: Patient and family/care givers will demonstrate understanding of plan of care, disease process/condition, diagnostic tests and medications  Outcome: Progressing     Problem: Skin Integrity  Goal: Skin integrity is maintained or improved  Outcome: Progressing     Problem: Fall Risk  Goal: Patient will remain free from falls  Outcome: Progressing     Problem: Pain - Standard  Goal: Alleviation of pain or a reduction in pain to the patient’s comfort goal  Outcome: Progressing       Patient is not progressing towards the following goals:

## 2022-11-27 LAB
ANION GAP SERPL CALC-SCNC: 12 MMOL/L (ref 7–16)
BASOPHILS # BLD AUTO: 0.8 % (ref 0–1.8)
BASOPHILS # BLD: 0.08 K/UL (ref 0–0.12)
BUN SERPL-MCNC: 40 MG/DL (ref 8–22)
CALCIUM SERPL-MCNC: 9.5 MG/DL (ref 8.5–10.5)
CHLORIDE SERPL-SCNC: 100 MMOL/L (ref 96–112)
CO2 SERPL-SCNC: 24 MMOL/L (ref 20–33)
CREAT SERPL-MCNC: 1.67 MG/DL (ref 0.5–1.4)
EOSINOPHIL # BLD AUTO: 0.49 K/UL (ref 0–0.51)
EOSINOPHIL NFR BLD: 5.1 % (ref 0–6.9)
ERYTHROCYTE [DISTWIDTH] IN BLOOD BY AUTOMATED COUNT: 46.7 FL (ref 35.9–50)
GFR SERPLBLD CREATININE-BSD FMLA CKD-EPI: 32 ML/MIN/1.73 M 2
GLUCOSE SERPL-MCNC: 117 MG/DL (ref 65–99)
HCT VFR BLD AUTO: 33.3 % (ref 37–47)
HGB BLD-MCNC: 10.5 G/DL (ref 12–16)
IMM GRANULOCYTES # BLD AUTO: 0.09 K/UL (ref 0–0.11)
IMM GRANULOCYTES NFR BLD AUTO: 0.9 % (ref 0–0.9)
LYMPHOCYTES # BLD AUTO: 1.54 K/UL (ref 1–4.8)
LYMPHOCYTES NFR BLD: 15.9 % (ref 22–41)
MCH RBC QN AUTO: 29.9 PG (ref 27–33)
MCHC RBC AUTO-ENTMCNC: 31.5 G/DL (ref 33.6–35)
MCV RBC AUTO: 94.9 FL (ref 81.4–97.8)
MONOCYTES # BLD AUTO: 0.98 K/UL (ref 0–0.85)
MONOCYTES NFR BLD AUTO: 10.1 % (ref 0–13.4)
NEUTROPHILS # BLD AUTO: 6.52 K/UL (ref 2–7.15)
NEUTROPHILS NFR BLD: 67.2 % (ref 44–72)
NRBC # BLD AUTO: 0 K/UL
NRBC BLD-RTO: 0 /100 WBC
PLATELET # BLD AUTO: 676 K/UL (ref 164–446)
PMV BLD AUTO: 10.3 FL (ref 9–12.9)
POTASSIUM SERPL-SCNC: 4.6 MMOL/L (ref 3.6–5.5)
RBC # BLD AUTO: 3.51 M/UL (ref 4.2–5.4)
SODIUM SERPL-SCNC: 136 MMOL/L (ref 135–145)
WBC # BLD AUTO: 9.7 K/UL (ref 4.8–10.8)

## 2022-11-27 PROCEDURE — 36415 COLL VENOUS BLD VENIPUNCTURE: CPT

## 2022-11-27 PROCEDURE — A9270 NON-COVERED ITEM OR SERVICE: HCPCS | Performed by: STUDENT IN AN ORGANIZED HEALTH CARE EDUCATION/TRAINING PROGRAM

## 2022-11-27 PROCEDURE — 99232 SBSQ HOSP IP/OBS MODERATE 35: CPT | Performed by: STUDENT IN AN ORGANIZED HEALTH CARE EDUCATION/TRAINING PROGRAM

## 2022-11-27 PROCEDURE — 700102 HCHG RX REV CODE 250 W/ 637 OVERRIDE(OP): Performed by: STUDENT IN AN ORGANIZED HEALTH CARE EDUCATION/TRAINING PROGRAM

## 2022-11-27 PROCEDURE — 770020 HCHG ROOM/CARE - TELE (206)

## 2022-11-27 PROCEDURE — 80048 BASIC METABOLIC PNL TOTAL CA: CPT

## 2022-11-27 PROCEDURE — 85025 COMPLETE CBC W/AUTO DIFF WBC: CPT

## 2022-11-27 RX ADMIN — OMEPRAZOLE 40 MG: 20 CAPSULE, DELAYED RELEASE ORAL at 05:08

## 2022-11-27 RX ADMIN — CARVEDILOL 6.25 MG: 6.25 TABLET, FILM COATED ORAL at 08:09

## 2022-11-27 RX ADMIN — ASPIRIN 81 MG 81 MG: 81 TABLET ORAL at 05:08

## 2022-11-27 RX ADMIN — ACETAMINOPHEN 1000 MG: 500 TABLET ORAL at 01:10

## 2022-11-27 RX ADMIN — VALSARTAN 40 MG: 80 TABLET, FILM COATED ORAL at 05:07

## 2022-11-27 RX ADMIN — MIRTAZAPINE 15 MG: 15 TABLET, FILM COATED ORAL at 20:31

## 2022-11-27 RX ADMIN — APIXABAN 5 MG: 5 TABLET, FILM COATED ORAL at 17:08

## 2022-11-27 RX ADMIN — HYDROXYCHLOROQUINE SULFATE 200 MG: 200 TABLET ORAL at 08:09

## 2022-11-27 RX ADMIN — APIXABAN 5 MG: 5 TABLET, FILM COATED ORAL at 05:08

## 2022-11-27 RX ADMIN — AMIODARONE HYDROCHLORIDE 200 MG: 200 TABLET ORAL at 05:08

## 2022-11-27 ASSESSMENT — ENCOUNTER SYMPTOMS
CARDIOVASCULAR NEGATIVE: 1
INSOMNIA: 1
SHORTNESS OF BREATH: 1
EYES NEGATIVE: 1
NERVOUS/ANXIOUS: 1
SPEECH CHANGE: 1
GASTROINTESTINAL NEGATIVE: 1
SENSORY CHANGE: 1
MUSCULOSKELETAL NEGATIVE: 1

## 2022-11-27 ASSESSMENT — PAIN DESCRIPTION - PAIN TYPE: TYPE: ACUTE PAIN

## 2022-11-27 ASSESSMENT — FIBROSIS 4 INDEX
FIB4 SCORE: 0.96
FIB4 SCORE: 0.96

## 2022-11-27 NOTE — PROGRESS NOTES
Notified by RN that sepsis alerted on patient's chart  Chart review indicates that patient does have leukocytosis, although this is improved from 17 down to 12.5 down to 11.4 without having been on any antibiotics other than hydroxychloroquine. (Bactrim was given earlier in the visit but was finished on 11/18)  Procalcitonin was also negative earlier in the visit  Thus, this is unlikely to be sepsis from infectious source but we will continue to monitor

## 2022-11-27 NOTE — THERAPY
Physical Therapy   Daily Treatment     Patient Name: Lauren Dave  Age:  72 y.o., Sex:  female  Medical Record #: [id number]  Today's Date: 11/26/2022     Precautions  Precautions: Fall Risk;Swallow Precautions ( See Comments);Cardiac Precautions (See Comments);Sternal Precautions (See Comments)  Comments: s/p CABG, CVA    Assessment  Pt seen for PT tx session with therapy tech to improve pt safety and function. Pt with slight improvement in bed mobility and balance at EOB today. Pt still needing consistent cueing to use rails to hold herself up, but she was able to sit unassisted for up to 20 seconds at times before drifting to her R. Pt very limited by R achilles pain today and deferred increased standing and R LE therex due to pain; RN notified about pain with potential for PRAFO as pt reports her foot has been plantarflexed almost daily since being here. Continue to recommend post acute placement due to deficits. Will follow.     Plan    Continue current treatment plan.    DC Equipment Recommendations: Unable to determine at this time  Discharge Recommendations: Recommend post-acute placement for additional physical therapy services prior to discharge home        Vitals   O2 (LPM) 1   O2 Delivery Device Silicone Nasal Cannula   Pain 0 - 10 Group   Location Foot  (achilles tendon)   Location Orientation Right   Pain Rating Scale (NPRS) 7   Description Aching   Therapist Pain Assessment During Activity   Cognition    Cognition / Consciousness X   Speech/ Communication Delayed Responses;Slurred   Level of Consciousness Alert   Ability To Follow Commands 1 Step   Safety Awareness Impaired   New Learning Impaired   Attention Impaired   Initiation Impaired   Comments pt slightly lethargic and needing cues to wake up and participate fully   Sitting Lower Body Exercises   Sitting Lower Body Exercises Yes   Long Arc Quad 1 set of 10;Bilateral   Comments AAROM for RLE due to pain in R foot   Neuro-Muscular  Treatments   Neuro-Muscular Treatments Anterior weight shift;Postural Facilitation;Tapping;Verbal Cuing;Weight Shift Right;Weight Shift Left;Sequencing;Facilitation   Comments time spent on midline orientation at EOB. pt needing increased cues to lean to her L as she has R lean when not paying attention. pt able to use rails as needed to hold herself up and needing Min A-CGA for balance. pt responds well to mirroring therapist for midline   Balance   Sitting Balance (Static) Poor +   Sitting Balance (Dynamic) Poor   Standing Balance (Static) Poor -   Standing Balance (Dynamic) Trace +   Weight Shift Sitting Poor   Weight Shift Standing Poor   Skilled Intervention Verbal Cuing;Tactile Cuing;Postural Facilitation;Facilitation   Comments HHA Max A in standing   Gait Analysis   Gait Level Of Assist Unable to Participate   Weight Bearing Status no restrictions   Comments pt unable to stand sufficiently due to R foot pain   Bed Mobility    Supine to Sit Maximal Assist   Sit to Supine Total Assist   Scooting Maximal Assist   Skilled Intervention Verbal Cuing;Tactile Cuing;Sequencing;Facilitation   Comments pt with slight improvement in bed mobility today and was able to facilitate transfer somewhat with increased trunk control   Functional Mobility   Sit to Stand Moderate Assist  (x2)   Mobility STS x1   Skilled Intervention Verbal Cuing;Tactile Cuing;Sequencing;Facilitation   Comments pt only able to stand x1 for 5 seconds before sitting down due to R foot pain. pt states her neuropathy is too bad to put weight on it and declined further stands   How much difficulty does the patient currently have...   Turning over in bed (including adjusting bedclothes, sheets and blankets)? 1   Sitting down on and standing up from a chair with arms (e.g., wheelchair, bedside commode, etc.) 1   Moving from lying on back to sitting on the side of the bed? 1   How much help from another person does the patient currently need...   Moving to  and from a bed to a chair (including a wheelchair)? 1   Need to walk in a hospital room? 1   Climbing 3-5 steps with a railing? 1   6 clicks Mobility Score 6   Activity Tolerance   Sitting Edge of Bed 11 min   Standing <1 min   Comments OOB activity limited by pain and fatigue   Short Term Goals    Short Term Goal # 1 Pt will transfer supine<->sit with mod A within 6 visits to promote return home   Goal Outcome # 1 Progressing slower than expected   Short Term Goal # 2 Pt will sit at EOB 15 min with min A within 6 visits to promote return home   Goal Outcome # 2 Progressing as expected   Short Term Goal # 3 Pt will transfer bed<->chair with mod A within 6 visits in order to promote return home   Goal Outcome # 3 Goal not met   Anticipated Discharge Equipment and Recommendations   DC Equipment Recommendations Unable to determine at this time   Discharge Recommendations Recommend post-acute placement for additional physical therapy services prior to discharge home   Interdisciplinary Plan of Care Collaboration   IDT Collaboration with  Family / Caregiver;Nursing;Therapy Tech   Patient Position at End of Therapy In Bed;Bed Alarm On;Call Light within Reach;Tray Table within Reach;Phone within Reach;Family / Friend in Room   Collaboration Comments RN updated   Session Information   Date / Session Number  11/26- 6 (1/4, 12/2)

## 2022-11-27 NOTE — PROGRESS NOTES
Hospital Medicine Daily Progress Note    Date of Service  11/27/2022    Chief Complaint  Lauren Dave is a 72 y.o. female admitted 11/10/2022 with elective surgery    Hospital Course  72 y.o. 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  The patient is found lethargic, she arouses on verbal command, she is confused, she does have weakness in the right lower extremity as well as left upper extremity, the patient's sister is at the bedside assisting the history taking.  The patient is being prepared for transfer to telemetry, chest tubes are being removed, a pacer wire has been removed, a MRI is underway and later reports multiple areas of watershed type infarctions.  There is no bleeding apparent.  A postoperative echo shows an ejection fraction of 35%, the patient is being diuresed, she is on GDMT post surgery.  The patient unfortunately is intolerant to statin medication.  Neurology was consulted after patient was identified to have a CVA.     11/14/2022: Evaluated patient at bedside today patient appears to be without movement of right lower extremity difficult to arouse weak left upper extremity.  Trending back looks like she has had increasing oxygen demand in addition chest tubes removed on 11/13/2022.  Lastly patient is also had decreasing hemoglobin at present we will trend hemoglobin every 6 hours for 24 hours standing transfusion order has been  placed to transfuse 1 unit PRBC if repeat hemoglobin less than 8.0.  Ejection fraction reviewed from DAVE remains at 35% cardiology medicine recommendations appreciated.  Referral to acute rehabilitation has been placed.  We will continue to follow along closely with cardiovascular thoracic surgery as primary.  Present patient has persistent deficit right lower extremity no movement left upper extremity is weak.  Watershed infarct noted above.  Consider discussion of initiation of amantadine with neurology and cardiology service.  11/15: Ms. Dave was evaluated and examined in the IMCU. Hb this morning is 8.3. I discussed with Dr. Gallagher neurology about +/- anticoagulation given her transient afib and he recommends Apixaban which has been started. She remain on NG feeding and in soft wrist restraints. Her sister is at bedside. IV lasix will be stopped due to a CVP 3 and change to oral.   11/16: Ms. Dave was seen and evaluated in the IMCU. Hb 7.6 today. UA + for E coli. Her blood pressure has fluctuated significantly. She is on 5 liters of oxygen. She has been started on Eliquis in addendum to aspirin.   11/17-11/18 she was transferred to the ICU due to respiratory failure and decreased level of consciousness. Due to pulmonary edema after blood transfusions, she was given IV lasix. She had a metabolic alkalosis.   11/19: Ms. Dave was evaluated and examined in the ICU. She is moving both legs today and is more lucid. She remains on NG feeding. Her sister is at bedside. She is on Eliquis for anticoagulation and Hb is holding stable. She will go to the IMCU. Labs ordered for the morning.  11/20: Ms. Dave was seen and evaluated in the IMCU. Her blood pressure has had very significant fluctuations as low at 81/47 up to 152/66. Cr today is up to 1.75 despite tube feeds and no diuretics. Her sister is at bedside. She remains on NG feeding.  11/21: Ms. Dave was evaluated and examined in the IMCU. Her Cr  remains high at 1.7 and her BUN has gone up to 72. She is off diuretics. She has been on tube feeds. Her blood pressure has continued to fluctuate from systolic in the low 80's to systolic 150's. Free water added 250 ml q4 hours. A FEES has been ordered for today. She had a 6.2 second pause last night while coughing that was asymptomatic. Her sister is at bedside.    Interval Problem Update  Patient was evaluated at bedside  Patient in NAD this AM  Satting well room air  No leukocytosis today  PT/OT recommending postacute care  PMR evaluated on 11/14 and recommended SNF  Pending SNF    I have discussed this patient's plan of care and discharge plan at IDT rounds today with Case Management, Nursing, Nursing leadership, and other members of the IDT team.    Consultants/Specialty  critical care   Neurology  Physiatry  Cardiology   CT surgery  Code Status  Full Code    Disposition  Patient is medically cleared for discharge.   Anticipate discharge to to skilled nursing facility.  I have placed the appropriate orders for post-discharge needs.    Review of Systems  Review of Systems   Constitutional:  Positive for malaise/fatigue.   HENT: Negative.     Eyes: Negative.    Respiratory:  Positive for shortness of breath.    Cardiovascular: Negative.    Gastrointestinal: Negative.    Genitourinary: Negative.    Musculoskeletal: Negative.    Skin: Negative.    Neurological:  Positive for sensory change and speech change.   Endo/Heme/Allergies: Negative.    Psychiatric/Behavioral:  The patient is nervous/anxious and has insomnia.       Physical Exam  Temp:  [36.4 °C (97.5 °F)-36.9 °C (98.4 °F)] 36.5 °C (97.7 °F)  Pulse:  [82-95] 82  Resp:  [15-18] 16  BP: (107-140)/(50-61) 109/58  SpO2:  [92 %-97 %] 97 %    Physical Exam  Vitals and nursing note reviewed.   Constitutional:       General: She is not in acute distress.     Appearance: She is ill-appearing.   HENT:      Head: Normocephalic and atraumatic.      Nose:      Comments:  NG tube     Mouth/Throat:      Mouth: Mucous membranes are dry.      Pharynx: Oropharynx is clear.   Eyes:      General: No scleral icterus.     Conjunctiva/sclera: Conjunctivae normal.   Cardiovascular:      Rate and Rhythm: Normal rate and regular rhythm.      Comments: Sternal incision without infection  Pulmonary:      Effort: Pulmonary effort is normal.      Breath sounds: Normal breath sounds.      Comments: 3 liters of oxygen  Abdominal:      General: There is no distension.      Tenderness: There is no abdominal tenderness.   Musculoskeletal:      Cervical back: Normal range of motion and neck supple.      Right lower leg: No edema.      Left lower leg: No edema.   Skin:     General: Skin is warm and dry.   Neurological:      Mental Status: She is alert.      Comments: Confused   Left leg 5/5 right leg 3+/5  Arms/hands move generally equally   Psychiatric:      Comments:   Voice is a bit hoarse  Flat affect  A bit impulsive and easily distracted       Fluids    Intake/Output Summary (Last 24 hours) at 11/27/2022 1030  Last data filed at 11/27/2022 0848  Gross per 24 hour   Intake 120 ml   Output --   Net 120 ml         Laboratory  Recent Labs     11/25/22 0134 11/26/22  0206 11/27/22  0141   WBC 12.5* 11.4* 9.7   RBC 3.87* 3.59* 3.51*   HEMOGLOBIN 11.6* 11.0* 10.5*   HEMATOCRIT 36.7* 33.9* 33.3*   MCV 94.8 94.4 94.9   MCH 30.0 30.6 29.9   MCHC 31.6* 32.4* 31.5*   RDW 46.7 46.1 46.7   PLATELETCT 677* 709* 676*   MPV 10.6 10.3 10.3       Recent Labs     11/25/22 0134 11/26/22  0206 11/27/22  0141   SODIUM 137 135 136   POTASSIUM 4.8 4.5 4.6   CHLORIDE 99 98 100   CO2 25 26 24   GLUCOSE 103* 118* 117*   BUN 45* 40* 40*   CREATININE 1.60* 1.50* 1.67*   CALCIUM 9.8 9.6 9.5                       Imaging  [id number] VIEW   Final Result      1.  There is a nonobstructive bowel gas pattern with a moderate amount of colonic stool.      DX-CHEST-LIMITED (1 VIEW)   Final Result      Stable chest with cardiac  enlargement, bronchial thickening and left basilar atelectasis. Bronchial thickening most likely represents bronchitis but edema could be considered      IR-US GUIDED PIV   Final Result    Ultrasound-guided PERIPHERAL IV INSERTION performed by    qualified nursing staff as above.      DX-ABDOMEN FOR TUBE PLACEMENT   Final Result      Enteric tube tip projects over the stomach      DX-CHEST-PORTABLE (1 VIEW)   Final Result      1.  Tubes and lines in good position.      2.  Mild cardiomegaly with mild diffuse pulmonary edema with slight improvement since previous exam.      3.  Small left pleural effusion.      EC-ECHOCARDIOGRAM LTD W/O CONT   Final Result      DX-ABDOMEN FOR TUBE PLACEMENT   Final Result      Enteric tube tip projects over the stomach      DX-CHEST-PORTABLE (1 VIEW)   Final Result      1.  Layering bilateral pleural effusions with adjacent airspace disease.   2.  Increased interstitial edema.      DX-ABDOMEN FOR TUBE PLACEMENT   Final Result      Orogastric tube tip at the distal stomach.      DX-CHEST-PORTABLE (1 VIEW)   Final Result      1.  Removal of right IJ catheter.      2.  Right subclavian catheter unchanged in position.      3.  Mild cardiomegaly.      4.  Blunting of left costophrenic angle consistent with atelectasis, pneumonitis, and/or pleural effusion.      DX-ABDOMEN FOR TUBE PLACEMENT   Final Result      Enteric tube tip projects over the stomach antrum      MR-BRAIN-W/O   Final Result      1.  BILATERAL mostly supratentorial areas of ischemia as described above. These appear to be primarily watershed zone infarctions.   2.  No hemorrhage   3.  No significant mass effect      DX-CHEST-PORTABLE (1 VIEW)   Final Result         1. No significant interval change.      DX-ABDOMEN FOR TUBE PLACEMENT   Final Result      Interval placement of enteric tube with its tip over the midline epigastrium compatible with position at the level of the gastric body.      CT-HEAD W/O   Final Result       1.  Likely subacute infarcts in the bilateral parieto-occipital regions.   2.  No acute intracranial hemorrhage.   3.  Bilateral maxillary sinus disease.      EC-DAVE W/O CONT   Final Result      DX-CHEST-PORTABLE (1 VIEW)   Final Result      Stable enlargement of the cardiomediastinal silhouette, mild diffuse interstitial edema and bilateral basilar atelectasis and/or consolidation.      DX-CHEST-PORTABLE (1 VIEW)   Final Result      Satisfactory postoperative appearance of the chest             Assessment/Plan  * Acute ischemic stroke (HCC)  Assessment & Plan  Post/perioperative event with multiple areas of watershed type infarction  Neurology consulted  DAPT had been given then due to recent afib, neurology recommended Eliquis which was started  Neurology consultation was obtained  PT/OT/SLP and rehabilitation  NG feeding until she is cleared by speech path  She is now moving all extremities though right leg remains weak  She will be an excellent candidate for rehab    Dysphagia- (present on admission)  Assessment & Plan  SPL following, NG feed held and started on diet  Nutrition following, keep NG tube for now and monitor oral feeds 24-hour    Chronic systolic congestive heart failure ([facility]) EF 35%- (present on admission)  Assessment & Plan  Treated to euvolemia, GDMT as tolerated  Ejection fraction 35% on DAVE.  Coreg and diovan    TAN (acute kidney injury) ([facility])  Assessment & Plan  Cr has gone up to 1.7 and BUN is up to 72  She is now off diuretics  Free water 250 mL will be added to NG feeding q4 hours.  Follow urine output and check BMP in the morning.     Acute blood loss anemia- (present on admission)  Assessment & Plan  From hematuria  Requiring transfusion  Hb 12.4    Encephalopathy acute  Assessment & Plan  Acute encephalopathy with consideration of ICU delirium post-stroke  This is slowly improving though she remains encephalopathic    Acute respiratory failure with hypoxia ([facility])  Assessment & Plan  She  has had volume overload requiring high flow oxygen  Now on 2.5 liters of oxygen  .     PAF (paroxysmal atrial fibrillation) ([facility])- (present on admission)  Assessment & Plan  Post-operative  Decrease the amiodarone dose as she has been loaded.  eliquis    Other specified anemias  Assessment & Plan  Acute blood loss due to hematuria  Hb has been kady thus stop daily phlebotomy and check as indicated.  Transfuse RBCs for hemoglobin less than 8.0 as she is s/p CABG.    S/P CABG x 3  Assessment & Plan  As per cardiac surgery, optimization,, mobilization as tolerated,  Telemetry    Chronic obstructive pulmonary disease (COPD) ([facility])- (present on admission)  Assessment & Plan  Pre-existing, respiratory protocol,  Continuous respiratory therapy protocol.  Without exacerbation    Dyslipidemia  Assessment & Plan  The patient with prior severe intolerance, apparently failed multiple regimens  Consider outpatient referral to PCSK9 treatment    Coronary artery disease due to calcified coronary lesion - Calcifications seen on CT scan also wtih aortic ulceration- (present on admission)  Assessment & Plan  S/p CABG         VTE prophylaxis: therapeutic anticoagulation with Eliquis    I have performed a physical exam and reviewed and updated ROS and Plan today (11/27/2022). In review of yesterday's note (11/26/2022), there are no changes except as documented above.

## 2022-11-27 NOTE — PROGRESS NOTES
Bedside report received from day shift RN. Assumed care at 1900. Pt is A&Ox4. Pt is in bed. Patient is on 1 L via nasal cannula. Pt was updated on plan of care. Pt has call light, personal belongings, and bedside table within reach. Bed locked and in lowest position.

## 2022-11-27 NOTE — CARE PLAN
Problem: Knowledge Deficit - Standard  Goal: Patient and family/care givers will demonstrate understanding of plan of care, disease process/condition, diagnostic tests and medications  Outcome: Progressing     Problem: Skin Integrity  Goal: Skin integrity is maintained or improved  Outcome: Progressing     Problem: Fall Risk  Goal: Patient will remain free from falls  Outcome: Progressing     Problem: Pain - Standard  Goal: Alleviation of pain or a reduction in pain to the patient’s comfort goal  Outcome: Progressing     Problem: Post Op Day 3 CABG/Heart Valve replacement  Goal: Optimal care of the post op CABG/Heart Valve replacement post op day 3  Outcome: Progressing     Problem: Communication  Goal: The ability to communicate needs accurately and effectively will improve  Outcome: Progressing     Problem: Hemodynamics  Goal: Patient's hemodynamics, fluid balance and neurologic status will be stable or improve  Outcome: Progressing     Problem: Respiratory  Goal: Patient will achieve/maintain optimum respiratory ventilation and gas exchange  Outcome: Progressing     Problem: Optimal Care of the Stroke Patient  Goal: Optimal emergency care for the stroke patient  Outcome: Progressing  Goal: Optimal acute care for the stroke patient  Outcome: Progressing     Problem: Knowledge Deficit - Stroke Education  Goal: Patient's knowledge of stroke and risk factors will improve  Outcome: Progressing     Problem: Psychosocial - Patient Condition  Goal: Patient's ability to verbalize feelings about condition will improve  Outcome: Progressing     Problem: Discharge Planning - Stroke  Goal: Patient’s continuum of care needs will be met  Outcome: Progressing     Problem: Neuro Status  Goal: Neuro status will remain stable or improve  Outcome: Progressing     Problem: Hemodynamic Monitoring  Goal: Patient's hemodynamics, fluid balance and neurologic status will be stable or improve  Outcome: Progressing     Problem:  Dysphagia  Goal: Dysphagia will improve  Outcome: Progressing     Problem: Urinary Elimination  Goal: Establish and maintain regular urinary output  Outcome: Progressing     Problem: Bowel Elimination  Goal: Establish and maintain regular bowel function  Outcome: Progressing     Problem: Mobility - Stroke  Goal: Patient's capacity to carry out activities will improve  Outcome: Progressing  Goal: Spasticity will be prevented or improved  Outcome: Progressing  Goal: Subluxation will be prevented or improved  Outcome: Progressing     Problem: Self Care  Goal: Patient will have the ability to perform ADLs independently or with assistance (bathe, groom, dress, toilet and feed)  Outcome: Progressing   The patient is Stable - Low risk of patient condition declining or worsening    Shift Goals  Clinical Goals: Discharge  Patient Goals: Discharge  Family Goals: SUNDAR

## 2022-11-28 ENCOUNTER — APPOINTMENT (OUTPATIENT)
Dept: CARDIOLOGY | Facility: MEDICAL CENTER | Age: 72
DRG: 235 | End: 2022-11-28
Attending: INTERNAL MEDICINE
Payer: MEDICARE

## 2022-11-28 ENCOUNTER — APPOINTMENT (OUTPATIENT)
Dept: RADIOLOGY | Facility: MEDICAL CENTER | Age: 72
DRG: 235 | End: 2022-11-28
Attending: STUDENT IN AN ORGANIZED HEALTH CARE EDUCATION/TRAINING PROGRAM
Payer: MEDICARE

## 2022-11-28 ENCOUNTER — TELEPHONE (OUTPATIENT)
Dept: CARDIOLOGY | Facility: MEDICAL CENTER | Age: 72
End: 2022-11-28
Payer: MEDICARE

## 2022-11-28 PROBLEM — I44.2 COMPLETE HEART BLOCK (HCC): Status: ACTIVE | Noted: 2022-11-28

## 2022-11-28 PROBLEM — I44.2 COMPLETE HEART BLOCK (HCC): Chronic | Status: ACTIVE | Noted: 2022-11-28

## 2022-11-28 LAB
ANION GAP SERPL CALC-SCNC: 15 MMOL/L (ref 7–16)
BUN SERPL-MCNC: 41 MG/DL (ref 8–22)
CALCIUM SERPL-MCNC: 9.7 MG/DL (ref 8.5–10.5)
CHLORIDE SERPL-SCNC: 98 MMOL/L (ref 96–112)
CO2 SERPL-SCNC: 23 MMOL/L (ref 20–33)
CREAT SERPL-MCNC: 2.02 MG/DL (ref 0.5–1.4)
EKG IMPRESSION: NORMAL
GFR SERPLBLD CREATININE-BSD FMLA CKD-EPI: 26 ML/MIN/1.73 M 2
GLUCOSE SERPL-MCNC: 140 MG/DL (ref 65–99)
POTASSIUM SERPL-SCNC: 4.2 MMOL/L (ref 3.6–5.5)
PREALB SERPL-MCNC: 24.5 MG/DL (ref 18–38)
SODIUM SERPL-SCNC: 136 MMOL/L (ref 135–145)

## 2022-11-28 PROCEDURE — 700102 HCHG RX REV CODE 250 W/ 637 OVERRIDE(OP): Performed by: STUDENT IN AN ORGANIZED HEALTH CARE EDUCATION/TRAINING PROGRAM

## 2022-11-28 PROCEDURE — 5A1223Z PERFORMANCE OF CARDIAC PACING, CONTINUOUS: ICD-10-PCS | Performed by: INTERNAL MEDICINE

## 2022-11-28 PROCEDURE — 700111 HCHG RX REV CODE 636 W/ 250 OVERRIDE (IP)

## 2022-11-28 PROCEDURE — 700101 HCHG RX REV CODE 250

## 2022-11-28 PROCEDURE — 99239 HOSP IP/OBS DSCHRG MGMT >30: CPT | Performed by: STUDENT IN AN ORGANIZED HEALTH CARE EDUCATION/TRAINING PROGRAM

## 2022-11-28 PROCEDURE — 84134 ASSAY OF PREALBUMIN: CPT

## 2022-11-28 PROCEDURE — A9270 NON-COVERED ITEM OR SERVICE: HCPCS | Performed by: STUDENT IN AN ORGANIZED HEALTH CARE EDUCATION/TRAINING PROGRAM

## 2022-11-28 PROCEDURE — 97535 SELF CARE MNGMENT TRAINING: CPT

## 2022-11-28 PROCEDURE — 33210 INSERT ELECTRD/PM CATH SNGL: CPT | Performed by: INTERNAL MEDICINE

## 2022-11-28 PROCEDURE — 80048 BASIC METABOLIC PNL TOTAL CA: CPT

## 2022-11-28 PROCEDURE — 99233 SBSQ HOSP IP/OBS HIGH 50: CPT | Performed by: INTERNAL MEDICINE

## 2022-11-28 PROCEDURE — 93010 ELECTROCARDIOGRAM REPORT: CPT | Mod: XU | Performed by: INTERNAL MEDICINE

## 2022-11-28 PROCEDURE — 93005 ELECTROCARDIOGRAM TRACING: CPT | Performed by: STUDENT IN AN ORGANIZED HEALTH CARE EDUCATION/TRAINING PROGRAM

## 2022-11-28 PROCEDURE — C1894 INTRO/SHEATH, NON-LASER: HCPCS

## 2022-11-28 PROCEDURE — 92526 ORAL FUNCTION THERAPY: CPT

## 2022-11-28 PROCEDURE — 02HK3JZ INSERTION OF PACEMAKER LEAD INTO RIGHT VENTRICLE, PERCUTANEOUS APPROACH: ICD-10-PCS | Performed by: INTERNAL MEDICINE

## 2022-11-28 PROCEDURE — 770000 HCHG ROOM/CARE - INTERMEDIATE ICU *

## 2022-11-28 RX ORDER — HEPARIN SODIUM 1000 [USP'U]/ML
INJECTION, SOLUTION INTRAVENOUS; SUBCUTANEOUS
Status: COMPLETED
Start: 2022-11-28 | End: 2022-11-28

## 2022-11-28 RX ORDER — LIDOCAINE HYDROCHLORIDE 20 MG/ML
INJECTION, SOLUTION INFILTRATION; PERINEURAL
Status: COMPLETED
Start: 2022-11-28 | End: 2022-11-28

## 2022-11-28 RX ORDER — AMIODARONE HYDROCHLORIDE 200 MG/1
200 TABLET ORAL DAILY
Qty: 30 TABLET | Refills: 0 | Status: SHIPPED | OUTPATIENT
Start: 2022-11-29 | End: 2023-03-14

## 2022-11-28 RX ORDER — CARVEDILOL 6.25 MG/1
6.25 TABLET ORAL 2 TIMES DAILY WITH MEALS
Qty: 60 TABLET | Refills: 0 | Status: SHIPPED | OUTPATIENT
Start: 2022-11-28 | End: 2022-11-29

## 2022-11-28 RX ORDER — ACETAMINOPHEN 500 MG
1000 TABLET ORAL EVERY 8 HOURS PRN
Qty: 30 TABLET | Refills: 0 | Status: SHIPPED | OUTPATIENT
Start: 2022-11-28 | End: 2023-03-14

## 2022-11-28 RX ORDER — MIDAZOLAM HYDROCHLORIDE 1 MG/ML
INJECTION INTRAMUSCULAR; INTRAVENOUS
Status: COMPLETED
Start: 2022-11-28 | End: 2022-11-28

## 2022-11-28 RX ADMIN — HYDROXYCHLOROQUINE SULFATE 200 MG: 200 TABLET ORAL at 10:15

## 2022-11-28 RX ADMIN — LIDOCAINE HYDROCHLORIDE: 20 INJECTION, SOLUTION INFILTRATION; PERINEURAL at 18:10

## 2022-11-28 RX ADMIN — APIXABAN 5 MG: 5 TABLET, FILM COATED ORAL at 16:46

## 2022-11-28 RX ADMIN — CARVEDILOL 6.25 MG: 6.25 TABLET, FILM COATED ORAL at 10:15

## 2022-11-28 RX ADMIN — VALSARTAN 40 MG: 80 TABLET, FILM COATED ORAL at 05:19

## 2022-11-28 RX ADMIN — AMIODARONE HYDROCHLORIDE 200 MG: 200 TABLET ORAL at 05:19

## 2022-11-28 RX ADMIN — LIDOCAINE HYDROCHLORIDE 15 ML: 20 SOLUTION OROPHARYNGEAL at 23:13

## 2022-11-28 RX ADMIN — ASPIRIN 81 MG 81 MG: 81 TABLET ORAL at 05:18

## 2022-11-28 RX ADMIN — OMEPRAZOLE 40 MG: 20 CAPSULE, DELAYED RELEASE ORAL at 05:19

## 2022-11-28 RX ADMIN — MIRTAZAPINE 15 MG: 15 TABLET, FILM COATED ORAL at 20:55

## 2022-11-28 RX ADMIN — APIXABAN 5 MG: 5 TABLET, FILM COATED ORAL at 05:19

## 2022-11-28 RX ADMIN — HEPARIN SODIUM: 1000 INJECTION, SOLUTION INTRAVENOUS; SUBCUTANEOUS at 18:10

## 2022-11-28 ASSESSMENT — ENCOUNTER SYMPTOMS
PSYCHIATRIC NEGATIVE: 1
ENDOCRINE NEGATIVE: 1
WHEEZING: 0
FATIGUE: 1
NUMBNESS: 0
SPEECH CHANGE: 1
VOMITING: 0
SHORTNESS OF BREATH: 0
PALPITATIONS: 0
SPEECH DIFFICULTY: 0
SHORTNESS OF BREATH: 1
BLOOD IN STOOL: 0
FEVER: 0
CHEST TIGHTNESS: 0
LIGHT-HEADEDNESS: 0
DIZZINESS: 0
COUGH: 0
SENSORY CHANGE: 1
CHILLS: 0
INSOMNIA: 1
TROUBLE SWALLOWING: 1
HEMATOLOGIC/LYMPHATIC NEGATIVE: 1
GASTROINTESTINAL NEGATIVE: 1
ABDOMINAL PAIN: 0
NERVOUS/ANXIOUS: 1
MUSCULOSKELETAL NEGATIVE: 1
EYES NEGATIVE: 1
CARDIOVASCULAR NEGATIVE: 1
NAUSEA: 0

## 2022-11-28 ASSESSMENT — COGNITIVE AND FUNCTIONAL STATUS - GENERAL
PERSONAL GROOMING: A LOT
TOILETING: A LOT
DAILY ACTIVITIY SCORE: 12
HELP NEEDED FOR BATHING: A LOT
DRESSING REGULAR LOWER BODY CLOTHING: A LOT
SUGGESTED CMS G CODE MODIFIER DAILY ACTIVITY: CL
DRESSING REGULAR UPPER BODY CLOTHING: A LOT
EATING MEALS: A LOT

## 2022-11-28 ASSESSMENT — PAIN DESCRIPTION - PAIN TYPE
TYPE: ACUTE PAIN;SURGICAL PAIN
TYPE: SURGICAL PAIN

## 2022-11-28 ASSESSMENT — PATIENT HEALTH QUESTIONNAIRE - PHQ9
SUM OF ALL RESPONSES TO PHQ9 QUESTIONS 1 AND 2: 0
2. FEELING DOWN, DEPRESSED, IRRITABLE, OR HOPELESS: NOT AT ALL
1. LITTLE INTEREST OR PLEASURE IN DOING THINGS: NOT AT ALL

## 2022-11-28 ASSESSMENT — FIBROSIS 4 INDEX: FIB4 SCORE: 0.96

## 2022-11-28 NOTE — DISCHARGE PLANNING
Agency/Facility Name: [facility]  Outcome: DPA resent referrals as Pt is improving, DPA to follow up with local SNF facilities regarding placement.

## 2022-11-28 NOTE — THERAPY
Speech Language Pathology  Daily Treatment     Patient Name: Lauren Dave  Age:  72 y.o., Sex:  female  Medical Record #: [id number]  Today's Date: 11/28/2022     Precautions  Precautions: Swallow Precautions ( See Comments), Cardiac Precautions (See Comments), Sternal Precautions (See Comments)  Comments: s/p CABG, CVA    Assessment    Pt seen this date for dysphagia intervention. Pt reports no difficulty with liquidized/mildly thick liquid diet and that she is happy the NGT was removed. PO trials of MTL by cup, liquidized, puree, soft and bite size and thins by cup assessed. Timely swallow initiation and clear vocal quality appreciated. Adequate oral acceptance, containment and transit noted. Pt demonstrated prolonged but functional mastication of soft and bite size, pt reports this is due to missing posterior dentition s/p chemo. Pt denied globus sensation and odynophagia across trials. No s/sx of aspiration appreciated.     Given silent aspiration of juice from mixed and thins on FEES, recommend repeat diagnostic swallow study (MBSS) prior to advancement. MBSS tentatively scheduled for 1:30 PM this date.   Diet upgraded to minced and moist/mildly thick pending MBSS.   Meds floated whole in puree.     SLP following.       Plan    Continue current treatment plan.    Discharge Recommendations: (P) Recommend post-acute placement for additional speech therapy services prior to discharge home    Subjective    Pt alert, followed directions and participated in session.      Objective       11/28/22 1223   Charge Group   SLP Swallowing Dysfunction Treatment Swallowing Dysfunction Treatment   Total Treatment Time   SLP Time Spent Yes   SLP Swallowing Dysfunction Treatment (Mins) 15   SLP Total Time Spent 15   Precautions   Precautions Swallow Precautions ( See Comments);Cardiac Precautions (See Comments);Sternal Precautions (See Comments)   Vitals   O2 Delivery Device None - Room Air   Pain 0 - 10 Group   Therapist  "Pain Assessment Post Activity Pain Same as Prior to Activity;0   Dysphagia    Dysphagia X   Positioning / Behavior Modification Self Monitoring;Modulate Rate or Bite Size;Effortful Swallow;Multiple Swallows   Other Treatments PO trials   Diet / Liquid Recommendation Mildly Thick (2) - (Nectar Thick);Minced & Moist (5) - (Dysphagia II)   Nutritional Liquid Intake Rating Scale Thickened beverages (mildly thick unless otherwise specified)   Nutritional Food Intake Rating Scale Total oral diet with multiple consistencies but requiring special preparation or compensations   Nursing Communication Swallow Precaution Sign Posted at Head of Bed   Skilled Intervention Compensatory Strategies;Verbal Cueing   Recommended Route of Medication Administration   Medication Administration  Float Whole with Puree   Patient / Family Goals   Patient / Family Goal #1 \"that was really good\"   Goal #1 Outcome Progressing as expected   Short Term Goals   Short Term Goal # 1 New 11/22: Pt will consume LQ3/MT2 diet without any overt s/sx of aspiration   Goal Outcome # 1 Goal met, new goal added   Short Term Goal # 1 B  Pt will consume a diet of MM/MT with no s/sx of aspiration and min cues.   Education Group   Education Provided Dysphagia   Dysphagia Patient Response Patient;Other;Acceptance;Explanation;Demonstration;Verbal Demonstration;Action Demonstration;Reinforcement Needed   Anticipated Discharge Needs   Discharge Recommendations Recommend post-acute placement for additional speech therapy services prior to discharge home   Therapy Recommendations Upon DC Dysphagia Training;Patient / Family / Caregiver Education;Community Re-Integration   Interdisciplinary Plan of Care Collaboration   IDT Collaboration with  Family / Caregiver;Nursing;Physician   Patient Position at End of Therapy Seated;In Bed;Bed Alarm On;Call Light within Reach;Tray Table within Reach;Phone within Reach;Family / Friend in Room   Collaboration Comments RN and MD aware " of results/recs

## 2022-11-28 NOTE — CONSULTS
Cardiology Initial Consultation    Date of Service  11/28/2022    Referring Physician  Reza Beckford*    Reason for Consultation  Consideration for CRT     History of Presenting Illness  Lauren Dave is a 72 y.o. female with a past medical history significant for CAD, ischemic cardiomyopathy, LBBB, HFrEF with EF 30%,  HTN, breast Ca S/P chemo and XRT on RIGHT  who presented 11/10/2022 for elective CABG.  She underwent CABG x3 (LIMA-LAD, SVG- OM2, RCA) with Dr. Mac.  Post op found to have Right lower extremity weakness and found to have had watershed CVA without hemorrhage.  She has been working with PT/PT, speech.  Will need rehab.  Today noted to have 8 seconds of CHB.  She does not recall event. RN reports family told him that she briefly fell asleep and then woke up.  Per RN, pt was normal approximately one minute prior to this, had just left the room when monitor check was called. No strong evidence of vagal involvement during this time per discussion with nursing and Dr Mayberry.  Asked by  to consider CRT implant.     Review of Systems  Review of Systems   Constitutional:  Positive for fatigue. Negative for chills and fever.   HENT:  Positive for trouble swallowing (Post CVA, working with SLP).    Eyes: Negative.    Respiratory:  Negative for cough, chest tightness, shortness of breath and wheezing.    Cardiovascular:  Negative for chest pain, palpitations and leg swelling.   Gastrointestinal:  Negative for abdominal pain, blood in stool, nausea and vomiting.   Endocrine: Negative.    Genitourinary:  Negative for hematuria.   Musculoskeletal: Negative.    Skin: Negative.    Neurological:  Negative for dizziness, speech difficulty, light-headedness and numbness.   Hematological: Negative.    Psychiatric/Behavioral: Negative.       Past Medical History   has a past medical history of Arthritis, Breast cancer (HCC) (08/07/2013), Bronchitis (2005), Cancer (HCC), Cataract, Chronic  systolic congestive heart failure (HCC) EF 35% (10/12/2022), Coronary artery disease due to calcified coronary lesion - Calcifications seen on CT scan also wtih aortic ulceration, Dental disorder, Discoid lupus, Dyslipidemia, Essential hypertension, Heart burn, Hypoadrenalism (HCC) - need for chronic steroids, Ischemic heart disease due to coronary artery obstruction (HCC) - Severe 3vD on cath (10/19/2022), LBBB (left bundle branch block) (12/16/2014), Pneumonia, Pseudogout, Scarlet fever, and Unspecified hemorrhagic conditions.    Surgical History   has a past surgical history that includes colonoscopy (01/01/2003); tonsillectomy; breast biopsy; mastectomy (08/22/2013); node biopsy sentinel (08/22/2013); cath placement (08/22/2013); cath removal (02/24/2014); us-needle core bx-breast panel (01/01/2013); other cardiac surgery (11/2022); multiple coronary artery bypass endo vein harvest (11/10/2022); and echocardiogram, transesophageal, intraoperative (11/10/2022).    Family History  family history includes Arthritis in her father; Cancer in her maternal aunt and mother; Diabetes in her maternal uncle and paternal grandmother; Gout in her brother and son; Heart Disease in her maternal grandfather, maternal grandmother, paternal grandmother, and son; Multiple Sclerosis in her brother and mother; Other in her son; Stroke in her paternal grandfather.    Social History   reports that she quit smoking about 9 years ago. Her smoking use included cigarettes. She smoked an average of 1.00 packs per day. She has never used smokeless tobacco. She reports that she does not drink alcohol and does not use drugs.    Medications  Prior to Admission Medications   Prescriptions Last Dose Informant Patient Reported? Taking?   Cholecalciferol (VITAMIN D3) 5000 units Cap 11/9/2022 at 0800 Patient Yes No   Sig: Take 1 Capsule by mouth every day.   Cyanocobalamin (VITAMIN B-12 PO) 11/9/2022 at 0800 Patient Yes No   Sig: Take 1 Tablet by  "mouth every morning.   amLODIPine (NORVASC) 2.5 MG Tab 11/10/2022 at 0400 Patient No No   Sig: Take 1 Tablet by mouth 2 times a day.   ampicillin (PRINCIPEN) 500 MG Cap 11/9/2022 at 2000 Patient Yes No   Sig: Take 1 Capsule by mouth 4 times a day. Twice a day for 7 days   aspirin 81 MG EC tablet 11/9/2022 at 0800 Patient No No   Sig: Take 1 Tablet by mouth every day.   famotidine (PEPCID) 40 MG Tab 11/8/2022 at PRN Patient No No   Sig: Take 1 tablet by mouth twice daily   Patient taking differently: Take 1 Tablet by mouth as needed.   hydrALAZINE (APRESOLINE) 10 MG Tab 2 weeks ago at UNK Patient No No   Sig: Take 1 Tablet by mouth 3 times a day as needed (/100).   hydroxychloroquine (PLAQUENIL) 200 MG Tab 11/9/2022 at 0800 Patient No No   Sig: Take 1 tablet by mouth every day.   ibuprofen (MOTRIN) 800 MG Tab Not started yet at N/A Patient No No   Sig: TAKE 1 TABLET BY MOUTH EVERY 8 HOURS AS NEEDED FOR MILD PAIN   metoprolol SR (TOPROL XL) 50 MG TABLET SR 24 HR 11/9/2022 at 2000 Patient No No   Sig: Take 1 Tablet by mouth every day.      Facility-Administered Medications: None       Allergies  Allergies   Allergen Reactions    Statins [Hmg-Coa-R Inhibitors] Myalgia     Muscle Spasms   RXN=unknown    Atorvastatin Myalgia    Codeine Vomiting and Nausea     Clarified with patient - states that she has an \"upset stomach\" when she takes it    Eggs Diarrhea     Pt states that she is allergic to eggs per her mother.  Tolerated clevidipine 11/2022    Lisinopril Cough    Losartan Unspecified     Tried in 2017  Cannot recall reaction    Penicillins Unspecified     \"does not work\" .'IMMUNE TO PENICILLIN,AMPICILLIN IS THE ONLY THING THAT WORKS'       Vital signs in last 24 hours  Temp:  [36.3 °C (97.3 °F)-37 °C (98.6 °F)] 37 °C (98.6 °F)  Pulse:  [71-94] 83  Resp:  [16-18] 16  BP: ()/(52-64) 118/54  SpO2:  [90 %-99 %] 90 %    Physical Exam  Physical Exam  Vitals and nursing note reviewed.   Constitutional:       " Appearance: Normal appearance.   HENT:      Head: Normocephalic and atraumatic.      Nose: No congestion.   Eyes:      Extraocular Movements: Extraocular movements intact.      Conjunctiva/sclera: Conjunctivae normal.      Pupils: Pupils are equal, round, and reactive to light.   Cardiovascular:      Rate and Rhythm: Normal rate and regular rhythm.      Pulses: Normal pulses.      Heart sounds: Normal heart sounds. No murmur heard.    No friction rub. No gallop.   Pulmonary:      Effort: Pulmonary effort is normal.      Breath sounds: Normal breath sounds. No wheezing, rhonchi or rales.   Chest:      Comments: Midline sternotomy is C/D/I, well approximated.   Abdominal:      General: Bowel sounds are normal.      Tenderness: There is no abdominal tenderness.   Musculoskeletal:         General: Normal range of motion.      Cervical back: Normal range of motion.      Right lower leg: No edema.      Left lower leg: No edema.   Skin:     General: Skin is warm and dry.   Neurological:      Mental Status: She is alert and oriented to person, place, and time.      Gait: Gait normal.   Psychiatric:         Mood and Affect: Mood normal.         Behavior: Behavior normal.         Thought Content: Thought content normal.         Judgment: Judgment normal.       Lab Review  Lab Results   Component Value Date/Time    WBC 9.7 11/27/2022 01:41 AM    RBC 3.51 (L) 11/27/2022 01:41 AM    HEMOGLOBIN 10.5 (L) 11/27/2022 01:41 AM    HEMATOCRIT 33.3 (L) 11/27/2022 01:41 AM    MCV 94.9 11/27/2022 01:41 AM    MCH 29.9 11/27/2022 01:41 AM    MCHC 31.5 (L) 11/27/2022 01:41 AM    MPV 10.3 11/27/2022 01:41 AM      Lab Results   Component Value Date/Time    SODIUM 136 11/28/2022 01:24 AM    POTASSIUM 4.2 11/28/2022 01:24 AM    CHLORIDE 98 11/28/2022 01:24 AM    CO2 23 11/28/2022 01:24 AM    GLUCOSE 140 (H) 11/28/2022 01:24 AM    BUN 41 (H) 11/28/2022 01:24 AM    CREATININE 2.02 (H) 11/28/2022 01:24 AM      Lab Results   Component Value  Date/Time    ASTSGOT 35 11/07/2022 10:53 AM    ALTSGPT 15 11/07/2022 10:53 AM     Lab Results   Component Value Date/Time    CHOLSTRLTOT 161 11/14/2022 02:26 AM    LDL 76 11/14/2022 02:26 AM    HDL 54 11/14/2022 02:26 AM    TRIGLYCERIDE 154 (H) 11/14/2022 02:26 AM       No results for input(s): NTPROBNP in the last 72 hours.    Cardiac Imaging and Procedures Review  Telemetry  My personal interpretation of the EKG dated 11/28/22 is Sinus rhythm.  8 second period of CHB with ventricular standstill at approx 1125 AM     Echocardiogram:  11/17/22  CONCLUSIONS  Compared to the prior study on 10/12/22, no major changes  The left ventricular ejection fraction is visually estimated to be 30%.      Imaging    Assessment/Plan  pCHB  CAD S/P 3 V CABG  LBBB  Ischemic cardiomyopathy  HFrEF, approximately 30%.  Post operative AF.   Breast Cancer S/P on right S/P Chemo and XRT.     - EP serviced asked to see Ms Dave for consideration for CRT.    - Noted to have approximately 8 second period of CHB today with ventricular standstill. Coreg and Amiodarone has been held appropriately.  Per Nursing and family at bedside there does not appear to be a vagal component.  PT does not recall event, supporter at bedside does not endorse coughing or chocking at this time.   - Has been sinus rhythm since this event without recurrence of CHB.  Planned for temp pacing wire per Dr Mayberry.  - Given LBBB, pCHB, HFrEF and needs for betablocker and rhythm control, she does meet indication for device, >1 year life expectancy.  With LBBB would implant CRT-P vs D.  Pt is leaning towards CRT-D as previously discussed with cardiology team.  TO be reviewed by EP attending.    - She is right handed, will plan for left sided device.   - After discussion with Dr. Mayberry and patent, pt does not feel effects of lidocaine and would prefer anesthesia if at all possible for device implant.  She will be scheduled for 0730 tomorrow with Dr. Ambrose and  anesthesia.    - Please Keep NPO at MN. Hold anticoagulation tomorrow AM.  See orders.     DAILY Johnston.   [facility]  [phone]

## 2022-11-28 NOTE — DISCHARGE SUMMARY
Discharge Summary    CHIEF COMPLAINT ON ADMISSION  Elective surgery    Reason for Admission  CORONARY ARTERY DISEASE    Admission Date  11/10/2022     CODE STATUS  Full Code    HPI & HOSPITAL COURSE    72 y.o. 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 She underwent surgery without major difficulty but was found to have right lower extremity weakness after the surgery, her Head CT showed multiple areas of likely cerebral infarction, hospital medicine evaluation was requested to assist in further care after the patient suffered a acute CVA post CABG noted as right lower extremity weakness. Brain MRI showed multiple areas of watershed type infarctions without bleeding.  Follow-up echocardiogram showing ejection fraction at 35% which patient was started on IV diuresis.  Case was discussed with neurology and ultimately patient was started on Eliquis anticoagulation.  Patient requiring NG tube for feeds and hospital course complicated by hyponatremia which resolved with free water rehydration.  Ultimately, patient able to tolerate meals and NG tube was discontinued removed.  As per physical therapy and Occupational Therapy evaluation patient requiring postacute care for continuing rehab.  She was evaluated by physiatry but patient not appropriate for rehab at moment of evaluation.  Stable patient with in chronic condition was discharged to skilled nursing facility for continuity of care.    Therefore, she is discharged in good and stable condition to skilled nursing facility.    The patient met 2-midnight criteria for an inpatient stay at the time of discharge.      FOLLOW UP ITEMS POST DISCHARGE  Skilled nursing facility follow-up post hospital discharge care  Cardiology to  follow heart failure care  Per thoracic surgery to follow postsurgical care  Primary care provider follow-up post hospital discharge care    DISCHARGE DIAGNOSES  Principal Problem:    Acute ischemic stroke ([facility]) POA: Unknown  Active Problems:    Dysphagia POA: Yes    Chronic systolic congestive heart failure (HCC) EF 35% (Chronic) POA: Yes    Gastroesophageal reflux disease without esophagitis POA: Yes    Chronic obstructive pulmonary disease (COPD) (Formerly [facility]) POA: Yes    S/P CABG x 3 POA: Unknown    Encounter for weaning from ventilator ([facility]) POA: Unknown    Other specified anemias POA: Unknown    PAF (paroxysmal atrial fibrillation) ([facility]) POA: Yes    Acute respiratory failure with hypoxia ([facility]ly [facility]) POA: Unknown    Encephalopathy acute POA: Unknown    CHF (congestive heart failure), NYHA class III, acute on chronic, combined ([facility]) POA: Yes    HFrEF (heart failure with reduced ejection fraction) ([facility]) POA: Unknown    Acute blood loss anemia POA: Yes    Metabolic alkalemia POA: Unknown    TAN (acute kidney injury) (Former[facility]) POA: Unknown    Coronary artery disease due to calcified coronary lesion - Calcifications seen on CT scan also wtih aortic ulceration (Chronic) POA: Yes  Resolved Problems:    * No resolved hospital problems. *      FOLLOW UP  Future Appointments   Date Time Provider Department Center   12/2/2022 10:20 AM YULISSA Dykes [facility]   12/9/2022  2:30 PM YULISSA Leija [facility] None   12/12/2022  1:30 PM CT RESOURCE PROVIDER [facility] None   8/15/2023 10:00 AM BROOKE Guzman [facility] None     No follow-up provider specified.    MEDICATIONS ON DISCHARGE     Medication List        START taking these medications        Instructions   acetaminophen 500 MG Tabs  Commonly known as: TYLENOL   Take 2 Tablets by mouth every 8 hours as needed for Mild Pain.  Dose: 1,000 mg     amiodarone 200 MG Tabs  Start taking on: November 29, 2022  Commonly known as: Cordarone   Take 1 Tablet by mouth every  "day.  Dose: 200 mg     apixaban 5mg Tabs  Commonly known as: ELIQUIS   Take 1 Tablet by mouth 2 times a day. Indications: Thromboembolism secondary to Atrial Fibrillation  Dose: 5 mg     carvedilol 6.25 MG Tabs  Commonly known as: COREG   Take 1 Tablet by mouth 2 times a day with meals.  Dose: 6.25 mg            CHANGE how you take these medications        Instructions   famotidine 40 MG Tabs  What changed:   when to take this  reasons to take this  Commonly known as: PEPCID   Take 1 tablet by mouth twice daily            CONTINUE taking these medications        Instructions   amLODIPine 2.5 MG Tabs  Commonly known as: NORVASC   Take 1 Tablet by mouth 2 times a day.  Dose: 2.5 mg     aspirin 81 MG EC tablet   Take 1 Tablet by mouth every day.  Dose: 81 mg     hydrALAZINE 10 MG Tabs  Commonly known as: APRESOLINE   Take 1 Tablet by mouth 3 times a day as needed (/100).  Dose: 10 mg     hydroxychloroquine 200 MG Tabs  Commonly known as: Plaquenil   Take 1 tablet by mouth every day.  Dose: 200 mg     ibuprofen 800 MG Tabs  Commonly known as: MOTRIN   TAKE 1 TABLET BY MOUTH EVERY 8 HOURS AS NEEDED FOR MILD PAIN     VITAMIN B-12 PO   Take 1 Tablet by mouth every morning.  Dose: 1 Tablet     vitamin D3 125 MCG (5000 UT) Caps   Take 1 Capsule by mouth every day.  Dose: 1 Capsule            STOP taking these medications      ampicillin 500 MG Caps  Commonly known as: PRINCIPEN            ASK your doctor about these medications        Instructions   metoprolol SR 50 MG Tb24  Commonly known as: TOPROL XL   Take 1 Tablet by mouth every day.  Dose: 50 mg              Allergies  Allergies   Allergen Reactions    Statins [Hmg-Coa-R Inhibitors] Myalgia     Muscle Spasms   RXN=unknown    Atorvastatin Myalgia    Codeine Vomiting and Nausea     Clarified with patient - states that she has an \"upset stomach\" when she takes it    Eggs Diarrhea     Pt states that she is allergic to eggs per her mother.  Tolerated clevidipine " "11/2022    Lisinopril Cough    Losartan Unspecified     Tried in 2017  Cannot recall reaction    Penicillins Unspecified     \"does not work\" .'IMMUNE TO PENICILLIN,AMPICILLIN IS THE ONLY THING THAT WORKS'       DIET  Orders Placed This Encounter   Procedures    Diet Order Diet: Level 3 - Liquidised (Crush pills in applesauce); Liquid level: Level 2 - Mildly Thick; Tray Modifications (optional): SLP - Deliver to Nursing Station     Standing Status:   Standing     Number of Occurrences:   1     Order Specific Question:   Diet:     Answer:   Level 3 - Liquidised [26]     Comments:   Crush pills in applesauce     Order Specific Question:   Liquid level     Answer:   Level 2 - Mildly Thick     Order Specific Question:   Tray Modifications (optional)     Answer:   SLP - Deliver to Nursing Station       ACTIVITY  As tolerated.  Weight bearing as tolerated    LINES, DRAINS, AND WOUNDS  This is an automated list. Peripheral IVs will be removed prior to discharge.  Peripheral IV 11/22/22 22 G Left;Posterior Hand (Active)   Site Assessment Clean;Dry;Intact 11/27/22 2000   Dressing Type Transparent;Occlusive 11/27/22 2000   Line Status Scrubbed the hub prior to access;Flushed;Saline locked;No blood return 11/27/22 2000   Dressing Status Clean;Dry;Intact 11/27/22 2000   Dressing Intervention N/A 11/27/22 2000   Infiltration Grading ([facility]) 0 11/27/22 2000   Phlebitis Scale ([facility] Only) 0 11/27/22 2000       Wound 11/10/22 Incision Leg Left towels and ACE wrap  (Active)   Site Assessment Clean;Dry;Intact 11/27/22 2000   Periwound Assessment Clean;Dry;Intact 11/27/22 2000   Margins Attached edges 11/27/22 2000   Closure Dermabond 11/25/22 1930   Drainage Amount None 11/26/22 2000   Drainage Description SUNDAR 11/18/22 2000   Treatments Cleansed 11/25/22 1930   Wound Cleansing Soap and Water 11/25/22 1930   Dressing Cleansing/Solutions Not Applicable 11/26/22 2000   Dressing Options Open to Air 11/27/22 2000   Dressing Status " Open to Air 11/27/22 2000   Dressing Change/Treatment Frequency Daily, and As Needed 11/14/22 2000       Wound 11/10/22 Incision Chest PREVENA  (Active)   Site Assessment Clean;Dry;Intact 11/27/22 2000   Periwound Assessment Clean;Dry;Intact 11/27/22 2000   Margins Attached edges 11/27/22 2000   Closure Dermabond 11/25/22 1930   Drainage Amount None 11/26/22 2000   Drainage Description SUNDAR 11/18/22 2000   Treatments Cleansed;Site care 11/25/22 1930   Wound Cleansing Soap and Water 11/25/22 1930   Dressing Options Open to Air 11/27/22 2000   Dressing Changed Observed 11/26/22 2000   Dressing Status Clean;Dry;Intact 11/25/22 1930   Dressing Change/Treatment Frequency Daily, and As Needed 11/27/22 1000       Wound 11/13/22 Incision Abdomen Medial Chest tubes incisions (Active)   Site Assessment Clean;Dry;Intact 11/27/22 2000   Periwound Assessment Clean;Dry;Intact 11/27/22 2000   Margins Attached edges 11/27/22 2000   Closure None 11/25/22 1930   Drainage Amount None 11/26/22 2000   Treatments Cleansed;Site care 11/25/22 1930   Wound Cleansing Soap and Water 11/25/22 1930   Dressing Options Open to Air 11/26/22 2000   Dressing Changed Observed 11/26/22 2000   Dressing Status Open to Air 11/25/22 1930       Peripheral IV 11/22/22 22 G Left;Posterior Hand (Active)   Site Assessment Clean;Dry;Intact 11/27/22 2000   Dressing Type Transparent;Occlusive 11/27/22 2000   Line Status Scrubbed the hub prior to access;Flushed;Saline locked;No blood return 11/27/22 2000   Dressing Status Clean;Dry;Intact 11/27/22 2000   Dressing Intervention N/A 11/27/22 2000   Infiltration Grading ([facility]) 0 11/27/22 2000   Phlebitis Scale ([facility]) 0 11/27/22 2000               MENTAL STATUS ON TRANSFER  At baseline     CONSULTATIONS  Cardiothoracic surgery  Cardiology  Physiatry  Neurology  Critical care medicine    PROCEDURES  Coronary artery bypass grafting x3    LABORATORY  Lab Results   Component Value Date    SODIUM 136  11/28/2022    POTASSIUM 4.2 11/28/2022    CHLORIDE 98 11/28/2022    CO2 23 11/28/2022    GLUCOSE 140 (H) 11/28/2022    BUN 41 (H) 11/28/2022    CREATININE 2.02 (H) 11/28/2022        Lab Results   Component Value Date    WBC 9.7 11/27/2022    HEMOGLOBIN 10.5 (L) 11/27/2022    HEMATOCRIT 33.3 (L) 11/27/2022    PLATELETCT 676 (H) 11/27/2022        Total time of the discharge process exceeds 34 minutes.

## 2022-11-28 NOTE — THERAPY
Missed Therapy     Patient Name: Lauren Dave  Age:  72 y.o., Sex:  female  Medical Record #: [id number]  Today's Date: 11/28/2022 11/28/22 1346   Treatment Variance   Reason For Missed Therapy Medical - Other (Please Comment)  (NPO for procedure)   Total Treatment Time   SLP Time Spent Yes  (30)   Interdisciplinary Plan of Care Collaboration   IDT Collaboration with  Nursing;Physician   Collaboration Comments Attempted to see pt for MBSS. Cardiologist present and informed SLP pt is now NPO for pacemaker. SLP to hold MBSS at this time and return as able and as pt medicaly appropriate to complete diagnostic. RN and MD aware.

## 2022-11-28 NOTE — DISCHARGE PLANNING
Case Management Discharge Planning    Admission Date: 11/10/2022  GMLOS: 8.3  ALOS: 18    6-Clicks ADL Score: 8  6-Clicks Mobility Score: 6  PT and/or OT Eval ordered: Yes  Post-acute Referrals Ordered: Yes  Post-acute Choice Obtained: Yes  Has referral(s) been sent to post-acute provider:  Yes      Anticipated Discharge Dispo: Discharge Disposition: D/T to SNF with Medicare cert in anticipation of skilled care (03)    DME Needed: No    Action(s) Taken: Pt accepted to [facility]. Spoke to pts son, Tommy, by phone and pts sister, Haleigh at [facility]. Updated both on pt transferring to [facility] today at 1500. Per RN Tres and Dr. Walters, pt now not medically cleared, pt is pending clearance from Cardiology. [facility] can take pt tomorrow 11/29 at 1500, [facility] transport reset for tomorrow at 1500.     Escalations Completed: None    Medically Clear: No    Next Steps: transfer to [facility] tomorrow 11/29 at 1500 via [facility].    Barriers to Discharge: Medical clearance and Pending Placement

## 2022-11-28 NOTE — DISCHARGE PLANNING
Agency/Facility Name: [facility]  Spoke To: Annita  Outcome: DPA received v-mail in regards to whether Pt is back on tube feeds and D/C plan in regards to family support once she leaves facility. DPA to follow up once information has been confirmed     0948  Agency/Facility Name: [facility]  Outcome: DPA left v-mail in regards to decision on referral. DPA requested call back      0955  Agency/Facility Name: [facility]  Outcome: DPA left v-mail in regards to her follow up questions. DPA requested call back      1009  Agency/Facility Name: [facility]  Spoke To: Lalitha  Outcome: Referral is under review, DPA requested call back with decision     1015  Agency/Facility Name: [facility]  Spoke To: Jonna  Outcome: Referral is under review, DPA requested call back with decision    1016  Agency/Facility Name: [facility]  Outcome: DPA left v-mail in regards to referral status. DPA requested call back     1030  Agency/Facility Name: [facility]  Spoke To: Rey  Outcome: Facility can accept Pt with bed available today at 1500. DPA to follow up once transport has been confirmed     1132  Agency/Facility Name: [facility]  Spoke To: Emile  Outcome: Pt transport is confirmed for 1500 via [facility].     1133  Agency/Facility Name: [facility]  Spoke To: Rey  Outcome: DPA notified facility of 1500 transport time. No further follow up needs     1224  Agency/Facility Name: [facility]  Spoke To: Rey  Outcome: DPA notified facility that Pt no longer MC today, plan to DC tomorrow instead. They confirmed bed will be available tomorrow.      1227  Agency/Facility Name: [facility]  Spoke To: Emile / Bernice  Outcome: DPA notified transport will need to be changed to will call for tomorrow. DPA to follow up tomorrow with confirmed transport time

## 2022-11-28 NOTE — ASSESSMENT & PLAN NOTE
8-second pause on telemetry  Cardiology consulted for which patient was diagnosed for complete heart block  Permanent pacemaker placed 11/29/22

## 2022-11-28 NOTE — DISCHARGE PLANNING
Received repeat PMR consult. Patient set up to dc to [facility] tomorrow at 1500. [facility] will no longer follow.

## 2022-11-28 NOTE — TELEPHONE ENCOUNTER
Patient is scheduled for 1-29-22 for a Biv ICD implant w/anesthesia with  as an IP. Bryant with [employer] notified to be at procedure.

## 2022-11-28 NOTE — PROGRESS NOTES
Hospital Medicine Daily Progress Note    Date of Service  11/28/2022    Chief Complaint  Lauren Dave is a 72 y.o. female admitted 11/10/2022 with elective surgery    Hospital Course  72 y.o. 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 She underwent surgery without major difficulty but was found to have right lower extremity weakness after the surgery, her Head CT showed multiple areas of likely cerebral infarction, hospital medicine evaluation was requested to assist in further care after the patient suffered a acute CVA post CABG noted as right lower extremity weakness. Brain MRI showed multiple areas of watershed type infarctions without bleeding.  Follow-up echocardiogram showing ejection fraction at 35% which patient was started on IV diuresis.  Case was discussed with neurology and ultimately patient was started on Eliquis anticoagulation.  Patient requiring NG tube for feeds and hospital course complicated by hyponatremia which resolved with free water rehydration.  Ultimately, patient able to tolerate meals and NG tube was discontinued removed by patient.  SPL following for which patient is to undergo fees study.  On the morning of 11/28/2022 patient had an 8-second pause on telemetry while she was sleeping.  Cardiology was consulted    Interval Problem Update  Patient was evaluated at bedside  8-second pause on telemetry  Creatinine higher today  Cardiology consulted for which patient was diagnosed for complete heart block  Temporary pacer place    I have discussed this patient's plan of care and discharge plan at IDT rounds today with Case Management, Nursing, Nursing leadership, and other members of the IDT team.    Consultants/Specialty  critical care    Neurology  Physiatry  Cardiology   CT surgery  Code Status  Full Code    Disposition  Patient is not medically cleared for discharge.   Anticipate discharge to to skilled nursing facility.  I have placed the appropriate orders for post-discharge needs.    Review of Systems  Review of Systems   Constitutional:  Positive for malaise/fatigue.   HENT: Negative.     Eyes: Negative.    Respiratory:  Positive for shortness of breath.    Cardiovascular: Negative.    Gastrointestinal: Negative.    Genitourinary: Negative.    Musculoskeletal: Negative.    Skin: Negative.    Neurological:  Positive for sensory change and speech change.   Endo/Heme/Allergies: Negative.    Psychiatric/Behavioral:  The patient is nervous/anxious and has insomnia.       Physical Exam  Temp:  [36.3 °C (97.3 °F)-37 °C (98.6 °F)] 37 °C (98.6 °F)  Pulse:  [71-94] 83  Resp:  [16-18] 16  BP: ()/(52-64) 118/54  SpO2:  [90 %-99 %] 90 %    Physical Exam  Vitals and nursing note reviewed.   Constitutional:       General: She is not in acute distress.     Appearance: She is ill-appearing.   HENT:      Head: Normocephalic and atraumatic.      Nose:      Comments: NG tube     Mouth/Throat:      Mouth: Mucous membranes are dry.      Pharynx: Oropharynx is clear.   Eyes:      General: No scleral icterus.     Conjunctiva/sclera: Conjunctivae normal.   Cardiovascular:      Rate and Rhythm: Normal rate and regular rhythm.      Comments: Sternal incision without infection  Pulmonary:      Effort: Pulmonary effort is normal.      Breath sounds: Normal breath sounds.      Comments: 3 liters of oxygen  Abdominal:      General: There is no distension.      Tenderness: There is no abdominal tenderness.   Musculoskeletal:      Cervical back: Normal range of motion and neck supple.      Right lower leg: No edema.      Left lower leg: No edema.   Skin:     General: Skin is warm and dry.   Neurological:      Mental Status: She is alert.      Comments: Confused   Left  leg 5/5 right leg 3+/5  Arms/hands move generally equally   Psychiatric:      Comments:   Voice is a bit hoarse  Flat affect  A bit impulsive and easily distracted       Fluids  No intake or output data in the 24 hours ending 11/28/22 1428      Laboratory  Recent Labs     11/26/22  0206 11/27/22  0141   WBC 11.4* 9.7   RBC 3.59* 3.51*   HEMOGLOBIN 11.0* 10.5*   HEMATOCRIT 33.9* 33.3*   MCV 94.4 94.9   MCH 30.6 29.9   MCHC 32.4* 31.5*   RDW 46.1 46.7   PLATELETCT 709* 676*   MPV 10.3 10.3     Recent Labs     11/26/22  0206 11/27/22  0141 11/28/22  0124   SODIUM 135 136 136   POTASSIUM 4.5 4.6 4.2   CHLORIDE 98 100 98   CO2 26 24 23   GLUCOSE 118* 117* 140*   BUN 40* 40* 41*   CREATININE 1.50* 1.67* 2.02*   CALCIUM 9.6 9.5 9.7                     Imaging  [id number] VIEW   Final Result      1.  There is a nonobstructive bowel gas pattern with a moderate amount of colonic stool.      DX-CHEST-LIMITED (1 VIEW)   Final Result      Stable chest with cardiac enlargement, bronchial thickening and left basilar atelectasis. Bronchial thickening most likely represents bronchitis but edema could be considered      IR-US GUIDED PIV   Final Result    Ultrasound-guided PERIPHERAL IV INSERTION performed by    qualified nursing staff as above.      DX-ABDOMEN FOR TUBE PLACEMENT   Final Result      Enteric tube tip projects over the stomach      DX-CHEST-PORTABLE (1 VIEW)   Final Result      1.  Tubes and lines in good position.      2.  Mild cardiomegaly with mild diffuse pulmonary edema with slight improvement since previous exam.      3.  Small left pleural effusion.      EC-ECHOCARDIOGRAM LTD W/O CONT   Final Result      DX-ABDOMEN FOR TUBE PLACEMENT   Final Result      Enteric tube tip projects over the stomach      DX-CHEST-PORTABLE (1 VIEW)   Final Result      1.  Layering bilateral pleural effusions with adjacent airspace disease.   2.  Increased interstitial edema.      DX-ABDOMEN FOR TUBE PLACEMENT   Final Result       Orogastric tube tip at the distal stomach.      DX-CHEST-PORTABLE (1 VIEW)   Final Result      1.  Removal of right IJ catheter.      2.  Right subclavian catheter unchanged in position.      3.  Mild cardiomegaly.      4.  Blunting of left costophrenic angle consistent with atelectasis, pneumonitis, and/or pleural effusion.      DX-ABDOMEN FOR TUBE PLACEMENT   Final Result      Enteric tube tip projects over the stomach antrum      MR-BRAIN-W/O   Final Result      1.  BILATERAL mostly supratentorial areas of ischemia as described above. These appear to be primarily watershed zone infarctions.   2.  No hemorrhage   3.  No significant mass effect      DX-CHEST-PORTABLE (1 VIEW)   Final Result         1. No significant interval change.      DX-ABDOMEN FOR TUBE PLACEMENT   Final Result      Interval placement of enteric tube with its tip over the midline epigastrium compatible with position at the level of the gastric body.      CT-HEAD W/O   Final Result      1.  Likely subacute infarcts in the bilateral parieto-occipital regions.   2.  No acute intracranial hemorrhage.   3.  Bilateral maxillary sinus disease.      EC-DAVE W/O CONT   Final Result      DX-CHEST-PORTABLE (1 VIEW)   Final Result      Stable enlargement of the cardiomediastinal silhouette, mild diffuse interstitial edema and bilateral basilar atelectasis and/or consolidation.      DX-CHEST-PORTABLE (1 VIEW)   Final Result      Satisfactory postoperative appearance of the chest      CL-TEMPORARY PACEMAKER INSERT    (Results Pending)   CL-BIV IMPLANTABLE CARDIOVERTER DEFIBRILLATOR    (Results Pending)          Assessment/Plan  * Complete heart block (HCC)  Assessment & Plan  8-second pause on telemetry  Cardiology consulted for which patient was diagnosed for complete heart block  Temporary pacer  Cardiac monitoring    Dysphagia- (present on admission)  Assessment & Plan  SPL following, NG feed held and started on diet  Nutrition following, keep NG tube  for now and monitor oral feeds 24-hour    Chronic systolic congestive heart failure ([facility]) EF 35%- (present on admission)  Assessment & Plan  Treated to euvolemia, GDMT as tolerated  Ejection fraction 35% on DAVE.  Coreg and diovan    TAN (acute kidney injury) ([facility])  Assessment & Plan  Cr has gone up to 1.7 and BUN is up to 72  She is now off diuretics  Free water 250 mL will be added to NG feeding q4 hours.  Follow urine output and check BMP in the morning.     Acute blood loss anemia- (present on admission)  Assessment & Plan  From hematuria  Requiring transfusion  Hb 12.4    Encephalopathy acute  Assessment & Plan  Acute encephalopathy with consideration of ICU delirium post-stroke  This is slowly improving though she remains encephalopathic    Acute respiratory failure with hypoxia ([facility])  Assessment & Plan  She has had volume overload requiring high flow oxygen  Now on 2.5 liters of oxygen  .     PAF (paroxysmal atrial fibrillation) ([facility])- (present on admission)  Assessment & Plan  Post-operative  Decrease the amiodarone dose as she has been loaded.  eliquis    Other specified anemias  Assessment & Plan  Acute blood loss due to hematuria  Hb has been kady thus stop daily phlebotomy and check as indicated.  Transfuse RBCs for hemoglobin less than 8.0 as she is s/p CABG.    Acute ischemic stroke ([facility])  Assessment & Plan  Post/perioperative event with multiple areas of watershed type infarction  Neurology consulted  DAPT had been given then due to recent afib, neurology recommended Eliquis which was started  Neurology consultation was obtained  PT/OT/SLP and rehabilitation  NG feeding until she is cleared by speech path  She is now moving all extremities though right leg remains weak  She will be an excellent candidate for rehab    S/P CABG x 3  Assessment & Plan  As per cardiac surgery, optimization,, mobilization as tolerated,  Telemetry    Chronic obstructive pulmonary disease (COPD) ([facility])- (present on  admission)  Assessment & Plan  Pre-existing, respiratory protocol,  Continuous respiratory therapy protocol.  Without exacerbation    Dyslipidemia  Assessment & Plan  The patient with prior severe intolerance, apparently failed multiple regimens  Consider outpatient referral to PCSK9 treatment    Coronary artery disease due to calcified coronary lesion - Calcifications seen on CT scan also wtih aortic ulceration- (present on admission)  Assessment & Plan  S/p CABG       VTE prophylaxis: therapeutic anticoagulation with Eliquis    I have performed a physical exam and reviewed and updated ROS and Plan today (11/28/2022). In review of yesterday's note (11/27/2022), there are no changes except as documented above.

## 2022-11-28 NOTE — PROGRESS NOTES
[facility] Cardiology Follow-up Consult Note  Date of note:    11/28/2022          Patient ID:  Name:   Lauren Dave   YOB: 1950  Age:   72 y.o.  female   MRN:   [id number]    CC here for elective CABG    Interim Events:  Patient noted to have complete heart block up to 8-second pause some junctional escape beats and then reverted back to sinus rhythm with left bundle branch block.  She is with her sister at the bedside she did not have any note of symptoms at the time.  Per the chart review her last pause was week ago which was noted during a coughing spell of 6 seconds her amiodarone was decreased      ROS  No NV, No Bleeding, No dizziness   All other review of systems reviewed and negative.    Past medical, surgical, social, and family history reviewed and unchanged from admission except as noted in assessment and plan.    Medications: Reviewed in MAR  Current Facility-Administered Medications   Medication Dose Frequency Provider Last Rate Last Admin    mirtazapine (Remeron) tablet 15 mg  15 mg QHS Rezakhurram Beckford M.D.   15 mg at 11/27/22 2031    hydroxychloroquine (Plaquenil) tablet 200 mg  200 mg QDAY with Breakfast Rezakhurram Beckford M.D.   200 mg at 11/28/22 1015    omeprazole (PRILOSEC) capsule 40 mg  40 mg DAILY Rezakhurram Beckford M.D.   40 mg at 11/28/22 0519    carvedilol (COREG) tablet 6.25 mg  6.25 mg BID WITH MEALS Rezakhurram Beckford M.D.   6.25 mg at 11/28/22 1015    apixaban (ELIQUIS) tablet 5 mg  5 mg BID Rezakhurram Beckford M.D.   5 mg at 11/28/22 0519    amiodarone (Cordarone) tablet 200 mg  200 mg Q DAY Reza Beckford M.D.   200 mg at 11/28/22 0519    valsartan (DIOVAN) tablet 40 mg  40 mg Q DAY Rezakhurram Beckford M.D.   40 mg at 11/28/22 0519    aspirin (ASA) chewable tab 81 mg  81 mg DAILY Rezakhurram Beckford M.D.   81 mg at 11/28/22 0518    acetaminophen (TYLENOL) tablet 1,000 mg  1,000 mg Q8HRS PRN Reza D  "Selena Beckford M.D.   1,000 mg at 11/27/22 0110    ondansetron (ZOFRAN) syringe/vial injection 8 mg  8 mg Q6HRS PRN Reza Beckford M.D.        prochlorperazine (COMPAZINE) injection 10 mg  10 mg Q6HRS PRN Reza Beckford M.D.   10 mg at 11/24/22 1701    promethazine (PHENERGAN) suppository 25 mg  25 mg Q6HRS PRN Reza Beckford M.D.        Metoprolol Tartrate (LOPRESSOR) injection 5 mg  5 mg Q5 MIN PRN Kendall Yeboah M.D.        Respiratory Therapy Consult   Continuous RT YULISSA Kwon       Last reviewed on 11/10/2022  8:28 AM by Elizabeth Freedman R.N.   Allergies   Allergen Reactions    Statins [Hmg-Coa-R Inhibitors] Myalgia     Muscle Spasms   RXN=unknown    Atorvastatin Myalgia    Codeine Vomiting and Nausea     Clarified with patient - states that she has an \"upset stomach\" when she takes it    Eggs Diarrhea     Pt states that she is allergic to eggs per her mother.  Tolerated clevidipine 11/2022    Lisinopril Cough    Losartan Unspecified     Tried in 2017  Cannot recall reaction    Penicillins Unspecified     \"does not work\" .'IMMUNE TO PENICILLIN,AMPICILLIN IS THE ONLY THING THAT WORKS'       Physical Exam  Body mass index is 25 kg/m². /54   Pulse 83   Temp 37 °C (98.6 °F) (Temporal)   Resp 16   Ht 1.6 m (5' 3\")   Wt 64 kg (141 lb 1.5 oz)   SpO2 90%    Vitals:    11/27/22 2337 11/28/22 0357 11/28/22 0812 11/28/22 1246   BP: 127/58 122/58 128/64 118/54   Pulse: 94 92 94 83   Resp: 16 17 18 16   Temp: 36.3 °C (97.3 °F) 36.8 °C (98.2 °F) 36.7 °C (98.1 °F) 37 °C (98.6 °F)   TempSrc: Temporal Temporal Temporal Temporal   SpO2: 99% 94% 93% 90%   Weight:       Height:        Oxygen Therapy:  Pulse Oximetry: 90 %, O2 (LPM): 0, O2 Delivery Device: None - Room Air    General: no apparent distress  Eyes: nl conjunctiva  ENT: OP clear  Neck: no JVD   Lungs: normal respiratory effort sternotomy well healing  Heart: RRR,  EXT no edema bilateral lower extremities. + pedal " pulses. no cyanosis  Abdomen: soft, non tender, non distended,  Neurological: improved weakness of right leg some strength  Psychiatric: Appropriate affect,   Skin: Warm extremities    Labs (personally reviewed and notable for):   Recent Results (from the past 24 hour(s))   Basic Metabolic Panel (BMP) Critical Care 0130    Collection Time: 22  1:24 AM   Result Value Ref Range    Sodium 136 135 - 145 mmol/L    Potassium 4.2 3.6 - 5.5 mmol/L    Chloride 98 96 - 112 mmol/L    Co2 23 20 - 33 mmol/L    Glucose 140 (H) 65 - 99 mg/dL    Bun 41 (H) 8 - 22 mg/dL    Creatinine 2.02 (H) 0.50 - 1.40 mg/dL    Calcium 9.7 8.5 - 10.5 mg/dL    Anion Gap 15.0 7.0 - 16.0   Prealbumin    Collection Time: 22  1:24 AM   Result Value Ref Range    Pre-Albumin 24.5 18.0 - 38.0 mg/dL   ESTIMATED GFR    Collection Time: 22  1:24 AM   Result Value Ref Range    GFR (CKD-EPI) 26 (A) >60 mL/min/1.73 m 2   EKG    Collection Time: 22 11:52 AM   Result Value Ref Range    Report       [facility]    Test Date:  2022  Pt Name:    DMITRI LI              Department: 183  MRN:        [id number]                      Room:       [facility]  Gender:     Female                       Technician: CANDACE  :        1950                   Requested By:SWATHI RUSHING  Order #:    [id number]                    Reading MD:    Measurements  Intervals                                Axis  Rate:       80                           P:          41  KY:         173                          QRS:        81  QRSD:       148                          T:          244  QT:         470  QTc:        543    Interpretive Statements  Sinus rhythm  Probable left atrial enlargement  Left ventricular hypertrophy  Prolonged QT interval  Baseline wander in lead(s) V1,V4  Compared to ECG 2022 05:22:14  Left ventricular hypertrophy now present  Prolonged QT interval now present  Sinus tachycardia no longer present  Myocardial infarct  finding no longer present         Cardiac Imaging and Procedures Review:    EKG and telemetry tracings personally reviewed        Impression and Medical Decision Making:  Principal Problem:    Acute ischemic stroke (HCC) POA: Unknown  Active Problems:    Gastroesophageal reflux disease without esophagitis POA: Yes    Coronary artery disease due to calcified coronary lesion - Calcifications seen on CT scan also wtih aortic ulceration (Chronic) POA: Yes    Chronic systolic congestive heart failure (HCC) EF 35% (Chronic) POA: Yes    Chronic obstructive pulmonary disease (COPD) ([facility]) POA: Yes    S/P CABG x 3 POA: Unknown    Encounter for weaning from ventilator ([facility]) POA: Unknown    Other specified anemias POA: Unknown    PAF (paroxysmal atrial fibrillation) ([facility]) POA: Yes    Acute respiratory failure with hypoxia ([facility]) POA: Unknown    Encephalopathy acute POA: Unknown    CHF (congestive heart failure), NYHA class III, acute on chronic, combined ([facility]) POA: Yes    HFrEF (heart failure with reduced ejection fraction) ([facility]) POA: Unknown    Acute blood loss anemia POA: Yes    Metabolic alkalemia POA: Unknown    TAN (acute kidney injury) ([facility]) POA: Unknown    Dysphagia POA: Yes    Complete heart block (HCC) (Chronic) POA: No  Resolved Problems:    * No resolved hospital problems. *      CHB with LBBB  Plan for temp wire today and biV ICD tomorrow    CHF  CHF meds titrating    pAF  Mckennaprashant Rodriguez    I personally discussed her case with  Dr Reza Beckford*    Future Appointments   Date Time Provider Department Center   12[facility]2022 10:[facility]   12/9/2022  2:30 PM YULISSA Leija [facility] None   12/12/2022  1:30 PM CT RESOURCE PROVIDER [facility] None   8/15/2023 10:00 AM BROOKE Guzman [facility] None       It is my pleasure to participate in the care of Ms. Dave.  Please do not hesitate to contact me with questions or concerns.    Jarrod Mayberry MD PhD  [facility]  Cardiologist [facility]    11/28/2022    Please note that this dictation was created using voice recognition software. There may be errors of grammar and possibly content I did not discover before finalizing the note.     DIXON Jiménez

## 2022-11-29 ENCOUNTER — APPOINTMENT (OUTPATIENT)
Dept: RADIOLOGY | Facility: MEDICAL CENTER | Age: 72
DRG: 235 | End: 2022-11-29
Attending: INTERNAL MEDICINE
Payer: MEDICARE

## 2022-11-29 ENCOUNTER — ANESTHESIA EVENT (OUTPATIENT)
Dept: CARDIOLOGY | Facility: MEDICAL CENTER | Age: 72
DRG: 235 | End: 2022-11-29
Payer: MEDICARE

## 2022-11-29 ENCOUNTER — APPOINTMENT (OUTPATIENT)
Dept: CARDIOLOGY | Facility: MEDICAL CENTER | Age: 72
DRG: 235 | End: 2022-11-29
Attending: INTERNAL MEDICINE
Payer: MEDICARE

## 2022-11-29 ENCOUNTER — ANESTHESIA (OUTPATIENT)
Dept: CARDIOLOGY | Facility: MEDICAL CENTER | Age: 72
DRG: 235 | End: 2022-11-29
Payer: MEDICARE

## 2022-11-29 ENCOUNTER — APPOINTMENT (OUTPATIENT)
Dept: CARDIOLOGY | Facility: MEDICAL CENTER | Age: 72
DRG: 235 | End: 2022-11-29
Attending: NURSE PRACTITIONER
Payer: MEDICARE

## 2022-11-29 LAB
ANION GAP SERPL CALC-SCNC: 13 MMOL/L (ref 7–16)
BUN SERPL-MCNC: 42 MG/DL (ref 8–22)
CALCIUM SERPL-MCNC: 9.5 MG/DL (ref 8.5–10.5)
CHLORIDE SERPL-SCNC: 100 MMOL/L (ref 96–112)
CO2 SERPL-SCNC: 23 MMOL/L (ref 20–33)
CREAT SERPL-MCNC: 1.97 MG/DL (ref 0.5–1.4)
EKG IMPRESSION: NORMAL
GFR SERPLBLD CREATININE-BSD FMLA CKD-EPI: 26 ML/MIN/1.73 M 2
GLUCOSE SERPL-MCNC: 91 MG/DL (ref 65–99)
POTASSIUM SERPL-SCNC: 4.5 MMOL/L (ref 3.6–5.5)
SODIUM SERPL-SCNC: 136 MMOL/L (ref 135–145)

## 2022-11-29 PROCEDURE — 33249 INSJ/RPLCMT DEFIB W/LEAD(S): CPT | Performed by: INTERNAL MEDICINE

## 2022-11-29 PROCEDURE — 0JH609Z INSERTION OF CARDIAC RESYNCHRONIZATION DEFIBRILLATOR PULSE GENERATOR INTO CHEST SUBCUTANEOUS TISSUE AND FASCIA, OPEN APPROACH: ICD-10-PCS | Performed by: INTERNAL MEDICINE

## 2022-11-29 PROCEDURE — 700102 HCHG RX REV CODE 250 W/ 637 OVERRIDE(OP): Performed by: STUDENT IN AN ORGANIZED HEALTH CARE EDUCATION/TRAINING PROGRAM

## 2022-11-29 PROCEDURE — 160002 HCHG RECOVERY MINUTES (STAT)

## 2022-11-29 PROCEDURE — A9270 NON-COVERED ITEM OR SERVICE: HCPCS | Performed by: INTERNAL MEDICINE

## 2022-11-29 PROCEDURE — 770020 HCHG ROOM/CARE - TELE (206)

## 2022-11-29 PROCEDURE — 700105 HCHG RX REV CODE 258: Performed by: INTERNAL MEDICINE

## 2022-11-29 PROCEDURE — A9270 NON-COVERED ITEM OR SERVICE: HCPCS | Performed by: STUDENT IN AN ORGANIZED HEALTH CARE EDUCATION/TRAINING PROGRAM

## 2022-11-29 PROCEDURE — 80048 BASIC METABOLIC PNL TOTAL CA: CPT

## 2022-11-29 PROCEDURE — 02H63KZ INSERTION OF DEFIBRILLATOR LEAD INTO RIGHT ATRIUM, PERCUTANEOUS APPROACH: ICD-10-PCS | Performed by: INTERNAL MEDICINE

## 2022-11-29 PROCEDURE — 700111 HCHG RX REV CODE 636 W/ 250 OVERRIDE (IP)

## 2022-11-29 PROCEDURE — 93010 ELECTROCARDIOGRAM REPORT: CPT | Mod: 59 | Performed by: INTERNAL MEDICINE

## 2022-11-29 PROCEDURE — 700101 HCHG RX REV CODE 250: Performed by: ANESTHESIOLOGY

## 2022-11-29 PROCEDURE — 99152 MOD SED SAME PHYS/QHP 5/>YRS: CPT | Performed by: INTERNAL MEDICINE

## 2022-11-29 PROCEDURE — 33225 L VENTRIC PACING LEAD ADD-ON: CPT | Performed by: INTERNAL MEDICINE

## 2022-11-29 PROCEDURE — 00534 ANES INSERTION/RPLCMT CVDFB: CPT | Performed by: ANESTHESIOLOGY

## 2022-11-29 PROCEDURE — C2628 CATHETER, OCCLUSION: HCPCS

## 2022-11-29 PROCEDURE — 700102 HCHG RX REV CODE 250 W/ 637 OVERRIDE(OP): Performed by: INTERNAL MEDICINE

## 2022-11-29 PROCEDURE — 71045 X-RAY EXAM CHEST 1 VIEW: CPT

## 2022-11-29 PROCEDURE — 99100 ANES PT EXTEME AGE<1 YR&>70: CPT | Performed by: ANESTHESIOLOGY

## 2022-11-29 PROCEDURE — 700111 HCHG RX REV CODE 636 W/ 250 OVERRIDE (IP): Performed by: ANESTHESIOLOGY

## 2022-11-29 PROCEDURE — 160035 HCHG PACU - 1ST 60 MINS PHASE I

## 2022-11-29 PROCEDURE — 700111 HCHG RX REV CODE 636 W/ 250 OVERRIDE (IP): Performed by: INTERNAL MEDICINE

## 2022-11-29 PROCEDURE — 99232 SBSQ HOSP IP/OBS MODERATE 35: CPT | Mod: FS | Performed by: INTERNAL MEDICINE

## 2022-11-29 PROCEDURE — 700101 HCHG RX REV CODE 250

## 2022-11-29 PROCEDURE — 700105 HCHG RX REV CODE 258: Performed by: ANESTHESIOLOGY

## 2022-11-29 PROCEDURE — 99233 SBSQ HOSP IP/OBS HIGH 50: CPT | Performed by: HOSPITALIST

## 2022-11-29 PROCEDURE — 93005 ELECTROCARDIOGRAM TRACING: CPT | Performed by: INTERNAL MEDICINE

## 2022-11-29 PROCEDURE — 02HK3KZ INSERTION OF DEFIBRILLATOR LEAD INTO RIGHT VENTRICLE, PERCUTANEOUS APPROACH: ICD-10-PCS | Performed by: INTERNAL MEDICINE

## 2022-11-29 PROCEDURE — 02H43KZ INSERTION OF DEFIBRILLATOR LEAD INTO CORONARY VEIN, PERCUTANEOUS APPROACH: ICD-10-PCS | Performed by: INTERNAL MEDICINE

## 2022-11-29 RX ORDER — OXYCODONE HCL 5 MG/5 ML
10 SOLUTION, ORAL ORAL
Status: DISCONTINUED | OUTPATIENT
Start: 2022-11-29 | End: 2022-11-29 | Stop reason: HOSPADM

## 2022-11-29 RX ORDER — HYDROMORPHONE HYDROCHLORIDE 1 MG/ML
0.4 INJECTION, SOLUTION INTRAMUSCULAR; INTRAVENOUS; SUBCUTANEOUS
Status: DISCONTINUED | OUTPATIENT
Start: 2022-11-29 | End: 2022-11-29 | Stop reason: HOSPADM

## 2022-11-29 RX ORDER — SODIUM CHLORIDE, SODIUM LACTATE, POTASSIUM CHLORIDE, CALCIUM CHLORIDE 600; 310; 30; 20 MG/100ML; MG/100ML; MG/100ML; MG/100ML
INJECTION, SOLUTION INTRAVENOUS CONTINUOUS
Status: DISCONTINUED | OUTPATIENT
Start: 2022-11-29 | End: 2022-11-29 | Stop reason: HOSPADM

## 2022-11-29 RX ORDER — CEFAZOLIN SODIUM 1 G/3ML
INJECTION, POWDER, FOR SOLUTION INTRAMUSCULAR; INTRAVENOUS
Status: COMPLETED
Start: 2022-11-29 | End: 2022-11-29

## 2022-11-29 RX ORDER — METOPROLOL SUCCINATE 100 MG/1
100 TABLET, EXTENDED RELEASE ORAL
Status: DISCONTINUED | OUTPATIENT
Start: 2022-11-29 | End: 2022-11-30

## 2022-11-29 RX ORDER — LIDOCAINE HYDROCHLORIDE 20 MG/ML
INJECTION, SOLUTION INFILTRATION; PERINEURAL
Status: COMPLETED
Start: 2022-11-29 | End: 2022-11-29

## 2022-11-29 RX ORDER — OXYCODONE HCL 5 MG/5 ML
5 SOLUTION, ORAL ORAL
Status: DISCONTINUED | OUTPATIENT
Start: 2022-11-29 | End: 2022-11-29 | Stop reason: HOSPADM

## 2022-11-29 RX ORDER — SODIUM CHLORIDE, SODIUM LACTATE, POTASSIUM CHLORIDE, CALCIUM CHLORIDE 600; 310; 30; 20 MG/100ML; MG/100ML; MG/100ML; MG/100ML
INJECTION, SOLUTION INTRAVENOUS
Status: DISCONTINUED | OUTPATIENT
Start: 2022-11-29 | End: 2022-11-29 | Stop reason: SURG

## 2022-11-29 RX ORDER — ONDANSETRON 2 MG/ML
INJECTION INTRAMUSCULAR; INTRAVENOUS PRN
Status: DISCONTINUED | OUTPATIENT
Start: 2022-11-29 | End: 2022-11-29 | Stop reason: SURG

## 2022-11-29 RX ORDER — PHENYLEPHRINE HYDROCHLORIDE 10 MG/ML
INJECTION, SOLUTION INTRAMUSCULAR; INTRAVENOUS; SUBCUTANEOUS PRN
Status: DISCONTINUED | OUTPATIENT
Start: 2022-11-29 | End: 2022-11-29 | Stop reason: SURG

## 2022-11-29 RX ORDER — CEFAZOLIN SODIUM 1 G/3ML
INJECTION, POWDER, FOR SOLUTION INTRAMUSCULAR; INTRAVENOUS PRN
Status: DISCONTINUED | OUTPATIENT
Start: 2022-11-29 | End: 2022-11-29 | Stop reason: SURG

## 2022-11-29 RX ORDER — DIPHENHYDRAMINE HYDROCHLORIDE 50 MG/ML
12.5 INJECTION INTRAMUSCULAR; INTRAVENOUS
Status: DISCONTINUED | OUTPATIENT
Start: 2022-11-29 | End: 2022-11-29 | Stop reason: HOSPADM

## 2022-11-29 RX ORDER — HYDROMORPHONE HYDROCHLORIDE 1 MG/ML
0.2 INJECTION, SOLUTION INTRAMUSCULAR; INTRAVENOUS; SUBCUTANEOUS
Status: DISCONTINUED | OUTPATIENT
Start: 2022-11-29 | End: 2022-11-29 | Stop reason: HOSPADM

## 2022-11-29 RX ORDER — DEXAMETHASONE SODIUM PHOSPHATE 4 MG/ML
INJECTION, SOLUTION INTRA-ARTICULAR; INTRALESIONAL; INTRAMUSCULAR; INTRAVENOUS; SOFT TISSUE PRN
Status: DISCONTINUED | OUTPATIENT
Start: 2022-11-29 | End: 2022-11-29 | Stop reason: SURG

## 2022-11-29 RX ORDER — HYDROMORPHONE HYDROCHLORIDE 1 MG/ML
0.1 INJECTION, SOLUTION INTRAMUSCULAR; INTRAVENOUS; SUBCUTANEOUS
Status: DISCONTINUED | OUTPATIENT
Start: 2022-11-29 | End: 2022-11-29 | Stop reason: HOSPADM

## 2022-11-29 RX ORDER — ONDANSETRON 2 MG/ML
4 INJECTION INTRAMUSCULAR; INTRAVENOUS ONCE
Status: DISCONTINUED | OUTPATIENT
Start: 2022-11-29 | End: 2022-11-29 | Stop reason: HOSPADM

## 2022-11-29 RX ADMIN — SODIUM CHLORIDE 1 G: 900 INJECTION INTRAVENOUS at 17:18

## 2022-11-29 RX ADMIN — MIRTAZAPINE 15 MG: 15 TABLET, FILM COATED ORAL at 21:00

## 2022-11-29 RX ADMIN — FENTANYL CITRATE 50 MCG: 50 INJECTION, SOLUTION INTRAMUSCULAR; INTRAVENOUS at 08:19

## 2022-11-29 RX ADMIN — OMEPRAZOLE 40 MG: 20 CAPSULE, DELAYED RELEASE ORAL at 05:20

## 2022-11-29 RX ADMIN — EPINEPHRINE 0.04 MCG/KG/MIN: 1 INJECTION INTRAMUSCULAR; INTRAVENOUS; SUBCUTANEOUS at 08:24

## 2022-11-29 RX ADMIN — VALSARTAN 40 MG: 80 TABLET, FILM COATED ORAL at 05:20

## 2022-11-29 RX ADMIN — PHENYLEPHRINE HYDROCHLORIDE 100 MCG: 10 INJECTION INTRAVENOUS at 08:55

## 2022-11-29 RX ADMIN — DEXAMETHASONE SODIUM PHOSPHATE 12 MG: 4 INJECTION, SOLUTION INTRA-ARTICULAR; INTRALESIONAL; INTRAMUSCULAR; INTRAVENOUS; SOFT TISSUE at 08:18

## 2022-11-29 RX ADMIN — CEFAZOLIN 1000 MG: 330 INJECTION, POWDER, FOR SOLUTION INTRAMUSCULAR; INTRAVENOUS at 08:49

## 2022-11-29 RX ADMIN — SODIUM CHLORIDE, POTASSIUM CHLORIDE, SODIUM LACTATE AND CALCIUM CHLORIDE: 600; 310; 30; 20 INJECTION, SOLUTION INTRAVENOUS at 08:18

## 2022-11-29 RX ADMIN — PHENYLEPHRINE HYDROCHLORIDE 100 MCG: 10 INJECTION INTRAVENOUS at 08:19

## 2022-11-29 RX ADMIN — METOPROLOL SUCCINATE 100 MG: 25 TABLET, EXTENDED RELEASE ORAL at 15:37

## 2022-11-29 RX ADMIN — FENTANYL CITRATE 50 MCG: 50 INJECTION, SOLUTION INTRAMUSCULAR; INTRAVENOUS at 09:38

## 2022-11-29 RX ADMIN — ONDANSETRON 4 MG: 2 INJECTION INTRAMUSCULAR; INTRAVENOUS at 09:32

## 2022-11-29 RX ADMIN — CEFAZOLIN 2 G: 330 INJECTION, POWDER, FOR SOLUTION INTRAMUSCULAR; INTRAVENOUS at 08:27

## 2022-11-29 RX ADMIN — ROCURONIUM BROMIDE 50 MG: 10 INJECTION, SOLUTION INTRAVENOUS at 08:19

## 2022-11-29 RX ADMIN — ASPIRIN 81 MG 81 MG: 81 TABLET ORAL at 05:20

## 2022-11-29 RX ADMIN — LIDOCAINE HYDROCHLORIDE: 20 INJECTION, SOLUTION INFILTRATION; PERINEURAL at 08:49

## 2022-11-29 RX ADMIN — SUGAMMADEX 200 MG: 100 INJECTION, SOLUTION INTRAVENOUS at 09:54

## 2022-11-29 RX ADMIN — PROPOFOL 100 MG: 10 INJECTION, EMULSION INTRAVENOUS at 08:18

## 2022-11-29 RX ADMIN — APIXABAN 5 MG: 5 TABLET, FILM COATED ORAL at 18:00

## 2022-11-29 RX ADMIN — ROCURONIUM BROMIDE 30 MG: 10 INJECTION, SOLUTION INTRAVENOUS at 08:46

## 2022-11-29 RX ADMIN — ACETAMINOPHEN 1000 MG: 500 TABLET ORAL at 15:56

## 2022-11-29 ASSESSMENT — ENCOUNTER SYMPTOMS
SENSORY CHANGE: 0
DIZZINESS: 0
HEADACHES: 0
FOCAL WEAKNESS: 1
EYE DISCHARGE: 0
SHORTNESS OF BREATH: 0
COUGH: 0
CHILLS: 0
NERVOUS/ANXIOUS: 0
FEVER: 0
NAUSEA: 0
BACK PAIN: 0
DIARRHEA: 0
ABDOMINAL PAIN: 0
SPEECH CHANGE: 0
PALPITATIONS: 0
STRIDOR: 0

## 2022-11-29 ASSESSMENT — PAIN DESCRIPTION - PAIN TYPE
TYPE: ACUTE PAIN
TYPE: SURGICAL PAIN
TYPE: ACUTE PAIN
TYPE: SURGICAL PAIN
TYPE: ACUTE PAIN

## 2022-11-29 ASSESSMENT — PAIN SCALES - GENERAL: PAIN_LEVEL: 1

## 2022-11-29 NOTE — THERAPY
Missed Therapy     Patient Name: Lauren Dave  Age:  72 y.o., Sex:  female  Medical Record #: [id number]  Today's Date: 11/29/2022 11/29/22 0700   Treatment Variance   Reason For Missed Therapy Medical - Patient  in Procedure   Total Treatment Time   SLP Time Spent Yes  (10)   Interdisciplinary Plan of Care Collaboration   IDT Collaboration with  Other (See Comments)  (EMR)   Collaboration Comments Pt NPO for pacemaker this date. SLP to hold dysphagia intervention and return as able and as pt medically appropriate.

## 2022-11-29 NOTE — CARE PLAN
The patient is Watcher - Medium risk of patient condition declining or worsening    Shift Goals  Clinical Goals: rest, and SNF placement.  Patient Goals: Discharge  Family Goals: SUNDAR    Progress made toward(s) clinical / shift goals:  ambulate edge of the bed, up to commode for elimination, evaluations by PT, OT, SPL, discharge to SNF postponed due to pacemaker placement.     Patient is not progressing towards the following goals:      Problem: Knowledge Deficit - Standard  Goal: Patient and family/care givers will demonstrate understanding of plan of care, disease process/condition, diagnostic tests and medications  Outcome: Progressing     Problem: Skin Integrity  Goal: Skin integrity is maintained or improved  Outcome: Progressing     Problem: Fall Risk  Goal: Patient will remain free from falls  Outcome: Progressing

## 2022-11-29 NOTE — THERAPY
Occupational Therapy  Daily Treatment     Patient Name: Lauren Dave  Age:  72 y.o., Sex:  female  Medical Record #: [id number]  Today's Date: 11/28/2022     Precautions  Precautions: Swallow Precautions ( See Comments), Cardiac Precautions (See Comments), Sternal Precautions (See Comments)  Comments: s/p CABG, CVA    Assessment    Pt seen for OT treatment. She is making  progress toward her goals. She demonstrated improved initiation during sit to stand and functional transfers. Her initiation was poor during ADLs although she verbalized being excited to perform a sponge bath. She would continue to benefit from OT services.      Plan    Continue current treatment plan.    DC Equipment Recommendations: Unable to determine at this time  Discharge Recommendations: Recommend post-acute placement for additional occupational therapy services prior to discharge home    Subjective    Agreeable to therapy session     Objective       11/28/22 1202   Vitals   O2 Delivery Device None - Room Air   Pain 0 - 10 Group   Therapist Pain Assessment During Activity;Nurse Notified;0   Cognition    Cognition / Consciousness X   Level of Consciousness Alert   Ability To Follow Commands 1 Step   Comments delayed responses and initiation   Strength Upper Body   Comments able to hold objects in B hands to help with transfers and with sponge bath   Balance   Sitting Balance (Static) Fair -  (with UE support)   Sitting Balance (Dynamic) Poor   Standing Balance (Static) Poor -   Standing Balance (Dynamic) Trace +   Weight Shift Sitting Poor   Weight Shift Standing Poor   Skilled Intervention Verbal Cuing;Tactile Cuing;Compensatory Strategies;Sequencing;Postural Facilitation   Bed Mobility    Supine to Sit Maximal Assist   Sit to Supine Maximal Assist   Scooting Maximal Assist   Skilled Intervention Verbal Cuing;Tactile Cuing;Facilitation   Activities of Daily Living   Bathing Maximal Assist  (full body sponge bath seated up in chair)    Upper Body Dressing Moderate Assist  (gown)   Lower Body Dressing Maximal Assist  (socks)   Skilled Intervention Verbal Cuing;Tactile Cuing;Compensatory Strategies   Comments poor initiation during sponge bath   6 Clicks Daily Activity Score 12   Modified Audrey (mRS)   Modified [place] Score 5   Functional Mobility   Sit to Stand Moderate Assist   Bed, Chair, Wheelchair Transfer Moderate Assist   Mobility transferred to/from the bedside chair, better initiation during the transfers than during ADLs   Skilled Intervention Verbal Cuing;Tactile Cuing;Postural Facilitation   Activity Tolerance   Sitting in Chair 15 min   Comments Pt required verbal cues to keep her head up while sitting in the chair   Patient / Family Goals   Patient / Family Goal #1 to get better   Short Term Goals   Short Term Goal # 1 Pt will follow 1-step command 75% during functional tasks   Goal Outcome # 1 Progressing as expected   Short Term Goal # 1 B  will follow 100% of commands w/o req repetition   Goal Outcome  # 1 B Progressing as expected   Short Term Goal # 2 Pt will sit EOB with CGA >5 min to participate in ADL task   Goal Outcome # 2 Progressing as expected   Short Term Goal # 3 Pt will wipe face with SBA using dominant R hand   Goal Outcome # 3 Progressing as expected   Education Group   Role of Occupational Therapist Patient Response Patient;Acceptance;Explanation;Reinforcement Needed

## 2022-11-29 NOTE — PROGRESS NOTES
[facility] Follow-up Note    Date of Service:    11/29/2022      Name:   Lauren Dave   YOB: 1950  Age:   72 y.o.  female   MRN:   [id number]    Reason for cardiology consult: elective CABG, 8s pause    Attending Provider: Dr. Blankenship    HPI:  Mrs Dave has history of left bundle branch block and HFrEF with an EF of 30%, elective CABG x3 on 11/10 with Dr. Mac, complicated by CVA post surgery.  Noted to have complete heart block with 8 second pauses and junctional escape beats, underwent placement of transvenous pacemaker yesterday. Today she received biventricular ICD today with Dr. Ambrose.    Interim Events:  11/29/22  - Back in around BiV ICD placement, familiy at bedside  - Personal Telemetry interpretation: A sensed V paced   - Vitals: 100-115's/50-60's on  - Labs reviewed: Cr 1.97    ROS  Constitutional: + fatigue.  Respiratory:  Denies shortness of breath, no cough.  Cardiovascular: + Daily spine Postsurgical chest pain. Denies lower extremity edema.  Denies orthopnea or PND.  : denies polyuria, no dysuria.  GI:  Denies nausea/vomiting.  No abdominal distention.  Neuro:  Denies dizziness, syncope.  Hem/lymph: Denies easy bleeding/bruising.      All other review of systems reviewed and negative.    Past medical, surgical, social, and family history reviewed and unchanged from admission except as noted in HPI.    Medications: Reviewed in MAR  Current Facility-Administered Medications   Medication Dose Frequency Provider Last Rate Last Admin    FENTANYL CITRATE (PF) 0.05 MG/ML INJ SOLN (WRAPPED)            SUGAMMADEX SODIUM 200 MG/2ML IV SOLN            ceFAZolin (ANCEF) 1 g in  mL IVPB  1 g Once Kamron Ambrose M.D.        metoprolol SR (TOPROL XL) tablet 100 mg  100 mg Q DAY Jarrod Mayberry M.D.        mirtazapine (Remeron) tablet 15 mg  15 mg QHS Reza Beckford M.D.   15 mg at 11/28/22 2055    hydroxychloroquine (Plaquenil) tablet 200 mg  200 mg  "QDAY with Breakfast Reza Beckford M.D.   200 mg at 11/28/22 1015    omeprazole (PRILOSEC) capsule 40 mg  40 mg DAILY Reza Beckford M.D.   40 mg at 11/29/22 0520    apixaban (ELIQUIS) tablet 5 mg  5 mg BID Reza Beckford M.D.   5 mg at 11/28/22 1646    amiodarone (Cordarone) tablet 200 mg  200 mg Q DAY Reza Beckford M.D.   200 mg at 11/28/22 0519    valsartan (DIOVAN) tablet 40 mg  40 mg Q DAY Reza Beckford M.D.   40 mg at 11/29/22 0520    aspirin (ASA) chewable tab 81 mg  81 mg DAILY Reza Beckford M.D.   81 mg at 11/29/22 0520    acetaminophen (TYLENOL) tablet 1,000 mg  1,000 mg Q8HRS PRN Reza Beckford M.D.   1,000 mg at 11/27/22 0110    ondansetron (ZOFRAN) syringe/vial injection 8 mg  8 mg Q6HRS PRN Reza Beckford M.D.        prochlorperazine (COMPAZINE) injection 10 mg  10 mg Q6HRS PRN Reza Beckford M.D.   10 mg at 11/24/22 1701    promethazine (PHENERGAN) suppository 25 mg  25 mg Q6HRS PRN Reza Beckford M.D.        Metoprolol Tartrate (LOPRESSOR) injection 5 mg  5 mg Q5 MIN PRN Kendall Yeboah M.D.        Respiratory Therapy Consult   Continuous RT DAILY Kwon.       Last reviewed on 11/29/2022  7:45 AM by Robby Pichardo M.D.   Allergies   Allergen Reactions    Statins [Hmg-Coa-R Inhibitors] Myalgia     Muscle Spasms   RXN=unknown    Atorvastatin Myalgia    Codeine Vomiting and Nausea     Clarified with patient - states that she has an \"upset stomach\" when she takes it    Eggs Diarrhea     Pt states that she is allergic to eggs per her mother.  Tolerated clevidipine 11/2022    Lisinopril Cough    Losartan Unspecified     Tried in 2017  Cannot recall reaction    Penicillins Unspecified     \"does not work\" .'IMMUNE TO PENICILLIN,AMPICILLIN IS THE ONLY THING THAT WORKS'       Physical Exam  Body mass index is 26.64 kg/m². /59   Pulse 95   Temp 36.1 °C (97 °F) (Temporal)   Resp " "16   Ht 1.6 m (5' 3\")   Wt 68.2 kg (150 lb 5.7 oz)   SpO2 94%    Vitals:    11/29/22 1017 11/29/22 1030 11/29/22 1045 11/29/22 1100   BP: 116/58 105/58 109/56 121/59   Pulse: 94 93 95 95   Resp: 16 18 16 16   Temp: 36.1 °C (96.9 °F)   36.1 °C (97 °F)   TempSrc: Temporal   Temporal   SpO2: 99% 95% 93% 94%   Weight:       Height:        Oxygen Therapy:  Pulse Oximetry: 94 %, O2 (LPM): 2, O2 Delivery Device: Nasal Cannula    General: no acute distress, well- appearing  Neck: No JVD, no bruits  Lungs: CTAB, normal effort. no wheezing, rales, or rhonchi  Heart: RRR, normal S1 /S2, no murmur, no rub. Left chest PPM site soft, dressing C/D/I. No erythema or hematoma present   EXT: No lower extremity edema, 2+ radial pulses. 2+ pedal pulses.   Abdomen: soft, non tender, non distended  Neurological: No focal deficits, no facial asymmetry.  Normal speech  Psychiatric: Appropriate affect, alert and oriented x 3  Skin: Warm and dry extremities, no rashes    Labs (personally reviewed):     Lab Results   Component Value Date/Time    SODIUM 136 11/29/2022 04:00 AM    POTASSIUM 4.5 11/29/2022 04:00 AM    CHLORIDE 100 11/29/2022 04:00 AM    CO2 23 11/29/2022 04:00 AM    GLUCOSE 91 11/29/2022 04:00 AM    BUN 42 (H) 11/29/2022 04:00 AM    CREATININE 1.97 (H) 11/29/2022 04:00 AM     Lab Results   Component Value Date/Time    ALKPHOSPHAT 47 11/07/2022 10:53 AM    ASTSGOT 35 11/07/2022 10:53 AM    ALTSGPT 15 11/07/2022 10:53 AM    TBILIRUBIN 0.2 11/07/2022 10:53 AM      Lab Results   Component Value Date/Time    CHOLSTRLTOT 161 11/14/2022 02:26 AM    LDL 76 11/14/2022 02:26 AM    HDL 54 11/14/2022 02:26 AM    TRIGLYCERIDE 154 (H) 11/14/2022 02:26 AM     Cardiac Imaging and Procedures Review:      EKG 11/29/22 My Personal interpretation reveals a sensed V paced 95    Echo 11/17/22:  CONCLUSIONS  Compared to the prior study on 10/12/22, no major changes  The left ventricular ejection fraction is visually estimated to be " 30%.    Assessment and Medical Decision Making:    Intermittent complete heart block  -BiV ICD successfully placed today    HFrEF  -Continue GDMT per below  -Losartan 20 mg daily  -Metoprolol 100mg daily  -Hold MRA given current kidney function with GFR < 30    Paroxysmal atrial fibrillation  -Continue amiodarone 200 mg daily  -Continue apixaban 5 mg bid for  -Continue metoprolol SR 100mg daily      Thank you for allowing me to participate in this patients care.  Please contact me with any questions or concerns.    Please see Dr. Mayberry's attestation for additions and further recommendations.    I personally spent a total of 13 minutes which includes face-to-face time and non-face-to-face time spent on preparing to see the patient, reviewing hospital notes and tests, obtaining history from the patient, performing a medically appropriate exam, counseling and educating the patient, ordering medications/tests/procedures/referrals as clinically indicated, and documenting information in the electronic medical record.    PLEASE NOTE: Some of this dictation was created using voice recognition software. I have made every reasonable attempt to correct obvious errors, but I expect that there are errors of grammar and possibly content that I did not discover before finalizing the note.     DAILY Leija.   [facility]  [phone]

## 2022-11-29 NOTE — PROCEDURES
Cardiac Catheterization Procedure Note    DATE: 11/28/22    : Antonio Jacobs MD    PROCEDURES PERFORMED:  Temporary transvenous pacemaker implantation  Ultrasound-guided vascular access    INDICATIONS:  The patient is a 72-year-old woman referred for temporary transvenous pacemaker implantation for long sinus pauses lasting up to 8 seconds.    CONSENT:  The complete alternatives, risks, and benefits of the procedure were explained to the patient. Signed informed consent was obtained and placed in the chart and a time-out was performed prior to beginning the procedure.    MEDICATIONS:  Lidocaine    CONTRAST: None    ACCESS: 6-[place] sheath in the right internal jugular vein.    ESTIMATED BLOOD LOSS: 5 cc    COMPLICATIONS: None    PROCEDURE IN DETAIL:  The patient was brought to the cardiac catheterization laboratory in the fasting state.  The skin over the right neck was prepped and draped in the usual sterile fashion.  Lidocaine infiltration was used to anesthetize the tissue over the right internal jugular vein.  Using ultrasound guidance, a 6-[place] sheath was inserted in the right internal jugular vein.  A 5-[place] balloon-tipped temporary transvenous pacemaker was then advanced into the right ventricular apex.  The pacemaker threshold was then checked with complete capture at 0.6 mA.  The pacemaker was then set at a rate of 60 bpm at 1 mA.  The pacemaker sheath was then sewn in place for use on the floor.    IMPRESSION:  1.  Successful temporary transvenous pacemaker implantation.

## 2022-11-29 NOTE — THERAPY
Missed Therapy     Patient Name: Lauren Dave  Age:  72 y.o., Sex:  female  Medical Record #: [id number]  Today's Date: 11/29/2022    Per chart, pt to OR for defibrillator vs pacemaker. PT will resume POC post-op as medically appropriate.

## 2022-11-29 NOTE — ANESTHESIA POSTPROCEDURE EVALUATION
Patient: Lauren Dave    Procedure Summary     Date: 11/29/22 Room / Location: [facility] - CATH LAB [facility]    Anesthesia Start: 0804 Anesthesia Stop:     Procedure: CL-BIV IMPLANTABLE CARDIOVERTER DEFIBRILLATOR Diagnosis:                   Atherosclerotic heart disease of native coronary artery without angina pectoris      CHF (congestive heart failure), NYHA class III, acute on chronic, combined (HCC)                  Atherosclerotic heart disease of native coronary artery without angina pectoris      CHF (congestive heart failure), NYHA class III, acute on chronic, combined (HCC)      (See Assoicated Dx)    Scheduled Providers: Kamron Ambrose M.D.; Robby Pichardo M.D. Responsible Provider: Robby Pichardo M.D.    Anesthesia Type: general ASA Status: 4          Final Anesthesia Type: general  Last vitals  BP   Blood Pressure : 115/56    Temp   36.7 °C (98.1 °F)    Pulse   95   Resp   (!) 41    SpO2   92 %      Anesthesia Post Evaluation    Patient location during evaluation: PACU  Patient participation: complete - patient participated  Level of consciousness: awake and alert  Pain score: 1    Airway patency: patent  Anesthetic complications: no  Cardiovascular status: adequate and hemodynamically stable  Respiratory status: acceptable  Hydration status: acceptable    PONV: none          No notable events documented.     Nurse Pain Score: 0 (NPRS)

## 2022-11-29 NOTE — ANESTHESIA TIME REPORT
Anesthesia Start and Stop Event Times     Date Time Event    11/29/2022 0746 Ready for Procedure     0804 Anesthesia Start        Responsible Staff  11/29/22    Name Role Begin End    Robby Pichardo M.D. Anesth 0804         Overtime Reason:  no overtime (within assigned shift)    Comments:

## 2022-11-29 NOTE — DISCHARGE PLANNING
Agency/Facility Name: [facility]  Spoke To: Emile  Outcome: [facility] cancelled will call transport due to Pt heart procedure and family wishes for rehab. No further follow up needs

## 2022-11-29 NOTE — CONSULTS
Mrs Dave has history of left bundle branch block and HFrEF with an EF of 30%, elective CABG x3 on 11/10 with Dr. Mac.    Today developed 8-second pause and was taken for temporary pacing wire with expectation of PPM tomorrow with anesthesia.    We will transfer to ELIANE Castro M.D.

## 2022-11-29 NOTE — OP REPORT
Electrophysiology Procedure Note  [facility]      Date of procedure: 11/29/2022     Procedure Performed: Placement of AICD, placement of LV lead, moderate sedation administered by RN and supervised by physician    Indication: Ischemic cardiomyopathy, systolic congestive heart failure, ejection fraction 30%, intermittent complete heart block.    Physician(s): Kamron Ambrose M.D.    Anesthesia: General anesthesia, Dr. Robby Pichardo     Estimated Blood Loss:  30cc    Complications: None    Pre Procedure ECG: Sinus rhythm left bundle branch block    Post Procedure ECG: Sinus rhythm with biventricular pacing    DESCRIPTION OF PROCEDURE: After informed written consent, the patient was brought to the electrophysiology lab in the fasting, unsedated state. The patient was prepped and draped in the usual sterile fashion. The procedure was performed under general anesthesia with local anesthetic. A left infraclavicular incision was made with a scalpel and the pre-pectoral device pocket was created using a combination of blunt dissection and electrocautery. The modified Seldinger technique was used to gain access to the left axillary vein times 3. A peel-away hemostasis sheath was placed in the vein. Under fluoroscopic guidance, the ICD lead was introduced into the heart. The ventricular lead was advanced into the RVOT position the pulled back and advanced to the RV septum. The lead was tested and had satisfactory sensing and pacing parameters. Another peel-away hemostasis sheath was placed in the vein.  The CS was cannulated with an inner sheath and diagnostic EP catheter. CS angio showed amenable vein in the high lateral location. The quadripolar lead was placed in the branch with excellent numbers with vectors without phrenic nerve stimulation.  Another peel-away hemostasis sheath was placed in the vein. The Right atrial lead was placed and tested with good numbers and fixated with the normal mechanism.  High output pacing did not produce extracardiac stimulation in either the RA or RV lead.  The leads were sutured to the underlying pectoral muscle with interrupted silk over a silastic suture sleeve. The device pocket was irrigated with antibiotic solution, inspected, and no bleeding was seen. The leads were connected to the ICD pulse generator and the device was inserted into the pocket. The wound was closed with three layers of absorbable sutures and covered with Steri-Strips.   Following recovery from sedation, the patient was transferred to a monitored bed in good condition.    IMPLANTED DEVICE INFORMATION:    Pulse generator is a Medtronic model OXCN3JM   Serial number [id number]  LEAD INFORMATION:  1. Right atrial lead is a Medtronic model 5076-45, serial number [id number], P wave 2 millivolts, threshold 0.75 volts, pacing impedance 779 ohms.  2. Right ventricular lead is a Medtronic model 4838I13, serial number [id number], R wave 5 millivolts, threshold 0.5 volts , pacing impedance 399 ohms.   3. Left ventricular lead is a Medtronic model 4798-88, serial number [id number], R wave paced millivolts, threshold 2.25 volts (1 to 2), pacing impedance 475 ohms.     DEVICE PROGRAMMING:    Memo therapy: DDDR  Tachy therapy: Two zone    DEFIBRILLATION THRESHOLD TESTING:    Not performed    FLUOROSCOPY TIME: 82.7 mGy    SUMMARY/CONCLUSIONS:  1. Successful CRT-D      RECOMMENDATIONS:  1. Monitored bed.  2. Chest x-ray.  3. Implantable cardioverter defibrillator interrogation prior to hospital discharge.  4. Follow-up in device clinic for wound check and device interrogation.

## 2022-11-29 NOTE — ASSESSMENT & PLAN NOTE
Valsartan 40mg daily and metoprolol SR  Holding diuresis based on renal function  Monitor I/O, labs, vitals

## 2022-11-29 NOTE — PROGRESS NOTES
Hospital Medicine Daily Progress Note    Date of Service  11/29/2022    Chief Complaint  Right leg and left arm weakness after an open heart bypass    Hospital Course  Lauren Dave is a 72 y.o. 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  The MRI brain reports multiple areas of watershed type infarctions.  A postoperative echo shows a ejection fraction of 35%, the patient is being diuresed.  The patient is intolerant to statin medication.  Neurology was consulted after patient was identified to have a CVA.    Interval Problem Update  11/29/22: BUN:42, Cr:1.97. Patient had her permanent pacemaker today.  She is alert and awake.  Speech clear.  Right lower leg weakness.      I have discussed this patient's plan of care and discharge plan at IDT rounds today with Case Management, Nursing, Nursing leadership, and other members of the IDT team.    Consultants/Specialty  cardiovascular surgeon and neurology    Code Status  Full Code    Disposition  Patient is not medically cleared for discharge.   Anticipate discharge to  be determined .  I have placed the appropriate orders for post-discharge needs.    Review of Systems  Review of Systems   Constitutional:  Negative for chills and fever.   Eyes:  Negative for discharge.   Respiratory:  Negative for cough, shortness of breath and stridor.    Cardiovascular:  Negative for chest pain, palpitations and leg swelling.    Gastrointestinal:  Negative for abdominal pain, diarrhea and nausea.   Genitourinary:  Negative for dysuria and hematuria.   Musculoskeletal:  Negative for back pain and joint pain.   Neurological:  Positive for focal weakness. Negative for dizziness, sensory change, speech change and headaches.   Psychiatric/Behavioral:  The patient is not nervous/anxious.       Physical Exam  Temp:  [36.1 °C (96.9 °F)-37.2 °C (99 °F)] 36.1 °C (97 °F)  Pulse:  [82-97] 95  Resp:  [14-41] 16  BP: ()/(51-63) 121/59  SpO2:  [89 %-99 %] 94 %    Physical Exam  Vitals reviewed.   Constitutional:       Appearance: Normal appearance. She is not diaphoretic.   HENT:      Head: Normocephalic and atraumatic.      Nose: Nose normal.      Mouth/Throat:      Mouth: Mucous membranes are moist.      Pharynx: No oropharyngeal exudate.   Eyes:      General: No scleral icterus.        Right eye: No discharge.         Left eye: No discharge.      Extraocular Movements: Extraocular movements intact.      Conjunctiva/sclera: Conjunctivae normal.   Cardiovascular:      Rate and Rhythm: Regular rhythm. Tachycardia present.      Pulses:           Radial pulses are 2+ on the right side and 2+ on the left side.        Dorsalis pedis pulses are 2+ on the right side and 2+ on the left side.      Heart sounds: No murmur heard.  Pulmonary:      Effort: Pulmonary effort is normal. No respiratory distress.      Breath sounds: Normal breath sounds. No wheezing or rales.   Abdominal:      General: Bowel sounds are normal. There is no distension.      Palpations: Abdomen is soft.      Tenderness: There is no abdominal tenderness.   Musculoskeletal:         General: No swelling or tenderness.      Cervical back: Neck supple. No muscular tenderness.      Right lower leg: No edema.      Left lower leg: No edema.   Lymphadenopathy:      Cervical: No cervical adenopathy.   Skin:     Coloration: Skin is not jaundiced or pale.   Neurological:      General: No focal  deficit present.      Mental Status: She is alert and oriented to person, place, and time. Mental status is at baseline.      Cranial Nerves: No cranial nerve deficit.      Motor: Weakness present.   Psychiatric:         Mood and Affect: Mood normal.         Behavior: Behavior normal.       Fluids    Intake/Output Summary (Last 24 hours) at 11/29/2022 1532  Last data filed at 11/29/2022 1020  Gross per 24 hour   Intake 800 ml   Output --   Net 800 ml       Laboratory  Recent Labs     11/27/22  0141   WBC 9.7   RBC 3.51*   HEMOGLOBIN 10.5*   HEMATOCRIT 33.3*   MCV 94.9   MCH 29.9   MCHC 31.5*   RDW 46.7   PLATELETCT 676*   MPV 10.3     Recent Labs     11/27/22  0141 11/28/22  0124 11/29/22  0400   SODIUM 136 136 136   POTASSIUM 4.6 4.2 4.5   CHLORIDE 100 98 100   CO2 24 23 23   GLUCOSE 117* 140* 91   BUN 40* 41* 42*   CREATININE 1.67* 2.02* 1.97*   CALCIUM 9.5 9.7 9.5                   Imaging  DX-CHEST-PORTABLE (1 VIEW)   Final Result      1.  Small left pleural effusion.      XC-VQXBNMT-2 VIEW   Final Result      1.  There is a nonobstructive bowel gas pattern with a moderate amount of colonic stool.      DX-CHEST-LIMITED (1 VIEW)   Final Result      Stable chest with cardiac enlargement, bronchial thickening and left basilar atelectasis. Bronchial thickening most likely represents bronchitis but edema could be considered      IR-US GUIDED PIV   Final Result    Ultrasound-guided PERIPHERAL IV INSERTION performed by    qualified nursing staff as above.      DX-ABDOMEN FOR TUBE PLACEMENT   Final Result      Enteric tube tip projects over the stomach      DX-CHEST-PORTABLE (1 VIEW)   Final Result      1.  Tubes and lines in good position.      2.  Mild cardiomegaly with mild diffuse pulmonary edema with slight improvement since previous exam.      3.  Small left pleural effusion.      EC-ECHOCARDIOGRAM LTD W/O CONT   Final Result      DX-ABDOMEN FOR TUBE PLACEMENT   Final Result      Enteric tube tip projects over the  stomach      DX-CHEST-PORTABLE (1 VIEW)   Final Result      1.  Layering bilateral pleural effusions with adjacent airspace disease.   2.  Increased interstitial edema.      DX-ABDOMEN FOR TUBE PLACEMENT   Final Result      Orogastric tube tip at the distal stomach.      DX-CHEST-PORTABLE (1 VIEW)   Final Result      1.  Removal of right IJ catheter.      2.  Right subclavian catheter unchanged in position.      3.  Mild cardiomegaly.      4.  Blunting of left costophrenic angle consistent with atelectasis, pneumonitis, and/or pleural effusion.      DX-ABDOMEN FOR TUBE PLACEMENT   Final Result      Enteric tube tip projects over the stomach antrum      MR-BRAIN-W/O   Final Result      1.  BILATERAL mostly supratentorial areas of ischemia as described above. These appear to be primarily watershed zone infarctions.   2.  No hemorrhage   3.  No significant mass effect      DX-CHEST-PORTABLE (1 VIEW)   Final Result         1. No significant interval change.      DX-ABDOMEN FOR TUBE PLACEMENT   Final Result      Interval placement of enteric tube with its tip over the midline epigastrium compatible with position at the level of the gastric body.      CT-HEAD W/O   Final Result      1.  Likely subacute infarcts in the bilateral parieto-occipital regions.   2.  No acute intracranial hemorrhage.   3.  Bilateral maxillary sinus disease.      EC-DAVE W/O CONT   Final Result      DX-CHEST-PORTABLE (1 VIEW)   Final Result      Stable enlargement of the cardiomediastinal silhouette, mild diffuse interstitial edema and bilateral basilar atelectasis and/or consolidation.      DX-CHEST-PORTABLE (1 VIEW)   Final Result      Satisfactory postoperative appearance of the chest      CL-TEMPORARY PACEMAKER INSERT    (Results Pending)   CL-BIV IMPLANTABLE CARDIOVERTER DEFIBRILLATOR    (Results Pending)        Assessment/Plan  * Complete heart block (HCC)  Assessment & Plan  8-second pause on telemetry  Cardiology consulted for which  patient was diagnosed for complete heart block  Permanent pacemaker placed 11/29/22  Cardiac monitoring    Dysphagia- (present on admission)  Assessment & Plan  SPL following, advanced on diet  Nutrition following    TAN (acute kidney injury) ([facility])  Assessment & Plan  11/29 BUN:42, Cr:1.97  Monitor I/O's, vitals, labs  Avoid nephrotoxic meds.    Acute blood loss anemia- (present on admission)  Assessment & Plan  From hematuria  Required transfusion  11/27 Hgb:10.5  Monitor cbc    CHF (congestive heart failure), NYHA class III, acute on chronic, combined ([facility])- (present on admission)  Assessment & Plan  Valsartan 40mg daily and metoprolol SR  Holding diuresis based on renal function  Monitor I/O, labs, vitals    Encephalopathy acute  Assessment & Plan  improved    Acute respiratory failure with hypoxia ([facility])  Assessment & Plan  She has had volume overload requiring high flow oxygen  Now on 2.5 liters of oxygen and attempt to wean off.  Encourage deep inspiratory efforts and mobilization  .     PAF (paroxysmal atrial fibrillation) ([facility])- (present on admission)  Assessment & Plan  Post-operative  Decrease the amiodarone dose as she has been loaded.  eliquis    Other specified anemias  Assessment & Plan  Acute blood loss due to hematuria  Hb has been kady thus stop daily phlebotomy and check as indicated.  Transfuse RBCs for hemoglobin less than 8.0 as she is s/p CABG.    Acute ischemic stroke ([facility])  Assessment & Plan  Post/perioperative event with multiple areas of watershed type infarction  Neurology consulted  Neurology recommended [facility]   Neurology consultation during admit.  PT/OT/SLP and rehabilitation  She is now moving all extremities though right leg remains weak  candidate for rehab and I have recommended this to her    S/P CABG x 3  Assessment & Plan  As per cardiac surgery, optimization,, mobilization as tolerated,  Telemetry    Chronic obstructive pulmonary disease (COPD) ([facility])- (present on  admission)  Assessment & Plan  Pre-existing, respiratory protocol,  Continuous respiratory therapy protocol.  Without exacerbation during admit  Quit smoking early 20's    Chronic systolic congestive heart failure (HCC) EF 35%- (present on admission)  Assessment & Plan  Treated to euvolemia, GDMT as tolerated  Ejection fraction 35% on DAVE.  Coreg and diovan    Coronary artery disease due to calcified coronary lesion - Calcifications seen on CT scan also wtih aortic ulceration- (present on admission)  Assessment & Plan  S/p CABG this admit  Apixaban, metoprolol, amiodarone, valsartan  Intolerant to Statins.    Dyslipidemia  Assessment & Plan  The patient with prior severe intolerance, apparently failed multiple regimens  Consider outpatient referral to PCSK9 treatment       VTE prophylaxis: SCDs/TEDs and therapeutic anticoagulation with apixaban    I have performed a physical exam and reviewed and updated ROS and Plan today (11/29/2022). In review of yesterday's note (11/28/2022), there are no changes except as documented above.

## 2022-11-29 NOTE — PROGRESS NOTES
History of recent coronary bypass graft surgery.  Preoperative EF in the low 30s.  Postoperative EF continues to be in the low 30s.  Left bundle branch block.  Complete heart block intermittent.  Temporary wire in right IJ.  For CRT.  Patient seen and examined.  Chart reviewed.  Discussed defibrillator versus pacemaker.  Patient wishes to proceed with defibrillator.

## 2022-11-29 NOTE — OR NURSING
Patient recovered well in post-op. AAOx4. VSS, on 2L via NC. Surgical sites SUNDAR, surgical dressing in place. LUE restrictions, arm sling in place. CXR and EKG completed. Declines surgical pain/nausea. Family updated and discussed POC. No belongings in pacu. Report called to KATY Apodaca. Patient transported on monitor w/ RN to T630. O2 tank full for transport.

## 2022-11-29 NOTE — ANESTHESIA PROCEDURE NOTES
Airway    Date/Time: 11/29/2022 8:32 AM  Performed by: Robby Pichardo M.D.  Authorized by: Robby Pichardo M.D.     Location:  OR  Urgency:  Elective  Indications for Airway Management:  Anesthesia      Spontaneous Ventilation: absent    Sedation Level:  Deep  Preoxygenated: Yes    Patient Position:  Sniffing  Final Airway Type:  Endotracheal airway  Final Endotracheal Airway:  ETT  Cuffed: Yes    Technique Used for Successful ETT Placement:  Direct laryngoscopy    Insertion Site:  Oral  Blade Type:  Boyd  Laryngoscope Blade/Videolaryngoscope Blade Size:  2  ETT Size (mm):  7.5  Measured from:  Teeth  ETT to Teeth (cm):  21  Placement Verified by: auscultation and capnometry    Cormack-Lehane Classification:  Grade I - full view of glottis  Number of Attempts at Approach:  1

## 2022-11-29 NOTE — ANESTHESIA PREPROCEDURE EVALUATION
Date/Time: 11/29/22 0718    Scheduled providers: Kamron Ambrose M.D.; Robby Pichardo M.D.    Procedure: CL-BIV IMPLANTABLE CARDIOVERTER DEFIBRILLATOR    Diagnosis:                   Atherosclerotic heart disease of native coronary artery without angina pectoris [I25.10]      CHF (congestive heart failure), NYHA class III, acute on chronic, combined ([facility]) [I50.43]                  Atherosclerotic heart disease of native coronary artery without angina pectoris [I25.10]      CHF (congestive heart failure), NYHA class III, acute on chronic, combined ([facility]) [I50.43]    Indications: See Assoicated Dx    [facility]          Relevant Problems   PULMONARY   (positive) Chronic obstructive pulmonary disease (COPD) ([facility])      NEURO   (positive) Acute ischemic stroke ([facility])      CARDIAC   (positive) Atherosclerosis of aorta ([facility])   (positive) Chronic systolic congestive heart failure ([facility]) EF 35%   (positive) Complete heart block ([facility])   (positive) Coronary artery disease due to calcified coronary lesion - Calcifications seen on CT scan also wtih aortic ulceration   (positive) Essential hypertension   (positive) Ischemic heart disease due to coronary artery obstruction ([facility]) - Severe 3vD on cath   (positive) LBBB (left bundle branch block)   (positive) PAF (paroxysmal atrial fibrillation) ([facility])      GI   (positive) Gastroesophageal reflux disease without esophagitis         (positive) TAN (acute kidney injury) ([facility])       Physical Exam    Airway   Mallampati: II  TM distance: >3 FB  Neck ROM: full       Cardiovascular - normal exam  Rhythm: regular  Rate: normal  (-) murmur     Dental - normal exam           Pulmonary - normal exam  Breath sounds clear to auscultation     Abdominal    Neurological - normal exam                 Anesthesia Plan    ASA 4       Plan - general       Airway plan will be ETT          Induction: intravenous    Postoperative Plan: Postoperative  administration of opioids is intended.    Pertinent diagnostic labs and testing reviewed    Informed Consent:    Anesthetic plan and risks discussed with patient.    Use of blood products discussed with: patient whom consented to blood products.

## 2022-11-29 NOTE — CARE PLAN
The patient is Watcher - Medium risk of patient condition declining or worsening    Shift Goals  Clinical Goals: rest, and SNF placement.  Patient Goals: Discharge  Family Goals: SUNDAR    Progress made toward(s) clinical / shift goals:    Problem: Knowledge Deficit - Standard  Goal: Patient and family/care givers will demonstrate understanding of plan of care, disease process/condition, diagnostic tests and medications  Outcome: Progressing     Problem: Skin Integrity  Goal: Skin integrity is maintained or improved  Outcome: Progressing     Problem: Fall Risk  Goal: Patient will remain free from falls  Outcome: Progressing     Problem: Pain - Standard  Goal: Alleviation of pain or a reduction in pain to the patient’s comfort goal  Outcome: Progressing     Problem: Day of surgery post CABG/Heart valve replacement  Goal: Stabilization in immediate post op period  Outcome: Progressing     Problem: Post Op Day 1 CABG/Heart Valve Replacement  Goal: Optimal care of the post op CABG/heart valve replacement Post Op Day 1  Outcome: Progressing     Problem: Post op day 2 CABG/Heart Valve Replacement  Goal: Optimal care of the post op CABG/heart valve replacement post op day 2  Outcome: Progressing     Problem: Post Op Day 3 CABG/Heart Valve replacement  Goal: Optimal care of the post op CABG/Heart Valve replacement post op day 3  Outcome: Progressing     Problem: Post Op Day 4 CABG/Heart Valve Replacement  Goal: Optimal care of the Post Op CABG/Heart Valve replacement Post Op Day 4  Outcome: Progressing     Problem: Post Op Day 5 CABG/Heart Valve Replacement  Goal: Optimal care of the Post Op CABG/Heart Valve replacement Post Op Day 5  Outcome: Progressing     Problem: Communication  Goal: The ability to communicate needs accurately and effectively will improve  Outcome: Progressing     Problem: Hemodynamics  Goal: Patient's hemodynamics, fluid balance and neurologic status will be stable or improve  Outcome: Progressing      Problem: Safety - Medical Restraint  Goal: Remains free of injury from restraints (Restraint for Interference with Medical Device)  Outcome: Progressing  Goal: Free from restraint(s) (Restraint for Interference with Medical Device)  Outcome: Progressing     Problem: Optimal Care of the Stroke Patient  Goal: Optimal emergency care for the stroke patient  Outcome: Progressing  Goal: Optimal acute care for the stroke patient  Outcome: Progressing     Problem: Knowledge Deficit - Stroke Education  Goal: Patient's knowledge of stroke and risk factors will improve  Outcome: Progressing     Problem: Psychosocial - Patient Condition  Goal: Patient's ability to verbalize feelings about condition will improve  Outcome: Progressing  Goal: Patient's ability to re-evaluate and adapt role responsibilities will improve  Outcome: Progressing     Problem: Discharge Planning - Stroke  Goal: Ensure Stroke Core Measures are met prior to discharge  Outcome: Progressing  Goal: Patient’s continuum of care needs will be met  Outcome: Progressing     Problem: Neuro Status  Goal: Neuro status will remain stable or improve  Outcome: Progressing     Problem: Hemodynamic Monitoring  Goal: Patient's hemodynamics, fluid balance and neurologic status will be stable or improve  Outcome: Progressing     Problem: Respiratory - Stroke Patient  Goal: Patient will achieve/maintain optimum respiratory rate/effort  Outcome: Progressing     Problem: Dysphagia  Goal: Dysphagia will improve  Outcome: Progressing     Problem: Risk for Aspiration  Goal: Patient's risk for aspiration will be absent or decrease  Outcome: Progressing     Problem: Urinary Elimination  Goal: Establish and maintain regular urinary output  Outcome: Progressing     Problem: Bowel Elimination  Goal: Establish and maintain regular bowel function  Outcome: Progressing     Problem: Mobility - Stroke  Goal: Patient's capacity to carry out activities will improve  Outcome:  Progressing  Goal: Spasticity will be prevented or improved  Outcome: Progressing  Goal: Subluxation will be prevented or improved  Outcome: Progressing     Problem: Self Care  Goal: Patient will have the ability to perform ADLs independently or with assistance (bathe, groom, dress, toilet and feed)  Outcome: Progressing     Problem: Respiratory  Goal: Patient will achieve/maintain optimum respiratory ventilation and gas exchange  Outcome: Progressing       Patient is not progressing towards the following goals:

## 2022-11-30 ENCOUNTER — APPOINTMENT (OUTPATIENT)
Dept: RADIOLOGY | Facility: MEDICAL CENTER | Age: 72
DRG: 235 | End: 2022-11-30
Attending: HOSPITALIST
Payer: MEDICARE

## 2022-11-30 ENCOUNTER — APPOINTMENT (OUTPATIENT)
Dept: RADIOLOGY | Facility: MEDICAL CENTER | Age: 72
DRG: 235 | End: 2022-11-30
Attending: INTERNAL MEDICINE
Payer: MEDICARE

## 2022-11-30 LAB
ANION GAP SERPL CALC-SCNC: 10 MMOL/L (ref 7–16)
BUN SERPL-MCNC: 41 MG/DL (ref 8–22)
CALCIUM SERPL-MCNC: 9.4 MG/DL (ref 8.5–10.5)
CHLORIDE SERPL-SCNC: 102 MMOL/L (ref 96–112)
CO2 SERPL-SCNC: 24 MMOL/L (ref 20–33)
CREAT SERPL-MCNC: 1.82 MG/DL (ref 0.5–1.4)
EKG IMPRESSION: NORMAL
ERYTHROCYTE [DISTWIDTH] IN BLOOD BY AUTOMATED COUNT: 47.8 FL (ref 35.9–50)
GFR SERPLBLD CREATININE-BSD FMLA CKD-EPI: 29 ML/MIN/1.73 M 2
GLUCOSE SERPL-MCNC: 132 MG/DL (ref 65–99)
HCT VFR BLD AUTO: 31 % (ref 37–47)
HGB BLD-MCNC: 9.8 G/DL (ref 12–16)
MCH RBC QN AUTO: 30.2 PG (ref 27–33)
MCHC RBC AUTO-ENTMCNC: 31.6 G/DL (ref 33.6–35)
MCV RBC AUTO: 95.7 FL (ref 81.4–97.8)
PLATELET # BLD AUTO: 629 K/UL (ref 164–446)
PMV BLD AUTO: 9.8 FL (ref 9–12.9)
POTASSIUM SERPL-SCNC: 5 MMOL/L (ref 3.6–5.5)
RBC # BLD AUTO: 3.24 M/UL (ref 4.2–5.4)
SODIUM SERPL-SCNC: 136 MMOL/L (ref 135–145)
WBC # BLD AUTO: 15.7 K/UL (ref 4.8–10.8)

## 2022-11-30 PROCEDURE — 36415 COLL VENOUS BLD VENIPUNCTURE: CPT

## 2022-11-30 PROCEDURE — 99232 SBSQ HOSP IP/OBS MODERATE 35: CPT | Mod: FS | Performed by: INTERNAL MEDICINE

## 2022-11-30 PROCEDURE — 700102 HCHG RX REV CODE 250 W/ 637 OVERRIDE(OP): Performed by: STUDENT IN AN ORGANIZED HEALTH CARE EDUCATION/TRAINING PROGRAM

## 2022-11-30 PROCEDURE — A9270 NON-COVERED ITEM OR SERVICE: HCPCS | Performed by: STUDENT IN AN ORGANIZED HEALTH CARE EDUCATION/TRAINING PROGRAM

## 2022-11-30 PROCEDURE — A9270 NON-COVERED ITEM OR SERVICE: HCPCS | Performed by: INTERNAL MEDICINE

## 2022-11-30 PROCEDURE — 85027 COMPLETE CBC AUTOMATED: CPT

## 2022-11-30 PROCEDURE — 97530 THERAPEUTIC ACTIVITIES: CPT

## 2022-11-30 PROCEDURE — 99233 SBSQ HOSP IP/OBS HIGH 50: CPT | Performed by: HOSPITALIST

## 2022-11-30 PROCEDURE — 71045 X-RAY EXAM CHEST 1 VIEW: CPT

## 2022-11-30 PROCEDURE — 93010 ELECTROCARDIOGRAM REPORT: CPT | Performed by: INTERNAL MEDICINE

## 2022-11-30 PROCEDURE — 770020 HCHG ROOM/CARE - TELE (206)

## 2022-11-30 PROCEDURE — 92611 MOTION FLUOROSCOPY/SWALLOW: CPT

## 2022-11-30 PROCEDURE — 74230 X-RAY XM SWLNG FUNCJ C+: CPT

## 2022-11-30 PROCEDURE — 700102 HCHG RX REV CODE 250 W/ 637 OVERRIDE(OP): Performed by: INTERNAL MEDICINE

## 2022-11-30 PROCEDURE — 93005 ELECTROCARDIOGRAM TRACING: CPT | Performed by: INTERNAL MEDICINE

## 2022-11-30 PROCEDURE — 80048 BASIC METABOLIC PNL TOTAL CA: CPT

## 2022-11-30 RX ORDER — METOPROLOL SUCCINATE 100 MG/1
100 TABLET, EXTENDED RELEASE ORAL
Status: DISCONTINUED | OUTPATIENT
Start: 2022-11-30 | End: 2022-11-30

## 2022-11-30 RX ORDER — METOPROLOL SUCCINATE 100 MG/1
200 TABLET, EXTENDED RELEASE ORAL
Status: DISCONTINUED | OUTPATIENT
Start: 2022-12-01 | End: 2022-11-30

## 2022-11-30 RX ORDER — CARVEDILOL 6.25 MG/1
6.25 TABLET ORAL 2 TIMES DAILY WITH MEALS
Status: DISCONTINUED | OUTPATIENT
Start: 2022-11-30 | End: 2022-12-02

## 2022-11-30 RX ADMIN — AMIODARONE HYDROCHLORIDE 200 MG: 200 TABLET ORAL at 04:44

## 2022-11-30 RX ADMIN — APIXABAN 5 MG: 5 TABLET, FILM COATED ORAL at 16:10

## 2022-11-30 RX ADMIN — MIRTAZAPINE 15 MG: 15 TABLET, FILM COATED ORAL at 20:32

## 2022-11-30 RX ADMIN — ASPIRIN 81 MG 81 MG: 81 TABLET ORAL at 04:44

## 2022-11-30 RX ADMIN — ACETAMINOPHEN 1000 MG: 500 TABLET ORAL at 03:25

## 2022-11-30 RX ADMIN — APIXABAN 5 MG: 5 TABLET, FILM COATED ORAL at 04:43

## 2022-11-30 RX ADMIN — VALSARTAN 40 MG: 80 TABLET, FILM COATED ORAL at 04:43

## 2022-11-30 RX ADMIN — OMEPRAZOLE 40 MG: 20 CAPSULE, DELAYED RELEASE ORAL at 04:44

## 2022-11-30 RX ADMIN — HYDROXYCHLOROQUINE SULFATE 200 MG: 200 TABLET ORAL at 11:23

## 2022-11-30 ASSESSMENT — ENCOUNTER SYMPTOMS
CHEST TIGHTNESS: 0
BLURRED VISION: 0
FEVER: 0
FOCAL WEAKNESS: 1
COUGH: 0
ABDOMINAL PAIN: 0
NERVOUS/ANXIOUS: 0
EYE DISCHARGE: 0
SHORTNESS OF BREATH: 1
DEPRESSION: 0
FATIGUE: 1
WEAKNESS: 1
NAUSEA: 0
SORE THROAT: 0
BACK PAIN: 0
SHORTNESS OF BREATH: 0
DIZZINESS: 0
PALPITATIONS: 0
BLOOD IN STOOL: 0
DIARRHEA: 0

## 2022-11-30 ASSESSMENT — COGNITIVE AND FUNCTIONAL STATUS - GENERAL
SUGGESTED CMS G CODE MODIFIER MOBILITY: CM
CLIMB 3 TO 5 STEPS WITH RAILING: TOTAL
MOVING FROM LYING ON BACK TO SITTING ON SIDE OF FLAT BED: UNABLE
WALKING IN HOSPITAL ROOM: TOTAL
MOVING TO AND FROM BED TO CHAIR: UNABLE
STANDING UP FROM CHAIR USING ARMS: A LOT
TURNING FROM BACK TO SIDE WHILE IN FLAT BAD: UNABLE
MOBILITY SCORE: 7

## 2022-11-30 ASSESSMENT — GAIT ASSESSMENTS: GAIT LEVEL OF ASSIST: UNABLE TO PARTICIPATE

## 2022-11-30 ASSESSMENT — PAIN DESCRIPTION - PAIN TYPE: TYPE: ACUTE PAIN

## 2022-11-30 NOTE — PROGRESS NOTES
Bedside report received from night shift RN. Assumed care at 0700. Pt is A&Ox4. Pt is in bed. Pt was updated on plan of care. Pt has call light, personal belongings, and bedside table within reach. Bed is in the lowest position and bed alarm is on. Will continue to monitor

## 2022-11-30 NOTE — DIETARY
Nutrition Services Brief Update:    Day 20 of admit.  Lauren Dave is a 72 y.o. female with admitting DX of Atherosclerotic heart disease of native coronary artery without angina pectoris [I25.10]  CHF (congestive heart failure), NYHA class III, acute on chronic, combined (HCC) [I50.43]    Current Diet: Cardiac    Barium swallow test done this AM, pt cleared for regular/thin liquid diet. Limited ADL documentation, 50-75%x1 meal, %x1 boost. Pt seems to be progressing.     Problem: Nutritional:  Goal: Achieve adequate nutritional intake  Description: Patient will consume >50% of meals  Outcome: Progressing.       RD Following.

## 2022-11-30 NOTE — DISCHARGE PLANNING
Repeat PMR consult referral received from Dr. Lucia.  Please review the consult from Dr. Geller regarding post acute recommendations.  TCC will no longer follow.  Please reach out to myself with any interval changes/questions.

## 2022-11-30 NOTE — CARE PLAN
The patient is Watcher - Medium risk of patient condition declining or worsening    Shift Goals  Clinical Goals: hemodynamic stability and pain control  Patient Goals: discharge  Family Goals: Updates    Progress made toward(s) clinical / shift goals:    Problem: Knowledge Deficit - Standard  Goal: Patient and family/care givers will demonstrate understanding of plan of care, disease process/condition, diagnostic tests and medications  Outcome: Progressing     Problem: Skin Integrity  Goal: Skin integrity is maintained or improved  Outcome: Progressing     Problem: Fall Risk  Goal: Patient will remain free from falls  Outcome: Progressing     Problem: Pain - Standard  Goal: Alleviation of pain or a reduction in pain to the patient’s comfort goal  Outcome: Progressing     Problem: Hemodynamics  Goal: Patient's hemodynamics, fluid balance and neurologic status will be stable or improve  Outcome: Progressing     Problem: Respiratory  Goal: Patient will achieve/maintain optimum respiratory ventilation and gas exchange  Outcome: Progressing       Patient is not progressing towards the following goals:

## 2022-11-30 NOTE — CARE PLAN
Problem: Knowledge Deficit - Standard  Goal: Patient and family/care givers will demonstrate understanding of plan of care, disease process/condition, diagnostic tests and medications  Outcome: Progressing  Note: Pt's whiteboard is updated, pt has been updated on POC, all questions have been answered at this time       Problem: Fall Risk  Goal: Patient will remain free from falls  Outcome: Progressing  Note: Bed locked in lowest position. Bed alarm on. Treaded socks in use. Call light and belongings within reach. Pt educated to call for assistance. Pt verbalized understanding. Hourly rounding in place.      Problem: Pain - Standard  Goal: Alleviation of pain or a reduction in pain to the patient’s comfort goal  Outcome: Progressing  Note: Pain interventions will allow pt to participate in ADLs comfortably    The patient is Watcher - Medium risk of patient condition declining or worsening    Shift Goals  Clinical Goals: ADLs, mobilize, monitor heart rhythm  Patient Goals: rest  Family Goals: Updates    Progress made toward(s) clinical / shift goals:  up to chair for lunch, tolerating new diet and meal consistency     Patient is not progressing towards the following goals:

## 2022-11-30 NOTE — DISCHARGE SUMMARY
Received Choice form at [id number]  Agency/Facility Name: [facility]   Referral sent per Choice form @ [phone]

## 2022-11-30 NOTE — PROGRESS NOTES
Monitor Summary:   Rhythm: SR  Rate: 75-88  Measurement: .12/.14/.40  Ectopy: NA

## 2022-11-30 NOTE — PROGRESS NOTES
[facility] Cardiology Follow-up Note    Date of Service:    11/30/2022      Name:   Lauren Dave   YOB: 1950  Age:   72 y.o.  female   MRN:   [id number]    Reason for cardiology consult: elective CABG, 8s pause    Attending Provider: Dr. Blankenship    HPI:  Mrs Dave has history of left bundle branch block and HFrEF with an EF of 30%, elective CABG x3 on 11/10 with Dr. Mac, complicated by CVA post surgery.  Noted to have complete heart block with 8 second pauses and junctional escape beats, underwent placement of transvenous pacemaker on 11/28/22. Received biventricular ICD on 11/29/22.    Interim Events:  11/30/22  - Device interrogation completed, functioning normally  - Personal Telemetry interpretation: SR 70-80's  - Stood this morning, very weak and dizzy, requiring assistance x3  - Vital signs: BP well controlled  - Labs reviewed: not yet collected    11/29/22  - Back from BiV ICD placement, family at bedside  - Personal Telemetry interpretation: A sensed V paced   - Vitals: 100-115's/50-60's on  - Labs reviewed: Cr 1.97    ROS  Constitutional: + fatigue. + weakness  Respiratory:  Denies shortness of breath, no cough.  Cardiovascular: + Daily spine Postsurgical chest pain. Denies lower extremity edema.  Denies orthopnea or PND.  : denies polyuria, no dysuria.  GI:  Denies nausea/vomiting.  No abdominal distention.  Neuro:  Denies dizziness, syncope.  Hem/lymph: Denies easy bleeding/bruising.      All other review of systems reviewed and negative.    Past medical, surgical, social, and family history reviewed and unchanged from admission except as noted in HPI.    Medications: Reviewed in MAR  Current Facility-Administered Medications   Medication Dose Frequency Provider Last Rate Last Admin    metoprolol SR (TOPROL XL) tablet 100 mg  100 mg Q DAY Jarrod Mayberry M.D.   100 mg at 11/29/22 1537    mirtazapine (Remeron) tablet 15 mg  15 mg QHS Reza Beckford M.D.   15  "mg at 11/29/22 2100    hydroxychloroquine (Plaquenil) tablet 200 mg  200 mg QDAY with Breakfast Reza Beckford M.D.   200 mg at 11/28/22 1015    omeprazole (PRILOSEC) capsule 40 mg  40 mg DAILY Reza Beckford M.D.   40 mg at 11/30/22 0444    apixaban (ELIQUIS) tablet 5 mg  5 mg BID Reza Beckford M.D.   5 mg at 11/30/22 0443    amiodarone (Cordarone) tablet 200 mg  200 mg Q DAY Reza Beckford M.D.   200 mg at 11/30/22 0444    valsartan (DIOVAN) tablet 40 mg  40 mg Q DAY Reza Beckford M.D.   40 mg at 11/30/22 0443    aspirin (ASA) chewable tab 81 mg  81 mg DAILY Reza Beckford M.D.   81 mg at 11/30/22 0444    acetaminophen (TYLENOL) tablet 1,000 mg  1,000 mg Q8HRS PRN Reza Beckford M.D.   1,000 mg at 11/30/22 0325    ondansetron (ZOFRAN) syringe/vial injection 8 mg  8 mg Q6HRS PRN Reza Beckford M.D.        prochlorperazine (COMPAZINE) injection 10 mg  10 mg Q6HRS PRN Reza Beckford M.D.   10 mg at 11/24/22 1701    promethazine (PHENERGAN) suppository 25 mg  25 mg Q6HRS PRN Reza Beckford M.D.        Metoprolol Tartrate (LOPRESSOR) injection 5 mg  5 mg Q5 MIN PRN Kendall Yeboah M.D.        Respiratory Therapy Consult   Continuous RT YULISSA Kwon       Last reviewed on 11/29/2022  7:45 AM by Robby Pichardo M.D.   Allergies   Allergen Reactions    Statins [Hmg-Coa-R Inhibitors] Myalgia     Muscle Spasms   RXN=unknown    Atorvastatin Myalgia    Codeine Vomiting and Nausea     Clarified with patient - states that she has an \"upset stomach\" when she takes it    Eggs Diarrhea     Pt states that she is allergic to eggs per her mother.  Tolerated clevidipine 11/2022    Lisinopril Cough    Losartan Unspecified     Tried in 2017  Cannot recall reaction    Penicillins Unspecified     \"does not work\" .'IMMUNE TO PENICILLIN,AMPICILLIN IS THE ONLY THING THAT WORKS'       Physical Exam  Body mass index is " "26.64 kg/m². /57   Pulse 84   Temp 36.9 °C (98.4 °F) (Temporal)   Resp 18   Ht 1.6 m (5' 3\")   Wt 68.2 kg (150 lb 5.7 oz)   SpO2 93%    Vitals:    11/29/22 2200 11/29/22 2300 11/30/22 0325 11/30/22 0727   BP: 115/58 129/61 103/54 131/57   Pulse: 75 88 85 84   Resp:   18 18   Temp:   36.9 °C (98.4 °F) 36.9 °C (98.4 °F)   TempSrc:   Temporal Temporal   SpO2: (!) 84% 89% 94% 93%   Weight:       Height:        Oxygen Therapy:  Pulse Oximetry: 93 %, O2 (LPM): 0, O2 Delivery Device: None - Room Air    General: no acute distress, well- appearing  Neck: No JVD, no bruits. Dressing to RIJ  Lungs: CTAB, normal effort. no wheezing, rales, or rhonchi  Heart: RRR, normal S1 /S2, no murmur, no rub. Left chest PPM site soft, dressing C/D/I. No erythema or hematoma present   EXT: No lower extremity edema, 2+ radial pulses. 2+ pedal pulses.   Abdomen: soft, non tender, non distended  Neurological: No focal deficits, no facial asymmetry.  Normal speech  Psychiatric: Appropriate affect, alert and oriented x 3  Skin: Warm and dry extremities, no rashes    Labs (personally reviewed):     Lab Results   Component Value Date/Time    SODIUM 136 11/29/2022 04:00 AM    POTASSIUM 4.5 11/29/2022 04:00 AM    CHLORIDE 100 11/29/2022 04:00 AM    CO2 23 11/29/2022 04:00 AM    GLUCOSE 91 11/29/2022 04:00 AM    BUN 42 (H) 11/29/2022 04:00 AM    CREATININE 1.97 (H) 11/29/2022 04:00 AM     Lab Results   Component Value Date/Time    ALKPHOSPHAT 47 11/07/2022 10:53 AM    ASTSGOT 35 11/07/2022 10:53 AM    ALTSGPT 15 11/07/2022 10:53 AM    TBILIRUBIN 0.2 11/07/2022 10:53 AM      Lab Results   Component Value Date/Time    CHOLSTRLTOT 161 11/14/2022 02:26 AM    LDL 76 11/14/2022 02:26 AM    HDL 54 11/14/2022 02:26 AM    TRIGLYCERIDE 154 (H) 11/14/2022 02:26 AM     Cardiac Imaging and Procedures Review:      EKG 11/29/22 My Personal interpretation reveals a sensed V paced 95    Echo 11/17/22:  CONCLUSIONS  Compared to the prior study on 10/12/22, " no major changes  The left ventricular ejection fraction is visually estimated to be 30%.    Assessment and Medical Decision Making:    Intermittent complete heart block  -BiV ICD successfully placed   -Interrogated this morning with normal function    HFrEF  -Continue GDMT per below  -Losartan 20 mg daily  -Metoprolol SR 100mg daily (did not receive this am, requesting meds crushed)  -Hold MRA given current kidney function with GFR < 30    Paroxysmal atrial fibrillation  -Continue amiodarone 200 mg daily  -Continue apixaban 5 mg bid for  -Continue metoprolol SR 100mg daily    Thank you for allowing me to participate in this patients care.  Please contact me with any questions or concerns. Pt will most likely need discharge to a SNF when medically cleared.     Please see Dr. Mayberry's attestation for additions and further recommendations.    I personally spent a total of 11 minutes which includes face-to-face time and non-face-to-face time spent on preparing to see the patient, reviewing hospital notes and tests, obtaining history from the patient, performing a medically appropriate exam, counseling and educating the patient, ordering medications/tests/procedures/referrals as clinically indicated, and documenting information in the electronic medical record.    Future Appointments   Date Time Provider Department Center   12/2/2022 10:20 AM YULISSA Dykes [facility]   12/6/2022 10:00 AM PACER CHECK-CAM B [facility] None   12/9/2022  2:30 PM YULISSA Leija [facility] None   12/12/2022  1:30 PM CT RESOURCE PROVIDER [facility] None   8/15/2023 10:00 AM BROOKE Guzman [facility] None       PLEASE NOTE: Some of this dictation was created using voice recognition software. I have made every reasonable attempt to correct obvious errors, but I expect that there are errors of grammar and possibly content that I did not discover before finalizing the note.     DAILY Leija.   [facility]  [phone]

## 2022-11-30 NOTE — DISCHARGE PLANNING
Case Management Discharge Planning    Admission Date: 11/10/2022  GMLOS: 8.3  ALOS: 20    6-Clicks ADL Score: 12  6-Clicks Mobility Score: 6  PT and/or OT Eval ordered: Yes  Post-acute Referrals Ordered: Yes  Post-acute Choice Obtained: Yes  Has referral(s) been sent to post-acute provider:  Yes      Anticipated Discharge Dispo: Discharge Disposition: D/T to IRF    DME Needed: No    Action(s) Taken: Choice obtained    Escalations Completed: None    Medically Clear: No    Next Steps: RNCM met with patient at bedside to provide choice for IRF.  Patient/family chose [facility] first and [facility] second.  Choice form faxed to Maryjo ANDERSEN.  Patient is pending updated PT/OT recs at this time.  Provider to enter PMR consult.      Barriers to Discharge: Medical clearance

## 2022-11-30 NOTE — PROGRESS NOTES
Cardiology Follow Up Progress Note    Date of Service  11/30/2022    Attending Physician  Debra Lucia M.D.    Chief Complaint   MVCAD    HPI  Lauren Dave is a 72 y.o. female electively admitted on 11/10/2022 for CABG. Patient underwent successful CABG x 3 (LIMA-LAD, SVG-OM2, RCA) with Dr. Mac complicated by CVA and pAF.     While recovering, patient was noted to have 8 seconds of CHB on 11/29/2022. Temporary PPM was placed and EP consulted for possible PPM versus CRT implant. Underwent successful MDT CRT-D implant with Dr. Ambrose on 11/29/2022.     Other past medical history is pertinent for CAD, ICM (30%), LBBB, HTN and breast cancer S/P chemo and XRT on right.     Interim Events  No acute events overnight.   PPM site is soft soft with CDI dressing in place.   Device interrogation this am showed normal sensing and function.  No evidence of late PTX on CXR this am.     Review of Systems  Review of Systems   Constitutional:  Positive for fatigue. Negative for fever.   Respiratory:  Negative for chest tightness and shortness of breath.    Cardiovascular:  Negative for chest pain, palpitations and leg swelling.   Gastrointestinal:  Negative for abdominal pain and blood in stool.   Genitourinary:  Negative for hematuria.   Musculoskeletal:  Negative for gait problem.   Neurological:  Positive for weakness. Negative for dizziness and syncope.   All other systems reviewed and are negative.    Vital signs in last 24 hours  Temp:  [36.1 °C (97 °F)-36.9 °C (98.4 °F)] 36.9 °C (98.4 °F)  Pulse:  [75-99] 84  Resp:  [14-34] 18  BP: (101-131)/(53-61) 131/57  SpO2:  [84 %-97 %] 93 %    Physical Exam  Physical Exam  Constitutional:       General: She is not in acute distress.     Appearance: Normal appearance.   HENT:      Head: Normocephalic.   Eyes:      Extraocular Movements: Extraocular movements intact.   Cardiovascular:      Rate and Rhythm: Normal rate and regular rhythm.      Pulses: Normal pulses.      Heart  sounds: Normal heart sounds.   Pulmonary:      Effort: Pulmonary effort is normal.      Breath sounds: Normal breath sounds.   Chest:      Comments: CRT-D site is uncomplicated with CDI dressing in place.     Sternotomy incision PANCHITO  Abdominal:      Palpations: Abdomen is soft.      Tenderness: There is no abdominal tenderness.   Musculoskeletal:      Cervical back: Normal range of motion.      Left lower leg: No edema.   Skin:     General: Skin is warm and dry.   Neurological:      Mental Status: She is alert and oriented to person, place, and time.   Psychiatric:         Attention and Perception: Attention normal.       Lab Review  Lab Results   Component Value Date/Time    WBC 15.7 (H) 11/30/2022 08:51 AM    RBC 3.24 (L) 11/30/2022 08:51 AM    HEMOGLOBIN 9.8 (L) 11/30/2022 08:51 AM    HEMATOCRIT 31.0 (L) 11/30/2022 08:51 AM    MCV 95.7 11/30/2022 08:51 AM    MCH 30.2 11/30/2022 08:51 AM    MCHC 31.6 (L) 11/30/2022 08:51 AM    MPV 9.8 11/30/2022 08:51 AM      Lab Results   Component Value Date/Time    SODIUM 136 11/30/2022 08:51 AM    POTASSIUM 5.0 11/30/2022 08:51 AM    CHLORIDE 102 11/30/2022 08:51 AM    CO2 24 11/30/2022 08:51 AM    GLUCOSE 132 (H) 11/30/2022 08:51 AM    BUN 41 (H) 11/30/2022 08:51 AM    CREATININE 1.82 (H) 11/30/2022 08:51 AM      Lab Results   Component Value Date/Time    ASTSGOT 35 11/07/2022 10:53 AM    ALTSGPT 15 11/07/2022 10:53 AM     Lab Results   Component Value Date/Time    CHOLSTRLTOT 161 11/14/2022 02:26 AM    LDL 76 11/14/2022 02:26 AM    HDL 54 11/14/2022 02:26 AM    TRIGLYCERIDE 154 (H) 11/14/2022 02:26 AM       No results for input(s): NTPROBNP in the last 72 hours.    Cardiac Imaging and Procedures Review  LTD TTE 11/17/2022:  CONCLUSIONS  Compared to the prior study on 10/12/22, no major changes  The left ventricular ejection fraction is visually estimated to be 30%.     Assessment/Plan  CHB   ICM (30%)  LBBB  - S/P successful MDT CRT-D with Dr. Ambrose on 11/29/20222.   - CXR  shows no late signs of PTX, leads appear to be in correct placement.   - EKG and telemetry monitor shows V paced rhythm, no acute findings.   - Device interrogation today showed normal sensing and function.  - Left upper pacemaker site CDI, uncomplicated.   - Discussed pacemaker discharge education and care with patient at bedside per below. All pt questions/concerns were answered, patient verbalized understanding.   - Patient will follow up with pacemaker clinic in 1 week for pacemaker check.     pAF:  - Continue Amiodarone 200 mg daily and Toprol  mg daily.   - OAC with Eliquis 5 mg BID.     Thank you for allowing me to participate in the care of this patient.    EP will sign off. Close follow up arranged as below.    Please contact me with any questions.    Future Appointments   Date Time Provider Department Center   [facility]   12/6/2022 10:00 AM PACER CHECK-CAM B [facility] None   12/9/2022  2:30 PM YULISSA Leija [facility] None   12/12/2022  1:30 PM CT RESOURCE PROVIDER CT None   8/15/2023 10:00 AM SHANKAR GuzmanPTRESSA ONCRMO None       ROMAIN BruceRTRESSA   Cardiologist, [facility]  [phone]    Pacemaker Discharge Instructions/[facility]    1.  No showers until seen in follow up; may take sponge bath.  Keep dressing dry & in place until seen at for you follow up visit at the cardiology office.     2.  No lifting over 10 lbs with affected arm for six weeks.  3.  Do not raise affected arm above shoulder level or behind head for six weeks.  4.  Avoid excessive pushing, pulling, or twisting for six weeks.  5.  No driving for the first week.  6.  Call our office ([phone]) if you notice any increased swelling, redness, warmth, or drainage at the implant site.  7.  Needs to be seen in emergency if you develop fever > 101F or uncontrolled pain.  8.  Always check with device clinic before any  planned MRI to see if device is MRI compatible.  9.  No routine dental work or cleanings for 3 months.  10.  May remove arm sling after one day, but please wear if you have trouble remembering to keep your arm down.  Please wear at night as a reminder.   11. Do not place cell phones or mobile devices directly over implanted device.     ICD Instructions  If has 1 shock: if feeling fine can notify cardiology office and be seen for interrogation.  If feeling poorly after needs to call 911.  2 Shocks or more in 24 hours: call 911.

## 2022-11-30 NOTE — DISCHARGE INSTRUCTIONS
Pacemaker Discharge Instructions/[facility]    1.  No showers until seen in follow up; may take sponge bath.  Keep dressing dry & in place until seen at for you follow up visit at the cardiology office.     2.  No lifting over 10 lbs with affected arm for six weeks.  3.  Do not raise affected arm above shoulder level or behind head for six weeks.  4.  Avoid excessive pushing, pulling, or twisting for six weeks.  5.  No driving for the first week.  6.  Call our office ([phone]) if you notice any increased swelling, redness, warmth, or drainage at the implant site.  7.  Needs to be seen in emergency if you develop fever > 101F or uncontrolled pain.  8.  Always check with device clinic before any planned MRI to see if device is MRI compatible.  9.  No routine dental work or cleanings for 3 months.  10.  May remove arm sling after one day, but please wear if you have trouble remembering to keep your arm down.  Please wear at night as a reminder.   11. Do not place cell phones or mobile devices directly over implanted device.     ICD Instructions  If has 1 shock: if feeling fine can notify cardiology office and be seen for interrogation.  If feeling poorly after needs to call 911.  2 Shocks or more in 24 hours: call 911.           Stroke / CVA / TIA / Hemorrhagic Ischemia Discharge Instructions    You have had a stroke. Your risk factors have been identified as follows:  Age - Over 55  High blood pressure  Heart disease  Atrial Fibrillation  Artery Disease / Peripheral Vascular Disease     It is important that you reduce your risk factors to avoid another stroke in the future. Here are some general guidelines to follow:    Eat healthy - avoid food high in fat  Maintain a healthy weight  Avoid alcohol and illegal drug use Get regular exercise  Avoid smoking  Take your medications as directed     For more information regarding risk factors, refer to pages 17-19 in your Stroke Patient Education Guide. Stroke  Education Guide was given to patient.    Warning signs of a stroke include (which can also be found on page 3 of your Stroke Patient Education Guide):  Sudden numbness of weakness of the face, arm or leg (especially on one side of the body).  Sudden confusion, trouble speaking or understanding.  Sudden trouble seeing in one or both eyes.  Sudden trouble walking, dizziness, loss of balance or coordination.  Sudden severe headache with no known cause.  It is very important to get treatment quickly when a stroke occurs. If you experience any of the above warning signs, call 911 immediately.     Some patients who have had a stroke will be going home on a blood thinner medication called Warfarin (Coumadin).  This medication requires very close monitoring and follow up.  This follow up can be provided by either your Primary Care Physician or by [facility]'s Outpatient Anticoagulation Service.  The Outpatient Anticoagulation Service is located at the [place] [facility] at [facility] ([facility]).  If you do not know when your follow up appointment is scheduled, call [phone] to verify your appointment time.        HF Patient Discharge Instructions  Monitor your weight daily, and maintain a weight chart, to track your weight changes.   Activity as tolerated, unless your Doctor has ordered otherwise.  Follow a low fat, low cholesterol, low salt diet unless instructed otherwise by your Doctor. Read the labels on the back of food products and track your intake of fat, cholesterol and salt.   Fluid Restriction No. If a Fluid Restriction has been ordered by your Doctor, measure fluids with a measuring cup to ensure that you are not exceeding the restriction.   No smoking.  Oxygen No. If your Doctor has ordered that you wear Oxygen at home, it is important to wear it as ordered.  Did you receive an explanation from staff on the importance of taking each of your medications and  why it is necessary to keep taking them unless your doctor says to stop? Yes  Were all of your questions answered about how to manage your heart failure and what to do if you have increased signs and symptoms after you go home? Yes  Do you feel like your heart failure care team involved you in the care treatment plan and allowed you to make decisions regarding your care while in the hospital and addressed any discharge needs you might have? Yes    See the educational handout provided at discharge for more information on monitoring your daily weight, activity and diet. This also explains more about Heart Failure, symptoms of a flare-up and some of the tests that you have undergone.     Warning Signs of a Flare-Up include:  Swelling in the ankles or lower legs.  Shortness of breath, while at rest, or while doing normal activities.   Shortness of breath at night when in bed, or coughing in bed.   Requiring more pillows to sleep at night, or needing to sit up at night to sleep.  Feeling weak, dizzy or fatigued.     When to call your Doctor:  Call [facility] seven days a week from 8:00 a.m. to 8:00 p.m. for medical questions [phone].  Call your Primary Care Physician or Cardiologist if:   You experience any pain radiating to your jaw or neck.  You have any difficulty breathing.  You experience weight gain of 3 lbs in a day or 5 lbs in a week.   You feel any palpitations or irregular heartbeats.  You become dizzy or lose consciousness.   If you have had an angiogram or had a pacemaker or AICD placed, and experience:  Bleeding, drainage or swelling at the surgical / puncture site.  Fever greater than 100.0 F  Shock from internal defibrillator.  Cool and / or numb extremities.     Please access the [facility] My HF Guide/Heart Failure Interactive Workbook:   [email or web address]

## 2022-11-30 NOTE — PROGRESS NOTES
Pt received from [facility]. Tele monitor in place. VSS. Pt oriented to room. Educated on use of the call light. Pt demonstrated use of the call light. Discussed POC. All questions answered.

## 2022-11-30 NOTE — CARE PLAN
The patient is Watcher - Medium risk of patient condition declining or worsening    Shift Goals  Clinical Goals: hemodynamic stability and pain control  Patient Goals: discharge  Family Goals: Updates    Progress made toward(s) clinical / shift goals:    Problem: Knowledge Deficit - Standard  Goal: Patient and family/care givers will demonstrate understanding of plan of care, disease process/condition, diagnostic tests and medications  Outcome: Progressing     Problem: Skin Integrity  Goal: Skin integrity is maintained or improved  11/29/2022 1938 by Timoteo Zavala R.N.  Outcome: Progressing  11/29/2022 1650 by Timoteo Zavala R.N.  Outcome: Progressing     Problem: Fall Risk  Goal: Patient will remain free from falls  11/29/2022 1938 by Timoteo Zavala R.N.  Outcome: Progressing  11/29/2022 1650 by Timoteo Zavala R.N.  Outcome: Progressing     Problem: Pain - Standard  Goal: Alleviation of pain or a reduction in pain to the patient’s comfort goal  11/29/2022 1938 by Timoteo Zavala R.N.  Outcome: Progressing  11/29/2022 1650 by Timoteo Zavala R.N.  Outcome: Progressing     Problem: Communication  Goal: The ability to communicate needs accurately and effectively will improve  Outcome: Progressing     Problem: Hemodynamics  Goal: Patient's hemodynamics, fluid balance and neurologic status will be stable or improve  11/29/2022 1938 by Timoteo Zavala R.N.  Outcome: Progressing  11/29/2022 1650 by SAMUEL SalasN.  Outcome: Progressing     Problem: Respiratory  Goal: Patient will achieve/maintain optimum respiratory ventilation and gas exchange  11/29/2022 1938 by Timoteo Zavala R.N.  Outcome: Progressing  11/29/2022 1650 by SAMUEL SalasN.  Outcome: Progressing       Patient is not progressing towards the following goals:

## 2022-11-30 NOTE — THERAPY
Physical Therapy   Daily Treatment     Patient Name: Lauren Dave  Age:  72 y.o., Sex:  female  Medical Record #: [id number]  Today's Date: 11/30/2022    Precautions: Fall Risk;Swallow Precautions;Cardiac Precautions;Sternal Precautions;pacemaker precautions  Comments: s/p CABG, AICD, CVA    Assessment  Pt now s/p AICD placement 11/29. Educated and provided handout to pt and family on pacemaker precautions. Pt progressing with PT but does need encouragement to participate and to incr activity. Completed multiple STS to FWW with mod A and required max A for SPT with assist for stability, FWW management, and RLE advancement. Recommend up to chair with nursing daily to promote incr activity tolerance to progress with PT. Will follow.     Plan  Continue current treatment plan.  DC Equipment Recommendations: Unable to determine at this time  Discharge Recommendations: Recommend post-acute placement for additional physical therapy services prior to discharge home     11/30/22 1149   Cognition    Level of Consciousness Alert   Ability To Follow Commands 1 Step   Attention Impaired   Sequencing Impaired   Initiation Impaired   Comments pt irritable but ultimately cooperative   Balance   Sitting Balance (Static) Fair   Sitting Balance (Dynamic) Fair -   Standing Balance (Static) Poor -   Standing Balance (Dynamic) Trace +   Weight Shift Sitting Fair   Weight Shift Standing Poor   Skilled Intervention Verbal Cuing;Postural Facilitation   Comments standing with FWW   Gait Analysis   Gait Level Of Assist Unable to Participate   Comments unable to advance RLE on own   Bed Mobility    Supine to Sit Minimal Assist   Scooting Supervised   Skilled Intervention Verbal Cuing;Sequencing   Functional Mobility   Sit to Stand Moderate Assist   Bed, Chair, Wheelchair Transfer Maximal Assist   Transfer Method Stand Pivot   Mobility EOB > chair   Skilled Intervention Verbal Cuing;Sequencing;Postural Facilitation   Comments practiced  STS x4 to FWW, tolerates standing < 20 seconds and needed encouragement to attempt incr time standing to attempt SPT. pt required max A for SPT for stability, FWW management, hip guidance, and RLE advancement.   Short Term Goals    Short Term Goal # 1 Pt will transfer supine<->sit with mod A within 6 visits to promote return home   Goal Outcome # 1 Progressing as expected   Short Term Goal # 2 Pt will sit at EOB 15 min with min A within 6 visits to promote return home   Goal Outcome # 2 Goal met   Short Term Goal # 3 Pt will transfer bed<->chair with mod A within 6 visits in order to promote return home   Goal Outcome # 3 Goal not met

## 2022-11-30 NOTE — CARE PLAN
Problem: Knowledge Deficit - Standard  Goal: Patient and family/care givers will demonstrate understanding of plan of care, disease process/condition, diagnostic tests and medications  Outcome: Progressing     Problem: Skin Integrity  Goal: Skin integrity is maintained or improved  Outcome: Progressing     Problem: Fall Risk  Goal: Patient will remain free from falls  Outcome: Progressing     Problem: Pain - Standard  Goal: Alleviation of pain or a reduction in pain to the patient’s comfort goal  Outcome: Progressing     Problem: Hemodynamics  Goal: Patient's hemodynamics, fluid balance and neurologic status will be stable or improve  Outcome: Progressing   The patient is Stable - Low risk of patient condition declining or worsening    Shift Goals  Clinical Goals: pain control, hemodynamics  Patient Goals: discharge  Family Goals: Updates    Progress made toward(s) clinical / shift goals:  Progressing    Patient is not progressing towards the following goals:

## 2022-11-30 NOTE — PROGRESS NOTES
Hospital Medicine Daily Progress Note    Date of Service  11/30/2022    Chief Complaint  Right leg and left arm weakness after an open heart bypass    Hospital Course      This is a 72-year-old female with past medical significant for CAD, hypertension, history of breast cancer, dyslipidemia, chronic systolic heart failure, hypertension and chronic steroid, seronegative RA, gout was admitted for elective CABG on 11/10/2022.    Surgery went well but she was noted to have right lower extremity weakness after the surgery, CT head showed multiple areas of likely cerebral infarction.  MRI showed multiple areas of watershed type infarction, echocardiogram showed EF of 35%.  Neurology was consulted, neurology recommended Eliquis, patient is intolerant to statin.      While her being monitored in tele, she was  noted to have a second pause, cardiology consulted, patient underwent placement of PPM on 11/29/2022    Hospital course was complicated with acute blood loss anemia s/p transfusion, monitor hemoglobin, if less than 8, transfuse considering history of CAD.  Hospital course was complicated with paroxysmal atrial fibrillation, will continue amiodarone along with Eliquis 5 mg p.o. twice daily.    Interval events:  -- No acute events overnight, medicine has been stable, heart rate of 85, blood pressure 97/50, sats are 92% on room air.  Patient is alert, awake, answering questions appropriately.  -- Patient will be continued elevated at 15.7, will obtain chest x-ray, UA, procalcitonin.  --Continue amiodarone and Eliquis for atrial fibrillation.  Cardiology continue to follow the patient  --- PT/OT/speech evaluated, recommend postacute, physiatry referral in place.  Patient is medically stable to be discharged to acute rehab    I have discussed this patient's plan of care and discharge plan at IDT rounds today with Case Management, Nursing, Nursing leadership, and other members of the IDT  team.    Consultants/Specialty  cardiovascular surgeon and neurology    Code Status  Full Code    Disposition  Patient is  medically stable  for discharge.   Anticipate discharge to acute rehab  I have placed the appropriate orders for post-discharge needs.    Review of Systems  Review of Systems   Constitutional:  Positive for malaise/fatigue. Negative for fever.   HENT:  Negative for congestion and sore throat.    Eyes:  Negative for blurred vision and discharge.   Respiratory:  Positive for shortness of breath. Negative for cough.    Cardiovascular:  Negative for chest pain, palpitations and leg swelling.   Gastrointestinal:  Negative for abdominal pain, diarrhea and nausea.   Genitourinary:  Negative for dysuria and hematuria.   Musculoskeletal:  Negative for back pain and joint pain.   Neurological:  Positive for focal weakness. Negative for dizziness.   Psychiatric/Behavioral:  Negative for depression. The patient is not nervous/anxious.    All other systems reviewed and are negative.     Physical Exam  Temp:  [36.3 °C (97.4 °F)-36.9 °C (98.4 °F)] 36.3 °C (97.4 °F)  Pulse:  [75-99] 84  Resp:  [14-34] 17  BP: ()/(53-61) 97/53  SpO2:  [84 %-97 %] 92 %    Physical Exam  Vitals reviewed.   Constitutional:       Appearance: Normal appearance. She is not diaphoretic.   HENT:      Head: Normocephalic and atraumatic.   Eyes:      General: No scleral icterus.     Extraocular Movements: Extraocular movements intact.      Pupils: Pupils are equal, round, and reactive to light.   Cardiovascular:      Rate and Rhythm: Regular rhythm. Tachycardia present.   Pulmonary:      Breath sounds: Normal breath sounds. No rales.   Abdominal:      General: Bowel sounds are normal. There is no distension.      Palpations: Abdomen is soft.      Tenderness: There is no abdominal tenderness.   Musculoskeletal:         General: Swelling and tenderness present.      Cervical back: Neck supple. No muscular tenderness.   Skin:      Coloration: Skin is not jaundiced.   Neurological:      Mental Status: She is alert and oriented to person, place, and time. Mental status is at baseline.      Cranial Nerves: No cranial nerve deficit.      Motor: Weakness present.   Psychiatric:         Mood and Affect: Mood normal.         Behavior: Behavior normal.       Fluids  No intake or output data in the 24 hours ending 11/30/22 1301      Laboratory  Recent Labs     11/30/22  0851   WBC 15.7*   RBC 3.24*   HEMOGLOBIN 9.8*   HEMATOCRIT 31.0*   MCV 95.7   MCH 30.2   MCHC 31.6*   RDW 47.8   PLATELETCT 629*   MPV 9.8       Recent Labs     11/28/22  0124 11/29/22  0400 11/30/22  0851   SODIUM 136 136 136   POTASSIUM 4.2 4.5 5.0   CHLORIDE 98 100 102   CO2 23 23 24   GLUCOSE 140* 91 132*   BUN 41* 42* 41*   CREATININE 2.02* 1.97* 1.82*   CALCIUM 9.7 9.5 9.4                     Imaging  DX-CHEST-PORTABLE (1 VIEW)   Final Result         1.  Left lower lobe atelectasis versus infiltrate, slightly decreased since prior study.   2.  Small left pleural effusion.   3.  Cardiomegaly   4.  Atherosclerosis      DX-CHEST-PORTABLE (1 VIEW)   Final Result      1.  Small left pleural effusion.      TR-[id number] VIEW   Final Result      1.  There is a nonobstructive bowel gas pattern with a moderate amount of colonic stool.      DX-CHEST-LIMITED (1 VIEW)   Final Result      Stable chest with cardiac enlargement, bronchial thickening and left basilar atelectasis. Bronchial thickening most likely represents bronchitis but edema could be considered      IR-US GUIDED PIV   Final Result    Ultrasound-guided PERIPHERAL IV INSERTION performed by    qualified nursing staff as above.      DX-ABDOMEN FOR TUBE PLACEMENT   Final Result      Enteric tube tip projects over the stomach      DX-CHEST-PORTABLE (1 VIEW)   Final Result      1.  Tubes and lines in good position.      2.  Mild cardiomegaly with mild diffuse pulmonary edema with slight improvement since previous exam.      3.  Small  left pleural effusion.      EC-ECHOCARDIOGRAM LTD W/O CONT   Final Result      DX-ABDOMEN FOR TUBE PLACEMENT   Final Result      Enteric tube tip projects over the stomach      DX-CHEST-PORTABLE (1 VIEW)   Final Result      1.  Layering bilateral pleural effusions with adjacent airspace disease.   2.  Increased interstitial edema.      DX-ABDOMEN FOR TUBE PLACEMENT   Final Result      Orogastric tube tip at the distal stomach.      DX-CHEST-PORTABLE (1 VIEW)   Final Result      1.  Removal of right IJ catheter.      2.  Right subclavian catheter unchanged in position.      3.  Mild cardiomegaly.      4.  Blunting of left costophrenic angle consistent with atelectasis, pneumonitis, and/or pleural effusion.      DX-ABDOMEN FOR TUBE PLACEMENT   Final Result      Enteric tube tip projects over the stomach antrum      MR-BRAIN-W/O   Final Result      1.  BILATERAL mostly supratentorial areas of ischemia as described above. These appear to be primarily watershed zone infarctions.   2.  No hemorrhage   3.  No significant mass effect      DX-CHEST-PORTABLE (1 VIEW)   Final Result         1. No significant interval change.      DX-ABDOMEN FOR TUBE PLACEMENT   Final Result      Interval placement of enteric tube with its tip over the midline epigastrium compatible with position at the level of the gastric body.      CT-HEAD W/O   Final Result      1.  Likely subacute infarcts in the bilateral parieto-occipital regions.   2.  No acute intracranial hemorrhage.   3.  Bilateral maxillary sinus disease.      EC-DAVE W/O CONT   Final Result      DX-CHEST-PORTABLE (1 VIEW)   Final Result      Stable enlargement of the cardiomediastinal silhouette, mild diffuse interstitial edema and bilateral basilar atelectasis and/or consolidation.      DX-CHEST-PORTABLE (1 VIEW)   Final Result      Satisfactory postoperative appearance of the chest      CL-TEMPORARY PACEMAKER INSERT    (Results Pending)   CL-BIV IMPLANTABLE CARDIOVERTER  DEFIBRILLATOR    (Results Pending)   DX-ESOPHAGUS - [id number]    (Results Pending)   DX-CHEST-LIMITED (1 VIEW)    (Results Pending)          Assessment/Plan  * Complete heart block (HCC)  Assessment & Plan  8-second pause on telemetry  Cardiology consulted for which patient was diagnosed for complete heart block  Permanent pacemaker placed 11/29/22      Coronary artery disease due to calcified coronary lesion - Calcifications seen on CT scan also wtih aortic ulceration- (present on admission)  Assessment & Plan  S/p CABG this admit  Apixaban, metoprolol, amiodarone, valsartan  Intolerant to Statins.    TAN (acute kidney injury) ([facility])  Assessment & Plan  11/29 BUN:42, Cr:1.97  Monitor I/O's, vitals, labs  Avoid nephrotoxic meds.    CHF (congestive heart failure), NYHA class III, acute on chronic, combined ([facility])- (present on admission)  Assessment & Plan  Valsartan 40mg daily and metoprolol SR  Holding diuresis based on renal function  Monitor I/O, labs, vitals    Acute ischemic stroke ([facility])  Assessment & Plan  Post/perioperative event with multiple areas of watershed type infarction   --MRI obtained on 11/11, neurology consulted, recommended Eliquis.  Patient is intolerant to statin.  .  PT/OT/SLP evaluated, recommended postacute, physiatry referral in place    Acute respiratory failure with hypoxia ([facility])  Assessment & Plan  Resolved, now saturating well onRA    PAF (paroxysmal atrial fibrillation) ([facility])- (present on admission)  Assessment & Plan  Post-operative  Decrease the amiodarone dose as she has been loaded.  eliquis    Other specified anemias  Assessment & Plan  Acute blood loss due to hematuria  Hb has been kady thus stop daily phlebotomy and check as indicated.  Transfuse RBCs for hemoglobin less than 8.0 as she is s/p CABG.    S/P CABG x 3  Assessment & Plan  As per cardiac surgery, optimization,, mobilization as tolerated,  Telemetry    Chronic systolic congestive heart failure (HCC) EF 35%-  (present on admission)  Assessment & Plan  Treated to euvolemia, GDMT as tolerated  Ejection fraction 35% on DAVE.  Coreg and diovan   -- cards following     Dyslipidemia  Assessment & Plan  The patient with prior severe intolerance, apparently failed multiple regimens  Consider outpatient referral to PCSK9 treatment    Dysphagia- (present on admission)  Assessment & Plan  SPL following, advanced on diet  Nutrition following    Acute blood loss anemia- (present on admission)  Assessment & Plan  From hematuria  Required transfusion  11/27 Hgb:9.8  Monitor cbc    Encephalopathy acute  Assessment & Plan  improved    Chronic obstructive pulmonary disease (COPD) (HCC)- (present on admission)  Assessment & Plan  Pre-existing, respiratory protocol,  Continuous respiratory therapy protocol.  Without exacerbation during admit  Quit smoking early 20's       VTE prophylaxis: Eliquis

## 2022-11-30 NOTE — THERAPY
Speech Language Pathology   Video Swallow Evaluation     Patient Name: Lauren Dave  AGE:  72 y.o., SEX:  female  Medical Record #: [id number]  Today's Date: 11/30/2022     Precautions  Precautions: (P) Fall Risk, Swallow Precautions ( See Comments), Cardiac Precautions (See Comments), Sternal Precautions (See Comments)  Comments: (P) s/p CABG, s/p pacemaker, CVA    Current Method of Nutrition   Oral diet (MM/MT)    Pertinent Information  Affect/Behavior: Appropriate, Calm, Cooperative  Oxygen Requirements: Room Air  Secretion Management: WNL  Feeding Tube: None  Dentition: Missing posterior dentition  Factor(s) Affecting Performance: None    Discussed with the risks, benefits, and alternatives of the VFSS procedure. Patient/family acknowledged and agreed to proceed.    Assessment  Videofluoroscopic Swallow Study was conducted in the lateral projection(s) to evaluate oropharyngeal swallow function. A radiology tech was present to assist with the procedure.     Positioning: seated upright in fluoroscopy chair  Anatomic View: WNL  Bolus Administration: SLP and Patient  PO barium contrast trials: Varibar thin liquid, liquidized mixed with Varibar powder, Varibar pudding, solid coated in Varibar pudding, mixed consistency coated in Varibar powder      Consistency PAS Score Timing Comments   Thin Liquid 2 During swallow PAS 2 during tsp, cup, straw, consecutive cup, consecutive straw  Trace lingual, piriform sinus and vallecular residue    Liquidized 1 N/A    Pudding 1 N/A    Soft & Bite Sized 2 During swallow  On juice   Solid 1 N/A    Mixed 2 During swallow On juice     Penetration-Aspiration Scale (PAS)  1     No contrast enters airway  2     Contrast enters the airway, remains above the vocal folds, and is ejected from the airway (not seen in the airway at the end of the swallow).  3     Contrast enters the airway, remains above the vocal folds, and is not ejected from the airway (is seen in the airway after  the swallow).  4     Contrast enters the airway, contacts the vocal folds, and is ejected from the airway.  5     Contrast enters the airway, contacts the vocal folds, and is not ejected from the airway  6     Contrast enters the airway, crosses the plane of the vocal folds, and is ejected from the airway.  7     Contrast enters the airway, crosses the plane of the vocal folds, and is not ejected from the airway despite effort.  8     Contrast enters the airway, crosses the plane of the vocal folds, is not ejected from the airway and there is no response to aspiration.    Oral phase:  Mildly prolonged but functional mastication of soft and bite size and regular textures d/t missing posterior dentition. Piecemeal swallowing of puree noted. Trace-mild lingual residue cleared with cleansing swallow.     Pharyngeal phase:  Timely laryngeal vestibule closure and epiglottal inversion. Flash penetration during the swallow with spontaneous ejection with thins and juice from mixed. No significant pharyngeal residue. No other penetration or aspiration visualized under fluoroscopy today.     Esophageal phase:  Complete distention and duration of PES with no obstruction of bolus flow.     Clinical Impressions  The pt presents with a functional oropharyngeal swallow. Airway protection and pharyngeal efficiency intact.     Recommendations  Regular/Thin Liquid diet   2.  Swallowing Instructions & Precautions:   Supervision: Assist with meal tray set up, Independent  Positioning: Fully upright and midline during oral intake  Medication: Whole with liquid  Strategies: Small bites/sips, Slow rate of intake  Oral Care: BID    SLP following to assess diet tolerance x1-2.     Plan    Recommend Speech Therapy 4 times per week until therapy goals are met for the following treatments:  Dysphagia Training and Patient / Family / Caregiver Education.    Discharge Recommendations: (P) Anticipate that the patient will have no further speech  "therapy needs after discharge from the hospital    Subjective    Pt alert, followed directions and participated with encouragement.   Objective       11/30/22 0839   Total Time Spent   Total Time Spent (Mins) 45   Charge Group   SLP Video Swallow / FEES Videofluoroscopic Evaluation   Initial Contact Note    Initial Contact Note  Order Received and Verified, Speech Therapy Evaluation in Progress with Full Report to Follow.   Precautions   Precautions Fall Risk;Swallow Precautions ( See Comments);Cardiac Precautions (See Comments);Sternal Precautions (See Comments)   Comments s/p CABG, s/p pacemaker, CVA   Vitals   O2 (LPM) 0   O2 Delivery Device None - Room Air   Pain 0 - 10 Group   Therapist Pain Assessment Post Activity Pain Same as Prior to Activity;Nurse Notified  (generalized pain)   Prior Living Situation   Prior Services Unable To Determine At This Time   Housing / Facility 1 Story House   Lives with - Patient's Self Care Capacity Significant Other   Prior Level Of Function   Communication Within Functional Limits   Swallow Within Functional Limits   Dentition Poor Quality    Dentures None   Hearing Within Functional Limits for Evaluation   Hearing Aid None   Vision Within Functional Limits for Evaluation   Patient's Primary Language English   Recommendations   Diet / Liquid Recommendation Thin (0);Regular (7)   Medication Administration  Whole with Liquid Wash   Strategies / Precautions Small Bites;Small Sips   Dysphagia Rating   Nutritional Liquid Intake Rating Scale Non thickened beverages   Nutritional Food Intake Rating Scale Total oral diet with no restrictions   Patient / Family Goals   Patient / Family Goal #1 \"that was really good\"   Goal #1 Outcome Progressing as expected   Short Term Goals   Short Term Goal # 1 B  Pt will consume a diet of MM/MT with no s/sx of aspiration and min cues.   Goal Outcome  # 1 B Goal met   Short Term Goal # 3 Pt will complete pharyngeal constriction exercises with good " accuracy over 10 reps in 3 sets   Goal Outcome  # 3 Goal met   Short Term Goal # 4 Pt will consume a reg/thin liquid diet with no s/sx of aspiration and min cues.   Education Group   Education Provided Dysphagia   Dysphagia Patient Response Patient;Acceptance;Explanation;Demonstration;Verbal Demonstration;Action Demonstration   Anticipated Discharge Needs   Discharge Recommendations Anticipate that the patient will have no further speech therapy needs after discharge from the hospital   Therapy Recommendations Upon DC Not Indicated   Interdisciplinary Plan of Care Collaboration   IDT Collaboration with  Nursing   Patient Position at End of Therapy Seated;In Bed;Bed Alarm On;Call Light within Reach;Tray Table within Reach   Collaboration Comments RN aware of results/recs

## 2022-12-01 PROBLEM — G93.40 ENCEPHALOPATHY ACUTE: Status: RESOLVED | Noted: 2022-11-16 | Resolved: 2022-12-01

## 2022-12-01 PROBLEM — Z99.11 ENCOUNTER FOR WEANING FROM VENTILATOR (HCC): Status: RESOLVED | Noted: 2022-11-10 | Resolved: 2022-12-01

## 2022-12-01 LAB
ALBUMIN SERPL BCP-MCNC: 3.3 G/DL (ref 3.2–4.9)
BUN SERPL-MCNC: 37 MG/DL (ref 8–22)
CALCIUM SERPL-MCNC: 9.2 MG/DL (ref 8.5–10.5)
CHLORIDE SERPL-SCNC: 103 MMOL/L (ref 96–112)
CO2 SERPL-SCNC: 23 MMOL/L (ref 20–33)
CREAT SERPL-MCNC: 1.54 MG/DL (ref 0.5–1.4)
ERYTHROCYTE [DISTWIDTH] IN BLOOD BY AUTOMATED COUNT: 48.1 FL (ref 35.9–50)
GFR SERPLBLD CREATININE-BSD FMLA CKD-EPI: 35 ML/MIN/1.73 M 2
GLUCOSE SERPL-MCNC: 101 MG/DL (ref 65–99)
HCT VFR BLD AUTO: 30.5 % (ref 37–47)
HGB BLD-MCNC: 9.5 G/DL (ref 12–16)
MAGNESIUM SERPL-MCNC: 2.1 MG/DL (ref 1.5–2.5)
MCH RBC QN AUTO: 29.5 PG (ref 27–33)
MCHC RBC AUTO-ENTMCNC: 31.1 G/DL (ref 33.6–35)
MCV RBC AUTO: 94.7 FL (ref 81.4–97.8)
PHOSPHATE SERPL-MCNC: 3.2 MG/DL (ref 2.5–4.5)
PLATELET # BLD AUTO: 507 K/UL (ref 164–446)
PMV BLD AUTO: 9.4 FL (ref 9–12.9)
POTASSIUM SERPL-SCNC: 4.7 MMOL/L (ref 3.6–5.5)
PROCALCITONIN SERPL-MCNC: 0.11 NG/ML
RBC # BLD AUTO: 3.22 M/UL (ref 4.2–5.4)
SARS-COV+SARS-COV-2 AG RESP QL IA.RAPID: NOTDETECTED
SODIUM SERPL-SCNC: 138 MMOL/L (ref 135–145)
SPECIMEN SOURCE: NORMAL
WBC # BLD AUTO: 11.4 K/UL (ref 4.8–10.8)

## 2022-12-01 PROCEDURE — 80069 RENAL FUNCTION PANEL: CPT

## 2022-12-01 PROCEDURE — 99233 SBSQ HOSP IP/OBS HIGH 50: CPT | Performed by: INTERNAL MEDICINE

## 2022-12-01 PROCEDURE — 99233 SBSQ HOSP IP/OBS HIGH 50: CPT | Mod: FS | Performed by: INTERNAL MEDICINE

## 2022-12-01 PROCEDURE — 36415 COLL VENOUS BLD VENIPUNCTURE: CPT

## 2022-12-01 PROCEDURE — 85027 COMPLETE CBC AUTOMATED: CPT

## 2022-12-01 PROCEDURE — 84145 PROCALCITONIN (PCT): CPT

## 2022-12-01 PROCEDURE — 770020 HCHG ROOM/CARE - TELE (206)

## 2022-12-01 PROCEDURE — A9270 NON-COVERED ITEM OR SERVICE: HCPCS | Performed by: INTERNAL MEDICINE

## 2022-12-01 PROCEDURE — 83735 ASSAY OF MAGNESIUM: CPT

## 2022-12-01 PROCEDURE — 87426 SARSCOV CORONAVIRUS AG IA: CPT

## 2022-12-01 PROCEDURE — 700102 HCHG RX REV CODE 250 W/ 637 OVERRIDE(OP): Performed by: INTERNAL MEDICINE

## 2022-12-01 PROCEDURE — 700102 HCHG RX REV CODE 250 W/ 637 OVERRIDE(OP): Performed by: STUDENT IN AN ORGANIZED HEALTH CARE EDUCATION/TRAINING PROGRAM

## 2022-12-01 PROCEDURE — A9270 NON-COVERED ITEM OR SERVICE: HCPCS | Performed by: STUDENT IN AN ORGANIZED HEALTH CARE EDUCATION/TRAINING PROGRAM

## 2022-12-01 RX ORDER — CARVEDILOL 6.25 MG/1
6.25 TABLET ORAL 2 TIMES DAILY WITH MEALS
Qty: 60 TABLET | Status: SHIPPED
Start: 2022-12-01 | End: 2022-12-05

## 2022-12-01 RX ORDER — METOPROLOL SUCCINATE 25 MG/1
50 TABLET, EXTENDED RELEASE ORAL DAILY
Qty: 30 TABLET | DISCHARGE
Start: 2022-12-01

## 2022-12-01 RX ADMIN — AMIODARONE HYDROCHLORIDE 200 MG: 200 TABLET ORAL at 04:52

## 2022-12-01 RX ADMIN — OMEPRAZOLE 40 MG: 20 CAPSULE, DELAYED RELEASE ORAL at 04:52

## 2022-12-01 RX ADMIN — APIXABAN 5 MG: 5 TABLET, FILM COATED ORAL at 17:31

## 2022-12-01 RX ADMIN — MIRTAZAPINE 15 MG: 15 TABLET, FILM COATED ORAL at 20:16

## 2022-12-01 RX ADMIN — APIXABAN 5 MG: 5 TABLET, FILM COATED ORAL at 04:52

## 2022-12-01 RX ADMIN — CARVEDILOL 6.25 MG: 6.25 TABLET, FILM COATED ORAL at 17:31

## 2022-12-01 RX ADMIN — CARVEDILOL 6.25 MG: 6.25 TABLET, FILM COATED ORAL at 07:58

## 2022-12-01 RX ADMIN — ACETAMINOPHEN 1000 MG: 500 TABLET ORAL at 19:30

## 2022-12-01 RX ADMIN — HYDROXYCHLOROQUINE SULFATE 200 MG: 200 TABLET ORAL at 07:58

## 2022-12-01 RX ADMIN — ACETAMINOPHEN 1000 MG: 500 TABLET ORAL at 00:04

## 2022-12-01 RX ADMIN — ASPIRIN 81 MG 81 MG: 81 TABLET ORAL at 04:52

## 2022-12-01 RX ADMIN — CARVEDILOL 6.25 MG: 6.25 TABLET, FILM COATED ORAL at 00:00

## 2022-12-01 ASSESSMENT — PATIENT HEALTH QUESTIONNAIRE - PHQ9
2. FEELING DOWN, DEPRESSED, IRRITABLE, OR HOPELESS: NOT AT ALL
SUM OF ALL RESPONSES TO PHQ9 QUESTIONS 1 AND 2: 0
1. LITTLE INTEREST OR PLEASURE IN DOING THINGS: NOT AT ALL

## 2022-12-01 ASSESSMENT — ENCOUNTER SYMPTOMS
BACK PAIN: 0
DIARRHEA: 0
PALPITATIONS: 0
PHOTOPHOBIA: 0
SENSORY CHANGE: 0
TINGLING: 0
APNEA: 0
WHEEZING: 0
CHOKING: 0
NECK PAIN: 0
WEIGHT LOSS: 0
EYE PAIN: 0
ORTHOPNEA: 0
DIZZINESS: 0
NAUSEA: 0
DOUBLE VISION: 0
COUGH: 0
ABDOMINAL PAIN: 0
CHILLS: 0
HALLUCINATIONS: 0
SPUTUM PRODUCTION: 0
FOCAL WEAKNESS: 0
HEADACHES: 0
FEVER: 0
TREMORS: 0
BLURRED VISION: 0
VOMITING: 0
SPEECH CHANGE: 0
SHORTNESS OF BREATH: 0
STRIDOR: 0
MYALGIAS: 0
CONSTIPATION: 0
CHEST TIGHTNESS: 0

## 2022-12-01 ASSESSMENT — LIFESTYLE VARIABLES: SUBSTANCE_ABUSE: 0

## 2022-12-01 ASSESSMENT — PAIN DESCRIPTION - PAIN TYPE: TYPE: ACUTE PAIN

## 2022-12-01 ASSESSMENT — FIBROSIS 4 INDEX: FIB4 SCORE: 1.28

## 2022-12-01 NOTE — PROGRESS NOTES
Bedside report received from day RN, pt care assumed, assessment completed. Pt is A&O4, pain 0/10, 100% paced on the monitor. Updated on POC, questions answered. Bed in lowest, locked position, treaded socks on, call light and belongings within reach.

## 2022-12-01 NOTE — DISCHARGE PLANNING
Agency/Facility Name: [facility]  Spoke To: Rey  Outcome: Facility can accept Pt today. DPA to follow up once transport time has been confirmed     0857  Agency/Facility Name: [facility]  Outcome: DPA left v-mail notifying facility of transport time of 1300 for Pt. DPA requested call back confirming

## 2022-12-01 NOTE — DISCHARGE PLANNING
DC Transport Scheduled    Received request at: 12/1/2022 0818    Transport Company Scheduled:  [facility]  Spoke with Sherri at [facility] to schedule transport.    Scheduled Date: 12/1/2022  Scheduled Time: 1300    Destination: [address] [place]    Notified care team of scheduled transport via Voalte.     If there are any changes needed to the DC transportation scheduled, please contact [facility] at ext. [phone] between the hours of [phone] Mon-Fri. If outside those hours, contact the ED Case Manager at ext. [phone].

## 2022-12-01 NOTE — DISCHARGE PLANNING
Case Management Discharge Planning    Admission Date: 11/10/2022  GMLOS: 8.3  ALOS: 21    6-Clicks ADL Score: 12  6-Clicks Mobility Score: 7  PT and/or OT Eval ordered: Yes  Post-acute Referrals Ordered: Yes  Post-acute Choice Obtained: Yes  Has referral(s) been sent to post-acute provider:  Yes      Anticipated Discharge Dispo: Discharge Disposition: D/T to SNF with Medicare cert in anticipation of skilled care (03)    DME Needed: No    Action(s) Taken: Chart review completed. Per MD Lucia's note 11/30 pt is medically clear. GANESH Sibley verified that [facility] is able to accept pt today as acute rehab has declined pt. Voalte sent to MD Robles to verify medical clearance for SNF.    Addendum @0855  MD Robles confirmed medical clearance. LSW requested SNF covid test and notified RN Cecy as well. Transportation forms faxed to ride line to request 1300 [facility]. LSW met with pt at bedside to provide update. All questions answered and pt requested this LSW notify her sister. LSW made phone call to [facility] to provide update. All questions answered.    Addendum @1016  Voalte sent to Sim with [facility] to see if they will reconsider    Escalations Completed: None    Medically Clear: Yes    Next Steps: Care coordination will f/u with ride line to verify transportation time    Barriers to Discharge: None    Is the patient up for discharge tomorrow: No-anticipated to DC to [facility] today via [facility] at 1300

## 2022-12-01 NOTE — PROGRESS NOTES
Pt sister in law devan called RN screaming. She was upset tht she was not in the chart as someone who could get information on the patient. She also said someone had called her and left a voicemail about discharge and she wanted to talk to them. She was unsure who it was. RN asked Devan to lower her voice and speak in a professional manner. RN spoke with pt who confirmed it is ok for Devan to get information about her stay. Devan updated on discharge plan and her contact info was added to the chart. Devan transferred to the case management office.

## 2022-12-01 NOTE — CARE PLAN
Problem: Urinary Elimination  Goal: Establish and maintain regular urinary output  Outcome: Not Met     Problem: Knowledge Deficit - Standard  Goal: Patient and family/care givers will demonstrate understanding of plan of care, disease process/condition, diagnostic tests and medications  Outcome: Progressing     Problem: Skin Integrity  Goal: Skin integrity is maintained or improved  Outcome: Progressing     Problem: Fall Risk  Goal: Patient will remain free from falls  Outcome: Progressing     Problem: Pain - Standard  Goal: Alleviation of pain or a reduction in pain to the patient’s comfort goal  Outcome: Progressing     Problem: Neuro Status  Goal: Neuro status will remain stable or improve  Outcome: Progressing     Problem: Self Care  Goal: Patient will have the ability to perform ADLs independently or with assistance (bathe, groom, dress, toilet and feed)  Outcome: Progressing     Problem: Respiratory  Goal: Patient will achieve/maintain optimum respiratory ventilation and gas exchange  Outcome: Progressing   The patient is Watcher - Medium risk of patient condition declining or worsening    Shift Goals  Clinical Goals: ADL. safety, sleep  Patient Goals: sleep  Family Goals: sergey    Progress made toward(s) clinical / shift goals:     Patient is not progressing towards the following goals:      Problem: Urinary Elimination  Goal: Establish and maintain regular urinary output  Outcome: Not Met

## 2022-12-01 NOTE — CARE PLAN
Problem: Knowledge Deficit - Standard  Goal: Patient and family/care givers will demonstrate understanding of plan of care, disease process/condition, diagnostic tests and medications  Outcome: Progressing     Problem: Skin Integrity  Goal: Skin integrity is maintained or improved  Outcome: Progressing     Problem: Fall Risk  Goal: Patient will remain free from falls  Outcome: Progressing   The patient is Stable - Low risk of patient condition declining or worsening    Shift Goals  Clinical Goals: ADL. safety, sleep  Patient Goals: sleep  Family Goals: sergey    Progress made toward(s) clinical / shift goals:  yes    Patient is not progressing towards the following goals:

## 2022-12-01 NOTE — PROGRESS NOTES
Cardiology Follow Up Progress Note    Date of Service  12/1/2022    Attending Physician  CELINA Roe*    Chief Complaint   Elective admission for CABG 11/10/22    HPI  Lauren Dave is a 72 y.o. female with CAD, hypertension, dyslipidemia, breast cancer, HFrEF , gout admitted 11/10/2022 for CABG.  Patient underwent successful CABG x3 (LIMA to LAD, SVG to OM 2, SVG to RCA) with Dr. Mac complicated by CVA and PAF.  Notable 8 seconds of complete heart block 11/29/2022 status post temporary pacemaker placement.  Eventually underwent successful [employer] CRT-D implantation by Dr. Ambrose on 11/29/2022.    Interim Events  No overnight cardiac events  Telemetry-paced rhythm  BP appropriate  Appears euvolemic  [facility]     Review of Systems  Review of Systems   Respiratory:  Negative for apnea, cough, choking, chest tightness, shortness of breath, wheezing and stridor.      Vital signs in last 24 hours  Temp:  [36.4 °C (97.5 °F)-36.8 °C (98.2 °F)] 36.8 °C (98.2 °F)  Pulse:  [78-89] 83  Resp:  [17-18] 18  BP: (112-125)/(54-60) 118/56  SpO2:  [90 %-93 %] 92 %    Physical Exam  Physical Exam  Cardiovascular:      Comments: Paced rhythm   Pulmonary:      Effort: Pulmonary effort is normal.   Skin:     General: Skin is warm.   Neurological:      Mental Status: Mental status is at baseline.   Psychiatric:         Mood and Affect: Mood normal.       Lab Review  Lab Results   Component Value Date/Time    WBC 11.4 (H) 12/01/2022 12:30 AM    RBC 3.22 (L) 12/01/2022 12:30 AM    HEMOGLOBIN 9.5 (L) 12/01/2022 12:30 AM    HEMATOCRIT 30.5 (L) 12/01/2022 12:30 AM    MCV 94.7 12/01/2022 12:30 AM    MCH 29.5 12/01/2022 12:30 AM    MCHC 31.1 (L) 12/01/2022 12:30 AM    MPV 9.4 12/01/2022 12:30 AM      Lab Results   Component Value Date/Time    SODIUM 138 12/01/2022 12:30 AM    POTASSIUM 4.7 12/01/2022 12:30 AM    CHLORIDE 103 12/01/2022 12:30 AM    CO2 23 12/01/2022 12:30 AM    GLUCOSE 101 (H)  12/01/2022 12:30 AM    BUN 37 (H) 12/01/2022 12:30 AM    CREATININE 1.54 (H) 12/01/2022 12:30 AM      Lab Results   Component Value Date/Time    ASTSGOT 35 11/07/2022 10:53 AM    ALTSGPT 15 11/07/2022 10:53 AM     Lab Results   Component Value Date/Time    CHOLSTRLTOT 161 11/14/2022 02:26 AM    LDL 76 11/14/2022 02:26 AM    HDL 54 11/14/2022 02:26 AM    TRIGLYCERIDE 154 (H) 11/14/2022 02:26 AM       No results for input(s): NTPROBNP in the last 72 hours.    Cardiac Imaging and Procedures Review  EKG:  My personal interpretation of the EKG dated  11/30/22 is A-S V-P.    Echocardiogram:   11/17/22  LVEF 30%     Cardiac Catheterization:    10/19/22  MVCAD       EP procedure note  11/29/2022  Successful CRT-D implantation, [employer]    Imaging  Chest X-Ray:       Stress Test: n/a     Assessment/Plan    #Intermittent complete heart block ,8 sec pause. S/p ICD 11/29/22.  # MVCAD s/p CABG 11/10/22 (LIMA to LAD, VG to OM2, VG to RCA.).  # Postop complicated with Acute ischemic CVA , MRI 11/11/22.  # Postop PAF, on Amio.  # High risk medication.  #Ischemic cardiomyopathy LVEF 30%.  #HFrEF, NYHA class II, stage C.  #TAN, improving  # Allergy to statins. LDL 76.      Recommendations  -Status post biventricular ICD, 11/29/2022.  -Continue Amiodarone 200 mg daily.  -Continue Eliquis 5 mg BID  -Continue ASA and Coreg 6.25 mg BID, allergy to statins.  -Holding ACE/ARB & MRA secondary to low GFR.    [facility].  Cardiology will follow along.    Follow-up appointment in cardiology office 12/9/22 at 2:30 pm.  Follow-up appointment in device clinic for device check & wound interrogation 12/6/2022 at 10 AM.    I personally spent a total of 12 minutes which includes face-to-face time and non-face-to-face time spent on preparing to see the patient, reviewing hospital notes and tests, obtaining history from the patient, performing a medically appropriate exam, counseling and educating the patient, ordering  medications/tests/procedures/referrals as clinically indicated, and documenting information in the electronic medical record.     Thank you for allowing me to participate in the care of this patient.      Please contact me with any questions.    MAXINE Adams.   Cardiologist, [facility]  [phone]

## 2022-12-02 LAB
ANION GAP SERPL CALC-SCNC: 11 MMOL/L (ref 7–16)
BUN SERPL-MCNC: 39 MG/DL (ref 8–22)
CALCIUM SERPL-MCNC: 9.6 MG/DL (ref 8.5–10.5)
CHLORIDE SERPL-SCNC: 101 MMOL/L (ref 96–112)
CO2 SERPL-SCNC: 22 MMOL/L (ref 20–33)
CREAT SERPL-MCNC: 1.58 MG/DL (ref 0.5–1.4)
ERYTHROCYTE [DISTWIDTH] IN BLOOD BY AUTOMATED COUNT: 49 FL (ref 35.9–50)
GFR SERPLBLD CREATININE-BSD FMLA CKD-EPI: 34 ML/MIN/1.73 M 2
GLUCOSE SERPL-MCNC: 112 MG/DL (ref 65–99)
HCT VFR BLD AUTO: 30.1 % (ref 37–47)
HGB BLD-MCNC: 9.5 G/DL (ref 12–16)
MAGNESIUM SERPL-MCNC: 1.9 MG/DL (ref 1.5–2.5)
MCH RBC QN AUTO: 30.4 PG (ref 27–33)
MCHC RBC AUTO-ENTMCNC: 31.6 G/DL (ref 33.6–35)
MCV RBC AUTO: 96.2 FL (ref 81.4–97.8)
PLATELET # BLD AUTO: 480 K/UL (ref 164–446)
PMV BLD AUTO: 9.7 FL (ref 9–12.9)
POTASSIUM SERPL-SCNC: 4.6 MMOL/L (ref 3.6–5.5)
RBC # BLD AUTO: 3.13 M/UL (ref 4.2–5.4)
SODIUM SERPL-SCNC: 134 MMOL/L (ref 135–145)
WBC # BLD AUTO: 11.3 K/UL (ref 4.8–10.8)

## 2022-12-02 PROCEDURE — 700102 HCHG RX REV CODE 250 W/ 637 OVERRIDE(OP): Performed by: STUDENT IN AN ORGANIZED HEALTH CARE EDUCATION/TRAINING PROGRAM

## 2022-12-02 PROCEDURE — 99232 SBSQ HOSP IP/OBS MODERATE 35: CPT | Mod: FS | Performed by: INTERNAL MEDICINE

## 2022-12-02 PROCEDURE — A9270 NON-COVERED ITEM OR SERVICE: HCPCS | Performed by: STUDENT IN AN ORGANIZED HEALTH CARE EDUCATION/TRAINING PROGRAM

## 2022-12-02 PROCEDURE — 700102 HCHG RX REV CODE 250 W/ 637 OVERRIDE(OP)

## 2022-12-02 PROCEDURE — A9270 NON-COVERED ITEM OR SERVICE: HCPCS

## 2022-12-02 PROCEDURE — A9270 NON-COVERED ITEM OR SERVICE: HCPCS | Performed by: INTERNAL MEDICINE

## 2022-12-02 PROCEDURE — 700102 HCHG RX REV CODE 250 W/ 637 OVERRIDE(OP): Performed by: INTERNAL MEDICINE

## 2022-12-02 PROCEDURE — 770020 HCHG ROOM/CARE - TELE (206)

## 2022-12-02 PROCEDURE — 99231 SBSQ HOSP IP/OBS SF/LOW 25: CPT | Performed by: STUDENT IN AN ORGANIZED HEALTH CARE EDUCATION/TRAINING PROGRAM

## 2022-12-02 PROCEDURE — 85027 COMPLETE CBC AUTOMATED: CPT

## 2022-12-02 PROCEDURE — 36415 COLL VENOUS BLD VENIPUNCTURE: CPT

## 2022-12-02 PROCEDURE — 80048 BASIC METABOLIC PNL TOTAL CA: CPT

## 2022-12-02 PROCEDURE — 92526 ORAL FUNCTION THERAPY: CPT

## 2022-12-02 PROCEDURE — 92523 SPEECH SOUND LANG COMPREHEN: CPT

## 2022-12-02 PROCEDURE — 83735 ASSAY OF MAGNESIUM: CPT

## 2022-12-02 RX ORDER — CHOLECALCIFEROL (VITAMIN D3) 125 MCG
5 CAPSULE ORAL NIGHTLY
Status: DISCONTINUED | OUTPATIENT
Start: 2022-12-02 | End: 2022-12-05 | Stop reason: HOSPADM

## 2022-12-02 RX ORDER — CARVEDILOL 12.5 MG/1
12.5 TABLET ORAL 2 TIMES DAILY WITH MEALS
Status: DISCONTINUED | OUTPATIENT
Start: 2022-12-03 | End: 2022-12-05 | Stop reason: HOSPADM

## 2022-12-02 RX ADMIN — ASPIRIN 81 MG 81 MG: 81 TABLET ORAL at 05:11

## 2022-12-02 RX ADMIN — HYDROXYCHLOROQUINE SULFATE 200 MG: 200 TABLET ORAL at 08:18

## 2022-12-02 RX ADMIN — APIXABAN 5 MG: 5 TABLET, FILM COATED ORAL at 05:11

## 2022-12-02 RX ADMIN — AMIODARONE HYDROCHLORIDE 200 MG: 200 TABLET ORAL at 05:11

## 2022-12-02 RX ADMIN — ACETAMINOPHEN 1000 MG: 500 TABLET ORAL at 05:11

## 2022-12-02 RX ADMIN — Medication 5 MG: at 20:39

## 2022-12-02 RX ADMIN — OMEPRAZOLE 40 MG: 20 CAPSULE, DELAYED RELEASE ORAL at 05:11

## 2022-12-02 RX ADMIN — MIRTAZAPINE 15 MG: 15 TABLET, FILM COATED ORAL at 20:03

## 2022-12-02 RX ADMIN — APIXABAN 5 MG: 5 TABLET, FILM COATED ORAL at 17:14

## 2022-12-02 RX ADMIN — CARVEDILOL 6.25 MG: 6.25 TABLET, FILM COATED ORAL at 08:18

## 2022-12-02 RX ADMIN — CARVEDILOL 6.25 MG: 6.25 TABLET, FILM COATED ORAL at 17:14

## 2022-12-02 ASSESSMENT — ENCOUNTER SYMPTOMS
SPEECH CHANGE: 0
COUGH: 0
WEIGHT LOSS: 0
VOMITING: 0
CHEST TIGHTNESS: 0
MYALGIAS: 0
FOCAL WEAKNESS: 0
BACK PAIN: 0
DIZZINESS: 0
SPUTUM PRODUCTION: 0
ABDOMINAL PAIN: 0
CHILLS: 0
PALPITATIONS: 0
TREMORS: 0
PHOTOPHOBIA: 0
SHORTNESS OF BREATH: 0
HEADACHES: 0
FEVER: 0
WHEEZING: 0
CONSTIPATION: 0
APNEA: 0
CHOKING: 0
STRIDOR: 0
NECK PAIN: 0
BLURRED VISION: 0
NAUSEA: 0
DIARRHEA: 0
DOUBLE VISION: 0
ORTHOPNEA: 0
TINGLING: 0
HALLUCINATIONS: 0
EYE PAIN: 0
NERVOUS/ANXIOUS: 1
SENSORY CHANGE: 0

## 2022-12-02 ASSESSMENT — COGNITIVE AND FUNCTIONAL STATUS - GENERAL
TOILETING: A LOT
DRESSING REGULAR LOWER BODY CLOTHING: A LOT
TURNING FROM BACK TO SIDE WHILE IN FLAT BAD: A LITTLE
SUGGESTED CMS G CODE MODIFIER DAILY ACTIVITY: CL
WALKING IN HOSPITAL ROOM: TOTAL
SUGGESTED CMS G CODE MODIFIER MOBILITY: CL
CLIMB 3 TO 5 STEPS WITH RAILING: TOTAL
DAILY ACTIVITIY SCORE: 12
EATING MEALS: A LOT
MOVING FROM LYING ON BACK TO SITTING ON SIDE OF FLAT BED: A LOT
HELP NEEDED FOR BATHING: A LOT
STANDING UP FROM CHAIR USING ARMS: A LOT
MOBILITY SCORE: 11
DRESSING REGULAR UPPER BODY CLOTHING: A LOT
MOVING TO AND FROM BED TO CHAIR: A LOT
PERSONAL GROOMING: A LOT

## 2022-12-02 ASSESSMENT — FIBROSIS 4 INDEX: FIB4 SCORE: 1.35554417117259591

## 2022-12-02 ASSESSMENT — PAIN DESCRIPTION - PAIN TYPE
TYPE: ACUTE PAIN
TYPE: ACUTE PAIN

## 2022-12-02 ASSESSMENT — LIFESTYLE VARIABLES: SUBSTANCE_ABUSE: 0

## 2022-12-02 NOTE — PROGRESS NOTES
Monitor Summary:   Rhythm: Paced  Rate: 82-85  Measurement: .NA/.NA/.NA  Ectopy: pvc's

## 2022-12-02 NOTE — DISCHARGE PLANNING
Agency/Facility Name: [facility]  Spoke To: Rey  Outcome: Facility can accept Pt today with bed available. DPA to follow up once transport time has been confirmed    1204  Agency/Facility Name: [facility]  Spoke To: Rey  Outcome: DPA confirmed Pt declined SNF. No further follow up needs

## 2022-12-02 NOTE — CARE PLAN
Problem: Knowledge Deficit - Standard  Goal: Patient and family/care givers will demonstrate understanding of plan of care, disease process/condition, diagnostic tests and medications  Outcome: Progressing     Problem: Skin Integrity  Goal: Skin integrity is maintained or improved  Outcome: Progressing     Problem: Fall Risk  Goal: Patient will remain free from falls  Outcome: Progressing     Problem: Pain - Standard  Goal: Alleviation of pain or a reduction in pain to the patient’s comfort goal  Outcome: Progressing     Problem: Neuro Status  Goal: Neuro status will remain stable or improve  Outcome: Progressing   The patient is Stable - Low risk of patient condition declining or worsening    Shift Goals  Clinical Goals: ADL's, safety  Patient Goals: sleep  Family Goals: sergey    Progress made toward(s) clinical / shift goals:  Progressing    Patient is not progressing towards the following goals:

## 2022-12-02 NOTE — CARE PLAN
The patient is Stable - Low risk of patient condition declining or worsening    Shift Goals  Clinical Goals: ADLs, safety  Patient Goals: Rest  Family Goals: SUNDAR    Progress made toward(s) clinical / shift goals:      Problem: Knowledge Deficit - Standard  Goal: Patient and family/care givers will demonstrate understanding of plan of care, disease process/condition, diagnostic tests and medications  Description: Target End Date:  1-3 days or as soon as patient condition allows    Document in Patient Education    1.  Patient and family/caregiver oriented to unit, equipment, visitation policy and means for communicating concern  2.  Complete/review Learning Assessment  3.  Assess knowledge level of disease process/condition, treatment plan, diagnostic tests and medications  4.  Explain disease process/condition, treatment plan, diagnostic tests and medications  Outcome: Progressing     Problem: Skin Integrity  Goal: Skin integrity is maintained or improved  Description: Target End Date:  Prior to discharge or change in level of care    Document interventions on Skin Risk/Pepito flowsheet groups and corresponding LDA    1.  Assess and monitor skin integrity, appearance and/or temperature  2.  Assess risk factors for impaired skin integrity and/or pressures ulcers  3.  Implement precautions to protect skin integrity in collaboration with interdisciplinary team  4.  Implement pressure ulcer prevention protocol if at risk for skin breakdown  5.  Confirm wound care consult if at risk for skin breakdown  6.  Ensure patient use of pressure relieving devices  (Low air loss bed, waffle overlay, heel protectors, ROHO cushion, etc)  Outcome: Progressing     Problem: Fall Risk  Goal: Patient will remain free from falls  Description: Target End Date:  Prior to discharge or change in level of care    Document interventions on the Haleigh Hooker Fall Risk Assessment    1.  Assess for fall risk factors  2.  Implement fall  precautions  Outcome: Progressing     Problem: Pain - Standard  Goal: Alleviation of pain or a reduction in pain to the patient’s comfort goal  Description: Target End Date:  Prior to discharge or change in level of care    Document on Vitals flowsheet    1.  Document pain using the appropriate pain scale per order or unit policy  2.  Educate and implement non-pharmacologic comfort measures (i.e. relaxation, distraction, massage, cold/heat therapy, etc.)  3.  Pain management medications as ordered  4.  Reassess pain after pain med administration per policy  5.  If opiods administered assess patient's response to pain medication is appropriate per POSS sedation scale  6.  Follow pain management plan developed in collaboration with patient and interdisciplinary team (including palliative care or pain specialists if applicable)  Outcome: Progressing     Problem: Day of surgery post CABG/Heart valve replacement  Goal: Stabilization in immediate post op period  Outcome: Progressing     Problem: Post Op Day 1 CABG/Heart Valve Replacement  Goal: Optimal care of the post op CABG/heart valve replacement Post Op Day 1  Outcome: Progressing     Problem: Post op day 2 CABG/Heart Valve Replacement  Goal: Optimal care of the post op CABG/heart valve replacement post op day 2  Outcome: Progressing     Problem: Post Op Day 3 CABG/Heart Valve replacement  Goal: Optimal care of the post op CABG/Heart Valve replacement post op day 3  Outcome: Progressing     Problem: Post Op Day 4 CABG/Heart Valve Replacement  Goal: Optimal care of the Post Op CABG/Heart Valve replacement Post Op Day 4  Outcome: Progressing     Problem: Post Op Day 5 CABG/Heart Valve Replacement  Goal: Optimal care of the Post Op CABG/Heart Valve replacement Post Op Day 5  Description: Target End Date:  end of post op day 5  Outcome: Progressing     Problem: Communication  Goal: The ability to communicate needs accurately and effectively will improve  Description:  Target End Date:  End of day 1    1.  Assess ability to communicate and understand  2.  Provide augmentative or alternative methods of communication devices  3.  Use /language line as appropriate  4.  Collaborate with Speech Therapy as needed  Outcome: Progressing     Problem: Hemodynamics  Goal: Patient's hemodynamics, fluid balance and neurologic status will be stable or improve  Description: Target End Date:  Prior to discharge or change in level of care    Document on Assessment and I/O flowsheet templates    1.  Monitor vital signs, pulse oximetry and cardiac monitor per provider order and/or policy  2.  Maintain blood pressure per provider order  3.  Hemodynamic monitoring per provider order  4.  Manage IV fluids and IV infusions  5.  Monitor intake and output  6.  Daily weights per unit policy or provider order  7.  Assess peripheral pulses and capillary refill  8.  Assess color and body temperature  9.  Position patient for maximum circulation/cardiac output  10. Monitor for signs/symptoms of excessive bleeding  11. Assess mental status, restlessness and changes in level of consciousness  12. Monitor temperature and report fever or hypothermia to provider immediately. Consideration of targeted temperature management.  Outcome: Progressing     Problem: Safety - Medical Restraint  Goal: Remains free of injury from restraints (Restraint for Interference with Medical Device)  Description: INTERVENTIONS:  1. Determine that other, less restrictive measures have been tried or would not be effective before applying the restraint  2. Evaluate the patient's condition at the time of restraint application  3. Educate patient/family regarding the reason for restraint  4. Q2H: Monitor safety, psychosocial status, comfort, circulation, respiratory status, LOC, nutrition and hydration  Outcome: Progressing  Goal: Free from restraint(s) (Restraint for Interference with Medical Device)  Description:  INTERVENTIONS:  1.  ONCE/SHIFT or MINIMUM Q12H: Assess and document the continuing need for restraints  2.  Q24H: Continued use of restraint requires LIP to perform face to face examination and written order  3.  Identify and implement measures to help patient regain control  4.  Educate patient/family on discontinuation criteria   5.  Assess patient's understanding and retention of education provided  6.  Assess readiness for release & initiate progressive release per protocol  7.  Identify and document criteria for restraints  Outcome: Progressing     Problem: Optimal Care of the Stroke Patient  Goal: Optimal emergency care for the stroke patient  Description: Target End Date:  End of day 1    Time of Onset    1.  Time of last known well obtained  2.  Patient and family/caregiver verbalize understanding of diagnosis, medications and testing  3.  NIHSS performed and documented, including date and time, for ischemic stroke patients prior to any acute recanalization therapy (thrombolytics or mechanical) or within 12 hours of arrival if no intervention is warranted  4.  Consults and referrals placed to appropriate departments    Medications Administration as Ordered:    1.  Implement appropriate reversal agents for INR greater than 1.5  2.  Pre-alteplase administration of antihypertensives for SBP >185 DBP >110  3.  Post-alteplase administration of antihypertensives for SBP >185, DBP >105  4.  Thrombolytic Therapy for qualifying ischemic stroke patients who arrive within 4.5 hours of time of Last Known Well. Thrombolytic therapy administered within 30 minutes or a documented reason for delay  Outcome: Progressing  Goal: Optimal acute care for the stroke patient  Description: Target End Date:  1 to 3 days    - Vital signs and neuro checks performed and documented per order  - NIHSS completed and documented per order  - Continuous telemetry monitoring for 72 hours or until discontinued by provider  - Head CT without  contrast obtained  - Consideration of MRI/MRA  - MRI screening form completed in worklist if MRI ordered  - Echocardiogram with Bubble Study ordered/completed with consideration of DAVE  - Carotid Doppler ordered/completed (Not required if CTA of neck completed in ED)  - Lipid Panel obtained within 48 hours of admission  - PT, PTT, INR obtained per Anticoagulation orders (if applicable)  - Antithrombotic therapy by end of hospital day 2 for ischemic stroke. Provider must document reason if contraindicated.  - Venous Thromboembolism (VTE) Prophylaxis by end of hospital day 2 for ischemic and hemorrhagic stroke. Provider must document reason if contraindicated  - Dysphagia screen completed and documented prior to any PO intake. Patient to remain NPO until Speech Therapy evaluation if thrombolytic or thrombectomy performed  - Rehabilitation assessment including PT/OT/SLP evaluations for referral to Physical Medicine and Rehabilitation services. If none needed, provider needs to document reason  - Neurology consult placed  - Consideration of cardiology consult for cryptogenic strokes  Outcome: Progressing     Problem: Knowledge Deficit - Stroke Education  Goal: Patient's knowledge of stroke and risk factors will improve  Description: Target End Date:  1-3 days or as soon as patient condition allows    Document in Patient Education    1.  Stroke education booklet provided  2.  Education regarding EMS activation, need for follow up, medication prescribed at discharge, risk factors for stroke/lifestyle modifications, warning signs and symptoms of stroke provided  Outcome: Progressing     Problem: Psychosocial - Patient Condition  Goal: Patient's ability to verbalize feelings about condition will improve  Description: Target End Date:  Prior to discharge or change in level of care    1.  Discuss coping with medical condition and its effects  2.  Encourage patient participation in care  3.  Encourage acknowledgement of body  changes and accompanying emotions  4.  Perform depression screening  Outcome: Progressing  Goal: Patient's ability to re-evaluate and adapt role responsibilities will improve  Description: Target End Date:  Prior to discharge or change in level of care    1.  Assess family support  2.  Encourage support system participation in care  3.  Encouraged verbalization of feelings regarding caregiver responsibilities  4.  Discuss changes in role and responsibilities caused by patient's condition  Outcome: Progressing     Problem: Discharge Planning - Stroke  Goal: Ensure Stroke Core Measures are met prior to discharge  Description: Target End Date:  Prior to discharge or change in level of care    1. Patient discharged on antithrombotic therapy (Ischemic Stroke)  2. Patient discharged on intensive statin if LDL is greater than or equal to 70 mg/dl (Ischemic Stroke)  3. Patient discharged on anticoagulation therapy for patients with atrial fibrillation/flutter (Ischemic Stroke)  4. Smoking education/cessation provided if applicable  Outcome: Progressing  Goal: Patient’s continuum of care needs will be met  Description: Target End Date:  Prior to discharge or change in level of care    1.  Potential discharge barriers identified upon admission and throughout hospital stay  2.  Ensure appropriate referrals in place for follow up with specialists after discharge  3.  Ensure appropriate referrals in place for DMEs if applicable  4.  Collaboration with transitional care team and interdisciplinary team to meet discharge needs  5.  Involve patient and family/caregiver in prioritizing goals for discharge planning  6.  Educate patient and caregiver about discharge instructions, medications, and follow up appointments  7.  Referral placed to [facility]  8.  Assure orders and follow up appointments are made for outpatient extended cardiac monitoring if appropriate  Outcome: Progressing     Problem: Neuro Status  Goal: Neuro  status will remain stable or improve  Description: Target End Date:  Prior to discharge or change in level of care    Document on Neuro assessment in the Assessment flowsheet    1.  Assess and monitor neurologic status per provider order/protocol/unit policy  2.  Assess level of consciousness and orientation  3.  Assess for speech, dysarthria, dysphagia, facial symmetry  4.  Assess visual field, eye movements, gaze preference, pupil reaction and size  5.  Assess muscle strength and motor response in all four extremities  6.  Assess for sensation (numbness and tingling)  7.  Assess basic neuro reflexes (cough, gag, corneal)  8.  Identify changes in neuro status and report to provider for testing/treatment orders  Outcome: Progressing     Problem: Hemodynamic Monitoring  Goal: Patient's hemodynamics, fluid balance and neurologic status will be stable or improve  Description: Target End Date:  Prior to discharge or change in level of care    1.  Vital signs, pulse oximetry and cardiac monitor per provider order and/or policy  2.  Frequent pulse checks performed post thrombectomy  3.  Frequent monitoring for signs of bleeding post TPA administration  4.  Proper management of IV infusions  5.  Intake and output monitored per provider order  6.  Daily weight obtained per unit policy or provider order  7.  Peripheral pulses and capillary refill assessed as needed  8.  Monitor for signs/symptoms of excessive bleeding  9.  Body temperature assessed and fevers treated  10. Patient positioned for maximum circulation/cardiac output  Outcome: Progressing     Problem: Respiratory - Stroke Patient  Goal: Patient will achieve/maintain optimum respiratory rate/effort  Description: Target End Date:  Prior to discharge or change in level of care    Document on Assessment flowsheet    1.  Assess and monitor respiratory rate, rhythm, depth and effort of respiration  2.  Oxygenation assessed throughout shift (recommendation of >94% for  new stroke patients)  3.  Oxygen administered and/or titrated per order  4.  Collaboration with RT to administer medication/treatments per order  5.  Patient educated on importance of turning, coughing, and deep breathing  6.  Patient positioned for maximum ventilatory efficiency  7.  Airway suctioning provided as needed  8.  Incentive spirometry encouraged 5-10 times every hour or while awake  Outcome: Progressing     Problem: Dysphagia  Goal: Dysphagia will improve  Description: Target End Date:  Prior to discharge or change in level of care    1.  Assess and monitor ability to swallow  2.  Collaborate with Speech Therapy to determine appropriate adaptation for safe administration of medications and oral nutrition  3.  Elevate head of bed to 90 degrees during feedings and for 30 minutes after each feeding  4.  Encourage proper swallowing techniques  5.  Screening on admission or as soon as possible  Outcome: Progressing     Problem: Risk for Aspiration  Goal: Patient's risk for aspiration will be absent or decrease  Description: Target End Date:  Prior to discharge or change in level of care    1.   Complete dysphagia screening on admission  2.   NPO until dysphagia screening complete or medically cleared  3.   Collaborate with Speech Therapy, Clinical Dietitian and interdisciplinary team  4.   Implement aspiration precautions  5.   Assist patient up to chair for meals  6.   Elevate head of bed 90 degrees if patient is unable to get out of bed  7.   Encourage small bites  8.   Ensure foods/liquids are of appropriate consistency  9.   Assess for any signs/symptoms of aspiration  10. Assess breath sounds and vital signs after oral intake  Outcome: Progressing     Problem: Urinary Elimination  Goal: Establish and maintain regular urinary output  Description: Target End Date:  Prior to discharge or change in level of care    Document on I/O and Assessment flowsheets    1.  Evaluate need to continue indwelling catheter  every shift  2.  Assess signs and symptoms of urinary retention  3.  Assess post-void residual volumes  4.  Implement bladder training program  5.  Encourage scheduled voidings  6.  Assist patient to sit on bedside commode or toilet for voiding  7.  Educate patient and family/caregiver on use and purpose of urine collection devices (document in Patient Education)  Outcome: Progressing     Problem: Bowel Elimination  Goal: Establish and maintain regular bowel function  Description: Target End Date:  Prior to discharge or change in level of care    1.   Note date of last BM  2.   Educate about diet, fluid intake, medication and activity to promote bowel function  3.   Educate signs and symptoms of constipation and interventions to implement  4.   Pharmacologic bowel management per provider order  5.   Regular toileting schedule  6.   Upright position for toileting  7.   High fiber diet  8.   Encourage hydration  9.   Collaborate with Clinical Dietician  10. Care and maintenance of ostomy if applicable  Outcome: Progressing     Problem: Mobility - Stroke  Goal: Patient's capacity to carry out activities will improve  Description: Target End Date:  Prior to discharge or change in level of care    1.  Assess for barriers to mobility/activity  2.  Implement activity per interdisciplinary team recommendations  3.  Target activity level identified and patient/family/caregiver aware of goal  4.  Provide assistive devices  5.  Instruct patient/caregiver on proper use of assistive/adaptive devices  6.  Schedule activities and rest periods to decrease effects of fatigue  7.  Encourage mobilization to extent of ability  8.  Maintain proper body alignment  9.  Provide adequate pain management to allow progressive mobilization  10. Implement pace maker precautions as needed  Outcome: Progressing  Goal: Spasticity will be prevented or improved  Description: Target End Date:  Prior to discharge or change in level of care    1.   Muscle relaxing agents considered or administered per order  2.  Splints applied properly and used accordingly to therapist's recommendations  3.  Assistance with stretching and range of motion exercises provided  Outcome: Progressing  Goal: Subluxation will be prevented or improved  Description: Target End Date:  Prior to discharge or change in level of care    1.   Ensure proper handling during transfers, ambulation, and repositioning in bed  2.   Reduce traction to affected limb and provide adequate support of weight  3.   Perform passive range of motion  4.  Assist with active range of motion  Outcome: Progressing     Problem: Self Care  Goal: Patient will have the ability to perform ADLs independently or with assistance (bathe, groom, dress, toilet and feed)  Description: Target End Date:  Prior to discharge or change in level of care    Document on ADL flowsheet    1.  Assess the capability and level of deficiency to perform ADLs  2.  Encourage family/care giver involvement  3.  Provide assistive devices  4.  Consider PT/OT evaluations  5.  Maintain support, give positive feedback, encourage self-care allowing extra time and verbal cuing as needed  6.  Avoid doing something for patients they can do themselves, but provide assistance as needed  7.  Assist in anticipating/planning individual needs  8.  Collaborate with Case Management and  to meet discharge needs  Outcome: Progressing     Problem: Respiratory  Goal: Patient will achieve/maintain optimum respiratory ventilation and gas exchange  Description: Target End Date:  Prior to discharge or change in level of care    Document on Assessment flowsheet    1.  Assess and monitor rate, rhythm, depth and effort of respiration  2.  Breath sounds assessed qshift and/or as needed  3.  Assess O2 saturation, administer/titrate oxygen as ordered  4.  Position patient for maximum ventilatory efficiency  5.  Turn, cough, and deep breath with splinting to  improve effectiveness  6.  Collaborate with RT to administer medication/treatments per order  7.  Encourage use of incentive spirometer and encourage patient to cough after use and utilize splinting techniques if applicable  8.  Airway suctioning  9.  Monitor sputum production for changes in color, consistency and frequency  10. Perform frequent oral hygiene  11. Alternate physical activity with rest periods  Outcome: Progressing

## 2022-12-02 NOTE — THERAPY
Speech Language Pathology  Daily Treatment     Patient Name: Lauren Dave  Age:  72 y.o., Sex:  female  Medical Record #: [id number]  Today's Date: 12/2/2022     Precautions  Precautions: Fall Risk, Swallow Precautions ( See Comments), Cardiac Precautions (See Comments)  Comments: s/p CABG, AICD, CVA    Assessment    Patient seen for dysphagia tx with lunch tray of regular solids and thin liquids s/p MBSS. Pt w/ appropriate oral phase. Adequate self-feeding, appropriate oral bolus stripping and containment, appropriate mastication. Mild oral residue noted w/ regular solids; functional use of thin liquid wash to clear noted. No overt s/sx of aspiration noted w/ single and sequential straw sips. No increase in WOB, no cough/clear, no change in vocal quality. No s/sx concerning for impaired efficiency. Given pt is tolerating regular diet. SLP will no longer actively follow pt for dysphagia management.     Recommendations  Regular/Thin Liquid diet   2.  Swallowing Instructions & Precautions:   Supervision: Assist with meal tray set up, Independent  Positioning: Fully upright and midline during oral intake  Medication: Whole with liquid  Strategies: Small bites/sips, Slow rate of intake  Oral Care: BID    Plan    Continue current treatment plan.    Discharge Recommendations: Anticipate that the patient will have no further speech therapy needs after discharge from the hospital       Objective       12/02/22 1212   Dysphagia    Other Treatments tx with PO snack of RG/TN   Diet / Liquid Recommendation Thin (0);Regular (7)   Nutritional Liquid Intake Rating Scale Non thickened beverages   Nutritional Food Intake Rating Scale Total oral diet with no restrictions   Nursing Communication Swallow Precaution Sign Posted at Head of Bed   Skilled Intervention Compensatory Strategies;Verbal Cueing   Recommended Route of Medication Administration   Medication Administration  Whole with Liquid Wash   Patient / Family Goals  "  Patient / Family Goal #1 \"that was really good\"   Goal #1 Outcome Goal met   Short Term Goals   Short Term Goal # 4 Pt will consume a reg/thin liquid diet with no s/sx of aspiration and min cues.   Goal Outcome  # 4 Goal met     "

## 2022-12-02 NOTE — PROGRESS NOTES
PM&[facility] Medical Director chart review completed.     Patient is a 73-year-old female with past medical history significant for coronary artery disease, HTN, history of breast cancer, dyslipidemia, chronic systolic heart failure, hypertension and chronic steroid seronegative RA, gout was admitted for elective CABG on 11/10/2022 which was complicated by multiple areas of cerebral infarction.  Echocardiogram showed ejection fraction of 35%.  As per neurology patient was started on Eliquis and was intolerant to statin.  Patient has been slowly improving with therapy.    Recommendation:  Rehab: Although there is slow improvement at this time I would recommend discharge to skilled nursing facility, do not expect significant functional improvement during limited acute rehabilitation hospitalization of approximately 14-23 days.  We are also unable to confirm discharge support.    Please let me know if there is any other questions or concerns.      Carson Muniz D.O.

## 2022-12-02 NOTE — PROGRESS NOTES
Cardiology Follow Up Progress Note    Date of Service  12/2/2022    Attending Physician  CELINA Roe*    Chief Complaint   Elective admission for CABG 11/10/22    HPI  Lauren Dave is a 72 y.o. female with CAD, hypertension, dyslipidemia, breast cancer, HFrEF , gout admitted 11/10/2022 for CABG.  Patient underwent successful CABG x3 (LIMA to LAD, SVG to OM 2, SVG to RCA) with Dr. Mac complicated by CVA and PAF.  Notable 8 seconds of complete heart block 11/29/2022 status post temporary pacemaker placement.  Eventually underwent successful [employer] CRT-D implantation by Dr. Ambrose on 11/29/2022.    Interim Events  No overnight cardiac events  Telemetry-paced rhythm  BP appropriate  Appears euvolemic  Referral to rehab pending.    Review of Systems  Review of Systems   Respiratory:  Negative for apnea, cough, choking, chest tightness, shortness of breath, wheezing and stridor.      Vital signs in last 24 hours  Temp:  [36.7 °C (98.1 °F)-37 °C (98.6 °F)] 36.8 °C (98.2 °F)  Pulse:  [83-91] 91  Resp:  [10-18] 16  BP: (118-162)/(56-81) 135/71  SpO2:  [90 %-95 %] 94 %    Physical Exam  Physical Exam  Cardiovascular:      Comments: Paced rhythm   Pulmonary:      Effort: Pulmonary effort is normal.   Skin:     General: Skin is warm.   Neurological:      Mental Status: Mental status is at baseline.   Psychiatric:         Mood and Affect: Mood normal.       Lab Review  Lab Results   Component Value Date/Time    WBC 11.3 (H) 12/02/2022 12:12 AM    RBC 3.13 (L) 12/02/2022 12:12 AM    HEMOGLOBIN 9.5 (L) 12/02/2022 12:12 AM    HEMATOCRIT 30.1 (L) 12/02/2022 12:12 AM    MCV 96.2 12/02/2022 12:12 AM    MCH 30.4 12/02/2022 12:12 AM    MCHC 31.6 (L) 12/02/2022 12:12 AM    MPV 9.7 12/02/2022 12:12 AM      Lab Results   Component Value Date/Time    SODIUM 134 (L) 12/02/2022 12:12 AM    POTASSIUM 4.6 12/02/2022 12:12 AM    CHLORIDE 101 12/02/2022 12:12 AM    CO2 22 12/02/2022 12:12 AM    GLUCOSE 112 (H) 12/02/2022  12:12 AM    BUN 39 (H) 12/02/2022 12:12 AM    CREATININE 1.58 (H) 12/02/2022 12:12 AM      Lab Results   Component Value Date/Time    ASTSGOT 35 11/07/2022 10:53 AM    ALTSGPT 15 11/07/2022 10:53 AM     Lab Results   Component Value Date/Time    CHOLSTRLTOT 161 11/14/2022 02:26 AM    LDL 76 11/14/2022 02:26 AM    HDL 54 11/14/2022 02:26 AM    TRIGLYCERIDE 154 (H) 11/14/2022 02:26 AM       No results for input(s): NTPROBNP in the last 72 hours.    Cardiac Imaging and Procedures Review  EKG:  My personal interpretation of the EKG dated  11/30/22 is A-S V-P.    Echocardiogram:   11/17/22  LVEF 30%     Cardiac Catheterization:    10/19/22  MVCAD       EP procedure note  11/29/2022  Successful CRT-D implantation, [employer]onic    Imaging  Chest X-Ray:       Stress Test: n/a     Assessment/Plan    #Intermittent complete heart block ,8 sec pause. S/p ICD 11/29/22.  # MVCAD s/p CABG 11/10/22 (LIMA to LAD, VG to OM2, VG to RCA.).  # Postop complicated with Acute ischemic CVA , MRI 11/11/22.  # Postop PAF, on Amio.  # High risk medication.  #Ischemic cardiomyopathy LVEF 30%.  #HFrEF, NYHA class II, stage C.  #TAN, improving  # Allergy to statins. LDL 76.      Recommendations  -Status post biventricular ICD, 11/29/2022.  -Continue Amiodarone 200 mg daily.  -Continue Eliquis 5 mg BID  -Continue ASA 81 mg and Coreg 6.25 mg BID.  -Allergy to statins.  -Holding ACE/ARB & MRA secondary to low GFR.  -Referral to renal rehab pending.      Follow-up appointment in cardiology office 12/9/22 at 2:30 pm.  Follow-up appointment in device clinic for device check & wound interrogation 12/6/2022 at 10 AM.    I personally spent a total of 10 minutes which includes face-to-face time and non-face-to-face time spent on preparing to see the patient, reviewing hospital notes and tests, obtaining history from the patient, performing a medically appropriate exam, counseling and educating the patient, ordering medications/tests/procedures/referrals as  clinically indicated, and documenting information in the electronic medical record.     Thank you for allowing me to participate in the care of this patient.      Please contact me with any questions.    MAXINE Adams.   Cardiologist, [facility]  [phone]

## 2022-12-02 NOTE — DISCHARGE PLANNING
Case Management Discharge Planning    Admission Date: 11/10/2022  GMLOS: 8.3  ALOS: 22    6-Clicks ADL Score: 12  6-Clicks Mobility Score: 7  PT and/or OT Eval ordered: Yes  Post-acute Referrals Ordered: Yes  Post-acute Choice Obtained: Yes  Has referral(s) been sent to post-acute provider:  Yes      Anticipated Discharge Dispo: Discharge Disposition: D/T to SNF with Medicare cert in anticipation of skilled care (03)    DME Needed: No    Action(s) Taken: OTHER    Escalations Completed: None    Medically Clear: No    Next Steps: RNCM received handoff from [facility] Coreen PATRICK on patient's discharge plan/barriers.  Per Cardiology - strong recommendation for IRF.  RNCM reached out to Sim Maldonado to request that patient be reconsidered for IRF.  [facility] Coreen PATRICK confirmed CM Director Yolanda is aware of discharge barriers etc.      Care Management will continue to follow for discharge planning needs.      Barriers to Discharge: Pending Placement    Update @ 1208 RNCM met with patient and best friend at the bedside to discuss patient's transfer to [facility] today.  Patient and best friend are declining.  IMM provided, signed, and faxed to Maryjo ANDERSEN.  Patient/friend would like to expand the IRF search to find an accepting facility.

## 2022-12-02 NOTE — PROGRESS NOTES
Report given by night rn, Damien. Assumed pt care. Pt is A&Ox4 but seems forgetful. No complaints of pain. No facial droop noticed. No other needs at this time. Call light within reach, bed in low and locked position. Will continue to monitor.

## 2022-12-02 NOTE — PROGRESS NOTES
Hospital Medicine Daily Progress Note    Date of Service  12/1/2022    Chief Complaint  Right leg and left arm weakness after an open heart bypass    Hospital Course      This is a 72-year-old female with past medical significant for CAD, hypertension, history of breast cancer, dyslipidemia, chronic systolic heart failure, hypertension and chronic steroid, seronegative RA, gout was admitted for elective CABG on 11/10/2022.    Surgery went well but she was noted to have right lower extremity weakness after the surgery, CT head showed multiple areas of likely cerebral infarction.  MRI showed multiple areas of watershed type infarction, echocardiogram showed EF of 35%.  Neurology was consulted, neurology recommended Eliquis, patient is intolerant to statin.      While her being monitored in tele, she was  noted to have a second pause, cardiology consulted, patient underwent placement of PPM on 11/29/2022    Hospital course was complicated with acute blood loss anemia s/p transfusion, monitor hemoglobin, if less than 8, transfuse considering history of CAD.  Hospital course was complicated with paroxysmal atrial fibrillation, will continue amiodarone along with Eliquis 5 mg p.o. twice daily.      Interval events:    12/01/22    I evaluated and examined her at the bedside.  She reported mild pain at the site of AICD placement.  She expressed that she is feeling better.  She has mild lower extremity weakness but  Patient and her daughter expressed that they would like to discharge Ms. Dave to rehab.  I discussed plan of care with case management regarding arrangement for rehab.  Patient will decline to the rehab few days ago.  Cardiologist and cardiothoracic surgeon discussed with me and they both recommended to discharge patient to acute rehab.  I discussed plan of care with bedside RN and charge RN.    I have discussed this patient's plan of care and discharge plan at IDT rounds today with Case Management, Nursing,  Nursing leadership, and other members of the IDT team.    Consultants/Specialty  cardiovascular surgeon and neurology    Code Status  Full Code    Disposition  Patient is  medically stable  for discharge.   Anticipate discharge to acute rehab  I have placed the appropriate orders for post-discharge needs.    Review of Systems  Review of Systems   Constitutional:  Negative for chills, fever and weight loss.   HENT:  Negative for hearing loss and tinnitus.    Eyes:  Negative for blurred vision, double vision, photophobia and pain.   Respiratory:  Negative for cough, sputum production and shortness of breath.    Cardiovascular:  Positive for chest pain (Mild pain at the site of AICD placement). Negative for palpitations, orthopnea and leg swelling.   Gastrointestinal:  Negative for abdominal pain, constipation, diarrhea, nausea and vomiting.   Genitourinary:  Negative for dysuria, frequency and urgency.   Musculoskeletal:  Negative for back pain, joint pain, myalgias and neck pain.   Skin:  Negative for rash.   Neurological:  Negative for dizziness, tingling, tremors, sensory change, speech change, focal weakness and headaches.   Psychiatric/Behavioral:  Negative for hallucinations and substance abuse.    All other systems reviewed and are negative.     Physical Exam  Temp:  [36.4 °C (97.5 °F)-36.8 °C (98.2 °F)] 36.7 °C (98.1 °F)  Pulse:  [78-89] 89  Resp:  [17-18] 18  BP: (112-125)/(54-60) 122/59  SpO2:  [90 %-94 %] 94 %    Physical Exam  Vitals reviewed.   Constitutional:       General: She is not in acute distress.     Appearance: Normal appearance. She is not ill-appearing.   HENT:      Head: Normocephalic and atraumatic.      Nose: No congestion.   Eyes:      General:         Right eye: No discharge.         Left eye: No discharge.      Pupils: Pupils are equal, round, and reactive to light.   Cardiovascular:      Rate and Rhythm: Normal rate and regular rhythm.      Pulses: Normal pulses.      Heart sounds:  Normal heart sounds. No murmur heard.  Pulmonary:      Effort: Pulmonary effort is normal. No respiratory distress.      Breath sounds: Normal breath sounds. No stridor.      Comments: Site of AICD placement clean dry and intact.  Abdominal:      General: Bowel sounds are normal. There is no distension.      Palpations: Abdomen is soft.      Tenderness: There is no abdominal tenderness.   Musculoskeletal:         General: No swelling or tenderness.      Cervical back: Normal range of motion. No rigidity.   Skin:     General: Skin is warm.      Capillary Refill: Capillary refill takes less than 2 seconds.      Coloration: Skin is not jaundiced or pale.      Findings: No bruising.   Neurological:      General: No focal deficit present.      Mental Status: She is alert and oriented to person, place, and time.      Cranial Nerves: No cranial nerve deficit.      Comments: Motor 4/5 in bilateral lower extremities   Psychiatric:         Mood and Affect: Mood normal.         Behavior: Behavior normal.       Fluids    Intake/Output Summary (Last 24 hours) at 12/1/2022 1650  Last data filed at 12/1/2022 0900  Gross per 24 hour   Intake 400 ml   Output --   Net 400 ml         Laboratory  Recent Labs     11/30/22  0851 12/01/22  0030   WBC 15.7* 11.4*   RBC 3.24* 3.22*   HEMOGLOBIN 9.8* 9.5*   HEMATOCRIT 31.0* 30.5*   MCV 95.7 94.7   MCH 30.2 29.5   MCHC 31.6* 31.1*   RDW 47.8 48.1   PLATELETCT 629* 507*   MPV 9.8 9.4       Recent Labs     11/29/22  0400 11/30/22  0851 12/01/22  0030   SODIUM 136 136 138   POTASSIUM 4.5 5.0 4.7   CHLORIDE 100 102 103   CO2 23 24 23   GLUCOSE 91 132* 101*   BUN 42* 41* 37*   CREATININE 1.97* 1.82* 1.54*   CALCIUM 9.5 9.4 9.2                     Imaging  DX-ESOPHAGUS - [id number]   Final Result      DX-CHEST-PORTABLE (1 VIEW)   Final Result         1.  Left lower lobe atelectasis versus infiltrate, slightly decreased since prior study.   2.  Small left pleural effusion.   3.  Cardiomegaly   4.   Atherosclerosis      DX-CHEST-PORTABLE (1 VIEW)   Final Result      1.  Small left pleural effusion.      [place]-PYESOSE-4 VIEW   Final Result      1.  There is a nonobstructive bowel gas pattern with a moderate amount of colonic stool.      DX-CHEST-LIMITED (1 VIEW)   Final Result      Stable chest with cardiac enlargement, bronchial thickening and left basilar atelectasis. Bronchial thickening most likely represents bronchitis but edema could be considered      IR-US GUIDED PIV   Final Result    Ultrasound-guided PERIPHERAL IV INSERTION performed by    qualified nursing staff as above.      DX-ABDOMEN FOR TUBE PLACEMENT   Final Result      Enteric tube tip projects over the stomach      DX-CHEST-PORTABLE (1 VIEW)   Final Result      1.  Tubes and lines in good position.      2.  Mild cardiomegaly with mild diffuse pulmonary edema with slight improvement since previous exam.      3.  Small left pleural effusion.      EC-ECHOCARDIOGRAM LTD W/O CONT   Final Result      DX-ABDOMEN FOR TUBE PLACEMENT   Final Result      Enteric tube tip projects over the stomach      DX-CHEST-PORTABLE (1 VIEW)   Final Result      1.  Layering bilateral pleural effusions with adjacent airspace disease.   2.  Increased interstitial edema.      DX-ABDOMEN FOR TUBE PLACEMENT   Final Result      Orogastric tube tip at the distal stomach.      DX-CHEST-PORTABLE (1 VIEW)   Final Result      1.  Removal of right IJ catheter.      2.  Right subclavian catheter unchanged in position.      3.  Mild cardiomegaly.      4.  Blunting of left costophrenic angle consistent with atelectasis, pneumonitis, and/or pleural effusion.      DX-ABDOMEN FOR TUBE PLACEMENT   Final Result      Enteric tube tip projects over the stomach antrum      MR-BRAIN-W/O   Final Result      1.  BILATERAL mostly supratentorial areas of ischemia as described above. These appear to be primarily watershed zone infarctions.   2.  No hemorrhage   3.  No significant mass effect       DX-CHEST-PORTABLE (1 VIEW)   Final Result         1. No significant interval change.      DX-ABDOMEN FOR TUBE PLACEMENT   Final Result      Interval placement of enteric tube with its tip over the midline epigastrium compatible with position at the level of the gastric body.      CT-HEAD W/O   Final Result      1.  Likely subacute infarcts in the bilateral parieto-occipital regions.   2.  No acute intracranial hemorrhage.   3.  Bilateral maxillary sinus disease.      EC-DAVE W/O CONT   Final Result      DX-CHEST-PORTABLE (1 VIEW)   Final Result      Stable enlargement of the cardiomediastinal silhouette, mild diffuse interstitial edema and bilateral basilar atelectasis and/or consolidation.      DX-CHEST-PORTABLE (1 VIEW)   Final Result      Satisfactory postoperative appearance of the chest      CL-TEMPORARY PACEMAKER INSERT    (Results Pending)   CL-BIV IMPLANTABLE CARDIOVERTER DEFIBRILLATOR    (Results Pending)          Assessment/Plan  * Complete heart block (HCC)  Assessment & Plan  8-second pause on telemetry  Cardiology consulted for which patient was diagnosed for complete heart block  Permanent pacemaker placed 11/29/22      Dysphagia- (present on admission)  Assessment & Plan  SPL following, advanced on diet  Nutrition following    TAN (acute kidney injury) ([facility])  Assessment & Plan  11/29 BUN:42, Cr:1.97  Monitor I/O's, vitals, labs  Avoid nephrotoxic meds.    Acute blood loss anemia- (present on admission)  Assessment & Plan  From hematuria  Required transfusion  11/27 Hgb:9.8  Monitor cbc    CHF (congestive heart failure), NYHA class III, acute on chronic, combined ([facility])- (present on admission)  Assessment & Plan  Valsartan 40mg daily and metoprolol SR  Holding diuresis based on renal function  Monitor I/O, labs, vitals    Acute respiratory failure with hypoxia ([facility])  Assessment & Plan  Resolved, now saturating well onRA    PAF (paroxysmal atrial fibrillation) ([facility])- (present on admission)  Assessment &  Plan  Post-operative  Decrease the amiodarone dose as she has been loaded.  eliquis    Other specified anemias  Assessment & Plan  Acute blood loss due to hematuria  Hb has been kady thus stop daily phlebotomy and check as indicated.  Transfuse RBCs for hemoglobin less than 8.0 as she is s/p CABG.    Acute ischemic stroke (HCC)  Assessment & Plan  Post/perioperative event with multiple areas of watershed type infarction   --MRI obtained on 11/11, neurology consulted, recommended Eliquis.  Patient is intolerant to statin.  .  PT/OT/SLP evaluated, recommended postacute, physiatry referral in place    S/P CABG x 3  Assessment & Plan  As per cardiac surgery, optimization,, mobilization as tolerated,  Telemetry    Chronic obstructive pulmonary disease (COPD) (HCC)- (present on admission)  Assessment & Plan  Pre-existing, respiratory protocol,  Continuous respiratory therapy protocol.  Without exacerbation during admit  Quit smoking early 20's    Chronic systolic congestive heart failure (HCC) EF 35%- (present on admission)  Assessment & Plan  Treated to euvolemia, GDMT as tolerated  Ejection fraction 35% on DAVE.  Coreg and mikevan   -- cards following     Coronary artery disease due to calcified coronary lesion - Calcifications seen on CT scan also wtih aortic ulceration- (present on admission)  Assessment & Plan  S/p CABG this admit  Apixaban, metoprolol, amiodarone, valsartan  Intolerant to Statins.    Dyslipidemia  Assessment & Plan  The patient with prior severe intolerance, apparently failed multiple regimens  Consider outpatient referral to PCSK9 treatment         I reviewed above some plan no acute changes.  I personally discussed case with cardiothoracic surgeon Dr. Mac and with cardiologist Dr. Mayberry regarding patient's current medical condition.  They recommended to restart this patient to acute rehab.    I discussed plan of care with bedside RN, charge RN and case management and leadership regarding this  patient discharge.    Time spend: 43 minutes. > 50 % time spend providing counseling / co ordination of care.      VTE prophylaxis: Eliquis

## 2022-12-02 NOTE — PROGRESS NOTES
Hospital Medicine Daily Progress Note    Date of Service  12/2/2022    Chief Complaint  Right leg and left arm weakness after an open heart bypass    Hospital Course      This is a 72-year-old female with past medical significant for CAD, hypertension, history of breast cancer, dyslipidemia, chronic systolic heart failure, hypertension and chronic steroid, seronegative RA, gout was admitted for elective CABG on 11/10/2022.    Surgery went well but she was noted to have right lower extremity weakness after the surgery, CT head showed multiple areas of likely cerebral infarction.  MRI showed multiple areas of watershed type infarction, echocardiogram showed EF of 35%.  Neurology was consulted, neurology recommended Eliquis, patient is intolerant to statin.      While her being monitored in tele, she was  noted to have a second pause, cardiology consulted, patient underwent placement of PPM on 11/29/2022    Hospital course was complicated with acute blood loss anemia s/p transfusion, monitor hemoglobin, if less than 8, transfuse considering history of CAD.  Hospital course was complicated with paroxysmal atrial fibrillation, will continue amiodarone along with Eliquis 5 mg p.o. twice daily.      Interval events:    12/2: Vitals are stable.  Patient anxious to discharge.  Rehab reports they feel she would do better in skilled nursing facility.  Referral pending.  Patient is medically cleared.  Creatinine stable 1.58.    I have discussed this patient's plan of care and discharge plan at IDT rounds today with Case Management, Nursing, Nursing leadership, and other members of the IDT team.    Consultants/Specialty  cardiovascular surgeon and neurology    Code Status  Full Code    Disposition  Patient is  medically stable  for discharge.   Anticipate discharge to acute rehab  I have placed the appropriate orders for post-discharge needs.    Review of Systems  Review of Systems   Constitutional:  Negative for chills, fever  and weight loss.   Eyes:  Negative for blurred vision, double vision, photophobia and pain.   Respiratory:  Negative for cough, sputum production and shortness of breath.    Cardiovascular:  Positive for chest pain (Mild pain at the site of AICD placement). Negative for palpitations, orthopnea and leg swelling.   Gastrointestinal:  Negative for abdominal pain, constipation, diarrhea, nausea and vomiting.   Genitourinary:  Negative for dysuria, frequency and urgency.   Musculoskeletal:  Negative for back pain, joint pain, myalgias and neck pain.   Neurological:  Negative for dizziness, tingling, tremors, sensory change, speech change, focal weakness and headaches.   Psychiatric/Behavioral:  Negative for hallucinations and substance abuse. The patient is nervous/anxious.    All other systems reviewed and are negative.     Physical Exam  Temp:  [36.3 °C (97.3 °F)-37 °C (98.6 °F)] 36.3 °C (97.3 °F)  Pulse:  [83-91] 87  Resp:  [10-18] 16  BP: (116-162)/(55-81) 116/55  SpO2:  [90 %-95 %] 93 %    Physical Exam  Vitals reviewed.   Constitutional:       General: She is not in acute distress.     Appearance: Normal appearance. She is not ill-appearing.   HENT:      Head: Normocephalic and atraumatic.      Nose: No congestion.   Eyes:      General:         Right eye: No discharge.         Left eye: No discharge.      Pupils: Pupils are equal, round, and reactive to light.   Cardiovascular:      Rate and Rhythm: Normal rate and regular rhythm.      Pulses: Normal pulses.      Heart sounds: Normal heart sounds. No murmur heard.  Pulmonary:      Effort: Pulmonary effort is normal. No respiratory distress.      Breath sounds: Normal breath sounds. No stridor.      Comments: Site of AICD placement clean dry and intact.  Abdominal:      General: Bowel sounds are normal. There is no distension.      Palpations: Abdomen is soft.      Tenderness: There is no abdominal tenderness.   Musculoskeletal:         General: No swelling or  tenderness.      Cervical back: Normal range of motion. No rigidity.   Skin:     General: Skin is warm.      Capillary Refill: Capillary refill takes less than 2 seconds.      Coloration: Skin is not jaundiced or pale.      Findings: No bruising.      Comments: CABG incision is healing well with no signs of infection.   Neurological:      General: No focal deficit present.      Mental Status: She is alert and oriented to person, place, and time.      Cranial Nerves: No cranial nerve deficit.      Comments: Motor 4/5 in bilateral lower extremities   Psychiatric:         Mood and Affect: Mood normal.         Behavior: Behavior normal.       Fluids  No intake or output data in the 24 hours ending 12/02/22 1157      Laboratory  Recent Labs     11/30/22  0851 12/01/22  0030 12/02/22  0012   WBC 15.7* 11.4* 11.3*   RBC 3.24* 3.22* 3.13*   HEMOGLOBIN 9.8* 9.5* 9.5*   HEMATOCRIT 31.0* 30.5* 30.1*   MCV 95.7 94.7 96.2   MCH 30.2 29.5 30.4   MCHC 31.6* 31.1* 31.6*   RDW 47.8 48.1 49.0   PLATELETCT 629* 507* 480*   MPV 9.8 9.4 9.7       Recent Labs     11/30/22  0851 12/01/22  0030 12/02/22  0012   SODIUM 136 138 134*   POTASSIUM 5.0 4.7 4.6   CHLORIDE 102 103 101   CO2 24 23 22   GLUCOSE 132* 101* 112*   BUN 41* 37* 39*   CREATININE 1.82* 1.54* 1.58*   CALCIUM 9.4 9.2 9.6                     Imaging  DX-ESOPHAGUS - [id number]   Final Result      DX-CHEST-PORTABLE (1 VIEW)   Final Result         1.  Left lower lobe atelectasis versus infiltrate, slightly decreased since prior study.   2.  Small left pleural effusion.   3.  Cardiomegaly   4.  Atherosclerosis      DX-CHEST-PORTABLE (1 VIEW)   Final Result      1.  Small left pleural effusion.      [id number] VIEW   Final Result      1.  There is a nonobstructive bowel gas pattern with a moderate amount of colonic stool.      DX-CHEST-LIMITED (1 VIEW)   Final Result      Stable chest with cardiac enlargement, bronchial thickening and left basilar atelectasis. Bronchial  thickening most likely represents bronchitis but edema could be considered      IR-US GUIDED PIV   Final Result    Ultrasound-guided PERIPHERAL IV INSERTION performed by    qualified nursing staff as above.      DX-ABDOMEN FOR TUBE PLACEMENT   Final Result      Enteric tube tip projects over the stomach      DX-CHEST-PORTABLE (1 VIEW)   Final Result      1.  Tubes and lines in good position.      2.  Mild cardiomegaly with mild diffuse pulmonary edema with slight improvement since previous exam.      3.  Small left pleural effusion.      EC-ECHOCARDIOGRAM LTD W/O CONT   Final Result      DX-ABDOMEN FOR TUBE PLACEMENT   Final Result      Enteric tube tip projects over the stomach      DX-CHEST-PORTABLE (1 VIEW)   Final Result      1.  Layering bilateral pleural effusions with adjacent airspace disease.   2.  Increased interstitial edema.      DX-ABDOMEN FOR TUBE PLACEMENT   Final Result      Orogastric tube tip at the distal stomach.      DX-CHEST-PORTABLE (1 VIEW)   Final Result      1.  Removal of right IJ catheter.      2.  Right subclavian catheter unchanged in position.      3.  Mild cardiomegaly.      4.  Blunting of left costophrenic angle consistent with atelectasis, pneumonitis, and/or pleural effusion.      DX-ABDOMEN FOR TUBE PLACEMENT   Final Result      Enteric tube tip projects over the stomach antrum      MR-BRAIN-W/O   Final Result      1.  BILATERAL mostly supratentorial areas of ischemia as described above. These appear to be primarily watershed zone infarctions.   2.  No hemorrhage   3.  No significant mass effect      DX-CHEST-PORTABLE (1 VIEW)   Final Result         1. No significant interval change.      DX-ABDOMEN FOR TUBE PLACEMENT   Final Result      Interval placement of enteric tube with its tip over the midline epigastrium compatible with position at the level of the gastric body.      CT-HEAD W/O   Final Result      1.  Likely subacute infarcts in the bilateral parieto-occipital regions.    2.  No acute intracranial hemorrhage.   3.  Bilateral maxillary sinus disease.      EC-DAVE W/O CONT   Final Result      DX-CHEST-PORTABLE (1 VIEW)   Final Result      Stable enlargement of the cardiomediastinal silhouette, mild diffuse interstitial edema and bilateral basilar atelectasis and/or consolidation.      DX-CHEST-PORTABLE (1 VIEW)   Final Result      Satisfactory postoperative appearance of the chest      CL-TEMPORARY PACEMAKER INSERT    (Results Pending)   CL-BIV IMPLANTABLE CARDIOVERTER DEFIBRILLATOR    (Results Pending)          Assessment/Plan  * Complete heart block (HCC)  Assessment & Plan  8-second pause on telemetry  Cardiology consulted for which patient was diagnosed for complete heart block  Permanent pacemaker placed 11/29/22      Dysphagia- (present on admission)  Assessment & Plan  SPL following, advanced on diet  Nutrition following    TAN (acute kidney injury) ([facility])  Assessment & Plan  11/29 BUN:42, Cr:1.97  Monitor I/O's, vitals, labs  Avoid nephrotoxic meds.    Acute blood loss anemia- (present on admission)  Assessment & Plan  From hematuria  Required transfusion  11/27 Hgb:9.8  Monitor cbc    CHF (congestive heart failure), NYHA class III, acute on chronic, combined ([facility])- (present on admission)  Assessment & Plan  Valsartan 40mg daily and metoprolol SR  Holding diuresis based on renal function  Monitor I/O, labs, vitals    Acute respiratory failure with hypoxia ([facility])  Assessment & Plan  Resolved, now saturating well onRA    PAF (paroxysmal atrial fibrillation) ([facility])- (present on admission)  Assessment & Plan  Post-operative  Decrease the amiodarone dose as she has been loaded.  eliquis    Other specified anemias  Assessment & Plan  Acute blood loss due to hematuria  Hb has been kady thus stop daily phlebotomy and check as indicated.  Transfuse RBCs for hemoglobin less than 8.0 as she is s/p CABG.    Acute ischemic stroke ([facility])  Assessment & Plan  Post/perioperative event with  multiple areas of watershed type infarction   --MRI obtained on 11/11, neurology consulted, recommended Eliquis.  Patient is intolerant to statin.  .  PT/OT/SLP evaluated, recommended postacute, physiatry referral in place    S/P CABG x 3  Assessment & Plan  As per cardiac surgery, optimization,, mobilization as tolerated,  Telemetry    Chronic obstructive pulmonary disease (COPD) (HCC)- (present on admission)  Assessment & Plan  Pre-existing, respiratory protocol,  Continuous respiratory therapy protocol.  Without exacerbation during admit  Quit smoking early 20's    Chronic systolic congestive heart failure (HCC) EF 35%- (present on admission)  Assessment & Plan  Treated to euvolemia, GDMT as tolerated  Ejection fraction 35% on DAVE.  Coreg and diovan   -- cards following     Coronary artery disease due to calcified coronary lesion - Calcifications seen on CT scan also wtih aortic ulceration- (present on admission)  Assessment & Plan  S/p CABG this admit  Apixaban, metoprolol, amiodarone, valsartan  Intolerant to Statins.    Dyslipidemia  Assessment & Plan  The patient with prior severe intolerance, apparently failed multiple regimens  Consider outpatient referral to P[facility] treatment         I reviewed above some plan no acute changes.  I personally discussed case with cardiothoracic surgeon Dr. Mac and with cardiologist Dr. Mayberry regarding patient's current medical condition.  They recommended to restart this patient to acute rehab.    I discussed plan of care with bedside RN, charge RN and case management and leadership regarding this patient discharge.    Time spend: 43 minutes. > 50 % time spend providing counseling / co ordination of care.      VTE prophylaxis: Eliquis

## 2022-12-03 PROCEDURE — 700102 HCHG RX REV CODE 250 W/ 637 OVERRIDE(OP): Performed by: STUDENT IN AN ORGANIZED HEALTH CARE EDUCATION/TRAINING PROGRAM

## 2022-12-03 PROCEDURE — A9270 NON-COVERED ITEM OR SERVICE: HCPCS

## 2022-12-03 PROCEDURE — 700102 HCHG RX REV CODE 250 W/ 637 OVERRIDE(OP)

## 2022-12-03 PROCEDURE — A9270 NON-COVERED ITEM OR SERVICE: HCPCS | Performed by: STUDENT IN AN ORGANIZED HEALTH CARE EDUCATION/TRAINING PROGRAM

## 2022-12-03 PROCEDURE — 770020 HCHG ROOM/CARE - TELE (206)

## 2022-12-03 PROCEDURE — A9270 NON-COVERED ITEM OR SERVICE: HCPCS | Performed by: INTERNAL MEDICINE

## 2022-12-03 PROCEDURE — 700102 HCHG RX REV CODE 250 W/ 637 OVERRIDE(OP): Performed by: INTERNAL MEDICINE

## 2022-12-03 PROCEDURE — 97530 THERAPEUTIC ACTIVITIES: CPT

## 2022-12-03 PROCEDURE — 99231 SBSQ HOSP IP/OBS SF/LOW 25: CPT | Performed by: STUDENT IN AN ORGANIZED HEALTH CARE EDUCATION/TRAINING PROGRAM

## 2022-12-03 RX ADMIN — CARVEDILOL 12.5 MG: 12.5 TABLET, FILM COATED ORAL at 08:16

## 2022-12-03 RX ADMIN — APIXABAN 5 MG: 5 TABLET, FILM COATED ORAL at 05:22

## 2022-12-03 RX ADMIN — ACETAMINOPHEN 1000 MG: 500 TABLET ORAL at 05:50

## 2022-12-03 RX ADMIN — AMIODARONE HYDROCHLORIDE 200 MG: 200 TABLET ORAL at 05:22

## 2022-12-03 RX ADMIN — CARVEDILOL 12.5 MG: 12.5 TABLET, FILM COATED ORAL at 17:29

## 2022-12-03 RX ADMIN — HYDROXYCHLOROQUINE SULFATE 200 MG: 200 TABLET ORAL at 08:16

## 2022-12-03 RX ADMIN — MIRTAZAPINE 15 MG: 15 TABLET, FILM COATED ORAL at 20:00

## 2022-12-03 RX ADMIN — ASPIRIN 81 MG 81 MG: 81 TABLET ORAL at 05:22

## 2022-12-03 RX ADMIN — OMEPRAZOLE 40 MG: 20 CAPSULE, DELAYED RELEASE ORAL at 05:22

## 2022-12-03 RX ADMIN — APIXABAN 5 MG: 5 TABLET, FILM COATED ORAL at 17:29

## 2022-12-03 RX ADMIN — Medication 5 MG: at 20:00

## 2022-12-03 ASSESSMENT — ENCOUNTER SYMPTOMS
DIARRHEA: 0
NECK PAIN: 0
DOUBLE VISION: 0
PHOTOPHOBIA: 0
BLURRED VISION: 0
FOCAL WEAKNESS: 0
ABDOMINAL PAIN: 0
WEIGHT LOSS: 0
NAUSEA: 0
CONSTIPATION: 0
VOMITING: 0
SENSORY CHANGE: 0
ORTHOPNEA: 0
HEADACHES: 0
COUGH: 0
PALPITATIONS: 0
SPUTUM PRODUCTION: 0
BACK PAIN: 0
DIZZINESS: 0
MYALGIAS: 0
TREMORS: 0
CHILLS: 0
FEVER: 0
EYE PAIN: 0
NERVOUS/ANXIOUS: 1
HALLUCINATIONS: 0
SHORTNESS OF BREATH: 0
SPEECH CHANGE: 0
WEAKNESS: 1
TINGLING: 0

## 2022-12-03 ASSESSMENT — COGNITIVE AND FUNCTIONAL STATUS - GENERAL
CLIMB 3 TO 5 STEPS WITH RAILING: TOTAL
MOVING TO AND FROM BED TO CHAIR: A LITTLE
WALKING IN HOSPITAL ROOM: A LOT
SUGGESTED CMS G CODE MODIFIER MOBILITY: CL
STANDING UP FROM CHAIR USING ARMS: A LOT
MOVING FROM LYING ON BACK TO SITTING ON SIDE OF FLAT BED: UNABLE
MOBILITY SCORE: 12
TURNING FROM BACK TO SIDE WHILE IN FLAT BAD: A LITTLE

## 2022-12-03 ASSESSMENT — GAIT ASSESSMENTS
DEVIATION: STEP TO;DECREASED BASE OF SUPPORT;BRADYKINETIC;DECREASED HEEL STRIKE;DECREASED TOE OFF
GAIT LEVEL OF ASSIST: MODERATE ASSIST
DISTANCE (FEET): 5
ASSISTIVE DEVICE: FRONT WHEEL WALKER

## 2022-12-03 ASSESSMENT — PAIN DESCRIPTION - PAIN TYPE: TYPE: ACUTE PAIN

## 2022-12-03 ASSESSMENT — LIFESTYLE VARIABLES: SUBSTANCE_ABUSE: 0

## 2022-12-03 ASSESSMENT — FIBROSIS 4 INDEX: FIB4 SCORE: 1.35554417117259591

## 2022-12-03 NOTE — PROGRESS NOTES
Report given by night nurse, Damien BURNS. Assumed pt care. Pt is awake and oriented. Seems confused and irritable this morning. A little forgetful. Call light within reach. Bed in low and locked position. Will continue to monitor.

## 2022-12-03 NOTE — DISCHARGE PLANNING
Case Management Discharge Planning    Admission Date: 11/10/2022  GMLOS: 8.3  ALOS: 23    6-Clicks ADL Score: 12  6-Clicks Mobility Score: 11  PT and/or OT Eval ordered: Yes  Post-acute Referrals Ordered: Yes  Post-acute Choice Obtained: Yes  Has referral(s) been sent to post-acute provider:  Yes      Anticipated Discharge Dispo: Discharge Disposition: D/T to SNF with Medicare cert in anticipation of skilled care (03)    DME Needed: No    Action(s) Taken: Updated Provider/Nurse on Discharge Plan    Escalations Completed: None    Medically Clear: Yes    Next Steps: Patient filed an appeal to her discharge.  [facility] determination is pending.     Barriers to Discharge: Refusing treatment(s).   Refused transfer to next level of care.      Is the patient up for discharge tomorrow: Yes    Is transport arranged for discharge disposition: No, pending [facility] determination.

## 2022-12-03 NOTE — CARE PLAN
Problem: Knowledge Deficit - Standard  Goal: Patient and family/care givers will demonstrate understanding of plan of care, disease process/condition, diagnostic tests and medications  Outcome: Progressing     Problem: Skin Integrity  Goal: Skin integrity is maintained or improved  Outcome: Progressing     Problem: Fall Risk  Goal: Patient will remain free from falls  Outcome: Progressing     Problem: Pain - Standard  Goal: Alleviation of pain or a reduction in pain to the patient’s comfort goal  Outcome: Progressing   The patient is Stable - Low risk of patient condition declining or worsening    Shift Goals  Clinical Goals: pain control, safety  Patient Goals: Rest  Family Goals: SUNDAR    Progress made toward(s) clinical / shift goals:  Progressing    Patient is not progressing towards the following goals:

## 2022-12-03 NOTE — PROGRESS NOTES
1045 Pt yelling out multiple times after call light has been on for 10 seconds. After educating the pt about call light use and not yelling, pt agreed to be patient and not yell again.    1047 Call light going off again after conversation and pt continuing to yell again. Pt once again educated on not yelling at staff. Notified her that nursing staff might need to close pt door if she continues as to not be disruptive to other pts. Pt agreeable to not yelling.     1051: Pt yelling once more and not using call light appropriately.     Pt is A&Ox4 but not acting appropriately. She has labile emotions and seems forgetful. MD notified of this and notified of pt not performing well on her cognitive assessment yesterday with speech therapy.     Call light within reach. Bed in a low and locked position. Bed alarm on and in place. Will continue to monitor.

## 2022-12-03 NOTE — PROGRESS NOTES
Hospital Medicine Daily Progress Note    Date of Service  12/3/2022    Chief Complaint  Right leg and left arm weakness after an open heart bypass    Hospital Course      This is a 72-year-old female with past medical significant for CAD, hypertension, history of breast cancer, dyslipidemia, chronic systolic heart failure, hypertension and chronic steroid, seronegative RA, gout was admitted for elective CABG on 11/10/2022.    Surgery went well but she was noted to have right lower extremity weakness after the surgery, CT head showed multiple areas of likely cerebral infarction.  MRI showed multiple areas of watershed type infarction, echocardiogram showed EF of 35%.  Neurology was consulted, neurology recommended Eliquis, patient is intolerant to statin.      While her being monitored in tele, she was  noted to have a second pause, cardiology consulted, patient underwent placement of PPM on 11/29/2022    Hospital course was complicated with acute blood loss anemia s/p transfusion, monitor hemoglobin, if less than 8, transfuse considering history of CAD.  Hospital course was complicated with paroxysmal atrial fibrillation, will continue amiodarone along with Eliquis 5 mg p.o. twice daily.      Interval events:    12/2: Vitals are stable.  Patient anxious to discharge.  Rehab reports they feel she would do better in skilled nursing facility.  Referral pending.  Patient is medically cleared.  Creatinine stable 1.58.  12/3: Patient and sister were reticent about going to a skilled nursing facility.  I discussed with them that there were multiple options at the moment they would prefer not to go to [facility] unless that their only choice.  I explained that they were denied from all the rehabs.  Patient continues to have some pain at the surgical site for the pacemaker but otherwise is doing well.  She is continuing to progress with therapy.    I have discussed this patient's plan of care and discharge plan at IDT rounds today  with Case Management, Nursing, Nursing leadership, and other members of the IDT team.    Consultants/Specialty  cardiovascular surgeon and neurology    Code Status  Full Code    Disposition  Patient is  medically stable  for discharge.   Anticipate discharge to acute rehab  I have placed the appropriate orders for post-discharge needs.    Review of Systems  Review of Systems   Constitutional:  Negative for chills, fever and weight loss.   Eyes:  Negative for blurred vision, double vision, photophobia and pain.   Respiratory:  Negative for cough, sputum production and shortness of breath.    Cardiovascular:  Negative for chest pain (Mild pain at the site of AICD placement), palpitations, orthopnea and leg swelling.   Gastrointestinal:  Negative for abdominal pain, constipation, diarrhea, nausea and vomiting.   Genitourinary:  Negative for dysuria, frequency and urgency.   Musculoskeletal:  Negative for back pain, joint pain, myalgias and neck pain.   Neurological:  Positive for weakness. Negative for dizziness, tingling, tremors, sensory change, speech change, focal weakness and headaches.   Psychiatric/Behavioral:  Negative for hallucinations and substance abuse. The patient is nervous/anxious.    All other systems reviewed and are negative.     Physical Exam  Temp:  [36.6 °C (97.9 °F)-36.9 °C (98.4 °F)] 36.6 °C (97.9 °F)  Pulse:  [] 98  Resp:  [16-18] 16  BP: (127-151)/(66-75) 127/66  SpO2:  [92 %-96 %] 94 %    Physical Exam  Vitals reviewed.   Constitutional:       General: She is not in acute distress.     Appearance: Normal appearance. She is not ill-appearing.   HENT:      Head: Normocephalic and atraumatic.      Nose: No congestion.   Eyes:      General:         Right eye: No discharge.         Left eye: No discharge.      Pupils: Pupils are equal, round, and reactive to light.   Cardiovascular:      Rate and Rhythm: Normal rate and regular rhythm.      Pulses: Normal pulses.      Heart sounds: Normal  heart sounds. No murmur heard.  Pulmonary:      Effort: Pulmonary effort is normal. No respiratory distress.      Breath sounds: Normal breath sounds. No stridor.      Comments: Site of AICD placement clean dry and intact.  Abdominal:      General: Bowel sounds are normal. There is no distension.      Palpations: Abdomen is soft.      Tenderness: There is no abdominal tenderness.   Musculoskeletal:         General: No swelling or tenderness.      Cervical back: Normal range of motion. No rigidity.   Skin:     General: Skin is warm.      Capillary Refill: Capillary refill takes less than 2 seconds.      Coloration: Skin is not jaundiced or pale.      Findings: No bruising.      Comments: CABG incision is healing well with no signs of infection.   Neurological:      General: No focal deficit present.      Mental Status: She is alert and oriented to person, place, and time.      Cranial Nerves: No cranial nerve deficit.      Comments: Motor 4/5 in bilateral lower extremities   Psychiatric:         Mood and Affect: Mood normal.         Behavior: Behavior normal.       Fluids  No intake or output data in the 24 hours ending 12/03/22 1529      Laboratory  Recent Labs     12/01/22  0030 12/02/22  0012   WBC 11.4* 11.3*   RBC 3.22* 3.13*   HEMOGLOBIN 9.5* 9.5*   HEMATOCRIT 30.5* 30.1*   MCV 94.7 96.2   MCH 29.5 30.4   MCHC 31.1* 31.6*   RDW 48.1 49.0   PLATELETCT 507* 480*   MPV 9.4 9.7       Recent Labs     12/01/22  0030 12/02/22  0012   SODIUM 138 134*   POTASSIUM 4.7 4.6   CHLORIDE 103 101   CO2 23 22   GLUCOSE 101* 112*   BUN 37* 39*   CREATININE 1.54* 1.58*   CALCIUM 9.2 9.6                     Imaging  DX-ESOPHAGUS - [id number]   Final Result      DX-CHEST-PORTABLE (1 VIEW)   Final Result         1.  Left lower lobe atelectasis versus infiltrate, slightly decreased since prior study.   2.  Small left pleural effusion.   3.  Cardiomegaly   4.  Atherosclerosis      DX-CHEST-PORTABLE (1 VIEW)   Final Result      1.   Small left pleural effusion.      [id number] VIEW   Final Result      1.  There is a nonobstructive bowel gas pattern with a moderate amount of colonic stool.      DX-CHEST-LIMITED (1 VIEW)   Final Result      Stable chest with cardiac enlargement, bronchial thickening and left basilar atelectasis. Bronchial thickening most likely represents bronchitis but edema could be considered      IR-US GUIDED PIV   Final Result    Ultrasound-guided PERIPHERAL IV INSERTION performed by    qualified nursing staff as above.      DX-ABDOMEN FOR TUBE PLACEMENT   Final Result      Enteric tube tip projects over the stomach      DX-CHEST-PORTABLE (1 VIEW)   Final Result      1.  Tubes and lines in good position.      2.  Mild cardiomegaly with mild diffuse pulmonary edema with slight improvement since previous exam.      3.  Small left pleural effusion.      EC-ECHOCARDIOGRAM LTD W/O CONT   Final Result      DX-ABDOMEN FOR TUBE PLACEMENT   Final Result      Enteric tube tip projects over the stomach      DX-CHEST-PORTABLE (1 VIEW)   Final Result      1.  Layering bilateral pleural effusions with adjacent airspace disease.   2.  Increased interstitial edema.      DX-ABDOMEN FOR TUBE PLACEMENT   Final Result      Orogastric tube tip at the distal stomach.      DX-CHEST-PORTABLE (1 VIEW)   Final Result      1.  Removal of right IJ catheter.      2.  Right subclavian catheter unchanged in position.      3.  Mild cardiomegaly.      4.  Blunting of left costophrenic angle consistent with atelectasis, pneumonitis, and/or pleural effusion.      DX-ABDOMEN FOR TUBE PLACEMENT   Final Result      Enteric tube tip projects over the stomach antrum      MR-BRAIN-W/O   Final Result      1.  BILATERAL mostly supratentorial areas of ischemia as described above. These appear to be primarily watershed zone infarctions.   2.  No hemorrhage   3.  No significant mass effect      DX-CHEST-PORTABLE (1 VIEW)   Final Result         1. No significant  interval change.      DX-ABDOMEN FOR TUBE PLACEMENT   Final Result      Interval placement of enteric tube with its tip over the midline epigastrium compatible with position at the level of the gastric body.      CT-HEAD W/O   Final Result      1.  Likely subacute infarcts in the bilateral parieto-occipital regions.   2.  No acute intracranial hemorrhage.   3.  Bilateral maxillary sinus disease.      EC-DAVE W/O CONT   Final Result      DX-CHEST-PORTABLE (1 VIEW)   Final Result      Stable enlargement of the cardiomediastinal silhouette, mild diffuse interstitial edema and bilateral basilar atelectasis and/or consolidation.      DX-CHEST-PORTABLE (1 VIEW)   Final Result      Satisfactory postoperative appearance of the chest      CL-TEMPORARY PACEMAKER INSERT    (Results Pending)   CL-BIV IMPLANTABLE CARDIOVERTER DEFIBRILLATOR    (Results Pending)          Assessment/Plan  * Complete heart block ([facility])  Assessment & Plan  8-second pause on telemetry  Cardiology consulted for which patient was diagnosed for complete heart block  Permanent pacemaker placed 11/29/22      Dysphagia- (present on admission)  Assessment & Plan  SPL following, advanced on diet  Nutrition following    TAN (acute kidney injury) ([facility])  Assessment & Plan  11/29 BUN:42, Cr:1.97  Monitor I/O's, vitals, labs  Avoid nephrotoxic meds.    Acute blood loss anemia- (present on admission)  Assessment & Plan  From hematuria  Required transfusion  11/27 Hgb:9.8  Monitor cbc    CHF (congestive heart failure), NYHA class III, acute on chronic, combined ([facility])- (present on admission)  Assessment & Plan  Valsartan 40mg daily and metoprolol SR  Holding diuresis based on renal function  Monitor I/O, labs, vitals    Acute respiratory failure with hypoxia ([facility])  Assessment & Plan  Resolved, now saturating well onRA    PAF (paroxysmal atrial fibrillation) ([facility])- (present on admission)  Assessment & Plan  Post-operative  Decrease the amiodarone dose as she has been  loaded.  eliquis    Other specified anemias  Assessment & Plan  Acute blood loss due to hematuria  Hb has been kady thus stop daily phlebotomy and check as indicated.  Transfuse RBCs for hemoglobin less than 8.0 as she is s/p CABG.    Acute ischemic stroke ([facility])  Assessment & Plan  Post/perioperative event with multiple areas of watershed type infarction   --MRI obtained on 11/11, neurology consulted, recommended Eliquis.  Patient is intolerant to statin.  .  PT/OT/SLP evaluated, recommended postacute, physiatry referral in place    S/P CABG x 3  Assessment & Plan  As per cardiac surgery, optimization,, mobilization as tolerated,  Telemetry    Chronic obstructive pulmonary disease (COPD) (H[facility])- (present on admission)  Assessment & Plan  Pre-existing, respiratory protocol,  Continuous respiratory therapy protocol.  Without exacerbation during admit  Quit smoking early 20's    Chronic systolic congestive heart failure (HCC) EF 35%- (present on admission)  Assessment & Plan  Treated to euvolemia, GDMT as tolerated  Ejection fraction 35% on DAVE.  Coreg and diovan   -- cards following     Coronary artery disease due to calcified coronary lesion - Calcifications seen on CT scan also wtih aortic ulceration- (present on admission)  Assessment & Plan  S/p CABG this admit  Apixaban, metoprolol, amiodarone, valsartan  Intolerant to Statins.    Dyslipidemia  Assessment & Plan  The patient with prior severe intolerance, apparently failed multiple regimens  Consider outpatient referral to PCSK9 treatment         I reviewed above some plan no acute changes.  I personally discussed case with cardiothoracic surgeon Dr. Mac and with cardiologist Dr. Mayberry regarding patient's current medical condition.  They recommended to restart this patient to acute rehab.    I discussed plan of care with bedside RN, charge RN and case management and leadership regarding this patient discharge.    Time spend: 43 minutes. > 50 % time spend  providing counseling / co ordination of care.      VTE prophylaxis: Eliquis

## 2022-12-03 NOTE — THERAPY
"Speech Language Pathology   Cognitive Evaluation     Patient Name: Lauren Dave  AGE:  72 y.o., SEX:  female  Medical Record #: [id number]  Today's Date: 12/3/2022     Precautions  Precautions: Fall Risk  Comments: s/p CABG, AICD, CVA    Assessment    Prior level of function/Living situation:  Pt lives w/her ex-. Pt worked full-time as a  and was I with ADLs/IADLs at baseline.      Subjective:   Pt's sister was present for the evaluation. 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       Portions of the COGNISTAT (Neurobehavioral Cognitive Status Assessment) and other formal measures were administered.     Patient scored the following on the Cognistat:  Orientation: Average  Attention: Mild  Comprehension (Language): Mild  Repetition (Language): Average  Naming (Language): Average  Memory: Mild  Calculations: Average  Similarities (Reasoning): Average  Judgment (Reasoning): Average     Medication Management:  Given a mock medication label and instructions, patient had difficulty reading, missing words in the sentences, needing clinician to read label/instructions. Patient had difficulty providing dosage and frequency even with max verbal cues, becoming increasingly frustrated and declining to continue with task.      CLQT Clock Drawin/13 =Severe  Performance was notable for multiple hand placements, perseveration of multiple numbers, poor spacing and number placement, suggestive of impaired executive function and visuospatial skills. 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 Pt continuing to make same errors and becoming emotional and asking \"why this is happening?\"      Given a 4 word recall task, pt was able to recall 2/4 words independently after a 5-minute delay. " She recalled 4/4 words when given a category prompt. During reading tasks, pt would skip words in the sentence. Some improvement seen when pt was asked to use finger to follow along when reading. During writing task, pt would have most ineligible writing, focusing only on the right side of the page.      Recommendations:  Direct supervision with IADLs, including medication management, financial management and scheduling.       Plan    Recommend Speech Therapy 4 times per week until therapy goals are met for the following treatments:  Comprehension Training, Reading Training, Writing Training, Cognitive-Linguistic Training, and Patient / Family / Caregiver Education.    Discharge Recommendations: Recommend post-acute placement for additional speech therapy services prior to discharge home    Objective       12/02/22 1538   Verbal Expression   Verbal Expression / Aphasia Eval (WDL) WDL   Auditory Comprehension   Auditory Comprehension (WDL) X   Yes / No Questions: General Information Within Functional Limits (6-7)   Identifies Pictures Within Functional Limits (6-7)   Follows One Unit Commands Within Functional Limits (6-7)   Follows Two Unit Commands Within Functional Limits (6-7)   Follows Three Unit Commands Within Functional Limits (6-7)   Skilled Intervention Compensatory Strategies;Verbal Cueing   Reading Comprehension   Reading Comprehension (WDL) X   Reading Words Within Functional Limits (6-7)   Reading Sentences Minimal (4)   Following Written Direction Moderate (3)   Skilled Intervention Compensatory Strategies;Verbal Cueing   Written Expression   Written Expression (WDL) X   Functional Writing: Name Minimal (4)   Functional Writing Dictation: Word Minimal (4)   Formulates: Sentence Moderate (3)   Overall Legibility Moderate (3)   Dominant Hand Right   Skilled Intervention Compensatory Strategies;Verbal Cueing   Cognitive-Linguistic   Cognitive-Linguistic (WDL) X   Level of Consciousness Alert   Orientation  "Level Not Oriented to Year   Sustained Attention Supervision (5)   Short Term Memory Minimal (4)   Immediate Memory Supervision (5)   Safety Awareness Supervision (5)   Insight into Deficits Supervision (5)   Executive Functioning / Organization Moderate (3)   Auditory Math Minimal (4)   Medication Management  Moderate (3)   Clock Drawing Left Neglect;Poor Planning;Disorganization;Impaired Hand Placement   Skilled Intervention Compensatory Strategies;Verbal Cueing   Social / Pragmatic Communication   Social / Pragmatic Communication WDL   Patient / Family Goals   Patient / Family Goal #1 \"I'm going back to work\"   Short Term Goals   Short Term Goal # 1 New 12/2: Pt will complete a medication management task with >85% accuracy given min verbal cues   Short Term Goal # 2 New 12/2: Pt complete a reading comprehension task with less than 3 redirections to task given mod verbalcues   Short Term Goal # 3 New 12/2: Pt will complete written expression task with min verbal cues     "

## 2022-12-04 PROCEDURE — 99231 SBSQ HOSP IP/OBS SF/LOW 25: CPT | Performed by: STUDENT IN AN ORGANIZED HEALTH CARE EDUCATION/TRAINING PROGRAM

## 2022-12-04 PROCEDURE — 770020 HCHG ROOM/CARE - TELE (206)

## 2022-12-04 PROCEDURE — 700102 HCHG RX REV CODE 250 W/ 637 OVERRIDE(OP): Performed by: STUDENT IN AN ORGANIZED HEALTH CARE EDUCATION/TRAINING PROGRAM

## 2022-12-04 PROCEDURE — 700102 HCHG RX REV CODE 250 W/ 637 OVERRIDE(OP): Performed by: INTERNAL MEDICINE

## 2022-12-04 PROCEDURE — A9270 NON-COVERED ITEM OR SERVICE: HCPCS | Performed by: STUDENT IN AN ORGANIZED HEALTH CARE EDUCATION/TRAINING PROGRAM

## 2022-12-04 PROCEDURE — A9270 NON-COVERED ITEM OR SERVICE: HCPCS | Performed by: INTERNAL MEDICINE

## 2022-12-04 RX ORDER — POLYETHYLENE GLYCOL 3350 17 G/17G
1 POWDER, FOR SOLUTION ORAL
Status: DISCONTINUED | OUTPATIENT
Start: 2022-12-04 | End: 2022-12-05 | Stop reason: HOSPADM

## 2022-12-04 RX ADMIN — HYDROXYCHLOROQUINE SULFATE 200 MG: 200 TABLET ORAL at 08:03

## 2022-12-04 RX ADMIN — MIRTAZAPINE 15 MG: 15 TABLET, FILM COATED ORAL at 20:23

## 2022-12-04 RX ADMIN — POLYETHYLENE GLYCOL 3350 1 PACKET: 17 POWDER, FOR SOLUTION ORAL at 15:27

## 2022-12-04 RX ADMIN — AMIODARONE HYDROCHLORIDE 200 MG: 200 TABLET ORAL at 05:08

## 2022-12-04 RX ADMIN — APIXABAN 5 MG: 5 TABLET, FILM COATED ORAL at 05:08

## 2022-12-04 RX ADMIN — CARVEDILOL 12.5 MG: 12.5 TABLET, FILM COATED ORAL at 17:23

## 2022-12-04 RX ADMIN — APIXABAN 5 MG: 5 TABLET, FILM COATED ORAL at 17:23

## 2022-12-04 RX ADMIN — CARVEDILOL 12.5 MG: 12.5 TABLET, FILM COATED ORAL at 08:03

## 2022-12-04 RX ADMIN — OMEPRAZOLE 40 MG: 20 CAPSULE, DELAYED RELEASE ORAL at 05:08

## 2022-12-04 RX ADMIN — ASPIRIN 81 MG 81 MG: 81 TABLET ORAL at 05:08

## 2022-12-04 ASSESSMENT — ENCOUNTER SYMPTOMS
WEAKNESS: 1
PALPITATIONS: 0
PHOTOPHOBIA: 0
BACK PAIN: 0
TREMORS: 0
CHILLS: 0
SHORTNESS OF BREATH: 0
ORTHOPNEA: 0
ABDOMINAL PAIN: 0
DOUBLE VISION: 0
EYE PAIN: 0
FOCAL WEAKNESS: 0
MYALGIAS: 0
WEIGHT LOSS: 0
BLURRED VISION: 0
TINGLING: 0
VOMITING: 0
DIZZINESS: 0
NECK PAIN: 0
COUGH: 0
NAUSEA: 0
CONSTIPATION: 0
FEVER: 0
NERVOUS/ANXIOUS: 0
HEADACHES: 0
SENSORY CHANGE: 0
DIARRHEA: 0
SPEECH CHANGE: 0
SPUTUM PRODUCTION: 0
HALLUCINATIONS: 0

## 2022-12-04 ASSESSMENT — PAIN DESCRIPTION - PAIN TYPE: TYPE: ACUTE PAIN

## 2022-12-04 ASSESSMENT — LIFESTYLE VARIABLES: SUBSTANCE_ABUSE: 0

## 2022-12-04 ASSESSMENT — FIBROSIS 4 INDEX: FIB4 SCORE: 1.35554417117259591

## 2022-12-04 NOTE — THERAPY
Physical Therapy   Daily Treatment     Patient Name: Lauren Dave  Age:  72 y.o., Sex:  female  Medical Record #: [id number]  Today's Date: 12/3/2022     Precautions  Precautions: Fall Risk  Comments: s/p CABG, CVA, AICD    Assessment  Pt seen for PT tx session. Pt with significant improvement in bed mobility with no assistance needed today. Pt able to complete multiple transfers with Min-Mod A, however pt is having difficulty with weight shifting to move her feet and has to shuffle mostly. Pt educated on strategies for appropriate weightshift and was able to ambulate 5' before fatiguing. Pt is making progress slowly and is very motivated to continue working with PT. Continue to recommend placement, IRF if possible as pt is now demonstrating improved activity tolerance and cognition. Will follow.     Plan    Continue current treatment plan.    DC Equipment Recommendations: Unable to determine at this time  Discharge Recommendations: Recommend post-acute placement for additional physical therapy services prior to discharge home        Vitals   O2 (LPM) 0   O2 Delivery Device None - Room Air   Pain 0 - 10 Group   Therapist Pain Assessment   (no c/o pain during session)   Cognition    Cognition / Consciousness X   Level of Consciousness Alert   Ability To Follow Commands 1 Step   Sequencing Impaired   Initiation Impaired   Comments pt able to communicate much better compared to last time this PT saw pt. pt still with difficulty initiating and sequencing, but receptive to education   Balance   Sitting Balance (Static) Fair +   Sitting Balance (Dynamic) Fair   Standing Balance (Static) Poor +   Standing Balance (Dynamic) Poor   Weight Shift Sitting Fair   Weight Shift Standing Poor   Skilled Intervention Verbal Cuing;Tactile Cuing;Facilitation;Sequencing   Comments standing with FWW and Min A   Gait Analysis   Gait Level Of Assist Moderate Assist   Assistive Device Front Wheel Walker   Distance (Feet) 5   # of  Times Distance was Traveled 1   Deviation Step To;Decreased Base Of Support;Bradykinetic;Decreased Heel Strike;Decreased Toe Off   Weight Bearing Status no restrictions   Skilled Intervention Verbal Cuing;Tactile Cuing;Sequencing   Comments pt able to ambulate short distance with advancing R foot first. pt fatigued quickly and had to be assisted down into the chair. increased retropulsion throughout, likely in attempt to advance LE's   Bed Mobility    Supine to Sit Standby Assist   Sit to Supine   (NT, in chair after)   Scooting Standby Assist   Skilled Intervention Verbal Cuing   Comments HOB elevated   Functional Mobility   Sit to Stand Minimal Assist   Bed, Chair, Wheelchair Transfer Moderate Assist   Transfer Method Stand Pivot   Mobility eob<>chair   Skilled Intervention Verbal Cuing;Tactile Cuing;Sequencing;Facilitation   Comments pt able to complete STS x4 and SPT x2. initial STS is Min A however pt needs increased assist during pivot due to poor weight transfer. pt cued to take steps with feet instead of shuffling, however pt has diffculty moving L foot   How much difficulty does the patient currently have...   Turning over in bed (including adjusting bedclothes, sheets and blankets)? 3   Sitting down on and standing up from a chair with arms (e.g., wheelchair, bedside commode, etc.) 1   Moving from lying on back to sitting on the side of the bed? 3   How much help from another person does the patient currently need...   Moving to and from a bed to a chair (including a wheelchair)? 2   Need to walk in a hospital room? 2   Climbing 3-5 steps with a railing? 1   6 clicks Mobility Score 12   Activity Tolerance   Sitting in Chair 10 min/post session   Sitting Edge of Bed 5 min   Standing 3 min   Comments standing activity limited by weakness and fatigue   Short Term Goals    Short Term Goal # 1 Pt will transfer supine<->sit with mod A within 6 visits to promote return home   Goal Outcome # 1 Goal met, new goal  added   Short Term Goal # 1 B  Pt will transfer supine<->sit with hob flat and spv within 6 visits to promote return home   Goal Outcome  # 1 B   (added 12/3)   Short Term Goal # 3 Pt will transfer bed<->chair with mod A within 6 visits in order to promote return home   Goal Outcome # 3 Goal met, new goal added   Short Term Goal # 3 B Pt will transfer bed<->chair with cga within 6 visits in order to promote return home   Goal Outcome # 3 B   (added 12/3)   Short Term Goal # 4 pt will ambulate 25 ft with fww and min a in 6 tx for short household distances   Goal Outcome # 4   (added 12/3)   Anticipated Discharge Equipment and Recommendations   DC Equipment Recommendations Unable to determine at this time   Discharge Recommendations Recommend post-acute placement for additional physical therapy services prior to discharge home   Interdisciplinary Plan of Care Collaboration   IDT Collaboration with  Nursing;Family / Caregiver   Patient Position at End of Therapy Seated;Chair Alarm On;Call Light within Reach;Tray Table within Reach;Phone within Reach;Family / Friend in Room   Collaboration Comments RN updated   Session Information   Date / Session Number  12/3- 8 (1/4, 12/9)

## 2022-12-04 NOTE — CARE PLAN
Problem: Skin Integrity  Goal: Skin integrity is maintained or improved  Outcome: Progressing     Problem: Fall Risk  Goal: Patient will remain free from falls  Outcome: Progressing   The patient is Stable - Low risk of patient condition declining or worsening    Shift Goals  Clinical Goals: remain free of falls  Patient Goals: rest  Family Goals: SUNDAR    Progress made toward(s) clinical / shift goals:  Pt repositions self frequently. Pt educated about using call bell. Pt demonstrates waiting for staff before getting out of bed. Pt safely transports from bed to chair with staff.    Patient is not progressing towards the following goals:

## 2022-12-05 VITALS
HEIGHT: 63 IN | BODY MASS INDEX: 27.66 KG/M2 | HEART RATE: 79 BPM | DIASTOLIC BLOOD PRESSURE: 62 MMHG | TEMPERATURE: 98.2 F | WEIGHT: 156.09 LBS | RESPIRATION RATE: 17 BRPM | OXYGEN SATURATION: 94 % | SYSTOLIC BLOOD PRESSURE: 125 MMHG

## 2022-12-05 LAB
ANION GAP SERPL CALC-SCNC: 9 MMOL/L (ref 7–16)
BUN SERPL-MCNC: 30 MG/DL (ref 8–22)
CALCIUM SERPL-MCNC: 9.4 MG/DL (ref 8.5–10.5)
CHLORIDE SERPL-SCNC: 103 MMOL/L (ref 96–112)
CO2 SERPL-SCNC: 24 MMOL/L (ref 20–33)
CREAT SERPL-MCNC: 1.44 MG/DL (ref 0.5–1.4)
ERYTHROCYTE [DISTWIDTH] IN BLOOD BY AUTOMATED COUNT: 47.5 FL (ref 35.9–50)
GFR SERPLBLD CREATININE-BSD FMLA CKD-EPI: 38 ML/MIN/1.73 M 2
GLUCOSE SERPL-MCNC: 101 MG/DL (ref 65–99)
HCT VFR BLD AUTO: 29.1 % (ref 37–47)
HGB BLD-MCNC: 9.1 G/DL (ref 12–16)
MCH RBC QN AUTO: 29.8 PG (ref 27–33)
MCHC RBC AUTO-ENTMCNC: 31.3 G/DL (ref 33.6–35)
MCV RBC AUTO: 95.4 FL (ref 81.4–97.8)
PLATELET # BLD AUTO: 294 K/UL (ref 164–446)
PMV BLD AUTO: 8.9 FL (ref 9–12.9)
POTASSIUM SERPL-SCNC: 4.6 MMOL/L (ref 3.6–5.5)
PREALB SERPL-MCNC: 30.8 MG/DL (ref 18–38)
RBC # BLD AUTO: 3.05 M/UL (ref 4.2–5.4)
SARS-COV+SARS-COV-2 AG RESP QL IA.RAPID: NOTDETECTED
SODIUM SERPL-SCNC: 136 MMOL/L (ref 135–145)
SPECIMEN SOURCE: NORMAL
WBC # BLD AUTO: 8 K/UL (ref 4.8–10.8)

## 2022-12-05 PROCEDURE — 85027 COMPLETE CBC AUTOMATED: CPT

## 2022-12-05 PROCEDURE — 80048 BASIC METABOLIC PNL TOTAL CA: CPT

## 2022-12-05 PROCEDURE — 87426 SARSCOV CORONAVIRUS AG IA: CPT

## 2022-12-05 PROCEDURE — A9270 NON-COVERED ITEM OR SERVICE: HCPCS | Performed by: STUDENT IN AN ORGANIZED HEALTH CARE EDUCATION/TRAINING PROGRAM

## 2022-12-05 PROCEDURE — A9270 NON-COVERED ITEM OR SERVICE: HCPCS | Performed by: INTERNAL MEDICINE

## 2022-12-05 PROCEDURE — 700102 HCHG RX REV CODE 250 W/ 637 OVERRIDE(OP): Performed by: STUDENT IN AN ORGANIZED HEALTH CARE EDUCATION/TRAINING PROGRAM

## 2022-12-05 PROCEDURE — 36415 COLL VENOUS BLD VENIPUNCTURE: CPT

## 2022-12-05 PROCEDURE — 84134 ASSAY OF PREALBUMIN: CPT

## 2022-12-05 PROCEDURE — 700102 HCHG RX REV CODE 250 W/ 637 OVERRIDE(OP): Performed by: INTERNAL MEDICINE

## 2022-12-05 PROCEDURE — 99239 HOSP IP/OBS DSCHRG MGMT >30: CPT | Performed by: STUDENT IN AN ORGANIZED HEALTH CARE EDUCATION/TRAINING PROGRAM

## 2022-12-05 RX ORDER — CARVEDILOL 12.5 MG/1
12.5 TABLET ORAL 2 TIMES DAILY WITH MEALS
Qty: 60 TABLET | Status: SHIPPED
Start: 2022-12-05 | End: 2022-12-23 | Stop reason: SDUPTHER

## 2022-12-05 RX ADMIN — AMIODARONE HYDROCHLORIDE 200 MG: 200 TABLET ORAL at 05:58

## 2022-12-05 RX ADMIN — CARVEDILOL 12.5 MG: 12.5 TABLET, FILM COATED ORAL at 08:07

## 2022-12-05 RX ADMIN — APIXABAN 5 MG: 5 TABLET, FILM COATED ORAL at 05:58

## 2022-12-05 RX ADMIN — OMEPRAZOLE 40 MG: 20 CAPSULE, DELAYED RELEASE ORAL at 05:58

## 2022-12-05 RX ADMIN — HYDROXYCHLOROQUINE SULFATE 200 MG: 200 TABLET ORAL at 08:07

## 2022-12-05 RX ADMIN — ASPIRIN 81 MG 81 MG: 81 TABLET ORAL at 05:58

## 2022-12-05 RX ADMIN — ACETAMINOPHEN 1000 MG: 500 TABLET ORAL at 00:39

## 2022-12-05 ASSESSMENT — PAIN DESCRIPTION - PAIN TYPE: TYPE: ACUTE PAIN

## 2022-12-05 NOTE — CARE PLAN
The patient is Stable - Low risk of patient condition declining or worsening    Shift Goals  Clinical Goals: Patient safety, VSS, comfort, sleep  Patient Goals: rest, find rehab that she likes  Family Goals: sergey    Progress made toward(s) clinical / shift goals:    Problem: Knowledge Deficit - Standard  Goal: Patient and family/care givers will demonstrate understanding of plan of care, disease process/condition, diagnostic tests and medications  Outcome: Progressing     Problem: Skin Integrity  Goal: Skin integrity is maintained or improved  Outcome: Progressing     Problem: Fall Risk  Goal: Patient will remain free from falls  Outcome: Progressing     Problem: Pain - Standard  Goal: Alleviation of pain or a reduction in pain to the patient’s comfort goal  Outcome: Progressing

## 2022-12-05 NOTE — PROGRESS NOTES
Pt refused lab drawing this morning, says she is discharging tomorrow and feels like she doesn't need them.

## 2022-12-05 NOTE — PROGRESS NOTES
Pt DC via transporter and stretcher. Pt sister took all belongings with her. Report given to ambulance staff

## 2022-12-05 NOTE — CARE PLAN
Problem: Knowledge Deficit - Standard  Goal: Patient and family/care givers will demonstrate understanding of plan of care, disease process/condition, diagnostic tests and medications  Outcome: Progressing     Problem: Skin Integrity  Goal: Skin integrity is maintained or improved  Outcome: Progressing     Problem: Fall Risk  Goal: Patient will remain free from falls  Outcome: Progressing     Problem: Pain - Standard  Goal: Alleviation of pain or a reduction in pain to the patient’s comfort goal  Outcome: Progressing   The patient is Stable - Low risk of patient condition declining or worsening    Shift Goals  Clinical Goals: Patient safety, VSS, comfort, sleep  Patient Goals: rest, find rehab that she likes  Family Goals: sergey    Progress made toward(s) clinical / shift goals:  yes    Patient is not progressing towards the following goals:

## 2022-12-05 NOTE — THERAPY
Missed Therapy     Patient Name: Lauren Dave  Age:  72 y.o., Sex:  female  Medical Record #: [id number]  Today's Date: 12/5/2022    Attempted to see pt for OT session. Pt d/c'd to SNF this PM. Will continue to follow in case of change in POC.

## 2022-12-05 NOTE — PROGRESS NOTES
Hospital Medicine Daily Progress Note    Date of Service  12/4/2022    Chief Complaint  Right leg and left arm weakness after an open heart bypass    Hospital Course  This is a 72-year-old female with past medical significant for CAD, hypertension, history of breast cancer, dyslipidemia, chronic systolic heart failure, hypertension and chronic steroid, seronegative RA, gout was admitted for elective CABG on 11/10/2022.    Surgery went well but she was noted to have right lower extremity weakness after the surgery, CT head showed multiple areas of likely cerebral infarction.  MRI showed multiple areas of watershed type infarction, echocardiogram showed EF of 35%.  Neurology was consulted, neurology recommended Eliquis, patient is intolerant to statin.      While her being monitored in tele, she was  noted to have a second pause, cardiology consulted, patient underwent placement of PPM on 11/29/2022    Hospital course was complicated with acute blood loss anemia s/p transfusion, monitor hemoglobin, if less than 8, transfuse considering history of CAD.  Hospital course was complicated with paroxysmal atrial fibrillation, will continue amiodarone along with Eliquis 5 mg p.o. twice daily.      Interval events:    12/2: Vitals are stable.  Patient anxious to discharge.  Rehab reports they feel she would do better in skilled nursing facility.  Referral pending.  Patient is medically cleared.  Creatinine stable 1.58.  12/3: Patient and sister were reticent about going to a skilled nursing facility.  I discussed with them that there were multiple options at the moment they would prefer not to go to [facility] unless that their only choice.  I explained that they were denied from all the rehabs.  Patient continues to have some pain at the surgical site for the pacemaker but otherwise is doing well.  She is continuing to progress with therapy.  12/4: Patient and sister are happy today and agreeable to the plan to discharge to SNF.   They are hoping for heart stone.  I explained that they will not be able to discharge today and we will be working closely with them to make arrangements.  Patient's vitals have been stable and she is working hard on therapy.    I have discussed this patient's plan of care and discharge plan at IDT rounds today with Case Management, Nursing, Nursing leadership, and other members of the IDT team.    Consultants/Specialty  cardiovascular surgeon and neurology    Code Status  Full Code    Disposition  Patient is  medically stable  for discharge.   Anticipate discharge to acute rehab  I have placed the appropriate orders for post-discharge needs.    Review of Systems  Review of Systems   Constitutional:  Negative for chills, fever and weight loss.   Eyes:  Negative for blurred vision, double vision, photophobia and pain.   Respiratory:  Negative for cough, sputum production and shortness of breath.    Cardiovascular:  Negative for chest pain (Mild pain at the site of AICD placement), palpitations, orthopnea and leg swelling.   Gastrointestinal:  Negative for abdominal pain, constipation, diarrhea, nausea and vomiting.   Genitourinary:  Negative for dysuria, frequency and urgency.   Musculoskeletal:  Negative for back pain, joint pain, myalgias and neck pain.   Neurological:  Positive for weakness. Negative for dizziness, tingling, tremors, sensory change, speech change, focal weakness and headaches.   Psychiatric/Behavioral:  Negative for hallucinations and substance abuse. The patient is not nervous/anxious.    All other systems reviewed and are negative.     Physical Exam  Temp:  [36.4 °C (97.5 °F)-37.2 °C (99 °F)] 36.9 °C (98.4 °F)  Pulse:  [79-92] 79  Resp:  [16-19] 19  BP: (109-136)/(50-66) 136/66  SpO2:  [91 %-100 %] 100 %    Physical Exam  Vitals reviewed.   Constitutional:       General: She is not in acute distress.     Appearance: Normal appearance. She is not ill-appearing.   HENT:      Head: Normocephalic  and atraumatic.      Nose: No congestion.   Eyes:      General:         Right eye: No discharge.         Left eye: No discharge.      Pupils: Pupils are equal, round, and reactive to light.   Cardiovascular:      Rate and Rhythm: Normal rate and regular rhythm.      Pulses: Normal pulses.      Heart sounds: Normal heart sounds. No murmur heard.  Pulmonary:      Effort: Pulmonary effort is normal. No respiratory distress.      Breath sounds: Normal breath sounds. No stridor.      Comments: Site of AICD placement clean dry and intact.  Abdominal:      General: Bowel sounds are normal. There is no distension.      Palpations: Abdomen is soft.      Tenderness: There is no abdominal tenderness.   Musculoskeletal:         General: No swelling or tenderness.      Cervical back: Normal range of motion. No rigidity.   Skin:     General: Skin is warm.      Capillary Refill: Capillary refill takes less than 2 seconds.      Coloration: Skin is not jaundiced or pale.      Findings: No bruising.      Comments: CABG incision is healing well with no signs of infection.   Neurological:      General: No focal deficit present.      Mental Status: She is alert and oriented to person, place, and time.      Cranial Nerves: No cranial nerve deficit.      Comments: Motor 4/5 in bilateral lower extremities   Psychiatric:         Mood and Affect: Mood normal.         Behavior: Behavior normal.       Fluids    Intake/Output Summary (Last 24 hours) at 12/4/2022 1641  Last data filed at 12/4/2022 1200  Gross per 24 hour   Intake 120 ml   Output 500 ml   Net -380 ml         Laboratory  Recent Labs     12/02/22  0012   WBC 11.3*   RBC 3.13*   HEMOGLOBIN 9.5*   HEMATOCRIT 30.1*   MCV 96.2   MCH 30.4   MCHC 31.6*   RDW 49.0   PLATELETCT 480*   MPV 9.7       Recent Labs     12/02/22  0012   SODIUM 134*   POTASSIUM 4.6   CHLORIDE 101   CO2 22   GLUCOSE 112*   BUN 39*   CREATININE 1.58*   CALCIUM 9.6                     Imaging  DX-ESOPHAGUS -  [id number]   Final Result      DX-CHEST-PORTABLE (1 VIEW)   Final Result         1.  Left lower lobe atelectasis versus infiltrate, slightly decreased since prior study.   2.  Small left pleural effusion.   3.  Cardiomegaly   4.  Atherosclerosis      DX-CHEST-PORTABLE (1 VIEW)   Final Result      1.  Small left pleural effusion.      [id number] VIEW   Final Result      1.  There is a nonobstructive bowel gas pattern with a moderate amount of colonic stool.      DX-CHEST-LIMITED (1 VIEW)   Final Result      Stable chest with cardiac enlargement, bronchial thickening and left basilar atelectasis. Bronchial thickening most likely represents bronchitis but edema could be considered      IR-US GUIDED PIV   Final Result    Ultrasound-guided PERIPHERAL IV INSERTION performed by    qualified nursing staff as above.      DX-ABDOMEN FOR TUBE PLACEMENT   Final Result      Enteric tube tip projects over the stomach      DX-CHEST-PORTABLE (1 VIEW)   Final Result      1.  Tubes and lines in good position.      2.  Mild cardiomegaly with mild diffuse pulmonary edema with slight improvement since previous exam.      3.  Small left pleural effusion.      EC-ECHOCARDIOGRAM LTD W/O CONT   Final Result      DX-ABDOMEN FOR TUBE PLACEMENT   Final Result      Enteric tube tip projects over the stomach      DX-CHEST-PORTABLE (1 VIEW)   Final Result      1.  Layering bilateral pleural effusions with adjacent airspace disease.   2.  Increased interstitial edema.      DX-ABDOMEN FOR TUBE PLACEMENT   Final Result      Orogastric tube tip at the distal stomach.      DX-CHEST-PORTABLE (1 VIEW)   Final Result      1.  Removal of right IJ catheter.      2.  Right subclavian catheter unchanged in position.      3.  Mild cardiomegaly.      4.  Blunting of left costophrenic angle consistent with atelectasis, pneumonitis, and/or pleural effusion.      DX-ABDOMEN FOR TUBE PLACEMENT   Final Result      Enteric tube tip projects over the stomach  antrum      MR-BRAIN-W/O   Final Result      1.  BILATERAL mostly supratentorial areas of ischemia as described above. These appear to be primarily watershed zone infarctions.   2.  No hemorrhage   3.  No significant mass effect      DX-CHEST-PORTABLE (1 VIEW)   Final Result         1. No significant interval change.      DX-ABDOMEN FOR TUBE PLACEMENT   Final Result      Interval placement of enteric tube with its tip over the midline epigastrium compatible with position at the level of the gastric body.      CT-HEAD W/O   Final Result      1.  Likely subacute infarcts in the bilateral parieto-occipital regions.   2.  No acute intracranial hemorrhage.   3.  Bilateral maxillary sinus disease.      EC-DAVE W/O CONT   Final Result      DX-CHEST-PORTABLE (1 VIEW)   Final Result      Stable enlargement of the cardiomediastinal silhouette, mild diffuse interstitial edema and bilateral basilar atelectasis and/or consolidation.      DX-CHEST-PORTABLE (1 VIEW)   Final Result      Satisfactory postoperative appearance of the chest      CL-TEMPORARY PACEMAKER INSERT    (Results Pending)   CL-BIV IMPLANTABLE CARDIOVERTER DEFIBRILLATOR    (Results Pending)          Assessment/Plan  * Complete heart block (HCC)  Assessment & Plan  8-second pause on telemetry  Cardiology consulted for which patient was diagnosed for complete heart block  Permanent pacemaker placed 11/29/22      Dysphagia- (present on admission)  Assessment & Plan  SPL following, advanced on diet  Nutrition following    TAN (acute kidney injury) ([facility])  Assessment & Plan  11/29 BUN:42, Cr:1.97  Monitor I/O's, vitals, labs  Avoid nephrotoxic meds.    Acute blood loss anemia- (present on admission)  Assessment & Plan  From hematuria  Required transfusion  11/27 Hgb:9.8  Monitor cbc    CHF (congestive heart failure), NYHA class III, acute on chronic, combined ([facility])- (present on admission)  Assessment & Plan  Valsartan 40mg daily and metoprolol SR  Holding diuresis based  on renal function  Monitor I/O, labs, vitals    Acute respiratory failure with hypoxia (HCC)  Assessment & Plan  Resolved, now saturating well onRA    PAF (paroxysmal atrial fibrillation) (HCC)- (present on admission)  Assessment & Plan  Post-operative  Decrease the amiodarone dose as she has been loaded.  eliquis    Other specified anemias  Assessment & Plan  Acute blood loss due to hematuria  Hb has been kady thus stop daily phlebotomy and check as indicated.  Transfuse RBCs for hemoglobin less than 8.0 as she is s/p CABG.    Acute ischemic stroke (HCC)  Assessment & Plan  Post/perioperative event with multiple areas of watershed type infarction   --MRI obtained on 11/11, neurology consulted, recommended Eliquis.  Patient is intolerant to statin.  .  PT/OT/SLP evaluated, recommended postacute, physiatry referral in place    S/P CABG x 3  Assessment & Plan  As per cardiac surgery, optimization,, mobilization as tolerated,  Telemetry    Chronic obstructive pulmonary disease (COPD) (HCC)- (present on admission)  Assessment & Plan  Pre-existing, respiratory protocol,  Continuous respiratory therapy protocol.  Without exacerbation during admit  Quit smoking early 20's    Chronic systolic congestive heart failure (HCC) EF 35%- (present on admission)  Assessment & Plan  Treated to euvolemia, GDMT as tolerated  Ejection fraction 35% on DAVE.  Coreg and diovan   -- cards following     Coronary artery disease due to calcified coronary lesion - Calcifications seen on CT scan also wtih aortic ulceration- (present on admission)  Assessment & Plan  S/p CABG this admit  Apixaban, metoprolol, amiodarone, valsartan  Intolerant to Statins.    Dyslipidemia  Assessment & Plan  The patient with prior severe intolerance, apparently failed multiple regimens  Consider outpatient referral to [facility] treatment         I reviewed above some plan no acute changes.  I personally discussed case with cardiothoracic surgeon Dr. Mac and with  cardiologist Dr. Mayberry regarding patient's current medical condition.  They recommended to restart this patient to acute rehab.    I discussed plan of care with bedside RN, charge RN and case management and leadership regarding this patient discharge.    Time spend: 43 minutes. > 50 % time spend providing counseling / co ordination of care.      VTE prophylaxis: Eliquis

## 2022-12-05 NOTE — DISCHARGE PLANNING
Case Management Discharge Planning    Admission Date: 11/10/2022  GMLOS: 8.3  ALOS: 25    6-Clicks ADL Score: 12  6-Clicks Mobility Score: 12        Anticipated Discharge Dispo: Discharge Disposition: D/T to SNF with Medicare cert in anticipation of skilled care (03)    DME Needed: No    Action(s) Taken: Patient Conference    Escalations Completed: None    Medically Clear: Yes    Next Steps: RNCM met with patient at the bedside to notify them that IMM appeal had been denied.  RNCM shared a list of all facilities the patient had been referred to.  Patient now agreeable to [facility].  Mihai ANDERSEN called [facility] who stated they can accept the patient today.  [facility] will need new COVID - provider aware and ordered.  Results back, negative.  Ride line documents filled out and faxed.  Voalte message sent to [facility] - transport set up for 1500.  COBRA filled out and given to bedside RN along with transfer packet for documents.      Patient going to [facility] today at 1500 via [facility].      Barriers to Discharge: None

## 2022-12-05 NOTE — DISCHARGE PLANNING
Agency/Facility Name: [facility]  Outcome: DPA left v-mail requesting update on bed availability.     RN CM notified.     0920-  Agency/Facility Name: [facility]  Spoke To: Rey  Outcome: DPA informed that SNF is waiting to hear back from their infectious disease representative on whether they can take Pts, as they recently were on admissions hold due to flu.     RN CM notified.     1015-  Agency/Facility Name: [facility]  Spoke To: Rey  Outcome: DPA informed that SNF can take Pt today. SNF requesting RN CM arrange transport today for 1500, as SNF's transport services are not available. SNF reminded DPA that Pt will need negative covid test prior to DC.    RN CM notified.     1210-  Agency/Facility Name: [facility]  Spoke To: Rey  Outcome: DPA confirmed 1500 transport today.

## 2022-12-05 NOTE — DISCHARGE SUMMARY
Discharge Summary    CHIEF COMPLAINT ON ADMISSION  No chief complaint on file.      Reason for Admission  CORONARY ARTERY DISEASE    Admission Date  11/10/2022     CODE STATUS  Full Code    HPI & HOSPITAL COURSE        This is a 72-year-old female with past medical significant for CAD, hypertension, history of breast cancer, dyslipidemia, chronic systolic heart failure, hypertension and chronic steroid, seronegative RA, gout was admitted for elective CABG on 11/10/2022.    Surgery went well but she was noted to have right lower extremity weakness after the surgery, CT head showed multiple areas of likely cerebral infarction.  MRI showed multiple areas of watershed type infarction, echocardiogram showed EF of 35%.  Neurology was consulted, neurology recommended Eliquis, patient is intolerant to statin.      While her being monitored in tele, she was  noted to have a second pause, cardiology consulted, patient underwent placement of PPM on 11/29/2022    Hospital course was complicated with acute blood loss anemia s/p transfusion, monitor hemoglobin, if less than 8, transfuse considering history of CAD.  Hospital course was complicated with paroxysmal atrial fibrillation, will continue amiodarone along with Eliquis 5 mg p.o. twice daily.    Interval events:  -- No acute events overnight, medicine has been stable, heart rate of 85, blood pressure 97/50, sats are 92% on room air.  Patient is alert, awake, answering questions appropriately.  -- Patient will be continued elevated at 15.7    During the hospitalization stay patient is continue to show improvement with physical therapy.  She was evaluated by physiatry who felt that her therapy needs would likely last longer than what they would be able to provide.  Recommendation was for skilled nursing facility.  Patient will discharge to [facility].  Patient will discharge on [facility].  She reports has had allergies to ACE inhibitor's and ARB's.  We will hold off starting ACE  inhibitor's ARB's at this time and allow her renal function to improve and then a trial of an ARB can be started as an outpatient.    Therefore, she is discharged in good and stable condition to skilled nursing facility.    The patient met 2-midnight criteria for an inpatient stay at the time of discharge.      FOLLOW UP ITEMS POST DISCHARGE  See below    DISCHARGE DIAGNOSES  Principal Problem:    Complete heart block (HCC) POA: No  Active Problems:    Gastroesophageal reflux disease without esophagitis POA: Yes    Coronary artery disease due to calcified coronary lesion - Calcifications seen on CT scan also wtih aortic ulceration (Chronic) POA: Yes    Chronic systolic congestive heart failure ([facility]) EF 35% (Chronic) POA: Yes    Chronic obstructive pulmonary disease (COPD) ([facility]) POA: Yes    S/P CABG x 3 POA: Unknown    Acute ischemic stroke ([facility]) POA: Unknown    Other specified anemias POA: Unknown    PAF (paroxysmal atrial fibrillation) ([facility]) POA: Yes    Acute respiratory failure with hypoxia ([facility]) POA: Unknown    CHF (congestive heart failure), NYHA class III, acute on chronic, combined ([facility]) POA: Yes    HFrEF (heart failure with reduced ejection fraction) ([facility]) POA: Unknown    Acute blood loss anemia POA: Yes    Metabolic alkalemia POA: Unknown    TAN (acute kidney injury) ([facility]) POA: Unknown    Dysphagia POA: Yes  Resolved Problems:    Encounter for weaning from ventilator ([facility]) POA: Unknown    Encephalopathy acute POA: Unknown      FOLLOW UP  Future Appointments   Date Time Provider Department Center   12/6/2022 10:00 AM PACER CHECK-CAM B [facility] None   12/9/2022  2:30 PM ROMAIN LeijaRBetiN. [facility] None   12/12/2022  1:30 PM CT RESOURCE PROVIDER [facility] None   8/15/2023 10:00 AM YASMANY Guzman.PTRESSA [facility] None     No follow-up provider specified.    MEDICATIONS ON DISCHARGE     Medication List        START taking these medications        Instructions   acetaminophen 500 MG Tabs  Commonly known as: TYLENOL   Take  "2 Tablets by mouth every 8 hours as needed for Mild Pain.  Dose: 1,000 mg     amiodarone 200 MG Tabs  Commonly known as: Cordarone   Take 1 Tablet by mouth every day.  Dose: 200 mg     apixaban 5mg Tabs  Commonly known as: ELIQUIS   Take 1 Tablet by mouth 2 times a day. Indications: Thromboembolism secondary to Atrial Fibrillation  Dose: 5 mg     carvedilol 12.5 MG Tabs  Commonly known as: COREG   Take 1 Tablet by mouth 2 times a day with meals.  Dose: 12.5 mg            CHANGE how you take these medications        Instructions   famotidine 40 MG Tabs  What changed:   when to take this  reasons to take this  Commonly known as: PEPCID   Take 1 tablet by mouth twice daily            CONTINUE taking these medications        Instructions   aspirin 81 MG EC tablet   Take 1 Tablet by mouth every day.  Dose: 81 mg     hydrALAZINE 10 MG Tabs  Commonly known as: APRESOLINE   Take 1 Tablet by mouth 3 times a day as needed (/100).  Dose: 10 mg     hydroxychloroquine 200 MG Tabs  Commonly known as: Plaquenil   Take 1 tablet by mouth every day.  Dose: 200 mg     ibuprofen 800 MG Tabs  Commonly known as: MOTRIN   TAKE 1 TABLET BY MOUTH EVERY 8 HOURS AS NEEDED FOR MILD PAIN     VITAMIN B-12 PO   Take 1 Tablet by mouth every morning.  Dose: 1 Tablet     vitamin D3 125 MCG (5000 UT) Caps   Take 1 Capsule by mouth every day.  Dose: 1 Capsule            STOP taking these medications      amLODIPine 2.5 MG Tabs  Commonly known as: NORVASC     ampicillin 500 MG Caps  Commonly known as: PRINCIPEN     metoprolol SR 50 MG Tb24  Commonly known as: TOPROL XL              Allergies  Allergies   Allergen Reactions    Statins [Hmg-Coa-R Inhibitors] Myalgia     Muscle Spasms   RXN=unknown    Atorvastatin Myalgia    Codeine Vomiting and Nausea     Clarified with patient - states that she has an \"upset stomach\" when she takes it    Eggs Diarrhea     Pt states that she is allergic to eggs per her mother.  Tolerated clevidipine 11/2022    " "Lisinopril Cough    Losartan Unspecified     Tried in 2017  Cannot recall reaction    Penicillins Unspecified     \"does not work\" .'IMMUNE TO PENICILLIN,AMPICILLIN IS THE ONLY THING THAT WORKS'       DIET  Orders Placed This Encounter   Procedures    Diet Order Diet: Cardiac     Standing Status:   Standing     Number of Occurrences:   1     Order Specific Question:   Diet:     Answer:   Cardiac [6]       ACTIVITY  As tolerated and directed by skilled nursing.  Weight bearing as tolerated    LINES, DRAINS, AND WOUNDS  This is an automated list. Peripheral IVs will be removed prior to discharge.  Peripheral IV 11/30/22 20 G Left Forearm (Active)   Site Assessment Clean;Dry;Intact 12/05/22 0745   Dressing Type Transparent 12/05/22 0745   Line Status Scrubbed the hub prior to access;Flushed 12/05/22 0745   Dressing Status Clean;Dry;Intact 12/05/22 0745   Dressing Intervention N/A 12/04/22 2000   Infiltration Grading ([facility]) 0 12/05/22 0745   Phlebitis Scale ([facility]) 0 12/05/22 0745       Wound 11/10/22 Incision Leg Left towels and ACE wrap  (Active)   Site Assessment Clean;Dry;Intact 12/05/22 0745   Periwound Assessment Clean;Dry;Intact 12/05/22 0745   Margins Attached edges 12/05/22 0745   Closure Open to air 12/04/22 2000   Drainage Amount None 12/04/22 2000   Drainage Description SUNDAR 11/18/22 2000   Treatments Cleansed 11/30/22 1920   Wound Cleansing Foam Cleanser/Washcloth 11/30/22 1920   Dressing Cleansing/Solutions Not Applicable 11/30/22 1920   Dressing Options Open to Air 12/04/22 2000   Dressing Status Open to Air 12/04/22 2000   Dressing Change/Treatment Frequency Daily, and As Needed 12/01/22 2000       Wound 11/10/22 Incision Chest PREVENA  (Active)   Site Assessment Clean;Dry;Intact 12/05/22 0745   Periwound Assessment Clean;Dry;Intact 12/05/22 0745   Margins Attached edges 12/05/22 0745   Closure Open to air 12/04/22 2000   Drainage Amount None 12/04/22 2000   Drainage Description SUNDAR 11/30/22 " 1920   Treatments Site care 11/30/22 1920   Wound Cleansing Foam Cleanser/Washcloth 11/30/22 1920   Dressing Options Open to Air 12/04/22 2000   Dressing Changed Observed 11/30/22 1920   Dressing Status Open to Air 11/30/22 1920   Dressing Change/Treatment Frequency Daily, and As Needed 12/01/22 2000       Wound 11/13/22 Incision Abdomen Medial Chest tubes incisions (Active)   Site Assessment Clean;Dry;Intact 12/05/22 0745   Periwound Assessment Clean;Dry;Intact 12/05/22 0745   Margins Attached edges 12/05/22 0745   Closure Open to air 11/30/22 0850   Drainage Amount None 11/30/22 0850   Treatments Cleansed;Site care 11/25/22 1930   Wound Cleansing Soap and Water 11/28/22 1000   Dressing Options Open to Air 11/30/22 0850   Dressing Changed Observed 11/28/22 1000   Dressing Status Open to Air 11/30/22 0850       Peripheral IV 11/30/22 20 G Left Forearm (Active)   Site Assessment Clean;Dry;Intact 12/05/22 0745   Dressing Type Transparent 12/05/22 0745   Line Status Scrubbed the hub prior to access;Flushed 12/05/22 0745   Dressing Status Clean;Dry;Intact 12/05/22 0745   Dressing Intervention N/A 12/04/22 2000   Infiltration Grading ([facility]) 0 12/05/22 0745   Phlebitis Scale ([facility]) 0 12/05/22 0745               MENTAL STATUS ON TRANSFER   A&Ox3          CONSULTATIONS  Critical care-Dr Castro  EP-Dr Abdi  Cardiology-Dr Hamilton  Neurology-Dr Valencia  Physiatry-Dr Geller    PROCEDURES  11/29: Placement of AICD, placement of LV lead  11/28: Temporary transvenous pacemaker implantation, Ultrasound-guided vascular access  11/10: Coronary artery bypass grafting x3  Left internal mammary artery to left anterior descending artery  Reversed saphenous vein graft to obtuse marginal #2  Reversed saphenous vein graft to distal right coronary artery  Endo-vein procurement    LABORATORY  Lab Results   Component Value Date    SODIUM 134 (L) 12/02/2022    POTASSIUM 4.6 12/02/2022    CHLORIDE 101 12/02/2022    CO2 22  12/02/2022    GLUCOSE 112 (H) 12/02/2022    BUN 39 (H) 12/02/2022    CREATININE 1.58 (H) 12/02/2022        Lab Results   Component Value Date    WBC 11.3 (H) 12/02/2022    HEMOGLOBIN 9.5 (L) 12/02/2022    HEMATOCRIT 30.1 (L) 12/02/2022    PLATELETCT 480 (H) 12/02/2022        Total time of the discharge process exceeds 34 minutes.

## 2022-12-05 NOTE — DISCHARGE PLANNING
DC Transport Scheduled    Received request at: 12/5/2022 10:42AM    Transport Company Scheduled:  [employer]  Spoke with AUBRIE at [facility] to schedule transport.    Scheduled Date: 12/5/2022  Scheduled Time: 1500    Destination: [facility]    Notified care team of scheduled transport via Voalte.     If there are any changes needed to the DC transportation scheduled, please contact [facility] at ext. [phone] between the hours of 4255-0138 Mon-Fri. If outside those hours, contact the ED Case Manager at ext. [phone].

## 2022-12-05 NOTE — PROGRESS NOTES
Patient ready for discharge. All belongings collected, IV removed. Report to be called. Pt given discharge teaching. Pickup scheduled for 1500.

## 2022-12-09 ENCOUNTER — TELEPHONE (OUTPATIENT)
Dept: CARDIOLOGY | Facility: MEDICAL CENTER | Age: 72
End: 2022-12-09

## 2022-12-09 ENCOUNTER — OFFICE VISIT (OUTPATIENT)
Dept: CARDIOLOGY | Facility: MEDICAL CENTER | Age: 72
End: 2022-12-09
Payer: MEDICARE

## 2022-12-09 ENCOUNTER — DEVICE CHECK (OUTPATIENT)
Dept: CARDIOLOGY | Facility: MEDICAL CENTER | Age: 72
End: 2022-12-09

## 2022-12-09 VITALS
DIASTOLIC BLOOD PRESSURE: 58 MMHG | HEIGHT: 63 IN | BODY MASS INDEX: 25.69 KG/M2 | WEIGHT: 145 LBS | OXYGEN SATURATION: 96 % | SYSTOLIC BLOOD PRESSURE: 110 MMHG | RESPIRATION RATE: 12 BRPM | HEART RATE: 75 BPM

## 2022-12-09 DIAGNOSIS — Z78.9 STATIN INTOLERANCE: ICD-10-CM

## 2022-12-09 DIAGNOSIS — I24.0 ISCHEMIC HEART DISEASE DUE TO CORONARY ARTERY OBSTRUCTION (HCC): ICD-10-CM

## 2022-12-09 DIAGNOSIS — Z95.810 CARDIAC RESYNCHRONIZATION THERAPY DEFIBRILLATOR (CRT-D) IN PLACE: ICD-10-CM

## 2022-12-09 DIAGNOSIS — I50.22 CHRONIC SYSTOLIC CONGESTIVE HEART FAILURE (HCC): ICD-10-CM

## 2022-12-09 DIAGNOSIS — I63.9 ACUTE ISCHEMIC STROKE (HCC): ICD-10-CM

## 2022-12-09 DIAGNOSIS — I50.22 CHRONIC SYSTOLIC CONGESTIVE HEART FAILURE (HCC): Chronic | ICD-10-CM

## 2022-12-09 DIAGNOSIS — I25.9 ISCHEMIC HEART DISEASE DUE TO CORONARY ARTERY OBSTRUCTION (HCC): ICD-10-CM

## 2022-12-09 PROBLEM — Z85.3 HISTORY OF MALIGNANT NEOPLASM OF BREAST: Status: ACTIVE | Noted: 2020-08-17

## 2022-12-09 PROCEDURE — 99214 OFFICE O/P EST MOD 30 MIN: CPT | Performed by: NURSE PRACTITIONER

## 2022-12-09 RX ORDER — SACUBITRIL AND VALSARTAN 24; 26 MG/1; MG/1
1 TABLET, FILM COATED ORAL 2 TIMES DAILY
Qty: 60 TABLET | Refills: 6 | Status: SHIPPED | OUTPATIENT
Start: 2022-12-09 | End: 2023-03-14

## 2022-12-09 ASSESSMENT — FIBROSIS 4 INDEX: FIB4 SCORE: 2.21

## 2022-12-09 NOTE — PROGRESS NOTES
Cardiology Clinic Follow-up Note    Date of note:    12/9/2022  Primary Care Provider: Jenna Hernandez M.D.    Name:             Lauren Dave  YOB: 1950  MRN:               [id number]    CC: hospital F/U s/p CABGx 5, CVA, CRT    Primary Cardiologist: Dr Mayberry    Patient HPI:   Lauren Dave is a 72 y.o. female with current medical problems including multivessel CAD, s/p CABG x3 (LIMA to LAD, SVG to OM 2, SVG to RCA) with Dr. Mac on 11/10/22 complicated by watershed strokes and PAF (short period) postoperatively, cardiomyopathy with HFrEF, HTN, HLD, breast cancer s/p chemo and XRT right chest, CRT-D on 11/29/22    Interim History:  Ms. Dave was discharged to [facility] after leaving our [facility] on 12/5 and she experienced a very complicated hospital course. She underwent CABG x3, complicated by postoperative watershed strokes, HFrEF, and necessity of CRT-D placement.    [facility]  She is very impatient and not wanting to be at appointment, currently very uncomfortable with sitting up in wheelchair, needing to take a trip to the bathroom, uninterested in discussion regarding current symptoms and medications. She states she is used to laying down in the hospital bed, not doing much physical therapy at SNF.  She has not yet had her 1 week pacemaker check.    She denies palpitations, chest pain, shortness of breath, dyspnea on exertion, dizziness or syncopal episodes, orthopnea, PND, lower extremity swelling, and recent weight gain.  Only symptoms as she is tired and impatient.    Patient endorses medication compliance.     Review of systems:  All others systems reviewed and negative except for what is outlined in the above HPI    Past Medical History:   Diagnosis Date    Arthritis     Breast cancer (HCC) 08/07/2013    Bronchitis 2005    Cancer (HCC)     right breast cancer     Cataract     removed    Chronic systolic congestive  heart failure (HCC) EF 35% 10/12/2022    Coronary artery disease due to calcified coronary lesion - Calcifications seen on CT scan also wtih aortic ulceration     Dental disorder     tooth infection    Discoid lupus     Dyslipidemia     Tried atorvastatin, pravastatin, lovastatin, and Zetia.  All causes severe myalgias.  Just taking fish oil.      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     Ischemic heart disease due to coronary artery obstruction (HCC) - Severe 3vD on cath 10/19/2022    LBBB (left bundle branch block) 12/16/2014    Noted on prechemo EKG 9/2014, MUGA WNL    Pneumonia     Pseudogout     Scarlet fever     Unspecified hemorrhagic conditions     gums     Past Surgical History:   Procedure Laterality Date    MULTIPLE CORONARY ARTERY BYPASS ENDO VEIN HARVEST  11/10/2022    Procedure: CORONARY ARTERY BYPASS GRAFTING X 3, WITH LEFT INTERNAL MAMMARY ARTERY TAKEDOWN AND ENDOSCOPIC VEIN HARVEST LEFT GREATER SAPHENOUS VEIN, AND TRANSESOPHAGEAL ECHOCARDIOGRAM;  Surgeon: Padmini Mac M.D.;  Location: SURGERY [facility];  Service: Cardiothoracic    ECHOCARDIOGRAM, TRANSESOPHAGEAL, INTRAOPERATIVE  11/10/2022    Procedure: ECHOCARDIOGRAM, TRANSESOPHAGEAL, INTRAOPERATIVE;  Surgeon: Padmini Mac M.D.;  Location: SURGERY [facility];  Service: Cardiothoracic    OTHER CARDIAC SURGERY  11/2022    angiogram    CATH REMOVAL  02/24/2014    Performed by Aggie Burns M.D. at SURGERY SAME DAY [facility] ORS    MASTECTOMY  08/22/2013    Performed by Aggie Burns M.D. at SURGERY SAME DAY [facility] ORS    NODE BIOPSY SENTINEL  08/22/2013    Performed by Aggie Burns M.D. at SURGERY SAME DAY [facility] ORS    CATH PLACEMENT  08/22/2013    Performed by Aggie Burns M.D. at SURGERY SAME DAY [facility] ORS    US-NEEDLE CORE BX-BREAST PANEL  01/01/2013    right breast    COLONOSCOPY  01/01/2003    BREAST BIOPSY      TONSILLECTOMY       Family History   Problem  "Relation Age of Onset    Cancer Mother         possible thyroid and gynecological    Multiple Sclerosis Mother     Arthritis Father     Multiple Sclerosis Brother     Gout Brother     Heart Disease Paternal Grandmother     Diabetes Paternal Grandmother     Cancer Maternal Aunt         breast cancer- 60s    Diabetes Maternal Uncle     Heart Disease Maternal Grandmother     Heart Disease Maternal Grandfather         MI    Stroke Paternal Grandfather     Other Son         breast mass    Heart Disease Son         HEART MURMUR    Gout Son      Social History     Socioeconomic History    Marital status:      Spouse name: Not on file    Number of children: 2    Years of education: Not on file    Highest education level: Not on file   Occupational History     Employer: [employer]   Tobacco Use    Smoking status: Former     Packs/day: 1.00     Years: 0.00     Pack years: 0.00     Types: Cigarettes     Quit date: 10/1/2013     Years since quittin.1    Smokeless tobacco: Never   Vaping Use    Vaping Use: Never used   Substance and Sexual Activity    Alcohol use: No     Alcohol/week: 0.0 oz    Drug use: No    Sexual activity: Not on file     Comment: , 2 children, enemployed   Other Topics Concern    Not on file   Social History Narrative    ** Merged History Encounter **         Patient is a . Patient has 2 adult children. She is  from .           Social Determinants of Health     Financial Resource Strain: Not on file   Food Insecurity: Not on file   Transportation Needs: Not on file   Physical Activity: Not on file   Stress: Not on file   Social Connections: Not on file   Intimate Partner Violence: Not on file   Housing Stability: Not on file     Allergies   Allergen Reactions    Statins [Hmg-Coa-R Inhibitors] Myalgia     Muscle Spasms   RXN=unknown    Atorvastatin Myalgia    Codeine Vomiting and Nausea     Clarified with patient - states that she has an \"upset stomach\" when she " "takes it    Eggs Diarrhea     Pt states that she is allergic to eggs per her mother.  Tolerated clevidipine 11/2022    Lisinopril Cough    Losartan Unspecified     Tried in 2017  Cannot recall reaction    Penicillins Unspecified     \"does not work\" .'IMMUNE TO PENICILLIN,AMPICILLIN IS THE ONLY THING THAT WORKS'     Current Outpatient Medications   Medication Sig Dispense Refill    sacubitril-valsartan (ENTRESTO) 24-26 MG Tab Take 1 Tablet by mouth 2 times a day. 60 Tablet 6    carvedilol (COREG) 12.5 MG Tab Take 1 Tablet by mouth 2 times a day with meals. 60 Tablet     apixaban (ELIQUIS) 5mg Tab Take 1 Tablet by mouth 2 times a day. Indications: Thromboembolism secondary to Atrial Fibrillation 60 Tablet 0    amiodarone (CORDARONE) 200 MG Tab Take 1 Tablet by mouth every day. 30 Tablet 0    acetaminophen (TYLENOL) 500 MG Tab Take 2 Tablets by mouth every 8 hours as needed for Mild Pain. 30 Tablet 0    aspirin 81 MG EC tablet Take 1 Tablet by mouth every day. 100 Tablet 0    hydrALAZINE (APRESOLINE) 10 MG Tab Take 1 Tablet by mouth 3 times a day as needed (/100). 25 Tablet 11    hydroxychloroquine (PLAQUENIL) 200 MG Tab Take 1 tablet by mouth every day. 30 tablet 0    famotidine (PEPCID) 40 MG Tab Take 1 tablet by mouth twice daily (Patient taking differently: Take 40 mg by mouth as needed.) 180 tablet 1    Cyanocobalamin (VITAMIN B-12 PO) Take 1 Tablet by mouth every morning.      ibuprofen (MOTRIN) 800 MG Tab TAKE 1 TABLET BY MOUTH EVERY 8 HOURS AS NEEDED FOR MILD PAIN 90 Tab 0    Cholecalciferol (VITAMIN D3) 5000 units Cap Take 1 Capsule by mouth every day.       No current facility-administered medications for this visit.     Physical Exam:  Ambulatory Vitals  /58 (BP Location: Left arm, Patient Position: Sitting, BP Cuff Size: Adult)   Pulse 75   Resp 12   Ht 1.6 m (5' 3\")   Wt 65.8 kg (145 lb)   SpO2 96%    BP Readings from Last 4 Encounters:   12/09/22 110/58   12/05/22 125/62   11/01/22 122/62 "   10/20/22 118/58       Weight/BMI: Body mass index is 25.69 kg/m².  Wt Readings from Last 4 Encounters:   12/09/22 65.8 kg (145 lb)   12/04/22 70.8 kg (156 lb 1.4 oz)   11/11/22 74.8 kg (164 lb 14.5 oz)   11/01/22 66.8 kg (147 lb 3.2 oz)     General: No apparent distress. Well nourished.   Neck: No JVD. No caroid bruits, trachea midline  Lungs: CTAB. Normal effort, without crackles/rhonchi, no wheezing  Heart: RRR. Normal S1/S2, NO murmur, no rub. no lower extremity edema. 2+ radial pulses, 2+ DT pulses  Chest: tegaderm removed, ecchymosis present, steristrips intact, no evidence of infection  Ext: No clubbing or cyanosis.  Abdomen: soft, non tender, non distended, no mike hepatomegaly.  Neurological: No focal deficits, no facial asymmetry.  Normal speech.  Psychiatric: Appropriate affect, alert and oriented x 4.   Skin: Warm and dry, no rash.    Lab Data Review:  Lab Results   Component Value Date/Time    CHOLSTRLTOT 161 11/14/2022 02:26 AM    LDL 76 11/14/2022 02:26 AM    HDL 54 11/14/2022 02:26 AM    TRIGLYCERIDE 154 (H) 11/14/2022 02:26 AM       Lab Results   Component Value Date/Time    SODIUM 136 12/05/2022 10:37 AM    POTASSIUM 4.6 12/05/2022 10:37 AM    CHLORIDE 103 12/05/2022 10:37 AM    CO2 24 12/05/2022 10:37 AM    GLUCOSE 101 (H) 12/05/2022 10:37 AM    BUN 30 (H) 12/05/2022 10:37 AM    CREATININE 1.44 (H) 12/05/2022 10:37 AM     Lab Results   Component Value Date/Time    ALKPHOSPHAT 47 11/07/2022 10:53 AM    ASTSGOT 35 11/07/2022 10:53 AM    ALTSGPT 15 11/07/2022 10:53 AM    TBILIRUBIN 0.2 11/07/2022 10:53 AM      Lab Results   Component Value Date/Time    WBC 8.0 12/05/2022 10:37 AM       Cardiac Imaging and Procedures Review:      EKG/30/22: My Personal interpretation reveals A sensed V paced 81    Echo 11/17/22:  CONCLUSIONS  Compared to the prior study on 10/12/22, no major changes  The left ventricular ejection fraction is visually estimated to be 30%.    [facility] 10/19/22:  Postoperative  diagnosis:  Calcific multivessel CAD (ostial LAD, mid LCx, pRCA)  LVEDP 12 mmHg     Recommendations:  Guideline directed medical therapy   Cardiovascular Risk factor modification  CABG    EP procedure note  2022  Successful CRT-D implantation, [employer]    Assessment and Clinical Decision Makin. Chronic systolic congestive heart failure (HCC) EF 35%  sacubitril-valsartan (ENTRESTO) 24-26 MG Tab    Referral to Lipid Clinic    Basic Metabolic Panel    EC-ECHOCARDIOGRAM COMPLETE W/O CONT      2. Acute ischemic stroke (HCC)  Referral to Neurology      3. Statin intolerance  Referral to Lipid Clinic      4. Cardiac resynchronization therapy defibrillator (CRT-D) in place  EC-ECHOCARDIOGRAM COMPLETE W/O CONT        The following treatment plan was discussed    HFrEF, ACC Stage C,NYHA Class III, LVEF 35%:   -Heart failure due to ischemic cardiomyopathy  -ACE-I/ARB/ARNI: Per allergies has had cough with lisinopril, will attempt to start on Entresto per Dr. Mayberry's recommendations during hospitalization, and states that if it is too expensive she will not take it, losartan may be an option as she is unsure why she did not tolerate this back in 2017  -Evidence Based Beta-blocker: Continue carvedilol to be 5 mg bid   -Aldosterone Antagonist (if EF/<35): Unable to prescribe MRA due to CKD, if this continues to improve we may be able to initiate in the near future  -Diuretic: Euvolemic on exam  -Labs: bmp  -Repeat Echo soon to assess for improvement of EF with CRT-D  -Reinforced s/sx of worsening heart failure with patient and weight monitoring. Pt verbalizes understanding. Pt to call office or RTC if present.    -PUMP line number [phone] (PUMP)    CRT-D  -Check today during appointment, with normal function  -Tegaderm dressing removed, Steri-Strips intact, no signs or symptoms of infection  -Recheck in 5 to 6 weeks    Acute ischemic strokes  -Referral for neurology has history concerned about patient's current mood  and severe impatience    Statin intolerance  -Referral to lipid clinic    Plan reviewed in detail with the patient, verbalizes understanding and is in agreement.  Pt is to follow up with DR Mayberry in 3 months    Encouraged Pt to follow up with us over the phone or electronically using my [employer] as cardiac issues/concerns arise.      PLEASE NOTE: This dictation was created using voice recognition software. I have made every reasonable attempt to correct obvious errors, but I expect that there are errors of grammar and possibly content that I did not discover before finalizing the note.       DAILY Leija.   [facility]  [phone]    Collaborating Physician: Dr. Sawyer

## 2022-12-12 ENCOUNTER — TELEPHONE (OUTPATIENT)
Dept: CARDIOTHORACIC SURGERY | Facility: MEDICAL CENTER | Age: 72
End: 2022-12-12

## 2022-12-12 ENCOUNTER — APPOINTMENT (OUTPATIENT)
Dept: CARDIOTHORACIC SURGERY | Facility: MEDICAL CENTER | Age: 72
End: 2022-12-12
Payer: MEDICARE

## 2022-12-12 NOTE — TELEPHONE ENCOUNTER
Patient called asking for anxiety meds for sleep and anxiety. I spoke with Kevyn Kramer Nurse Practioner and since patient is current in rehab, the doctor at rehab will have to prescribe the medications since that is not a medication through cardiac surgery. Patient said the doctor at rehab wont prescribe this to her and if her cardiologist will prescribe it. I told her I cant speak for her cardiologist but she could call them and ask. No further questions or concerns

## 2022-12-14 ENCOUNTER — TELEPHONE (OUTPATIENT)
Dept: HEALTH INFORMATION MANAGEMENT | Facility: OTHER | Age: 72
End: 2022-12-14
Payer: MEDICARE

## 2022-12-19 ENCOUNTER — TELEPHONE (OUTPATIENT)
Dept: CARDIOLOGY | Facility: MEDICAL CENTER | Age: 72
End: 2022-12-19
Payer: MEDICARE

## 2022-12-19 DIAGNOSIS — I24.0 ISCHEMIC HEART DISEASE DUE TO CORONARY ARTERY OBSTRUCTION (HCC): ICD-10-CM

## 2022-12-19 DIAGNOSIS — I25.9 ISCHEMIC HEART DISEASE DUE TO CORONARY ARTERY OBSTRUCTION (HCC): ICD-10-CM

## 2022-12-19 DIAGNOSIS — I50.22 CHRONIC SYSTOLIC CONGESTIVE HEART FAILURE (HCC): ICD-10-CM

## 2022-12-20 NOTE — TELEPHONE ENCOUNTER
----- Message from Jarrod Mayberry M.D. sent at 12/14/2022  2:40 PM PST -----  Regarding: FW: satinder  Change her echo order to have it done prior to her appointment in early March she can do at the same day if that is available urgent or stat if required for scheduling  ----- Message -----  From: Angelina Sampson  Sent: 12/14/2022   2:23 PM PST  To: Jarrod Mayberry M.D.  Subject: clarifaction                                     12/14/22 Raimundo Campos we are booked until April for the echocardiogram, are you ok with patient being alicia that far out since you future dated it for 03/09, if you are wanting to get her sooner as in march you would need to update order status. Thank you so much for your time.

## 2022-12-22 ENCOUNTER — TELEPHONE (OUTPATIENT)
Dept: CARDIOLOGY | Facility: MEDICAL CENTER | Age: 72
End: 2022-12-22
Payer: MEDICARE

## 2022-12-22 DIAGNOSIS — I50.43 CHF (CONGESTIVE HEART FAILURE), NYHA CLASS III, ACUTE ON CHRONIC, COMBINED (HCC): ICD-10-CM

## 2022-12-22 DIAGNOSIS — I48.0 PAF (PAROXYSMAL ATRIAL FIBRILLATION) (HCC): ICD-10-CM

## 2022-12-23 PROCEDURE — RXMED WILLOW AMBULATORY MEDICATION CHARGE: Performed by: NURSE PRACTITIONER

## 2022-12-23 RX ORDER — CARVEDILOL 12.5 MG/1
12.5 TABLET ORAL 2 TIMES DAILY WITH MEALS
Qty: 180 TABLET | Refills: 0 | Status: SHIPPED | OUTPATIENT
Start: 2022-12-23 | End: 2023-05-01

## 2022-12-23 NOTE — TELEPHONE ENCOUNTER
Called [facility] ext [phone]   S/w Anisa. There are samples available. Rx sent to [facility].     apixaban (ELIQUIS) 5mg Tab 60 Tablet 0/0 12/23/2022     Sig - Route: Take 1 Tablet by mouth 2 times a day. Indications: Thromboembolism secondary to Atrial Fibrillation - Oral    Sent to pharmacy as: Apixaban 5 MG Oral Tablet (ELIQUIS)    Notes to Pharmacy: Please provide samples. Awaiting pt assistance.    Cosign for Ordering: Required by YULISSA Leija      Pharmacy    [facility]     ---------------------------------------------------------------  Called pt  [phone]  Advised pt of samples available. Pt in agreement. Provided address, phone number, and hours of pharmacy. Pt verbalized understanding. Pt requests Coreg Rx [facility] ref[facility]. Pt requests eliquis pt assistance paperwork be mailed to home address. Home address verified. Pt verbalized understanding.       carvedilol (COREG) 12.5 MG Tab 180 Tablet 0/0 12/23/2022     Sig - Route: Take 1 Tablet by mouth 2 times a day with meals. - Oral    Sent to pharmacy as: Carvedilol 12.5 MG Oral Tablet (COREG)    Cosign for Ordering: Required by YULISSA Leija    E-Prescribing Status: Receipt confirmed by pharmacy (12/23/2022 10:19 AM PST)      Pharmacy    [address], [place] - [address] [facility]

## 2022-12-23 NOTE — TELEPHONE ENCOUNTER
MR    Caller: Lauren    Topic/issue: Pt called and stated she cannot afford the script for apixaban (ELIQUIS) 5mg Tab and would like to know if there is an alternative. She stated she was just released from the hospital and not sure if she should be taking the script for carvedilol (COREG) 12.5 MG Tab    Callback Number: [phone]

## 2022-12-27 ENCOUNTER — TELEPHONE (OUTPATIENT)
Dept: CARDIOTHORACIC SURGERY | Facility: MEDICAL CENTER | Age: 72
End: 2022-12-27

## 2022-12-28 ENCOUNTER — PHARMACY VISIT (OUTPATIENT)
Dept: PHARMACY | Facility: MEDICAL CENTER | Age: 72
End: 2022-12-28
Payer: COMMERCIAL

## 2023-01-02 ENCOUNTER — HOSPITAL ENCOUNTER (OUTPATIENT)
Dept: CARDIOLOGY | Facility: MEDICAL CENTER | Age: 73
End: 2023-01-02
Attending: NURSE PRACTITIONER
Payer: MEDICARE

## 2023-01-04 ENCOUNTER — HOSPITAL ENCOUNTER (OUTPATIENT)
Dept: CARDIOLOGY | Facility: MEDICAL CENTER | Age: 73
End: 2023-01-04
Attending: NURSE PRACTITIONER
Payer: MEDICARE

## 2023-01-04 ENCOUNTER — OFFICE VISIT (OUTPATIENT)
Dept: NEUROLOGY | Facility: MEDICAL CENTER | Age: 73
End: 2023-01-04
Attending: PSYCHIATRY & NEUROLOGY
Payer: MEDICARE

## 2023-01-04 VITALS
WEIGHT: 135 LBS | RESPIRATION RATE: 18 BRPM | HEIGHT: 63 IN | HEART RATE: 75 BPM | BODY MASS INDEX: 23.92 KG/M2 | SYSTOLIC BLOOD PRESSURE: 118 MMHG | TEMPERATURE: 98.9 F | DIASTOLIC BLOOD PRESSURE: 78 MMHG | OXYGEN SATURATION: 96 %

## 2023-01-04 DIAGNOSIS — Z95.810 CARDIAC RESYNCHRONIZATION THERAPY DEFIBRILLATOR (CRT-D) IN PLACE: ICD-10-CM

## 2023-01-04 DIAGNOSIS — Z86.73 CEREBRAL INFARCTION, WATERSHED DISTRIBUTION, BILATERAL, CHRONIC: ICD-10-CM

## 2023-01-04 DIAGNOSIS — I50.22 CHRONIC SYSTOLIC CONGESTIVE HEART FAILURE (HCC): Chronic | ICD-10-CM

## 2023-01-04 LAB
LV EJECT FRACT  99904: 30
LV EJECT FRACT MOD 2C 99903: 32.27
LV EJECT FRACT MOD 4C 99902: 41.72
LV EJECT FRACT MOD BP 99901: 38.32

## 2023-01-04 PROCEDURE — 93306 TTE W/DOPPLER COMPLETE: CPT

## 2023-01-04 PROCEDURE — 99215 OFFICE O/P EST HI 40 MIN: CPT | Performed by: PSYCHIATRY & NEUROLOGY

## 2023-01-04 PROCEDURE — 93306 TTE W/DOPPLER COMPLETE: CPT | Mod: 26 | Performed by: INTERNAL MEDICINE

## 2023-01-04 ASSESSMENT — PATIENT HEALTH QUESTIONNAIRE - PHQ9: CLINICAL INTERPRETATION OF PHQ2 SCORE: 0

## 2023-01-04 ASSESSMENT — FIBROSIS 4 INDEX: FIB4 SCORE: 2.21

## 2023-01-04 NOTE — PROGRESS NOTES
Chief Complaint   Patient presents with    New Patient       History of present illness:  Lauren Dave 72 y.o. 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  On 11/11/22 after she was extubated, it was noted she has no movement of right leg and has decreased movement of left upper extremity with right gaze deviation.  Brain CT obtained which revealed subacute infarcts in the bilateral parietal-occipital head region.       Overall, she is doing well. She gets angry more easily than before. She is not working currently. She is a [employer]  but is taking time off of work due to the stroke. She has resumed using a cane to ambulate. She has weakness of the right leg and the left arm that has improved since onset but has not resolved back to normal. She has blurry vision.     She has insomnia and generalized anxiety. She has nights where she is unable to sleep.     Past medical history:   Past Medical History:   Diagnosis Date    Arthritis     Breast cancer (HCC) 08/07/2013    Bronchitis 2005    Cancer (HCC)     right breast cancer     Cataract     removed    Chronic systolic congestive heart failure (HCC) EF 35% 10/12/2022    Coronary artery disease due to calcified coronary lesion - Calcifications seen on CT scan also wtih aortic ulceration     Dental disorder     tooth infection    Discoid lupus     Dyslipidemia     Tried atorvastatin, pravastatin, lovastatin, and Zetia.  All causes severe myalgias.  Just taking fish oil.      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     Ischemic heart disease due to coronary artery obstruction (HCC) - Severe 3vD on cath 10/19/2022    LBBB (left bundle branch block) 12/16/2014    Noted on prechemo EKG 9/2014, MUGA WNL    Pneumonia     Pseudogout     Scarlet  fever     Unspecified hemorrhagic conditions     gums       Past surgical history:   Past Surgical History:   Procedure Laterality Date    MULTIPLE CORONARY ARTERY BYPASS ENDO VEIN HARVEST  11/10/2022    Procedure: CORONARY ARTERY BYPASS GRAFTING X 3, WITH LEFT INTERNAL MAMMARY ARTERY TAKEDOWN AND ENDOSCOPIC VEIN HARVEST LEFT GREATER SAPHENOUS VEIN, AND TRANSESOPHAGEAL ECHOCARDIOGRAM;  Surgeon: Padmini Mac M.D.;  Location: SURGERY [facility];  Service: Cardiothoracic    ECHOCARDIOGRAM, TRANSESOPHAGEAL, INTRAOPERATIVE  11/10/2022    Procedure: ECHOCARDIOGRAM, TRANSESOPHAGEAL, INTRAOPERATIVE;  Surgeon: Padmini Mac M.D.;  Location: SURGERY [facility];  Service: Cardiothoracic    OTHER CARDIAC SURGERY  11/2022    angiogram    CATH REMOVAL  02/24/2014    Performed by Aggie Burns M.D. at [facility]    MASTECTOMY  08/22/2013    Performed by Aggie Burns M.D. at [facility]    NODE BIOPSY SENTINEL  08/22/2013    Performed by Aggie Burns M.D. at [facility]    CATH PLACEMENT  08/22/2013    Performed by Aggie Burns M.D. at [facility]    US-NEEDLE CORE BX-BREAST PANEL  01/01/2013    right breast    COLONOSCOPY  01/01/2003    BREAST BIOPSY      TONSILLECTOMY         Family history:   Family History   Problem Relation Age of Onset    Cancer Mother         possible thyroid and gynecological    Multiple Sclerosis Mother     Arthritis Father     Multiple Sclerosis Brother     Gout Brother     Heart Disease Paternal Grandmother     Diabetes Paternal Grandmother     Cancer Maternal Aunt         breast cancer- 60s    Diabetes Maternal Uncle     Heart Disease Maternal Grandmother     Heart Disease Maternal Grandfather         MI    Stroke Paternal Grandfather     Other Son         breast mass    Heart Disease Son         HEART MURMUR    Gout Son        Social history:   Social History     Socioeconomic History    Marital status:  "     Spouse name: Not on file    Number of children: 2    Years of education: Not on file    Highest education level: Not on file   Occupational History     Employer: [employer]   Tobacco Use    Smoking status: Former     Packs/day: 1.00     Years: 0.00     Pack years: 0.00     Types: Cigarettes     Quit date: 10/1/2013     Years since quittin.2    Smokeless tobacco: Never   Vaping Use    Vaping Use: Never used   Substance and Sexual Activity    Alcohol use: No     Alcohol/week: 0.0 oz    Drug use: No    Sexual activity: Not on file     Comment: , 2 children, enemployed   Other Topics Concern    Not on file   Social History Narrative    ** Merged History Encounter **         Patient is a . Patient has 2 adult children. She is  from .           Social Determinants of Health     Financial Resource Strain: Not on file   Food Insecurity: Not on file   Transportation Needs: Not on file   Physical Activity: Not on file   Stress: Not on file   Social Connections: Not on file   Intimate Partner Violence: Not on file   Housing Stability: Not on file       Current medications:   Current Outpatient Medications   Medication    apixaban (ELIQUIS) 5mg Tab    carvedilol (COREG) 12.5 MG Tab    sacubitril-valsartan (ENTRESTO) 24-26 MG Tab    amiodarone (CORDARONE) 200 MG Tab    acetaminophen (TYLENOL) 500 MG Tab    aspirin 81 MG EC tablet    hydrALAZINE (APRESOLINE) 10 MG Tab    hydroxychloroquine (PLAQUENIL) 200 MG Tab    famotidine (PEPCID) 40 MG Tab    Cyanocobalamin (VITAMIN B-12 PO)    ibuprofen (MOTRIN) 800 MG Tab    Cholecalciferol (VITAMIN D3) 5000 units Cap     No current facility-administered medications for this visit.       Medication Allergy:  Allergies   Allergen Reactions    Statins [Hmg-Coa-R Inhibitors] Myalgia     Muscle Spasms   RXN=unknown    Albumin     Atorvastatin Myalgia    Codeine Vomiting and Nausea     Clarified with patient - states that she has an \"upset " "stomach\" when she takes it    Eggs Diarrhea     Pt states that she is allergic to eggs per her mother.  Tolerated clevidipine 11/2022    Lisinopril Cough    Losartan Unspecified     Tried in 2017  Cannot recall reaction    Penicillins Unspecified     \"does not work\" .'IMMUNE TO PENICILLIN,AMPICILLIN IS THE ONLY THING THAT WORKS'       Physical examination:   Vitals:    01/04/23 1256   BP: 118/78   BP Location: Left arm   Patient Position: Sitting   BP Cuff Size: Adult   Pulse: 75   Resp: 18   Temp: 37.2 °C (98.9 °F)   TempSrc: Temporal   SpO2: 96%   Weight: 61.2 kg (135 lb)   Height: 1.6 m (5' 3\")       Current NIHSS    1a. LOC: 0  1b. LOC Questions: 0  1c. LOC Commands: 0  2. Best Gaze: 0  3. Visual Fields: 0  4. Facial Paresis: 0  5a. Motor arm left: 0  5b. Motor arm right: 0  6a. Motor leg left: 0  6b. Motor leg right: 1  7. Sensory: 0  8. Best Language: 0  9. Limb Ataxia: 0  10. Dysarthria: 0  11. Extinction/Inattention: 0    Total Score Current 1    Labs:  I reviewed the following labs personally:  None     Imaging:   US carotid    FINDINGS   Bilateral-   Heterogeneous plaque of the bifurcation extending into the internal carotid    artery. <50% internal carotid artery stenosis.    Plaque is irregular on the surface and heterogeneous with mixed    echogenicity.    MRI brain   I reviewed the images personally and agree with the following read:     IMPRESSION:     1.  BILATERAL mostly supratentorial areas of ischemia as described above. These appear to be primarily watershed zone infarctions.  2.  No hemorrhage  3.  No significant mass effect    ASSESSMENT AND PLAN:  Problem List Items Addressed This Visit    None  Visit Diagnoses       Cerebral infarction, watershed distribution, bilateral, chronic        Relevant Orders    Referral to Physical Therapy            1. Cerebral infarction, watershed distribution, bilateral, chronic  - Referral to Physical Therapy    72-year-old female with periprocedural watershed " distribution stroke from cardiac artery bypass surgery.  This occurred in November, and she has recovered fairly well, with some residual weakness still present in the right leg.  There is no further stroke work-up or treatment required, given that this was not a vascular mechanism.  Carotid arteries were imaged during her hospitalization, and there is no significant stenosis.  The management of her atrial fibrillation is per her cardiologist.  I have placed a referral physical therapy to treat her residual weakness of the right leg.    FOLLOW-UP:   Return if symptoms worsen or fail to improve.    Total time spent for the day for this patient unrelated to procedure time is: 40 minutes. I spent 30 minutes in face to face time and I spent 6 minutes pre-charting and 4 minutes in post-visit documentation.      GREG ValenciaO.  [facility]

## 2023-01-04 NOTE — PATIENT INSTRUCTIONS
I have placed a referral to physical therapy for rehabilitation of your stroke.     Your stroke was caused by the cardiac artery bypass surgery.

## 2023-01-06 ENCOUNTER — PATIENT MESSAGE (OUTPATIENT)
Dept: CARDIOLOGY | Facility: MEDICAL CENTER | Age: 73
End: 2023-01-06
Payer: MEDICARE

## 2023-01-09 ENCOUNTER — TELEPHONE (OUTPATIENT)
Dept: CARDIOTHORACIC SURGERY | Facility: MEDICAL CENTER | Age: 73
End: 2023-01-09
Payer: MEDICARE

## 2023-01-09 NOTE — TELEPHONE ENCOUNTER
Called to set up post op follow up as she was recovering from a stroke and couldn't come to the 5 week follow up.  Appointment set for 1:30 on 1/16.    She complains of difficulty sleeping and was told this is a side effect from the CVA, she was told our team typically recommends benadryl as a sleep aid; she does see her PCP but not for another 4-5 day, she was told to address the issue with her PCP as well as she does not want to take melatonin.    Call time 5 minutes

## 2023-01-11 ENCOUNTER — TELEPHONE (OUTPATIENT)
Dept: NEUROLOGY | Facility: MEDICAL CENTER | Age: 73
End: 2023-01-11
Payer: MEDICARE

## 2023-01-11 NOTE — TELEPHONE ENCOUNTER
VOICEMAIL  1. Caller Name: Lauren Dave                       Call Back Number: [phone]    2. Message: Patient called and stated her referral for physical therapy did not have [facility] name on the referral and could not be accepted until this is corrected, please assist.   Thank you!

## 2023-01-11 NOTE — PROGRESS NOTES
CHIEF COMPLAINT: Post-op visit     PROCEDURE:   11/10/22 Dr. Mac  Coronary artery bypass grafting x3  Left internal mammary artery to left anterior descending artery  Reversed saphenous vein graft to obtuse marginal #2  Reversed saphenous vein graft to distal right coronary artery  Endo-vein procurement    HPI: ***    There were no vitals taken for this visit.    PHYSICAL EXAM:  Physical Exam   Cardiac: S1S2  Neuro:  AAO x 3  Resp:  CTA  Wounds:  CDI  Sternum:  Stable    PLAN: ***  Continue {blood thinners:44800} for 3 months or per your Cardiologist's recommendations.  We reviewed post operative sternal precautions, weight limits and driving precautions moving forward.  We reviewed SBE prophylaxis.      Overall, we are very pleased with the patient’s progress and we have instructed the patient to follow-up with us in the future should they have any concerns related to their surgery. Otherwise, we will see the patient on a PRN basis. The patient will continue to follow-up with their Cardiologist and PCP.  The patient has been informed that any further prescription refills should be done through their primary care physician and/or cardiologist.  They acknowledged understanding.  Thank you for allowing us to participate in the care of this very pleasant patient and please let us know if there is any way we may be of further assistance.

## 2023-01-16 ENCOUNTER — PATIENT MESSAGE (OUTPATIENT)
Dept: CARDIOLOGY | Facility: MEDICAL CENTER | Age: 73
End: 2023-01-16

## 2023-01-16 ENCOUNTER — TELEPHONE (OUTPATIENT)
Dept: CARDIOLOGY | Facility: MEDICAL CENTER | Age: 73
End: 2023-01-16

## 2023-01-16 ENCOUNTER — APPOINTMENT (OUTPATIENT)
Dept: CARDIOTHORACIC SURGERY | Facility: MEDICAL CENTER | Age: 73
End: 2023-01-16
Payer: MEDICARE

## 2023-01-16 NOTE — TELEPHONE ENCOUNTER
SMILEY    Caller: Lauren Dave    Topic/issue: MEDICATION MANAGEMENT     Patient states that she cannot afford ELIQUIS and she is almost out of this medication. She would like to know if there is an alternative medication is cheaper. Please advise.    Thank you,  Ramone THOMAS    Callback Number: [phone] (home)

## 2023-01-17 ENCOUNTER — PATIENT MESSAGE (OUTPATIENT)
Dept: CARDIOLOGY | Facility: MEDICAL CENTER | Age: 73
End: 2023-01-17
Payer: MEDICARE

## 2023-01-18 NOTE — PATIENT COMMUNICATION
Eliquis patient assistance form printed and mailed to pt mailing address listed in chart.    Replied to pt via [facility] regarding findings.

## 2023-01-26 NOTE — PATIENT COMMUNICATION
Called pt, [phone], as requested in regards to pt spouse.  See pt spouse chart.    Pt clarifying her betablocker use.  Advised to only take carvedilol and NOT metoprolol. She verbalizes understanding and is appreciative of information given.

## 2023-02-02 ENCOUNTER — PATIENT MESSAGE (OUTPATIENT)
Dept: CARDIOLOGY | Facility: MEDICAL CENTER | Age: 73
End: 2023-02-02
Payer: MEDICARE

## 2023-02-09 ENCOUNTER — TELEPHONE (OUTPATIENT)
Dept: VASCULAR LAB | Facility: MEDICAL CENTER | Age: 73
End: 2023-02-09
Payer: MEDICARE

## 2023-02-09 NOTE — TELEPHONE ENCOUNTER
[facility]    Received lipid referral from Jayashree ROMERO on 12/9/22    We've been trying to establish care since October and pt is not yet ready to establish care.  Will await pt contact - she has our contact information.    Insurance: Medicare  PCP: [facility]  Locations to be seen: [address]    [facility] at [phone], fax [phone]    Alejandra Orosco, PharmD

## 2023-02-10 DIAGNOSIS — I48.0 PAF (PAROXYSMAL ATRIAL FIBRILLATION) (HCC): ICD-10-CM

## 2023-02-10 NOTE — TELEPHONE ENCOUNTER
Donald Piper is a 48 y.o. male who was seen by synchronous (real-time) audio-video technology on 2/10/2023 for Diabetes (1 wk f/u)        Assessment & Plan:   Diagnoses and all orders for this visit:    1. Uncontrolled type 2 diabetes mellitus with hypoglycemia without coma (HCC)  -     empagliflozin (Jardiance) 25 mg tablet; Take 1 Tablet by mouth daily.  -     glucose blood VI test strips strip; Blood  sugar check daily. DX: Type 2 diabetes mellitus with hyperglycemia, without long-term current use of insulin (E11.65)      Getting better but still with elevated readings  Increase Jardiance to 25 mg daily  Test strips as per orders ; Watch salt loads   given h/o diastolic HF  Subjective:       Prior to Admission medications    Medication Sig Start Date End Date Taking? Authorizing Provider   empagliflozin (Jardiance) 10 mg tablet Take 1 Tablet by mouth daily. 2/6/23  Yes Meg Richard MD   umeclidinium (Incruse Ellipta) 62.5 mcg/actuation inhaler INHALE 1 PUFF BY MOUTH ONCE DAILY -  EXHALE  FULLY  BEFORE  INHALING 2/4/23  Yes Chloe Gutierrez MD   Blood-Glucose Meter monitoring kit Blood sugar check daily DX: Type 2 diabetes mellitus with hyperglycemia, without long-term current use of insulin (E11.65) 2/2/23  Yes Meg Richard MD   glucose blood VI test strips strip Blood  sugar check daily. DX: Type 2 diabetes mellitus with hyperglycemia, without long-term current use of insulin (E11.65) 2/2/23  Yes Meg Richard MD   lancets misc Blood sugar check daily. DX: Type 2 diabetes mellitus with hyperglycemia, without long-term current use of insulin (E11.65) 2/2/23  Yes Meg Richard MD   simethicone (MYLICON) 80 mg chewable tablet Take 1 Tablet by mouth every six (6) hours as needed for Flatulence (Abdominal bloating). 1/30/23  Yes Claudette Schmidt DO   lisinopriL (PRINIVIL, ZESTRIL) 20 mg tablet Take 1 Tablet by mouth daily.  1/18/23  Yes Javan Mojica MD   atorvastatin (LIPITOR) 20 mg Patient called to schedule hospital follow up after a long hospital course and stay at SNF.     tablet Take 1 tablet by mouth once daily 12/16/22  Yes Jonathan Harry MD   roflumilast (Daliresp) 500 mcg tab tablet TAKE 1 TABLET DAILY 11/15/22  Yes Marlyn Zavala MD   furosemide (LASIX) 20 mg tablet TAKE 1 TABLET BY MOUTH EVERY OTHER DAY 9/27/22  Yes Arturo Storm NP   diclofenac (VOLTAREN) 1 % gel  7/7/22  Yes Provider, Historical   Advair Diskus 500-50 mcg/dose diskus inhaler INHALE 1 DOSE BY MOUTH TWICE DAILY 7/8/22  Yes Marlyn Zavala MD   albuterol (PROVENTIL HFA, VENTOLIN HFA, PROAIR HFA) 90 mcg/actuation inhaler Take 2 Puffs by inhalation every four (4) hours as needed for Wheezing. 7/8/22  Yes Marlyn Zavala MD   hydrOXYzine HCL (ATARAX) 50 mg tablet  11/23/21  Yes Provider, Historical   Narcan 4 mg/actuation nasal spray  9/23/21  Yes Provider, Historical   albuterol (PROVENTIL VENTOLIN) 2.5 mg /3 mL (0.083 %) nebu Nebulized 1 vial for inhalation every 4 hours as needed Diag. Code J44.9, J96.10  File Under Medicare B 9/23/21  Yes Kanchan Vasquez MD   aspirin delayed-release 81 mg tablet Take  by mouth daily. Yes Provider, Historical   oxyCODONE-acetaminophen (PERCOCET) 5-325 mg per tablet TAKE 1 TABLET BY MOUTH EVERY 12 HOURS 7/28/20  Yes Provider, Historical   Blood Pressure Monitor (BLOOD PRESSURE KIT) kit 1 Device by Does Not Apply route daily as needed (for blood pressure checks). 4/25/19  Yes Luz Elena Paiz NP   OXcarbazepine (TRILEPTAL) 150 mg tablet Take 150 mg by mouth two (2) times a day. Yes Provider, Historical   LORazepam (ATIVAN) 1 mg tablet Take 1 Tab by mouth every eight (8) hours as needed for Anxiety. Max Daily Amount: 3 mg. 8/27/18  Yes Gaby Lee MD   Nebulizer & Compressor machine 1 Each by Does Not Apply route every four (4) hours as needed. 2/28/18  Yes Mayito Helm NP   OXYGEN-AIR DELIVERY SYSTEMS (WALKABOUT 1 OXYGEN SYSTEM) 2.5 L/min by Nasal route continuous.  Daily PRN  Indications: DME: [facility]   Yes Provider, Historical pantoprazole (PROTONIX) 40 mg tablet Take 1 Tab by mouth Daily (before breakfast). 1/12/18  Yes Teo Jarrell MD   metFORMIN (GLUCOPHAGE) 500 mg tablet Take 1 Tablet by mouth two (2) times daily (with meals). Patient not taking: Reported on 2/10/2023 2/2/23   Lauren Gandara MD   cyclobenzaprine (FLEXERIL) 10 mg tablet Take 1 Tablet by mouth two (2) times daily as needed for Muscle Spasm(s). Patient not taking: Reported on 2/10/2023 8/3/22   Lauren Gandara MD     {History SmartLink choices - disappears if left unselected (Optional):68596}    ROS    Objective:     Patient-Reported Vitals 1/11/2022   Patient-Reported Weight -   Patient-Reported Height -   Patient-Reported Pulse -   Patient-Reported SpO2 73   Patient-Reported Systolic  635   Patient-Reported Diastolic 77   Patient-Reported Peak Flow 95      General: alert, cooperative, no distress   Mental  status: normal mood, behavior, speech, dress, motor activity, and thought processes, able to follow commands   HENT: NCAT   Neck: no visualized mass   Resp: no respiratory distress   Neuro: no gross deficits   Skin: no discoloration or lesions of concern on visible areas   Psychiatric: normal affect, consistent with stated mood, no evidence of hallucinations     Additional exam findings: none      We discussed the expected course, resolution and complications of the diagnosis(es) in detail. Medication risks, benefits, costs, interactions, and alternatives were discussed as indicated. I advised him to contact the office if his condition worsens, changes or fails to improve as anticipated. He expressed understanding with the diagnosis(es) and plan. Lisa Sterling Sr., was evaluated through a synchronous (real-time) audio-video encounter. The patient (or guardian if applicable) is aware that this is a billable service, which includes applicable co-pays. This Virtual Visit was conducted with patient's (and/or legal guardian's) consent.  The visit was conducted pursuant to the emergency declaration under the [facility], [address] [place] [address] authority and the [employer] General Act. Patient identification was verified, and a caregiver was present when appropriate. The patient was located at: Home: [address] [address]  The provider was located at:  Facility ([facility] Department): [id number] Manfred Glover MD Diabetes Father      Social History     Tobacco Use    Smoking status: Former     Packs/day: 2.50     Years: 25.00     Pack years: 62.50     Types: Cigarettes     Start date: 1984     Quit date: 2017     Years since quittin.1    Smokeless tobacco: Never   Substance Use Topics    Alcohol use: No       ROS as per HPI    Objective:     Patient-Reported Vitals 2022   Patient-Reported Weight -   Patient-Reported Height -   Patient-Reported Pulse -   Patient-Reported SpO2 73   Patient-Reported Systolic  538   Patient-Reported Diastolic 77   Patient-Reported Peak Flow 95      General: alert, cooperative, no distress   Mental  status: normal mood, behavior, speech, dress, motor activity, and thought processes, able to follow commands   HENT: NCAT   Neck: no visualized mass   Resp: no respiratory distress   Neuro: no gross deficits   Skin: no discoloration or lesions of concern on visible areas   Psychiatric: normal affect, consistent with stated mood, no evidence of hallucinations     Additional exam findings: none      We discussed the expected course, resolution and complications of the diagnosis(es) in detail. Medication risks, benefits, costs, interactions, and alternatives were discussed as indicated. I advised him to contact the office if his condition worsens, changes or fails to improve as anticipated. He expressed understanding with the diagnosis(es) and plan. Niurka See Sr., was evaluated through a synchronous (real-time) audio-video encounter. The patient (or guardian if applicable) is aware that this is a billable service, which includes applicable co-pays. This Virtual Visit was conducted with patient's (and/or legal guardian's) consent. The visit was conducted pursuant to the emergency declaration under the [address] [place] [address], [address] [place] [address] authority and the [employer] General Act.   Patient identification was verified, and a caregiver was present when appropriate. The patient was located at: Home: [address] [address]  The provider was located at:  Facility (Appt Department): [id number] Manfred Cruz MD

## 2023-02-13 PROCEDURE — RXMED WILLOW AMBULATORY MEDICATION CHARGE: Performed by: INTERNAL MEDICINE

## 2023-02-14 ENCOUNTER — PATIENT MESSAGE (OUTPATIENT)
Dept: CARDIOLOGY | Facility: MEDICAL CENTER | Age: 73
End: 2023-02-14
Payer: MEDICARE

## 2023-02-15 ENCOUNTER — TELEPHONE (OUTPATIENT)
Dept: CARDIOLOGY | Facility: MEDICAL CENTER | Age: 73
End: 2023-02-15
Payer: MEDICARE

## 2023-02-15 DIAGNOSIS — I48.0 PAF (PAROXYSMAL ATRIAL FIBRILLATION) (HCC): ICD-10-CM

## 2023-02-15 NOTE — TELEPHONE ENCOUNTER
Received walk in form and completed to the best of this RN's ability.    Form printed, rx printed to [facility].    Task deferred to KAYLA FANG to place in MD folder awaiting signature

## 2023-02-16 ENCOUNTER — PHARMACY VISIT (OUTPATIENT)
Dept: PHARMACY | Facility: MEDICAL CENTER | Age: 73
End: 2023-02-16
Payer: COMMERCIAL

## 2023-02-20 ENCOUNTER — PATIENT MESSAGE (OUTPATIENT)
Dept: CARDIOLOGY | Facility: MEDICAL CENTER | Age: 73
End: 2023-02-20
Payer: MEDICARE

## 2023-02-20 ENCOUNTER — APPOINTMENT (OUTPATIENT)
Dept: PHYSICAL THERAPY | Facility: REHABILITATION | Age: 73
End: 2023-02-20
Attending: PSYCHIATRY & NEUROLOGY
Payer: MEDICARE

## 2023-02-21 ENCOUNTER — PATIENT MESSAGE (OUTPATIENT)
Dept: CARDIOLOGY | Facility: MEDICAL CENTER | Age: 73
End: 2023-02-21
Payer: MEDICARE

## 2023-02-21 ENCOUNTER — TELEPHONE (OUTPATIENT)
Dept: CARDIOLOGY | Facility: MEDICAL CENTER | Age: 73
End: 2023-02-21
Payer: MEDICARE

## 2023-02-21 NOTE — TELEPHONE ENCOUNTER
MR    Caller: Lauren Natasha Jolie    Topic/issue: PREDNISONE 5MG    Patient states that this request is very urgent. She would like to know if she is able to get back on her prednisone medication today if possible. She is unsure if she is able to take this. Please advise.    Thank you,  Ramone THOMAS    Callback Number: [phone] (home)

## 2023-02-21 NOTE — PATIENT COMMUNICATION
To CW, please review [employer] message and reply directly to pt regarding advise thank you!

## 2023-02-22 ENCOUNTER — APPOINTMENT (OUTPATIENT)
Dept: PHYSICAL THERAPY | Facility: REHABILITATION | Age: 73
End: 2023-02-22
Attending: PSYCHIATRY & NEUROLOGY
Payer: MEDICARE

## 2023-02-27 ENCOUNTER — APPOINTMENT (OUTPATIENT)
Dept: PHYSICAL THERAPY | Facility: REHABILITATION | Age: 73
End: 2023-02-27
Attending: PSYCHIATRY & NEUROLOGY
Payer: MEDICARE

## 2023-03-01 ENCOUNTER — APPOINTMENT (OUTPATIENT)
Dept: PHYSICAL THERAPY | Facility: REHABILITATION | Age: 73
End: 2023-03-01
Attending: PSYCHIATRY & NEUROLOGY
Payer: MEDICARE

## 2023-03-01 NOTE — TELEPHONE ENCOUNTER
To KS, please follow up with CW regarding signed prescription and fax to [facility] [phone], thank you!    Received signed paperwork from CW.    Noted paper RX was not attached to paperwork.    RX reprinted and placed on MD desk awaiting signature.

## 2023-03-02 NOTE — TELEPHONE ENCOUNTER
Received signed documents from MD and faxed to [facility], [phone], completed status received.    Fax sent to scanning via [employer] for reference, completed status.

## 2023-03-06 ENCOUNTER — APPOINTMENT (OUTPATIENT)
Dept: PHYSICAL THERAPY | Facility: REHABILITATION | Age: 73
End: 2023-03-06
Attending: PSYCHIATRY & NEUROLOGY
Payer: MEDICARE

## 2023-03-09 ENCOUNTER — APPOINTMENT (OUTPATIENT)
Dept: PHYSICAL THERAPY | Facility: REHABILITATION | Age: 73
End: 2023-03-09
Attending: PSYCHIATRY & NEUROLOGY
Payer: MEDICARE

## 2023-03-10 ENCOUNTER — TELEPHONE (OUTPATIENT)
Dept: CARDIOLOGY | Facility: MEDICAL CENTER | Age: 73
End: 2023-03-10
Payer: MEDICARE

## 2023-03-10 NOTE — TELEPHONE ENCOUNTER
Spoke to patient regarding lab work ordered at last OV by MR. Patient has not had lab work done, stated most recent lab work was done in December at [facility]. Confirmed with patient upcoming appt date and time with CW.

## 2023-03-13 ENCOUNTER — APPOINTMENT (OUTPATIENT)
Dept: PHYSICAL THERAPY | Facility: REHABILITATION | Age: 73
End: 2023-03-13
Attending: PSYCHIATRY & NEUROLOGY
Payer: MEDICARE

## 2023-03-14 ENCOUNTER — OFFICE VISIT (OUTPATIENT)
Dept: CARDIOLOGY | Facility: MEDICAL CENTER | Age: 73
End: 2023-03-14
Payer: MEDICARE

## 2023-03-14 VITALS
WEIGHT: 141 LBS | HEART RATE: 75 BPM | OXYGEN SATURATION: 98 % | HEIGHT: 63 IN | SYSTOLIC BLOOD PRESSURE: 146 MMHG | DIASTOLIC BLOOD PRESSURE: 52 MMHG | RESPIRATION RATE: 14 BRPM | BODY MASS INDEX: 24.98 KG/M2

## 2023-03-14 DIAGNOSIS — I50.22 CHRONIC SYSTOLIC CONGESTIVE HEART FAILURE (HCC): Chronic | ICD-10-CM

## 2023-03-14 DIAGNOSIS — I24.0 ISCHEMIC HEART DISEASE DUE TO CORONARY ARTERY OBSTRUCTION (HCC): Chronic | ICD-10-CM

## 2023-03-14 DIAGNOSIS — E27.40 HYPOADRENALISM (HCC): Chronic | ICD-10-CM

## 2023-03-14 DIAGNOSIS — G83.11 PARALYSIS OF RIGHT LOWER EXTREMITY (HCC): ICD-10-CM

## 2023-03-14 DIAGNOSIS — I70.0 ATHEROSCLEROSIS OF AORTA (HCC): ICD-10-CM

## 2023-03-14 DIAGNOSIS — Z95.1 S/P CABG X 3: ICD-10-CM

## 2023-03-14 DIAGNOSIS — I63.9 ACUTE ISCHEMIC STROKE (HCC): ICD-10-CM

## 2023-03-14 DIAGNOSIS — D69.2 SENILE PURPURA (HCC): ICD-10-CM

## 2023-03-14 DIAGNOSIS — I25.9 ISCHEMIC HEART DISEASE DUE TO CORONARY ARTERY OBSTRUCTION (HCC): Chronic | ICD-10-CM

## 2023-03-14 PROBLEM — M06.00 RHEUMATOID ARTHRITIS WITHOUT RHEUMATOID FACTOR, UNSPECIFIED SITE (HCC): Status: ACTIVE | Noted: 2022-12-05

## 2023-03-14 PROBLEM — R27.9 UNSPECIFIED LACK OF COORDINATION: Status: ACTIVE | Noted: 2022-12-05

## 2023-03-14 PROBLEM — J96.01 ACUTE RESPIRATORY FAILURE WITH HYPOXIA (HCC): Status: RESOLVED | Noted: 2022-11-16 | Resolved: 2023-03-14

## 2023-03-14 PROBLEM — I69.30 UNSPECIFIED SEQUELAE OF CEREBRAL INFARCTION: Status: ACTIVE | Noted: 2022-12-06

## 2023-03-14 PROCEDURE — 99214 OFFICE O/P EST MOD 30 MIN: CPT | Performed by: INTERNAL MEDICINE

## 2023-03-14 RX ORDER — PREDNISONE 5 MG/1
3 TABLET ORAL DAILY
COMMUNITY
Start: 2023-03-10

## 2023-03-14 RX ORDER — LIDOCAINE 50 MG/G
1 PATCH TOPICAL EVERY 24 HOURS
Qty: 10 PATCH | Refills: 11 | Status: SHIPPED | OUTPATIENT
Start: 2023-03-14 | End: 2023-07-18

## 2023-03-14 ASSESSMENT — FIBROSIS 4 INDEX: FIB4 SCORE: 2.24

## 2023-03-14 NOTE — PROGRESS NOTES
Chief Complaint   Patient presents with    CHF (Systolic)     F/V Dx: Chronic systolic congestive heart failure (HCC) EF 35%    Dyslipidemia    Aortic Atherosclerosis       Subjective     Lauren Dave is a 73 y.o. female who presents today for follow-up of her complex cardiac condition including heart failure with ischemic cardiomyopathy long-term left bundle branch block she had CABG and had a stroke intraoperative with significant defect    She has improved her functional status better than I would have anticipated she continues to struggle with the cost of her medications she is following closely with physical therapy and henrietta    She is found that she has improved strength by adding back prednisone    Past Medical History:   Diagnosis Date    Arthritis     Breast cancer (HCC) 08/07/2013    Bronchitis 2005    Cancer (HCC)     right breast cancer     Cataract     removed    Chronic systolic congestive heart failure (HCC) EF 35% 10/12/2022    Coronary artery disease due to calcified coronary lesion - Calcifications seen on CT scan also wtih aortic ulceration     Dental disorder     tooth infection    Discoid lupus     Dyslipidemia     Tried atorvastatin, pravastatin, lovastatin, and Zetia.  All causes severe myalgias.  Just taking fish oil.      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     Ischemic heart disease due to coronary artery obstruction (HCC) - Severe 3vD on cath 10/19/2022    LBBB (left bundle branch block) 12/16/2014    Noted on prechemo EKG 9/2014, MUGA WNL    Pneumonia     Pseudogout     Scarlet fever     Unspecified hemorrhagic conditions     gums     Past Surgical History:   Procedure Laterality Date    MULTIPLE CORONARY ARTERY BYPASS ENDO VEIN HARVEST  11/10/2022    Procedure: CORONARY ARTERY BYPASS GRAFTING X 3, WITH LEFT INTERNAL MAMMARY ARTERY TAKEDOWN AND ENDOSCOPIC VEIN HARVEST LEFT GREATER SAPHENOUS VEIN, AND TRANSESOPHAGEAL  ECHOCARDIOGRAM;  Surgeon: Padmini Mac M.D.;  Location: SURGERY [facility];  Service: Cardiothoracic    ECHOCARDIOGRAM, TRANSESOPHAGEAL, INTRAOPERATIVE  11/10/2022    Procedure: ECHOCARDIOGRAM, TRANSESOPHAGEAL, INTRAOPERATIVE;  Surgeon: Padmini Mac M.D.;  Location: SURGERY [facility];  Service: Cardiothoracic    OTHER CARDIAC SURGERY  2022    angiogram    CATH REMOVAL  2014    Performed by Aggie Burns M.D. at [facility]    MASTECTOMY  2013    Performed by Aggie Burns M.D. at [facility]    NODE BIOPSY SENTINEL  2013    Performed by Aggie Burns M.D. at [facility]    CATH PLACEMENT  2013    Performed by Aggie Burns M.D. at [facility]    US-NEEDLE CORE BX-BREAST PANEL  2013    right breast    COLONOSCOPY  2003    BREAST BIOPSY      TONSILLECTOMY       Family History   Problem Relation Age of Onset    Cancer Mother         possible thyroid and gynecological    Multiple Sclerosis Mother     Arthritis Father     Multiple Sclerosis Brother     Gout Brother     Heart Disease Paternal Grandmother     Diabetes Paternal Grandmother     Cancer Maternal Aunt         breast cancer- 60s    Diabetes Maternal Uncle     Heart Disease Maternal Grandmother     Heart Disease Maternal Grandfather         MI    Stroke Paternal Grandfather     Other Son         breast mass    Heart Disease Son         HEART MURMUR    Gout Son      Social History     Socioeconomic History    Marital status:      Spouse name: Not on file    Number of children: 2    Years of education: Not on file    Highest education level: Not on file   Occupational History     Employer: [employer]   Tobacco Use    Smoking status: Former     Packs/day: 1.00     Years: 0.00     Pack years: 0.00     Types: Cigarettes     Quit date: 10/1/2013     Years since quittin.4    Smokeless tobacco: Never   Vaping Use     "Vaping Use: Never used   Substance and Sexual Activity    Alcohol use: No     Alcohol/week: 0.0 oz    Drug use: No    Sexual activity: Not on file     Comment: , 2 children, enemployed   Other Topics Concern    Not on file   Social History Narrative    ** Merged History Encounter **         Patient is a . Patient has 2 adult children. She is  from .           Social Determinants of Health     Financial Resource Strain: Not on file   Food Insecurity: Not on file   Transportation Needs: Not on file   Physical Activity: Not on file   Stress: Not on file   Social Connections: Not on file   Intimate Partner Violence: Not on file   Housing Stability: Not on file     Allergies   Allergen Reactions    Statins [Hmg-Coa-R Inhibitors] Myalgia     Muscle Spasms   RXN=unknown    Albumin     Atorvastatin Myalgia    Codeine Vomiting and Nausea     Clarified with patient - states that she has an \"upset stomach\" when she takes it    Eggs Diarrhea     Pt states that she is allergic to eggs per her mother.  Tolerated clevidipine 11/2022    Lisinopril Cough    Losartan Unspecified     Tried in 2017  Cannot recall reaction    Penicillins Unspecified     \"does not work\" .'IMMUNE TO PENICILLIN,AMPICILLIN IS THE ONLY THING THAT WORKS'     Outpatient Encounter Medications as of 3/14/2023   Medication Sig Dispense Refill    predniSONE (DELTASONE) 5 MG Tab Take 5 mg by mouth every day. for 30 days      lidocaine (LIDODERM) 5 % Patch Place 1 Patch on the skin every 24 hours. 10 Patch 11    apixaban (ELIQUIS) 5mg Tab Take 1 Tablet by mouth 2 times a day. 180 Tablet 3    carvedilol (COREG) 12.5 MG Tab Take 1 Tablet by mouth 2 times a day with meals. 180 Tablet 0    aspirin 81 MG EC tablet Take 1 Tablet by mouth every day. 100 Tablet 0    hydrALAZINE (APRESOLINE) 10 MG Tab Take 1 Tablet by mouth 3 times a day as needed (/100). 25 Tablet 11    hydroxychloroquine (PLAQUENIL) 200 MG Tab Take 1 tablet by mouth " "every day. 30 tablet 0    famotidine (PEPCID) 40 MG Tab Take 1 tablet by mouth twice daily (Patient taking differently: Take 40 mg by mouth as needed.) 180 tablet 1    Cyanocobalamin (VITAMIN B-12 PO) Take 1 Tablet by mouth every morning.      ibuprofen (MOTRIN) 800 MG Tab TAKE 1 TABLET BY MOUTH EVERY 8 HOURS AS NEEDED FOR MILD PAIN 90 Tab 0    Cholecalciferol (VITAMIN D3) 5000 units Cap Take 1 Capsule by mouth every day.      [DISCONTINUED] apixaban (ELIQUIS) 5mg Tab Take 1 Tablet by mouth 2 times a day. 60 Tablet 0    [DISCONTINUED] sacubitril-valsartan (ENTRESTO) 24-26 MG Tab Take 1 Tablet by mouth 2 times a day. 60 Tablet 6    [DISCONTINUED] amiodarone (CORDARONE) 200 MG Tab Take 1 Tablet by mouth every day. (Patient not taking: Reported on 3/14/2023) 30 Tablet 0    [DISCONTINUED] acetaminophen (TYLENOL) 500 MG Tab Take 2 Tablets by mouth every 8 hours as needed for Mild Pain. 30 Tablet 0     No facility-administered encounter medications on file as of 3/14/2023.     ROS           Objective     BP (!) 146/52 (BP Location: Left arm, Patient Position: Sitting, BP Cuff Size: Adult)   Pulse 75   Resp 14   Ht 1.6 m (5' 3\")   Wt 64 kg (141 lb)   SpO2 98%   BMI 24.98 kg/m²     Physical Exam  Constitutional:       General: She is not in acute distress.     Appearance: She is not diaphoretic.   HENT:      Mouth/Throat:      Comments: Wearing a mask for COVID protocol  Eyes:      General: No scleral icterus.  Neck:      Vascular: No JVD.   Cardiovascular:      Rate and Rhythm: Normal rate.      Heart sounds: Normal heart sounds. No murmur heard.    No friction rub. No gallop.   Pulmonary:      Effort: No respiratory distress.      Breath sounds: No wheezing or rales.   Abdominal:      General: Bowel sounds are normal.      Palpations: Abdomen is soft.   Musculoskeletal:      Right lower leg: No edema.      Left lower leg: No edema.   Skin:     Findings: No rash.   Neurological:      Mental Status: She is alert. " Mental status is at baseline.   Psychiatric:         Mood and Affect: Mood normal.   She walks with a significant limp due to right leg weakness dramatically improved from when I saw her in the hospital       We reviewed in person the most recent labs    Extensive labs and hospital results reviewed      Assessment & Plan     1. Chronic systolic congestive heart failure (HCC) EF 35%        2. Atherosclerosis of aorta (HCC)        3. 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 Ischemic heart disease due to coronary artery obstruction (HCC) - Severe 3vD on cath        5. S/P CABG x 3  Referral to Cardiac Rehab      6. Senile purpura (HCC)        7. Acute ischemic stroke (HCC)  Referral to Physical Therapy      8. Paralysis of right lower extremity (HCC)  Referral to Physical Therapy          Medical Decision Making: Today's Assessment/Status/Plan:        It was my pleasure to meet with Ms. Dave.    We addressed the management of hypertension at today's visit. Blood pressure is well controlled.  We specifically assessed the labs on hypertension treatment    We addressed the management of dyslipidemia and atherosclerosis at today's visit. She is on appropriate statin.    We addressed the management of atherosclerotic artery disease.  She is on proper antiplatelet, cholesterol management and beta-blockers as appropriate.  We addressed the potential side effects and laboratory follow-up for these medications.  She had already stopped amiodarone after a month but can safely do so we will monitor her with pacer checks     12/9/2022   [facility] DEVICE FLOWSHEET    Device Type: C R T - D    Mode: D D D    Rate: 75    Configuration: 2 Zone    Presenting Rhythm: AsVp    Atrially Paced: (%) 5 %    Ventricularly Paced: (%) 98 %    Device Therapy Details: No episodes    Mode Switching Details: No episodes    Atrial Lead Threshold: (Volts) 0.5 Volts    Atrial Lead Sensing: (mV) 4.3 mV    Atrial Lead Impedance: (Ohms)  513 Ohms    Right Ventricular Lead Threshold: (Volts) 1 Volts    Right Ventricular Lead Sensing: (mV) 11.1 mV    Right Ventricular Lead Impedance: (Ohms) 399 Ohms    Left Ventricular Lead Threshold: (Volts) 1.25 Volts    Left Ventricular Lead Impedance: (Ohms) 456 Ohms    High Voltage Lead: (Ohms) 57 Ohms    Battery Estimated Longevity: 6 Years    Changes Made: No changes made          We addressed the management of congestive heart failure with reduced ejection fraction .  She is on proper mineralacorticoid, angiotensin/ace inhibition with neprilysin inhibition, SGLTs inhibitor and beta-blockers as appropriate.  We addressed the potential side effects and regular laboratory follow-up for these medications.    Check echo in June    MEDICATIONS ARE UNAFFORDABLE for heart failure    We will give her prescription for physical therapy and a walker ideally she can participate in cardiac rehab after completing physical therapy    I will see Ms. Dave back in 4 months time and encouraged her to follow up with us over the phone or electronically using my MyChart as issues arise.    It is my pleasure to participate in the care of Ms. Dave.  Please do not hesitate to contact me with questions or concerns.    Jarrod Mayberry MD PhD FAC  Cardiologist [facility]    Please note that this dictation was created using voice recognition software. There may be errors I did not discover before finalizing the note.

## 2023-03-15 ENCOUNTER — APPOINTMENT (OUTPATIENT)
Dept: PHYSICAL THERAPY | Facility: REHABILITATION | Age: 73
End: 2023-03-15
Attending: PSYCHIATRY & NEUROLOGY
Payer: MEDICARE

## 2023-03-17 ENCOUNTER — TELEPHONE (OUTPATIENT)
Dept: CARDIOLOGY | Facility: MEDICAL CENTER | Age: 73
End: 2023-03-17
Payer: MEDICARE

## 2023-03-17 DIAGNOSIS — G89.29 CHRONIC BILATERAL LOW BACK PAIN WITHOUT SCIATICA: ICD-10-CM

## 2023-03-17 DIAGNOSIS — M54.50 CHRONIC BILATERAL LOW BACK PAIN WITHOUT SCIATICA: ICD-10-CM

## 2023-03-17 NOTE — TELEPHONE ENCOUNTER
PA sent to joseluis Dave Key: [id number] - PA Case ID: [id number] - Rx #: [id number] help? Call us at [phone]  Status  Sent to [employer]  Drug  Lidocaine 5% patches  Form  [employer] Medicare Electronic PA Form (2017 NCPDP)  Original Claim Info  [phone]

## 2023-03-20 ENCOUNTER — APPOINTMENT (OUTPATIENT)
Dept: PHYSICAL THERAPY | Facility: REHABILITATION | Age: 73
End: 2023-03-20
Attending: PSYCHIATRY & NEUROLOGY
Payer: MEDICARE

## 2023-03-20 NOTE — TELEPHONE ENCOUNTER
PA denied as DX of paralysis of right lower extremity is not an approved DX for this medication. Is there a more appropriate DX we can use such as chronic back pain?

## 2023-03-21 ENCOUNTER — PATIENT MESSAGE (OUTPATIENT)
Dept: CARDIOLOGY | Facility: MEDICAL CENTER | Age: 73
End: 2023-03-21
Payer: MEDICARE

## 2023-03-22 ENCOUNTER — PATIENT MESSAGE (OUTPATIENT)
Dept: CARDIOLOGY | Facility: MEDICAL CENTER | Age: 73
End: 2023-03-22
Payer: MEDICARE

## 2023-03-22 ENCOUNTER — TELEPHONE (OUTPATIENT)
Dept: CARDIOLOGY | Facility: MEDICAL CENTER | Age: 73
End: 2023-03-22
Payer: MEDICARE

## 2023-03-22 ENCOUNTER — TELEPHONE (OUTPATIENT)
Dept: CARDIOTHORACIC SURGERY | Facility: MEDICAL CENTER | Age: 73
End: 2023-03-22
Payer: MEDICARE

## 2023-03-22 PROBLEM — M54.50 LUMBAGO: Status: ACTIVE | Noted: 2023-03-22

## 2023-03-22 NOTE — PATIENT COMMUNICATION
Staff message sent to DANITA Alonzo coordinator regarding pt request.  Awaiting Clinton response.    MyChart sent regarding findings.  \

## 2023-03-22 NOTE — TELEPHONE ENCOUNTER
----- Message from Clinton Hoffman R.D. sent at 3/22/2023 12:32 PM PDT -----  Regarding: RE: ICR referral  Misbah Youssef-I did let the patient know that [facility] is the only other CR in the area currently other than [facility].  [facility] does not have a CR but is expecting to open an ICR at their [facility] later this year.  ----- Message -----  From: Domonique Szymanski R.N.  Sent: 3/22/2023  11:34 AM PDT  To: Clinton Hoffman R.D.  Subject: ICR referral                                     Misbah Alonzo,    I reviewed your notes in the ICR referral and pt sent a message about sending the referral to [facility].  Since the referral is closed, should I order a new referral or are you able to send it out as requested?      Please let me know, thank you!

## 2023-03-23 ENCOUNTER — PATIENT MESSAGE (OUTPATIENT)
Dept: CARDIOLOGY | Facility: MEDICAL CENTER | Age: 73
End: 2023-03-23
Payer: MEDICARE

## 2023-03-23 NOTE — PATIENT COMMUNICATION
Received fax from [employer] requesting for signed application signed and dated by MD and pt proof of income.    MyChart sent regarding findings.    Upon chart review signed application not successfully uploaded to media.    Original form located, noting application is complete and signed, and MD portion refaxed to [employer], [phone]. Completed status received.    Fax sent to scanning via [email or web address] for reference, completed status.

## 2023-03-27 ENCOUNTER — OFFICE VISIT (OUTPATIENT)
Dept: CARDIOTHORACIC SURGERY | Facility: MEDICAL CENTER | Age: 73
End: 2023-03-27
Payer: MEDICARE

## 2023-03-27 VITALS
BODY MASS INDEX: 25.21 KG/M2 | WEIGHT: 142.3 LBS | OXYGEN SATURATION: 98 % | SYSTOLIC BLOOD PRESSURE: 132 MMHG | DIASTOLIC BLOOD PRESSURE: 68 MMHG | HEART RATE: 75 BPM | TEMPERATURE: 97.4 F

## 2023-03-27 DIAGNOSIS — Z98.890 POST-OPERATIVE STATE: ICD-10-CM

## 2023-03-27 PROCEDURE — 99024 POSTOP FOLLOW-UP VISIT: CPT | Performed by: NURSE PRACTITIONER

## 2023-03-27 ASSESSMENT — FIBROSIS 4 INDEX: FIB4 SCORE: 2.24

## 2023-03-27 NOTE — PROGRESS NOTES
CHIEF COMPLAINT: Post-op visit     PROCEDURE: 11/10/22 Dr Mac  CABG x 3    HPI: Very pleasant 74 yo female here for post op visit.  Her post op period was complicated by intraoperative stroke.  She has regained the majority of her functionality since being in the hospital with mild deficits in her ambulation with her right leg.  She reports seeing her eye doctor recently and having some medication adjustments due to some seen plaque in the back of her eye.  She has been attending rehab regularly outpatient.  Her wounds are well healed.  She had some elevated blood pressures yesterday with return to normal today.  She reports some neck pain over the last day.    /68 (BP Location: Left arm, Patient Position: Sitting, BP Cuff Size: Adult)   Pulse 75   Temp 36.3 °C (97.4 °F) (Temporal)   Wt 64.5 kg (142 lb 4.8 oz)   SpO2 98%     PHYSICAL EXAM:  Cardiac: S1S2  Neuro:  AAO x 3  Resp:  CTA  Wounds:  CDI  Sternum:  Stable       PLAN: We discussed trending her blood pressured and contacting her cardiologist if she continues to have high blood pressure.    Follow up with primary care for neck pain.  She verbalizes understanding.      Continue NOAC for 3 months or per your Cardiologist's recommendations.  We reviewed post operative sternal precautions, weight limits and driving precautions moving forward.  We reviewed SBE prophylaxis.      Overall, we are very pleased with the patient’s progress and we have instructed the patient to follow-up with us in the future should they have any concerns related to their surgery. Otherwise, we will see the patient on a PRN basis. The patient will continue to follow-up with their Cardiologist and PCP.  The patient has been informed that any further prescription refills should be done through their primary care physician and/or cardiologist.  They acknowledged understanding.  Thank you for allowing us to participate in the care of this very pleasant patient and please let us  know if there is any way we may be of further assistance.

## 2023-03-30 NOTE — TELEPHONE ENCOUNTER
PA approved through 3/24/2024.    Lauren Dave Key: [id number] - PA Case ID: [id number] - Rx #: [id number]  Need help? Call us at [phone]  Outcome   Approvedon March 24  CaseId:[id number];Status:Approved;Review Type:Prior Auth;Coverage Start Date:01/01/2023;Coverage End Date:03/24/2024;  Drug    Lidocaine 5% patches   Form    [employer] E[employer] Medicare Electronic PA Form (2017 NCPDP)           Original Claim Info    [id number]

## 2023-04-14 ENCOUNTER — PATIENT MESSAGE (OUTPATIENT)
Dept: CARDIOLOGY | Facility: MEDICAL CENTER | Age: 73
End: 2023-04-14
Payer: MEDICARE

## 2023-04-17 ENCOUNTER — PATIENT MESSAGE (OUTPATIENT)
Dept: CARDIOLOGY | Facility: MEDICAL CENTER | Age: 73
End: 2023-04-17
Payer: MEDICARE

## 2023-04-23 ENCOUNTER — PATIENT MESSAGE (OUTPATIENT)
Dept: CARDIOLOGY | Facility: MEDICAL CENTER | Age: 73
End: 2023-04-23
Payer: MEDICARE

## 2023-04-24 NOTE — PATIENT COMMUNICATION
To CW, can pt have a glass of wine or cocktail once in a while for special occasion with her cardiac medications?  Please advise, thank you!

## 2023-04-25 ENCOUNTER — PATIENT MESSAGE (OUTPATIENT)
Dept: CARDIOLOGY | Facility: MEDICAL CENTER | Age: 73
End: 2023-04-25
Payer: MEDICARE

## 2023-04-30 DIAGNOSIS — I50.43 CHF (CONGESTIVE HEART FAILURE), NYHA CLASS III, ACUTE ON CHRONIC, COMBINED (HCC): ICD-10-CM

## 2023-05-01 ENCOUNTER — HOSPITAL ENCOUNTER (OUTPATIENT)
Dept: LAB | Facility: MEDICAL CENTER | Age: 73
End: 2023-05-01
Attending: FAMILY MEDICINE
Payer: MEDICARE

## 2023-05-01 LAB
ALBUMIN SERPL BCP-MCNC: 4.5 G/DL (ref 3.2–4.9)
ALBUMIN/GLOB SERPL: 1.6 G/DL
ALP SERPL-CCNC: 51 U/L (ref 30–99)
ALT SERPL-CCNC: 8 U/L (ref 2–50)
AMYLASE SERPL-CCNC: 93 U/L (ref 20–103)
ANION GAP SERPL CALC-SCNC: 11 MMOL/L (ref 7–16)
AST SERPL-CCNC: 39 U/L (ref 12–45)
BASOPHILS # BLD AUTO: 0.6 % (ref 0–1.8)
BASOPHILS # BLD: 0.05 K/UL (ref 0–0.12)
BILIRUB SERPL-MCNC: 0.2 MG/DL (ref 0.1–1.5)
BUN SERPL-MCNC: 32 MG/DL (ref 8–22)
CALCIUM ALBUM COR SERPL-MCNC: 9.1 MG/DL (ref 8.5–10.5)
CALCIUM SERPL-MCNC: 9.5 MG/DL (ref 8.5–10.5)
CHLORIDE SERPL-SCNC: 104 MMOL/L (ref 96–112)
CO2 SERPL-SCNC: 24 MMOL/L (ref 20–33)
CREAT SERPL-MCNC: 1.36 MG/DL (ref 0.5–1.4)
EOSINOPHIL # BLD AUTO: 0.07 K/UL (ref 0–0.51)
EOSINOPHIL NFR BLD: 0.8 % (ref 0–6.9)
ERYTHROCYTE [DISTWIDTH] IN BLOOD BY AUTOMATED COUNT: 50.1 FL (ref 35.9–50)
GFR SERPLBLD CREATININE-BSD FMLA CKD-EPI: 41 ML/MIN/1.73 M 2
GLOBULIN SER CALC-MCNC: 2.9 G/DL (ref 1.9–3.5)
GLUCOSE SERPL-MCNC: 94 MG/DL (ref 65–99)
HCT VFR BLD AUTO: 37.4 % (ref 37–47)
HGB BLD-MCNC: 11.6 G/DL (ref 12–16)
IMM GRANULOCYTES # BLD AUTO: 0.05 K/UL (ref 0–0.11)
IMM GRANULOCYTES NFR BLD AUTO: 0.6 % (ref 0–0.9)
LIPASE SERPL-CCNC: 86 U/L (ref 11–82)
LYMPHOCYTES # BLD AUTO: 0.97 K/UL (ref 1–4.8)
LYMPHOCYTES NFR BLD: 11 % (ref 22–41)
MCH RBC QN AUTO: 30.4 PG (ref 27–33)
MCHC RBC AUTO-ENTMCNC: 31 G/DL (ref 33.6–35)
MCV RBC AUTO: 98.2 FL (ref 81.4–97.8)
MONOCYTES # BLD AUTO: 0.37 K/UL (ref 0–0.85)
MONOCYTES NFR BLD AUTO: 4.2 % (ref 0–13.4)
NEUTROPHILS # BLD AUTO: 7.32 K/UL (ref 2–7.15)
NEUTROPHILS NFR BLD: 82.8 % (ref 44–72)
NRBC # BLD AUTO: 0 K/UL
NRBC BLD-RTO: 0 /100 WBC
PLATELET # BLD AUTO: 241 K/UL (ref 164–446)
PMV BLD AUTO: 11.2 FL (ref 9–12.9)
POTASSIUM SERPL-SCNC: 4.7 MMOL/L (ref 3.6–5.5)
PROT SERPL-MCNC: 7.4 G/DL (ref 6–8.2)
RBC # BLD AUTO: 3.81 M/UL (ref 4.2–5.4)
SODIUM SERPL-SCNC: 139 MMOL/L (ref 135–145)
WBC # BLD AUTO: 8.8 K/UL (ref 4.8–10.8)

## 2023-05-01 PROCEDURE — 83690 ASSAY OF LIPASE: CPT

## 2023-05-01 PROCEDURE — 82150 ASSAY OF AMYLASE: CPT

## 2023-05-01 PROCEDURE — 36415 COLL VENOUS BLD VENIPUNCTURE: CPT

## 2023-05-01 PROCEDURE — 85025 COMPLETE CBC W/AUTO DIFF WBC: CPT

## 2023-05-01 PROCEDURE — 80053 COMPREHEN METABOLIC PANEL: CPT

## 2023-05-01 RX ORDER — CARVEDILOL 12.5 MG/1
TABLET ORAL
Qty: 180 TABLET | Refills: 3 | Status: SHIPPED | OUTPATIENT
Start: 2023-05-01 | End: 2023-07-18 | Stop reason: SDUPTHER

## 2023-05-01 NOTE — TELEPHONE ENCOUNTER
Is the patient due for a refill? Yes    Was the patient seen the past year? Yes    Date of last office visit: 3/14/2023    Does the patient have an upcoming appointment?  Yes   If yes, When? 7/18/2023    Provider to refill:CW    Does the patients insurance require a 100 day supply?  No

## 2023-05-03 ENCOUNTER — TELEPHONE (OUTPATIENT)
Dept: CARDIOLOGY | Facility: MEDICAL CENTER | Age: 73
End: 2023-05-03
Payer: MEDICARE

## 2023-05-03 ENCOUNTER — HOSPITAL ENCOUNTER (OUTPATIENT)
Dept: RADIOLOGY | Facility: MEDICAL CENTER | Age: 73
End: 2023-05-03
Attending: FAMILY MEDICINE
Payer: MEDICARE

## 2023-05-03 ENCOUNTER — PATIENT MESSAGE (OUTPATIENT)
Dept: CARDIOLOGY | Facility: MEDICAL CENTER | Age: 73
End: 2023-05-03
Payer: MEDICARE

## 2023-05-03 DIAGNOSIS — G83.11 PARALYSIS OF RIGHT LOWER EXTREMITY (HCC): ICD-10-CM

## 2023-05-03 DIAGNOSIS — Z95.1 S/P CABG X 3: ICD-10-CM

## 2023-05-03 DIAGNOSIS — I63.9 ACUTE ISCHEMIC STROKE (HCC): ICD-10-CM

## 2023-05-03 NOTE — TELEPHONE ENCOUNTER
SMILEY    Caller: Lauren Dave     Topic/issue: Patient has an upcoming CT and has completed blood work 5/1/23 she is hoping her blood work can be reviewed prior to the CT scan .    Callback Number: [phone]      Thank you   Linda CHAPARRO

## 2023-05-03 NOTE — TELEPHONE ENCOUNTER
Upon chart review, CT and labs were ordered from outside provider, PCP.    Pt active on rumr: turn off the lights, last login today.  [employer] sent regarding findings and advise to follow up with ordering provider.

## 2023-05-09 ENCOUNTER — HOSPITAL ENCOUNTER (OUTPATIENT)
Dept: RADIOLOGY | Facility: MEDICAL CENTER | Age: 73
End: 2023-05-09
Attending: FAMILY MEDICINE
Payer: MEDICARE

## 2023-05-09 DIAGNOSIS — R14.0 ABDOMINAL BLOATING: ICD-10-CM

## 2023-05-09 DIAGNOSIS — R31.9 HEMATURIA, UNSPECIFIED TYPE: ICD-10-CM

## 2023-05-09 DIAGNOSIS — N39.0 FREQUENT UTI: ICD-10-CM

## 2023-05-09 PROCEDURE — 74178 CT ABD&PLV WO CNTR FLWD CNTR: CPT

## 2023-05-09 PROCEDURE — 700117 HCHG RX CONTRAST REV CODE 255: Mod: UD | Performed by: FAMILY MEDICINE

## 2023-05-09 RX ADMIN — IOHEXOL 100 ML: 350 INJECTION, SOLUTION INTRAVENOUS at 10:04

## 2023-05-11 ENCOUNTER — TELEPHONE (OUTPATIENT)
Dept: CARDIOLOGY | Facility: PHYSICIAN GROUP | Age: 73
End: 2023-05-11
Payer: MEDICARE

## 2023-05-11 NOTE — TELEPHONE ENCOUNTER
----- Message from Jenna Hernandez M.D. sent at 5/11/2023 11:38 AM PDT -----  Regarding: BP adjustment  Hello!  Just seeing Nati in the office today.  She is still waking up with BP in the 170s and a headache.  I am asking her to take 12.5 mg of caredilol at night and 6.25 mg in the morning to help control it a bit more.  Hope you have a great day!    Jenna

## 2023-05-16 ENCOUNTER — TELEPHONE (OUTPATIENT)
Dept: VASCULAR LAB | Facility: MEDICAL CENTER | Age: 73
End: 2023-05-16
Payer: MEDICARE

## 2023-05-16 NOTE — TELEPHONE ENCOUNTER
Received call from pt. She wants to establish care with our lipid clinic (see referral placed in December 2022). She would like to be scheduled w/ her ex- MRN [id number] as well but I told her that he needs a referral placed by Dr Mayberry's team. Pt verbalized understanding.    Alejandra Orosco, PharmD

## 2023-05-24 ENCOUNTER — TELEPHONE (OUTPATIENT)
Dept: VASCULAR LAB | Facility: MEDICAL CENTER | Age: 73
End: 2023-05-24
Payer: MEDICARE

## 2023-05-24 NOTE — TELEPHONE ENCOUNTER
Called pt to scheduled pt for initial visit in DM clinic - scheduled her and her ex  for initial appt on 6/26.    Marciano Hogan, DelorisD, BCACP

## 2023-05-25 NOTE — TELEPHONE ENCOUNTER
When back in office, please see pt request, asking for cardiac rehab referral change. I see that you were contacted regarding insurance not covering. Please advise on any needed notation regarding what pt needs if you would like to change referral as requested.

## 2023-06-08 DIAGNOSIS — I48.0 PAF (PAROXYSMAL ATRIAL FIBRILLATION) (HCC): ICD-10-CM

## 2023-06-09 ENCOUNTER — TELEPHONE (OUTPATIENT)
Dept: CARDIOLOGY | Facility: MEDICAL CENTER | Age: 73
End: 2023-06-09
Payer: MEDICARE

## 2023-06-09 DIAGNOSIS — I48.0 PAF (PAROXYSMAL ATRIAL FIBRILLATION) (HCC): ICD-10-CM

## 2023-06-09 NOTE — TELEPHONE ENCOUNTER
Phone Number Called: [phone]    Call outcome: Spoke to patient regarding message below.    Message: Called to inquire about pharmacy patient needs medication sent to. Sent to [facility] on [address] per patient request. No further questions at this time.

## 2023-06-09 NOTE — TELEPHONE ENCOUNTER
CW    Caller: Lauren Natasha Littleton    Medication Name and Dosage:  apixaban (ELIQUIS) 5mg Tab [[id number]    Please call your pharmacy and have them send us a refill request or speak to a live representative, RX number may have changed.    Medication amount left: 0    Preferred Pharmacy: [facility] on [address]     Other questions (Topic): Patient states frustration of getting this medication refilled, as she has been completely out for 6 days. [facility] pharmacy has contacted our office and refills have not been sent. [facility], she states she has never used this pharmacy and would like medication sent to a [facility] on [address]. Advised patient of turn around time. Please advise.     Callback Number (Will only call for issues): [phone] (home)     Thank you,   Tricia PATRICK

## 2023-06-12 DIAGNOSIS — I48.0 PAF (PAROXYSMAL ATRIAL FIBRILLATION) (HCC): ICD-10-CM

## 2023-06-12 NOTE — TELEPHONE ENCOUNTER
CW  Caller: Lauren Dave     Medication Name and Dosage:  apixaban (ELIQUIS) 5mg Tab [[id number]    Medication amount left: 0    Preferred Pharmacy: [facility] [address] [place] [address], [place] - [address]    Other questions: Medication was sent to the wrong pharmacy. Patient no longer [facility] .    Callback Number (Will only call for issues): [phone]      Thank you   Linda CHAPARRO

## 2023-06-13 DIAGNOSIS — I48.0 PAF (PAROXYSMAL ATRIAL FIBRILLATION) (HCC): ICD-10-CM

## 2023-06-13 NOTE — TELEPHONE ENCOUNTER
CW    Caller: Laurenanuradha Dave    Medication Name and Dosage:  apixaban (ELIQUIS) 5mg Tab [[id number]    Please call your pharmacy and have them send us a refill request or speak to a live representative, RX number may have changed.    Medication amount left: 0    Preferred Pharmacy: [facility]     Other questions (Topic): Patient is calling back and states frustration that the [facility] does not have the prescription. Patient states she has had to call a total of 12 times and is extremely upset that she is completley out of medication. Patient states she wants a nurse to call her, as this situation is causing her an immense amount of stress and she is very upset.  Please advise.     Callback Number (Will only call for issues): [phone] (home)     Thank you,   Tricia PATRICK

## 2023-06-14 NOTE — TELEPHONE ENCOUNTER
Phone Number Called: [phone]    Call outcome:  Spoke to pharmacist Silverio Martinez    Message: Called to call in prescription for Eliquis as Epic is defaulting to [facility] despite being changed numerous times.     Patient called and informed that medication has been called in. No further questions at this time.

## 2023-06-21 DIAGNOSIS — I10 ESSENTIAL HYPERTENSION: ICD-10-CM

## 2023-06-21 NOTE — TELEPHONE ENCOUNTER
Is the patient due for a refill? Yes pharmacy requesting 90-day supply per insurance.     Was the patient seen the past year? Yes    Date of last office visit: 3/14/2023    Does the patient have an upcoming appointment?  Yes   If yes, When? 7/18/2023    Provider to refill:SMILEY HART out of office    Does the patients insurance require a 100 day supply?  No

## 2023-06-22 ENCOUNTER — HOSPITAL ENCOUNTER (OUTPATIENT)
Dept: CARDIOLOGY | Facility: MEDICAL CENTER | Age: 73
End: 2023-06-22
Attending: INTERNAL MEDICINE
Payer: MEDICARE

## 2023-06-22 DIAGNOSIS — I25.9 ISCHEMIC HEART DISEASE DUE TO CORONARY ARTERY OBSTRUCTION (HCC): ICD-10-CM

## 2023-06-22 DIAGNOSIS — I50.22 CHRONIC SYSTOLIC CONGESTIVE HEART FAILURE (HCC): ICD-10-CM

## 2023-06-22 DIAGNOSIS — I24.0 ISCHEMIC HEART DISEASE DUE TO CORONARY ARTERY OBSTRUCTION (HCC): ICD-10-CM

## 2023-06-22 PROCEDURE — 93306 TTE W/DOPPLER COMPLETE: CPT

## 2023-06-22 RX ORDER — HYDRALAZINE HYDROCHLORIDE 10 MG/1
10 TABLET, FILM COATED ORAL 3 TIMES DAILY PRN
Qty: 270 TABLET | Refills: 1 | Status: SHIPPED | OUTPATIENT
Start: 2023-06-22 | End: 2023-07-17

## 2023-06-23 LAB
LV EJECT FRACT  99904: 50
LV EJECT FRACT MOD 2C 99903: 44.44
LV EJECT FRACT MOD 4C 99902: 39.36
LV EJECT FRACT MOD BP 99901: 43.56

## 2023-06-23 PROCEDURE — 93306 TTE W/DOPPLER COMPLETE: CPT | Mod: 26 | Performed by: INTERNAL MEDICINE

## 2023-06-26 ENCOUNTER — APPOINTMENT (OUTPATIENT)
Dept: VASCULAR LAB | Facility: MEDICAL CENTER | Age: 73
End: 2023-06-26
Attending: INTERNAL MEDICINE
Payer: MEDICARE

## 2023-06-28 ENCOUNTER — TELEPHONE (OUTPATIENT)
Dept: VASCULAR LAB | Facility: MEDICAL CENTER | Age: 73
End: 2023-06-28
Payer: MEDICARE

## 2023-06-28 NOTE — TELEPHONE ENCOUNTER
Pt canceled their initial lipid appointment on 6/26.    Rescheduled on 8/2    Alejandra Orosco, DelorisD

## 2023-07-15 DIAGNOSIS — I10 ESSENTIAL HYPERTENSION: ICD-10-CM

## 2023-07-17 RX ORDER — HYDRALAZINE HYDROCHLORIDE 10 MG/1
TABLET, FILM COATED ORAL
Qty: 25 TABLET | Refills: 0 | Status: SHIPPED | OUTPATIENT
Start: 2023-07-17 | End: 2023-07-18 | Stop reason: SDUPTHER

## 2023-07-17 NOTE — TELEPHONE ENCOUNTER
Is the patient due for a refill? Yes Pharmacy change to [facility]     Was the patient seen the past year? Yes               Date of last office visit: 3/14/2023     Does the patient have an upcoming appointment?  Yes              If yes, When? 7/18/2023     Provider to refill:CW     Does the patients insurance require a 100 day supply?  No

## 2023-07-18 ENCOUNTER — NON-PROVIDER VISIT (OUTPATIENT)
Dept: CARDIOLOGY | Facility: MEDICAL CENTER | Age: 73
End: 2023-07-18
Attending: INTERNAL MEDICINE
Payer: MEDICARE

## 2023-07-18 ENCOUNTER — PHYSICAL THERAPY (OUTPATIENT)
Dept: PHYSICAL THERAPY | Facility: REHABILITATION | Age: 73
End: 2023-07-18
Attending: INTERNAL MEDICINE
Payer: MEDICARE

## 2023-07-18 ENCOUNTER — NON-PROVIDER VISIT (OUTPATIENT)
Dept: CARDIOLOGY | Facility: MEDICAL CENTER | Age: 73
End: 2023-07-18

## 2023-07-18 VITALS
HEIGHT: 63 IN | RESPIRATION RATE: 16 BRPM | BODY MASS INDEX: 27.29 KG/M2 | WEIGHT: 154 LBS | OXYGEN SATURATION: 97 % | DIASTOLIC BLOOD PRESSURE: 88 MMHG | HEART RATE: 80 BPM | SYSTOLIC BLOOD PRESSURE: 178 MMHG

## 2023-07-18 DIAGNOSIS — I50.43 CHF (CONGESTIVE HEART FAILURE), NYHA CLASS III, ACUTE ON CHRONIC, COMBINED (HCC): ICD-10-CM

## 2023-07-18 DIAGNOSIS — D69.2 SENILE PURPURA (HCC): ICD-10-CM

## 2023-07-18 DIAGNOSIS — Z95.1 S/P CABG X 3: ICD-10-CM

## 2023-07-18 DIAGNOSIS — E78.5 DYSLIPIDEMIA: ICD-10-CM

## 2023-07-18 DIAGNOSIS — I50.20 HFREF (HEART FAILURE WITH REDUCED EJECTION FRACTION) (HCC): ICD-10-CM

## 2023-07-18 DIAGNOSIS — I25.10 CORONARY ARTERY DISEASE DUE TO CALCIFIED CORONARY LESION: Chronic | ICD-10-CM

## 2023-07-18 DIAGNOSIS — I50.22 CHRONIC SYSTOLIC CONGESTIVE HEART FAILURE (HCC): Chronic | ICD-10-CM

## 2023-07-18 DIAGNOSIS — I69.30 LATE EFFECT OF STROKE: ICD-10-CM

## 2023-07-18 DIAGNOSIS — I44.2 COMPLETE HEART BLOCK (HCC): ICD-10-CM

## 2023-07-18 DIAGNOSIS — I44.7 LBBB (LEFT BUNDLE BRANCH BLOCK): Chronic | ICD-10-CM

## 2023-07-18 DIAGNOSIS — I48.0 PAF (PAROXYSMAL ATRIAL FIBRILLATION) (HCC): ICD-10-CM

## 2023-07-18 DIAGNOSIS — I25.84 CORONARY ARTERY DISEASE DUE TO CALCIFIED CORONARY LESION: Chronic | ICD-10-CM

## 2023-07-18 DIAGNOSIS — Z95.810 CARDIAC RESYNCHRONIZATION THERAPY DEFIBRILLATOR (CRT-D) IN PLACE: ICD-10-CM

## 2023-07-18 DIAGNOSIS — I24.0 ISCHEMIC HEART DISEASE DUE TO CORONARY ARTERY OBSTRUCTION (HCC): Chronic | ICD-10-CM

## 2023-07-18 DIAGNOSIS — I70.0 ATHEROSCLEROSIS OF AORTA (HCC): ICD-10-CM

## 2023-07-18 DIAGNOSIS — G83.11 PARALYSIS OF RIGHT LOWER EXTREMITY (HCC): ICD-10-CM

## 2023-07-18 DIAGNOSIS — I10 ESSENTIAL HYPERTENSION: ICD-10-CM

## 2023-07-18 DIAGNOSIS — I25.9 ISCHEMIC HEART DISEASE DUE TO CORONARY ARTERY OBSTRUCTION (HCC): Chronic | ICD-10-CM

## 2023-07-18 PROCEDURE — 97162 PT EVAL MOD COMPLEX 30 MIN: CPT

## 2023-07-18 PROCEDURE — 3077F SYST BP >= 140 MM HG: CPT | Performed by: INTERNAL MEDICINE

## 2023-07-18 PROCEDURE — 93284 PRGRMG EVAL IMPLANTABLE DFB: CPT | Performed by: INTERNAL MEDICINE

## 2023-07-18 PROCEDURE — 99212 OFFICE O/P EST SF 10 MIN: CPT | Performed by: INTERNAL MEDICINE

## 2023-07-18 PROCEDURE — 99213 OFFICE O/P EST LOW 20 MIN: CPT | Performed by: INTERNAL MEDICINE

## 2023-07-18 PROCEDURE — 3079F DIAST BP 80-89 MM HG: CPT | Performed by: INTERNAL MEDICINE

## 2023-07-18 PROCEDURE — 99214 OFFICE O/P EST MOD 30 MIN: CPT | Performed by: INTERNAL MEDICINE

## 2023-07-18 PROCEDURE — 93284 PRGRMG EVAL IMPLANTABLE DFB: CPT | Mod: 26 | Performed by: INTERNAL MEDICINE

## 2023-07-18 RX ORDER — HYDRALAZINE HYDROCHLORIDE 10 MG/1
TABLET, FILM COATED ORAL
Qty: 100 TABLET | Refills: 3 | Status: SHIPPED | OUTPATIENT
Start: 2023-07-18 | End: 2023-08-15

## 2023-07-18 RX ORDER — SACUBITRIL AND VALSARTAN 24; 26 MG/1; MG/1
1 TABLET, FILM COATED ORAL 2 TIMES DAILY
Qty: 180 TABLET | Refills: 3 | Status: SHIPPED | OUTPATIENT
Start: 2023-07-18 | End: 2023-08-14 | Stop reason: SDUPTHER

## 2023-07-18 RX ORDER — AMLODIPINE BESYLATE 5 MG/1
TABLET ORAL
COMMUNITY
Start: 2023-06-07 | End: 2023-07-18

## 2023-07-18 RX ORDER — CARVEDILOL 25 MG/1
25 TABLET ORAL 2 TIMES DAILY WITH MEALS
Qty: 180 TABLET | Refills: 3 | Status: SHIPPED | OUTPATIENT
Start: 2023-07-18 | End: 2023-08-14 | Stop reason: SDUPTHER

## 2023-07-18 RX ORDER — FUROSEMIDE 20 MG/1
20 TABLET ORAL DAILY
Qty: 90 TABLET | Refills: 3 | Status: SHIPPED | OUTPATIENT
Start: 2023-07-18 | End: 2023-08-14 | Stop reason: SDUPTHER

## 2023-07-18 ASSESSMENT — ENCOUNTER SYMPTOMS
PAIN SCALE: 7
ALLEVIATING FACTORS: LYING DOWN
PAIN SCALE AT HIGHEST: 9
EXACERBATED BY: SITTING

## 2023-07-18 ASSESSMENT — FIBROSIS 4 INDEX: FIB4 SCORE: 4.18

## 2023-07-18 NOTE — PROGRESS NOTES
Chief Complaint   Patient presents with    CHF (Chronic)     F/V Dx: Chronic systolic congestive heart failure (HCC) EF 35%    Dyslipidemia    Other     F/V Dx: Ischemic heart disease due to coronary artery obstruction (HCC) - Severe 3vD on cath       Subjective     Lauren Dave is a 73 y.o. female who presents today     Blood pressures have been high at home still quite labile    Past Medical History:   Diagnosis Date    Arthritis     Breast cancer (HCC) 08/07/2013    Bronchitis 2005    Cancer (HCC)     right breast cancer     Cataract     removed    Chronic systolic congestive heart failure (HCC) EF 35% 10/12/2022    Coronary artery disease due to calcified coronary lesion - Calcifications seen on CT scan also wtih aortic ulceration     Dental disorder     tooth infection    Discoid lupus     Dyslipidemia     Tried atorvastatin, pravastatin, lovastatin, and Zetia.  All causes severe myalgias.  Just taking fish oil.      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     Ischemic heart disease due to coronary artery obstruction (HCC) - Severe 3vD on cath 10/19/2022    LBBB (left bundle branch block) 12/16/2014    Noted on prechemo EKG 9/2014, MUGA WNL    Pneumonia     Pseudogout     Scarlet fever     Unspecified hemorrhagic conditions     gums     Past Surgical History:   Procedure Laterality Date    MULTIPLE CORONARY ARTERY BYPASS ENDO VEIN HARVEST  11/10/2022    Procedure: CORONARY ARTERY BYPASS GRAFTING X 3, WITH LEFT INTERNAL MAMMARY ARTERY TAKEDOWN AND ENDOSCOPIC VEIN HARVEST LEFT GREATER SAPHENOUS VEIN, AND TRANSESOPHAGEAL ECHOCARDIOGRAM;  Surgeon: Padmini Mac M.D.;  Location: SURGERY [facility];  Service: Cardiothoracic    ECHOCARDIOGRAM, TRANSESOPHAGEAL, INTRAOPERATIVE  11/10/2022    Procedure: ECHOCARDIOGRAM, TRANSESOPHAGEAL, INTRAOPERATIVE;  Surgeon: Padmini Mac M.D.;  Location: SURGERY [facility];  Service: Cardiothoracic    OTHER CARDIAC  SURGERY  2022    angiogram    CATH REMOVAL  2014    Performed by Aggie Burns M.D. at [facility]    MASTECTOMY  2013    Performed by Aggie Burns M.D. at [facility]    NODE BIOPSY SENTINEL  2013    Performed by Aggie Burns M.D. at [facility]    CATH PLACEMENT  2013    Performed by Aggie Burns M.D. at [facility]    US-NEEDLE CORE BX-BREAST PANEL  2013    right breast    COLONOSCOPY  2003    BREAST BIOPSY      TONSILLECTOMY       Family History   Problem Relation Age of Onset    Cancer Mother         possible thyroid and gynecological    Multiple Sclerosis Mother     Arthritis Father     Multiple Sclerosis Brother     Gout Brother     Heart Disease Paternal Grandmother     Diabetes Paternal Grandmother     Cancer Maternal Aunt         breast cancer- 60s    Diabetes Maternal Uncle     Heart Disease Maternal Grandmother     Heart Disease Maternal Grandfather         MI    Stroke Paternal Grandfather     Other Son         breast mass    Heart Disease Son         HEART MURMUR    Gout Son      Social History     Socioeconomic History    Marital status:      Spouse name: Not on file    Number of children: 2    Years of education: Not on file    Highest education level: Not on file   Occupational History     Employer: [employer]   Tobacco Use    Smoking status: Former     Packs/day: 1.00     Years: 0.00     Pack years: 0.00     Types: Cigarettes     Quit date: 10/1/2013     Years since quittin.8    Smokeless tobacco: Never   Vaping Use    Vaping Use: Never used   Substance and Sexual Activity    Alcohol use: No     Alcohol/week: 0.0 oz    Drug use: No    Sexual activity: Not on file     Comment: , 2 children, enemployed   Other Topics Concern    Not on file   Social History Narrative    ** Merged History Encounter **         Patient is a . Patient has 2 adult  "children. She is  from .           Social Determinants of Health     Financial Resource Strain: Not on file   Food Insecurity: Not on file   Transportation Needs: Not on file   Physical Activity: Not on file   Stress: Not on file   Social Connections: Not on file   Intimate Partner Violence: Not on file   Housing Stability: Not on file     Allergies   Allergen Reactions    Statins [Hmg-Coa-R Inhibitors] Myalgia     Muscle Spasms   RXN=unknown    Albumin     Hydroxychloroquine      Eye changes    Atorvastatin Myalgia    Codeine Vomiting and Nausea     Clarified with patient - states that she has an \"upset stomach\" when she takes it    Eggs Diarrhea     Pt states that she is allergic to eggs per her mother.  Tolerated clevidipine 11/2022    Lisinopril Cough    Losartan Unspecified     Tried in 2017  Cannot recall reaction    Penicillins Unspecified     \"does not work\" .'IMMUNE TO PENICILLIN,AMPICILLIN IS THE ONLY THING THAT WORKS'     Outpatient Encounter Medications as of 7/18/2023   Medication Sig Dispense Refill    hydrALAZINE (APRESOLINE) 10 MG Tab TAKE 1 TABLET BY MOUTH THREE TIMES DAILY AS NEEDED (/100) 100 Tablet 3    carvedilol (COREG) 25 MG Tab Take 1 Tablet by mouth 2 times a day with meals. 180 Tablet 3    sacubitril-valsartan (ENTRESTO) 24-26 MG Tab Take 1 Tablet by mouth 2 times a day. 180 Tablet 3    apixaban (ELIQUIS) 5mg Tab Take 1 Tablet by mouth 2 times a day. 180 Tablet 3    predniSONE (DELTASONE) 5 MG Tab Take 5 mg by mouth every day. for 30 days      aspirin 81 MG EC tablet Take 1 Tablet by mouth every day. 100 Tablet 0    famotidine (PEPCID) 40 MG Tab Take 1 tablet by mouth twice daily (Patient taking differently: Take 40 mg by mouth every day.) 180 tablet 1    Cyanocobalamin (VITAMIN B-12 PO) Take 1 Tablet by mouth every morning.      ibuprofen (MOTRIN) 800 MG Tab TAKE 1 TABLET BY MOUTH EVERY 8 HOURS AS NEEDED FOR MILD PAIN 90 Tab 0    Cholecalciferol (VITAMIN D3) 5000 units " "Cap Take 1 Capsule by mouth every day.      amLODIPine (NORVASC) 5 MG Tab       [DISCONTINUED] hydrALAZINE (APRESOLINE) 10 MG Tab TAKE 1 TABLET BY MOUTH THREE TIMES DAILY AS NEEDED (/100) 25 Tablet 0    [DISCONTINUED] carvedilol (COREG) 12.5 MG Tab TAKE 1 TABLET BY MOUTH TWICE DAILY WITH MEALS 180 Tablet 3    [DISCONTINUED] lidocaine (LIDODERM) 5 % Patch Place 1 Patch on the skin every 24 hours. 10 Patch 11    [DISCONTINUED] Misc. Devices (ROLLER WALKER) Misc ROLLING WALKER FOLDABLE (Patient not taking: Reported on 7/18/2023) 1 Each 1    [DISCONTINUED] hydroxychloroquine (PLAQUENIL) 200 MG Tab Take 1 tablet by mouth every day. (Patient not taking: Reported on 7/18/2023) 30 tablet 0     No facility-administered encounter medications on file as of 7/18/2023.     ROS           Objective     BP (!) 178/88 (BP Location: Left arm, Patient Position: Sitting, BP Cuff Size: Adult)   Pulse 80   Resp 16   Ht 1.6 m (5' 3\")   Wt 69.9 kg (154 lb)   SpO2 97%   BMI 27.28 kg/m²     Physical Exam  Constitutional:       General: She is not in acute distress.     Appearance: She is not diaphoretic.   Eyes:      General: No scleral icterus.  Neck:      Vascular: No JVD.   Cardiovascular:      Rate and Rhythm: Normal rate.      Heart sounds: Normal heart sounds. No murmur heard.     No friction rub. No gallop.   Pulmonary:      Effort: No respiratory distress.      Breath sounds: No wheezing or rales.   Abdominal:      General: Bowel sounds are normal.      Palpations: Abdomen is soft.   Musculoskeletal:      Right lower leg: No edema.      Left lower leg: No edema.   Skin:     Findings: No rash.   Neurological:      Mental Status: She is alert. Mental status is at baseline.   Psychiatric:         Mood and Affect: Mood normal.     Has SP       We reviewed in person the most recent labs  Recent Results (from the past 5040 hour(s))   EC-ECHOCARDIOGRAM COMPLETE W/O CONT    Collection Time: 01/04/23  3:01 PM   Result Value Ref " Range    Eject.Frac. MOD BP 38.32     Eject.Frac. MOD 4C 41.72     Eject.Frac. MOD 2C 32.27     Left Ventrical Ejection Fraction 30    CBC WITH DIFFERENTIAL    Collection Time: 05/01/23  2:46 PM   Result Value Ref Range    WBC 8.8 4.8 - 10.8 K/uL    RBC 3.81 (L) 4.20 - 5.40 M/uL    Hemoglobin 11.6 (L) 12.0 - 16.0 g/dL    Hematocrit 37.4 37.0 - 47.0 %    MCV 98.2 (H) 81.4 - 97.8 fL    MCH 30.4 27.0 - 33.0 pg    MCHC 31.0 (L) 33.6 - 35.0 g/dL    RDW 50.1 (H) 35.9 - 50.0 fL    Platelet Count 241 164 - 446 K/uL    MPV 11.2 9.0 - 12.9 fL    Neutrophils-Polys 82.80 (H) 44.00 - 72.00 %    Lymphocytes 11.00 (L) 22.00 - 41.00 %    Monocytes 4.20 0.00 - 13.40 %    Eosinophils 0.80 0.00 - 6.90 %    Basophils 0.60 0.00 - 1.80 %    Immature Granulocytes 0.60 0.00 - 0.90 %    Nucleated RBC 0.00 /100 WBC    Neutrophils (Absolute) 7.32 (H) 2.00 - 7.15 K/uL    Lymphs (Absolute) 0.97 (L) 1.00 - 4.80 K/uL    Monos (Absolute) 0.37 0.00 - 0.85 K/uL    Eos (Absolute) 0.07 0.00 - 0.51 K/uL    Baso (Absolute) 0.05 0.00 - 0.12 K/uL    Immature Granulocytes (abs) 0.05 0.00 - 0.11 K/uL    NRBC (Absolute) 0.00 K/uL   Comp Metabolic Panel    Collection Time: 05/01/23  2:46 PM   Result Value Ref Range    Sodium 139 135 - 145 mmol/L    Potassium 4.7 3.6 - 5.5 mmol/L    Chloride 104 96 - 112 mmol/L    Co2 24 20 - 33 mmol/L    Anion Gap 11.0 7.0 - 16.0    Glucose 94 65 - 99 mg/dL    Bun 32 (H) 8 - 22 mg/dL    Creatinine 1.36 0.50 - 1.40 mg/dL    Calcium 9.5 8.5 - 10.5 mg/dL    AST(SGOT) 39 12 - 45 U/L    ALT(SGPT) 8 2 - 50 U/L    Alkaline Phosphatase 51 30 - 99 U/L    Total Bilirubin 0.2 0.1 - 1.5 mg/dL    Albumin 4.5 3.2 - 4.9 g/dL    Total Protein 7.4 6.0 - 8.2 g/dL    Globulin 2.9 1.9 - 3.5 g/dL    A-G Ratio 1.6 g/dL   AMYLASE    Collection Time: 05/01/23  2:46 PM   Result Value Ref Range    Amylase 93 20 - 103 U/L   LIPASE    Collection Time: 05/01/23  2:46 PM   Result Value Ref Range    Lipase 86 (H) 11 - 82 U/L   CORRECTED CALCIUM     Collection Time: 05/01/23  2:46 PM   Result Value Ref Range    Correct Calcium 9.1 8.5 - 10.5 mg/dL   ESTIMATED GFR    Collection Time: 05/01/23  2:46 PM   Result Value Ref Range    GFR (CKD-EPI) 41 (A) >60 mL/min/1.73 m 2   EC-ECHOCARDIOGRAM COMPLETE W/O CONT    Collection Time: 06/22/23 12:46 PM   Result Value Ref Range    Eject.Frac. MOD BP 43.56     Eject.Frac. MOD 4C 39.36     Eject.Frac. MOD 2C 44.44     Left Ventrical Ejection Fraction 50        Echo shows improvement in EF    Assessment & Plan     1. Chronic systolic congestive heart failure (HCC) EF 35%  sacubitril-valsartan (ENTRESTO) 24-26 MG Tab      2. Essential hypertension  hydrALAZINE (APRESOLINE) 10 MG Tab      3. CHF (congestive heart failure), NYHA class III, acute on chronic, combined ([facility])  carvedilol (COREG) 25 MG Tab    sacubitril-valsartan (ENTRESTO) 24-26 MG Tab    furosemide (LASIX) 20 MG Tab      4. S/P CABG x 3        5. Senile purpura ([facility])        6. Cardiac resynchronization therapy defibrillator (CRT-D) in place        7. Coronary artery disease due to calcified coronary lesion - Calcifications seen on CT scan also wtih aortic ulceration        8. Complete heart block ([facility])        9. Dyslipidemia        10. HFrEF (heart failure with reduced ejection fraction) ([facility])        11. LBBB (left bundle branch block)        12. PAF (paroxysmal atrial fibrillation) ([facility])  Comp Metabolic Panel    CBC WITHOUT DIFFERENTIAL      13. Atherosclerosis of aorta ([facility])        14. Ischemic heart disease due to coronary artery obstruction ([facility]) - Severe 3vD on cath            Medical Decision Making: Today's Assessment/Status/Plan:        It was my pleasure to meet with Ms. Dave.    We addressed the management of hypertension at today's visit. Blood pressure is well controlled.  We specifically assessed the labs on hypertension treatment    We addressed the management of dyslipidemia and atherosclerosis at today's visit. She is on appropriate  statin.    We addressed the management of congestive heart failure with reduced ejection fraction .  She is on proper mineralacorticoid, angiotensin/ace inhibition with neprilysin inhibition, SGLTs inhibitor and beta-blockers as appropriate.  We addressed the potential side effects and regular laboratory follow-up for these medications.  ADDED ENTRESTO and PRN lasix      We addressed the management of atherosclerotic artery disease.  She is on proper antiplatelet, cholesterol management and beta-blockers as appropriate.  We addressed the potential side effects and laboratory follow-up for these medications.    We addressed the management of atrial fibrillation.  She is on proper anticoagulation cholesterol management and rate or rhythm control as appropriate.  We addressed the potential side effects and laboratory follow-up for these medications.    We addressed the management of chronic anticoagulation at today's visit. She understands the risks and benefits of chronic anticoagulation.  We reviewed and verified the results of annual testing for anemia and kidney function.      I will see Ms. Dave back in 6 months time and encouraged her to follow up with us over the phone or electronically using my MyChart as issues arise.    It is my pleasure to participate in the care of Ms. Dave.  Please do not hesitate to contact me with questions or concerns.    Jarrod Mayberry MD PhD [facility]  Cardiologist [facility]    Please note that this dictation was created using voice recognition software. There may be errors I did not discover before finalizing the note.

## 2023-07-18 NOTE — OP THERAPY EVALUATION
"  Outpatient Physical Therapy  INITIAL NEUROLOGICAL EVALUATION    [facility] Physical Therapy [facility] [address]  [address]  [address]  [place] [address]  Phone:  [phone]  Fax:  [phone]    Date of Evaluation: 07/18/2023    Patient: Lauren Dave  YOB: 1950  MRN: [id number]     Referring Provider: Jarrod Mayberry M.D.  [address]  [address]  [place]  [place] [address]   Referring Diagnosis S/P CABG x 3 [Z95.1];Acute ischemic stroke (HCC) [I63.9];Paralysis of right lower extremity (HCC) [G83.11]     Time Calculation    Start time: 1130  Stop time: 1213 Time Calculation (min): 43 minutes             Chief Complaint: Weakness, Difficulty Walking, and Loss Of Balance    Visit Diagnoses     ICD-10-CM   1. Late effect of stroke  I69.30   2. Paralysis of right lower extremity (HCC)  G83.11   3. S/P CABG x 3  Z95.1       Subjective:   History of Present Illness:     Mechanism of injury:  S/P CABG x 3 complicated by CVA 11/10/2022 watershed stroke (left UE and Rt LE) during procedure. Chronic systolic heart failure with EF x 35%    Pt originally referred for cardiac rehab but due to cost did not move forward. Pt states no SOB with activity. And states Dr feels she is appropriate for PT.    Pt has not returned to work but wants to. Feels she lacks the stamina, feels clumsy (drops items) (turns out they closed) Wants to walk \"better\"  look less robotic. Feels walk and balance deficits are the same. Does not feel off balance on uneven surfaces. Can do curbs/stairs but does note it is harder going down and uses UE for support. Does not feel off balance in the dark. Feels a little off with head turns. Reports some difficulty focusing with head turns while driving.    Denies any falls or near falls.     Denies difficulty with coordination.     Does have a shower bench to reach certain parts but stands for the rest, does not use rails. Sometimes a little disoriented in the shower.     Pt also complains " of back pain/injury which she feels is impacting her gait as well. Numbness/tingling in Rt leg and some sensation changes in her torso.     Notes her BP seems high in the morning and is talking to Dr today about it. Denies feeling light headed dizzy etc  Prior level of function:  Dancer  Headaches:  tension headaches  Headache comments: some headaches in the morning  Pain:     Current pain ratin    At worst pain ratin    Location:  Described as tight/contraction:    Relieving factors:  Lying down    Aggravating factors:  Sitting      Past Medical History:   Diagnosis Date    Arthritis     Breast cancer (HCC) 2013    Bronchitis 2005    Cancer (HCC)     right breast cancer     Cataract     removed    Chronic systolic congestive heart failure (HCC) EF 35% 10/12/2022    Coronary artery disease due to calcified coronary lesion - Calcifications seen on CT scan also wtih aortic ulceration     Dental disorder     tooth infection    Discoid lupus     Dyslipidemia     Tried atorvastatin, pravastatin, lovastatin, and Zetia.  All causes severe myalgias.  Just taking fish oil.      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     Ischemic heart disease due to coronary artery obstruction (HCC) - Severe 3vD on cath 10/19/2022    LBBB (left bundle branch block) 2014    Noted on prechemo EKG 2014, MUGA WNL    Pneumonia     Pseudogout     Scarlet fever     Unspecified hemorrhagic conditions     gums     Past Surgical History:   Procedure Laterality Date    MULTIPLE CORONARY ARTERY BYPASS ENDO VEIN HARVEST  11/10/2022    Procedure: CORONARY ARTERY BYPASS GRAFTING X 3, WITH LEFT INTERNAL MAMMARY ARTERY TAKEDOWN AND ENDOSCOPIC VEIN HARVEST LEFT GREATER SAPHENOUS VEIN, AND TRANSESOPHAGEAL ECHOCARDIOGRAM;  Surgeon: Padmini Mac M.D.;  Location: SURGERY [facility];  Service: Cardiothoracic    ECHOCARDIOGRAM, TRANSESOPHAGEAL, INTRAOPERATIVE  11/10/2022    Procedure:  ECHOCARDIOGRAM, TRANSESOPHAGEAL, INTRAOPERATIVE;  Surgeon: Padmini Mac M.D.;  Location: SURGERY [facility];  Service: Cardiothoracic    OTHER CARDIAC SURGERY  2022    angiogram    CATH REMOVAL  2014    Performed by Aggie Burns M.D. at SURGERY SAME DAY [facility]    MASTECTOMY  2013    Performed by Aggie Burns M.D. at SURGERY SAME DAY [facility]    NODE BIOPSY SENTINEL  2013    Performed by Aggie Burns M.D. at SURGERY SAME DAY [facility]    CATH PLACEMENT  2013    Performed by Aggie Burns M.D. at SURGERY SAME DAY [facility]    US-NEEDLE CORE BX-BREAST PANEL  2013    right breast    COLONOSCOPY  2003    BREAST BIOPSY      TONSILLECTOMY       Social History     Tobacco Use    Smoking status: Former     Packs/day: 1.00     Years: 0.00     Pack years: 0.00     Types: Cigarettes     Quit date: 10/1/2013     Years since quittin.8    Smokeless tobacco: Never   Substance Use Topics    Alcohol use: No     Alcohol/week: 0.0 oz     Family and Occupational History     Socioeconomic History    Marital status:      Spouse name: Not on file    Number of children: 2    Years of education: Not on file    Highest education level: Not on file   Occupational History     Employer: [employer]       Objective:     Strength:   Lower extremity (left):     Hip flexion: 4-    Knee flexion: 4    Knee extension: 4    Ankle dorsiflexion: 4  Lower extremity (right):     Hip flexion: 4    Knee flexion: 4+    Knee extension: 5    Ankle dorsiflexion: 5    Quality of Movement During Gait     Additional Quality of Movement During Gait Details  Mild circumduction, dec knee ext in stance bilaterally    Balance/Gait Comments   5STS: 14.68 no UE  TUG 18.64  TUG Cog 26.81  10MWT 18.71  Gait speed:.53m/s        Therapeutic Exercises (CPT 32755):       Therapeutic Exercise Summary: Education on POC including therapy cap, hard cap, ABN and once soft cap reached  "expectation of functional improvements every session      Time-based treatments/modalities:           Assessment, Response and Plan:   Impairments: abnormal gait, impaired functional mobility, impaired balance, impaired physical strength, lacks appropriate home exercise program and limited mobility    Assessment details:  Nati presents for PT evaluation with chief complaint of balance, strength, and gait deficits secondary to CVA which occurred during a CABGx 3 procedure in November 2022. At this time pt has moderate deficits but primary concern of hers is to \"walk normal.\" Pt does have mild functional strength deficits as noted with 5STS 14.68 without UE as well as moderate functional mobility deficits with TUG score 18.64. Pt did have significantly slow gait speed at .54m/s self selected speed. Due to time constraints further functional assessments to be completed next visit. Pt is at increased risk of falling and will benefit from skilled PT 2 x 8 weeks to address above impairments, decrease fall risk and maximize current function.  Did provided education related to Medicare cap with emphasis on pt dedication to HEP as it is likely cap will be met quickly and functional improvements will need to be noted every visit once cap has been reached. Pt attendance may impact POC as she has to be scheduled the same time as her  which will impact the frequency of sessions.  Barriers to therapy:  Comorbidities and psychosocial  Other barriers to therapy:  Did not cont HEP after DC from [facility], CAP, attendance  Prognosis: good    Goals:   Short Term Goals:   Pt will be compliant with HEP 3-5x per week for improving strength and stabilty.  Pt will complete most appropriate balance outcome measure and 6MWT.    Short term goal time span:  2-4 weeks      Long Term Goals:    1. Pt will demonstrate improved functional mobility with TUG score 14 sec or better.  2. Pt will demonstrate improved functional LE strength with 5STS " 11 sec or better.  3. Pt will demonstrate improved CV endurance and gait speed with 6MWT score improved 200 feet from assessment or better.  4. Pt will demonstrate improved balance scoring low fall risk on most appropriate outcome measure.   5. Pt will demonstrate improved gait speed with 10MWT of .8m/s or better to dec fall risk.    Long term goal time span:  6-8 weeks    Plan:   Therapy options:  Physical therapy treatment to continue  Planned therapy interventions:  Gait Training (CPT 19860), Neuromuscular Re-education (CPT 28218), Therapeutic Activities (CPT 23217) and Therapeutic Exercise (CPT 80384)  Frequency:  2x week  Duration in weeks:  8  Discussed with:  Patient  Plan details:  6MWT, DGI      Functional Assessment Used      5STS, TUG, 10MWT        Referring provider co-signature:  I have reviewed this plan of care and my co-signature certifies the need for services.    Certification Period: 07/18/2023 to  10/17/23    Physician Signature: ________________________________ Date: ______________

## 2023-07-19 ASSESSMENT — ACTIVITIES OF DAILY LIVING (ADL): POOR_BALANCE: 1

## 2023-07-20 NOTE — OP THERAPY DAILY TREATMENT
Outpatient Physical Therapy  DAILY TREATMENT     [facility] Physical Therapy [address]  [address]  [place] [address]  Phone:  [phone]  Fax:  [phone]    Date: 07/21/2023    Patient: Lauren Dave  YOB: 1950  MRN: [id number]     Time Calculation                   Chief Complaint: No chief complaint on file.    Visit #: 11    SUBJECTIVE:  ***    OBJECTIVE:  Current objective measures:   5STS: 14.68 no UE  TUG 18.64  TUG Cog 26.81  10MWT 18.71  Gait speed:.53m/s       6MWT    DGI          Therapeutic Exercises (CPT 93441):     20. 7/18-10/17      Time-based treatments/modalities:             ASSESSMENT:   Response to treatment: ***    PLAN/RECOMMENDATIONS:   Plan for treatment: therapy treatment to continue next visit.  Planned interventions for next visit: continue with current treatment.

## 2023-07-21 ENCOUNTER — PHYSICAL THERAPY (OUTPATIENT)
Dept: PHYSICAL THERAPY | Facility: REHABILITATION | Age: 73
End: 2023-07-21
Attending: INTERNAL MEDICINE
Payer: MEDICARE

## 2023-07-21 DIAGNOSIS — G83.11 PARALYSIS OF RIGHT LOWER EXTREMITY (HCC): ICD-10-CM

## 2023-07-21 DIAGNOSIS — R29.898 WEAKNESS OF BOTH LOWER EXTREMITIES: ICD-10-CM

## 2023-07-21 DIAGNOSIS — Z95.1 S/P CABG X 3: ICD-10-CM

## 2023-07-21 DIAGNOSIS — I69.30 LATE EFFECT OF STROKE: ICD-10-CM

## 2023-07-21 PROCEDURE — 97110 THERAPEUTIC EXERCISES: CPT

## 2023-07-21 PROCEDURE — 97112 NEUROMUSCULAR REEDUCATION: CPT

## 2023-07-21 NOTE — CARDIAC REMOTE MONITOR - SCAN
Device transmission reviewed. Device demonstrated appropriate function.       Electronically Signed by: Sudha Hobbs M.D.    7/28/2023  4:22 PM

## 2023-07-21 NOTE — OP THERAPY DAILY TREATMENT
Outpatient Physical Therapy  DAILY TREATMENT     [facility] Physical [address]  [address]  [place] [address]  Phone:  [phone]  Fax:  [phone]    Date: 07/21/2023    Patient: Lauren Dave  YOB: 1950  MRN: [id number]     Time Calculation    Start time: 1045  Stop time: 1127 Time Calculation (min): 42 minutes         Chief Complaint: Weakness, Loss Of Balance, and Difficulty Walking    Visit #: 2    SUBJECTIVE:  Getting pain injections in neck on Monday. Feels a little tired after using nustep yesterday.     OBJECTIVE:  Current objective measures:     6MWT: 653 feet, 30sec rest at 3:40 standing    DGI  A. Gait level surface x 20 feet  Instructions: Walk at your normal speed from here to the next ayaka (20’)  0. Severe Impairment: cannot walk 20 feet. Without assistance, severe gait deviations or imbalance.  Moderate impairment: Walks 20 feet, slow speed, abnormal gait pattern, evidence for imbalance.  2. Mild Impairment: walks 20 feet, uses assistive devices, slower speed, mild gait deviations.   3. Normal: Walks 20 feet, no assistive devices, good speed, no evidence of imbalance, normal gait pattern.    B. Change in Gait speed  Instructions: Begin walking at your normal pace (for 5’), when I tell you “go,” walk as fast as you can (for 5’). When I tell you “slow,” walk as slowly as you can (for 5’).   0. Severe impairment: cannot change speeds, or loses balance and has to reach for wall or be caught.   1. Moderate impairment: Makes only minor adjustments to walking speed or accomplishes a change in speed with significant gait deviations, or changes speed but has significant gait deviations, or changes speed but loses balance but is able to recover and keep walking.    2. Mild Impairment: Able to change speed but demonstrated mild gait deviations, or no gait deviations but unable to achieve a significant change in velocity, or uses and assistive device.    3.  Normal: Able to smoothly change walking speed without loss of balance or gait deviations. Shows significant difference in walking speeds between normal, fast and slow speeds.     C. Gait with horizontal head turns  Instructions: Begin walking at your normal pace. When I tell you to “look right,” keep walking straight, but turn your head to the right. Keep looking to the right until I tell you, “look left,” then keep walking straight and turn your head to the left. Keep your head to the left until I tell you “look straight,“ then keep walking straight, but return your head to the center.   0. Severe impairment: Performs task with severe disruptions of gait (ie staggers outside 15 inch path, LOB, stops, reaches for wall)   1. Moderate impairment: Performs head turns with moderate change in gait velocity, slows down, staggers, but recovers and cont to walk.    2. Mild impairment: Performs head turn smoothly with slight change in gait velocity (ie minor disruption to smooth gait path or uses walking aid.)   3. Normal: Performs head turns smoothly with no guo in gait.     D. Gait with vertical head turns  Instructions: Begin walking at your normal pace. When I tell you to “look up,” keep walking straight, but tip your head up. Keep looking up until I tell you, “look down,” then keep walking straight and tip your head down. Keep your head down until I tell you “look straight,“ then keep walking straight, but return your head to the center.  0. Severe impairment: Performs task with severe disruptions of gait (ie staggers outside 15 inch path, LOB, stops, reaches for wall)   1. Moderate impairment: Performs head turns with moderate change in gait velocity, slows down, staggers, but recovers and cont to walk.    2. Mild impairment: Performs head turn smoothly with slight change in gait velocity (ie minor disruption to smooth gait path or uses walking aid.)   3. Normal: Performs head turns smoothly with no guo in gait.      E. Gait and pivot turn  Instructions: Begin walking at your normal pace. When I tell you, “turn and stop,” turn as quickly as you can to face the opposite direction and stop.   0. Severe impairment: Cannot turn safely, requires assistance to turn and stop.   1. Moderate impairment: Turns slowly, requires verbal; cueing, requires several small steps to catch balance following turn.   2. Mild impairment:Pivot turn safely in greater than 3 seconds and stops without loss of balance.   3. Normal: Pivots and turns safely within 3 seconds and stops quickly with no loss of balance.    F. Step over obstacles  Instructions: Begin walking at your normal speed. When you come to the shoebox, step over it, not around it, and keep walking.   0. Severe impairment: cannot perform activity without assistance.   1. Moderate impairment: Able to step over box, but must stop, then step over. May require verbal cueing.    2. Mild impairment: Able to step over box, but must slow down, and adjust steps to clear box safely.   3. Normal: Able to step over box without changing gait speed, no evidence of imbalance.     G. Step around obstacles  Instructions: Begin walking at normal speed. When you come to the first cone (about 6’ away), walk around the right side of it. When you come to the second cone (6’ past first cone), walk around it to the left.   0. Severe impairment: Unable to clear cones, walking into one or more cones, or requires physical assistance.    1. Moderate impairment: Able to clear cones but must significantly slow speed to accomplish task, or requires verbal cueing.   2. Mild impairment: Able to step around both cones, but must slow down and adjust steps to clear cones.   3. Normals: able to walk around cones safely without changing gait speed, no evidence of imbalance.    H. Stairs  Instructions: Walk up these stairs as you would at home, i.e., using the railing if necessary. At the top, turn around and walk down.   0.  "Severe impairment: cannot perform safely.   1. Moderate impairment: Two feet to a stair, must use rails.   2. Mild impairment: alternating feet, must use rails.   3. Normal: alternating feet, no rail.    14/24                        Therapeutic Exercises (CPT 28282):     1. 6MWT    2. bridge holds, 3 x 1 min    3. sidelying hip ab, 10 x 10 sec      Therapeutic Exercise Summary: Access Code: [email or web address]  URL: [email or web address]  Date: 07/21/2023  Prepared by:     Exercises  - Supine Bridge  - 1 x daily - 7 x weekly - 3 reps - 1 min hold  - Sidelying Hip Abduction  - 1 x daily - 7 x weekly - 10 reps - 10 hold    Therapeutic Treatments and Modalities:     1. Neuromuscular Re-education (CPT 73950)    Therapeutic Treatment and Modalities Summary: DGI see objective   Reviewed and educated pt on findings. Educated pt \"wobble\" likely trendelenburg due to weakness as well as leg length discrepancy.     Time-based treatments/modalities:    Physical Therapy Timed Treatment Charges  Neuromusc re-ed, balance, coor, post minutes (CPT 55146): 18 minutes  Therapeutic exercise minutes (CPT 93344): 24 minutes        ASSESSMENT:   Response to treatment: Provided education on findings with pt at increased risk of falling via DGI and mod cv endurance impairments with 6MWT as well. Provided HEP for improving strength endurance. Discussed possible gait /deficits may be associated with pt leg length discrepancy and may not improve.      PLAN/RECOMMENDATIONS:   Plan for treatment: therapy treatment to continue next visit.  Planned interventions for next visit: continue with current treatment.         "

## 2023-07-25 ENCOUNTER — APPOINTMENT (OUTPATIENT)
Dept: PHYSICAL THERAPY | Facility: REHABILITATION | Age: 73
End: 2023-07-25
Attending: INTERNAL MEDICINE
Payer: MEDICARE

## 2023-07-27 ENCOUNTER — PHYSICAL THERAPY (OUTPATIENT)
Dept: PHYSICAL THERAPY | Facility: REHABILITATION | Age: 73
End: 2023-07-27
Attending: INTERNAL MEDICINE
Payer: MEDICARE

## 2023-07-27 DIAGNOSIS — I69.30 LATE EFFECT OF STROKE: ICD-10-CM

## 2023-07-27 DIAGNOSIS — R29.898 WEAKNESS OF BOTH LOWER EXTREMITIES: ICD-10-CM

## 2023-07-27 DIAGNOSIS — G83.11 PARALYSIS OF RIGHT LOWER EXTREMITY (HCC): ICD-10-CM

## 2023-07-27 PROCEDURE — 97110 THERAPEUTIC EXERCISES: CPT

## 2023-07-27 PROCEDURE — 97112 NEUROMUSCULAR REEDUCATION: CPT

## 2023-07-27 NOTE — OP THERAPY DAILY TREATMENT
"  Outpatient Physical Therapy  DAILY TREATMENT     [facility] [address]  [address]  [place] [address]  Phone:  [phone]  Fax:  [phone]    Date: 07/27/2023    Patient: Lauren Dave  YOB: 1950  MRN: [id number]     Time Calculation    Start time: 0930  Stop time: 1015 Time Calculation (min): 45 minutes         Chief Complaint: Loss Of Balance    Visit #: 3    SUBJECTIVE:  Lauren presents today for physical therapy services. She reports she would like to \"workout\" more. Her back is troublesome but wants to do some exercises to get \" stronger\"     OBJECTIVE:  Current objective measures:   (left UE and Rt LE)     6MWT: 653 feet, 30sec rest at 3:40 standing    DGI  A. Gait level surface x 20 feet  Instructions: Walk at your normal speed from here to the next ayaka (20’)  0. Severe Impairment: cannot walk 20 feet. Without assistance, severe gait deviations or imbalance.  Moderate impairment: Walks 20 feet, slow speed, abnormal gait pattern, evidence for imbalance.  2. Mild Impairment: walks 20 feet, uses assistive devices, slower speed, mild gait deviations.   3. Normal: Walks 20 feet, no assistive devices, good speed, no evidence of imbalance, normal gait pattern.    B. Change in Gait speed  Instructions: Begin walking at your normal pace (for 5’), when I tell you “go,” walk as fast as you can (for 5’). When I tell you “slow,” walk as slowly as you can (for 5’).   0. Severe impairment: cannot change speeds, or loses balance and has to reach for wall or be caught.   1. Moderate impairment: Makes only minor adjustments to walking speed or accomplishes a change in speed with significant gait deviations, or changes speed but has significant gait deviations, or changes speed but loses balance but is able to recover and keep walking.    2. Mild Impairment: Able to change speed but demonstrated mild gait deviations, or no gait deviations but unable to achieve a " significant change in velocity, or uses and assistive device.    3. Normal: Able to smoothly change walking speed without loss of balance or gait deviations. Shows significant difference in walking speeds between normal, fast and slow speeds.     C. Gait with horizontal head turns  Instructions: Begin walking at your normal pace. When I tell you to “look right,” keep walking straight, but turn your head to the right. Keep looking to the right until I tell you, “look left,” then keep walking straight and turn your head to the left. Keep your head to the left until I tell you “look straight,“ then keep walking straight, but return your head to the center.   0. Severe impairment: Performs task with severe disruptions of gait (ie staggers outside 15 inch path, LOB, stops, reaches for wall)   1. Moderate impairment: Performs head turns with moderate change in gait velocity, slows down, staggers, but recovers and cont to walk.    2. Mild impairment: Performs head turn smoothly with slight change in gait velocity (ie minor disruption to smooth gait path or uses walking aid.)   3. Normal: Performs head turns smoothly with no guo in gait.     D. Gait with vertical head turns  Instructions: Begin walking at your normal pace. When I tell you to “look up,” keep walking straight, but tip your head up. Keep looking up until I tell you, “look down,” then keep walking straight and tip your head down. Keep your head down until I tell you “look straight,“ then keep walking straight, but return your head to the center.  0. Severe impairment: Performs task with severe disruptions of gait (ie staggers outside 15 inch path, LOB, stops, reaches for wall)   1. Moderate impairment: Performs head turns with moderate change in gait velocity, slows down, staggers, but recovers and cont to walk.    2. Mild impairment: Performs head turn smoothly with slight change in gait velocity (ie minor disruption to smooth gait path or uses walking  aid.)   3. Normal: Performs head turns smoothly with no guo in gait.     E. Gait and pivot turn  Instructions: Begin walking at your normal pace. When I tell you, “turn and stop,” turn as quickly as you can to face the opposite direction and stop.   0. Severe impairment: Cannot turn safely, requires assistance to turn and stop.   1. Moderate impairment: Turns slowly, requires verbal; cueing, requires several small steps to catch balance following turn.   2. Mild impairment:Pivot turn safely in greater than 3 seconds and stops without loss of balance.   3. Normal: Pivots and turns safely within 3 seconds and stops quickly with no loss of balance.    F. Step over obstacles  Instructions: Begin walking at your normal speed. When you come to the shoebox, step over it, not around it, and keep walking.   0. Severe impairment: cannot perform activity without assistance.   1. Moderate impairment: Able to step over box, but must stop, then step over. May require verbal cueing.    2. Mild impairment: Able to step over box, but must slow down, and adjust steps to clear box safely.   3. Normal: Able to step over box without changing gait speed, no evidence of imbalance.     G. Step around obstacles  Instructions: Begin walking at normal speed. When you come to the first cone (about 6’ away), walk around the right side of it. When you come to the second cone (6’ past first cone), walk around it to the left.   0. Severe impairment: Unable to clear cones, walking into one or more cones, or requires physical assistance.    1. Moderate impairment: Able to clear cones but must significantly slow speed to accomplish task, or requires verbal cueing.   2. Mild impairment: Able to step around both cones, but must slow down and adjust steps to clear cones.   3. Normals: able to walk around cones safely without changing gait speed, no evidence of imbalance.    H. Stairs  Instructions: Walk up these stairs as you would at home, i.e.,  "using the railing if necessary. At the top, turn around and walk down.   0. Severe impairment: cannot perform safely.   1. Moderate impairment: Two feet to a stair, must use rails.   2. Mild impairment: alternating feet, must use rails.   3. Normal: alternating feet, no rail.    14/24                        Therapeutic Exercises (CPT 04293):     1. Nu step, Seat 7, arms 8, level 4 resistance, 6 min at start of session, 5-6 min at the end of the session.    2. True cage, Hip flexor stretch, hs stretch, gastroc stretch    4. TRX strap, 3 x 5 each, alt step touches, ring rows, squats to a chair      Therapeutic Exercise Summary: Access Code: [email or web address]  URL: [email or web address]  Date: 07/21/2023  Prepared by:     Exercises  - Supine Bridge  - 1 x daily - 7 x weekly - 3 reps - 1 min hold  - Sidelying Hip Abduction  - 1 x daily - 7 x weekly - 10 reps - 10 hold    Therapeutic Treatments and Modalities:     1. Neuromuscular Re-education (CPT 82546)    Therapeutic Treatment and Modalities Summary: Foam roller step overs, forward and lateral            Time-based treatments/modalities:    Physical Therapy Timed Treatment Charges  Neuromusc re-ed, balance, coor, post minutes (CPT 98646): 30 minutes  Therapeutic exercise minutes (CPT 90405): 15 minutes        ASSESSMENT:   Response to treatment: She has a strong will to \"workout\" but gets fatigued and is limited by her back and knee. Continue to challenge legs, balance and endurance going forward.       PLAN/RECOMMENDATIONS:   Plan for treatment: therapy treatment to continue next visit.  Planned interventions for next visit: continue with current treatment.         "

## 2023-08-01 ENCOUNTER — PHYSICAL THERAPY (OUTPATIENT)
Dept: PHYSICAL THERAPY | Facility: REHABILITATION | Age: 73
End: 2023-08-01
Attending: INTERNAL MEDICINE
Payer: MEDICARE

## 2023-08-01 DIAGNOSIS — G83.11 PARALYSIS OF RIGHT LOWER EXTREMITY (HCC): ICD-10-CM

## 2023-08-01 DIAGNOSIS — I69.30 LATE EFFECT OF STROKE: ICD-10-CM

## 2023-08-01 DIAGNOSIS — R29.898 WEAKNESS OF BOTH LOWER EXTREMITIES: ICD-10-CM

## 2023-08-01 PROCEDURE — 97112 NEUROMUSCULAR REEDUCATION: CPT

## 2023-08-01 PROCEDURE — 97110 THERAPEUTIC EXERCISES: CPT

## 2023-08-01 NOTE — OP THERAPY DAILY TREATMENT
"  Outpatient Physical Therapy  DAILY TREATMENT     [facility] Physical [address].  [address]  [place] [address]  Phone:  [phone]  Fax:  [phone]    Date: 08/01/2023    Patient: Lauren Dave  YOB: 1950  MRN: [id number]     Time Calculation    Start time: 1045  Stop time: 1126 Time Calculation (min): 41 minutes         Chief Complaint: Weakness    Visit #: 4    SUBJECTIVE:  Pt reports back feels better, no pain but then states 3/10, is chronic states \"fractured\"  Wants to do exact session as last time. Performing stretches from last time  Adamant using nustep x 5 min every session as warm up.    OBJECTIVE:  Current objective measures:   (left UE and Rt LE)     6MWT: 653 feet, 30sec rest at 3:40 standing    DGI  14/24                        Therapeutic Exercises (CPT 11112):     1. Nu step, 5 min, Lvl 3    2. Brief gastroc, hip flexor HS stretch, print out provided and education for performing at home    20. 7/18-10/17      Therapeutic Exercise Summary: Access Code: [email or web address]  URL: [email or web address]  Date: 07/21/2023  Prepared by:     Exercises  - Supine Bridge  - 1 x daily - 7 x weekly - 3 reps - 1 min hold  - Sidelying Hip Abduction  - 1 x daily - 7 x weekly - 10 reps - 10 hold    Access Code: [email or web address]  URL: [email or web address]  Date: 08/01/2023  Prepared by:     Exercises  - Standing Gastroc Stretch on Foam 1/2 Roll  - 1 x daily - 7 x weekly - 3-5 reps - 30 hold  - Standing Hamstring Stretch on Counter  - 1 x daily - 7 x weekly - 3-5 reps - 30 hold  - Standing Hip Flexor Stretch  - 1 x daily - 7 x weekly - 3-5 reps - 30 hold  - Hip Flexor Stretch with Chair  - 1 x daily - 7 x weekly - 3-5 reps - 30 hold    Therapeutic Treatments and Modalities:     1. Neuromuscular Re-education (CPT 53302)    Therapeutic Treatment and Modalities Summary: For improving single leg stance and neuromuscular control performed cone tap BUE, 1 UE and No UE support " "alt UE x 10 reps each with cues for quiet landing and controled tap. Progressed to 3 cones to incorporated add./abduction with hip flexion 1 UE support and No UE x 8 Reps each    For improving single leg stance, stabilty and dec circumduction and inc heel strike performed 12 inch x 2 ward stepping // Bars x 1 UE x 8 reps leading Rt and left LE. Min cues dec circumduction Rt LE. No Ue performed leading Rt LE x 8, unable to perform more than 3-4 reps no UE  due to fatigue/fear leading left LE.    Educated on difference between strength/confidence associated with stance time on left LE due to CVA impairments impacting Rt LE    Req breaks due to back pain    Time-based treatments/modalities:    Physical Therapy Timed Treatment Charges  Neuromusc re-ed, balance, coor, post minutes (CPT 37647): 28 minutes  Therapeutic exercise minutes (CPT 44762): 13 minutes        ASSESSMENT:   Response to treatment:  Tme educating on empahsis on interventions to be performed undet skilled PT ie will instruct in HEP for stretching rather be performed at every session for her management of symptoms. Education on importance of intensity of interventions during session to maximize neuroplastic. Pt continues to be adamant her walking will be \"normal\" as therapist reminded her gait is functional and unable to predict future improvement.    PLAN/RECOMMENDATIONS:   Plan for treatment: therapy treatment to continue next visit.  Planned interventions for next visit: continue with current treatment.         "

## 2023-08-02 ENCOUNTER — DOCUMENTATION (OUTPATIENT)
Dept: PHARMACY | Facility: MEDICAL CENTER | Age: 73
End: 2023-08-02

## 2023-08-02 ENCOUNTER — NON-PROVIDER VISIT (OUTPATIENT)
Dept: VASCULAR LAB | Facility: MEDICAL CENTER | Age: 73
End: 2023-08-02
Attending: INTERNAL MEDICINE
Payer: MEDICARE

## 2023-08-02 DIAGNOSIS — E78.5 DYSLIPIDEMIA: ICD-10-CM

## 2023-08-02 PROCEDURE — 99213 OFFICE O/P EST LOW 20 MIN: CPT

## 2023-08-02 RX ORDER — EZETIMIBE 10 MG/1
10 TABLET ORAL DAILY
Qty: 30 TABLET | Refills: 11 | Status: SHIPPED | OUTPATIENT
Start: 2023-08-02 | End: 2023-08-28

## 2023-08-02 NOTE — PROGRESS NOTES
[facility] - Initial Visit  Date of Service: 08/02/23    Lauren Dave has been referred for evaluation and management of dyslipidemia    Referral Source: Cardiology    Subjective    HPI  History of ASCVD: Yes, Details: CAD w/ stenting, CABG x5, stroke  Other Established (non-atherosclerotic) Vascular Disease, if Present: CHF, HTN  Age at Initial Diagnosis of Dyslipidemia: Atleast 10 years, patient does not know    Current Prescription Lipid Lowering Medications - including dose:   Statin: None  Non-Statin: None  Current Lipid Lowering and Related Supplements:   None  Any Current Side Effects Potentially Related to Lipid Lowering therapy?   No  Current Adherence to Lipid Lowering Therapies   Complete  Previously Attempted Interventions for Lipids - including outcome  Statin: rosuvastatin (cough), simvastatin (muscle pain) - could not tolerate due to side effects  Non-Statin: Zetia (pt not sure)  Any Previous History of Statin Intolerance?   Yes, Details: see above  Baseline Lipids Prior to Treatment:    Latest Reference Range & Units 02/27/19 07:17   Cholesterol,Tot 100 - 199 mg/dL 263 (H)   Triglycerides 0 - 149 mg/dL 203 (H)   HDL >=40 mg/dL 58   LDL <100 mg/dL 164 (H)   (H): Data is abnormally high  Other Pertinent History: none  History of other CV risk factors: None    PAST MEDICAL HISTORY:  has a past medical history of Arthritis, Breast cancer (HCC) (08/07/2013), Bronchitis (2005), Cancer (HCC), Cataract, Chronic systolic congestive heart failure (HCC) EF 35% (10/12/2022), Coronary artery disease due to calcified coronary lesion - Calcifications seen on CT scan also wtih aortic ulceration, Dental disorder, Discoid lupus, Dyslipidemia, Essential hypertension, Heart burn, Hypoadrenalism (HCC) - need for chronic steroids, 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    PAST SURGICAL HISTORY:  has a past surgical history that includes colonoscopy (2003); tonsillectomy; breast biopsy; mastectomy (2013); node biopsy sentinel (2013); cath placement (2013); cath removal (2014); us-needle core bx-breast panel (2013); other cardiac surgery (2022); multiple coronary artery bypass endo vein harvest (11/10/2022); and echocardiogram, transesophageal, intraoperative (11/10/2022).    CURRENT MEDICATIONS:   Current Outpatient Medications:     hydrALAZINE, TAKE 1 TABLET BY MOUTH THREE TIMES DAILY AS NEEDED (/100)    carvedilol, 25 mg, Oral, BID WITH MEALS    Entresto, 1 Tablet, Oral, BID    furosemide, 20 mg, Oral, DAILY    apixaban, 5 mg, Oral, BID    predniSONE, 5 mg, Oral, DAILY    aspirin, 81 mg, Oral, DAILY    famotidine, Take 1 tablet by mouth twice daily (Patient taking differently: 40 mg, Oral, DAILY)    Cyanocobalamin (VITAMIN B-12 PO), 1 Tablet, Oral, QAM    ibuprofen, TAKE 1 TABLET BY MOUTH EVERY 8 HOURS AS NEEDED FOR MILD PAIN    vitamin D3, 1 Capsule, Oral, DAILY    ALLERGIES: Statins [hmg-coa-r inhibitors], Albumin, Hydroxychloroquine, Atorvastatin, Codeine, Eggs, Lisinopril, Losartan, and Penicillins    FAMILY HISTORY: patient does not know, brother has high cholesterol    SOCIAL HISTORY   Social History     Tobacco Use   Smoking Status Former    Packs/day: 1.00    Years: 0.00    Pack years: 0.00    Types: Cigarettes    Quit date: 10/1/2013    Years since quittin.8   Smokeless Tobacco Never     Change in weight: Stable  Exercise habits: minimal exercise, twice per week goes to physical therapy. Does exercises in her bed daily ~10 minutes.   Diet: Patient eats healthy  Breakfast - toast, cereal  Lunch - sandwich or skips  Dinner - Chicken (bake), air fries food  Tries to eat mediterranean         Objective      There were no vitals filed for this visit.   Physical Exam    DATA  REVIEW:  Most Recent Lipid Panel:   Lab Results   Component Value Date    CHOLSTRLTOT 161 11/14/2022    TRIGLYCERIDE 154 (H) 11/14/2022    HDL 54 11/14/2022    LDL 76 11/14/2022       Other Pertinent Blood Work:   Lab Results   Component Value Date    SODIUM 139 05/01/2023    POTASSIUM 4.7 05/01/2023    CHLORIDE 104 05/01/2023    CO2 24 05/01/2023    ANION 11.0 05/01/2023    GLUCOSE 94 05/01/2023    BUN 32 (H) 05/01/2023    CREATININE 1.36 05/01/2023    CALCIUM 9.5 05/01/2023    ASTSGOT 39 05/01/2023    ALTSGPT 8 05/01/2023    ALKPHOSPHAT 51 05/01/2023    TBILIRUBIN 0.2 05/01/2023    ALBUMIN 4.5 05/01/2023    AGRATIO 1.6 05/01/2023    CREACTPROT 6.73 (H) 11/21/2022    TSHULTRASEN 0.870 06/29/2022       Other: NA    Recent Imaging Studies:    None since last visit        ASSESSMENT AND PLAN  Patient Type, check all that apply:   Secondary Prevention  Established Atherosclerotic Cardiovascular Disease (ASCVD)  Yes, Details: CAD, CABG x 5  Other Established (non-atherosclerotic) Vascular Disease, if Present:  HTN, CHF  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):   No (If yes, enter details of how diagnosis was made  ACC/AHA Indication for Statin Therapy, ayaka all that apply:  Established ASCVD: Indication for High intensity statin   Calculated Risk for ASCVD, if applicable    N/A  Other Significant Risk Markers, if any, ayaka all that apply   None  Goal LDL-C and nonHDL-C based on Clinic Protocol  LDL-C:   <70 mg/dL  Lifestyle Recommendations From Today’s Visit:    Exercise: exercise at least 10 minutes daily  Continue mediterranean diet  Statin Therapy Recommendations from Today’s Visit:   None - Patient unwilling to retrial any of the statin  Non-Statin Medications Recommendations from Today’s Visit:   Start ezetimibe 10 mg daily  Indication for PCSK9 Inhibitor, if applicable:  Not currently indicated  Supplements Recommended at this visit:   None   Recommendations for Other Cardiovascular Risk  Factors, ayaka all that apply:   Hypertension- continue f/u w/ cards    Other Issues:  none    Studies Ordered at Todays Visit: none  Blood Work Ordered At Today’s visit: Lipid, apoB, LipoA   Follow-Up: 1 months    More Ambrose PharmD    CC:  TERENCE Christianson, Jayashree ANDERSON, A.P.R.*  Michael Bloch

## 2023-08-02 NOTE — PROGRESS NOTES
Drug: Zetia 10mg tablets   Status: NO PA Needed   Coverage dates:  N/A   Copay: $4.15 per 30 tablets per 30 days   Fill with :  patient decision     Will outreach to offer services and/or release to preferred pharmacy

## 2023-08-03 ENCOUNTER — TELEPHONE (OUTPATIENT)
Dept: VASCULAR LAB | Facility: MEDICAL CENTER | Age: 73
End: 2023-08-03
Payer: MEDICARE

## 2023-08-03 PROCEDURE — RXMED WILLOW AMBULATORY MEDICATION CHARGE: Performed by: INTERNAL MEDICINE

## 2023-08-03 NOTE — TELEPHONE ENCOUNTER
Attempted to contact patient at [phone] to discuss [facility] and services/benefits offered. No answer, left voicemail.      Steffany Oliveira

## 2023-08-07 ENCOUNTER — HOSPITAL ENCOUNTER (OUTPATIENT)
Dept: LAB | Facility: MEDICAL CENTER | Age: 73
End: 2023-08-07
Attending: FAMILY MEDICINE
Payer: MEDICARE

## 2023-08-07 ENCOUNTER — HOSPITAL ENCOUNTER (OUTPATIENT)
Dept: LAB | Facility: MEDICAL CENTER | Age: 73
End: 2023-08-07
Attending: INTERNAL MEDICINE
Payer: MEDICARE

## 2023-08-07 DIAGNOSIS — E78.5 DYSLIPIDEMIA: ICD-10-CM

## 2023-08-07 LAB
ALBUMIN SERPL BCP-MCNC: 4.4 G/DL (ref 3.2–4.9)
ALBUMIN/GLOB SERPL: 1.4 G/DL
ALP SERPL-CCNC: 59 U/L (ref 30–99)
ALT SERPL-CCNC: 12 U/L (ref 2–50)
AMYLASE SERPL-CCNC: 88 U/L (ref 20–103)
ANION GAP SERPL CALC-SCNC: 12 MMOL/L (ref 7–16)
AST SERPL-CCNC: 35 U/L (ref 12–45)
BASOPHILS # BLD AUTO: 0.7 % (ref 0–1.8)
BASOPHILS # BLD: 0.05 K/UL (ref 0–0.12)
BILIRUB SERPL-MCNC: 0.3 MG/DL (ref 0.1–1.5)
BUN SERPL-MCNC: 28 MG/DL (ref 8–22)
CALCIUM ALBUM COR SERPL-MCNC: 9.6 MG/DL (ref 8.5–10.5)
CALCIUM SERPL-MCNC: 9.9 MG/DL (ref 8.5–10.5)
CHLORIDE SERPL-SCNC: 104 MMOL/L (ref 96–112)
CHOLEST SERPL-MCNC: 296 MG/DL (ref 100–199)
CO2 SERPL-SCNC: 25 MMOL/L (ref 20–33)
CREAT SERPL-MCNC: 1.34 MG/DL (ref 0.5–1.4)
EOSINOPHIL # BLD AUTO: 0.3 K/UL (ref 0–0.51)
EOSINOPHIL NFR BLD: 4.1 % (ref 0–6.9)
ERYTHROCYTE [DISTWIDTH] IN BLOOD BY AUTOMATED COUNT: 48.4 FL (ref 35.9–50)
FASTING STATUS PATIENT QL REPORTED: NORMAL
GFR SERPLBLD CREATININE-BSD FMLA CKD-EPI: 42 ML/MIN/1.73 M 2
GGT SERPL-CCNC: 11 U/L (ref 7–34)
GLOBULIN SER CALC-MCNC: 3.2 G/DL (ref 1.9–3.5)
GLUCOSE SERPL-MCNC: 83 MG/DL (ref 65–99)
HCT VFR BLD AUTO: 39.4 % (ref 37–47)
HDLC SERPL-MCNC: 76 MG/DL
HGB BLD-MCNC: 11.9 G/DL (ref 12–16)
IMM GRANULOCYTES # BLD AUTO: 0.02 K/UL (ref 0–0.11)
IMM GRANULOCYTES NFR BLD AUTO: 0.3 % (ref 0–0.9)
LDLC SERPL CALC-MCNC: 163 MG/DL
LIPASE SERPL-CCNC: 90 U/L (ref 11–82)
LYMPHOCYTES # BLD AUTO: 1.64 K/UL (ref 1–4.8)
LYMPHOCYTES NFR BLD: 22.4 % (ref 22–41)
MCH RBC QN AUTO: 30.7 PG (ref 27–33)
MCHC RBC AUTO-ENTMCNC: 30.2 G/DL (ref 32.2–35.5)
MCV RBC AUTO: 101.5 FL (ref 81.4–97.8)
MONOCYTES # BLD AUTO: 0.59 K/UL (ref 0–0.85)
MONOCYTES NFR BLD AUTO: 8 % (ref 0–13.4)
NEUTROPHILS # BLD AUTO: 4.73 K/UL (ref 1.82–7.42)
NEUTROPHILS NFR BLD: 64.5 % (ref 44–72)
NRBC # BLD AUTO: 0 K/UL
NRBC BLD-RTO: 0 /100 WBC (ref 0–0.2)
PLATELET # BLD AUTO: 198 K/UL (ref 164–446)
PMV BLD AUTO: 10.4 FL (ref 9–12.9)
POTASSIUM SERPL-SCNC: 4.4 MMOL/L (ref 3.6–5.5)
PROT SERPL-MCNC: 7.6 G/DL (ref 6–8.2)
RBC # BLD AUTO: 3.88 M/UL (ref 4.2–5.4)
SODIUM SERPL-SCNC: 141 MMOL/L (ref 135–145)
TRIGL SERPL-MCNC: 286 MG/DL (ref 0–149)
WBC # BLD AUTO: 7.3 K/UL (ref 4.8–10.8)

## 2023-08-07 PROCEDURE — 80053 COMPREHEN METABOLIC PANEL: CPT

## 2023-08-07 PROCEDURE — 82150 ASSAY OF AMYLASE: CPT

## 2023-08-07 PROCEDURE — 85025 COMPLETE CBC W/AUTO DIFF WBC: CPT

## 2023-08-07 PROCEDURE — 82172 ASSAY OF APOLIPOPROTEIN: CPT

## 2023-08-07 PROCEDURE — 83695 ASSAY OF LIPOPROTEIN(A): CPT

## 2023-08-07 PROCEDURE — 36415 COLL VENOUS BLD VENIPUNCTURE: CPT

## 2023-08-07 PROCEDURE — 80061 LIPID PANEL: CPT

## 2023-08-07 PROCEDURE — 83690 ASSAY OF LIPASE: CPT

## 2023-08-07 PROCEDURE — 82977 ASSAY OF GGT: CPT | Mod: GA

## 2023-08-08 ENCOUNTER — PHYSICAL THERAPY (OUTPATIENT)
Dept: PHYSICAL THERAPY | Facility: REHABILITATION | Age: 73
End: 2023-08-08
Attending: INTERNAL MEDICINE
Payer: MEDICARE

## 2023-08-08 DIAGNOSIS — R29.898 WEAKNESS OF BOTH LOWER EXTREMITIES: ICD-10-CM

## 2023-08-08 DIAGNOSIS — I69.30 LATE EFFECT OF STROKE: ICD-10-CM

## 2023-08-08 DIAGNOSIS — G83.11 PARALYSIS OF RIGHT LOWER EXTREMITY (HCC): ICD-10-CM

## 2023-08-08 LAB
APO B100 SERPL-MCNC: 119 MG/DL (ref 55–125)
LPA SERPL-MCNC: 49 MG/DL

## 2023-08-08 PROCEDURE — 97110 THERAPEUTIC EXERCISES: CPT

## 2023-08-08 PROCEDURE — 97112 NEUROMUSCULAR REEDUCATION: CPT

## 2023-08-08 NOTE — OP THERAPY DAILY TREATMENT
"  Outpatient Physical Therapy  DAILY TREATMENT     [facility] Physical [address]  [address]  [place] [address]  Phone:  [phone]  Fax:  [phone]    Date: 08/08/2023    Patient: Lauren Dave  YOB: 1950  MRN: [id number]     Time Calculation    Start time: 1330  Stop time: 1412 Time Calculation (min): 42 minutes         Chief Complaint: Weakness, Loss Of Balance, and Difficulty Walking    Visit #: 5    SUBJECTIVE:  Pt reports busy morning. Is completing HEP 4x a week, agrees to add new ones. Later states she has not been performing the appropriate holds of either exercise. Not sure   When asked if she is comfortable in quadruped on her bed pt agreed however by end of session pt verbalizing concerns and \"i'm not sure\" type statements but wanted to attempt at home.     Stated after 1/2 kneeling she does not know why she has to complete so many of the interventions and has concerns PT will cause gout flare.      Adamant using nustep x 5 min every session as warm up.    OBJECTIVE:  Current objective measures:   (left UE and Rt LE)     6MWT: 653 feet, 30sec rest at 3:40 standing    DGI  14/24             Therapeutic Exercises (CPT 96741):     1. Nu step, 5 min, Lvl 3    2. bridge, x 1 min    3. quadruped fire hydrant, x 10 B    4. quadruped donkey kick, x 10 B    20. 7/18-10/17      Therapeutic Exercise Summary: Access Code: [email or web address]  URL: [email or web address]  Date: 07/21/2023  Prepared by:     Exercises  - Supine Bridge  - 1 x daily - 7 x weekly - 3 reps - 1 min hold  - Sidelying Hip Abduction  - 1 x daily - 7 x weekly - 10 reps - 10 hold  Added 8/8  Quadruped fire hydrants, quadruped donkey kicks 3 x 10 on bed    Access Code: [email or web address]  URL: [email or web address]  Date: 08/01/2023  Prepared by:     Exercises  - Standing Gastroc Stretch on Foam 1/2 Roll  - 1 x daily - 7 x weekly - 3-5 reps - 30 hold  - Standing Hamstring Stretch on Counter  - 1 x daily " "- 7 x weekly - 3-5 reps - 30 hold  - Standing Hip Flexor Stretch  - 1 x daily - 7 x weekly - 3-5 reps - 30 hold  - Hip Flexor Stretch with Chair  - 1 x daily - 7 x weekly - 3-5 reps - 30 hold    Therapeutic Treatments and Modalities:     1. Neuromuscular Re-education (CPT 83187)    Therapeutic Treatment and Modalities Summary: Split STS for improving Rt LE stabilty mod encouragement 3 x 8 no UE 19\" table SPV    For improving Rt hip abduction strength/stabilty pt positioned in 1/2 kneeling on mat table with swiss ball UE support then cued to let go of ball. Mod cues to dec Rt lateral trunk flexion through out, then progressed to horizontal head turns x 30 sec (pt rest) attempted second set but pt stated unable due tp \"pulling\" in her groin educated it is likely due to hip flexion tightness due to glute weakness and posture weakness/compensation and encouraged pushing through if able. Pt wanted to perform on Lt LE. Informed pt current emphasis on Rt LE as this is most significantly impaired but pt insisted and pt able to perform without any LOB or difficulty in which she then agreed and attempted another Rt side but only tolerated a few seconds. At this time she reported she has \"a bad knee\" including meniscus injury and therapist informed her that this position does not exacerbate this to which she replied she hopes this positioning does not aggravate her gout. Informed that gout not typically exacerbated by weightbearing and exercise. Pt then stated she did not know why she has to do so much/many to which education provided that she had currently only been in position x 4 min with rests. And re educated on neuro plasticity as mentioned in assessment.   Educated pot this intervention is designed to improve her impairments and make progress towards her goals as she has stated and this 1/2 kneeling decreases ability for compensatory strategies and more so targets the glute med which is why it was selected. "           Time-based treatments/modalities:    Physical Therapy Timed Treatment Charges  Neuromusc re-ed, balance, coor, post minutes (CPT 94529): 15 minutes  Therapeutic exercise minutes (CPT 35059): 27 minutes        ASSESSMENT:   Response to treatment:  Educated pt that gout flares are not typically related to weightbearing ie tall/1/2 kneeling positions as pt was referring too and not induced by exercise. Pt seems to c/o pain shes described as pulling. Other medical conditions such as her gout today when performing difficult/challenging tasks such as the 1/2 kneeling today. PT has provided significant education during session related to importance of performing difficult interventions during session and higher intensity training to facilitate neuro plasticity and progress towards goals. Pt c/o co morbidities limiting her perceived ability to perform more challenging interventions may impact the POC.     Provided handout with new HEP and reiterated she is to only perform handouts provided for HEP not all interventions performed in session.     PLAN/RECOMMENDATIONS:   Plan for treatment: therapy treatment to continue next visit.  Planned interventions for next visit: continue with current treatment.

## 2023-08-10 ENCOUNTER — APPOINTMENT (OUTPATIENT)
Dept: PHYSICAL THERAPY | Facility: REHABILITATION | Age: 73
End: 2023-08-10
Attending: INTERNAL MEDICINE
Payer: MEDICARE

## 2023-08-14 DIAGNOSIS — I48.0 PAF (PAROXYSMAL ATRIAL FIBRILLATION) (HCC): ICD-10-CM

## 2023-08-14 DIAGNOSIS — D68.69 HYPERCOAGULABLE STATE DUE TO PAROXYSMAL ATRIAL FIBRILLATION (HCC): ICD-10-CM

## 2023-08-14 DIAGNOSIS — I10 ESSENTIAL HYPERTENSION: ICD-10-CM

## 2023-08-14 DIAGNOSIS — I50.22 CHRONIC SYSTOLIC CONGESTIVE HEART FAILURE (HCC): Chronic | ICD-10-CM

## 2023-08-14 DIAGNOSIS — I50.43 CHF (CONGESTIVE HEART FAILURE), NYHA CLASS III, ACUTE ON CHRONIC, COMBINED (HCC): ICD-10-CM

## 2023-08-14 DIAGNOSIS — I48.0 HYPERCOAGULABLE STATE DUE TO PAROXYSMAL ATRIAL FIBRILLATION (HCC): ICD-10-CM

## 2023-08-14 RX ORDER — CARVEDILOL 6.25 MG/1
6.25 TABLET ORAL 3 TIMES DAILY
Qty: 270 TABLET | Refills: 3 | Status: SHIPPED | OUTPATIENT
Start: 2023-08-14 | End: 2024-03-27

## 2023-08-14 RX ORDER — CARVEDILOL 25 MG/1
25 TABLET ORAL 2 TIMES DAILY WITH MEALS
Qty: 180 TABLET | Refills: 3 | Status: SHIPPED | OUTPATIENT
Start: 2023-08-14 | End: 2023-10-25 | Stop reason: SDUPTHER

## 2023-08-14 RX ORDER — SACUBITRIL AND VALSARTAN 24; 26 MG/1; MG/1
1 TABLET, FILM COATED ORAL 2 TIMES DAILY
Qty: 180 TABLET | Refills: 3 | Status: SHIPPED | OUTPATIENT
Start: 2023-08-14 | End: 2023-10-17 | Stop reason: SDUPTHER

## 2023-08-14 RX ORDER — FUROSEMIDE 20 MG/1
20 TABLET ORAL DAILY
Qty: 90 TABLET | Refills: 3 | Status: SHIPPED | OUTPATIENT
Start: 2023-08-14

## 2023-08-14 RX ORDER — HYDRALAZINE HYDROCHLORIDE 10 MG/1
10 TABLET, FILM COATED ORAL 3 TIMES DAILY PRN
Qty: 25 TABLET | Refills: 0 | Status: SHIPPED | OUTPATIENT
Start: 2023-08-14 | End: 2023-10-17 | Stop reason: SDUPTHER

## 2023-08-14 RX ORDER — CARVEDILOL 12.5 MG/1
12.5 TABLET ORAL 2 TIMES DAILY WITH MEALS
Qty: 180 TABLET | Refills: 3 | Status: SHIPPED | OUTPATIENT
Start: 2023-08-14 | End: 2023-10-17 | Stop reason: SDUPTHER

## 2023-08-14 NOTE — TELEPHONE ENCOUNTER
Raimundo!    I'm helping Lauren Dave transition their medications to [facility] on [address].  Would you please authorize these prescriptions?    Thank you!       Steffany Oliveira

## 2023-08-15 ENCOUNTER — HOSPITAL ENCOUNTER (OUTPATIENT)
Dept: HEMATOLOGY ONCOLOGY | Facility: MEDICAL CENTER | Age: 73
End: 2023-08-15
Attending: NURSE PRACTITIONER
Payer: MEDICARE

## 2023-08-15 VITALS
RESPIRATION RATE: 12 BRPM | WEIGHT: 157.63 LBS | SYSTOLIC BLOOD PRESSURE: 138 MMHG | BODY MASS INDEX: 27.93 KG/M2 | HEART RATE: 77 BPM | TEMPERATURE: 98.1 F | HEIGHT: 63 IN | DIASTOLIC BLOOD PRESSURE: 64 MMHG

## 2023-08-15 DIAGNOSIS — Z85.3 HISTORY OF BREAST CANCER: ICD-10-CM

## 2023-08-15 PROCEDURE — 99212 OFFICE O/P EST SF 10 MIN: CPT | Performed by: NURSE PRACTITIONER

## 2023-08-15 PROCEDURE — 99214 OFFICE O/P EST MOD 30 MIN: CPT | Performed by: NURSE PRACTITIONER

## 2023-08-15 ASSESSMENT — ENCOUNTER SYMPTOMS
DIARRHEA: 0
COUGH: 0
CONSTIPATION: 0
MYALGIAS: 0
CHILLS: 0
WEIGHT LOSS: 0
PALPITATIONS: 0
ABDOMINAL PAIN: 0
SHORTNESS OF BREATH: 0
NAUSEA: 0
VOMITING: 0
FEVER: 0

## 2023-08-15 ASSESSMENT — FIBROSIS 4 INDEX: FIB4 SCORE: 3.73

## 2023-08-15 ASSESSMENT — PAIN SCALES - GENERAL: PAINLEVEL: NO PAIN
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
## 2023-08-16 ENCOUNTER — PHARMACY VISIT (OUTPATIENT)
Dept: PHARMACY | Facility: MEDICAL CENTER | Age: 73
End: 2023-08-16
Payer: COMMERCIAL

## 2023-08-17 ENCOUNTER — APPOINTMENT (OUTPATIENT)
Dept: PHYSICAL THERAPY | Facility: REHABILITATION | Age: 73
End: 2023-08-17
Attending: INTERNAL MEDICINE
Payer: MEDICARE

## 2023-08-21 ENCOUNTER — TELEPHONE (OUTPATIENT)
Dept: PHYSICAL THERAPY | Facility: REHABILITATION | Age: 73
End: 2023-08-21
Payer: MEDICARE

## 2023-08-21 ENCOUNTER — APPOINTMENT (OUTPATIENT)
Dept: PHYSICAL THERAPY | Facility: REHABILITATION | Age: 73
End: 2023-08-21
Attending: INTERNAL MEDICINE
Payer: MEDICARE

## 2023-08-21 DIAGNOSIS — G83.11 PARALYSIS OF RIGHT LOWER EXTREMITY (HCC): ICD-10-CM

## 2023-08-21 DIAGNOSIS — I69.30 LATE EFFECT OF STROKE: ICD-10-CM

## 2023-08-21 NOTE — OP THERAPY DISCHARGE SUMMARY
"  Outpatient Physical Therapy  DISCHARGE SUMMARY NOTE      [facility] [address].  [address]  [place] [address]  Phone:  [phone]  Fax:  [phone]    Date of Visit: 08/21/2023    Patient: Lauren Dave  YOB: 1950  MRN: [id number]     Referring Provider: No referring provider defined for this encounter.   Referring Diagnosis No admission diagnoses are documented for this encounter.         Functional Assessment Used        Your patient is being discharged from Physical Therapy with the following comments:   Patient called today to cancel all follow up visits     Comments:  Patient cancelled all follow up visits on 8/21/23 reporting she needed to cancel for \"a lot of reasons\" but not elaborated upon during voicemail. During previous sessions patient did \"complain of co-morbidities limiting her perceived ability to perform more challenging interventions (per primary therapist)\".     Limitations Remaining:  No re-assessment occurred following initial evaluation    Recommendations:  Patient participated in 5 physical therapy sessions to address impairments following stroke. Due to patient cancelling all follow up appointments, she will need to obtain a new referral if further PT treatment is required.     Ava Herron, PT, DPT    Date: 8/21/2023          "

## 2023-08-22 ENCOUNTER — TELEPHONE (OUTPATIENT)
Dept: VASCULAR LAB | Facility: MEDICAL CENTER | Age: 73
End: 2023-08-22
Payer: MEDICARE

## 2023-08-22 NOTE — TELEPHONE ENCOUNTER
Patient called wondering if patient should start cholesterol medication. LDL elevated w/ last cholesterol test. Instructed patient to start taking Zetia will follow up with further instruction at next visit 9/14.     Deloris AgarwalD

## 2023-08-24 ENCOUNTER — APPOINTMENT (OUTPATIENT)
Dept: PHYSICAL THERAPY | Facility: REHABILITATION | Age: 73
End: 2023-08-24
Attending: INTERNAL MEDICINE
Payer: MEDICARE

## 2023-08-28 ENCOUNTER — TELEPHONE (OUTPATIENT)
Dept: VASCULAR LAB | Facility: MEDICAL CENTER | Age: 73
End: 2023-08-28
Payer: MEDICARE

## 2023-08-28 NOTE — TELEPHONE ENCOUNTER
S/w pt - advised her to discontinue ezetimibe.   Added ezetimibe reaction to allergy list on chart.  Will discuss alternatives at f/u appt on 9/14.  Alejandra Orosco, DelorisD

## 2023-08-28 NOTE — TELEPHONE ENCOUNTER
Patient called stating she's had an allergic reaction to Rx: ezetimibe (ZETIA) 10 MG Tab  S/s Itchy and bumps on forehead.  Patient stopped taking Rx today and s/s have decreased.      I advised patient to stay off of the zetia and that I would send additional message to anti-coag.  And to wait till her next appt in Sept.      Please c/b patient if she needs further directions.

## 2023-08-29 ENCOUNTER — APPOINTMENT (OUTPATIENT)
Dept: PHYSICAL THERAPY | Facility: REHABILITATION | Age: 73
End: 2023-08-29
Attending: INTERNAL MEDICINE
Payer: MEDICARE

## 2023-08-31 ENCOUNTER — APPOINTMENT (OUTPATIENT)
Dept: PHYSICAL THERAPY | Facility: REHABILITATION | Age: 73
End: 2023-08-31
Attending: INTERNAL MEDICINE
Payer: MEDICARE

## 2023-09-14 ENCOUNTER — NON-PROVIDER VISIT (OUTPATIENT)
Dept: VASCULAR LAB | Facility: MEDICAL CENTER | Age: 73
End: 2023-09-14
Attending: INTERNAL MEDICINE
Payer: MEDICARE

## 2023-09-14 VITALS — HEART RATE: 75 BPM | DIASTOLIC BLOOD PRESSURE: 78 MMHG | SYSTOLIC BLOOD PRESSURE: 155 MMHG

## 2023-09-14 DIAGNOSIS — E78.5 DYSLIPIDEMIA: ICD-10-CM

## 2023-09-14 PROCEDURE — 99212 OFFICE O/P EST SF 10 MIN: CPT

## 2023-09-14 NOTE — PROGRESS NOTES
[facility] - Follow up Visit  Date of Service: 09/14/23    Luaren Dave has been referred for evaluation and management of dyslipidemia    Referral Source: Jayashree MARLEY    Subjective    HPI  History of ASCVD: Yes, Details: CABG x5, stroke  Other Established (non-atherosclerotic) Vascular Disease, if Present: CHF, HTN  Age at Initial Diagnosis of Dyslipidemia: Atleast 10 years, patient does not know    Current Prescription Lipid Lowering Medications - including dose:   Statin: None  Non-Statin: None  Current Lipid Lowering and Related Supplements:   None  Any Current Side Effects Potentially Related to Lipid Lowering therapy?   No  Current Adherence to Lipid Lowering Therapies   Complete  Previously Attempted Interventions for Lipids - including outcome  Statin: rosuvastatin (cough), simvastatin (muscle pain) - could not tolerate due to side effects  Non-Statin: Zetia (rash)  Any Previous History of Statin Intolerance?   Yes, Details: see above  Baseline Lipids Prior to Treatment:    Latest Reference Range & Units 02/27/19 07:17   Cholesterol,Tot 100 - 199 mg/dL 263 (H)   Triglycerides 0 - 149 mg/dL 203 (H)   HDL >=40 mg/dL 58   LDL <100 mg/dL 164 (H)   (H): Data is abnormally high  Other Pertinent History: none  History of other CV risk factors: None    PAST MEDICAL HISTORY:  has a past medical history of Arthritis, Breast cancer (HCC) (08/07/2013), Bronchitis (2005), Cancer (HCC), Cataract, Chronic systolic congestive heart failure (HCC) EF 35% (10/12/2022), Coronary artery disease due to calcified coronary lesion - Calcifications seen on CT scan also wtih aortic ulceration, Dental disorder, Discoid lupus, Dyslipidemia, Essential hypertension, Heart burn, Hypoadrenalism (HCC) - need for chronic steroids, 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    PAST SURGICAL HISTORY:  has a past surgical history that includes colonoscopy (2003); tonsillectomy; breast biopsy; mastectomy (2013); node biopsy sentinel (2013); cath placement (2013); cath removal (2014); us-needle core bx-breast panel (2013); other cardiac surgery (2022); multiple coronary artery bypass endo vein harvest (11/10/2022); and echocardiogram, transesophageal, intraoperative (11/10/2022).    CURRENT MEDICATIONS:   Current Outpatient Medications:     hydrALAZINE, 10 mg, Oral, TID PRN    carvedilol, 12.5 mg, Oral, BID WITH MEALS    carvedilol, 25 mg, Oral, BID WITH MEALS    carvedilol, 6.25 mg, Oral, TID    Entresto, 1 Tablet, Oral, BID (Patient not taking: Reported on 8/15/2023)    furosemide, 20 mg, Oral, DAILY    apixaban, 5 mg, Oral, BID    apixaban, 5 mg, Oral, BID    hydroxychloroquine, Take 2 Tablets by mouth once daily for 1 day and for 1 tablet the next day, alternating (Patient not taking: Reported on 8/15/2023)    predniSONE, 5 mg, Oral, DAILY    aspirin, 81 mg, Oral, DAILY    famotidine, Take 1 tablet by mouth twice daily (Patient taking differently: 40 mg, Oral, DAILY)    Cyanocobalamin (VITAMIN B-12 PO), 1 Tablet, Oral, QAM    ibuprofen, TAKE 1 TABLET BY MOUTH EVERY 8 HOURS AS NEEDED FOR MILD PAIN    vitamin D3, 1 Capsule, Oral, DAILY    ALLERGIES: Statins [hmg-coa-r inhibitors], Albumin, Ezetimibe, Hydroxychloroquine, Atorvastatin, Codeine, Eggs, Lisinopril, Losartan, and Penicillins    FAMILY HISTORY: patient does not know, brother has high cholesterol    SOCIAL HISTORY   Social History     Tobacco Use   Smoking Status Former    Current packs/day: 0.00    Types: Cigarettes    Start date: 10/1/2013    Quit date: 10/1/2013    Years since quittin.9   Smokeless Tobacco Never     Change in weight: Stable  Exercise habits: minimal exercise, twice per week goes to physical therapy. Does exercises in her bed daily  ~10 minutes.   Diet: Patient eats healthy  Breakfast - toast, cereal  Lunch - sandwich or skips  Dinner - Chicken (bake), air fries food  Tries to eat mediterranean         Objective      There were no vitals filed for this visit.   Physical Exam    DATA REVIEW:  Most Recent Lipid Panel:   Lab Results   Component Value Date    CHOLSTRLTOT 296 (H) 08/07/2023    TRIGLYCERIDE 286 (H) 08/07/2023    HDL 76 08/07/2023     (H) 08/07/2023       Other Pertinent Blood Work:   Lab Results   Component Value Date    SODIUM 141 08/07/2023    POTASSIUM 4.4 08/07/2023    CHLORIDE 104 08/07/2023    CO2 25 08/07/2023    ANION 12.0 08/07/2023    GLUCOSE 83 08/07/2023    BUN 28 (H) 08/07/2023    CREATININE 1.34 08/07/2023    CALCIUM 9.9 08/07/2023    ASTSGOT 35 08/07/2023    ALTSGPT 12 08/07/2023    ALKPHOSPHAT 59 08/07/2023    TBILIRUBIN 0.3 08/07/2023    ALBUMIN 4.4 08/07/2023    AGRATIO 1.4 08/07/2023    CREACTPROT 6.73 (H) 11/21/2022    LIPOPROTA 49 (H) 08/07/2023    TSHULTRASEN 0.870 06/29/2022       Other: NA    Recent Imaging Studies:    None since last visit        ASSESSMENT AND PLAN  Patient Type, check all that apply:   Secondary Prevention  Established Atherosclerotic Cardiovascular Disease (ASCVD)  Yes, Details: CAD, CABG x 5  Other Established (non-atherosclerotic) Vascular Disease, if Present:  HTN, CHF  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):   Yes baseline LDL >160  ACC/AHA Indication for Statin Therapy, ayaka all that apply:  Established ASCVD: Indication for High intensity statin   Calculated Risk for ASCVD, if applicable    N/A  Other Significant Risk Markers, if any, ayaka all that apply   Elevated lipo(a)   Latest Reference Range & Units 08/07/23 08:12   Lipoprotein (a) <=29 mg/dL 49 (H)   (H): Data is abnormally high  Goal LDL-C and nonHDL-C based on Clinic Protocol  LDL-C:   <70 mg/dL  Lifestyle Recommendations From Today’s Visit:    Exercise: exercise at least 10 minutes  daily  Continue mediterranean diet  Statin Therapy Recommendations from Today’s Visit:   None - Patient unwilling to retrial any statin  Non-Statin Medications Recommendations from Today’s Visit:   none  Indication for PCSK9 Inhibitor, if applicable:  ASCVD with suboptimal control of LDL-C despite maximally tolerated statin  Start Repatha 140 mg q14 days  Supplements Recommended at this visit:   None   Recommendations for Other Cardiovascular Risk Factors, ayaka all that apply:   Hypertension- continue f/u w/ cards, BP elevated in office today but patient states she has a history of white coat HTN will continue to monitor, last BP within range    Other Issues:  Discussed with patient importance of treating cholesterol to prevent future ASCVD events. She is hesitant but willing to try PCSK9i injections. Discussed MOA, adverse effects and administration with patient during visit.       Studies Ordered at Todays Visit: none  Blood Work Ordered At Today’s visit: Lipid panel  Follow-Up: 2 months    More Ambrose, DelorisD    CC:  TERENCE Christianson, Jayashree ANDERSON, A.P.R.*  Michael Bloch

## 2023-09-15 ENCOUNTER — TELEPHONE (OUTPATIENT)
Dept: PHARMACY | Facility: MEDICAL CENTER | Age: 73
End: 2023-09-15
Payer: MEDICARE

## 2023-09-15 NOTE — TELEPHONE ENCOUNTER
New   Requesting prior authorization for Repatha   PA Outcome Approved   Quantity of 4 for a day supply of 28  Insurance [employer]  Reference #? [id number]  Dates in effect, from 08/16/2023 through 09/14/2024

## 2023-09-18 NOTE — TELEPHONE ENCOUNTER
Received Refill PA request via [employer]  for Evolocumab (REPATHA) 140 MG/ML Solution Auto-injector SubQ injection pen  . (Quantity:2ml, Day Supply:28)     Insurance: [employer] [place]   Member ID:   [id number]  BIN: [id number]  PCN: [id number]  Group: [id number]     Ran Test claim via [place] & medication Rejects stating prior authorization is required.    AYO Watts, PhT  Pharmacy Liaison  P: [phone]  9/18/2023 9:50 AM

## 2023-09-18 NOTE — TELEPHONE ENCOUNTER
Prior Authorization for Evolocumab (REPATHA) 140 MG/ML Solution Auto-injector SubQ injection pen   (Quantity: 2, Days: 28) has been submitted via Cover My Meds: Key ([id number])    Insurance: [employer]     Will follow up in 24-48 business hours.     AYO Watts, PhT  Pharmacy Liaison  P: [phone]  9/18/2023 9:51 AM

## 2023-09-18 NOTE — TELEPHONE ENCOUNTER
PA for Evolocumab (REPATHA) 140 MG/ML Solution Auto-injector SubQ injection pen   has been approved for a quantity of 6 ml, day supply 84    PA reference number: [id number]  Insurance: [employer]   Effective dates: 09/14/2023-09/14/2024  Copay: $0/84     Is patient eligible to fill with [facility]? Yes    Next Steps: The Patients copay is less than $5.00. Will contact the patient to determine choice of pharmacy, if applicable.    AYO Watts, PhT  Pharmacy Liaison  P: [phone]  9/18/2023 9:52 AM

## 2023-10-12 ENCOUNTER — TELEPHONE (OUTPATIENT)
Dept: VASCULAR LAB | Facility: MEDICAL CENTER | Age: 73
End: 2023-10-12
Payer: MEDICARE

## 2023-10-12 NOTE — TELEPHONE ENCOUNTER
Received call from pt that Repatha was $10/month, but she had a quote for $0.    Pt states if it is $10/month, it is unaffordable for her.    Will ask Dinorah to f/u on copay    Alejandra Orosco, DelorisD

## 2023-10-16 ENCOUNTER — APPOINTMENT (OUTPATIENT)
Dept: RADIOLOGY | Facility: MEDICAL CENTER | Age: 73
End: 2023-10-16
Attending: NURSE PRACTITIONER
Payer: MEDICARE

## 2023-10-17 ENCOUNTER — TELEPHONE (OUTPATIENT)
Dept: CARDIOLOGY | Facility: MEDICAL CENTER | Age: 73
End: 2023-10-17
Payer: MEDICARE

## 2023-10-17 DIAGNOSIS — I50.43 CHF (CONGESTIVE HEART FAILURE), NYHA CLASS III, ACUTE ON CHRONIC, COMBINED (HCC): ICD-10-CM

## 2023-10-17 DIAGNOSIS — D68.69 HYPERCOAGULABLE STATE DUE TO PAROXYSMAL ATRIAL FIBRILLATION (HCC): ICD-10-CM

## 2023-10-17 DIAGNOSIS — I48.0 PAF (PAROXYSMAL ATRIAL FIBRILLATION) (HCC): ICD-10-CM

## 2023-10-17 DIAGNOSIS — I48.0 HYPERCOAGULABLE STATE DUE TO PAROXYSMAL ATRIAL FIBRILLATION (HCC): ICD-10-CM

## 2023-10-17 DIAGNOSIS — I50.22 CHRONIC SYSTOLIC CONGESTIVE HEART FAILURE (HCC): Chronic | ICD-10-CM

## 2023-10-17 RX ORDER — SACUBITRIL AND VALSARTAN 24; 26 MG/1; MG/1
1 TABLET, FILM COATED ORAL 2 TIMES DAILY
Qty: 180 TABLET | Refills: 3 | Status: SHIPPED | OUTPATIENT
Start: 2023-10-17 | End: 2024-03-27

## 2023-10-17 RX ORDER — CARVEDILOL 12.5 MG/1
12.5 TABLET ORAL 2 TIMES DAILY WITH MEALS
Qty: 180 TABLET | Refills: 3 | Status: SHIPPED | OUTPATIENT
Start: 2023-10-17 | End: 2024-03-27

## 2023-10-17 RX ORDER — HYDRALAZINE HYDROCHLORIDE 10 MG/1
10 TABLET, FILM COATED ORAL 3 TIMES DAILY PRN
Qty: 25 TABLET | Refills: 0 | Status: SHIPPED | OUTPATIENT
Start: 2023-10-17

## 2023-10-17 NOTE — TELEPHONE ENCOUNTER
CW    Caller: Cesar from [facility]     Medication Name and Dosage:     furosemide (LASIX) 20 MG Tab   apixaban (ELIQUIS) 5mg Tab   sacubitril-valsartan (ENTRESTO) 24-26 MG Tab   carvedilol (COREG) 12.5 MG Tab  hydrALAZINE (APRESOLINE) 10 MG Tab     Medication amount left:    carvedilol - none left  Other medications unkown    Preferred Pharmacy: [facility] called to have this patients perscriptions switched to [facility]. Please advise.     Other questions (Topic): Pharmacy changed to [facility]     Callback Number (Will only call for issues): [phone] - [facility]     Thank you,     Yessi GARZA   Speech Therapy      Visit Type: Evaluation  -  Clinical swallow  Reason for consult: Per chart,  Patient is 76 year old male status post unsuccessful mitraclip (7/6) , developed cardiogenic shock due to worsening mitral regurgitation, now postop surgical MVR 7/19. Patient was intubated post op 2/2 acute respiratory failure and extubated 7/22. Patient exhibited seizure like activity 7/19; neuro consulted with findings indicating SDH.     07/23 XR Chest \" 2. Prominent heart size with worsening opacification of the bilateral  hemithorax suspicious for pleural effusions with atelectasis, edema, and/or  infection.  3. Continued follow-up is recommended.  4. Additional findings as described above.     Electronically Signed by: CAROLYN CANTU M.D.   Signed on: 7/23/2022 8:11 AM \"    07/20 CT Head 1. Small amount of acute subdural hemorrhage in the left parietal vertex.  2. Age-appropriate involutional change.  3. Hypodensities consistent with chronic small vessel ischemia and prior  ischemic or traumatic insult mostly to the right frontal lobe.  4. Mild posterior scalp swelling without underlying calvarial fracture.  5. Please see above for additional observations.     Dr. Fleming was notified of the findings over the telephone by Dr. Walker  on 7/20/2022 at 19:17.\"    Past Medical History:  No date: A-fib (CMS/[facility])  No date: Anemia  No date: Asthma  No date: Atrial flutter (CMS/[facility])  No date: Chronic kidney disease  No date: Congestive cardiac failure (CMS/[facility])  No date: Coronary artery disease  No date: Diabetes mellitus (CMS/[facility])  No date: Essential (primary) hypertension  No date: Gastroesophageal reflux disease  No date: Gout  No date: High cholesterol  No date: History of GI bleed  No date: Ischemic cardiomyopathy  No date: Sleep apnea      Comment:  no device        Precautions     - Medical precautions:  fall risk; standard precautions.      - Oxygen: nasal cannula.      - Basic: IV, NG and O2    - Lines in chart  and on patient reviewed; cautions maintained through out session    - Safety measures: restraints, bed alarm and bed rails    - Cognition:         • Expression is verbal.          • Following commands impaired.      Current Diet: nothing by mouth      - Dentition: some missing dentition  SUBJECTIVE  Patient was received awake in bed with mouth open and dry oral cavity. Patient was agreeable to swallow evaluation requesting water.Patient agreed to participate in therapy this date.   Patient/family goals: unable to state     OBJECTIVE     Oral Mechanism   Oral Motor Assessment: generalized weakness  Facial     - Symmetry: • Left: impaired  • Right: intact    - Range of Motion: • Left: impaired  • Right: impaired    - Strength: • Left: impaired  • Right: impaired    - Coordination: • Left: impaired  • Right: impaired    Communication/Cognition:  Orientation Level:      - Person: oriented    - Place: oriented    - Month: oriented with cues    - Year: oriented with cues    - Date: not oriented    - Day of week: not oriented    Swallowing     Consistencies:  Ice Chips:    - Oral phase: intact      - Pharyngeal phase: impaired, vocal quality change  Thin Liquid (teaspoon):    - Oral: impaired, suspect premature spillage and reduced propulsion   - Pharyngeal: impaired  , vocal quality change, suspect decreased hyolaryngeal elevation and delayed throat clear  Thin Liquid (cup):    - Oral: impaired  , suspect premature spillage, reduced propulsion and impaired bolus control   - Pharyngeal: impaired, delayed throat clear, vocal quality change, suspect decreased hyolaryngeal elevation and reduced swallow/respiration coordination  Thin Liquid (straw):     - Oral: impaired, suspect premature spillage, reduced propulsion and impaired bolus control   - Pharyngeal: impaired, immediate cough, vocal quality change, reduced swallow/respiration coordination and suspect decreased hyolaryngeal elevation            ASSESSMENT  Impairments:  swallowing  Patient presents with mod-severe oral and suspected pharyngeal dysphagia in the setting of severe mitral regurgitation and SDH. Patient's dysphagia is exacerbated by impaired cognition and reduced attention and awareness. Bolus acceptance was reduced with patient demonstrating difficulty forming an oral seal for a cup sip and generating oral propulsion for straw sip. Suspect reduced bolus control of liquids w/suspected delayed swallow. Per palpation hyolaryngeal elevation was reduced. Patient exhibited signs and symptoms of aspiration including throat clear, cough, and increased breathing with PO trials.    Patient required suction due to buildup of thick secretions. Further PO trials discontinued and solids deferred. SLP to re-assess swallow function. Recommend video fluoroscopic swallow study when medically appropriate. Recommend NPO with main source of nutrition from NG tube.    Discussed with RN.         • Potential barriers to progress:  Complex medical history, severity of deficits and current medical conditions     • Skilled therapy is required to establish safe means of nutrition, hydration and medication administration    Patient at end of session:    - location: in bed    - safety measures: bed rails x4, restraints in place, equipment intact, lines intact and call light within reach    - hand off to: nurse    PLAN    Interventions:  Reassess swallow function as appropriate    Plan/Goal Agreement:  Patient agrees with goals and individualized plan of care    RECOMMENDATIONS     -Diet:          *nothing by mouth    -Additional Swallow Recommendations:         *continue alternative means of nutrition and re-evaluate swallow    GOALS    Long Term Goals:   Patient will manage an oral diet with no sequelae of aspiration in order to achieve adequate nutrition/hydration by mouth. INITIAL   Documented in the chart in the following areas: Assessment.      Therapy procedure time and total treatment time  can be found documented on the Time Entry flowsheet

## 2023-10-18 ENCOUNTER — TELEPHONE (OUTPATIENT)
Dept: VASCULAR LAB | Facility: MEDICAL CENTER | Age: 73
End: 2023-10-18
Payer: MEDICARE

## 2023-10-18 NOTE — TELEPHONE ENCOUNTER
JEWELS Roberts reached out to me to verify Pt's copay. Ran a test claim through [employer] and Pt's copay is $0/84DS.     Called Pt and LVM for the medication status.     Dr. Bloch sent a message via EMR to verify if there's any status update.     Called [facility] @ [phone] and spoke to Darshana Sanches to verify if Pt did picked up her medication refill. Per Myron, Pt requested to transfer out all of her medications to [facility]---[address] [place] [address].     Called the [facility] @ [phone] and spoke to JEWELS Garcia. Per Radha, they're still waiting for the rest of Pt's medications to be sent out from [facility]. [facility] will call once all medications has been received.    AYO Watts, [facility]  Pharmacy Liaison (Rx Coordinator)  P: [phone]  10/18/2023 12:23 PM

## 2023-10-19 ENCOUNTER — TELEPHONE (OUTPATIENT)
Dept: CARDIOLOGY | Facility: MEDICAL CENTER | Age: 73
End: 2023-10-19
Payer: MEDICARE

## 2023-10-19 NOTE — TELEPHONE ENCOUNTER
Last OV: 07/18/2023  Proposed Surgery: Colonoscopy/EGD   Surgery Date: TBD  Requesting Office Name: [facility]   Fax Number: [phone]  Preference of Location (default is surgery center unless specified by Cardiologist or [facility])  Prior Clearance Addressed: No      Anticoags/Antiplatelets: Apixaban   Outstanding Cardiac Imaging : No  Stent, Cardiac Devices, or Catheterization: Yes  AICD Date : 11/29/2022   Ablation, TAVR/Valve (including open heart), Cardioversion: No  Recent Cardiac Hospitalization: No            When: N/A  History (cardiac history):   Past Medical History:   Diagnosis Date    Arthritis     Breast cancer (HCC) 08/07/2013    Bronchitis 2005    Cancer (HCC)     right breast cancer     Cataract     removed    Chronic systolic congestive heart failure (HCC) EF 35% 10/12/2022    Coronary artery disease due to calcified coronary lesion - Calcifications seen on CT scan also wtih aortic ulceration     Dental disorder     tooth infection    Discoid lupus     Dyslipidemia     Tried atorvastatin, pravastatin, lovastatin, and Zetia.  All causes severe myalgias.  Just taking fish oil.      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     Ischemic heart disease due to coronary artery obstruction (HCC) - Severe 3vD on cath 10/19/2022    LBBB (left bundle branch block) 12/16/2014    Noted on prechemo EKG 9/2014, MUGA WNL    Pneumonia     Pseudogout     Scarlet fever     Unspecified hemorrhagic conditions     gums             Surgical Clearance Letter Sent: YES   **Scan clearance request letter into [employer].**

## 2023-10-24 ENCOUNTER — TELEPHONE (OUTPATIENT)
Dept: CARDIOLOGY | Facility: MEDICAL CENTER | Age: 73
End: 2023-10-24
Payer: MEDICARE

## 2023-10-24 DIAGNOSIS — I50.43 CHF (CONGESTIVE HEART FAILURE), NYHA CLASS III, ACUTE ON CHRONIC, COMBINED (HCC): ICD-10-CM

## 2023-10-24 NOTE — TELEPHONE ENCOUNTER
SMILEY     Caller: Cesar @ [facility]   Patient wants medications sent to New pharmacy going forward     Medication Name and Dosage:     carvedilol (COREG) 25 MG Tab  hydrALAZINE (APRESOLINE) 10 MG Tab  apixaban (ELIQUIS) 5mg Tab    Medication amount left: Patient is out of medications     Preferred Pharmacy: [facility], in [facility]   Fax Number: [phone]    Callback Number (Will only call for issues): [phone]    Thank You   Angeles STARK

## 2023-10-25 RX ORDER — CARVEDILOL 25 MG/1
25 TABLET ORAL 2 TIMES DAILY WITH MEALS
Qty: 180 TABLET | Refills: 3 | Status: SHIPPED | OUTPATIENT
Start: 2023-10-25

## 2023-10-25 NOTE — TELEPHONE ENCOUNTER
Upon chart review apixaban and hydralazine already sent to [place] pharmacy on 10/17/23.    Carvedilol Medication sent to [facility] pharmacy as requested.

## 2023-10-26 ENCOUNTER — HOSPITAL ENCOUNTER (OUTPATIENT)
Dept: RADIOLOGY | Facility: MEDICAL CENTER | Age: 73
End: 2023-10-26
Attending: SURGERY
Payer: MEDICARE

## 2023-11-07 ENCOUNTER — HOSPITAL ENCOUNTER (OUTPATIENT)
Dept: RADIOLOGY | Facility: MEDICAL CENTER | Age: 73
End: 2023-11-07
Attending: SURGERY
Payer: MEDICARE

## 2023-11-07 DIAGNOSIS — D34 BENIGN NEOPLASM OF THYROID GLAND: ICD-10-CM

## 2023-11-07 PROCEDURE — 76536 US EXAM OF HEAD AND NECK: CPT

## 2023-11-08 ENCOUNTER — TELEPHONE (OUTPATIENT)
Dept: VASCULAR LAB | Facility: MEDICAL CENTER | Age: 73
End: 2023-11-08
Payer: MEDICARE

## 2023-11-08 NOTE — TELEPHONE ENCOUNTER
Pt is overdue for lipid appt.    S/w pt to scheduled f/u. She would like to schedule a f/u prior to getting labs drawn because she wants to discuss the medication Repatha    She was unable to schedule f/u at the time of my call. Will try again later.    Alejandra Orosco, PharmD

## 2023-11-08 NOTE — TELEPHONE ENCOUNTER
R/s for 11/16 - pt states she has not started Repatha but picked it up for $10  Alejandra Orosco, DelorisD

## 2023-11-13 ENCOUNTER — PATIENT MESSAGE (OUTPATIENT)
Dept: CARDIOLOGY | Facility: MEDICAL CENTER | Age: 73
End: 2023-11-13
Payer: MEDICARE

## 2023-11-14 ENCOUNTER — PATIENT MESSAGE (OUTPATIENT)
Dept: CARDIOLOGY | Facility: MEDICAL CENTER | Age: 73
End: 2023-11-14
Payer: MEDICARE

## 2023-11-14 DIAGNOSIS — I65.22 LEFT CAROTID STENOSIS: ICD-10-CM

## 2023-11-14 NOTE — PATIENT COMMUNICATION
To CW: Patient sent MyChart asking if since it has been a year since her last carotid ultrasound if she needs another one. Please advise how often that exam needs to be repeated/followed up on. Thank you!

## 2023-11-15 NOTE — PATIENT COMMUNICATION
To CW: Patient states she would like to do the carotid ultrasound sooner. Okay to place order? Please advise if any additional recommendations. Thank you!

## 2023-11-17 ENCOUNTER — PATIENT MESSAGE (OUTPATIENT)
Dept: CARDIOLOGY | Facility: MEDICAL CENTER | Age: 73
End: 2023-11-17
Payer: MEDICARE

## 2023-11-17 DIAGNOSIS — E78.5 DYSLIPIDEMIA: ICD-10-CM

## 2023-11-20 ENCOUNTER — HOSPITAL ENCOUNTER (OUTPATIENT)
Dept: RADIOLOGY | Facility: MEDICAL CENTER | Age: 73
End: 2023-11-20
Attending: NURSE PRACTITIONER
Payer: MEDICARE

## 2023-11-20 DIAGNOSIS — Z85.3 HISTORY OF BREAST CANCER: ICD-10-CM

## 2023-12-11 ENCOUNTER — APPOINTMENT (OUTPATIENT)
Dept: VASCULAR LAB | Facility: MEDICAL CENTER | Age: 73
End: 2023-12-11
Attending: INTERNAL MEDICINE
Payer: MEDICARE

## 2023-12-11 NOTE — PROGRESS NOTES
[facility] - FollowUp Visit  Date of Service: 12/11/23    Lauren Dave is here for follow up of dyslipidemia    Subjective    HPI  History of ASCVD: Yes, Details: CABG x5, stroke  Other Established (non-atherosclerotic) Vascular Disease, if Present: CHF, HTN  Age at Initial Diagnosis of Dyslipidemia: Atleast 10 years, patient does not know    Current Prescription Lipid Lowering Medications - including dose:   Statin: None  Non-Statin: None  Current Lipid Lowering and Related Supplements:   None  Any Current Side Effects Potentially Related to Lipid Lowering therapy?   No  Current Adherence to Lipid Lowering Therapies   Complete  Previously Attempted Interventions for Lipids - including outcome  Statin: rosuvastatin (cough), simvastatin (muscle pain) - could not tolerate due to side effects  Non-Statin: Zetia (rash)  Any Previous History of Statin Intolerance?   Yes, Details: see above  Baseline Lipids Prior to Treatment:     Latest Reference Range & Units 02/27/19 07:17   Cholesterol,Tot 100 - 199 mg/dL 263 (H)   Triglycerides 0 - 149 mg/dL 203 (H)   HDL >=40 mg/dL 58   LDL <100 mg/dL 164 (H)   (H): Data is abnormally high    SOCIAL HISTORY  Social History     Tobacco Use   Smoking Status Former    Current packs/day: 0.00    Types: Cigarettes    Start date: 10/1/2013    Quit date: 10/1/2013    Years since quitting: 10.2   Smokeless Tobacco Never      Change in weight: {weight change stable/free txt:108483}  Exercise habits: {EXERCISE PATTERN:21}   Diet: {DIET TYPES:91035}      Objective    There were no vitals filed for this visit.   Physical Exam    DATA REVIEW  Most Recent Lipid Panel:   Lab Results   Component Value Date    CHOLSTRLTOT 296 (H) 08/07/2023    TRIGLYCERIDE 286 (H) 08/07/2023    HDL 76 08/07/2023     (H) 08/07/2023       Other Pertinent Blood Work:   Lab Results   Component Value Date    SODIUM 141 08/07/2023    POTASSIUM 4.4 08/07/2023    CHLORIDE 104 08/07/2023    CO2 25  08/07/2023    ANION 12.0 08/07/2023    GLUCOSE 83 08/07/2023    BUN 28 (H) 08/07/2023    CREATININE 1.34 08/07/2023    CALCIUM 9.9 08/07/2023    ASTSGOT 35 08/07/2023    ALTSGPT 12 08/07/2023    ALKPHOSPHAT 59 08/07/2023    TBILIRUBIN 0.3 08/07/2023    ALBUMIN 4.4 08/07/2023    AGRATIO 1.4 08/07/2023    CREACTPROT 6.73 (H) 11/21/2022    LIPOPROTA 49 (H) 08/07/2023    TSHULTRASEN 0.870 06/29/2022       Other:  {Other_(date_freetxt):187385}    Recent Imaging Studies:    {Recent Imaging Studies:397003}          ASSESSMENT AND PLAN  Patient Type, check all that apply:   Secondary Prevention  Established Atherosclerotic Cardiovascular Disease (ASCVD)  Yes, Details: CAD, CABG x 5  Other Established (non-atherosclerotic) Vascular Disease, if Present:    HTN, CHF  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): Possible, baseline LDL > 160   ACC/AHA Indication for Statin Therapy, ayaka all that apply:   Established ASCVD: Indication for High intensity statin    Calculated Risk for ASCVD, if applicable:  N/A  Other Significant Risk Markers, if any, ayaka all that apply:  Other: elevated lipo (a)    Latest Reference Range & Units 08/07/23 08:12    Lipoprotein (a) <=29 mg/dL 49 (H)   (H): Data is abnormally high  National Lipid Association (NLA) Goal (if applicable):  LDL-C:   <70 mg/dL  Lifestyle Recommendations From Today’s Visit:   {Lifestyle Recommendations:936000}  {Lifestyle Recommendations:239087}  Statin Recommendations from Today's Visit  None - patient unwilling to retrial any statins  Non-Statin Medications Recommendations from Today’s Visit:   {MEDICATIONS:9005}  Indication for PCSK9 Inhibitor, if applicable:  ASCVD with suboptimal control of LDL-C despite maximally tolerated statin  Supplements Recommended at this visit:  {MEDICATIONS:9005}  Recommendations for Other Cardiovascular Risk Factors, ayaka all that apply:   {Risk Factors_Cardiovascular:141459}  Other Issues:  ***    Studies Ordered at Todays  Visit:  {NONE:11361}   Blood Work Ordered At Today’s visit:   {NONE:77833}  Follow-Up:   {FOLLOWUP:53488}    Carson Young, Kalpana    CC:  Amber L Hayes, M.D. Bloch, Michael J, M.D.

## 2023-12-28 ENCOUNTER — HOSPITAL ENCOUNTER (OUTPATIENT)
Dept: RADIOLOGY | Facility: MEDICAL CENTER | Age: 73
End: 2023-12-28
Attending: INTERNAL MEDICINE
Payer: MEDICARE

## 2023-12-28 DIAGNOSIS — I65.22 LEFT CAROTID STENOSIS: ICD-10-CM

## 2023-12-28 PROCEDURE — 93880 EXTRACRANIAL BILAT STUDY: CPT

## 2023-12-29 ENCOUNTER — TELEPHONE (OUTPATIENT)
Dept: CARDIOLOGY | Facility: MEDICAL CENTER | Age: 73
End: 2023-12-29

## 2023-12-29 PROCEDURE — 93880 EXTRACRANIAL BILAT STUDY: CPT | Mod: 26 | Performed by: INTERNAL MEDICINE

## 2023-12-29 NOTE — TELEPHONE ENCOUNTER
CW    Caller: Lauren Dave    Topic/issue: Patient called to get her test results from her carotid test.     Please advise.    Callback Number: [phone]    Thank you,    Yessi GARZA

## 2024-01-02 ENCOUNTER — PATIENT MESSAGE (OUTPATIENT)
Dept: CARDIOLOGY | Facility: MEDICAL CENTER | Age: 74
End: 2024-01-02
Payer: MEDICARE

## 2024-01-02 NOTE — TELEPHONE ENCOUNTER
CW- Please advise on patients US and patient is also wanting PHILIP Dave results reviewed as well. Thank you!

## 2024-01-05 ENCOUNTER — NON-PROVIDER VISIT (OUTPATIENT)
Dept: VASCULAR LAB | Facility: MEDICAL CENTER | Age: 74
End: 2024-01-05
Attending: INTERNAL MEDICINE
Payer: MEDICARE

## 2024-01-05 VITALS — DIASTOLIC BLOOD PRESSURE: 74 MMHG | SYSTOLIC BLOOD PRESSURE: 137 MMHG | HEART RATE: 75 BPM

## 2024-01-05 DIAGNOSIS — E78.5 DYSLIPIDEMIA: ICD-10-CM

## 2024-01-05 PROCEDURE — 99212 OFFICE O/P EST SF 10 MIN: CPT

## 2024-01-05 NOTE — PROGRESS NOTES
[facility] - Follow up Visit  Date of Service: 1/5/24    Lauren Dave has been referred for evaluation and management of dyslipidemia    Referral Source: Jayashree MARLEY    Subjective    HPI  History of ASCVD: Yes, Details: CABG x5, stroke  Other Established (non-atherosclerotic) Vascular Disease, if Present: CHF, HTN  Age at Initial Diagnosis of Dyslipidemia: Atleast 10 years, patient does not know    Current Prescription Lipid Lowering Medications - including dose:   Statin: None  Non-Statin: None  Current Lipid Lowering and Related Supplements:   None  Any Current Side Effects Potentially Related to Lipid Lowering therapy?   No  Current Adherence to Lipid Lowering Therapies   Complete  Previously Attempted Interventions for Lipids - including outcome  Statin: rosuvastatin (cough), simvastatin (muscle pain) - could not tolerate due to side effects  Non-Statin: Zetia (rash)  Any Previous History of Statin Intolerance?   Yes, Details: see above  Baseline Lipids Prior to Treatment:    Latest Reference Range & Units 02/27/19 07:17   Cholesterol,Tot 100 - 199 mg/dL 263 (H)   Triglycerides 0 - 149 mg/dL 203 (H)   HDL >=40 mg/dL 58   LDL <100 mg/dL 164 (H)   (H): Data is abnormally high  Other Pertinent History: none  History of other CV risk factors: None    PAST MEDICAL HISTORY:  has a past medical history of Arthritis, Breast cancer (HCC) (08/07/2013), Bronchitis (2005), Cancer (HCC), Cataract, Chronic systolic congestive heart failure (HCC) EF 35% (10/12/2022), Coronary artery disease due to calcified coronary lesion - Calcifications seen on CT scan also wtih aortic ulceration, Dental disorder, Discoid lupus, Dyslipidemia, Essential hypertension, Heart burn, Hypoadrenalism (HCC) - need for chronic steroids, 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    PAST SURGICAL HISTORY:  has a past surgical history that includes colonoscopy (01/01/2003); tonsillectomy; breast biopsy; mastectomy (08/22/2013); node biopsy sentinel (08/22/2013); cath placement (08/22/2013); cath removal (02/24/2014); us-needle core bx-breast panel (01/01/2013); other cardiac surgery (11/2022); multiple coronary artery bypass endo vein harvest (11/10/2022); and echocardiogram, transesophageal, intraoperative (11/10/2022).    CURRENT MEDICATIONS:   Current Outpatient Medications:     carvedilol, 25 mg, Oral, BID WITH MEALS    apixaban, 5 mg, Oral, BID    Entresto, 1 Tablet, Oral, BID    hydrALAZINE, 10 mg, Oral, TID PRN    carvedilol, 12.5 mg, Oral, BID WITH MEALS    Repatha SureClick, INJECT 1ML SUBCUTANEOUSLY ONCE EVERY 14 DAYS    Evolocumab, Inject  under the skin.    carvedilol, 6.25 mg, Oral, TID    furosemide, 20 mg, Oral, DAILY    hydroxychloroquine, Take 2 Tablets by mouth once daily for 1 day and for 1 tablet the next day, alternating (Patient not taking: Reported on 8/15/2023)    predniSONE, 5 mg, Oral, DAILY    aspirin, 81 mg, Oral, DAILY    famotidine, Take 1 tablet by mouth twice daily (Patient taking differently: 40 mg, Oral, DAILY)    Cyanocobalamin (VITAMIN B-12 PO), 1 Tablet, Oral, QAM    ibuprofen, TAKE 1 TABLET BY MOUTH EVERY 8 HOURS AS NEEDED FOR MILD PAIN    vitamin D3, 1 Capsule, Oral, DAILY    ALLERGIES: Statins [hmg-coa-r inhibitors], Albumin, Ezetimibe, Hydroxychloroquine, Atorvastatin, Codeine, Eggs, Lisinopril, Losartan, and Penicillins    FAMILY HISTORY: patient does not know, brother has high cholesterol    SOCIAL HISTORY   Social History     Tobacco Use   Smoking Status Former    Current packs/day: 0.00    Types: Cigarettes    Start date: 10/1/2013    Quit date: 10/1/2013    Years since quitting: 10.2   Smokeless Tobacco Never     Change in weight: Stable  Exercise habits: minimal exercise, twice per week goes to physical  therapy. Does exercises in her bed daily ~10 minutes.   Diet: Patient eats healthy - unchanged from previous  Breakfast - toast, cereal  Lunch - sandwich or skips  Dinner - Chicken (bake), air fries food  Tries to eat mediterranean         Objective      Vitals:    01/05/24 1114   BP: 137/74   Pulse: 75      Physical Exam    DATA REVIEW:  Most Recent Lipid Panel:   Lab Results   Component Value Date    CHOLSTRLTOT 296 (H) 08/07/2023    TRIGLYCERIDE 286 (H) 08/07/2023    HDL 76 08/07/2023     (H) 08/07/2023       Other Pertinent Blood Work:   Lab Results   Component Value Date    SODIUM 141 08/07/2023    POTASSIUM 4.4 08/07/2023    CHLORIDE 104 08/07/2023    CO2 25 08/07/2023    ANION 12.0 08/07/2023    GLUCOSE 83 08/07/2023    BUN 28 (H) 08/07/2023    CREATININE 1.34 08/07/2023    CALCIUM 9.9 08/07/2023    ASTSGOT 35 08/07/2023    ALTSGPT 12 08/07/2023    ALKPHOSPHAT 59 08/07/2023    TBILIRUBIN 0.3 08/07/2023    ALBUMIN 4.4 08/07/2023    AGRATIO 1.4 08/07/2023    CREACTPROT 6.73 (H) 11/21/2022    LIPOPROTA 49 (H) 08/07/2023    TSHULTRASEN 0.870 06/29/2022       Other: NA    Recent Imaging Studies:    None since last visit        ASSESSMENT AND PLAN  Patient Type, check all that apply:   Secondary Prevention  Established Atherosclerotic Cardiovascular Disease (ASCVD)  Yes, Details: CAD, CABG x 5  Other Established (non-atherosclerotic) Vascular Disease, if Present:  HTN, CHF  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):   Yes baseline LDL >160  ACC/AHA Indication for Statin Therapy, ayaka all that apply:  Established ASCVD: Indication for High intensity statin   Calculated Risk for ASCVD, if applicable    N/A  Other Significant Risk Markers, if any, ayaka all that apply   Elevated lipo(a)   Latest Reference Range & Units 08/07/23 08:12   Lipoprotein (a) <=29 mg/dL 49 (H)   (H): Data is abnormally high  Goal LDL-C and nonHDL-C based on Clinic Protocol  LDL-C:   <70 mg/dL  Lifestyle Recommendations  From Today’s Visit:    Exercise: exercise at least 10 minutes daily  Continue mediterranean diet  Statin Therapy Recommendations from Today’s Visit:   None - Patient unwilling to retrial any statin  Non-Statin Medications Recommendations from Today’s Visit:   Start Repatha 140 mg q14 days  Indication for PCSK9 Inhibitor, if applicable:  ASCVD with suboptimal control of LDL-C despite maximally tolerated statin  Supplements Recommended at this visit:   None   Recommendations for Other Cardiovascular Risk Factors, ayaka all that apply:   Hypertension- continue f/u w/ cards, BP elevated in office today but patient states she has a history of white coat HTN will continue to monitor, last BP within range    Other Issues:  Patient has not started Repatha since last visit - she was hesitant and was worried about having to remember q2 week injection. Discussed that there are no other alternatives available for cholesterol management that are oral that I believe would reduce her cholesterol appropriately. Nexlitol is not an option due to patient's history of gout.     Reviewed again side effects and MOA of Repatha with patient and injected first dose for patient in office. We set up calendar alerts in her smartphone to help remind her of future doses.      Studies Ordered at Todays Visit: none  Blood Work Ordered At Today’s visit: Lipid panel (1 month, during off week of Repatha)  Follow-Up: 1 month    More Ambrose, DelorisD    CC:  Jenna Hernandez M.D.  Nitesh, Jayashree ANDERSON, A.P.R.*  Michael Bloch

## 2024-01-19 ENCOUNTER — TELEPHONE (OUTPATIENT)
Dept: VASCULAR LAB | Facility: MEDICAL CENTER | Age: 74
End: 2024-01-19
Payer: MEDICARE

## 2024-01-19 NOTE — TELEPHONE ENCOUNTER
Caller: Lauren Dave     Topic/issue: Pt called in regards to her medication Evolocumab (REPATHA) 140 MG Pt stated that she would like to speak with the one that is helping her with and would like a call back to discuss further please advise.     Callback Number: [phone]    Thank you,     Roni ARCE

## 2024-01-19 NOTE — TELEPHONE ENCOUNTER
Returned patient call - patient reporting bruising from last injection. No contraindication to next injection, recommend to inject at different site. Patient will try to send picture via [employer].     Deloris AgarwalD

## 2024-01-29 ENCOUNTER — TELEPHONE (OUTPATIENT)
Dept: HEMATOLOGY ONCOLOGY | Facility: MEDICAL CENTER | Age: 74
End: 2024-01-29
Payer: MEDICARE

## 2024-01-29 ENCOUNTER — APPOINTMENT (OUTPATIENT)
Dept: LAB | Facility: MEDICAL CENTER | Age: 74
End: 2024-01-29
Payer: MEDICARE

## 2024-01-29 NOTE — TELEPHONE ENCOUNTER
Patient called office to clarify lab work. Spoke with Viktoriya MARLEY, previous orders for CBC/D and CMP are in and patient is advised to get those with lipid panel. Called patient to update, no answer, left detailed message.

## 2024-01-30 ENCOUNTER — TELEPHONE (OUTPATIENT)
Dept: VASCULAR LAB | Facility: MEDICAL CENTER | Age: 74
End: 2024-01-30
Payer: MEDICARE

## 2024-01-30 NOTE — TELEPHONE ENCOUNTER
Unfortunately it looks like she now has [employer] Medicare Advantage that is out of network w/ [facility]. She is unable to pay $400 for visit. I spoke to her and she will f/u with PCP.    Alejandra Orosco, PharmD    Above noted. We will defer all further f/u of lipids to pcp unless further patient contact or otherwise requested.    Michael J Bloch, MD  Certified Clinical Lipid Specialist  Medial Director, [facility]    Cc:   YASMANY amaya

## 2024-01-30 NOTE — TELEPHONE ENCOUNTER
----- Message from Michael J Bloch, M.D. sent at 1/29/2024  7:02 PM PST -----  Regarding: needs lipid clinic f/u - repatha  Pool - Looks like patient was able to get her repatha, but cxl'd her f/u appt. Can you get her rescheduled    Thx    ----- Message -----  From: Michael J Bloch, M.D.  Sent: 1/5/2024   1:22 PM PST  To: Michael J Bloch, M.D.  Subject: await repatha pa                                   ----- Message -----  From: More Ambrose PharmD  Sent: 1/5/2024  11:18 AM PST  To: Michael J Bloch, M.D.

## 2024-01-31 ENCOUNTER — TELEPHONE (OUTPATIENT)
Dept: CARDIOLOGY | Facility: MEDICAL CENTER | Age: 74
End: 2024-01-31
Payer: MEDICARE

## 2024-02-01 NOTE — TELEPHONE ENCOUNTER
Prior Authorization for Repatha SureClick 140MG/ML auto-injectors (Quantity: 2, Days: 28) has been submitted via Cover My Meds: Key ([id number])    Insurance: [employer]    Will follow up in 24-48 business hours.

## 2024-02-01 NOTE — TELEPHONE ENCOUNTER
Medication Requesting prior authorization for EVOLOCUMAB (REPATHA) 140 MG/ML SOLUTION AUTO-INJECTOR   PA Outcome approved, renewal    Quantity of 6 for a day supply of 84   Insurance [employer]   Reference #? [id number]  Dates in effect, from 02/01/2024 through 12/31/2024  Pharmacy and phone number [facility] [phone]  Patient Copay unknown

## 2024-02-02 ENCOUNTER — APPOINTMENT (OUTPATIENT)
Dept: LAB | Facility: MEDICAL CENTER | Age: 74
End: 2024-02-02
Payer: MEDICARE

## 2024-02-09 ENCOUNTER — APPOINTMENT (OUTPATIENT)
Dept: VASCULAR LAB | Facility: MEDICAL CENTER | Age: 74
End: 2024-02-09
Payer: MEDICARE

## 2024-02-28 ENCOUNTER — TELEPHONE (OUTPATIENT)
Dept: HEMATOLOGY ONCOLOGY | Facility: MEDICAL CENTER | Age: 74
End: 2024-02-28
Payer: MEDICARE

## 2024-02-28 DIAGNOSIS — Z85.3 HISTORY OF BREAST CANCER: ICD-10-CM

## 2024-02-28 NOTE — TELEPHONE ENCOUNTER
Patient called clinic regarding mammogram orders and ultrasound orders. Spoke with Viktoriya MARLEY, orders for ultrasound placed, mammogram order already in place. Faxed to [facility]. Patient insurance changed at beginning of 2024. [facility] does not take Medicare Advantage plans. Called to update patient, no answer, left detailed voicemail and advised patient to call office when available.

## 2024-03-12 ENCOUNTER — PATIENT MESSAGE (OUTPATIENT)
Dept: CARDIOLOGY | Facility: MEDICAL CENTER | Age: 74
End: 2024-03-12
Payer: MEDICARE

## 2024-03-12 DIAGNOSIS — I24.0 ISCHEMIC HEART DISEASE DUE TO CORONARY ARTERY OBSTRUCTION (HCC): ICD-10-CM

## 2024-03-12 DIAGNOSIS — I25.9 ISCHEMIC HEART DISEASE DUE TO CORONARY ARTERY OBSTRUCTION (HCC): ICD-10-CM

## 2024-03-13 ENCOUNTER — TELEPHONE (OUTPATIENT)
Dept: CARDIOLOGY | Facility: MEDICAL CENTER | Age: 74
End: 2024-03-13
Payer: MEDICARE

## 2024-03-19 ENCOUNTER — APPOINTMENT (OUTPATIENT)
Dept: LAB | Facility: MEDICAL CENTER | Age: 74
End: 2024-03-19
Payer: MEDICARE

## 2024-03-19 ENCOUNTER — HOSPITAL ENCOUNTER (OUTPATIENT)
Dept: LAB | Facility: MEDICAL CENTER | Age: 74
End: 2024-03-19
Attending: INTERNAL MEDICINE
Payer: MEDICARE

## 2024-03-19 DIAGNOSIS — I48.0 PAF (PAROXYSMAL ATRIAL FIBRILLATION) (HCC): ICD-10-CM

## 2024-03-19 LAB
25(OH)D3 SERPL-MCNC: 63 NG/ML (ref 30–100)
ALBUMIN SERPL BCP-MCNC: 4.3 G/DL (ref 3.2–4.9)
ALBUMIN SERPL BCP-MCNC: 4.3 G/DL (ref 3.2–4.9)
ALBUMIN/GLOB SERPL: 1.3 G/DL
ALBUMIN/GLOB SERPL: 1.3 G/DL
ALP SERPL-CCNC: 61 U/L (ref 30–99)
ALP SERPL-CCNC: 61 U/L (ref 30–99)
ALT SERPL-CCNC: 19 U/L (ref 2–50)
ALT SERPL-CCNC: 19 U/L (ref 2–50)
ANION GAP SERPL CALC-SCNC: 12 MMOL/L (ref 7–16)
ANION GAP SERPL CALC-SCNC: 12 MMOL/L (ref 7–16)
AST SERPL-CCNC: 41 U/L (ref 12–45)
AST SERPL-CCNC: 43 U/L (ref 12–45)
BASOPHILS # BLD AUTO: 0.3 % (ref 0–1.8)
BASOPHILS # BLD: 0.03 K/UL (ref 0–0.12)
BILIRUB SERPL-MCNC: 0.2 MG/DL (ref 0.1–1.5)
BILIRUB SERPL-MCNC: 0.2 MG/DL (ref 0.1–1.5)
BUN SERPL-MCNC: 20 MG/DL (ref 8–22)
BUN SERPL-MCNC: 21 MG/DL (ref 8–22)
CALCIUM ALBUM COR SERPL-MCNC: 9.5 MG/DL (ref 8.5–10.5)
CALCIUM ALBUM COR SERPL-MCNC: 9.6 MG/DL (ref 8.5–10.5)
CALCIUM SERPL-MCNC: 9.7 MG/DL (ref 8.5–10.5)
CALCIUM SERPL-MCNC: 9.8 MG/DL (ref 8.5–10.5)
CHLORIDE SERPL-SCNC: 102 MMOL/L (ref 96–112)
CHLORIDE SERPL-SCNC: 102 MMOL/L (ref 96–112)
CHOLEST SERPL-MCNC: 290 MG/DL (ref 100–199)
CO2 SERPL-SCNC: 26 MMOL/L (ref 20–33)
CO2 SERPL-SCNC: 26 MMOL/L (ref 20–33)
CREAT SERPL-MCNC: 1.12 MG/DL (ref 0.5–1.4)
CREAT SERPL-MCNC: 1.13 MG/DL (ref 0.5–1.4)
EOSINOPHIL # BLD AUTO: 0.17 K/UL (ref 0–0.51)
EOSINOPHIL NFR BLD: 1.9 % (ref 0–6.9)
ERYTHROCYTE [DISTWIDTH] IN BLOOD BY AUTOMATED COUNT: 48.6 FL (ref 35.9–50)
ERYTHROCYTE [DISTWIDTH] IN BLOOD BY AUTOMATED COUNT: 48.7 FL (ref 35.9–50)
EST. AVERAGE GLUCOSE BLD GHB EST-MCNC: 128 MG/DL
FASTING STATUS PATIENT QL REPORTED: NORMAL
FERRITIN SERPL-MCNC: 45.6 NG/ML (ref 10–291)
GFR SERPLBLD CREATININE-BSD FMLA CKD-EPI: 51 ML/MIN/1.73 M 2
GFR SERPLBLD CREATININE-BSD FMLA CKD-EPI: 52 ML/MIN/1.73 M 2
GLOBULIN SER CALC-MCNC: 3.2 G/DL (ref 1.9–3.5)
GLOBULIN SER CALC-MCNC: 3.3 G/DL (ref 1.9–3.5)
GLUCOSE SERPL-MCNC: 82 MG/DL (ref 65–99)
GLUCOSE SERPL-MCNC: 83 MG/DL (ref 65–99)
HBA1C MFR BLD: 6.1 % (ref 4–5.6)
HCT VFR BLD AUTO: 38.7 % (ref 37–47)
HCT VFR BLD AUTO: 39 % (ref 37–47)
HDLC SERPL-MCNC: 67 MG/DL
HGB BLD-MCNC: 12.3 G/DL (ref 12–16)
HGB BLD-MCNC: 12.3 G/DL (ref 12–16)
IMM GRANULOCYTES # BLD AUTO: 0.04 K/UL (ref 0–0.11)
IMM GRANULOCYTES NFR BLD AUTO: 0.5 % (ref 0–0.9)
LDLC SERPL CALC-MCNC: 152 MG/DL
LYMPHOCYTES # BLD AUTO: 2.23 K/UL (ref 1–4.8)
LYMPHOCYTES NFR BLD: 25.5 % (ref 22–41)
MCH RBC QN AUTO: 31.2 PG (ref 27–33)
MCH RBC QN AUTO: 31.5 PG (ref 27–33)
MCHC RBC AUTO-ENTMCNC: 31.5 G/DL (ref 32.2–35.5)
MCHC RBC AUTO-ENTMCNC: 31.8 G/DL (ref 32.2–35.5)
MCV RBC AUTO: 99 FL (ref 81.4–97.8)
MCV RBC AUTO: 99.2 FL (ref 81.4–97.8)
MONOCYTES # BLD AUTO: 0.73 K/UL (ref 0–0.85)
MONOCYTES NFR BLD AUTO: 8.4 % (ref 0–13.4)
NEUTROPHILS # BLD AUTO: 5.53 K/UL (ref 1.82–7.42)
NEUTROPHILS NFR BLD: 63.4 % (ref 44–72)
NRBC # BLD AUTO: 0 K/UL
NRBC BLD-RTO: 0 /100 WBC (ref 0–0.2)
PLATELET # BLD AUTO: 243 K/UL (ref 164–446)
PLATELET # BLD AUTO: 244 K/UL (ref 164–446)
PMV BLD AUTO: 11.2 FL (ref 9–12.9)
PMV BLD AUTO: 11.3 FL (ref 9–12.9)
POTASSIUM SERPL-SCNC: 4.6 MMOL/L (ref 3.6–5.5)
POTASSIUM SERPL-SCNC: 4.6 MMOL/L (ref 3.6–5.5)
PROT SERPL-MCNC: 7.5 G/DL (ref 6–8.2)
PROT SERPL-MCNC: 7.6 G/DL (ref 6–8.2)
RBC # BLD AUTO: 3.9 M/UL (ref 4.2–5.4)
RBC # BLD AUTO: 3.94 M/UL (ref 4.2–5.4)
SODIUM SERPL-SCNC: 140 MMOL/L (ref 135–145)
SODIUM SERPL-SCNC: 140 MMOL/L (ref 135–145)
T3 SERPL-MCNC: 103 NG/DL (ref 60–181)
T4 FREE SERPL-MCNC: 1.29 NG/DL (ref 0.93–1.7)
TRIGL SERPL-MCNC: 357 MG/DL (ref 0–149)
TSH SERPL DL<=0.005 MIU/L-ACNC: 1.88 UIU/ML (ref 0.38–5.33)
VIT B12 SERPL-MCNC: 633 PG/ML (ref 211–911)
WBC # BLD AUTO: 8.7 K/UL (ref 4.8–10.8)
WBC # BLD AUTO: 8.8 K/UL (ref 4.8–10.8)

## 2024-03-19 PROCEDURE — 82306 VITAMIN D 25 HYDROXY: CPT

## 2024-03-19 PROCEDURE — 84480 ASSAY TRIIODOTHYRONINE (T3): CPT

## 2024-03-19 PROCEDURE — 85027 COMPLETE CBC AUTOMATED: CPT

## 2024-03-19 PROCEDURE — 80053 COMPREHEN METABOLIC PANEL: CPT

## 2024-03-19 PROCEDURE — 84443 ASSAY THYROID STIM HORMONE: CPT

## 2024-03-19 PROCEDURE — 84439 ASSAY OF FREE THYROXINE: CPT

## 2024-03-19 PROCEDURE — 82728 ASSAY OF FERRITIN: CPT

## 2024-03-19 PROCEDURE — 80061 LIPID PANEL: CPT

## 2024-03-19 PROCEDURE — 36415 COLL VENOUS BLD VENIPUNCTURE: CPT

## 2024-03-19 PROCEDURE — 82607 VITAMIN B-12: CPT

## 2024-03-19 PROCEDURE — 83036 HEMOGLOBIN GLYCOSYLATED A1C: CPT

## 2024-03-19 PROCEDURE — 85025 COMPLETE CBC W/AUTO DIFF WBC: CPT

## 2024-03-19 PROCEDURE — 80053 COMPREHEN METABOLIC PANEL: CPT | Mod: 91

## 2024-03-25 ENCOUNTER — PATIENT MESSAGE (OUTPATIENT)
Dept: CARDIOLOGY | Facility: MEDICAL CENTER | Age: 74
End: 2024-03-25
Payer: MEDICARE

## 2024-03-27 ENCOUNTER — NON-PROVIDER VISIT (OUTPATIENT)
Dept: CARDIOLOGY | Facility: MEDICAL CENTER | Age: 74
End: 2024-03-27
Attending: FAMILY MEDICINE
Payer: MEDICARE

## 2024-03-27 ENCOUNTER — TELEPHONE (OUTPATIENT)
Dept: CARDIOLOGY | Facility: MEDICAL CENTER | Age: 74
End: 2024-03-27

## 2024-03-27 ENCOUNTER — OFFICE VISIT (OUTPATIENT)
Dept: CARDIOLOGY | Facility: MEDICAL CENTER | Age: 74
End: 2024-03-27
Attending: INTERNAL MEDICINE
Payer: MEDICARE

## 2024-03-27 ENCOUNTER — NON-PROVIDER VISIT (OUTPATIENT)
Dept: CARDIOLOGY | Facility: MEDICAL CENTER | Age: 74
End: 2024-03-27

## 2024-03-27 VITALS
WEIGHT: 161 LBS | OXYGEN SATURATION: 97 % | DIASTOLIC BLOOD PRESSURE: 90 MMHG | SYSTOLIC BLOOD PRESSURE: 180 MMHG | HEART RATE: 75 BPM | RESPIRATION RATE: 16 BRPM | HEIGHT: 63 IN | BODY MASS INDEX: 28.53 KG/M2

## 2024-03-27 DIAGNOSIS — I10 ESSENTIAL HYPERTENSION: Chronic | ICD-10-CM

## 2024-03-27 DIAGNOSIS — I50.43 CHF (CONGESTIVE HEART FAILURE), NYHA CLASS III, ACUTE ON CHRONIC, COMBINED (HCC): ICD-10-CM

## 2024-03-27 DIAGNOSIS — I50.22 CHRONIC SYSTOLIC CONGESTIVE HEART FAILURE (HCC): Chronic | ICD-10-CM

## 2024-03-27 DIAGNOSIS — Z78.9 STATIN INTOLERANCE: ICD-10-CM

## 2024-03-27 DIAGNOSIS — Z95.1 S/P CABG X 3: ICD-10-CM

## 2024-03-27 DIAGNOSIS — I24.0 ISCHEMIC HEART DISEASE DUE TO CORONARY ARTERY OBSTRUCTION (HCC): Chronic | ICD-10-CM

## 2024-03-27 DIAGNOSIS — I70.0 ATHEROSCLEROSIS OF AORTA (HCC): ICD-10-CM

## 2024-03-27 DIAGNOSIS — Z95.810 CARDIAC RESYNCHRONIZATION THERAPY DEFIBRILLATOR (CRT-D) IN PLACE: ICD-10-CM

## 2024-03-27 DIAGNOSIS — I25.84 CORONARY ARTERY DISEASE DUE TO CALCIFIED CORONARY LESION: Chronic | ICD-10-CM

## 2024-03-27 DIAGNOSIS — I25.9 ISCHEMIC HEART DISEASE DUE TO CORONARY ARTERY OBSTRUCTION (HCC): Chronic | ICD-10-CM

## 2024-03-27 DIAGNOSIS — I50.20 HFREF (HEART FAILURE WITH REDUCED EJECTION FRACTION) (HCC): ICD-10-CM

## 2024-03-27 DIAGNOSIS — R29.818 SUSPECTED SLEEP APNEA: ICD-10-CM

## 2024-03-27 DIAGNOSIS — E78.5 DYSLIPIDEMIA: ICD-10-CM

## 2024-03-27 DIAGNOSIS — D69.2 SENILE PURPURA (HCC): ICD-10-CM

## 2024-03-27 DIAGNOSIS — I44.7 LBBB (LEFT BUNDLE BRANCH BLOCK): Chronic | ICD-10-CM

## 2024-03-27 DIAGNOSIS — I44.2 COMPLETE HEART BLOCK (HCC): ICD-10-CM

## 2024-03-27 DIAGNOSIS — I25.10 CORONARY ARTERY DISEASE DUE TO CALCIFIED CORONARY LESION: Chronic | ICD-10-CM

## 2024-03-27 DIAGNOSIS — I48.0 PAF (PAROXYSMAL ATRIAL FIBRILLATION) (HCC): ICD-10-CM

## 2024-03-27 PROCEDURE — 93284 PRGRMG EVAL IMPLANTABLE DFB: CPT | Performed by: INTERNAL MEDICINE

## 2024-03-27 PROCEDURE — 3080F DIAST BP >= 90 MM HG: CPT | Performed by: INTERNAL MEDICINE

## 2024-03-27 PROCEDURE — 99214 OFFICE O/P EST MOD 30 MIN: CPT | Performed by: INTERNAL MEDICINE

## 2024-03-27 PROCEDURE — 3077F SYST BP >= 140 MM HG: CPT | Performed by: INTERNAL MEDICINE

## 2024-03-27 PROCEDURE — 99213 OFFICE O/P EST LOW 20 MIN: CPT | Performed by: INTERNAL MEDICINE

## 2024-03-27 RX ORDER — EMPAGLIFLOZIN 10 MG/1
10 TABLET, FILM COATED ORAL DAILY
Qty: 90 TABLET | Refills: 3 | Status: SHIPPED | OUTPATIENT
Start: 2024-03-27

## 2024-03-27 RX ORDER — SACUBITRIL AND VALSARTAN 49; 51 MG/1; MG/1
1 TABLET, FILM COATED ORAL 2 TIMES DAILY
Qty: 180 TABLET | Refills: 3 | Status: SHIPPED | OUTPATIENT
Start: 2024-03-27

## 2024-03-27 RX ORDER — EVOLOCUMAB 420 MG/3.5
420 KIT SUBCUTANEOUS
Qty: 3.5 ML | Refills: 3 | Status: SHIPPED | OUTPATIENT
Start: 2024-03-27

## 2024-03-27 ASSESSMENT — FIBROSIS 4 INDEX: FIB4 SCORE: 2.99

## 2024-03-27 NOTE — PATIENT INSTRUCTIONS
carvedilol (COREG) 25 MG Tab Take 1 Tablet by mouth 2 times a day with meals. 180 Tablet 3    apixaban (ELIQUIS) 5mg Tab Take 1 tablet by mouth 2 times a day. 180 Tablet 3    hydrALAZINE (APRESOLINE) 10 MG Tab Take 1 tablet by mouth 3 times a day as needed (/100) 25 Tablet 0    predniSONE (DELTASONE) 5 MG Tab Take 3 mg by mouth every day. for 30 days      aspirin 81 MG EC tablet Take 1 Tablet by mouth every day. 100 Tablet 0    famotidine (PEPCID) 40 MG Tab Take 1 tablet by mouth twice daily (Patient taking differently: Take 40 mg by mouth every day.) 180 tablet 1    Cyanocobalamin (VITAMIN B-12 PO) Take 1 Tablet by mouth every morning.      ibuprofen (MOTRIN) 800 MG Tab TAKE 1 TABLET BY MOUTH EVERY 8 HOURS AS NEEDED FOR MILD PAIN 90 Tab 0    Cholecalciferol (VITAMIN D3) 5000 units Cap Take 1 Capsule by mouth every day.      sacubitril-valsartan (ENTRESTO) 24-26 MG Tab Take 1 Tablet by mouth 2 times a day. (Patient not taking: Reported on 3/27/2024) 180 Tablet 3    furosemide (LASIX) 20 MG Tab Take 1 Tablet by mouth every day. 90 Tablet 3     No current facility-administered medications on file prior to visit.     Entresto BP and improve heart strength  Carvedilol BP and improve heart strength  ELIQUIS STROKE PREVENTION  REPATHA CHOLESTEROL    ADDED JARDIANCE FOR PRESERVING HEART STRENGTH AND WEIGHT LOSS    We discussed adding Jardiance (empagliflozin) 10 mg daily to your regimen to prevent complications from heart disease and help with blood sugar.  Based on large clinical trials Jardiance is expected to reduce your risk by 38% of heart attack or dying from heart disease if you have coronary disease and diabetes (EMPA-REG Study).  In patients without diabetes but who have heart failure, there is a 21-25% relatively less likelihood of dying or being hospitalized for heart failure (EMPEROR Study).  While Jardiance has benefits (most importantly reduce risk for hospitalization for heart  failure or heart attack, weight loss, blood pressure control and reduces death) it also has risks due to the fact that you will be urinating excess sugar out of your body. There is increased risk for urinary and yeast infection, which can be very serious, so please report any symptoms as soon as possible to urgent care, primary care or our office.  You should stay well-hydrated on this medication and report abdominal pains.  You are expected to lose weight over time with this medication and may need to reduce your other medications.  It is advised to check your chemistries after starting this medication within the month and then regularly as is typically required for your other medications.      A - Antiplatelet - Clopidogrel (PLAVIX) reduces your risk of cardiac event by 27% compared to Aspirin 81 mg daily (HOST EXAM study 2021), prasrugrel (EFFIENT), ticagrelor (BRILINTA)) may be used for the first year.  Aspirin 81 mg daily is associated with a 20% less use of heart event and is used the first year after a cardiac event, stent or CABG.  B - Blood Pressure Control - reduces your risk or heart attack and stroke, the goal is <130/80.  C - Cholesterol Management - statins dramatically reduce your risk; for those that are intolerant to statins, there are alternatives.  D - Diet - MEDITERRANEAN DIET or Cardiac rehab diets, [email or web address].  E - Exercise - at least 2.5 hours of moderate exercise weekly  (typical brisk walking or similar activity).  F - Fats - VASCEPA, or EPA Fish oil (if Vascepa too expensive) for elevated triglycerides (REDUCE IT trial showed reduction from 22% 5 year MACE to 17%).  G - Good Vibes - meditation, exercise, yoga, Pilates, mindfulness, Giuseppe-Chi, stress reduction.  H - Heart Failure - betablockers, sucubatril (ENTRESTO) 16% less risk of dying over 3 years, spironolactone, empagliflozin (JARDIANCE) 17% less risk of dying over 2 years, CRT +/- ICD.  I - Inflammation - Colchicine in the LoDoCo2  study in 2020 reduced the risk of heart attack by 30% in 2.5 year follow up.  R - Rehab - Cardiac Rehab reduces risk of dying by 13-24% and need to go to the hospital by 30% within the first year. Compared to regular Cardiac Rehab, Intensive Cardiac Rehab ([facility]) was shown to reduce the risk of major events 17 to 11% and hospitalization for CHF from 8% to 2%. (Nutrients [id number](70):9011)  S - Smoking - never smoke, if you do smoke ask for help to quit for good. Patients who quit smoking after heart attack have 36% less likely risk of dying.  Resources are 1-800MacheenQUIT-NOW [employer] in addition to Chantix, bupropion (Zyban) or nicotine replacement  T - Type II Diabetes - pills empagliflozin (JARDIANCE) 38% less risk of dying over 4 years, and/or weekly injections: tirzepatide (Mounjaro), semaglutide (Ozempic), liraglutide (Victoza), dulaglutide (Trulicity) ~26% less risk of MACE in 2 years.  V - Vaccines - Annual flu shot and COVID vaccine reduces the risk of serious cardiovascular complications from these deadly infections.  W - Weight - maintain a healthy weight. Semaglutide (WEGOVY) weekly injection was shown to reduce weight by 10% and heart events by 20% for patients with CAD and BMI > 27 in the SELECT trial (6.5% vs 8% in 48 month follow up [facility] 12/2023).      Work on at least 2.5 - 5 hours a week of moderate exercise    Please look into the following diets and incorporate them into your diet  LOW SALT DIET   KEEP YOUR SODIUM EQUAL TO CALORIES AND NO MORE THAN DOUBLE THE CALORIES FOR A LOW SALT DIET    Cardiosmart.org - great resource for American College of Cardiology on heart disease prevention and treatment    FOR TREATMENT OR PREVENTION OF CORONARY ARTERY DISEASE  These three programs are approved by Medicare/Insurers for those with heart disease  [facility]  Dr. Herndon's Program for Reversing Heart Disease - Oscar Mayers's Cardiologist  vegetarian-based  [facility] - [facility]    Mediterranean Diet has been shown to be a hearty healthy diet.    This is a commonly referenced Program  Dr Manny Kilpatrick (book and documentary) - vegetarian-based    FOR TREATMENT OF BLOOD PRESSURE  DASH DIET - American Heart Association for treatment of HYPERTENSION    FOR TREATMENT OF BAD CHOLESTEROL/FATS  REDUCE PROCESSED SUGAR AS MUCH AS POSSIBLE  INCREASE WHOLE GRAINS/VEGETABLES  INCREASE FIBER    Lowering total cholesterol and LDL (bad) cholesterol:  - Eat leaner cuts of meat, or eliminate altogether if possible red meat, and frequently substitute fish or chicken.  - Limit saturated fat to no more than 7-10% of total calories no more than 10 g per day is recommended. Some sources of saturated fat include butter, animal fats, hydrogenated vegetable fats and oils, many desserts, whole milk dairy products.  - Replaced saturated fats with polyunsaturated fats and monounsaturated fats. Foods high in monounsaturated fat include nuts, canola oil, avocados, and olives.  - Limit trans fat (processed foods) and replaced with fresh fruits and vegetables  - Recommend nonfat dairy products  - Increase substantially the amount of soluble fiber intake (legumes such as beans, fruit, whole grains).  - Consider nutritional supplements: plant sterile spreads such as Benecol, fish oil,  flaxseed oil, omega-3 acids EPA capsules 2000 mg twice a day, or viscous fiber such as Metamucil  - Attain ideal weight and regular exercise (at least 30 minutes per day of moderate exercise)  ASK ABOUT STATIN OR NON STATIN MEDICATION TO REDUCE YOUR LDL AND HEART RISK    Lowering triglycerides:  - Reduce intake of simple sugar: Desserts, candy, pastries, honey, sodas, sugared cereals, yogurt, Gatorade, sports bars, canned fruit, smoothies, fruit juice, coffee drinks  - Reduced intake of refined starches: Refined Pasta, most  bread  - Reduce or abstain from alcohol  - Increase omega-3 fatty acids: [place], Trout, Mackerel, Herring, [place] tuna and supplements  - Attain ideal weight and regular exercise (at least 30 minutes per day of moderate exercise)  ASK ABOUT PURIFIED OMEGA 3 EPA or FISH OIL TO REDUCE YOUR TG AND HEART RISK    Elevating HDL (good) cholesterol:  - Increase physical activity  - Increase omega-3 fatty acids and supplements as listed above  - Incorporating appropriate amounts of monounsaturated fats such as nuts, olive oil, canola oil, avocados, olives  - Stop smoking  - Attain ideal weight and regular exercise (at least 30 minutes per day of moderate exercise)

## 2024-03-27 NOTE — TELEPHONE ENCOUNTER
New prescription for Repatha Pushtronex was received in Cardiology [facility]. Unable to run test claim due to [employer] insurance being a lockout to [facility]. Attempted PA to verify is PA was needed via CM and [employer] verified that there is already an authorization on file starting 01/01/24 and ending 12/31/2024. Releasing Rx to patients preferred [facility] pharmacy on file.

## 2024-03-27 NOTE — PROGRESS NOTES
Chief Complaint   Patient presents with    Congestive Heart Failure     F/v dx: Chronic systolic congestive heart failure (HCC) EF 35%    Atrial Fibrillation     F/v dx: PAF (paroxysmal atrial fibrillation) (HCC)    Hypertension       Subjective     Lauren Dave is a 74 y.o. female who presents today for follow-up of her complex cardiac condition including heart failure with ischemic cardiomyopathy long-term left bundle branch block she had CABG and had a stroke intraoperative with significant defect     Health insurance changed    Tried repatha but had a large bruise    Feels poorly and fatigued    Past Medical History:   Diagnosis Date    Arthritis     Breast cancer (HCC) 08/07/2013    Bronchitis 2005    Cancer (HCC)     right breast cancer     Cataract     removed    Chronic systolic congestive heart failure (HCC) EF 35% 10/12/2022    Coronary artery disease due to calcified coronary lesion - Calcifications seen on CT scan also wtih aortic ulceration     Dental disorder     tooth infection    Discoid lupus     Dyslipidemia     Tried atorvastatin, pravastatin, lovastatin, and Zetia.  All causes severe myalgias.  Just taking fish oil.      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     Ischemic heart disease due to coronary artery obstruction (HCC) - Severe 3vD on cath 10/19/2022    LBBB (left bundle branch block) 12/16/2014    Noted on prechemo EKG 9/2014, MUGA WNL    Pneumonia     Pseudogout     Scarlet fever     Unspecified hemorrhagic conditions     gums     Past Surgical History:   Procedure Laterality Date    MULTIPLE CORONARY ARTERY BYPASS ENDO VEIN HARVEST  11/10/2022    Procedure: CORONARY ARTERY BYPASS GRAFTING X 3, WITH LEFT INTERNAL MAMMARY ARTERY TAKEDOWN AND ENDOSCOPIC VEIN HARVEST LEFT GREATER SAPHENOUS VEIN, AND TRANSESOPHAGEAL ECHOCARDIOGRAM;  Surgeon: Padmini Mac M.D.;  Location: SURGERY [facility];  Service: Cardiothoracic     ECHOCARDIOGRAM, TRANSESOPHAGEAL, INTRAOPERATIVE  11/10/2022    Procedure: ECHOCARDIOGRAM, TRANSESOPHAGEAL, INTRAOPERATIVE;  Surgeon: Padmini Mac M.D.;  Location: SURGERY [facility];  Service: Cardiothoracic    OTHER CARDIAC SURGERY  11/2022    angiogram    CATH REMOVAL  02/24/2014    Performed by Aggie Burns M.D. at [facility]ERY [facility]    MASTECTOMY  08/22/2013    Performed by Aggie Burns M.D. at [facility]    NODE BIOPSY SENTINEL  08/22/2013    Performed by Aggie Burns M.D. at [facility]    CATH PLACEMENT  08/22/2013    Performed by Aggie Burns M.D. at [facility]    US-NEEDLE CORE BX-BREAST PANEL  01/01/2013    right breast    COLONOSCOPY  01/01/2003    BREAST BIOPSY      TONSILLECTOMY       Family History   Problem Relation Age of Onset    Cancer Mother         possible thyroid and gynecological    Multiple Sclerosis Mother     Arthritis Father     Multiple Sclerosis Brother     Gout Brother     Heart Disease Paternal Grandmother     Diabetes Paternal Grandmother     Cancer Maternal Aunt         breast cancer- 60s    Diabetes Maternal Uncle     Heart Disease Maternal Grandmother     Heart Disease Maternal Grandfather         MI    Stroke Paternal Grandfather     Other Son         breast mass    Heart Disease Son         HEART MURMUR    Gout Son      Social History     Socioeconomic History    Marital status:      Spouse name: Not on file    Number of children: 2    Years of education: Not on file    Highest education level: Not on file   Occupational History     Employer: [employer]   Tobacco Use    Smoking status: Former     Current packs/day: 0.00     Types: Cigarettes     Start date: 10/1/2013     Quit date: 10/1/2013     Years since quitting: 10.4    Smokeless tobacco: Never   Vaping Use    Vaping Use: Never used   Substance and Sexual Activity    Alcohol use: No     Alcohol/week: 0.0 oz    Drug use: No  "   Sexual activity: Not on file     Comment: , 2 children, enemployed   Other Topics Concern    Not on file   Social History Narrative    ** Merged History Encounter **         Patient is a . Patient has 2 adult children. She is  from .           Social Determinants of Health     Financial Resource Strain: Not on file   Food Insecurity: Not on file   Transportation Needs: Not on file   Physical Activity: Not on file   Stress: Not on file   Social Connections: Not on file   Intimate Partner Violence: Not on file   Housing Stability: Not on file     Allergies   Allergen Reactions    Statins [Hmg-Coa-R Inhibitors] Myalgia     Muscle Spasms   RXN=unknown    Albumin     Ezetimibe      Itchy bumps/rash on face    Hydroxychloroquine      Eye changes    Atorvastatin Myalgia    Codeine Vomiting and Nausea     Clarified with patient - states that she has an \"upset stomach\" when she takes it    Eggs Diarrhea     Pt states that she is allergic to eggs per her mother.  Tolerated clevidipine 11/2022    Lisinopril Cough    Losartan Unspecified     Tried in 2017  Cannot recall reaction    Penicillins Unspecified     \"does not work\" .'IMMUNE TO PENICILLIN,AMPICILLIN IS THE ONLY THING THAT WORKS'     Outpatient Encounter Medications as of 3/27/2024   Medication Sig Dispense Refill    CALCIUM MAGNESIUM ZINC PO Take  by mouth.      Iron Combinations (IRON COMPLEX PO) Take  by mouth.      Evolocumab with Infusor (REPATHA PUSHTRONEX SYSTEM) 420 MG/3.5ML On the body infusor injection Inject 3.5 mL under the skin every 28 days. 3.5 mL 3    carvedilol (COREG) 25 MG Tab Take 1 Tablet by mouth 2 times a day with meals. 180 Tablet 3    apixaban (ELIQUIS) 5mg Tab Take 1 tablet by mouth 2 times a day. 180 Tablet 3    hydrALAZINE (APRESOLINE) 10 MG Tab Take 1 tablet by mouth 3 times a day as needed (/100) 25 Tablet 0    predniSONE (DELTASONE) 5 MG Tab Take 3 mg by mouth every day. for 30 days      aspirin 81 MG " "EC tablet Take 1 Tablet by mouth every day. 100 Tablet 0    famotidine (PEPCID) 40 MG Tab Take 1 tablet by mouth twice daily (Patient taking differently: Take 40 mg by mouth every day.) 180 tablet 1    Cyanocobalamin (VITAMIN B-12 PO) Take 1 Tablet by mouth every morning.      ibuprofen (MOTRIN) 800 MG Tab TAKE 1 TABLET BY MOUTH EVERY 8 HOURS AS NEEDED FOR MILD PAIN 90 Tab 0    Cholecalciferol (VITAMIN D3) 5000 units Cap Take 1 Capsule by mouth every day.      sacubitril-valsartan (ENTRESTO) 24-26 MG Tab Take 1 Tablet by mouth 2 times a day. (Patient not taking: Reported on 3/27/2024) 180 Tablet 3    [DISCONTINUED] carvedilol (COREG) 12.5 MG Tab Take 1 tablet by mouth 2 times a day with meals. 180 Tablet 3    [DISCONTINUED] REPATHA SURECLICK 140 MG/ML Solution Auto-injector SubQ injection pen INJECT 1ML SUBCUTANEOUSLY ONCE EVERY 14 DAYS 6 Each 3    [DISCONTINUED] Evolocumab (REPATHA) 140 MG/ML Solution Auto-injector SubQ injection pen Inject  under the skin. 6 mL 0    furosemide (LASIX) 20 MG Tab Take 1 Tablet by mouth every day. 90 Tablet 3    [DISCONTINUED] carvedilol (COREG) 6.25 MG Tab Take 1 tablet by mouth in the mornining and 2 tablets in the evening. 270 Tablet 3    [DISCONTINUED] hydroxychloroquine (PLAQUENIL) 200 MG Tab Take 2 Tablets by mouth once daily for 1 day and for 1 tablet the next day, alternating (Patient not taking: Reported on 8/15/2023) 45 Tablet 3     No facility-administered encounter medications on file as of 3/27/2024.     ROS           Objective     BP (!) 180/90 (BP Location: Left arm, Patient Position: Sitting, BP Cuff Size: Adult)   Pulse 75   Resp 16   Ht 1.6 m (5' 3\")   Wt 73 kg (161 lb)   SpO2 97%   BMI 28.52 kg/m²     Physical Exam  Constitutional:       General: She is not in acute distress.     Appearance: She is not diaphoretic.   Eyes:      General: No scleral icterus.  Neck:      Vascular: No JVD.   Cardiovascular:      Rate and Rhythm: Normal rate.      Heart sounds: " Normal heart sounds. No murmur heard.     No friction rub. No gallop.   Pulmonary:      Effort: No respiratory distress.      Breath sounds: No wheezing or rales.   Abdominal:      General: Bowel sounds are normal.      Palpations: Abdomen is soft.   Musculoskeletal:      Right lower leg: No edema.      Left lower leg: No edema.   Skin:     Findings: No rash.   Neurological:      Mental Status: She is alert. Mental status is at baseline.   Psychiatric:         Mood and Affect: Mood normal.            We reviewed in person the most recent labs  Recent Results (from the past 5040 hour(s))   COLOGUARD COLON CANCER SCREENING    Collection Time: 01/18/24 11:45 AM   Result Value Ref Range    Noninvasive colorectal cancer DNA and occult blood screening in Stool Sample Could Not Be Processed 10 N/A   COLOGUARD COLON CANCER SCREENING    Collection Time: 02/17/24  5:45 AM   Result Value Ref Range    Noninvasive colorectal cancer DNA and occult blood screening in Stool Sample Could Not Be Processed 6 N/A   CBC WITH DIFFERENTIAL    Collection Time: 03/19/24  8:23 AM   Result Value Ref Range    WBC 8.7 4.8 - 10.8 K/uL    RBC 3.94 (L) 4.20 - 5.40 M/uL    Hemoglobin 12.3 12.0 - 16.0 g/dL    Hematocrit 39.0 37.0 - 47.0 %    MCV 99.0 (H) 81.4 - 97.8 fL    MCH 31.2 27.0 - 33.0 pg    MCHC 31.5 (L) 32.2 - 35.5 g/dL    RDW 48.7 35.9 - 50.0 fL    Platelet Count 243 164 - 446 K/uL    MPV 11.3 9.0 - 12.9 fL    Neutrophils-Polys 63.40 44.00 - 72.00 %    Lymphocytes 25.50 22.00 - 41.00 %    Monocytes 8.40 0.00 - 13.40 %    Eosinophils 1.90 0.00 - 6.90 %    Basophils 0.30 0.00 - 1.80 %    Immature Granulocytes 0.50 0.00 - 0.90 %    Nucleated RBC 0.00 0.00 - 0.20 /100 WBC    Neutrophils (Absolute) 5.53 1.82 - 7.42 K/uL    Lymphs (Absolute) 2.23 1.00 - 4.80 K/uL    Monos (Absolute) 0.73 0.00 - 0.85 K/uL    Eos (Absolute) 0.17 0.00 - 0.51 K/uL    Baso (Absolute) 0.03 0.00 - 0.12 K/uL    Immature Granulocytes (abs) 0.04 0.00 - 0.11 K/uL    NRBC  (Absolute) 0.00 K/uL   TRIIDOTHYRONINE    Collection Time: 03/19/24  8:23 AM   Result Value Ref Range    T3 103.0 60.0 - 181.0 ng/dL   TSH    Collection Time: 03/19/24  8:23 AM   Result Value Ref Range    TSH 1.880 0.380 - 5.330 uIU/mL   VITAMIN D,25 HYDROXY (DEFICIENCY)    Collection Time: 03/19/24  8:23 AM   Result Value Ref Range    25-Hydroxy   Vitamin D 25 63 30 - 100 ng/mL   FERRITIN    Collection Time: 03/19/24  8:23 AM   Result Value Ref Range    Ferritin 45.6 10.0 - 291.0 ng/mL   VITAMIN B12    Collection Time: 03/19/24  8:23 AM   Result Value Ref Range    Vitamin B12 -True Cobalamin 633 211 - 911 pg/mL   Comp Metabolic Panel    Collection Time: 03/19/24  8:23 AM   Result Value Ref Range    Sodium 140 135 - 145 mmol/L    Potassium 4.6 3.6 - 5.5 mmol/L    Chloride 102 96 - 112 mmol/L    Co2 26 20 - 33 mmol/L    Anion Gap 12.0 7.0 - 16.0    Glucose 82 65 - 99 mg/dL    Bun 21 8 - 22 mg/dL    Creatinine 1.12 0.50 - 1.40 mg/dL    Calcium 9.8 8.5 - 10.5 mg/dL    Correct Calcium 9.6 8.5 - 10.5 mg/dL    AST(SGOT) 41 12 - 45 U/L    ALT(SGPT) 19 2 - 50 U/L    Alkaline Phosphatase 61 30 - 99 U/L    Total Bilirubin 0.2 0.1 - 1.5 mg/dL    Albumin 4.3 3.2 - 4.9 g/dL    Total Protein 7.6 6.0 - 8.2 g/dL    Globulin 3.3 1.9 - 3.5 g/dL    A-G Ratio 1.3 g/dL   HEMOGLOBIN A1C    Collection Time: 03/19/24  8:23 AM   Result Value Ref Range    Glycohemoglobin 6.1 (H) 4.0 - 5.6 %    Est Avg Glucose 128 mg/dL   Lipid Profile    Collection Time: 03/19/24  8:23 AM   Result Value Ref Range    Cholesterol,Tot 290 (H) 100 - 199 mg/dL    Triglycerides 357 (H) 0 - 149 mg/dL    HDL 67 >=40 mg/dL     (H) <100 mg/dL   FREE THYROXINE    Collection Time: 03/19/24  8:23 AM   Result Value Ref Range    Free T-4 1.29 0.93 - 1.70 ng/dL   FASTING STATUS    Collection Time: 03/19/24  8:23 AM   Result Value Ref Range    Fasting Status Fasting    ESTIMATED GFR    Collection Time: 03/19/24  8:23 AM   Result Value Ref Range    GFR (CKD-EPI) 52 (A)  >60 mL/min/1.73 m 2   CBC WITHOUT DIFFERENTIAL    Collection Time: 03/19/24  8:25 AM   Result Value Ref Range    WBC 8.8 4.8 - 10.8 K/uL    RBC 3.90 (L) 4.20 - 5.40 M/uL    Hemoglobin 12.3 12.0 - 16.0 g/dL    Hematocrit 38.7 37.0 - 47.0 %    MCV 99.2 (H) 81.4 - 97.8 fL    MCH 31.5 27.0 - 33.0 pg    MCHC 31.8 (L) 32.2 - 35.5 g/dL    RDW 48.6 35.9 - 50.0 fL    Platelet Count 244 164 - 446 K/uL    MPV 11.2 9.0 - 12.9 fL   Comp Metabolic Panel    Collection Time: 03/19/24  8:25 AM   Result Value Ref Range    Sodium 140 135 - 145 mmol/L    Potassium 4.6 3.6 - 5.5 mmol/L    Chloride 102 96 - 112 mmol/L    Co2 26 20 - 33 mmol/L    Anion Gap 12.0 7.0 - 16.0    Glucose 83 65 - 99 mg/dL    Bun 20 8 - 22 mg/dL    Creatinine 1.13 0.50 - 1.40 mg/dL    Calcium 9.7 8.5 - 10.5 mg/dL    Correct Calcium 9.5 8.5 - 10.5 mg/dL    AST(SGOT) 43 12 - 45 U/L    ALT(SGPT) 19 2 - 50 U/L    Alkaline Phosphatase 61 30 - 99 U/L    Total Bilirubin 0.2 0.1 - 1.5 mg/dL    Albumin 4.3 3.2 - 4.9 g/dL    Total Protein 7.5 6.0 - 8.2 g/dL    Globulin 3.2 1.9 - 3.5 g/dL    A-G Ratio 1.3 g/dL   ESTIMATED GFR    Collection Time: 03/19/24  8:25 AM   Result Value Ref Range    GFR (CKD-EPI) 51 (A) >60 mL/min/1.73 m 2     I personally reviewed in person with the patient her most recent pacemaker check it is functioning appropriately.      Assessment & Plan     1. Essential hypertension        2. LBBB (left bundle branch block)        3. Dyslipidemia  Evolocumab with Infusor (REPATHA PUSHTRONEX SYSTEM) 420 MG/3.5ML On the body infusor injection      4. S/P CABG x 3        5. Atherosclerosis of aorta (HCC)        6. Ischemic heart disease due to coronary artery obstruction (HCC) - Severe 3vD on cath  Evolocumab with Infusor (REPATHA PUSHTRONEX SYSTEM) 420 MG/3.5ML On the body infusor injection      7. Chronic systolic congestive heart failure (HCC) EF 35%        8. Senile purpura (HCC)        9. Coronary artery disease due to calcified coronary lesion -  Calcifications seen on CT scan also wtih aortic ulceration  Evolocumab with Infusor (REPATHA PUSHTRONEX SYSTEM) 420 MG/3.5ML On the body infusor injection      10. PAF (paroxysmal atrial fibrillation) (HCC)        11. HFrEF (heart failure with reduced ejection fraction) (HCC)        12. Cardiac resynchronization therapy defibrillator (CRT-D) in place        13. Complete heart block (HCC)        14. Statin intolerance  Evolocumab with Infusor (REPATHA PUSHTRONEX SYSTEM) 420 MG/3.5ML On the body infusor injection          Medical Decision Making: Today's Assessment/Status/Plan:        It was my pleasure to meet with Ms. Dave.    We addressed the management of hypertension at today's visit. Blood pressure is well controlled.  We specifically assessed the labs on hypertension treatment    We addressed the management of dyslipidemia and atherosclerosis at today's visit. She is on appropriate statin.    We addressed the management of atherosclerotic artery disease.  She is on proper antiplatelet, cholesterol management and beta-blockers as appropriate.  We addressed the potential side effects and laboratory follow-up for these medications.    GIVEN CARPAL TUNNEL and CHF CONSIDER AMYLOID SCAN    I will see Ms. Dave back in 1 year time and encouraged her to follow up with us over the phone or electronically using my MyChart as issues arise.    It is my pleasure to participate in the care of Ms. Dave.  Please do not hesitate to contact me with questions or concerns.    Jarrod Mayberry MD PhD FAC  Cardiologist [facility]    Please note that this dictation was created using voice recognition software. There may be errors I did not discover before finalizing the note.     () Today's E/M visit is associated with medical care services that serve as the continuing focal point for all needed health care services and/or with medical care services that  are part of ongoing  care related to a patient's single, serious condition, or a complex condition: This includes  furnishing services to patients on an ongoing basis that result in care that is personalized  to the patient. The services result in a comprehensive, longitudinal, and continuous  relationship with the patient and involve delivery of team-based care that is accessible, coordinated with other practitioners and providers, and integrated with the broader health  care landscape.

## 2024-04-09 ENCOUNTER — PATIENT MESSAGE (OUTPATIENT)
Dept: CARDIOLOGY | Facility: MEDICAL CENTER | Age: 74
End: 2024-04-09
Payer: MEDICARE

## 2024-04-26 ENCOUNTER — TELEPHONE (OUTPATIENT)
Dept: CARDIOLOGY | Facility: MEDICAL CENTER | Age: 74
End: 2024-04-26
Payer: MEDICARE

## 2024-04-26 NOTE — TELEPHONE ENCOUNTER
PT CAME IN, AND REQ RCT FROM'S BE DELIVERED TO [facility] TO BE FILLED OUT. AND THEN IF WE COULD FAX THEM TO [facility].

## 2024-04-30 NOTE — TELEPHONE ENCOUNTER
CW          Caller: Lauren Dave      Topic/issue: Patient was calling on behalf of herself and her  to see about getting the RTC paperwork faxed over to that office. Patient stated that the fax number was on the paper work        Callback Number: [phone]    Thank you    -Galileo henao

## 2024-05-10 ENCOUNTER — APPOINTMENT (OUTPATIENT)
Dept: VASCULAR LAB | Facility: MEDICAL CENTER | Age: 74
End: 2024-05-10
Payer: MEDICARE

## 2024-05-21 DIAGNOSIS — I24.0 ISCHEMIC HEART DISEASE DUE TO CORONARY ARTERY OBSTRUCTION (HCC): ICD-10-CM

## 2024-05-21 DIAGNOSIS — I25.9 ISCHEMIC HEART DISEASE DUE TO CORONARY ARTERY OBSTRUCTION (HCC): ICD-10-CM

## 2024-07-02 ENCOUNTER — PATIENT MESSAGE (OUTPATIENT)
Dept: CARDIOLOGY | Facility: MEDICAL CENTER | Age: 74
End: 2024-07-02
Payer: MEDICARE

## 2024-07-09 DIAGNOSIS — Z12.31 ENCOUNTER FOR SCREENING MAMMOGRAM FOR MALIGNANT NEOPLASM OF BREAST: ICD-10-CM

## 2024-07-09 DIAGNOSIS — Z85.3 HISTORY OF BREAST CANCER: ICD-10-CM

## 2024-07-10 ENCOUNTER — HOSPITAL ENCOUNTER (OUTPATIENT)
Dept: LAB | Facility: MEDICAL CENTER | Age: 74
End: 2024-07-10
Attending: FAMILY MEDICINE
Payer: MEDICARE

## 2024-07-10 LAB
25(OH)D3 SERPL-MCNC: 48 NG/ML (ref 30–100)
ALBUMIN SERPL BCP-MCNC: 4.5 G/DL (ref 3.2–4.9)
ALBUMIN/GLOB SERPL: 1.7 G/DL
ALP SERPL-CCNC: 66 U/L (ref 30–99)
ALT SERPL-CCNC: 16 U/L (ref 2–50)
ANION GAP SERPL CALC-SCNC: 15 MMOL/L (ref 7–16)
AST SERPL-CCNC: 31 U/L (ref 12–45)
BASOPHILS # BLD AUTO: 0.8 % (ref 0–1.8)
BASOPHILS # BLD: 0.07 K/UL (ref 0–0.12)
BILIRUB SERPL-MCNC: 0.3 MG/DL (ref 0.1–1.5)
BUN SERPL-MCNC: 26 MG/DL (ref 8–22)
CALCIUM ALBUM COR SERPL-MCNC: 9.8 MG/DL (ref 8.5–10.5)
CALCIUM SERPL-MCNC: 10.2 MG/DL (ref 8.5–10.5)
CHLORIDE SERPL-SCNC: 104 MMOL/L (ref 96–112)
CHOLEST SERPL-MCNC: 303 MG/DL (ref 100–199)
CO2 SERPL-SCNC: 22 MMOL/L (ref 20–33)
CREAT SERPL-MCNC: 1.31 MG/DL (ref 0.5–1.4)
CREAT UR-MCNC: 80.61 MG/DL
EOSINOPHIL # BLD AUTO: 0.25 K/UL (ref 0–0.51)
EOSINOPHIL NFR BLD: 2.7 % (ref 0–6.9)
ERYTHROCYTE [DISTWIDTH] IN BLOOD BY AUTOMATED COUNT: 48.1 FL (ref 35.9–50)
EST. AVERAGE GLUCOSE BLD GHB EST-MCNC: 128 MG/DL
FERRITIN SERPL-MCNC: 90.4 NG/ML (ref 10–291)
GFR SERPLBLD CREATININE-BSD FMLA CKD-EPI: 43 ML/MIN/1.73 M 2
GLOBULIN SER CALC-MCNC: 2.7 G/DL (ref 1.9–3.5)
GLUCOSE SERPL-MCNC: 87 MG/DL (ref 65–99)
HBA1C MFR BLD: 6.1 % (ref 4–5.6)
HCT VFR BLD AUTO: 35.7 % (ref 37–47)
HDLC SERPL-MCNC: 52 MG/DL
HGB BLD-MCNC: 11.1 G/DL (ref 12–16)
IMM GRANULOCYTES # BLD AUTO: 0.03 K/UL (ref 0–0.11)
IMM GRANULOCYTES NFR BLD AUTO: 0.3 % (ref 0–0.9)
IRON SERPL-MCNC: 76 UG/DL (ref 40–170)
LDLC SERPL CALC-MCNC: 179 MG/DL
LYMPHOCYTES # BLD AUTO: 1.76 K/UL (ref 1–4.8)
LYMPHOCYTES NFR BLD: 19.3 % (ref 22–41)
MCH RBC QN AUTO: 31.4 PG (ref 27–33)
MCHC RBC AUTO-ENTMCNC: 31.1 G/DL (ref 32.2–35.5)
MCV RBC AUTO: 100.8 FL (ref 81.4–97.8)
MICROALBUMIN UR-MCNC: 39.3 MG/DL
MICROALBUMIN/CREAT UR: 488 MG/G (ref 0–30)
MONOCYTES # BLD AUTO: 0.67 K/UL (ref 0–0.85)
MONOCYTES NFR BLD AUTO: 7.3 % (ref 0–13.4)
NEUTROPHILS # BLD AUTO: 6.36 K/UL (ref 1.82–7.42)
NEUTROPHILS NFR BLD: 69.6 % (ref 44–72)
NRBC # BLD AUTO: 0 K/UL
NRBC BLD-RTO: 0 /100 WBC (ref 0–0.2)
PLATELET # BLD AUTO: 225 K/UL (ref 164–446)
PMV BLD AUTO: 11.2 FL (ref 9–12.9)
POTASSIUM SERPL-SCNC: 4.7 MMOL/L (ref 3.6–5.5)
PROT SERPL-MCNC: 7.2 G/DL (ref 6–8.2)
RBC # BLD AUTO: 3.54 M/UL (ref 4.2–5.4)
SODIUM SERPL-SCNC: 141 MMOL/L (ref 135–145)
T3 SERPL-MCNC: 103 NG/DL (ref 60–181)
T4 FREE SERPL-MCNC: 1.24 NG/DL (ref 0.93–1.7)
TRIGL SERPL-MCNC: 362 MG/DL (ref 0–149)
TSH SERPL DL<=0.005 MIU/L-ACNC: 1.48 UIU/ML (ref 0.38–5.33)
VIT B12 SERPL-MCNC: 900 PG/ML (ref 211–911)
WBC # BLD AUTO: 9.1 K/UL (ref 4.8–10.8)

## 2024-07-10 PROCEDURE — 82043 UR ALBUMIN QUANTITATIVE: CPT

## 2024-07-10 PROCEDURE — 84439 ASSAY OF FREE THYROXINE: CPT

## 2024-07-10 PROCEDURE — 83540 ASSAY OF IRON: CPT

## 2024-07-10 PROCEDURE — 85025 COMPLETE CBC W/AUTO DIFF WBC: CPT

## 2024-07-10 PROCEDURE — 83036 HEMOGLOBIN GLYCOSYLATED A1C: CPT

## 2024-07-10 PROCEDURE — 82570 ASSAY OF URINE CREATININE: CPT

## 2024-07-10 PROCEDURE — 82607 VITAMIN B-12: CPT

## 2024-07-10 PROCEDURE — 82306 VITAMIN D 25 HYDROXY: CPT

## 2024-07-10 PROCEDURE — 82728 ASSAY OF FERRITIN: CPT

## 2024-07-10 PROCEDURE — 84443 ASSAY THYROID STIM HORMONE: CPT

## 2024-07-10 PROCEDURE — 80061 LIPID PANEL: CPT

## 2024-07-10 PROCEDURE — 80053 COMPREHEN METABOLIC PANEL: CPT

## 2024-07-10 PROCEDURE — 84480 ASSAY TRIIODOTHYRONINE (T3): CPT

## 2024-07-10 PROCEDURE — 36415 COLL VENOUS BLD VENIPUNCTURE: CPT

## 2024-07-11 ENCOUNTER — PATIENT MESSAGE (OUTPATIENT)
Dept: CARDIOLOGY | Facility: MEDICAL CENTER | Age: 74
End: 2024-07-11
Payer: MEDICARE

## 2024-07-30 ENCOUNTER — TELEPHONE (OUTPATIENT)
Dept: CARDIOLOGY | Facility: MEDICAL CENTER | Age: 74
End: 2024-07-30
Payer: MEDICARE

## 2024-08-12 DIAGNOSIS — Z85.3 HISTORY OF BREAST CANCER: ICD-10-CM

## 2024-08-13 DIAGNOSIS — D68.69 HYPERCOAGULABLE STATE DUE TO PAROXYSMAL ATRIAL FIBRILLATION (HCC): ICD-10-CM

## 2024-08-13 DIAGNOSIS — I48.0 HYPERCOAGULABLE STATE DUE TO PAROXYSMAL ATRIAL FIBRILLATION (HCC): ICD-10-CM

## 2024-08-13 DIAGNOSIS — I48.0 PAF (PAROXYSMAL ATRIAL FIBRILLATION) (HCC): ICD-10-CM

## 2024-08-13 RX ORDER — APIXABAN 5 MG/1
5 TABLET, FILM COATED ORAL 2 TIMES DAILY
Qty: 180 TABLET | Refills: 2 | Status: SHIPPED | OUTPATIENT
Start: 2024-08-13

## 2024-08-15 ENCOUNTER — APPOINTMENT (OUTPATIENT)
Dept: HEMATOLOGY ONCOLOGY | Facility: MEDICAL CENTER | Age: 74
End: 2024-08-15
Payer: MEDICARE

## 2024-08-21 ENCOUNTER — TELEPHONE (OUTPATIENT)
Dept: CARDIOLOGY | Facility: MEDICAL CENTER | Age: 74
End: 2024-08-21
Payer: MEDICARE

## 2024-08-21 NOTE — TELEPHONE ENCOUNTER
[facility]    Caller: Lauren Kaur Jolie    Topic/issue: MEDICAL ADVICE    Lauren states that she received a VM that says she needs to cancel her appointment with [facility] on 9/18. She states that she has waited for this appointment for months and she does not like that she has to suffer. Please advise.    Thank you,  Ramone THOMAS    Callback Number: [phone]

## 2024-08-22 NOTE — TELEPHONE ENCOUNTER
"To CW, please advise on when pt should have FV.  Per last OV note to follow up in 1 year.  AVS advised to return in 6 months.  Pt was notified FV initially scheduled with you for 9/18 has to be rescheduled as you are now ADR that day.  Thank you!    ================    Upon chart review per CW last OV note, \"I will see Ms. Dave back in 1 year time \"  noted pt made 6 month FV on the day of last OV.     Returned pt call and reviewed concerns.  She states she was advised to follow up in 6 months.  Advised pacer checks is 6 months and she could keep that appointment.  Reviewed AVS advising to follow up in 6 months.  Lauren verbalizes understanding and states no other concerns or questions at this time.  She is appreciative of information given.    "

## 2024-08-22 NOTE — TELEPHONE ENCOUNTER
[facility], Tuesday 9/17 is booked for pacerchecks.  See pt concerns below.  Is there any way to squeeze pt in for pacercheck that day since she will be seeing CW for FV that day?  Please advise, thank you!    =================    Reviewed pacer , noted no availability for 9/17/24.      Called pt, [phone], and reviewed CW recommendations.  Scheduled pt for 9/17 at 1115.  Reviewed findings for pacer check.  Pt states she uses [facility] for transportation and is not able to come the next day for pacercheck on 9/18.  She states no other concerns or questions at this time and is appreciative of information given.

## 2024-08-23 NOTE — TELEPHONE ENCOUNTER
Returned pt call, [phone]  and reviewed recommendations.  Scheduled pt successfully for 9/17/24 at 1030.  She verbalizes understanding and states no other concerns or questions at this time.  Pt is appreciative of information given.    Cancelled 9/18/24 FV and pacercheck.

## 2024-08-29 ENCOUNTER — HOSPITAL ENCOUNTER (OUTPATIENT)
Dept: HEMATOLOGY ONCOLOGY | Facility: MEDICAL CENTER | Age: 74
End: 2024-08-29
Attending: NURSE PRACTITIONER
Payer: MEDICARE

## 2024-08-29 VITALS
TEMPERATURE: 96.7 F | DIASTOLIC BLOOD PRESSURE: 52 MMHG | HEART RATE: 75 BPM | RESPIRATION RATE: 16 BRPM | HEIGHT: 63 IN | OXYGEN SATURATION: 98 % | SYSTOLIC BLOOD PRESSURE: 112 MMHG | WEIGHT: 156.64 LBS | BODY MASS INDEX: 27.75 KG/M2

## 2024-08-29 DIAGNOSIS — Z85.3 HISTORY OF BREAST CANCER: ICD-10-CM

## 2024-08-29 DIAGNOSIS — Z12.31 ENCOUNTER FOR SCREENING MAMMOGRAM FOR MALIGNANT NEOPLASM OF BREAST: ICD-10-CM

## 2024-08-29 PROCEDURE — 99212 OFFICE O/P EST SF 10 MIN: CPT | Performed by: NURSE PRACTITIONER

## 2024-08-29 ASSESSMENT — FIBROSIS 4 INDEX: FIB4 SCORE: 2.55

## 2024-08-29 ASSESSMENT — ENCOUNTER SYMPTOMS
FEVER: 0
CHILLS: 0
PALPITATIONS: 0

## 2024-08-29 ASSESSMENT — PAIN SCALES - GENERAL: PAINLEVEL: 9=SEVERE PAIN

## 2024-08-29 NOTE — Clinical Note
Dr. Hernandez,   I will now defer to you for continued screening patient with annual imaging.  She can return here if needed in the future. Viktoriya
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
## 2024-09-17 ENCOUNTER — NON-PROVIDER VISIT (OUTPATIENT)
Dept: CARDIOLOGY | Facility: MEDICAL CENTER | Age: 74
End: 2024-09-17
Attending: INTERNAL MEDICINE
Payer: MEDICARE

## 2024-09-17 ENCOUNTER — NON-PROVIDER VISIT (OUTPATIENT)
Dept: CARDIOLOGY | Facility: MEDICAL CENTER | Age: 74
End: 2024-09-17

## 2024-09-17 VITALS
SYSTOLIC BLOOD PRESSURE: 128 MMHG | RESPIRATION RATE: 16 BRPM | HEIGHT: 63 IN | BODY MASS INDEX: 27.71 KG/M2 | OXYGEN SATURATION: 94 % | HEART RATE: 76 BPM | WEIGHT: 156.4 LBS | DIASTOLIC BLOOD PRESSURE: 70 MMHG

## 2024-09-17 DIAGNOSIS — Z95.1 S/P CABG X 3: ICD-10-CM

## 2024-09-17 DIAGNOSIS — I50.20 HFREF (HEART FAILURE WITH REDUCED EJECTION FRACTION) (HCC): ICD-10-CM

## 2024-09-17 DIAGNOSIS — I24.0 ISCHEMIC HEART DISEASE DUE TO CORONARY ARTERY OBSTRUCTION (HCC): Chronic | ICD-10-CM

## 2024-09-17 DIAGNOSIS — I25.84 CORONARY ARTERY DISEASE DUE TO CALCIFIED CORONARY LESION: Chronic | ICD-10-CM

## 2024-09-17 DIAGNOSIS — I48.0 PAF (PAROXYSMAL ATRIAL FIBRILLATION) (HCC): ICD-10-CM

## 2024-09-17 DIAGNOSIS — I25.9 ISCHEMIC HEART DISEASE DUE TO CORONARY ARTERY OBSTRUCTION (HCC): Chronic | ICD-10-CM

## 2024-09-17 DIAGNOSIS — I25.10 CORONARY ARTERY DISEASE DUE TO CALCIFIED CORONARY LESION: Chronic | ICD-10-CM

## 2024-09-17 DIAGNOSIS — I50.43 CHF (CONGESTIVE HEART FAILURE), NYHA CLASS III, ACUTE ON CHRONIC, COMBINED (HCC): ICD-10-CM

## 2024-09-17 DIAGNOSIS — I63.9 ACUTE ISCHEMIC STROKE (HCC): ICD-10-CM

## 2024-09-17 DIAGNOSIS — I50.22 CHRONIC SYSTOLIC CONGESTIVE HEART FAILURE (HCC): Chronic | ICD-10-CM

## 2024-09-17 DIAGNOSIS — Z95.810 CARDIAC RESYNCHRONIZATION THERAPY DEFIBRILLATOR (CRT-D) IN PLACE: ICD-10-CM

## 2024-09-17 DIAGNOSIS — I44.7 LBBB (LEFT BUNDLE BRANCH BLOCK): Chronic | ICD-10-CM

## 2024-09-17 DIAGNOSIS — I10 ESSENTIAL HYPERTENSION: ICD-10-CM

## 2024-09-17 DIAGNOSIS — E78.5 DYSLIPIDEMIA: ICD-10-CM

## 2024-09-17 LAB — EKG IMPRESSION: NORMAL

## 2024-09-17 PROCEDURE — 99214 OFFICE O/P EST MOD 30 MIN: CPT | Performed by: INTERNAL MEDICINE

## 2024-09-17 PROCEDURE — 93010 ELECTROCARDIOGRAM REPORT: CPT | Performed by: INTERNAL MEDICINE

## 2024-09-17 PROCEDURE — 93284 PRGRMG EVAL IMPLANTABLE DFB: CPT | Mod: 26 | Performed by: INTERNAL MEDICINE

## 2024-09-17 PROCEDURE — 3074F SYST BP LT 130 MM HG: CPT | Performed by: INTERNAL MEDICINE

## 2024-09-17 PROCEDURE — 3078F DIAST BP <80 MM HG: CPT | Performed by: INTERNAL MEDICINE

## 2024-09-17 PROCEDURE — 93005 ELECTROCARDIOGRAM TRACING: CPT | Performed by: INTERNAL MEDICINE

## 2024-09-17 PROCEDURE — G2211 COMPLEX E/M VISIT ADD ON: HCPCS | Performed by: INTERNAL MEDICINE

## 2024-09-17 PROCEDURE — 93284 PRGRMG EVAL IMPLANTABLE DFB: CPT | Performed by: INTERNAL MEDICINE

## 2024-09-17 PROCEDURE — 99213 OFFICE O/P EST LOW 20 MIN: CPT | Performed by: INTERNAL MEDICINE

## 2024-09-17 RX ORDER — CARVEDILOL 25 MG/1
25 TABLET ORAL 2 TIMES DAILY WITH MEALS
Qty: 180 TABLET | Refills: 3 | Status: SHIPPED | OUTPATIENT
Start: 2024-09-17

## 2024-09-17 RX ORDER — NITROGLYCERIN 0.4 MG/1
0.4 TABLET SUBLINGUAL PRN
Qty: 25 TABLET | Refills: 11 | Status: SHIPPED | OUTPATIENT
Start: 2024-09-17

## 2024-09-17 RX ORDER — BEMPEDOIC ACID AND EZETIMIBE 180; 10 MG/1; MG/1
TABLET, FILM COATED ORAL
COMMUNITY
Start: 2024-08-05

## 2024-09-17 ASSESSMENT — FIBROSIS 4 INDEX: FIB4 SCORE: 2.55

## 2024-09-17 NOTE — PROGRESS NOTES
Normal device function and all RX pathways programmed B<AX per [employer] support recommendations. See flowsheet.

## 2024-09-17 NOTE — CARDIAC REMOTE MONITOR - SCAN
Device transmission reviewed. Device demonstrated appropriate function.       Electronically Signed by: Kamron Ambrose M.D.    9/18/2024  7:48 AM

## 2024-09-17 NOTE — PROGRESS NOTES
Chief Complaint   Patient presents with    Hypertension    Atrial Fibrillation       Subjective     Lauren Dvae is a 74 y.o. female who presents today with  for follow-up of her complex cardiac condition including heart failure with ischemic cardiomyopathy long-term left bundle branch block she had CABG and had a stroke intraoperative with significant defect     Hasn't need hydralazine    Started on Nexlizet (bempedoic acid and ezetimibe) with PMD and has been tolerating ezetimibe in the past has caused     Jardiance had caused frequent UTI finds it hard to drink a lot of fluid gets more GERD    Describes some orthopnea and mild CHF         9/17/2024   [facility] DEVICE FLOWSHEET    Device History: Congestive Heart Failure;Ischemic Cardiomyopathy;Atrial Fibrillation;Sick Sinus Syndrome    Device Type: C R T - D    Mode: D D D    Rate: 75    Configuration: 2  Zone    Presenting Rhythm: AsVp    Atrially Paced: (%) 7.6 %    Ventricularly Paced: (%) 98.5 %    Device Therapy Details: No Therapies, 1-NSVT Episode<1 sec    Mode Switching Details: No Episodes    Atrial Lead Threshold: (Volts) 0.5 Volts    Atrial Lead Sensing: (mV) 4.6 mV    Atrial Lead Impedance: (Ohms) 418 Ohms    Right Ventricular Lead Threshold: (Volts) 0.5 Volts    Right Ventricular Lead Sensing: (mV) 9.9 mV    Right Ventricular Lead Impedance: (Ohms) 399 Ohms    Left Ventricular Lead Threshold: (Volts) 1.25 Volts    Left Ventricular Lead Impedance: (Ohms) 589 Ohms    High Voltage Lead: (Ohms) 69 Ohms    Battery Charge Time: 3.8 Secs    Battery Estimated Longevity: 5.6-7.4 Years    Changes Made: No Changes Made          Past Medical History:   Diagnosis Date    Arthritis     Breast cancer (HCC) 08/07/2013    Bronchitis 2005    Cancer (HCC)     right breast cancer     Cataract     removed    Chronic systolic congestive heart failure (HCC) EF 35% 10/12/2022    Coronary artery disease due to calcified coronary lesion - Calcifications seen on CT scan  also wtih aortic ulceration     Dental disorder     tooth infection    Discoid lupus     Dyslipidemia     Tried atorvastatin, pravastatin, lovastatin, and Zetia.  All causes severe myalgias.  Just taking fish oil.      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     Ischemic heart disease due to coronary artery obstruction (HCC) - Severe 3vD on cath 10/19/2022    LBBB (left bundle branch block) 12/16/2014    Noted on prechemo EKG 9/2014, MUGA WNL    Pneumonia     Pseudogout     Scarlet fever     Unspecified hemorrhagic conditions     gums     Past Surgical History:   Procedure Laterality Date    MULTIPLE CORONARY ARTERY BYPASS ENDO VEIN HARVEST  11/10/2022    Procedure: CORONARY ARTERY BYPASS GRAFTING X 3, WITH LEFT INTERNAL MAMMARY ARTERY TAKEDOWN AND ENDOSCOPIC VEIN HARVEST LEFT GREATER SAPHENOUS VEIN, AND TRANSESOPHAGEAL ECHOCARDIOGRAM;  Surgeon: Padmini Mac M.D.;  Location: SURGERY [facility];  Service: Cardiothoracic    ECHOCARDIOGRAM, TRANSESOPHAGEAL, INTRAOPERATIVE  11/10/2022    Procedure: ECHOCARDIOGRAM, TRANSESOPHAGEAL, INTRAOPERATIVE;  Surgeon: Padmini Mac M.D.;  Location: SURGERY [facility];  Service: Cardiothoracic    OTHER CARDIAC SURGERY  11/2022    angiogram    CATH REMOVAL  02/24/2014    Performed by Aggie Burns M.D. at [facility]    MASTECTOMY  08/22/2013    Performed by Aggie Burns M.D. at [facility]    NODE BIOPSY SENTINEL  08/22/2013    Performed by Aggie Burns M.D. at [facility]    CATH PLACEMENT  08/22/2013    Performed by Aggie Burns M.D. at [facility]    US-NEEDLE CORE BX-BREAST PANEL  01/01/2013    right breast    COLONOSCOPY  01/01/2003    BREAST BIOPSY      TONSILLECTOMY       Family History   Problem Relation Age of Onset    Cancer Mother         possible thyroid and gynecological    Multiple Sclerosis Mother     Arthritis Father   "   Multiple Sclerosis Brother     Gout Brother     Heart Disease Paternal Grandmother     Diabetes Paternal Grandmother     Cancer Maternal Aunt         breast cancer- 60s    Diabetes Maternal Uncle     Heart Disease Maternal Grandmother     Heart Disease Maternal Grandfather         MI    Stroke Paternal Grandfather     Other Son         breast mass    Heart Disease Son         HEART MURMUR    Gout Son      Social History     Socioeconomic History    Marital status:      Spouse name: Not on file    Number of children: 2    Years of education: Not on file    Highest education level: Not on file   Occupational History     Employer: [employer]   Tobacco Use    Smoking status: Former     Current packs/day: 0.00     Types: Cigarettes     Start date: 10/1/2013     Quit date: 10/1/2013     Years since quitting: 10.9    Smokeless tobacco: Never   Vaping Use    Vaping status: Never Used   Substance and Sexual Activity    Alcohol use: No     Alcohol/week: 0.0 oz    Drug use: No    Sexual activity: Not on file     Comment: , 2 children, enemployed   Other Topics Concern    Not on file   Social History Narrative    ** Merged History Encounter **         Patient is a . Patient has 2 adult children. She is  from .           Social Determinants of Health     Financial Resource Strain: Not on file   Food Insecurity: Not on file   Transportation Needs: Not on file   Physical Activity: Not on file   Stress: Not on file   Social Connections: Not on file   Intimate Partner Violence: Not on file   Housing Stability: Not on file     Allergies   Allergen Reactions    Statins [Hmg-Coa-R Inhibitors] Myalgia     Muscle Spasms   RXN=unknown    Albumin     Ezetimibe      Itchy bumps/rash on face    Hydroxychloroquine      Eye changes    Jardiance [Empagliflozin]      Frequent UTI    Atorvastatin Myalgia    Codeine Vomiting and Nausea     Clarified with patient - states that she has an \"upset stomach\" " "when she takes it    Eggs Diarrhea     Pt states that she is allergic to eggs per her mother.  Tolerated clevidipine 11/2022    Lisinopril Cough    Losartan Unspecified     Tried in 2017  Cannot recall reaction    Penicillins Unspecified     \"does not work\" .'IMMUNE TO PENICILLIN,AMPICILLIN IS THE ONLY THING THAT WORKS'     Outpatient Encounter Medications as of 9/17/2024   Medication Sig Dispense Refill    NEXLIZET 180-10 MG Tab       nitroglycerin (NITROSTAT) 0.4 MG SL Tab Place 1 Tablet under the tongue as needed for Chest Pain. 25 Tablet 11    ELIQUIS 5 MG Tab TAKE 1 TABLET BY MOUTH TWO TIMES A DAY. 180 Tablet 2    sacubitril-valsartan (ENTRESTO) 49-51 MG Tab Take 1 Tablet by mouth 2 times a day. 180 Tablet 3    carvedilol (COREG) 25 MG Tab Take 1 Tablet by mouth 2 times a day with meals. 180 Tablet 3    hydrALAZINE (APRESOLINE) 10 MG Tab Take 1 tablet by mouth 3 times a day as needed (/100) 25 Tablet 0    aspirin 81 MG EC tablet Take 1 Tablet by mouth every day. 100 Tablet 0    famotidine (PEPCID) 40 MG Tab Take 1 tablet by mouth twice daily (Patient taking differently: Take 40 mg by mouth every day.) 180 tablet 1    Cyanocobalamin (VITAMIN B-12 PO) Take 1 Tablet by mouth every morning.      ibuprofen (MOTRIN) 800 MG Tab TAKE 1 TABLET BY MOUTH EVERY 8 HOURS AS NEEDED FOR MILD PAIN 90 Tab 0    Cholecalciferol (VITAMIN D3) 5000 units Cap Take 1 Capsule by mouth every day.      [DISCONTINUED] CALCIUM MAGNESIUM ZINC PO Take  by mouth. (Patient not taking: Reported on 8/29/2024)       No facility-administered encounter medications on file as of 9/17/2024.     ROS           Objective     /70 (BP Location: Left arm, Patient Position: Sitting, BP Cuff Size: Adult)   Pulse 76   Resp 16   Ht 1.6 m (5' 3\")   Wt 70.9 kg (156 lb 6.4 oz)   SpO2 94%   BMI 27.71 kg/m²     Physical Exam           Assessment & Plan     1. Essential hypertension  EKG      2. S/P CABG x 3  Referral to Cardiac Rehab      3. " Ischemic heart disease due to coronary artery obstruction (HCC) - Severe 3vD on cath  nitroglycerin (NITROSTAT) 0.4 MG SL Tab      4. Chronic systolic congestive heart failure (HCC) EF 35%  Comp Metabolic Panel      5. Acute ischemic stroke ([facility])        6. LBBB (left bundle branch block)        7. Coronary artery disease due to calcified coronary lesion - Calcifications seen on CT scan also wtih aortic ulceration  CBC WITHOUT DIFFERENTIAL      8. PAF (paroxysmal atrial fibrillation) ([facility])        9. CHF (congestive heart failure), NYHA class III, acute on chronic, combined ([facility])        10. HFrEF (heart failure with reduced ejection fraction) ([facility])        11. Cardiac resynchronization therapy defibrillator (CRT-D) in place        12. Dyslipidemia  Lipid Profile          Medical Decision Making: Today's Assessment/Status/Plan:          It was my pleasure to meet with Ms. Dave.    We addressed the management of hypertension at today's visit. Blood pressure is well controlled.  We specifically assessed the labs on hypertension treatment    We addressed the management of dyslipidemia and atherosclerosis at today's visit. She is on appropriate statin.    We addressed the management of atherosclerotic artery disease.  She is on proper antiplatelet, cholesterol management and beta-blockers as appropriate.  We addressed the potential side effects and laboratory follow-up for these medications.    We addressed the management of congestive heart failure. She is on proper hypertension treatment and cholesterol management for atherosclerosis  as appropriate.  We addressed the potential side effects and laboratory follow-up for these medications. She is on proper mineralacorticoid, angiotensin/ace inhibition with neprilysin inhibition, SGLTs inhibitor and beta-blockers as appropriate.  We addressed the potential side effects and regular laboratory follow-up for these medications.      I will see Ms. Dave back in 1 year  time and encouraged her to follow up with us over the phone or electronically using my MyChart as issues arise.    It is my pleasure to participate in the care of Ms. Dave.  Please do not hesitate to contact me with questions or concerns.    Jarrod Mayberry MD PhD [facility]  Cardiologist [facility]    Please note that this dictation was created using voice recognition software. There may be errors I did not discover before finalizing the note.     () Today's E/M visit is associated with medical care services that serve as the continuing focal point for all needed health care services and/or with medical care services that  are part of ongoing care related to a patient's single, serious condition, or a complex condition: This includes  furnishing services to patients on an ongoing basis that result in care that is personalized  to the patient. The services result in a comprehensive, longitudinal, and continuous  relationship with the patient and involve delivery of team-based care that is accessible, coordinated with other practitioners and providers, and integrated with the broader health  care landscape.

## 2024-09-23 ENCOUNTER — TELEPHONE (OUTPATIENT)
Dept: CARDIOLOGY | Facility: MEDICAL CENTER | Age: 74
End: 2024-09-23
Payer: MEDICARE

## 2024-09-23 NOTE — LETTER
PROCEDURE/SURGERY CLEARANCE FORM      Encounter Date: 9/23/2024    Patient: Lauren Dave  YOB: 1950    CARDIOLOGIST:  Jarrod Mayberry M.D.    REFERRING DOCTOR:  No ref. provider found    The following procedure/surgery: Colonoscopy & EGD                                                PROCEDURE/SURGERY CLEARANCE FORM    Date: 9/23/2024   Patient Name: Lauren Dave    Dear Surgeon or Proceduralist,      Thank you for your request for cardiac stratification of our mutual patient Lauren Dave 1950. We have reviewed their [facility] records; and to the best of our understanding this patient has not had stenting, ablation, cardiothoracic surgery or hospitalization for cardiovascular reasons in the past 6 months.  Lauren Dave has been seen within the past 18 months and is considered to have non-modifiable cardiac risk for this low-risk procedure/surgery. They may proceed from a cardiovascular standpoint and may hold their antiplatelet/anticoagulation as briefly as possible. Please have patient resume this medication when hemodynamically stable to do so.     Aspirin or Prasugrel   - hold 7 days prior to procedure/surgery, resume when hemodynamically stable      Clopidrogrel or Ticagrelor  - hold 7 days for all neurological procedures, hold 5 days prior to all other procedure/surgery,  resume when hemodynamically stable     Warfarin - hold 7 days for all neurological procedures, hold 5 days prior to all other procedure/surgery and coordinate with [facility]tico[facility] ([phone]) INR testing and dose management.      Pradaxa/Xarelto/Eliquis/Savesya - hold 1 day prior to procedure for low bleeding risk procedure, 2 days for high bleeding risk procedure, or consider holding 3 days or longer for patients with reduced kidney function (CrCl <30mL/min) or spinal/cranial surgeries/procedures.      If they have a mechanical heart valve, please coordinate with  [facility]ticoagulation Service ([phone]) the proper management of their anticoagulant in the periprocedural or perioperative period.      Some patients have higher risk for cardiovascular complications or holding medication. If our patient has had prior complications of holding antiplatelet or anticoagulants in the past and we have seen them after these events, we have addressed these concerns with the patient. They are at an unknown degree of increased risk for recurrent complication.  You may hold anticoagulation/antiplatelets for the procedure or surgery if the benefits of the procedure or surgery outweigh this nonmodifiable risk.      If Lauren Dave 1950 has new symptoms of heart failure decompensation, unstable arrythmia, or angina please reach out and we will assess the patient.      If you have other patient-specific concerns, please feel free to reach out to the patient's cardiologist directly at [phone].     Thank you,       [facility]

## 2024-09-23 NOTE — TELEPHONE ENCOUNTER
Last OV: 09/17/2024  Proposed Surgery: Colonoscopy & EGD  Surgery Date: TBD  Requesting Office Name: [facility] neil Flores  Fax Number: [phone]  Preference of Location (default is surgery center unless specified by Cardiologist or [facility])  Prior Clearance Addressed: No      Anticoags/Antiplatelets: Aspirin and Apixaban   Anticoags/Antiplatelet managed by Cardiology? YES    Outstanding Cardiac Imaging : No  Stent, Cardiac Devices, or Catheterization: Yes  Date : 11/28/2022   Ablation, TAVR/Valve (including open heart), Cardioversion: No  Recent Cardiac Hospitalization: No            When: N/A  History (cardiac history):   Past Medical History:   Diagnosis Date    Arthritis     Breast cancer (HCC) 08/07/2013    Bronchitis 2005    Cancer (HCC)     right breast cancer     Cataract     removed    Chronic systolic congestive heart failure (HCC) EF 35% 10/12/2022    Coronary artery disease due to calcified coronary lesion - Calcifications seen on CT scan also wtih aortic ulceration     Dental disorder     tooth infection    Discoid lupus     Dyslipidemia     Tried atorvastatin, pravastatin, lovastatin, and Zetia.  All causes severe myalgias.  Just taking fish oil.      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     Ischemic heart disease due to coronary artery obstruction (HCC) - Severe 3vD on cath 10/19/2022    LBBB (left bundle branch block) 12/16/2014    Noted on prechemo EKG 9/2014, MUGA WNL    Pneumonia     Pseudogout     Scarlet fever     Unspecified hemorrhagic conditions     gums             Surgical Clearance Letter Sent: YES   **Scan clearance request letter into [facility].**

## 2024-10-01 ENCOUNTER — TELEPHONE (OUTPATIENT)
Dept: CARDIOLOGY | Facility: MEDICAL CENTER | Age: 74
End: 2024-10-01
Payer: MEDICARE

## 2024-10-09 ENCOUNTER — TELEPHONE (OUTPATIENT)
Dept: CARDIOLOGY | Facility: MEDICAL CENTER | Age: 74
End: 2024-10-09
Payer: MEDICARE

## 2024-10-21 ENCOUNTER — TELEPHONE (OUTPATIENT)
Dept: CARDIOLOGY | Facility: MEDICAL CENTER | Age: 74
End: 2024-10-21
Payer: MEDICARE

## 2024-11-20 ENCOUNTER — TELEPHONE (OUTPATIENT)
Dept: CARDIOLOGY | Facility: MEDICAL CENTER | Age: 74
End: 2024-11-20
Payer: MEDICARE

## 2024-11-20 DIAGNOSIS — I25.10 CORONARY ARTERY DISEASE DUE TO CALCIFIED CORONARY LESION: Chronic | ICD-10-CM

## 2024-11-20 DIAGNOSIS — I25.84 CORONARY ARTERY DISEASE DUE TO CALCIFIED CORONARY LESION: Chronic | ICD-10-CM

## 2024-11-21 ENCOUNTER — TELEPHONE (OUTPATIENT)
Dept: CARDIOLOGY | Facility: MEDICAL CENTER | Age: 74
End: 2024-11-21
Payer: MEDICARE

## 2024-11-21 NOTE — TELEPHONE ENCOUNTER
Received ERX in Cardiology MSOT, ran test claim and rejected for pharmacy not contracted.      Due to Nexletol being a new start medication for the patient, Prior Authorization for Nexletol 180mg tabs (Quantity: 90, Days: 90) has been submitted via Cover My Meds: Key ([id number]) PA Case ID #: [id number]    Insurance: [employer] D    Will follow up in 48-72 business hours.

## 2024-11-26 NOTE — TELEPHONE ENCOUNTER
Prior Authorization for Nexletol 180mg tabs has been approved for a quantity of 90 , day supply 90    Prior Authorization reference number:  (Key: [id number]) - [id number]  Insurance: [employer] D  Effective dates: 01/01/24 - 12/31/25  Copay: $0- verified with [facility] that patients current co-pay is $0.      Is patient eligible to fill with [facility]? No, test claim rejected stating Pharmacy not contracted.    Next Steps:  Released Rx to patient's preferred pharmacy on  file and confirmed receipt of prescription and co-pay. Pharmacy is ordering medication for the patient for tomorrow, 11/27/24.     Pneumothorax on right

## 2024-12-05 ENCOUNTER — TELEPHONE (OUTPATIENT)
Dept: SURGICAL ONCOLOGY | Facility: MEDICAL CENTER | Age: 74
End: 2024-12-05
Payer: MEDICARE

## 2024-12-05 DIAGNOSIS — E04.2 MULTINODULAR GOITER: ICD-10-CM

## 2024-12-05 NOTE — TELEPHONE ENCOUNTER
----- Message from Physician Yeimi Escobar M.D. sent at 12/5/2024  2:08 PM PST -----  Needs thyroid u/s  ----- Message -----  From: Tiffani Novoa, Zachery Ass't  Sent: 12/5/2024   1:15 PM PST  To: Yeimi Escobar M.D.    Patient is having a CT of her neck down to her thigh is this fine or do we need u/s?  ----- Message -----  From: Yeimi Escobar M.D.  Sent: 12/5/2024  10:05 AM PST  To: Tiffani Novoa Med Ass't    Does she have a recent u/s // do we need to order

## 2024-12-11 ENCOUNTER — TELEPHONE (OUTPATIENT)
Dept: CARDIOLOGY | Facility: MEDICAL CENTER | Age: 74
End: 2024-12-11
Payer: MEDICARE

## 2024-12-11 NOTE — TELEPHONE ENCOUNTER
CW  Caller: Lauren Dave     Topic/issue: Patient would like to know if it is safe to take tizanidine 4 mg  ( muscle relaxer for patients back) considering their heart history.     Secondly they would like to know if CW would prescribe pain medication for their back. Advised to reach out to PCP for this request.     Callback Number: [phone]      Thank you  Linda CHAPARRO

## 2024-12-12 NOTE — TELEPHONE ENCOUNTER
Attempted to call Lauren, [phone] unable to reach.  Left message on voicemail to return this call at their earliest convenience to discuss CW recommendations.    Upon chart review, pt is active on [employer].  Last login 12/6/24.  [employer] message sent regarding CW recommendations

## 2024-12-26 ENCOUNTER — TELEPHONE (OUTPATIENT)
Dept: SURGICAL ONCOLOGY | Facility: MEDICAL CENTER | Age: 74
End: 2024-12-26
Payer: MEDICARE

## 2024-12-30 ENCOUNTER — TELEPHONE (OUTPATIENT)
Dept: SURGICAL ONCOLOGY | Facility: MEDICAL CENTER | Age: 74
End: 2024-12-30
Payer: MEDICARE

## 2024-12-30 NOTE — TELEPHONE ENCOUNTER
Lauren called stating that [facility] never received her lab orders and she wanted to cancel her appointment with . We have already faxed over these lab orders to the fax we have on file for [facility]. I advised her to call [facility] lab and provide us with the fax number they need it sent to. She got upset and stated she just wanted to cancel at this time.

## 2025-01-09 ENCOUNTER — HOSPITAL ENCOUNTER (OUTPATIENT)
Dept: RADIOLOGY | Facility: MEDICAL CENTER | Age: 75
End: 2025-01-09
Payer: MEDICARE

## 2025-01-15 NOTE — PROGRESS NOTES
Neuro Interventional Service Consultation     Lauren Dave is a 74 y.o. female seen in clinic for evaluation and possible intracranial aneurysm intervention.     Referring provider/ Neurologist: Kamron Hurtado PA-C  PCP: Jenna Hernandez MD   Cardiologist: Jarrod Mayberry MD    History of Present Illness:  Initial consultation 1/16/25:  is a right handed female who presents with the incidental finding of an intracranial aneurysm. She has a history of ischemic CM and in 2022 underwent a CABG and developed hypoperfusion strokes. She has had deficits since then that impair her mobility. She presented to a new neurologist who initiated workup. Imaging revealed the chronic infarcts from her strokes and an incidental left middle cerebral artery aneurysm and the patient has been referred to the Neuro Interventional Service for evaluation and management of this finding. Additional clinical history includes HTN, HLD, right breast cancer, pacemaker implant, and CAD with CABG.    She is seen today for review of imaging studies and discussion of possible intracranial aneurysm embolization. Today, the patient reports chronic headaches but no sentinel headache symptoms. She has residual weakness in her left arm and right leg from her stroke and continues with physical therapy and has been noticing slow improvement. She denies pulmonary complaints and follows closely with her cardiologist. There is no family history of intracranial aneurysm. Blood pressure is controlled with medications. There is a history of smoking with cessation in 2012. The patient has had anesthesia in the past and has significant anxiety and requires sedation for CTs and MRIs due to claustrophobia. She reports that since she can have IVs in the left arm only that veins can be difficult to access by medical staff. The patient is taking aspirin and Eliquis and denies any history of major bleeding, including epistaxis, hemoptysis, hematemesis,  gross hematuria, and melena. She lives in [place] and is retired. She is accompanied by a support person Tommy to today's consultation.    Family history of aneurysm: no  Anticoagulation/ antiplatelet therapy: Eliquis and aspirin 81 mg  Hyperlipidemia: yes  Hypertension: yes  Smoking: former, 2012 cessation  Diabetes Mellitus: no    Past Medical History:   Diagnosis Date    Arthritis     Breast cancer (HCC) 08/07/2013    Bronchitis 2005    Cancer (HCC)     right breast cancer     Cataract     removed    Chronic systolic congestive heart failure (HCC) EF 35% 10/12/2022    Coronary artery disease due to calcified coronary lesion - Calcifications seen on CT scan also wtih aortic ulceration     Dental disorder     tooth infection    Discoid lupus     Dyslipidemia     Tried atorvastatin, pravastatin, lovastatin, and Zetia.  All causes severe myalgias.  Just taking fish oil.      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     Ischemic heart disease due to coronary artery obstruction (HCC) - Severe 3vD on cath 10/19/2022    LBBB (left bundle branch block) 12/16/2014    Noted on prechemo EKG 9/2014, MUGA WNL    Pneumonia     Pseudogout     Scarlet fever     Unspecified hemorrhagic conditions     gums     Past Surgical History:   Procedure Laterality Date    MULTIPLE CORONARY ARTERY BYPASS ENDO VEIN HARVEST  11/10/2022    Procedure: CORONARY ARTERY BYPASS GRAFTING X 3, WITH LEFT INTERNAL MAMMARY ARTERY TAKEDOWN AND ENDOSCOPIC VEIN HARVEST LEFT GREATER SAPHENOUS VEIN, AND TRANSESOPHAGEAL ECHOCARDIOGRAM;  Surgeon: Padmini Mac M.D.;  Location: SURGERY [facility];  Service: Cardiothoracic    ECHOCARDIOGRAM, TRANSESOPHAGEAL, INTRAOPERATIVE  11/10/2022    Procedure: ECHOCARDIOGRAM, TRANSESOPHAGEAL, INTRAOPERATIVE;  Surgeon: Padmini Mac M.D.;  Location: SURGERY [facility];  Service: Cardiothoracic    OTHER CARDIAC SURGERY  11/2022    angiogram    CATH REMOVAL  02/24/2014     Performed by Aggie Burns M.D. at [facility]    MASTECTOMY  2013    Performed by Aggie Burns M.D. at [facility]    NODE BIOPSY SENTINEL  2013    Performed by Aggie Burns M.D. at [facility]    CATH PLACEMENT  2013    Performed by Aggie Burns M.D. at [facility]    US-NEEDLE CORE BX-BREAST PANEL  2013    right breast    COLONOSCOPY  2003    BREAST BIOPSY      TONSILLECTOMY       Social History     Socioeconomic History    Marital status:      Spouse name: Not on file    Number of children: 2    Years of education: Not on file    Highest education level: Not on file   Occupational History     Employer: [employer]   Tobacco Use    Smoking status: Former     Current packs/day: 0.00     Types: Cigarettes     Start date: 10/1/2013     Quit date: 10/1/2013     Years since quittin.2    Smokeless tobacco: Never   Vaping Use    Vaping status: Never Used   Substance and Sexual Activity    Alcohol use: No     Alcohol/week: 0.0 oz    Drug use: No    Sexual activity: Not on file     Comment: , 2 children, enemployed   Other Topics Concern    Not on file   Social History Narrative    ** Merged History Encounter **         Patient is a . Patient has 2 adult children. She is  from .           Social Drivers of Health     Financial Resource Strain: Not on file   Food Insecurity: Not on file   Transportation Needs: Not on file   Physical Activity: Not on file   Stress: Not on file   Social Connections: Not on file   Intimate Partner Violence: Not on file   Housing Stability: Not on file     Family History   Problem Relation Age of Onset    Cancer Mother         possible thyroid and gynecological    Multiple Sclerosis Mother     Arthritis Father     Multiple Sclerosis Brother     Gout Brother     Heart Disease Paternal Grandmother     Diabetes Paternal Grandmother      Cancer Maternal Aunt         breast cancer- 60s    Diabetes Maternal Uncle     Heart Disease Maternal Grandmother     Heart Disease Maternal Grandfather         MI    Stroke Paternal Grandfather     Other Son         breast mass    Heart Disease Son         HEART MURMUR    Gout Son        ROS    A comprehensive 14-point review of systems was negative except as described above.     Labs:       Radiology:   CTA Head 12/27/24 at [facility]:        TTE 6/22/23 at [facility]:  CONCLUSIONS  Compared to the images of the prior study on 01/04/2023 - there is   improved ventricular function, previously 35%.  Mildly reduced left ventricular systolic function.  Global hypokinesis with regional variation.  Grade II diastolic dysfunction.  Normal right ventricular size and systolic function.  No significant valvular abnormalities.     MRI brain 11/13/22 at [facility]:  1.  BILATERAL mostly supratentorial areas of ischemia as described above. These appear to be primarily watershed zone infarctions.  2.  No hemorrhage  3.  No significant mass effect          Current Outpatient Medications   Medication Sig Dispense Refill    Bempedoic Acid 180 MG Tab Take 180 mg by mouth every day. 30 Tablet 11    Bempedoic Acid 180 MG Tab Take 180 mg by mouth every day. 30 Tablet 3    nitroglycerin (NITROSTAT) 0.4 MG SL Tab Place 1 Tablet under the tongue as needed for Chest Pain. 25 Tablet 11    carvedilol (COREG) 25 MG Tab Take 1 Tablet by mouth 2 times a day with meals. 180 Tablet 3    ELIQUIS 5 MG Tab TAKE 1 TABLET BY MOUTH TWO TIMES A DAY. 180 Tablet 2    sacubitril-valsartan (ENTRESTO) 49-51 MG Tab Take 1 Tablet by mouth 2 times a day. 180 Tablet 3    hydrALAZINE (APRESOLINE) 10 MG Tab Take 1 tablet by mouth 3 times a day as needed (/100) 25 Tablet 0    aspirin 81 MG EC tablet Take 1 Tablet by mouth every day. 100 Tablet 0    famotidine (PEPCID) 40 MG Tab Take 1 tablet by mouth twice daily (Patient taking  "differently: Take 40 mg by mouth every day.) 180 tablet 1    Cyanocobalamin (VITAMIN B-12 PO) Take 1 Tablet by mouth every morning.      ibuprofen (MOTRIN) 800 MG Tab TAKE 1 TABLET BY MOUTH EVERY 8 HOURS AS NEEDED FOR MILD PAIN 90 Tab 0    Cholecalciferol (VITAMIN D3) 5000 units Cap Take 1 Capsule by mouth every day.       No current facility-administered medications for this visit.       Allergies   Allergen Reactions    Statins [Hmg-Coa-R Inhibitors] Myalgia     Muscle Spasms   RXN=unknown    Albumin     Ezetimibe      Itchy bumps/rash on face    Hydroxychloroquine      Eye changes    Jardiance [Empagliflozin]      Frequent UTI    Atorvastatin Myalgia    Codeine Vomiting and Nausea     Clarified with patient - states that she has an \"upset stomach\" when she takes it    Eggs Diarrhea     Pt states that she is allergic to eggs per her mother.  Tolerated clevidipine 11/2022    Lisinopril Cough    Losartan Unspecified     Tried in 2017  Cannot recall reaction    Penicillins Unspecified     \"does not work\" .'IMMUNE TO PENICILLIN,AMPICILLIN IS THE ONLY THING THAT WORKS'       Physical Exam  Constitutional:       General: She is not in acute distress.     Appearance: She is not diaphoretic.   HENT:      Head: Normocephalic.   Eyes:      General: No scleral icterus.  Pulmonary:      Effort: Pulmonary effort is normal. No respiratory distress.   Abdominal:      General: There is no distension.   Skin:     General: Skin is warm and dry.      Coloration: Skin is not pale.      Findings: No erythema or rash.   Neurological:      General: No focal deficit present.      Mental Status: She is alert and oriented to person, place, and time. She is not disoriented.      Cranial Nerves: No cranial nerve deficit or facial asymmetry.      Sensory: Sensation is intact.      Motor: No weakness or tremor.      Coordination: Coordination normal.      Gait: Gait is intact. Gait normal.   Psychiatric:         Mood and Affect: Mood and " affect normal.         Behavior: Behavior normal.         Thought Content: Thought content normal.         Cognition and Memory: Memory normal.         Judgment: Judgment normal.     PHASES Aneurysm Risk Score: 7    The 5-year absolute risk of rupture based on score is currently estimated to be:    <=2 points: 0.4%  3 points: 0.7%  4 points: 0.9%  5 points: 1.3%  6 points: 1.7%  7 points: 2.4%  8 points: 3.2%  9 points: 4.3%  10 points: 5.3%  11 points: 7.2%  >= 12 points: 17.8%    Impression:   1. 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  2. History of intraoperative watershed strokes.  3. Ischemic cardiomyopathy.  4. Hyperlipidemia.  5. Hypertension.  6. Pacemaker.  7. Coronary artery disease, status post CABG.  8. Claustrophobia with CT and MRI studies.    Plan:   Chepe Cuellar MD has reviewed 's history and imaging studies, examined the patient, and discussed treatment options.  has the incidental finding of an irregular left ICA terminus aneurysm. It was present in 2022 upon review of MRI but we cannot determine if it has changed. It is asymptomatic.Aneurysms of this size and in this location carry a statistical cumulative 5 year rupture risk of 2.4%, however we explained that even small aneurysms can rupture. Overall procedure risk is approximately 3%. Her aneurysm is irregular and we recommend intervention. We discussed the method of the procedure at length including the use of catheters, contrast, and xrays to facilitate diagnostic procedures and on and off label use of stents and embolic agents to perform endovascular intervention. A brief commercial video depicting the procedure was played for the patient to assist in their understanding of endovascular procedures. We additionally discussed the procedure risks, including bleeding and infection, damage to the arteries, reaction to any medications given during the procedure, side effects of  contrast, radiation exposure, in stent stenosis, coil or stent migration, mechanical failure, stroke, hemorrhage, and death. There is a risk for unanticipated neurologic or non neurologic complications. There is a chance the aneurysm may ultimately not be amenable to endovascular intervention. After the procedure, there is a chance that the aneurysm could recur. We reviewed known risk factors for vascular health that include hypertension, hyperglycemia, hyperlipidemia, and tobacco use, and modifiable risk factors were discussed. We explained that the patient will need to have ongoing surveillance imaging after the procedure to monitor for recurrence of the condition, which will be managed by us, and that future treatment depends on multiple factors including lab studies, imaging, and performance status. We discussed alternatives to the procedure including surveillance and surgical intervention, which could be discussed with a surgeon. The patient verbalizes understanding of risks, benefits, and alternatives and elects to proceed. We discussed the need for dual therapy with Brilinta and Eliquis prior to the procedure and uninterrupted for 3 months post procedure if a device is implanted. Side effects of antiplatelet therapy, specifically bleeding, were discussed with instructions given should the patient develop minor or major bleeding. A prescription for Brilinta was sent to her pharmacy and she will notify us if the cost of this brand drug is not affordable and we will re-evaluate her pre procedure therapy if that is the case. Written pre- and post- procedure care instructions were provided. We discussed signs of a sentinel headache and stroke with instructions to call an ambulance for the onset of these symptoms. We have requested clearance from her cardiologist and explained this may require additional testing or appointments with him prior to the procedure.The patient has been scheduled for 2/3/25.     Lauren  Natasha Dave is a 74 y.o. female scheduled for intracranial aneurysm embolization. This surgery will be higher risk surgery due to risk of intracranial hemorrhage or thrombotic events. Care provided will be in the Intensive Care Unit. Post procedurally, this patient will require every 1 hour nursing interventions and physician evaluations more than 3-4 times a day. The care needed cannot be provided in outpatient setting and without such care there is high risk of development of complications and death.     Danielle Knight, AYAKA with Chepe Cuellar MD  Neuro Interventional Service   [id number] [facility] [id number])  [place] [address]  [phone]      I, Lilliana Cuellar, the interventional neuroradiologist attest that I personally reviewed the relevant imaging studies (MRI, CT and Angiograms) of this patient and also discussed the history, physical exam findings and correlated with the APNP's physical exam and agree with the above note. I also personally discussed the findings and plan of management with the patient.

## 2025-01-16 ENCOUNTER — HOSPITAL ENCOUNTER (OUTPATIENT)
Dept: RADIOLOGY | Facility: MEDICAL CENTER | Age: 75
End: 2025-01-16
Payer: MEDICARE

## 2025-01-16 DIAGNOSIS — I67.1 CEREBRAL ANEURYSM, NONRUPTURED: ICD-10-CM

## 2025-01-24 ENCOUNTER — TELEPHONE (OUTPATIENT)
Dept: CARDIOLOGY | Facility: MEDICAL CENTER | Age: 75
End: 2025-01-24
Payer: MEDICARE

## 2025-01-24 NOTE — TELEPHONE ENCOUNTER
[facility]Domon[facility] Olson and Dr. Mayberry for Cardiac Clearance

## 2025-01-24 NOTE — TELEPHONE ENCOUNTER
MAs- Please see other tele encounter dated 1/24/25. Could you please complete a surgical clearance for this patient. Thank you.

## 2025-01-24 NOTE — TELEPHONE ENCOUNTER
She can proceed with the proposed procedure or surgery from a cardiac standpoint, no modifiable cardiovascular risk, no further cardiac testing required, hold antiplatelet (Brilinta) and anticoagulant (Eliquis) 1 week chief he slit his wrist is [place]al[place]: At the slightest as necessary, resume as soon as possible typically when patient is able to take oral medications.    It is my pleasure to participate in the care of Ms. Dave.  Please do not hesitate to contact me with questions or concerns. [facility] is available 24/7 for consultative services at [phone] in the perioperative period.    Electronically Signed    Jarrod Mayberry MD PhD FACC  Cardiologist [facility]

## 2025-01-24 NOTE — TELEPHONE ENCOUNTER
----- Message -----  From: Christina Frausto  Sent: 1/16/2025  10:35 AM PST  To: BROOKE Mccarthy; Jose Otero/Hieu  Subject: Cardiac clearance for GA 3hrs                    This patient has a request for an Embolization with Dr. Cuellar on 2/3/25. This will require a Cardiac clearance for anesthesia for 3 hrs, she sees Dr. Jarrod Mayberry.    Christina

## 2025-01-24 NOTE — LETTER
PROCEDURE/SURGERY CLEARANCE FORM      Encounter Date: 1/24/2025    Patient: Lauren Dave  YOB: 1950    CARDIOLOGIST:  Jarrod Mayberry M.D.     REFERRING DOCTOR:  No ref. provider found    The following procedure/surgery: Embolization                                            Additional comments: She can proceed with the proposed procedure or surgery from a cardiac standpoint, no modifiable cardiovascular risk, no further cardiac testing required, hold antiplatelet (Brilinta) and anticoagulant (Eliquis) 1 week chief he slit his wrist is [place]: At the slightest as necessary, resume as soon as possible typically when patient is able to take oral medications.     It is my pleasure to participate in the care of Ms. Dave.  Please do not hesitate to contact me with questions or concerns. [facility] is available 24/7 for consultative services at [phone] in the perioperative period.      PROCEDURE/SURGERY CLEARANCE FORM    Date: 1/27/2025   Patient Name: Lauren Dave    Dear Surgeon or Proceduralist,      Thank you for your request for cardiac stratification of our mutual patient Lauren Dave 1950. We have reviewed their [facility] records; and to the best of our understanding this patient has not had stenting, ablation, watchman, cardiothoracic surgery or hospitalization for cardiovascular reasons in the past 6 months.  Lauren Dave has been seen within the past 15 months and is considered to have non-modifiable cardiac risk for this low-risk procedure/surgery. They may proceed from a cardiovascular standpoint and may hold their antiplatelet/anticoagulation as briefly as possible. Please have patient resume this medication when hemodynamically stable to do so.     Aspirin or Prasugrel   - hold 7 days prior to procedure/surgery, resume when hemodynamically stable      Clopidrogrel or Ticagrelor  - hold 7 days for all neurological procedures, hold 5  days prior to all other procedure/surgery,  resume when hemodynamically stable     Warfarin - hold 7 days for all neurological procedures, hold 5 days prior to all other procedure/surgery and coordinate with [facility] ([phone]) INR testing and dose management.      If they have a mechanical heart valve, please coordinate with [facility] ([phone]) the proper management of their anticoagulant in the periprocedural or perioperative period.      Some patients have higher risk for cardiovascular complications or holding medication. If our patient has had prior complications of holding antiplatelet or anticoagulants in the past and we have seen them after these events, we have addressed these concerns with the patient. They are at an unknown degree of increased risk for recurrent complication.  You may hold anticoagulation/antiplatelets for the procedure or surgery if the benefits of the procedure or surgery outweigh this nonmodifiable risk.      If Lauren Dave 1950 has new symptoms of heart failure decompensation, unstable arrythmia, or angina please reach out and we will assess the patient.      If you have other patient-specific concerns, please feel free to reach out to the patient's cardiologist directly at [phone].     Thank you,       [facility]        Electronically Signed       MD Silverio Mayberry M.D.

## 2025-01-28 ENCOUNTER — APPOINTMENT (OUTPATIENT)
Dept: ADMISSIONS | Facility: MEDICAL CENTER | Age: 75
DRG: 026 | End: 2025-01-28
Attending: RADIOLOGY
Payer: MEDICARE

## 2025-01-29 ENCOUNTER — PATIENT MESSAGE (OUTPATIENT)
Dept: CARDIOLOGY | Facility: MEDICAL CENTER | Age: 75
End: 2025-01-29
Payer: MEDICARE

## 2025-01-30 ENCOUNTER — PRE-ADMISSION TESTING (OUTPATIENT)
Dept: ADMISSIONS | Facility: MEDICAL CENTER | Age: 75
DRG: 026 | End: 2025-01-30
Attending: RADIOLOGY
Payer: MEDICARE

## 2025-01-30 RX ORDER — BACLOFEN 5 MG/1
TABLET ORAL
Status: ON HOLD | COMMUNITY
Start: 2025-01-02 | End: 2025-02-03

## 2025-01-30 RX ORDER — TRAZODONE HYDROCHLORIDE 50 MG/1
TABLET ORAL
Status: ON HOLD | COMMUNITY
Start: 2025-01-02 | End: 2025-02-03

## 2025-01-30 NOTE — PREADMIT AVS NOTE
Current Medications   Medication Instructions    ticagrelor (BRILINTA) 90 MG Tab tablet Continue taking medication as prescribed, including morning of procedure     nitroglycerin (NITROSTAT) 0.4 MG SL Tab Ok to use if needed    carvedilol (COREG) 25 MG Tab Continue taking medication as prescribed, including morning of procedure     ELIQUIS 5 MG Tab Continue taking medication as prescribed, including morning of procedure     sacubitril-valsartan (ENTRESTO) 49-51 MG Tab Stop 24 hours before surgery    hydrALAZINE (APRESOLINE) 10 MG Tab As needed medication, may take if needed, including morning of procedure     aspirin 81 MG EC tablet Stop 5 days before surgery    famotidine (PEPCID) 40 MG Tab Continue taking medication as prescribed, including morning of procedure     Cyanocobalamin (VITAMIN B-12 PO) Stop 7 days before surgery    ibuprofen (MOTRIN) 800 MG Tab Stop 5 days before surgery    Cholecalciferol (VITAMIN D3) 5000 units Cap Stop 7 days before surgery     
Yes

## 2025-02-03 ENCOUNTER — APPOINTMENT (OUTPATIENT)
Dept: RADIOLOGY | Facility: MEDICAL CENTER | Age: 75
DRG: 026 | End: 2025-02-03
Attending: RADIOLOGY
Payer: MEDICARE

## 2025-02-03 ENCOUNTER — ANESTHESIA (OUTPATIENT)
Dept: RADIOLOGY | Facility: MEDICAL CENTER | Age: 75
DRG: 026 | End: 2025-02-03
Payer: MEDICARE

## 2025-02-03 ENCOUNTER — HOSPITAL ENCOUNTER (INPATIENT)
Facility: MEDICAL CENTER | Age: 75
LOS: 7 days | DRG: 026 | End: 2025-02-10
Attending: RADIOLOGY | Admitting: RADIOLOGY
Payer: MEDICARE

## 2025-02-03 ENCOUNTER — ANESTHESIA EVENT (OUTPATIENT)
Dept: RADIOLOGY | Facility: MEDICAL CENTER | Age: 75
DRG: 026 | End: 2025-02-03
Payer: MEDICARE

## 2025-02-03 DIAGNOSIS — I50.43 CHF (CONGESTIVE HEART FAILURE), NYHA CLASS III, ACUTE ON CHRONIC, COMBINED (HCC): ICD-10-CM

## 2025-02-03 DIAGNOSIS — D50.9 IRON DEFICIENCY ANEMIA, UNSPECIFIED IRON DEFICIENCY ANEMIA TYPE: ICD-10-CM

## 2025-02-03 DIAGNOSIS — D62 ACUTE BLOOD LOSS ANEMIA: ICD-10-CM

## 2025-02-03 DIAGNOSIS — I67.1 INTRACRANIAL ANEURYSM: ICD-10-CM

## 2025-02-03 DIAGNOSIS — N17.9 AKI (ACUTE KIDNEY INJURY) (HCC): ICD-10-CM

## 2025-02-03 LAB
ANION GAP SERPL CALC-SCNC: 14 MMOL/L (ref 7–16)
BASOPHILS # BLD AUTO: 0.5 % (ref 0–1.8)
BASOPHILS # BLD: 0.04 K/UL (ref 0–0.12)
BUN SERPL-MCNC: 42 MG/DL (ref 8–22)
CALCIUM SERPL-MCNC: 9.7 MG/DL (ref 8.5–10.5)
CFT BLD TEG: 8.7 MIN (ref 4.6–9.1)
CFT P HPASE BLD TEG: 6.7 MIN (ref 4.3–8.3)
CHLORIDE SERPL-SCNC: 105 MMOL/L (ref 96–112)
CLOT ANGLE BLD TEG: 75.9 DEGREES (ref 63–78)
CLOT LYSIS 30M P MA LENFR BLD TEG: 0.8 % (ref 0–2.6)
CO2 SERPL-SCNC: 18 MMOL/L (ref 20–33)
CREAT SERPL-MCNC: 1.64 MG/DL (ref 0.5–1.4)
CT.EXTRINSIC BLD ROTEM: 1 MIN (ref 0.8–2.1)
EOSINOPHIL # BLD AUTO: 0.26 K/UL (ref 0–0.51)
EOSINOPHIL NFR BLD: 3.4 % (ref 0–6.9)
ERYTHROCYTE [DISTWIDTH] IN BLOOD BY AUTOMATED COUNT: 44.6 FL (ref 35.9–50)
GFR SERPLBLD CREATININE-BSD FMLA CKD-EPI: 32 ML/MIN/1.73 M 2
GLUCOSE SERPL-MCNC: 89 MG/DL (ref 65–99)
HCT VFR BLD AUTO: 30.1 % (ref 37–47)
HGB BLD-MCNC: 9.9 G/DL (ref 12–16)
IMM GRANULOCYTES # BLD AUTO: 0.03 K/UL (ref 0–0.11)
IMM GRANULOCYTES NFR BLD AUTO: 0.4 % (ref 0–0.9)
INR PPP: 1.54 (ref 0.87–1.13)
LYMPHOCYTES # BLD AUTO: 1.31 K/UL (ref 1–4.8)
LYMPHOCYTES NFR BLD: 17.3 % (ref 22–41)
MCF BLD TEG: 65.2 MM (ref 52–69)
MCF.PLATELET INHIB BLD ROTEM: 27.5 MM (ref 15–32)
MCH RBC QN AUTO: 31.1 PG (ref 27–33)
MCHC RBC AUTO-ENTMCNC: 32.9 G/DL (ref 32.2–35.5)
MCV RBC AUTO: 94.7 FL (ref 81.4–97.8)
MONOCYTES # BLD AUTO: 0.61 K/UL (ref 0–0.85)
MONOCYTES NFR BLD AUTO: 8.1 % (ref 0–13.4)
NEUTROPHILS # BLD AUTO: 5.31 K/UL (ref 1.82–7.42)
NEUTROPHILS NFR BLD: 70.3 % (ref 44–72)
NRBC # BLD AUTO: 0 K/UL
NRBC BLD-RTO: 0 /100 WBC (ref 0–0.2)
PA AA BLD-ACNC: 41.3 % (ref 0–11)
PA ADP BLD-ACNC: 43.1 % (ref 0–17)
PLATELET # BLD AUTO: 272 K/UL (ref 164–446)
PMV BLD AUTO: 9.9 FL (ref 9–12.9)
POTASSIUM SERPL-SCNC: 4.6 MMOL/L (ref 3.6–5.5)
PROTHROMBIN TIME: 18.5 SEC (ref 12–14.6)
RBC # BLD AUTO: 3.18 M/UL (ref 4.2–5.4)
SODIUM SERPL-SCNC: 137 MMOL/L (ref 135–145)
TEG ALGORITHM TGALG: ABNORMAL
WBC # BLD AUTO: 7.6 K/UL (ref 4.8–10.8)

## 2025-02-03 PROCEDURE — 75898 FOLLOW-UP ANGIOGRAPHY: CPT

## 2025-02-03 PROCEDURE — 700102 HCHG RX REV CODE 250 W/ 637 OVERRIDE(OP)

## 2025-02-03 PROCEDURE — 700111 HCHG RX REV CODE 636 W/ 250 OVERRIDE (IP): Mod: JZ | Performed by: ANESTHESIOLOGY

## 2025-02-03 PROCEDURE — 85025 COMPLETE CBC W/AUTO DIFF WBC: CPT

## 2025-02-03 PROCEDURE — A9270 NON-COVERED ITEM OR SERVICE: HCPCS | Performed by: RADIOLOGY

## 2025-02-03 PROCEDURE — 700111 HCHG RX REV CODE 636 W/ 250 OVERRIDE (IP)

## 2025-02-03 PROCEDURE — 61624 TCAT PERM OCCLS/EMBOLJ CNS: CPT

## 2025-02-03 PROCEDURE — 700102 HCHG RX REV CODE 250 W/ 637 OVERRIDE(OP): Performed by: RADIOLOGY

## 2025-02-03 PROCEDURE — 80048 BASIC METABOLIC PNL TOTAL CA: CPT

## 2025-02-03 PROCEDURE — 700111 HCHG RX REV CODE 636 W/ 250 OVERRIDE (IP): Performed by: ANESTHESIOLOGY

## 2025-02-03 PROCEDURE — 85610 PROTHROMBIN TIME: CPT

## 2025-02-03 PROCEDURE — 700101 HCHG RX REV CODE 250: Performed by: ANESTHESIOLOGY

## 2025-02-03 PROCEDURE — 700117 HCHG RX CONTRAST REV CODE 255: Performed by: RADIOLOGY

## 2025-02-03 PROCEDURE — A9270 NON-COVERED ITEM OR SERVICE: HCPCS

## 2025-02-03 PROCEDURE — 85384 FIBRINOGEN ACTIVITY: CPT | Mod: 91

## 2025-02-03 PROCEDURE — 85347 COAGULATION TIME ACTIVATED: CPT

## 2025-02-03 PROCEDURE — 85576 BLOOD PLATELET AGGREGATION: CPT

## 2025-02-03 PROCEDURE — 160002 HCHG RECOVERY MINUTES (STAT)

## 2025-02-03 PROCEDURE — 160035 HCHG PACU - 1ST 60 MINS PHASE I

## 2025-02-03 PROCEDURE — 770022 HCHG ROOM/CARE - ICU (200)

## 2025-02-03 PROCEDURE — 99291 CRITICAL CARE FIRST HOUR: CPT

## 2025-02-03 PROCEDURE — 03LG3DZ OCCLUSION OF INTRACRANIAL ARTERY WITH INTRALUMINAL DEVICE, PERCUTANEOUS APPROACH: ICD-10-PCS | Performed by: RADIOLOGY

## 2025-02-03 RX ORDER — ONDANSETRON 2 MG/ML
4 INJECTION INTRAMUSCULAR; INTRAVENOUS
Status: DISCONTINUED | OUTPATIENT
Start: 2025-02-03 | End: 2025-02-03 | Stop reason: HOSPADM

## 2025-02-03 RX ORDER — OXYCODONE HYDROCHLORIDE 5 MG/1
2.5 TABLET ORAL
Status: DISCONTINUED | OUTPATIENT
Start: 2025-02-03 | End: 2025-02-04

## 2025-02-03 RX ORDER — IBUPROFEN 200 MG
600 TABLET ORAL EVERY 8 HOURS PRN
Status: ON HOLD | COMMUNITY
End: 2025-02-10

## 2025-02-03 RX ORDER — LIDOCAINE HYDROCHLORIDE 20 MG/ML
INJECTION, SOLUTION EPIDURAL; INFILTRATION; INTRACAUDAL; PERINEURAL PRN
Status: DISCONTINUED | OUTPATIENT
Start: 2025-02-03 | End: 2025-02-03 | Stop reason: SURG

## 2025-02-03 RX ORDER — ACETAMINOPHEN 325 MG/1
650 TABLET ORAL EVERY 6 HOURS PRN
Status: DISCONTINUED | OUTPATIENT
Start: 2025-02-03 | End: 2025-02-10 | Stop reason: HOSPADM

## 2025-02-03 RX ORDER — OXYCODONE HCL 5 MG/5 ML
5 SOLUTION, ORAL ORAL
Status: DISCONTINUED | OUTPATIENT
Start: 2025-02-03 | End: 2025-02-03 | Stop reason: HOSPADM

## 2025-02-03 RX ORDER — POLYETHYLENE GLYCOL 3350 17 G/17G
1 POWDER, FOR SOLUTION ORAL
Status: DISCONTINUED | OUTPATIENT
Start: 2025-02-03 | End: 2025-02-10 | Stop reason: HOSPADM

## 2025-02-03 RX ORDER — PHENYLEPHRINE HYDROCHLORIDE 10 MG/ML
INJECTION, SOLUTION INTRAMUSCULAR; INTRAVENOUS; SUBCUTANEOUS PRN
Status: DISCONTINUED | OUTPATIENT
Start: 2025-02-03 | End: 2025-02-03 | Stop reason: SURG

## 2025-02-03 RX ORDER — FAMOTIDINE 20 MG/1
20 TABLET, FILM COATED ORAL
Status: DISCONTINUED | OUTPATIENT
Start: 2025-02-03 | End: 2025-02-08

## 2025-02-03 RX ORDER — LABETALOL HYDROCHLORIDE 5 MG/ML
10-20 INJECTION, SOLUTION INTRAVENOUS EVERY 4 HOURS PRN
Status: DISCONTINUED | OUTPATIENT
Start: 2025-02-03 | End: 2025-02-10 | Stop reason: HOSPADM

## 2025-02-03 RX ORDER — OXYCODONE HYDROCHLORIDE 5 MG/1
5 TABLET ORAL
Status: DISCONTINUED | OUTPATIENT
Start: 2025-02-03 | End: 2025-02-04

## 2025-02-03 RX ORDER — MORPHINE SULFATE 4 MG/ML
2 INJECTION INTRAVENOUS
Status: DISCONTINUED | OUTPATIENT
Start: 2025-02-03 | End: 2025-02-04

## 2025-02-03 RX ORDER — SODIUM CHLORIDE, SODIUM LACTATE, POTASSIUM CHLORIDE, CALCIUM CHLORIDE 600; 310; 30; 20 MG/100ML; MG/100ML; MG/100ML; MG/100ML
INJECTION, SOLUTION INTRAVENOUS CONTINUOUS
Status: DISCONTINUED | OUTPATIENT
Start: 2025-02-03 | End: 2025-02-03 | Stop reason: HOSPADM

## 2025-02-03 RX ORDER — HEPARIN SODIUM,PORCINE 1000/ML
VIAL (ML) INJECTION PRN
Status: DISCONTINUED | OUTPATIENT
Start: 2025-02-03 | End: 2025-02-03 | Stop reason: SURG

## 2025-02-03 RX ORDER — ACETAMINOPHEN 500 MG
1000 TABLET ORAL ONCE
Status: COMPLETED | OUTPATIENT
Start: 2025-02-03 | End: 2025-02-03

## 2025-02-03 RX ORDER — ONDANSETRON 2 MG/ML
INJECTION INTRAMUSCULAR; INTRAVENOUS PRN
Status: DISCONTINUED | OUTPATIENT
Start: 2025-02-03 | End: 2025-02-03 | Stop reason: SURG

## 2025-02-03 RX ORDER — CARVEDILOL 6.25 MG/1
25 TABLET ORAL 2 TIMES DAILY WITH MEALS
Status: DISCONTINUED | OUTPATIENT
Start: 2025-02-03 | End: 2025-02-10

## 2025-02-03 RX ORDER — AMPICILLIN 500 MG/1
500 CAPSULE ORAL 4 TIMES DAILY
Status: ON HOLD | COMMUNITY
Start: 2025-01-16 | End: 2025-02-10

## 2025-02-03 RX ORDER — HEPARIN SODIUM 1000 [USP'U]/ML
INJECTION, SOLUTION INTRAVENOUS; SUBCUTANEOUS
Status: COMPLETED
Start: 2025-02-03 | End: 2025-02-03

## 2025-02-03 RX ORDER — OXYCODONE HCL 5 MG/5 ML
10 SOLUTION, ORAL ORAL
Status: DISCONTINUED | OUTPATIENT
Start: 2025-02-03 | End: 2025-02-03 | Stop reason: HOSPADM

## 2025-02-03 RX ORDER — HALOPERIDOL 5 MG/ML
1 INJECTION INTRAMUSCULAR
Status: DISCONTINUED | OUTPATIENT
Start: 2025-02-03 | End: 2025-02-03 | Stop reason: HOSPADM

## 2025-02-03 RX ORDER — AMOXICILLIN 250 MG
2 CAPSULE ORAL EVERY EVENING
Status: DISCONTINUED | OUTPATIENT
Start: 2025-02-03 | End: 2025-02-10 | Stop reason: HOSPADM

## 2025-02-03 RX ORDER — HYDRALAZINE HYDROCHLORIDE 20 MG/ML
10-20 INJECTION INTRAMUSCULAR; INTRAVENOUS EVERY 4 HOURS PRN
Status: DISCONTINUED | OUTPATIENT
Start: 2025-02-03 | End: 2025-02-10 | Stop reason: HOSPADM

## 2025-02-03 RX ORDER — DIPHENHYDRAMINE HYDROCHLORIDE 50 MG/ML
12.5 INJECTION INTRAMUSCULAR; INTRAVENOUS
Status: DISCONTINUED | OUTPATIENT
Start: 2025-02-03 | End: 2025-02-03 | Stop reason: HOSPADM

## 2025-02-03 RX ORDER — SACUBITRIL AND VALSARTAN 49; 51 MG/1; MG/1
1 TABLET, FILM COATED ORAL 2 TIMES DAILY
Status: DISCONTINUED | OUTPATIENT
Start: 2025-02-03 | End: 2025-02-10 | Stop reason: HOSPADM

## 2025-02-03 RX ORDER — DEXAMETHASONE SODIUM PHOSPHATE 4 MG/ML
INJECTION, SOLUTION INTRA-ARTICULAR; INTRALESIONAL; INTRAMUSCULAR; INTRAVENOUS; SOFT TISSUE PRN
Status: DISCONTINUED | OUTPATIENT
Start: 2025-02-03 | End: 2025-02-03 | Stop reason: SURG

## 2025-02-03 RX ORDER — ACETAMINOPHEN 500 MG
1000 TABLET ORAL 2 TIMES DAILY PRN
COMMUNITY

## 2025-02-03 RX ORDER — SODIUM CHLORIDE 9 MG/ML
250 INJECTION, SOLUTION INTRAVENOUS ONCE
Status: COMPLETED | OUTPATIENT
Start: 2025-02-04 | End: 2025-02-04

## 2025-02-03 RX ADMIN — APIXABAN 5 MG: 5 TABLET, FILM COATED ORAL at 17:21

## 2025-02-03 RX ADMIN — LABETALOL HYDROCHLORIDE 10 MG: 5 INJECTION INTRAVENOUS at 17:36

## 2025-02-03 RX ADMIN — FENTANYL CITRATE 50 MCG: 50 INJECTION, SOLUTION INTRAMUSCULAR; INTRAVENOUS at 15:49

## 2025-02-03 RX ADMIN — DEXAMETHASONE SODIUM PHOSPHATE 4 MG: 4 INJECTION INTRA-ARTICULAR; INTRALESIONAL; INTRAMUSCULAR; INTRAVENOUS; SOFT TISSUE at 12:57

## 2025-02-03 RX ADMIN — IOHEXOL 190 ML: 300 INJECTION, SOLUTION INTRAVENOUS at 15:30

## 2025-02-03 RX ADMIN — MORPHINE SULFATE 2 MG: 4 INJECTION, SOLUTION INTRAMUSCULAR; INTRAVENOUS at 18:44

## 2025-02-03 RX ADMIN — HEPARIN SODIUM 1000 UNITS: 1000 INJECTION, SOLUTION INTRAVENOUS; SUBCUTANEOUS at 14:15

## 2025-02-03 RX ADMIN — HEPARIN SODIUM 5000 UNITS: 1000 INJECTION, SOLUTION INTRAVENOUS; SUBCUTANEOUS at 13:15

## 2025-02-03 RX ADMIN — ACETAMINOPHEN 650 MG: 325 TABLET ORAL at 18:08

## 2025-02-03 RX ADMIN — ONDANSETRON 4 MG: 2 INJECTION INTRAMUSCULAR; INTRAVENOUS at 12:57

## 2025-02-03 RX ADMIN — SACUBITRIL AND VALSARTAN 1 TABLET: 49; 51 TABLET, FILM COATED ORAL at 17:21

## 2025-02-03 RX ADMIN — OXYCODONE 5 MG: 5 TABLET ORAL at 17:07

## 2025-02-03 RX ADMIN — ROCURONIUM BROMIDE 50 MG: 10 INJECTION, SOLUTION INTRAVENOUS at 12:48

## 2025-02-03 RX ADMIN — LIDOCAINE HYDROCHLORIDE 100 MG: 20 INJECTION, SOLUTION EPIDURAL; INFILTRATION; INTRACAUDAL; PERINEURAL at 12:48

## 2025-02-03 RX ADMIN — CARVEDILOL 25 MG: 25 TABLET, FILM COATED ORAL at 17:21

## 2025-02-03 RX ADMIN — LABETALOL HYDROCHLORIDE 10 MG: 5 INJECTION INTRAVENOUS at 18:38

## 2025-02-03 RX ADMIN — PHENYLEPHRINE HYDROCHLORIDE 200 MCG: 10 INJECTION INTRAVENOUS at 13:15

## 2025-02-03 RX ADMIN — FENTANYL CITRATE 50 MCG: 50 INJECTION, SOLUTION INTRAMUSCULAR; INTRAVENOUS at 16:25

## 2025-02-03 RX ADMIN — ACETAMINOPHEN 1000 MG: 500 TABLET ORAL at 12:14

## 2025-02-03 RX ADMIN — TICAGRELOR 90 MG: 90 TABLET ORAL at 17:36

## 2025-02-03 RX ADMIN — PROPOFOL 200 MG: 10 INJECTION, EMULSION INTRAVENOUS at 12:48

## 2025-02-03 RX ADMIN — Medication 100 MG: at 12:48

## 2025-02-03 ASSESSMENT — PAIN DESCRIPTION - PAIN TYPE
TYPE: ACUTE PAIN
TYPE: ACUTE PAIN
TYPE: SURGICAL PAIN
TYPE: SURGICAL PAIN
TYPE: ACUTE PAIN
TYPE: ACUTE PAIN
TYPE: ACUTE PAIN;SURGICAL PAIN

## 2025-02-03 ASSESSMENT — CHA2DS2 SCORE
PRIOR STROKE OR TIA OR THROMBOEMBOLISM: YES
AGE 75 OR GREATER: NO
HYPERTENSION: YES
CHA2DS2 VASC SCORE: 7
AGE 65 TO 74: YES
SEX: FEMALE
CHF OR LEFT VENTRICULAR DYSFUNCTION: YES
VASCULAR DISEASE: YES
DIABETES: NO

## 2025-02-03 ASSESSMENT — FIBROSIS 4 INDEX
FIB4 SCORE: 2.11
FIB4 SCORE: 2.55

## 2025-02-03 NOTE — OR NURSING
Pt presents to IR 2. See anesthesia charting.  Report called to PACU RN. Pt transported by stretcher with anestesia and RN to PACU.         HydroFrame 10  REF: [id number]  LOT: [id number]  EXP: 4-30-29     Intraluminal Support  REF [id number]  LOT [id number]  EXP 12-31-26    Microplex   REF [id number]  LOT [id number]  EXP 12-31-28    Microplex 10  REF [id number]  LOT [id number]  Exp 4-30-29    Microplex 10  REF [id number]  LOT [id number]  EXP 4-30-27    SwiftPAC coil  REF [id number]  LOT [id number]  EXP 9-12-29    SwiftPACT coil  REF [id number]  LOT [id number]  Exp 10-7-29    HydroSoft 10  [id number]  LOT [id number]  EXP 8-31-29    HydroSoft 10  REF [id number]  LOT [id number]  EXP 4-30-29    Perclose   REF [id number]  LOT [id number]  EXP 10-31-26

## 2025-02-03 NOTE — ANESTHESIA PREPROCEDURE EVALUATION
Date/Time: 02/03/25 1200    Scheduled providers: Lilliana Cuellar M.D.    Procedure: IR-EMBOLIZE-NEURO-INTRACRANIAL    Diagnosis: Intracranial aneurysm [I67.1]    Indications:       left ICA terminus aneurysm      left ICA terminus aneurysm embolization    Location: [facility]            Relevant Problems   PULMONARY   (positive) Chronic obstructive pulmonary disease (COPD) ([facility])      NEURO   (positive) Acute ischemic stroke (HCC)      CARDIAC   (positive) Atherosclerosis of aorta ([facility])   (positive) Chronic systolic congestive heart failure ([facility]) EF 35%   (positive) Complete heart block ([facility])   (positive) Coronary artery disease due to calcified coronary lesion - Calcifications seen on CT scan also wtih aortic ulceration   (positive) Essential hypertension   (positive) Ischemic heart disease due to coronary artery obstruction ([facility]) - Severe 3vD on cath   (positive) LBBB (left bundle branch block)   (positive) PAF (paroxysmal atrial fibrillation) ([facility])      GI   (positive) Gastroesophageal reflux disease without esophagitis         (positive) TAN (acute kidney injury) ([facility])      Other   (positive) Rheumatoid arthritis without rheumatoid factor, unspecified site ([facility])       Physical Exam    Airway   Mallampati: II  TM distance: >3 FB  Neck ROM: full       Cardiovascular - normal exam  Rhythm: regular  Rate: normal  (-) murmur     Dental - normal exam           Pulmonary - normal exam  Breath sounds clear to auscultation     Abdominal    Neurological - normal exam                   Anesthesia Plan    ASA 3   ASA physical status 3 criteria: other (comment), CAD (>3 months) and CVA or TIA - history (> 3 months)    Plan - general               Induction: intravenous    Postoperative Plan: Postoperative administration of opioids is intended.    Pertinent diagnostic labs and testing reviewed    Informed Consent:    Anesthetic plan and risks discussed with patient.    Use of  blood products discussed with: patient whom consented to blood products.

## 2025-02-03 NOTE — ANESTHESIA PROCEDURE NOTES
Airway    Date/Time: 2/3/2025 12:55 PM    Performed by: Tommy Duran M.D.  Authorized by: Tommy Duran M.D.    Location:  OR  Urgency:  Elective  Indications for Airway Management:  Anesthesia      Spontaneous Ventilation: absent    Sedation Level:  Deep  Preoxygenated: Yes    Patient Position:  Sniffing  Final Airway Type:  Endotracheal airway  Final Endotracheal Airway:  ETT  Cuffed: Yes    Technique Used for Successful ETT Placement:  Video laryngoscopy    Insertion Site:  Oral  Blade Type:  Guera  Laryngoscope Blade/Videolaryngoscope Blade Size:  3  ETT Size (mm):  7.0  Measured from:  Teeth  ETT to Teeth (cm):  19  Placement Verified by: auscultation and capnometry    Cormack-Lehane Classification:  Grade III - view of epiglottis only  Number of Attempts at Approach:  1

## 2025-02-03 NOTE — ASSESSMENT & PLAN NOTE
Patient presents 2/3 with a scheduled left ICA aneurysm embolization and stent placement which was successfully performed  Serial neurologic exams remain unchanged and normal  HOB > 30 degrees  Maintain normal temperature  Goal glucose 140-180  Maintain normal natremia  Continue SBP goal 110-140  Off IV medicines for blood pressure at this point-hydralazine/labetalol as needed  Home medicines including carvedilol and Entresto ongoing  Lipid panel, Hgb [id number] noted  Fall precautions  Aspiration precautions  PT/OT/SLP to eval and treat-passed swallow eval I believe  Anticoagulation per IR: Brilinta and Eliquis, no ASA needed   Statin

## 2025-02-03 NOTE — OR SURGEON
Immediate Post- Operative Note          NEUROINTERVENTIONAL RADIOLOGY PROCEDURE NOTE        Procedure: Left ICA terminus aneurysm embolization    Access: Rt CFA 8 Fr sheath, closed with 6Fr perclose.    Findings:      7 mm Left ICA terminus aneurysm was coiled with stent assistance. Final angiogram shows no significant filling of the aneurysm.    Complications:  None.    Condition:  Stable.    Estimated blood loss:  Less than 30 cc.    Full dictated note to follow in PACS.    Discussed with Neuro intensivist  and patients family.    Disposition:    1. Patient to Neuro ICU  2. Keep -140 mm Hg  3. Monitor access site for bleeding.           2/3/2025     2:58 PM     Lilliana Cuellar M.D.

## 2025-02-03 NOTE — PROGRESS NOTES
Medication history reviewed with PT at bedside    Med rec is complete per PT reporting    Allergies reviewed.     PT is taking Ampicillin 500mg 20 day course started 01/16/2025. Last dose was 02/01/2025.     Patient is not taking anticoagulants.    Preferred pharmacy for this visit - [facility] in [place] ([phone])

## 2025-02-03 NOTE — CONSULTS
Critical Care Consultation    Date of consult: 2/3/2025    Referring Physician  Lilliana Cuellar M.D.    Reason for Consultation  ICU admission s/p Left ICA aneurysm embolization.    History of Presenting Illness  74 y.o. female with a PMH of CABG, HTN, HLD, right breast cancer, pacemaker implant, CAD.  She has a history of ischemic CM in 2022 underwent a CABG and developed hyperperfusion strokes.  She deficits which led her to seek care from a neurologist who initiated workup. Imaging revealed chronic infarcts from her strokes and incidental left middle cerebral artery aneurysm.  She presented 2/3/2025 for a routine 7 mm left ICA terminus aneurysm coiling and stent placement.      She will be admitted to the neuro ICU, SBP goal 110-140, with coagulation plans per IR: Brilinta and Apixaban.    Code Status  Full Code    Review of Systems  ROS    Past Medical History   has a past medical history of Arthritis, Breast cancer (HCC) (08/07/2013), Bronchitis (2005), Cancer (HCC), Cataract, Chronic systolic congestive heart failure (HCC) EF 35% (10/12/2022), Coronary artery disease due to calcified coronary lesion - Calcifications seen on CT scan also wtih aortic ulceration, Delayed emergence from general anesthesia, Dental disorder, Discoid lupus, Dyslipidemia, Essential hypertension, Heart burn, Hypoadrenalism (HCC) - need for chronic steroids, Ischemic heart disease due to coronary artery obstruction (HCC) - Severe 3vD on cath (10/19/2022), LBBB (left bundle branch block) (12/16/2014), Pacemaker, Pneumonia, Pseudogout, Psychiatric problem, Scarlet fever, and Unspecified hemorrhagic conditions.    She has no past medical history of Disorder of thyroid or PONV (postoperative nausea and vomiting).    Surgical History   has a past surgical history that includes colonoscopy (01/01/2003); tonsillectomy; breast biopsy; mastectomy (08/22/2013); node biopsy sentinel (08/22/2013); cath placement (08/22/2013); cath removal  (02/24/2014); us-needle core bx-breast panel (01/01/2013); other cardiac surgery (11/2022); multiple coronary artery bypass endo vein harvest (11/10/2022); and echocardiogram, transesophageal, intraoperative (11/10/2022).    Family History  family history includes Arthritis in her father; Cancer in her maternal aunt and mother; Diabetes in her maternal uncle and paternal grandmother; Gout in her brother and son; Heart Disease in her maternal grandfather, maternal grandmother, paternal grandmother, and son; Multiple Sclerosis in her brother and mother; Other in her son; Stroke in her paternal grandfather.    Social History   reports that she quit smoking about 11 years ago. Her smoking use included cigarettes. She started smoking about 11 years ago. She has never used smokeless tobacco. She reports that she does not drink alcohol and does not use drugs.    Medications  Home Medications       Reviewed by Darshana Steel (Pharmacy Tech) on 02/03/25 at 1159  Med List Status: Complete     Medication Last Dose Status   acetaminophen (TYLENOL) 500 MG Tab 2/3/2025 Active   ampicillin (PRINCIPEN) 500 MG Cap 2/1/2025 Active   aspirin 81 MG EC tablet 1/29/2025 Active   carvedilol (COREG) 25 MG Tab 2/3/2025 Active   Cholecalciferol (VITAMIN D3) 5000 units Cap 1/29/2025 Active   Cyanocobalamin (VITAMIN B-12 PO) 1/29/2025 Active   ELIQUIS 5 MG Tab 2/3/2025 Active   famotidine (PEPCID) 40 MG Tab 2/3/2025 Active   ibuprofen (MOTRIN) 200 MG Tab 1/20/2025 Active   nitroglycerin (NITROSTAT) 0.4 MG SL Tab  Active   sacubitril-valsartan (ENTRESTO) 49-51 MG Tab 2/1/2025 Active   ticagrelor (BRILINTA) 90 MG Tab tablet 2/3/2025 Active                  Audit from Redirected Encounters    **Home medications have not yet been reviewed for this encounter**       Current Facility-Administered Medications   Medication Dose Route Frequency Provider Last Rate Last Admin    lidocaine (Xylocaine) 1 % injection 0.5 mL  0.5 mL Intradermal  Once PRN Lilliana Cuellar M.D.        ondansetron (Zofran) syringe/vial injection 4 mg  4 mg Intravenous Once PRN Tommy Duran M.D.        haloperidol lactate (Haldol) injection 1 mg  1 mg Intravenous Q15 MIN PRN Tommy Duran M.D.        diphenhydrAMINE (Benadryl) injection 12.5 mg  12.5 mg Intravenous Q15 MIN PRN Tommy Duran M.D.        lactated ringers infusion   Intravenous Continuous Tommy Duran M.D.        fentaNYL (Sublimaze) injection 25 mcg  25 mcg Intravenous Q2 MIN PRN Tommy Duran M.D.        Or    fentaNYL (Sublimaze) injection 50 mcg  50 mcg Intravenous Q2 MIN PRN Tommy Duran M.D.   50 mcg at 02/03/25 1549    oxyCODONE (Roxicodone) oral solution 5 mg  5 mg Oral Once PRN Tommy Duran M.D.        Or    oxyCODONE (Roxicodone) oral solution 10 mg  10 mg Oral Once PRN Tommy Duran M.D.        SUGAMMADEX SODIUM 200 MG/2ML IV SOLN             ticagrelor (Brilinta) tablet 90 mg  90 mg Oral BID Lilliana Cuellar M.D.        acetaminophen (Tylenol) tablet 650 mg  650 mg Oral Q6HRS PRN Juvencio Mejia, A.P.R.N.        senna-docusate (Pericolace Or Senokot S) 8.6-50 MG per tablet 2 Tablet  2 Tablet Oral Q EVENING Jvuencio Mejia A.P.R.N.        And    polyethylene glycol/lytes (Miralax) Packet 1 Packet  1 Packet Oral QDAY PRN Juvencio Mejia A.P.R.N.        labetalol (Normodyne/Trandate) injection 10-20 mg  10-20 mg Intravenous Q4HRS PRN Juvencio Mejia, A.P.R.N.        Pharmacy Consult Request ...Pain Management Review 1 Each  1 Each Other PHARMACY TO DOSE Juvencio Mejia A.P.R.N.        oxyCODONE immediate-release (Roxicodone) tablet 2.5 mg  2.5 mg Oral Q3HRS PRN Juvencio W. Mejia, A.P.R.N.        Or    oxyCODONE immediate-release (Roxicodone) tablet 5 mg  5 mg Oral Q3HRS PRN Juvencio Mejia, A.P.R.N.        Or    morphine 4 MG/ML injection 2 mg  2 mg Intravenous Q3HRS PRN Juvencio Mejia, A.P.R.N.        hydrALAZINE (Apresoline) injection 10-20 mg  10-20 mg Intravenous Q4HRS PRN Juvencio Mejia, A.P.R.N.        niCARdipine  "(Cardene) 25 mg in  mL Standard Infusion  0-15 mg/hr Intravenous Continuous SHANKAR AlbertoP.RTRESSA        carvedilol (Coreg) tablet 25 mg  25 mg Oral BID WITH MEALS SHANKAR AblertoP.RTRESSA        sacubitril-valsartan (Entresto) 49-51 MG 1 Tablet  1 Tablet Oral BID SHANKAR AlbertoP.RTRESSA        famotidine (Pepcid) tablet 20 mg  20 mg Oral QDAY PRN YASMANY Alberto.P.RTRESSA        apixaban (Eliquis) tablet 5 mg  5 mg Oral BID SHANKAR AlbertoPLAURIE         Facility-Administered Medications Ordered in Other Encounters   Medication Dose Route Frequency Provider Last Rate Last Admin    propofol (DIPRIVAN) injection   Intravenous PRN Tommy Duran M.D.   200 mg at 02/03/25 1248    lidocaine PF (Xylocaine-MPF) 2 % injection PF   Intravenous PRN Tommy Duran M.D.   100 mg at 02/03/25 1248    succinylcholine (Anectine) injection   Intravenous PRN Tommy Duran M.D.   100 mg at 02/03/25 1248    rocuronium (Zemuron) injection   Intravenous PRN Tommy Duran M.D.   50 mg at 02/03/25 1248    dexamethasone (Decadron) injection   Intravenous PRN Tommy Duran M.D.   4 mg at 02/03/25 1257    ondansetron (Zofran) syringe/vial injection   Intravenous PRMIGUEL Duran M.D.   4 mg at 02/03/25 1257    phenylephrine (Dez-Synephrine) injection   Intravenous PRN Tommy Duran M.D.   200 mcg at 02/03/25 1315    heparin OR USE ONLY   Intravenous PRN Tommy Duran M.D.   1,000 Units at 02/03/25 1415       Allergies  Allergies   Allergen Reactions    Albumin Unspecified     PT is unsure     Ezetimibe Rash     Itchy bumps/rash on face    Atorvastatin Myalgia    Codeine Vomiting and Nausea     Clarified with patient - states that she has an \"upset stomach\" when she takes it    Eggs Diarrhea     Pt states that she is allergic to eggs per her mother.  Tolerated clevidipine 11/2022    Honey Hives    Hydroxychloroquine Unspecified     Eye changes    Jardiance [Empagliflozin] Unspecified     Frequent UTI    Lisinopril Cough    Losartan Unspecified     " "Tried in 2017  Cannot recall reaction    Penicillins Unspecified     \"does not work\" .'IMMUNE TO PENICILLIN,AMPICILLIN IS THE ONLY THING THAT WORKS'  PT declines    Statins [Hmg-Coa-R Inhibitors] Myalgia     Muscle Spasms   RXN=unknown    Wasp Venom Swelling     Swelled at injection       Vital Signs last 24 hours  Temp:  [36.4 °C (97.5 °F)-36.6 °C (97.9 °F)] 36.4 °C (97.5 °F)  Pulse:  [75-96] 75  Resp:  [12-24] 12  BP: (119-183)/(56-77) 119/56  SpO2:  [92 %-100 %] 98 %    Physical Exam  Physical Exam    Fluids  No intake or output data in the 24 hours ending 02/03/25 1614    Laboratory  Recent Results (from the past 48 hours)   Prothrombin Time (INR) (plasma)    Collection Time: 02/03/25 10:55 AM   Result Value Ref Range    PT 18.5 (H) 12.0 - 14.6 sec    INR 1.54 (H) 0.87 - 1.13   Basic Metabolic Panel    Collection Time: 02/03/25 10:55 AM   Result Value Ref Range    Sodium 137 135 - 145 mmol/L    Potassium 4.6 3.6 - 5.5 mmol/L    Chloride 105 96 - 112 mmol/L    Co2 18 (L) 20 - 33 mmol/L    Glucose 89 65 - 99 mg/dL    Bun 42 (H) 8 - 22 mg/dL    Creatinine 1.64 (H) 0.50 - 1.40 mg/dL    Calcium 9.7 8.5 - 10.5 mg/dL    Anion Gap 14.0 7.0 - 16.0   CBC WITH DIFFERENTIAL    Collection Time: 02/03/25 10:55 AM   Result Value Ref Range    WBC 7.6 4.8 - 10.8 K/uL    RBC 3.18 (L) 4.20 - 5.40 M/uL    Hemoglobin 9.9 (L) 12.0 - 16.0 g/dL    Hematocrit 30.1 (L) 37.0 - 47.0 %    MCV 94.7 81.4 - 97.8 fL    MCH 31.1 27.0 - 33.0 pg    MCHC 32.9 32.2 - 35.5 g/dL    RDW 44.6 35.9 - 50.0 fL    Platelet Count 272 164 - 446 K/uL    MPV 9.9 9.0 - 12.9 fL    Neutrophils-Polys 70.30 44.00 - 72.00 %    Lymphocytes 17.30 (L) 22.00 - 41.00 %    Monocytes 8.10 0.00 - 13.40 %    Eosinophils 3.40 0.00 - 6.90 %    Basophils 0.50 0.00 - 1.80 %    Immature Granulocytes 0.40 0.00 - 0.90 %    Nucleated RBC 0.00 0.00 - 0.20 /100 WBC    Neutrophils (Absolute) 5.31 1.82 - 7.42 K/uL    Lymphs (Absolute) 1.31 1.00 - 4.80 K/uL    Monos (Absolute) 0.61 0.00 - " 0.85 K/uL    Eos (Absolute) 0.26 0.00 - 0.51 K/uL    Baso (Absolute) 0.04 0.00 - 0.12 K/uL    Immature Granulocytes (abs) 0.03 0.00 - 0.11 K/uL    NRBC (Absolute) 0.00 K/uL   PLATELET MAPPING WITH BASIC TEG    Collection Time: 02/03/25 10:55 AM   Result Value Ref Range    Reaction Time Initial-R 8.7 4.6 - 9.1 min    React Time Initial Hep 6.7 4.3 - 8.3 min    Clot Kinetics-K 1.0 0.8 - 2.1 min    Clot Angle-Angle 75.9 63.0 - 78.0 degrees    Maximum Clot Strength-MA 65.2 52.0 - 69.0 mm    TEG Functional Fibrinogen(MA) 27.5 15.0 - 32.0 mm    Lysis 30 minutes-LY30 0.8 0.0 - 2.6 %    % Inhibition ADP 43.1 (H) 0.0 - 17.0 %    % Inhibition AA 41.3 (H) 0.0 - 11.0 %    TEG Algorithm Link Algorithm    ESTIMATED GFR    Collection Time: 02/03/25 10:55 AM   Result Value Ref Range    GFR (CKD-EPI) 32 (A) >60 mL/min/1.73 m 2       Imaging  IR-EMBOLIZE-NEURO-INTRACRANIAL   Final Result   Impression:      Stent assisted coil embolization of a left ICA terminus aneurysm as described above. Final angiographic images demonstrate satisfactory occlusion of the aneurysm with no significant filling within the aneurysm. Patient will be followed up in the neuro    interventional clinic. Patient will remain on Brilinta and Eliquis for 90 days following which she will discontinue Brilinta therapy.      ILilliana was physically present and participated during the entire procedure of the IR-EMBOLIZE-NEURO-INTRACRANIAL.          Assessment/Plan  Aneurysm of left internal carotid artery  Assessment & Plan  Patient presents 2/3 with a scheduled left ICA aneurysm embolization and stent placement.    Serial neurologic exams  HOB > 30 degrees  Maintain normal temperature  Goal glucose 140-180  Normal natremia  SBP goal 110-140  I am actively titrating nicardipine to achieve above SBP goal  Lipid panel, Hgb A1c  Fall precautions  Aspiration precautions  PT/OT/SLP   Anticoagulation per IR: Brilinta and Eliquis, no ASA  Statin     HFrEF  (heart failure with reduced ejection fraction) ([facility])- (present on admission)  Assessment & Plan  History of  Resume home meds when clinically able      PAF (paroxysmal atrial fibrillation) ([facility])- (present on admission)  Assessment & Plan  History of  Resume home meds when clinically able    S/P CABG x 3- (present on admission)  Assessment & Plan  History of  Continue home meds when able    Gastroesophageal reflux disease without esophagitis- (present on admission)  Assessment & Plan  Continue home meds when able    Dyslipidemia- (present on admission)  Assessment & Plan  History of  Tried atorvastatin, pravastatin, lovastatin, and Zetia.  Caused severe myalgias        Discussed patient condition and risk of morbidity and/or mortality with RN, RT, Therapies, Pharmacy, Charge nurse / hot rounds, Patient, and   my attending Dr Israel.    The patient remains critically ill.  Critical care time = 35 minutes in directly providing and coordinating critical care and extensive data review.  No time overlap and excludes procedures.    Please note that this dictation was created using voice recognition software. The accuracy of the dictation is limited to the abilities of the software. I have made every reasonable attempt to correct obvious errors, but I expect that there are errors of grammar and possibly content that I did not discover before finalizing the note.     Juvencio Mejia, APRN

## 2025-02-04 ENCOUNTER — APPOINTMENT (OUTPATIENT)
Dept: RADIOLOGY | Facility: MEDICAL CENTER | Age: 75
DRG: 026 | End: 2025-02-04
Attending: HOSPITALIST
Payer: MEDICARE

## 2025-02-04 DIAGNOSIS — I67.1 ANEURYSM OF LEFT INTERNAL CAROTID ARTERY: ICD-10-CM

## 2025-02-04 LAB
ALBUMIN SERPL BCP-MCNC: 3.5 G/DL (ref 3.2–4.9)
ALBUMIN/GLOB SERPL: 1.5 G/DL
ALP SERPL-CCNC: 46 U/L (ref 30–99)
ALT SERPL-CCNC: 9 U/L (ref 2–50)
ANION GAP SERPL CALC-SCNC: 13 MMOL/L (ref 7–16)
AST SERPL-CCNC: 36 U/L (ref 12–45)
BASOPHILS # BLD AUTO: 0.2 % (ref 0–1.8)
BASOPHILS # BLD: 0.02 K/UL (ref 0–0.12)
BILIRUB SERPL-MCNC: 0.2 MG/DL (ref 0.1–1.5)
BUN SERPL-MCNC: 38 MG/DL (ref 8–22)
CALCIUM ALBUM COR SERPL-MCNC: 8.7 MG/DL (ref 8.5–10.5)
CALCIUM SERPL-MCNC: 8.3 MG/DL (ref 8.5–10.5)
CHLORIDE SERPL-SCNC: 107 MMOL/L (ref 96–112)
CO2 SERPL-SCNC: 17 MMOL/L (ref 20–33)
CREAT SERPL-MCNC: 1.97 MG/DL (ref 0.5–1.4)
EOSINOPHIL # BLD AUTO: 0.05 K/UL (ref 0–0.51)
EOSINOPHIL NFR BLD: 0.6 % (ref 0–6.9)
ERYTHROCYTE [DISTWIDTH] IN BLOOD BY AUTOMATED COUNT: 46.2 FL (ref 35.9–50)
GFR SERPLBLD CREATININE-BSD FMLA CKD-EPI: 26 ML/MIN/1.73 M 2
GLOBULIN SER CALC-MCNC: 2.4 G/DL (ref 1.9–3.5)
GLUCOSE SERPL-MCNC: 175 MG/DL (ref 65–99)
HCT VFR BLD AUTO: 25.1 % (ref 37–47)
HGB BLD-MCNC: 8 G/DL (ref 12–16)
IMM GRANULOCYTES # BLD AUTO: 0.03 K/UL (ref 0–0.11)
IMM GRANULOCYTES NFR BLD AUTO: 0.4 % (ref 0–0.9)
LYMPHOCYTES # BLD AUTO: 0.86 K/UL (ref 1–4.8)
LYMPHOCYTES NFR BLD: 10.4 % (ref 22–41)
MCH RBC QN AUTO: 31.4 PG (ref 27–33)
MCHC RBC AUTO-ENTMCNC: 31.9 G/DL (ref 32.2–35.5)
MCV RBC AUTO: 98.4 FL (ref 81.4–97.8)
MONOCYTES # BLD AUTO: 0.52 K/UL (ref 0–0.85)
MONOCYTES NFR BLD AUTO: 6.3 % (ref 0–13.4)
NEUTROPHILS # BLD AUTO: 6.78 K/UL (ref 1.82–7.42)
NEUTROPHILS NFR BLD: 82.1 % (ref 44–72)
NRBC # BLD AUTO: 0 K/UL
NRBC BLD-RTO: 0 /100 WBC (ref 0–0.2)
PLATELET # BLD AUTO: 213 K/UL (ref 164–446)
PMV BLD AUTO: 9.9 FL (ref 9–12.9)
POTASSIUM SERPL-SCNC: 4.3 MMOL/L (ref 3.6–5.5)
PROT SERPL-MCNC: 5.9 G/DL (ref 6–8.2)
RBC # BLD AUTO: 2.55 M/UL (ref 4.2–5.4)
SODIUM SERPL-SCNC: 137 MMOL/L (ref 135–145)
WBC # BLD AUTO: 8.3 K/UL (ref 4.8–10.8)

## 2025-02-04 PROCEDURE — 80053 COMPREHEN METABOLIC PANEL: CPT

## 2025-02-04 PROCEDURE — A9270 NON-COVERED ITEM OR SERVICE: HCPCS | Performed by: HOSPITALIST

## 2025-02-04 PROCEDURE — 700111 HCHG RX REV CODE 636 W/ 250 OVERRIDE (IP): Mod: JZ

## 2025-02-04 PROCEDURE — 700105 HCHG RX REV CODE 258: Performed by: NURSE PRACTITIONER

## 2025-02-04 PROCEDURE — 73502 X-RAY EXAM HIP UNI 2-3 VIEWS: CPT | Mod: RT

## 2025-02-04 PROCEDURE — 700102 HCHG RX REV CODE 250 W/ 637 OVERRIDE(OP): Performed by: HOSPITALIST

## 2025-02-04 PROCEDURE — 700102 HCHG RX REV CODE 250 W/ 637 OVERRIDE(OP): Performed by: RADIOLOGY

## 2025-02-04 PROCEDURE — A9270 NON-COVERED ITEM OR SERVICE: HCPCS | Performed by: RADIOLOGY

## 2025-02-04 PROCEDURE — 770001 HCHG ROOM/CARE - MED/SURG/GYN PRIV*

## 2025-02-04 PROCEDURE — 85025 COMPLETE CBC W/AUTO DIFF WBC: CPT

## 2025-02-04 PROCEDURE — A9270 NON-COVERED ITEM OR SERVICE: HCPCS

## 2025-02-04 PROCEDURE — 99233 SBSQ HOSP IP/OBS HIGH 50: CPT | Performed by: INTERNAL MEDICINE

## 2025-02-04 PROCEDURE — 700105 HCHG RX REV CODE 258: Performed by: HOSPITALIST

## 2025-02-04 PROCEDURE — 700102 HCHG RX REV CODE 250 W/ 637 OVERRIDE(OP)

## 2025-02-04 PROCEDURE — 99223 1ST HOSP IP/OBS HIGH 75: CPT | Mod: AI | Performed by: HOSPITALIST

## 2025-02-04 RX ORDER — SODIUM CHLORIDE, SODIUM LACTATE, POTASSIUM CHLORIDE, CALCIUM CHLORIDE 600; 310; 30; 20 MG/100ML; MG/100ML; MG/100ML; MG/100ML
INJECTION, SOLUTION INTRAVENOUS CONTINUOUS
Status: ACTIVE | OUTPATIENT
Start: 2025-02-04 | End: 2025-02-05

## 2025-02-04 RX ORDER — DIPHENHYDRAMINE HYDROCHLORIDE, ZINC ACETATE 2; .1 G/100G; G/100G
CREAM TOPICAL 3 TIMES DAILY PRN
Status: DISCONTINUED | OUTPATIENT
Start: 2025-02-04 | End: 2025-02-10 | Stop reason: HOSPADM

## 2025-02-04 RX ORDER — MORPHINE SULFATE 4 MG/ML
4 INJECTION INTRAVENOUS
Status: DISCONTINUED | OUTPATIENT
Start: 2025-02-04 | End: 2025-02-05

## 2025-02-04 RX ORDER — MORPHINE SULFATE 4 MG/ML
4 INJECTION INTRAVENOUS ONCE
Status: COMPLETED | OUTPATIENT
Start: 2025-02-04 | End: 2025-02-04

## 2025-02-04 RX ORDER — OXYCODONE HYDROCHLORIDE 10 MG/1
10 TABLET ORAL
Status: DISCONTINUED | OUTPATIENT
Start: 2025-02-04 | End: 2025-02-05

## 2025-02-04 RX ORDER — OXYCODONE HYDROCHLORIDE 5 MG/1
5 TABLET ORAL
Status: DISCONTINUED | OUTPATIENT
Start: 2025-02-04 | End: 2025-02-05

## 2025-02-04 RX ADMIN — MORPHINE SULFATE 4 MG: 4 INJECTION, SOLUTION INTRAMUSCULAR; INTRAVENOUS at 19:56

## 2025-02-04 RX ADMIN — ACETAMINOPHEN 650 MG: 325 TABLET ORAL at 14:31

## 2025-02-04 RX ADMIN — SODIUM CHLORIDE, POTASSIUM CHLORIDE, SODIUM LACTATE AND CALCIUM CHLORIDE: 600; 310; 30; 20 INJECTION, SOLUTION INTRAVENOUS at 08:36

## 2025-02-04 RX ADMIN — APIXABAN 5 MG: 5 TABLET, FILM COATED ORAL at 16:53

## 2025-02-04 RX ADMIN — APIXABAN 5 MG: 5 TABLET, FILM COATED ORAL at 05:11

## 2025-02-04 RX ADMIN — TICAGRELOR 90 MG: 90 TABLET ORAL at 05:11

## 2025-02-04 RX ADMIN — Medication: at 15:53

## 2025-02-04 RX ADMIN — CARVEDILOL 25 MG: 25 TABLET, FILM COATED ORAL at 08:36

## 2025-02-04 RX ADMIN — SENNOSIDES AND DOCUSATE SODIUM 2 TABLET: 50; 8.6 TABLET ORAL at 16:53

## 2025-02-04 RX ADMIN — SODIUM CHLORIDE 250 ML: 9 INJECTION, SOLUTION INTRAVENOUS at 00:52

## 2025-02-04 RX ADMIN — ACETAMINOPHEN 650 MG: 325 TABLET ORAL at 23:10

## 2025-02-04 RX ADMIN — TICAGRELOR 90 MG: 90 TABLET ORAL at 16:53

## 2025-02-04 RX ADMIN — POLYETHYLENE GLYCOL 3350 1 PACKET: 17 POWDER, FOR SOLUTION ORAL at 16:53

## 2025-02-04 RX ADMIN — Medication: at 23:17

## 2025-02-04 RX ADMIN — CARVEDILOL 25 MG: 25 TABLET, FILM COATED ORAL at 16:53

## 2025-02-04 RX ADMIN — SACUBITRIL AND VALSARTAN 1 TABLET: 49; 51 TABLET, FILM COATED ORAL at 05:11

## 2025-02-04 ASSESSMENT — ENCOUNTER SYMPTOMS
TREMORS: 0
VOMITING: 0
NAUSEA: 0
DIZZINESS: 0
BLURRED VISION: 0
HEADACHES: 0
HEARTBURN: 0
SENSORY CHANGE: 0
EYES NEGATIVE: 1
SORE THROAT: 0
FOCAL WEAKNESS: 0
TINGLING: 0
SHORTNESS OF BREATH: 0
BACK PAIN: 0
PALPITATIONS: 0
ABDOMINAL PAIN: 0
NERVOUS/ANXIOUS: 0
BRUISES/BLEEDS EASILY: 1
FEVER: 0
CHILLS: 0
COUGH: 0
SPEECH CHANGE: 0

## 2025-02-04 ASSESSMENT — PAIN DESCRIPTION - PAIN TYPE
TYPE: ACUTE PAIN

## 2025-02-04 NOTE — PROGRESS NOTES
4 Eyes Skin Assessment Completed by KATY Soni and KATY Marrero.    Head WDL  Ears Redness and Blanching  Nose WDL  Mouth Ulcer(s); bilateral tongue purple/bruised   Neck WDL  Breast/Chest WDL  Shoulder Blades WDL  Spine WDL  (R) Arm/Elbow/Hand WDL  (L) Arm/Elbow/Hand WDL; abrasion to right middle finger   Abdomen WDL  Groin Incision; right groin incision - dressing in place with sanguinous drainage   Scrotum/Coccyx/Buttocks WDL  (R) Leg Abrasion abrasions  (L) Leg Abrasion abrasions  (R) Heel/Foot/Toe Redness, Blanching, and Discoloration; toes purple bilaterally; heels red and blanching   (L) Heel/Foot/Toe Redness, Blanching, and Discoloration; toes purple bilaterally; heels red and blanching          Devices In Places ECG, Blood Pressure Cuff, Pulse Ox, and SCD's      Interventions In Place NC W/Ear Foams, TAP System, Pillows, Q2 Turns, Low Air Loss Mattress, and Heels Loaded W/Pillows    Possible Skin Injury No    Pictures Uploaded Into Epic N/A  Wound Consult Placed N/A  RN Wound Prevention Protocol Ordered No

## 2025-02-04 NOTE — PROGRESS NOTES
"Radiology Progress Note   Author: YULISSA Motta Date & Time created: 2/4/2025  8:33 AM   Date of admission  2/3/2025  Note to reader: this note follows the APSO format rather than the historical SOAP format. Assessment and plan located at the top of the note for ease of use.    Chief Complaint  74 y.o. female admitted 2/3/2025 with incidental finding of left middle cerebral artery aneurysm for which she underwent coiling and stent placement of 7 mm left ICA aneurysm.        HPI  Shazia is a 74 yof with a pmhx of right breast cancer, HTN, HLD pacemenker, CAD,  ischemic CM and in 2022 underwent a CABG and developed hypoperfusion strokes. She has had deficits since then that impair her mobility. She presented to a new neurologist who initiated workup. Imaging revealed chronic infarcts from her strokes and an incidental left middle cerebral artery aneurysm for which she was referred to the Neuro Interventional Service for evaluation and management of this finding.  On 02/03/2025 she underwent coiling and stent placement of 7 mm left ICA terminus aneurysm by Dr. Chepe Cuellar.      Interval History:   02/04/2025-patient had some oozing from right groin  MELISSA site last night requiring manual pressure to be held.  Groin site this a.m. has some scant bruising to area however groin site is soft to palpate without any active bleeding at time of assessment.   Dressing is dry and intact.  The patient reports that she believes Brilinta is causing her itching on her legs and arms.  I explained the importance of taking Brilinta, but when I suggested the possibility of switching medications, she quickly responded, \"no, I want to keep taking Brilinta.\"  As a result she will continue with Brilinta for now.  I reviewed today's labs: WBC 8.3; H&H 8.0/25.1, Cr 1.97, GFR 44; Coags INR 1.54; platelet mapping present AA inhibition 41.3; percent inhibition ADP 43.1,  Micro -micro.  The patient's renal function has " worsened, so she will be transferred to the floor and managed by the hospitalist team for ongoing evaluation, management of her renal function.  I discussed plan of care with patient patient son at bedside, the hospitalist and the ICU team.       Assessment/Plan     Active Problems:    Dyslipidemia    Gastroesophageal reflux disease without esophagitis    S/P CABG x 3    PAF (paroxysmal atrial fibrillation) ([facility])    HFrEF (heart failure with reduced ejection fraction) ([facility])    Aneurysm of left internal carotid artery      Plan IR  -- Post MELISSA groin access site instructions: no lifting greater than 5 lbs and no baths/swimming/soaking in tub for 7-10 days. Shower OK. OK to change dressings/band aid as needed.  - Continue Brilinta 90 twice daily mg and Eliquis 5 mg twice daily for 6 months S/P procedure  - Follow up appointment in 2-4 weeks with OP Neuro IR, our office to call and schedule (appointment has been ordered on 02/04/2025)  - Neuro Checks per ICU policy  - From a Neuro Interventional Service standpoint, OK to discharge when medically cleared.     -Thank you for allowing Interventional Radiology team to participate in the patients care, if any additional care or requests are needed in the future please do not hesitate to call or place IR order           Review of Systems  Physical Exam   Review of Systems   Constitutional:  Negative for chills and fever.   Eyes:  Negative for blurred vision.   Respiratory:  Negative for cough.    Cardiovascular:  Negative for chest pain.   Gastrointestinal:  Negative for abdominal pain, heartburn, nausea and vomiting.   Skin:  Positive for itching.   Neurological:  Negative for dizziness, tingling, tremors, sensory change and headaches.      Vitals:    02/04/25 0600   BP: 107/51   Pulse: 75   Resp: 12   Temp:    SpO2: 97%        Physical Exam  Vitals and nursing note reviewed.   HENT:      Head: Normocephalic and atraumatic.      Mouth/Throat:      Mouth: Mucous membranes are  dry.      Pharynx: Oropharynx is clear.   Eyes:      Pupils: Pupils are equal, round, and reactive to light.   Cardiovascular:      Comments: Cardiac monitor-paced rhythm  Right groin site with scant bruising to area however groin site is soft to palpate without any active bleeding   Pulmonary:      Effort: Pulmonary effort is normal. No respiratory distress.   Abdominal:      Palpations: Abdomen is soft.   Musculoskeletal:      Right lower leg: No edema.      Left lower leg: No edema.   Skin:     General: Skin is warm and dry.      Capillary Refill: Capillary refill takes less than 2 seconds.   Neurological:      Mental Status: She is alert and oriented to person, place, and time. Mental status is at baseline.   Psychiatric:         Attention and Perception: Attention and perception normal.         Mood and Affect: Mood normal.         Speech: Speech normal.         Behavior: Behavior is cooperative.             Labs    Recent Labs     02/03/25  1055 02/04/25  0311   WBC 7.6 8.3   RBC 3.18* 2.55*   HEMOGLOBIN 9.9* 8.0*   HEMATOCRIT 30.1* 25.1*   MCV 94.7 98.4*   MCH 31.1 31.4   MCHC 32.9 31.9*   RDW 44.6 46.2   PLATELETCT 272 213   MPV 9.9 9.9     Recent Labs     02/03/25  1055 02/04/25  0311   SODIUM 137 137   POTASSIUM 4.6 4.3   CHLORIDE 105 107   CO2 18* 17*   GLUCOSE 89 175*   BUN 42* 38*   CREATININE 1.64* 1.97*   CALCIUM 9.7 8.3*     Recent Labs     02/03/25  1055 02/04/25  0311   ALBUMIN  --  3.5   TBILIRUBIN  --  0.2   ALKPHOSPHAT  --  46   TOTPROTEIN  --  5.9*   ALTSGPT  --  9   ASTSGOT  --  36   CREATININE 1.64* 1.97*     IR-EMBOLIZE-NEURO-INTRACRANIAL   Final Result   Impression:      Stent assisted coil embolization of a left ICA terminus aneurysm as described above. Final angiographic images demonstrate satisfactory occlusion of the aneurysm with no significant filling within the aneurysm. Patient will be followed up in the neuro    interventional clinic. Patient will remain on Brilinta and Eliquis for  "90 days following which she will discontinue Brilinta therapy.      I, Lilliana Cuellar was physically present and participated during the entire procedure of the IR-EMBOLIZE-NEURO-INTRACRANIAL.        INR   Date Value Ref Range Status   02/03/2025 1.54 (H) 0.87 - 1.13 Final     Comment:     INR - Non-therapeutic Reference Range: 0.87-1.13  INR - Therapeutic Reference Range: 2.0-4.0       No results found for: \"POCINR\"     Intake/Output Summary (Last 24 hours) at 2/4/2025 0833  Last data filed at 2/4/2025 0600  Gross per 24 hour   Intake 596.92 ml   Output 750 ml   Net -153.08 ml      I have personally reviewed the above labs and imaging      I have performed a physical exam and reviewed and updated ROS and Plan today (2/4/2025).     50 minutes in directly providing and coordinating care and extensive data review.  No time overlap and excludes procedures.    "

## 2025-02-04 NOTE — ANESTHESIA POSTPROCEDURE EVALUATION
Patient: Lauren Dave    Procedure Summary       Date: 02/03/25 Room / Location: [facility] Imaging - Interventional - [facility]    Anesthesia Start: 1240 Anesthesia Stop: 1516    Procedure: IR-EMBOLIZE-NEURO-INTRACRANIAL Diagnosis:       Intracranial aneurysm      (left ICA terminus aneurysm)      (left ICA terminus aneurysm embolization)    Scheduled Providers: Lilliana Cuellar M.D. Responsible Provider: Tommy Duran M.D.    Anesthesia Type: general ASA Status: 3            Final Anesthesia Type: general  Last vitals  BP   Blood Pressure : 113/53    Temp   36.2 °C (97.2 °F)    Pulse   75   Resp   (!) 25    SpO2   96 %      Anesthesia Post Evaluation    Patient location during evaluation: PACU  Patient participation: complete - patient participated  Level of consciousness: awake and alert    Airway patency: patent  Anesthetic complications: no  Cardiovascular status: hemodynamically stable  Respiratory status: acceptable  Hydration status: euvolemic    PONV: none          No notable events documented.     Nurse Pain Score: 8 (NPRS)

## 2025-02-04 NOTE — PROGRESS NOTES
Critical Care Progress Note    Date of admission  2/3/2025    Chief Complaint  74 y.o. female with a PMH of CABG, HTN, HLD, right breast cancer, pacemaker implant, CAD.  She has a history of ischemic CM in 2022 underwent a CABG and developed hyperperfusion strokes.  She deficits which led her to seek care from a neurologist who initiated workup. Imaging revealed chronic infarcts from her strokes and incidental left middle cerebral artery aneurysm.  She presented 2/3/2025 for a routine 7 mm left ICA terminus aneurysm coiling and stent placement.  She will be admitted to the neuro ICU, SBP goal 110-140, with coagulation plans per IR: Brilinta and Apixaban.    Hospital Course  No notes on file    Interval Problem Update  Reviewed last 24 hour events:    A&O x4  No neurologic complaints  No headache  Some bleeding at right groin site required FemoStop last night, looks good this a.m.  SR 70s  SBP 100s  UO adequate  WOB low  2 L O2 with sats 97%  I-S pending  Tmax 98.6  WBC 8.3  Hemoglobin 8.0 down from 9.9  Platelet count 213  , K4.3, chloride 107, HCO3 17  BUN 38, creatinine 1.97  Transaminases, alk phos and bilirubin normal  Glucose 175      Hydration neurochecks to every 4  Transfer to medical if okay with IR  Follow-up BMP in a.m.  Avoid nephrotoxins  Monitor UO  Gently hydrate p.o. today IV fluids if necessary      Review of Systems  Review of Systems   Constitutional:  Negative for fever and malaise/fatigue.   HENT:  Negative for congestion and sore throat.    Eyes: Negative.    Respiratory:  Negative for shortness of breath.    Cardiovascular:  Negative for chest pain and palpitations.   Gastrointestinal:  Negative for abdominal pain, nausea and vomiting.   Genitourinary: Negative.    Musculoskeletal:  Negative for back pain.   Neurological:  Negative for sensory change, speech change, focal weakness and headaches.   Endo/Heme/Allergies:  Bruises/bleeds easily (Right groin site).   Psychiatric/Behavioral:   The patient is not nervous/anxious.         Vital Signs for last 24 hours   Temp:  [36.1 °C (97 °F)-37.6 °C (99.7 °F)] 37.6 °C (99.7 °F)  Pulse:  [74-82] 82  Resp:  [8-25] 18  BP: ()/(49-78) 116/56  SpO2:  [91 %-100 %] 94 %    Hemodynamic parameters for last 24 hours       Respiratory Information for the last 24 hours       Physical Exam   Physical Exam  Vitals reviewed.   Constitutional:       Appearance: She is not ill-appearing.   HENT:      Head: Normocephalic and atraumatic.      Mouth/Throat:      Mouth: Mucous membranes are moist.   Eyes:      General: No scleral icterus.     Extraocular Movements: Extraocular movements intact.      Pupils: Pupils are equal, round, and reactive to light.   Cardiovascular:      Rate and Rhythm: Normal rate and regular rhythm.      Heart sounds: No murmur heard.  Pulmonary:      Effort: Pulmonary effort is normal. No respiratory distress.      Breath sounds: No wheezing, rhonchi or rales.   Abdominal:      General: Bowel sounds are normal. There is no distension.      Palpations: Abdomen is soft.      Tenderness: There is no abdominal tenderness.   Musculoskeletal:      Right lower leg: No edema.      Left lower leg: No edema.      Comments: Right groin IR site intact without significant bleeding   Skin:     General: Skin is warm and dry.      Capillary Refill: Capillary refill takes less than 2 seconds.   Neurological:      General: No focal deficit present.      Mental Status: She is alert and oriented to person, place, and time. Mental status is at baseline.   Psychiatric:         Mood and Affect: Mood normal.         Behavior: Behavior normal.         Thought Content: Thought content normal.         Medications  Current Facility-Administered Medications   Medication Dose Route Frequency Provider Last Rate Last Admin    menthol ([place]) lozenge 1 Lozenge  1 Lozenge Oral Q2HRS PRN Patricia Aguiar        lactated ringers infusion   Intravenous Continuous Kamron Blankenship,  D.O. 100 mL/hr at 02/04/25 1422 Rate Verify at 02/04/25 1422    diphenhydrAMINE-zinc acetate (Benadryl Itch) topical cream   Topical TID PRN Kamron Blankenship D.O.        ticagrelor (Brilinta) tablet 90 mg  90 mg Oral BID Lilliana Cuellar M.D.   90 mg at 02/04/25 0511    acetaminophen (Tylenol) tablet 650 mg  650 mg Oral Q6HRS PRN Juvencio Mejia A.P.R.N.   650 mg at 02/04/25 1431    senna-docusate (Pericolace Or Senokot S) 8.6-50 MG per tablet 2 Tablet  2 Tablet Oral Q EVENING Juvencio Mejia A.P.R.N.        And    polyethylene glycol/lytes (Miralax) Packet 1 Packet  1 Packet Oral QDAY PRN Juvencio Mejia A.P.R.N.        labetalol (Normodyne/Trandate) injection 10-20 mg  10-20 mg Intravenous Q4HRS PRN Juvencio Mejia A.P.R.N.   10 mg at 02/03/25 1838    Pharmacy Consult Request ...Pain Management Review 1 Each  1 Each Other PHARMACY TO DOSE YASMANY Alberto.P.R.N.        oxyCODONE immediate-release (Roxicodone) tablet 2.5 mg  2.5 mg Oral Q3HRS PRN Juvencio Mejia A.P.R.N.        Or    oxyCODONE immediate-release (Roxicodone) tablet 5 mg  5 mg Oral Q3HRS PRN Juvencio Mejia A.P.R.N.   5 mg at 02/03/25 1707    Or    morphine 4 MG/ML injection 2 mg  2 mg Intravenous Q3HRS PRN Juvencio Mejia A.P.R.N.   2 mg at 02/03/25 1844    hydrALAZINE (Apresoline) injection 10-20 mg  10-20 mg Intravenous Q4HRS PRN Juvencio Mejia A.P.R.N.        carvedilol (Coreg) tablet 25 mg  25 mg Oral BID WITH MEALS Juvencio Mejia A.P.R.N.   25 mg at 02/04/25 0836    sacubitril-valsartan (Entresto) 49-51 MG 1 Tablet  1 Tablet Oral BID Juvencio Mejia, A.P.R.N.   1 Tablet at 02/04/25 0511    famotidine (Pepcid) tablet 20 mg  20 mg Oral QDAY PRN Juvencio Mejia, A.P.R.N.        apixaban (Eliquis) tablet 5 mg  5 mg Oral BID Juvencio Mejia, A.P.R.N.   5 mg at 02/04/25 0511       Fluids    Intake/Output Summary (Last 24 hours) at 2/4/2025 1534  Last data filed at 2/4/2025 1422  Gross per 24 hour   Intake 1173.47 ml   Output 950 ml   Net 223.47 ml       Laboratory          Recent Labs      02/03/25  1055 02/04/25  0311   SODIUM 137 137   POTASSIUM 4.6 4.3   CHLORIDE 105 107   CO2 18* 17*   BUN 42* 38*   CREATININE 1.64* 1.97*   CALCIUM 9.7 8.3*     Recent Labs     02/03/25  1055 02/04/25  0311   ALTSGPT  --  9   ASTSGOT  --  36   ALKPHOSPHAT  --  46   TBILIRUBIN  --  0.2   GLUCOSE 89 175*     Recent Labs     02/03/25  1055 02/04/25  0311   WBC 7.6 8.3   NEUTSPOLYS 70.30 82.10*   LYMPHOCYTES 17.30* 10.40*   MONOCYTES 8.10 6.30   EOSINOPHILS 3.40 0.60   BASOPHILS 0.50 0.20   ASTSGOT  --  36   ALTSGPT  --  9   ALKPHOSPHAT  --  46   TBILIRUBIN  --  0.2     Recent Labs     02/03/25  1055 02/04/25 0311   RBC 3.18* 2.55*   HEMOGLOBIN 9.9* 8.0*   HEMATOCRIT 30.1* 25.1*   PLATELETCT 272 213   PROTHROMBTM 18.5*  --    INR 1.54*  --        Imaging  Outside CT &  IR images reviewed    Assessment/Plan  Aneurysm of left internal carotid artery  Assessment & Plan  Patient presents 2/3 with a scheduled left ICA aneurysm embolization and stent placement which was successfully performed  Serial neurologic exams remain unchanged and normal  HOB > 30 degrees  Maintain normal temperature  Goal glucose 140-180  Maintain normal natremia  Continue SBP goal 110-140  Off IV medicines for blood pressure at this point-hydralazine/labetalol as needed  Home medicines including carvedilol and Entresto ongoing  Lipid panel, Hgb [id number] noted  Fall precautions  Aspiration precautions  PT/OT/SLP to eval and treat-passed swallow eval I believe  Anticoagulation per IR: Brilinta and Eliquis, no ASA needed   Statin     TAN (acute kidney injury) (HCC)- (present on admission)  Assessment & Plan  Multifactorial etiology  UO adequate  Avoid nephrotoxins  Serial BMP  Gentle hydration with IV fluids n.p.o. as well  Berger catheter as needed  Prudent to keep for 24 hours and monitor function before discharge    HFrEF (heart failure with reduced ejection fraction) (HCC)- (present on admission)  Assessment & Plan  History of  Clinically  not in heart failure  Resuming Entresto and carvedilol      PAF (paroxysmal atrial fibrillation) (HCC)- (present on admission)  Assessment & Plan  History of  Clinically doing well resuming carvedilol and apixaban    S/P CABG x 3- (present on admission)  Assessment & Plan  History of  Continue home meds when able    Gastroesophageal reflux disease without esophagitis- (present on admission)  Assessment & Plan  Continue home famotidine  Antireflux measures      Dyslipidemia- (present on admission)  Assessment & Plan  History of  Tried atorvastatin, pravastatin, lovastatin, and Zetia.  Caused severe myalgias  Continue fish oil  Modified diet-strict         VTE:  NOAC  Ulcer: Not Indicated  Lines: None    I have performed a physical exam and reviewed and updated ROS and Plan today (2/4/2025). In review of yesterday's note (2/3/2025), there are no changes except as documented above.     Discussed patient condition and risk of morbidity and/or mortality with Hospitalist, RN, RT, Pharmacy, Charge nurse / hot rounds, Patient, and IR    Case reviewed with interventional radiology and hospitalist teams.  Patient appropriate to transfer out of ICU.  Will keep in the hospital another day with development of TAN overnight.  IV hydration gently initiated.  Case reviewed with Dr. Blankenship who kindly accepts the patient in transfer, RCC will sign off.

## 2025-02-04 NOTE — ASSESSMENT & PLAN NOTE
History of  Tried atorvastatin, pravastatin, lovastatin, and Zetia.  Caused severe myalgias  Continue fish oil  Modified diet-strict

## 2025-02-04 NOTE — CONSULTS
Hospital Medicine Consultation    Date of Service  2/4/2025    Referring Physician  Julian Israel M.D.    Consulting Physician  Kamron Blankenship D.O.    Reason for Consultation  Transfer of care out of the ICU s/p a left ICA aneurysm embolization    History of Presenting Illness  Lauren Dave is a 74 y.o. female with a hx of breast cancer s/p tx, HFrEF:35%, CAD (severe 3vessel disease on cath 10/19/22), discoid lupus, dyslipidemia, and essential HTN. who presented 2/3/2025 with scheduled interventional radiology to perform a left ICA terminus aneurysm embolization on 2/3.    Today 2/4, she has worsening renal function and patient will be watched for further improvement or worsening on am labs.  Her son was at bedside and patient understood need for further close watch.    Review of Systems  Review of Systems   Constitutional:  Negative for malaise/fatigue.   Respiratory:  Negative for shortness of breath.    Cardiovascular:  Negative for chest pain and leg swelling.   Gastrointestinal:  Negative for abdominal pain and nausea.   Genitourinary:  Positive for frequency. Negative for dysuria and hematuria.   Neurological:  Negative for speech change, focal weakness and headaches.   Psychiatric/Behavioral:  The patient is not nervous/anxious.        Past Medical History   has a past medical history of Arthritis, Breast cancer (HCC) (08/07/2013), Bronchitis (2005), Cancer (HCC), Cataract, Chronic systolic congestive heart failure (HCC) EF 35% (10/12/2022), Coronary artery disease due to calcified coronary lesion - Calcifications seen on CT scan also wtih aortic ulceration, Delayed emergence from general anesthesia, Dental disorder, Discoid lupus, Dyslipidemia, Essential hypertension, Heart burn, Hypoadrenalism (HCC) - need for chronic steroids, Ischemic heart disease due to coronary artery obstruction (HCC) - Severe 3vD on cath (10/19/2022), LBBB (left bundle branch block) (12/16/2014), Pacemaker, Pneumonia,  Pseudogout, Psychiatric problem, Scarlet fever, and Unspecified hemorrhagic conditions.    She has no past medical history of Disorder of thyroid or PONV (postoperative nausea and vomiting).    Surgical History   has a past surgical history that includes colonoscopy (01/01/2003); tonsillectomy; breast biopsy; mastectomy (08/22/2013); node biopsy sentinel (08/22/2013); cath placement (08/22/2013); cath removal (02/24/2014); us-needle core bx-breast panel (01/01/2013); other cardiac surgery (11/2022); multiple coronary artery bypass endo vein harvest (11/10/2022); and echocardiogram, transesophageal, intraoperative (11/10/2022).    Family History  family history includes Arthritis in her father; Cancer in her maternal aunt and mother; Diabetes in her maternal uncle and paternal grandmother; Gout in her brother and son; Heart Disease in her maternal grandfather, maternal grandmother, paternal grandmother, and son; Multiple Sclerosis in her brother and mother; Other in her son; Stroke in her paternal grandfather.    Social History   reports that she quit smoking about 11 years ago. Her smoking use included cigarettes. She started smoking about 11 years ago. She has never used smokeless tobacco. She reports that she does not drink alcohol and does not use drugs.    Medications  Current Facility-Administered Medications   Medication Dose Route Frequency Provider Last Rate Last Admin    menthol ([place]) lozenge 1 Lozenge  1 Lozenge Oral Q2HRS PRN Patricia Aguiar        lactated ringers infusion   Intravenous Continuous GREG GatesO. 100 mL/hr at 02/04/25 0836 New Bag at 02/04/25 0836    diphenhydrAMINE-zinc acetate (Benadryl Itch) topical cream   Topical TID PRN Kamron Blankenship D.O.        ticagrelor (Brilinta) tablet 90 mg  90 mg Oral BID Lilliana Cuellar M.D.   90 mg at 02/04/25 0511    acetaminophen (Tylenol) tablet 650 mg  650 mg Oral Q6HRS PRN ROMAIN AlbertoRBetiNBeti   650 mg at 02/03/25 1808    senna-docusate  "(Pericolace Or Senokot S) 8.6-50 MG per tablet 2 Tablet  2 Tablet Oral Q EVENING Juvencio Mejia A.P.R.N.        And    polyethylene glycol/lytes (Miralax) Packet 1 Packet  1 Packet Oral QDAY PRN Juvencio Mejia A.P.R.N.        labetalol (Normodyne/Trandate) injection 10-20 mg  10-20 mg Intravenous Q4HRS PRN Juvencio Mejia A.P.R.N.   10 mg at 02/03/25 1838    Pharmacy Consult Request ...Pain Management Review 1 Each  1 Each Other PHARMACY TO DOSE Juvencio Mejia A.P.R.N.        oxyCODONE immediate-release (Roxicodone) tablet 2.5 mg  2.5 mg Oral Q3HRS PRN Juvencio Mejia A.P.R.N.        Or    oxyCODONE immediate-release (Roxicodone) tablet 5 mg  5 mg Oral Q3HRS PRN Juvencio Mejia A.P.R.N.   5 mg at 02/03/25 1707    Or    morphine 4 MG/ML injection 2 mg  2 mg Intravenous Q3HRS PRN Juvencio Mejia A.P.R.N.   2 mg at 02/03/25 1844    hydrALAZINE (Apresoline) injection 10-20 mg  10-20 mg Intravenous Q4HRS PRN Juvencio Mejia A.P.R.N.        carvedilol (Coreg) tablet 25 mg  25 mg Oral BID WITH MEALS Juvencio Mejia A.P.R.N.   25 mg at 02/04/25 0836    sacubitril-valsartan (Entresto) 49-51 MG 1 Tablet  1 Tablet Oral BID Juvencio Mejia A.P.R.N.   1 Tablet at 02/04/25 0511    famotidine (Pepcid) tablet 20 mg  20 mg Oral QDAY PRN Juvencio Mejia A.P.R.N.        apixaban (Eliquis) tablet 5 mg  5 mg Oral BID Juvencio Mejia A.P.R.N.   5 mg at 02/04/25 0511       Allergies  Allergies   Allergen Reactions    Albumin Unspecified     PT is unsure     Ezetimibe Rash     Itchy bumps/rash on face    Atorvastatin Myalgia    Codeine Vomiting and Nausea     Clarified with patient - states that she has an \"upset stomach\" when she takes it    Eggs Diarrhea     Pt states that she is allergic to eggs per her mother.  Tolerated clevidipine 11/2022    Honey Hives    Hydroxychloroquine Unspecified     Eye changes    Jardiance [Empagliflozin] Unspecified     Frequent UTI    Lisinopril Cough    Losartan Unspecified     Tried in 2017  Cannot recall reaction    Penicillins " "Unspecified     \"does not work\" .'IMMUNE TO PENICILLIN,AMPICILLIN IS THE ONLY THING THAT WORKS'  PT declines    Statins [Hmg-Coa-R Inhibitors] Myalgia     Muscle Spasms   RXN=unknown    Wasp Venom Swelling     Swelled at injection       Physical Exam  Temp:  [36.1 °C (97 °F)-37.2 °C (98.9 °F)] 37.2 °C (98.9 °F)  Pulse:  [74-96] 77  Resp:  [8-25] 18  BP: ()/(50-78) 124/59  SpO2:  [91 %-100 %] 96 %    Physical Exam  Vitals reviewed.   Constitutional:       Appearance: She is not ill-appearing.   HENT:      Head: Normocephalic.      Nose: Nose normal.      Mouth/Throat:      Mouth: Mucous membranes are moist.   Eyes:      Extraocular Movements: Extraocular movements intact.      Conjunctiva/sclera: Conjunctivae normal.   Cardiovascular:      Rate and Rhythm: Normal rate and regular rhythm.      Heart sounds: No murmur heard.  Pulmonary:      Effort: No respiratory distress.      Breath sounds: Normal breath sounds.   Abdominal:      General: Bowel sounds are normal. There is no distension.      Palpations: Abdomen is soft.   Musculoskeletal:      Cervical back: Neck supple.      Right lower leg: No edema.      Left lower leg: No edema.   Lymphadenopathy:      Cervical: No cervical adenopathy.   Skin:     General: Skin is dry.   Neurological:      General: No focal deficit present.      Mental Status: She is alert and oriented to person, place, and time.      Cranial Nerves: No cranial nerve deficit.   Psychiatric:         Mood and Affect: Mood normal.         Behavior: Behavior normal.         Fluids      Laboratory  Recent Labs     02/03/25  1055 02/04/25  0311   WBC 7.6 8.3   RBC 3.18* 2.55*   HEMOGLOBIN 9.9* 8.0*   HEMATOCRIT 30.1* 25.1*   MCV 94.7 98.4*   MCH 31.1 31.4   MCHC 32.9 31.9*   RDW 44.6 46.2   PLATELETCT 272 213   MPV 9.9 9.9     Recent Labs     02/03/25  1055 02/04/25  0311   SODIUM 137 137   POTASSIUM 4.6 4.3   CHLORIDE 105 107   CO2 18* 17*   GLUCOSE 89 175*   BUN 42* 38*   CREATININE 1.64* " 1.97*   CALCIUM 9.7 8.3*     Recent Labs     02/03/25  1055   INR 1.54*                 Imaging  DX-HIP-COMPLETE - UNILATERAL 2+ RIGHT   Final Result      Osteoarthritic degenerative changes of the hips without acute fracture or malalignment.      IR-EMBOLIZE-NEURO-INTRACRANIAL   Final Result   Impression:      Stent assisted coil embolization of a left ICA terminus aneurysm as described above. Final angiographic images demonstrate satisfactory occlusion of the aneurysm with no significant filling within the aneurysm. Patient will be followed up in the neuro    interventional clinic. Patient will remain on Brilinta and Eliquis for 90 days following which she will discontinue Brilinta therapy.      Lilliana PRESCOTT was physically present and participated during the entire procedure of the IR-EMBOLIZE-NEURO-INTRACRANIAL.          Assessment/Plan  Aneurysm of left internal carotid artery  Assessment & Plan  S/p stenting  On apixaban, aspirin  Follow up with interventional radiology    TAN (acute kidney injury) ([facility])- (present on admission)  Assessment & Plan  2/4/25 Cr:1.9 <1.6  Acutely abnormal  IV fluids  No sign of volume overload  AM 2/5 labs ordered  Review for any renal toxic meds.  Continue Entresto for now.    HFrEF (heart failure with reduced ejection fraction) ([facility])- (present on admission)  Assessment & Plan  Entresto, coreg, asa,   No acute exacerbation    PAF (paroxysmal atrial fibrillation) ([facility])- (present on admission)  Assessment & Plan  On Eliquis and coreg  Monitor on telemetry  Paced rhythm on 2/4/25 tele    Gastroesophageal reflux disease without esophagitis- (present on admission)  Assessment & Plan  famotidine    Dyslipidemia- (present on admission)  Assessment & Plan  Statin therapy

## 2025-02-04 NOTE — ASSESSMENT & PLAN NOTE
Multifactorial etiology  UO adequate  Avoid nephrotoxins  Serial BMP  Gentle hydration with IV fluids n.p.o. as well  Berger catheter as needed  Prudent to keep for 24 hours and monitor function before discharge

## 2025-02-04 NOTE — ASSESSMENT & PLAN NOTE
Worsening renal function  2/9/2025  Hold further Lasix  Patient is requesting to be on ampicillin for ongoing UTI.  Continue ampicillin for another 1 to 2 days  I will continue Eliquis and Brilinta for now as a no concern for active GI bleed  Repeat CBC in a.m. to monitor hemoglobin,   Repeat BMP in a.m. to monitor lites, renal function  Transfuse as needed   Zofran as needed for ongoing nausea and vomiting  Requiring IV narcotics for pain management, monitor for toxicity  Case discussed with nephrology Dr. Garcia

## 2025-02-04 NOTE — OR NURSING
Patient complaining of sore throat.  To help  minimize pain in throat, given and tolerating small bits of ice chips; no complaints of nausea.  Patient on aspiration precautions; no signs/symptoms noted.  Patient complaining of pain in right groin; emotional support given.   Patient on O2@ 2 l/m via nasal cannula  Patient remains flat as directed  Neuro checks as directed: push pull equal and strong bilateral extremities, denies numbness or tingling    1630  Transferred to room via gurney  O2 tank > 50% full  Pertinent post op orders reviewed with receiving RN

## 2025-02-04 NOTE — DISCHARGE PLANNING
Case Management Discharge Planning    Admission Date: 2/3/2025  GMLOS: 3  ALOS: 1    6-Clicks ADL Score:    6-Clicks Mobility Score:    PT and/or OT Eval ordered: Yes  Post-acute Referrals Ordered: No  Post-acute Choice Obtained: No  Has referral(s) been sent to post-acute provider:  NA    Anticipated Discharge Dispo: Discharge Disposition: Discharged to home/self care (01)    DME Needed: No    Action(s) Taken:   Chart review completed. Patient was discussed during IDT rounds.     Discharge assessment was completed (see below).     Escalations Completed: None    Medically Clear: No    Next Steps:  ALEXANDRA RN to follow up with medical team to discuss discharge barriers or needs.     Barriers to Discharge: Medical clearance    Care Transition Team Assessment    CM RN met with pt at the bedside to complete discharge assessment. CM RN Introduced self and department roles. Pt was A/Ox4 and agreeable to assessment.     Case management role discussed; understanding of case management role verbalized.   Demographics on face sheet verified.   Please see H&P for pertinent PMH and reason for admission.   Housing: Pt lived in an  with her son Ajay and roommate Jhonny at the address verified on the face sheet.   IADLS/ADLS: Pt was independent with ADL/IADLS and used a FWW or cane to ambulate PTA.   Transportation: Pt does not drive and relies on [employer] and insurance covered transportation benefits as needed.   DME: Cane and FWW  O2: None owned or used PTA.   Discharge support: Pt reported good family support in the community from both sons Ajay and Tommy and her sister Shanita.   Patient's PCP is: Jenna Hernandez M.D.   Patient's preferred pharmacy is: [facility]   Insurance: [employer] Medicare Advantage and Medicaid FFS.   Monthly Income/employment status: Retired and receives SSI.    AD paperwork: None on file.   Mental Health Concerns: None indicated.   Substance Abuse: None indicated  Prior services: None - Home  independent.   Discharge planning: Rehab vs SNF vs HH    Pt's goal is to return home with family upon medical clearance. Pending PT/OT evaluation for determination of discharge disposition.    Information Source  Orientation Level: Oriented X4  Information Given By: Patient  Who is responsible for making decisions for patient? : Patient    Readmission Evaluation  Is this a readmission?: No    Elopement Risk  Legal Hold: No  Ambulatory or Self Mobile in Wheelchair: No-Not an Elopement Risk  Elopement Risk: Not at Risk for Elopement    Discharge Preparedness  What is your plan after discharge?: Home with help  What are your discharge supports?: Child, Sibling  Prior Functional Level: Ambulatory, Independent with Activities of Daily Living, Drives Self, Independent with Medication Management    Functional Assesment  Prior Functional Level: Ambulatory, Independent with Activities of Daily Living, Drives Self, Independent with Medication Management    Finances  Financial Barriers to Discharge: No  Prescription Coverage: Yes    Vision / Hearing Impairment  Right Eye Vision: Wears Glasses  Left Eye Vision: Wears Glasses    Advance Directive  Advance Directive?: None    Psychological Assessment  History of Substance Abuse: None  History of Psychiatric Problems: No    Discharge Risks or Barriers  Discharge risks or barriers?: Complex medical needs  Patient risk factors: Complex medical needs    Anticipated Discharge Information  Discharge Disposition: Discharged to home/self care (01)

## 2025-02-04 NOTE — OR NURSING
1600  Bleeding noted at right groin.  Manual pressure held for 10 minutes.  Dressing taken off, oozing from site noted; manual pressure applied.     1615  Dr Cuellar notified of bleeding from right groin.  No further orders    1625 Right groin site redressed with gauze, tegaderm.  Sandbag applied  Patient complaining of pain in right groin; medicated

## 2025-02-04 NOTE — PROGRESS NOTES
Right groin site oozing despite dressing change and 10lb sandbag in place. APRN notified. Manual pressure held [id number] and dressing changed.    1820 - Dressing continues to be clean/dry intact. Will continue to monitor.

## 2025-02-04 NOTE — ANESTHESIA TIME REPORT
Anesthesia Start and Stop Event Times       Date Time Event    2/3/2025 1229 Ready for Procedure     1240 Anesthesia Start     1516 Anesthesia Stop          Responsible Staff  02/03/25      Name Role Begin End    Tommy Duran M.D. Anesth 1240 1516          Overtime Reason:  overtime    Comments:

## 2025-02-04 NOTE — CARE PLAN
The patient is Watcher - Medium risk of patient condition declining or worsening    Shift Goals  Clinical Goals: -140, S/P thrombectomy protocol, stable neuro exam, monitor for groin site bleeding  Patient Goals: Rest, pain control  Family Goals: SUNDAR    Progress made toward(s) clinical / shift goals:        Problem: Knowledge Deficit - Standard  Goal: Patient and family/care givers will demonstrate understanding of plan of care, disease process/condition, diagnostic tests and medications  Outcome: Progressing     Problem: Fall Risk  Goal: Patient will remain free from falls  Outcome: Progressing     Problem: Neuro Status  Goal: Neuro status will remain stable or improve  Outcome: Progressing       Patient is not progressing towards the following goals:      Problem: Pain - Standard  Goal: Alleviation of pain or a reduction in pain to the patient’s comfort goal  Outcome: Not Progressing     Problem: Self Care  Goal: Patient will have the ability to perform ADLs independently or with assistance (bathe, groom, dress, toilet and feed)  Outcome: Not Progressing

## 2025-02-05 ENCOUNTER — APPOINTMENT (OUTPATIENT)
Dept: RADIOLOGY | Facility: MEDICAL CENTER | Age: 75
DRG: 026 | End: 2025-02-05
Attending: STUDENT IN AN ORGANIZED HEALTH CARE EDUCATION/TRAINING PROGRAM
Payer: MEDICARE

## 2025-02-05 DIAGNOSIS — I67.1 ANEURYSM OF LEFT INTERNAL CAROTID ARTERY: ICD-10-CM

## 2025-02-05 PROBLEM — D64.9 ANEMIA: Status: ACTIVE | Noted: 2025-02-05

## 2025-02-05 LAB
ABO GROUP BLD: NORMAL
ANION GAP SERPL CALC-SCNC: 11 MMOL/L (ref 7–16)
APPEARANCE UR: CLEAR
BACTERIA #/AREA URNS HPF: ABNORMAL /HPF
BARCODED ABORH UBTYP: 6200
BARCODED PRD CODE UBPRD: NORMAL
BARCODED UNIT NUM UBUNT: NORMAL
BASOPHILS # BLD AUTO: 0.2 % (ref 0–1.8)
BASOPHILS # BLD: 0.02 K/UL (ref 0–0.12)
BILIRUB UR QL STRIP.AUTO: NEGATIVE
BLD GP AB SCN SERPL QL: NORMAL
BUN SERPL-MCNC: 27 MG/DL (ref 8–22)
CALCIUM SERPL-MCNC: 8.8 MG/DL (ref 8.5–10.5)
CASTS URNS QL MICRO: ABNORMAL /LPF (ref 0–2)
CHLORIDE SERPL-SCNC: 107 MMOL/L (ref 96–112)
CO2 SERPL-SCNC: 19 MMOL/L (ref 20–33)
COLOR UR: YELLOW
COMPONENT R 8504R: NORMAL
CREAT SERPL-MCNC: 1.79 MG/DL (ref 0.5–1.4)
EOSINOPHIL # BLD AUTO: 0.28 K/UL (ref 0–0.51)
EOSINOPHIL NFR BLD: 3.2 % (ref 0–6.9)
EPITHELIAL CELLS 1715: ABNORMAL /HPF (ref 0–5)
ERYTHROCYTE [DISTWIDTH] IN BLOOD BY AUTOMATED COUNT: 45.2 FL (ref 35.9–50)
FERRITIN SERPL-MCNC: 95.3 NG/ML (ref 10–291)
GFR SERPLBLD CREATININE-BSD FMLA CKD-EPI: 29 ML/MIN/1.73 M 2
GLUCOSE SERPL-MCNC: 110 MG/DL (ref 65–99)
GLUCOSE UR STRIP.AUTO-MCNC: NEGATIVE MG/DL
HCT VFR BLD AUTO: 22 % (ref 37–47)
HCT VFR BLD AUTO: 24.6 % (ref 37–47)
HCT VFR BLD AUTO: 28.4 % (ref 37–47)
HGB BLD-MCNC: 7.1 G/DL (ref 12–16)
HGB BLD-MCNC: 7.9 G/DL (ref 12–16)
HGB BLD-MCNC: 9.2 G/DL (ref 12–16)
IMM GRANULOCYTES # BLD AUTO: 0.02 K/UL (ref 0–0.11)
IMM GRANULOCYTES NFR BLD AUTO: 0.2 % (ref 0–0.9)
IRON SATN MFR SERPL: 4 % (ref 15–55)
IRON SERPL-MCNC: 11 UG/DL (ref 40–170)
KETONES UR STRIP.AUTO-MCNC: NEGATIVE MG/DL
LEUKOCYTE ESTERASE UR QL STRIP.AUTO: ABNORMAL
LYMPHOCYTES # BLD AUTO: 0.9 K/UL (ref 1–4.8)
LYMPHOCYTES NFR BLD: 10.3 % (ref 22–41)
MCH RBC QN AUTO: 30.7 PG (ref 27–33)
MCHC RBC AUTO-ENTMCNC: 32.3 G/DL (ref 32.2–35.5)
MCV RBC AUTO: 95.2 FL (ref 81.4–97.8)
MICRO URNS: ABNORMAL
MONOCYTES # BLD AUTO: 0.97 K/UL (ref 0–0.85)
MONOCYTES NFR BLD AUTO: 11.1 % (ref 0–13.4)
NEUTROPHILS # BLD AUTO: 6.51 K/UL (ref 1.82–7.42)
NEUTROPHILS NFR BLD: 75 % (ref 44–72)
NITRITE UR QL STRIP.AUTO: NEGATIVE
NRBC # BLD AUTO: 0 K/UL
NRBC BLD-RTO: 0 /100 WBC (ref 0–0.2)
PH UR STRIP.AUTO: 6 [PH] (ref 5–8)
PLATELET # BLD AUTO: 190 K/UL (ref 164–446)
PMV BLD AUTO: 9.8 FL (ref 9–12.9)
POTASSIUM SERPL-SCNC: 4 MMOL/L (ref 3.6–5.5)
PRODUCT TYPE UPROD: NORMAL
PROT UR QL STRIP: 30 MG/DL
PROT UR-MCNC: 46.9 MG/DL (ref 0–15)
RBC # BLD AUTO: 2.31 M/UL (ref 4.2–5.4)
RBC # URNS HPF: ABNORMAL /HPF (ref 0–2)
RBC UR QL AUTO: ABNORMAL
RH BLD: NORMAL
SODIUM SERPL-SCNC: 137 MMOL/L (ref 135–145)
SP GR UR STRIP.AUTO: 1.01
TIBC SERPL-MCNC: 249 UG/DL (ref 250–450)
UIBC SERPL-MCNC: 238 UG/DL (ref 110–370)
UNIT STATUS USTAT: NORMAL
UROBILINOGEN UR STRIP.AUTO-MCNC: 0.2 EU/DL
WBC # BLD AUTO: 8.7 K/UL (ref 4.8–10.8)
WBC #/AREA URNS HPF: ABNORMAL /HPF

## 2025-02-05 PROCEDURE — 85025 COMPLETE CBC W/AUTO DIFF WBC: CPT

## 2025-02-05 PROCEDURE — 700102 HCHG RX REV CODE 250 W/ 637 OVERRIDE(OP)

## 2025-02-05 PROCEDURE — 700102 HCHG RX REV CODE 250 W/ 637 OVERRIDE(OP): Performed by: RADIOLOGY

## 2025-02-05 PROCEDURE — 85014 HEMATOCRIT: CPT

## 2025-02-05 PROCEDURE — 80048 BASIC METABOLIC PNL TOTAL CA: CPT

## 2025-02-05 PROCEDURE — 86900 BLOOD TYPING SEROLOGIC ABO: CPT

## 2025-02-05 PROCEDURE — 83550 IRON BINDING TEST: CPT

## 2025-02-05 PROCEDURE — 99233 SBSQ HOSP IP/OBS HIGH 50: CPT | Performed by: STUDENT IN AN ORGANIZED HEALTH CARE EDUCATION/TRAINING PROGRAM

## 2025-02-05 PROCEDURE — 700102 HCHG RX REV CODE 250 W/ 637 OVERRIDE(OP): Performed by: STUDENT IN AN ORGANIZED HEALTH CARE EDUCATION/TRAINING PROGRAM

## 2025-02-05 PROCEDURE — P9016 RBC LEUKOCYTES REDUCED: HCPCS

## 2025-02-05 PROCEDURE — 81001 URINALYSIS AUTO W/SCOPE: CPT

## 2025-02-05 PROCEDURE — 86850 RBC ANTIBODY SCREEN: CPT

## 2025-02-05 PROCEDURE — 700105 HCHG RX REV CODE 258: Performed by: STUDENT IN AN ORGANIZED HEALTH CARE EDUCATION/TRAINING PROGRAM

## 2025-02-05 PROCEDURE — 82728 ASSAY OF FERRITIN: CPT

## 2025-02-05 PROCEDURE — 36430 TRANSFUSION BLD/BLD COMPNT: CPT

## 2025-02-05 PROCEDURE — 97535 SELF CARE MNGMENT TRAINING: CPT

## 2025-02-05 PROCEDURE — A9270 NON-COVERED ITEM OR SERVICE: HCPCS

## 2025-02-05 PROCEDURE — 770001 HCHG ROOM/CARE - MED/SURG/GYN PRIV*

## 2025-02-05 PROCEDURE — 700105 HCHG RX REV CODE 258: Performed by: HOSPITALIST

## 2025-02-05 PROCEDURE — 86901 BLOOD TYPING SEROLOGIC RH(D): CPT

## 2025-02-05 PROCEDURE — A9270 NON-COVERED ITEM OR SERVICE: HCPCS | Performed by: RADIOLOGY

## 2025-02-05 PROCEDURE — 85018 HEMOGLOBIN: CPT

## 2025-02-05 PROCEDURE — A9270 NON-COVERED ITEM OR SERVICE: HCPCS | Performed by: STUDENT IN AN ORGANIZED HEALTH CARE EDUCATION/TRAINING PROGRAM

## 2025-02-05 PROCEDURE — 36415 COLL VENOUS BLD VENIPUNCTURE: CPT

## 2025-02-05 PROCEDURE — 84156 ASSAY OF PROTEIN URINE: CPT

## 2025-02-05 PROCEDURE — 97162 PT EVAL MOD COMPLEX 30 MIN: CPT

## 2025-02-05 PROCEDURE — 83540 ASSAY OF IRON: CPT

## 2025-02-05 PROCEDURE — 700111 HCHG RX REV CODE 636 W/ 250 OVERRIDE (IP): Mod: JZ | Performed by: STUDENT IN AN ORGANIZED HEALTH CARE EDUCATION/TRAINING PROGRAM

## 2025-02-05 PROCEDURE — 86923 COMPATIBILITY TEST ELECTRIC: CPT

## 2025-02-05 RX ORDER — OXYCODONE HYDROCHLORIDE 5 MG/1
5 TABLET ORAL
Status: DISCONTINUED | OUTPATIENT
Start: 2025-02-05 | End: 2025-02-10 | Stop reason: HOSPADM

## 2025-02-05 RX ORDER — MORPHINE SULFATE 4 MG/ML
1 INJECTION INTRAVENOUS EVERY 8 HOURS PRN
Status: DISCONTINUED | OUTPATIENT
Start: 2025-02-05 | End: 2025-02-08

## 2025-02-05 RX ORDER — SODIUM CHLORIDE 9 MG/ML
INJECTION, SOLUTION INTRAVENOUS CONTINUOUS
Status: ACTIVE | OUTPATIENT
Start: 2025-02-05 | End: 2025-02-06

## 2025-02-05 RX ORDER — OXYCODONE HYDROCHLORIDE 10 MG/1
10 TABLET ORAL
Status: DISCONTINUED | OUTPATIENT
Start: 2025-02-05 | End: 2025-02-10 | Stop reason: HOSPADM

## 2025-02-05 RX ORDER — PANTOPRAZOLE SODIUM 40 MG/10ML
40 INJECTION, POWDER, LYOPHILIZED, FOR SOLUTION INTRAVENOUS DAILY
Status: DISCONTINUED | OUTPATIENT
Start: 2025-02-05 | End: 2025-02-05

## 2025-02-05 RX ORDER — PANTOPRAZOLE SODIUM 40 MG/10ML
40 INJECTION, POWDER, LYOPHILIZED, FOR SOLUTION INTRAVENOUS 2 TIMES DAILY
Status: DISCONTINUED | OUTPATIENT
Start: 2025-02-05 | End: 2025-02-10 | Stop reason: HOSPADM

## 2025-02-05 RX ADMIN — OXYCODONE 5 MG: 5 TABLET ORAL at 11:52

## 2025-02-05 RX ADMIN — CARVEDILOL 25 MG: 25 TABLET, FILM COATED ORAL at 08:01

## 2025-02-05 RX ADMIN — CARVEDILOL 25 MG: 25 TABLET, FILM COATED ORAL at 16:08

## 2025-02-05 RX ADMIN — OXYCODONE HYDROCHLORIDE 10 MG: 10 TABLET ORAL at 16:08

## 2025-02-05 RX ADMIN — OXYCODONE HYDROCHLORIDE 10 MG: 10 TABLET ORAL at 23:15

## 2025-02-05 RX ADMIN — ACETAMINOPHEN 650 MG: 325 TABLET ORAL at 08:01

## 2025-02-05 RX ADMIN — SODIUM CHLORIDE: 0.9 INJECTION, SOLUTION INTRAVENOUS at 16:15

## 2025-02-05 RX ADMIN — APIXABAN 5 MG: 5 TABLET, FILM COATED ORAL at 16:07

## 2025-02-05 RX ADMIN — SODIUM CHLORIDE 250 MG: 9 INJECTION, SOLUTION INTRAVENOUS at 16:18

## 2025-02-05 RX ADMIN — APIXABAN 5 MG: 5 TABLET, FILM COATED ORAL at 04:29

## 2025-02-05 RX ADMIN — TICAGRELOR 90 MG: 90 TABLET ORAL at 16:09

## 2025-02-05 RX ADMIN — TICAGRELOR 90 MG: 90 TABLET ORAL at 04:29

## 2025-02-05 RX ADMIN — PANTOPRAZOLE SODIUM 40 MG: 40 INJECTION, POWDER, FOR SOLUTION INTRAVENOUS at 12:44

## 2025-02-05 RX ADMIN — Medication: at 23:18

## 2025-02-05 RX ADMIN — SENNOSIDES AND DOCUSATE SODIUM 2 TABLET: 50; 8.6 TABLET ORAL at 16:08

## 2025-02-05 RX ADMIN — SACUBITRIL AND VALSARTAN 1 TABLET: 49; 51 TABLET, FILM COATED ORAL at 04:28

## 2025-02-05 RX ADMIN — OXYCODONE 5 MG: 5 TABLET ORAL at 01:39

## 2025-02-05 RX ADMIN — POLYETHYLENE GLYCOL 3350 1 PACKET: 17 POWDER, FOR SOLUTION ORAL at 04:28

## 2025-02-05 RX ADMIN — SODIUM CHLORIDE, POTASSIUM CHLORIDE, SODIUM LACTATE AND CALCIUM CHLORIDE: 600; 310; 30; 20 INJECTION, SOLUTION INTRAVENOUS at 01:07

## 2025-02-05 ASSESSMENT — PAIN DESCRIPTION - PAIN TYPE
TYPE: ACUTE PAIN

## 2025-02-05 ASSESSMENT — COGNITIVE AND FUNCTIONAL STATUS - GENERAL
TURNING FROM BACK TO SIDE WHILE IN FLAT BAD: A LITTLE
SUGGESTED CMS G CODE MODIFIER MOBILITY: CK
SUGGESTED CMS G CODE MODIFIER MOBILITY: CL
WALKING IN HOSPITAL ROOM: A LITTLE
DRESSING REGULAR UPPER BODY CLOTHING: A LITTLE
WALKING IN HOSPITAL ROOM: A LOT
DAILY ACTIVITIY SCORE: 20
SUGGESTED CMS G CODE MODIFIER DAILY ACTIVITY: CJ
TOILETING: A LITTLE
DRESSING REGULAR LOWER BODY CLOTHING: A LITTLE
MOBILITY SCORE: 12
STANDING UP FROM CHAIR USING ARMS: A LITTLE
CLIMB 3 TO 5 STEPS WITH RAILING: A LITTLE
MOVING FROM LYING ON BACK TO SITTING ON SIDE OF FLAT BED: A LOT
TURNING FROM BACK TO SIDE WHILE IN FLAT BAD: A LOT
STANDING UP FROM CHAIR USING ARMS: A LOT
MOVING TO AND FROM BED TO CHAIR: A LITTLE
MOBILITY SCORE: 18
HELP NEEDED FOR BATHING: A LITTLE
MOVING TO AND FROM BED TO CHAIR: A LOT
CLIMB 3 TO 5 STEPS WITH RAILING: A LOT
MOVING FROM LYING ON BACK TO SITTING ON SIDE OF FLAT BED: A LITTLE

## 2025-02-05 ASSESSMENT — ENCOUNTER SYMPTOMS
TREMORS: 0
CHILLS: 0
BLURRED VISION: 0
SHORTNESS OF BREATH: 0
TINGLING: 0
FEVER: 0
SPEECH CHANGE: 0
DIZZINESS: 0
HEADACHES: 0
FOCAL WEAKNESS: 0
HEARTBURN: 0
VOMITING: 0
NAUSEA: 0
ABDOMINAL PAIN: 0
BLOOD IN STOOL: 0
SENSORY CHANGE: 0
COUGH: 0

## 2025-02-05 ASSESSMENT — GAIT ASSESSMENTS
DISTANCE (FEET): 30
DEVIATION: BRADYKINETIC;ANTALGIC
GAIT LEVEL OF ASSIST: CONTACT GUARD ASSIST
ASSISTIVE DEVICE: FRONT WHEEL WALKER

## 2025-02-05 NOTE — PROGRESS NOTES
Hospital Medicine Daily Progress Note    Date of Service  2/5/2025    Chief Complaint  Lauren Dave is a 74 y.o. female admitted 2/3/2025 for left ICA aneurysm embolization    Hospital Course  Lauren Dave is a 74 y.o. female with a hx of breast cancer s/p tx, HFrEF:35%, CAD (severe 3vessel disease on cath 10/19/22), discoid lupus, dyslipidemia, and essential HTN. who presented 2/3/2025 for elective anal embolization.  Underwent Left ICA terminus aneurysm embolization  on 2/3/2025 .  Postprocedure was admitted to ICU for close monitoring.  Hospital course complicated with severe anemia and TAN.      Interval Problem Update    2/5/2025  Seen and examined at bedside  Vitals remained stable  Denies any new weakness/numbness, denies any melena, hematemesis  Labs noted with severe anemia hemoglobin 7.1 which is significant drop from baseline.  Kidney function slightly improved BUN/creatinine 27/1.79, iron panel consistent with iron deficiency anemia  Procedure note reviewed, chart reviewed, meds reviewed    Plan  Started on IV fluid, need close monitoring of volume overload  I will continue Eliquis and Brilinta for now as a no concern for active GI bleed  Started on IV iron for iron deficiency anemia  Currently there is no concern for active GI bleed, likely had postoperative blood loss.  I will monitor H&H every 8 hours  I will add Protonix for now  I will transfuse 1 unit PRBC due to patient's significant history of CAD.  Check UA, urine lites, renal ultrasound for ongoing TAN  Need nephrology evaluation in a.m. if renal function continue to worsen.  Repeat BMP in a.m.  Monitor for toxicity while on IV narcotics    Case was discussed with radiology AYAKA BAUTISTA            I have discussed this patient's plan of care and discharge plan at IDT rounds today with Case Management, Nursing, Nursing leadership, and other members of the IDT team.    Consultants/Specialty  Interventional radiology    Code Status  Full  Code    Disposition  The patient is not medically cleared for discharge to home or a post-acute facility.  Anticipate discharge to: home with close outpatient follow-up    I have placed the appropriate orders for post-discharge needs.    Review of Systems  Review of Systems   Respiratory:  Negative for shortness of breath.    Cardiovascular:  Negative for chest pain.   Gastrointestinal:  Negative for blood in stool, melena, nausea and vomiting.   Neurological:  Negative for sensory change, speech change and focal weakness.        Physical Exam  Temp:  [36.8 °C (98.2 °F)-37.1 °C (98.8 °F)] 36.9 °C (98.5 °F)  Pulse:  [75-85] 85  Resp:  [15-20] 17  BP: (100-146)/(45-58) 146/58  SpO2:  [91 %-96 %] 93 %    Physical Exam  Constitutional:       Appearance: Normal appearance.   Cardiovascular:      Rate and Rhythm: Normal rate.      Pulses: Normal pulses.      Heart sounds: Normal heart sounds.   Pulmonary:      Effort: Pulmonary effort is normal. No respiratory distress.   Abdominal:      General: Abdomen is flat.   Musculoskeletal:      Right lower leg: No edema.      Left lower leg: No edema.      Comments: Right groin with no concern for hematoma   Neurological:      General: No focal deficit present.      Mental Status: She is alert.   Psychiatric:         Mood and Affect: Mood normal.         Fluids    Intake/Output Summary (Last 24 hours) at 2/5/2025 1429  Last data filed at 2/5/2025 0418  Gross per 24 hour   Intake 1003.27 ml   Output 200 ml   Net 803.27 ml        Laboratory  Recent Labs     02/03/25  1055 02/04/25  0311 02/05/25  0906   WBC 7.6 8.3 8.7   RBC 3.18* 2.55* 2.31*   HEMOGLOBIN 9.9* 8.0* 7.1*   HEMATOCRIT 30.1* 25.1* 22.0*   MCV 94.7 98.4* 95.2   MCH 31.1 31.4 30.7   MCHC 32.9 31.9* 32.3   RDW 44.6 46.2 45.2   PLATELETCT 272 213 190   MPV 9.9 9.9 9.8     Recent Labs     02/03/25  1055 02/04/25  0311 02/05/25  0906   SODIUM 137 137 137   POTASSIUM 4.6 4.3 4.0   CHLORIDE 105 107 107   CO2 18* 17* 19*    GLUCOSE 89 175* 110*   BUN 42* 38* 27*   CREATININE 1.64* 1.97* 1.79*   CALCIUM 9.7 8.3* 8.8     Recent Labs     02/03/25  1055   INR 1.54*               Imaging  DX-HIP-COMPLETE - UNILATERAL 2+ RIGHT   Final Result      Osteoarthritic degenerative changes of the hips without acute fracture or malalignment.      IR-EMBOLIZE-NEURO-INTRACRANIAL   Final Result   Impression:      Stent assisted coil embolization of a left ICA terminus aneurysm as described above. Final angiographic images demonstrate satisfactory occlusion of the aneurysm with no significant filling within the aneurysm. Patient will be followed up in the neuro    interventional clinic. Patient will remain on Brilinta and Eliquis for 90 days following which she will discontinue Brilinta therapy.      I, Lilliana Cuellar was physically present and participated during the entire procedure of the IR-EMBOLIZE-NEURO-INTRACRANIAL.           Assessment/Plan  Anemia  Assessment & Plan  I will continue Eliquis and brilinta for now as a no concern for active GI bleed  Started on IV iron for iron deficiency anemia  Currently there is no concern for active GI bleed, likely had postoperative blood loss.  I will monitor H&H every 8 hours  I will add Protonix for now  I will transfuse 1 unit PRBC due to patient's significant history of CAD.    Aneurysm of left internal carotid artery  Assessment & Plan  S/p stenting  On apixaban, aspirin  Follow up with interventional radiology    TAN (acute kidney injury) (HCC)- (present on admission)  Assessment & Plan  Mostly due to  dehydration /likely from contrast used during procedure  Intravenous fluids was for 4 more load  Avoid nephrotoxins, monitor inputs and outputs  Renal ultrasound  Bladder scan  UA, urine electrolytes  Nephrology evaluation if renal function continue to worsens          HFrEF (heart failure with reduced ejection fraction) (HCC)- (present on admission)  Assessment & Plan  Entresto, coreg, asa,   No  acute exacerbation    2/5/2025  Euvolemic on exam  IV fluid started on gentle hydration for ongoing TAN, need monitoring for volume  overload    PAF (paroxysmal atrial fibrillation) (HCC)- (present on admission)  Assessment & Plan  On Eliquis and coreg  Monitor on telemetry  Paced rhythm on 2/4/25 tele    Gastroesophageal reflux disease without esophagitis- (present on admission)  Assessment & Plan  famotidine    Dyslipidemia- (present on admission)  Assessment & Plan  Statin therapy         VTE prophylaxis: eliquis    I have performed a physical exam and reviewed and updated ROS and Plan today (2/5/2025). In review of yesterday's note (2/4/2025), there are no changes except as documented above.

## 2025-02-05 NOTE — THERAPY
Physical Therapy   Initial Evaluation     Patient Name: Lauren Dave  Age:  74 y.o., Sex:  female  Medical Record #: [id number]  Today's Date: 2/5/2025     Precautions  Precautions: Fall Risk  Comments: -140, residual RLE deficits from CVA    Assessment  Patient is a 74 y.o. female with hx of breast cancer s/p tx, HFrEF:35%, CAD (severe 3vessel disease on cath 10/19/22), discoid lupus, dyslipidemia, essential HTN, and CVA with residual RLE deficits. Pt admitted with left ICA aneurysm s/p elective coiling and stent placement 2/4, TAN. PT eval complete, pt currently presents slightly below her functional baseline due to impaired strength, coordination, balance, gait, and overall mobility. Pt is generally at [facility] for activity in the room with FWW, limited most by fatigue. Anticipate that pt will progress while admitted and be able to DC home with HHPT and family support once medically cleared. Will follow while admitted.     Plan    Physical Therapy Initial Treatment Plan   Treatment Plan : Bed Mobility, Gait Training, Neuro Re-Education / Balance, Self Care / Home Evaluation, Stair Training, Therapeutic Activities, Therapeutic Exercise  Treatment Frequency: 5 Times per Week  Duration: Until Therapy Goals Met    DC Equipment Recommendations: None  Discharge Recommendations: Recommend home health for continued physical therapy services (once pt is able to ambulate household distances.)         Vitals   O2 (LPM) 0   O2 Delivery Device None - Room Air   Vitals Comments SBP within parameters   Pain 0 - 10 Group   Location Hip   Location Orientation Right   Pain Rating Scale (NPRS)   (not quantified)   Description Aching   Therapist Pain Assessment During Activity;Post Activity Pain Same as Prior to Activity   Prior Living Situation   Prior Services Home With Outpatient Therapy   Housing / Facility   (RV)   Steps Into Home 5   Steps In Home 2   Rail Both Rail (Steps into Home);Both Rail (Steps in Home)    Equipment Owned Front-Wheel Walker;Quad Cane   Lives with - Patient's Self Care Capacity Unrelated Adult;Adult Children   Comments reports her son is coming to stay with her and assist as needed   Prior Level of Functional Mobility   Bed Mobility Independent   Transfer Status Independent   Ambulation Independent   Ambulation Distance limited community distances   Assistive Devices Used Quad Cane   Stairs Independent   Cognition    Cognition / Consciousness WDL   Level of Consciousness Alert   Comments pleasant and participatory   Active ROM Lower Body    Active ROM Lower Body  WDL   Strength Lower Body   Lower Body Strength  X   Comments RLE grossly 4/5. reported baseline due to recent stroke   Coordination Lower Body    Coordination Lower Body  X   Comments RLE mildly, grossly impaired   Balance Assessment   Sitting Balance (Static) Fair   Sitting Balance (Dynamic) Fair   Standing Balance (Static) Fair -   Standing Balance (Dynamic) Fair -   Weight Shift Sitting Fair   Weight Shift Standing Fair   Comments FWW   Bed Mobility    Supine to Sit Standby Assist   Scooting Standby Assist   Comments increased time/effort. HOB raised   Gait Analysis   Gait Level Of Assist Contact Guard Assist   Assistive Device Front Wheel Walker   Distance (Feet) 30   # of Times Distance was Traveled 2   Deviation Bradykinetic;Antalgic  (mildly decreased step length on right)   # of Stairs Climbed 0   Weight Bearing Status no restrictions   Comments distance limited by fatigue   Functional Mobility   Sit to Stand Contact Guard Assist   Bed, Chair, Wheelchair Transfer Contact Guard Assist   Toilet Transfers Contact Guard Assist   Mobility FWW   6 Clicks Assessment - How much HELP from from another person do you currently need... (If the patient hasn't done an activity recently, how much help from another person do you think he/she would need if he/she tried?)   Turning from your back to your side while in a flat bed without using  bedrails? 3   Moving from lying on your back to sitting on the side of a flat bed without using bedrails? 3   Moving to and from a bed to a chair (including a wheelchair)? 3   Standing up from a chair using your arms (e.g., wheelchair, or bedside chair)? 3   Walking in hospital room? 3   Climbing 3-5 steps with a railing? 3   6 clicks Mobility Score 18   Short Term Goals    Short Term Goal # 1 pt will move supine<>eob with spv in 6 tx for bed mobility.   Short Term Goal # 2 pt will complete spt with fww and spv in 6 tx for functional mobility.   Short Term Goal # 3 pt will ambulate 150 ft with fww and spv in 6 tx for household distances.   Short Term Goal # 4 pt will negotiate 5 stairs with sba and BUE support in 6 tx for home access.   Education Group   Education Provided Role of Physical Therapist   Role of Physical Therapist Patient Response Patient;Acceptance;Explanation;Verbal Demonstration   Additional Comments education on pt's CLOF, returning home with assist vs placement, quad cane vs FWW   Physical Therapy Initial Treatment Plan    Treatment Plan  Bed Mobility;Gait Training;Neuro Re-Education / Balance;Self Care / Home Evaluation;Stair Training;Therapeutic Activities;Therapeutic Exercise   Treatment Frequency 5 Times per Week   Duration Until Therapy Goals Met   Problem List    Problems Impaired Bed Mobility;Impaired Transfers;Impaired Ambulation;Functional Strength Deficit;Impaired Balance;Impaired Coordination;Decreased Activity Tolerance   Anticipated Discharge Equipment and Recommendations   DC Equipment Recommendations None   Discharge Recommendations Recommend home health for continued physical therapy services  (once pt is able to ambulate household distances.)   Interdisciplinary Plan of Care Collaboration   IDT Collaboration with  Nursing   Patient Position at End of Therapy Seated;Chair Alarm On;Call Light within Reach;Tray Table within Reach;Phone within Reach   Collaboration Comments RN updated    Session Information   Date / Session Number  2/5- 1 (1/5, 2/11)

## 2025-02-05 NOTE — ASSESSMENT & PLAN NOTE
I will continue Eliquis and brilinta for now as a no concern for active GI bleed  S/p completion of IV iron  Currently there is no concern for active GI bleed, likely had postoperative blood loss.  Continue IV Protonix  Ordered CT  for evaluation of right groin to rule out hematoma, imaging reviewed noted to have a small amount of hemorrhage  No concern of bleeding, monitor H&H closely  GI evaluation if H&H continue to drop    2/9/2025  Stable H&H  Occult blood stool positive  No melena  Likely need EGD in future  If H&H keeps dropping I will have GI consult for endoscopy

## 2025-02-05 NOTE — PROGRESS NOTES
"Radiology Progress Note   Author: YULISSA Motta Date & Time created: 2/5/2025     Date of admission  2/3/2025  Note to reader: this note follows the APSO format rather than the historical SOAP format. Assessment and plan located at the top of the note for ease of use.    Chief Complaint  74 y.o. female admitted 2/3/2025 with incidental finding of left middle cerebral artery aneurysm for which she underwent coiling and stent placement of 7 mm left ICA aneurysm.        HPI  Shazia is a 74 yof with a pmhx of right breast cancer, HTN, HLD pacemenker, CAD,  ischemic CM and in 2022 underwent a CABG and developed hypoperfusion strokes. She has had deficits since then that impair her mobility. She presented to a new neurologist who initiated workup. Imaging revealed chronic infarcts from her strokes and an incidental left middle cerebral artery aneurysm for which she was referred to the Neuro Interventional Service for evaluation and management of this finding.  On 02/03/2025 she underwent coiling and stent placement of 7 mm left ICA terminus aneurysm by Dr. Chepe Cuellar.    Interval History:   02/04/2025-patient had some oozing from right groin  MELISSA site last night requiring manual pressure to be held.  Groin site this a.m. has some scant bruising to area however groin site is soft to palpate without any active bleeding at time of assessment.   Dressing is dry and intact.  The patient reports that she believes Brilinta is causing her itching on her legs and arms.  I explained the importance of taking Brilinta, but when I suggested the possibility of switching medications, she quickly responded, \"no, I want to keep taking Brilinta.\"  As a result she will continue with Brilinta for now.  I reviewed today's labs: WBC 8.3; H&H 8.0/25.1, Cr 1.97, GFR 44; Coags INR 1.54; platelet mapping present AA inhibition 41.3; percent inhibition ADP 43.1,  Micro -micro.  The patient's renal function has worsened, so she " "will be transferred to the floor and managed by the hospitalist team for ongoing evaluation, management of her renal function.  I discussed plan of care with patient patient son at bedside, the hospitalist and the ICU team.     02/05/2025- Patient transferred to the floor yesterday.  Patient reports that she has not experienced any \"itching\" today. I reviewed today's labs: WBC 8.7; H&H 7.1/22, Cr 1.79 down from yesterday 1.97, GFR 44; most recent Coags INR 1.54; platelet mapping present AA inhibition 41.3; percent inhibition ADP 43.1,  no micro.  I discussed plan of care with hospitalist and patient at bedside.  MELISSA groin site with scant bruising to area however groin site is soft to palpate without  bleeding.  R groin Dsg is dry and intact. I reviewed IDT notes       Assessment/Plan     Active Problems:    Dyslipidemia    Gastroesophageal reflux disease without esophagitis    S/P CABG x 3    PAF (paroxysmal atrial fibrillation) ([facility])    HFrEF (heart failure with reduced ejection fraction) ([facility])    TAN (acute kidney injury) ([facility])    Aneurysm of left internal carotid artery      Plan IR  -- Post MELISSA groin access site instructions: no lifting greater than 5 lbs and no baths/swimming/soaking in tub for 7-10 days. Shower OK. OK to change dressings/band aid as needed.  - Continue Brilinta 90 twice daily mg and Eliquis 5 mg twice daily for 6 months S/P procedure to assure stent patency   - Follow up appointment in 2-4 weeks with OP Neuro IR, our office to call and schedule (appointment has been ordered on 02/05/2025)  - Neuro Checks per ICU policy  - From a Neuro Interventional Service standpoint, OK to discharge when medically cleared.     -Thank you for allowing Interventional Radiology team to participate in the patients care, if any additional care or requests are needed in the future please do not hesitate to call or place IR order           Review of Systems  Physical Exam   Review of Systems   Constitutional:  " Negative for chills and fever.   Eyes:  Negative for blurred vision.   Respiratory:  Negative for cough.    Cardiovascular:  Negative for chest pain.   Gastrointestinal:  Negative for abdominal pain, heartburn, nausea and vomiting.   Skin:  Positive for itching.   Neurological:  Negative for dizziness, tingling, tremors, sensory change and headaches.      Vitals:    02/05/25 1152   BP:    Pulse:    Resp: 17   Temp:    SpO2:         Physical Exam  Vitals and nursing note reviewed.   HENT:      Head: Normocephalic and atraumatic.      Mouth/Throat:      Mouth: Mucous membranes are dry.      Pharynx: Oropharynx is clear.   Eyes:      Pupils: Pupils are equal, round, and reactive to light.   Cardiovascular:      Comments: Cardiac monitor-paced rhythm  Right groin site with scant bruising to area however groin site is soft to palpate without any active bleeding   Pulmonary:      Effort: Pulmonary effort is normal. No respiratory distress.   Abdominal:      Palpations: Abdomen is soft.   Musculoskeletal:      Right lower leg: No edema.      Left lower leg: No edema.   Skin:     General: Skin is warm and dry.      Capillary Refill: Capillary refill takes less than 2 seconds.   Neurological:      Mental Status: She is alert and oriented to person, place, and time. Mental status is at baseline.   Psychiatric:         Attention and Perception: Attention and perception normal.         Mood and Affect: Mood normal.         Speech: Speech normal.         Behavior: Behavior is cooperative.             Labs    Recent Labs     02/03/25  1055 02/04/25  0311 02/05/25  0906   WBC 7.6 8.3 8.7   RBC 3.18* 2.55* 2.31*   HEMOGLOBIN 9.9* 8.0* 7.1*   HEMATOCRIT 30.1* 25.1* 22.0*   MCV 94.7 98.4* 95.2   MCH 31.1 31.4 30.7   MCHC 32.9 31.9* 32.3   RDW 44.6 46.2 45.2   PLATELETCT 272 213 190   MPV 9.9 9.9 9.8     Recent Labs     02/03/25  1055 02/04/25  0311 02/05/25  0906   SODIUM 137 137 137   POTASSIUM 4.6 4.3 4.0   CHLORIDE 105 107 107  "  CO2 18* 17* 19*   GLUCOSE 89 175* 110*   BUN 42* 38* 27*   CREATININE 1.64* 1.97* 1.79*   CALCIUM 9.7 8.3* 8.8     Recent Labs     02/03/25  1055 02/04/25  0311 02/05/25  0906   ALBUMIN  --  3.5  --    TBILIRUBIN  --  0.2  --    ALKPHOSPHAT  --  46  --    TOTPROTEIN  --  5.9*  --    ALTSGPT  --  9  --    ASTSGOT  --  36  --    CREATININE 1.64* 1.97* 1.79*     DX-HIP-COMPLETE - UNILATERAL 2+ RIGHT   Final Result      Osteoarthritic degenerative changes of the hips without acute fracture or malalignment.      IR-EMBOLIZE-NEURO-INTRACRANIAL   Final Result   Impression:      Stent assisted coil embolization of a left ICA terminus aneurysm as described above. Final angiographic images demonstrate satisfactory occlusion of the aneurysm with no significant filling within the aneurysm. Patient will be followed up in the neuro    interventional clinic. Patient will remain on Brilinta and Eliquis for 90 days following which she will discontinue Brilinta therapy.      I, Lilliana Cuellar was physically present and participated during the entire procedure of the IR-EMBOLIZE-NEURO-INTRACRANIAL.        INR   Date Value Ref Range Status   02/03/2025 1.54 (H) 0.87 - 1.13 Final     Comment:     INR - Non-therapeutic Reference Range: 0.87-1.13  INR - Therapeutic Reference Range: 2.0-4.0       No results found for: \"POCINR\"     Intake/Output Summary (Last 24 hours) at 2/4/2025 0833  Last data filed at 2/4/2025 0600  Gross per 24 hour   Intake 596.92 ml   Output 750 ml   Net -153.08 ml      I have personally reviewed the above labs and imaging      I have performed a physical exam and reviewed and updated ROS and Plan today (2/5/2025).     40 minutes in directly providing and coordinating care and extensive data review.  No time overlap and excludes procedures.    "

## 2025-02-05 NOTE — PROGRESS NOTES
Pt arrived to unit via stretcher at 1814. Pt oriented to room, unit, and plan of care. All questions answered at this time. Call light within reach; fall precautions in place.

## 2025-02-05 NOTE — PROGRESS NOTES
Room thermostat adjusted for comfort, warm blankets provided. Snacks made available at bedside as well as hot cocoa and decaf tea. Comfort pack provided. Patient gave cell phone to charge at nurses station. Currently charging iPhone with pink sticky note with room number attached.

## 2025-02-05 NOTE — CARE PLAN
The patient is Stable - Low risk of patient condition declining or worsening    Shift Goals  Clinical Goals: SBP <140, pain management  Patient Goals: pain control  Family Goals: pt comfort and pain control    Progress made toward(s) clinical / shift goals:      Problem: Pain - Standard  Goal: Alleviation of pain or a reduction in pain to the patient’s comfort goal  Outcome: Progressing  Note:   1.  Document pain using the appropriate pain scale per order or unit policy  2.  Educate and implement non-pharmacologic comfort measures (i.e. relaxation, distraction, massage, cold/heat therapy, etc.)  3.  Pain management medications as ordered  4.  Reassess pain after pain med administration per policy  5.  If opiods administered assess patient's response to pain medication is appropriate per POSS sedation scale  6.  Follow pain management plan developed in collaboration with patient and interdisciplinary team     Problem: Skin Integrity  Goal: Skin integrity is maintained or improved  Outcome: Progressing  Note:   1.  Assess and monitor skin integrity, appearance and/or temperature  2.  Assess risk factors for impaired skin integrity and/or pressures ulcers  3.  Implement precautions to protect skin integrity in collaboration with interdisciplinary team  4.  Implement pressure ulcer prevention protocol if at risk for skin breakdown  5.  Confirm wound care consult if at risk for skin breakdown  6.  Ensure patient use of pressure relieving devices  (Low air loss bed, waffle overlay, heel protectors, ROHO cushion, etc)     Problem: Fall Risk  Goal: Patient will remain free from falls  Outcome: Progressing  Note:   1.  Assess for fall risk factors  2.  Implement fall precautions     Problem: Neuro Status  Goal: Neuro status will remain stable or improve  Outcome: Progressing  Note:   1.  Assess and monitor neurologic status per provider order/protocol/unit policy  2.  Assess level of consciousness and orientation  3.  Assess  for speech, dysarthria, dysphagia, facial symmetry  4.  Assess visual field, eye movements, gaze preference, pupil reaction and size  5.  Assess muscle strength and motor response in all four extremities  6.  Assess for sensation (numbness and tingling)  7.  Assess basic neuro reflexes (cough, gag, corneal)  8.  Identify changes in neuro status and report to provider for testing/treatment orders       Patient is not progressing towards the following goals: N/A

## 2025-02-05 NOTE — PROGRESS NOTES
4 Eyes Skin Assessment Completed by KATY Willoughby and KATY Dfufy.    Head WDL  Ears Dried blood in L ear  Nose WDL  Mouth WDL  Neck WDL  Breast/Chest Midline old scar  Shoulder Blades WDL  Spine WDL  (R) Arm/Elbow/Hand Redness, Blanching, Bruising, Scab, and Scar  (L) Arm/Elbow/Hand Redness, Blanching, Bruising, and Scab  Abdomen WDL  Groin R groin site from procedure clean/dry/soft/ecchymosis  Scrotum/Coccyx/Buttocks Redness and Blanching  (R) Leg Scab and Abrasion  (L) Leg Scab and Abrasion  (R) Heel/Foot/Toe Redness, blanching, abrasion  (L) Heel/Foot/Toe Redness and Blanching          Devices In Places Blood Pressure Cuff and Pulse Ox      Interventions In Place TAP System, Pillows, and Barrier Cream    Possible Skin Injury No    Pictures Uploaded Into Epic N/A  Wound Consult Placed N/A  RN Wound Prevention Protocol Ordered No

## 2025-02-05 NOTE — PROGRESS NOTES
Report given to KATY Casillas. All questions answered. Patient picked up and taken to new room by transporter, on 1L oxygen, all belonging accounted for which includes a cell phone.

## 2025-02-05 NOTE — CARE PLAN
The patient is Stable - Low risk of patient condition declining or worsening    Shift Goals  Clinical Goals: -140, mobilize  Patient Goals: Rest, pain control  Family Goals: SUNDAR    Progress made toward(s) clinical / shift goals:    Problem: Pain - Standard  Goal: Alleviation of pain or a reduction in pain to the patient’s comfort goal  Outcome: Progressing     Problem: Fall Risk  Goal: Patient will remain free from falls  Outcome: Progressing     Problem: Mobility  Goal: Patient's capacity to carry out activities will improve  Outcome: Progressing

## 2025-02-06 ENCOUNTER — APPOINTMENT (OUTPATIENT)
Dept: RADIOLOGY | Facility: MEDICAL CENTER | Age: 75
DRG: 026 | End: 2025-02-06
Attending: STUDENT IN AN ORGANIZED HEALTH CARE EDUCATION/TRAINING PROGRAM
Payer: MEDICARE

## 2025-02-06 PROBLEM — N39.0 ACUTE UTI: Status: ACTIVE | Noted: 2025-02-06

## 2025-02-06 LAB
ANION GAP SERPL CALC-SCNC: 13 MMOL/L (ref 7–16)
BUN SERPL-MCNC: 22 MG/DL (ref 8–22)
CALCIUM SERPL-MCNC: 8.8 MG/DL (ref 8.5–10.5)
CHLORIDE SERPL-SCNC: 106 MMOL/L (ref 96–112)
CO2 SERPL-SCNC: 17 MMOL/L (ref 20–33)
CREAT SERPL-MCNC: 1.78 MG/DL (ref 0.5–1.4)
GFR SERPLBLD CREATININE-BSD FMLA CKD-EPI: 29 ML/MIN/1.73 M 2
GLUCOSE SERPL-MCNC: 106 MG/DL (ref 65–99)
HCT VFR BLD AUTO: 25.5 % (ref 37–47)
HCT VFR BLD AUTO: 26.8 % (ref 37–47)
HGB BLD-MCNC: 8.1 G/DL (ref 12–16)
HGB BLD-MCNC: 8.5 G/DL (ref 12–16)
MAGNESIUM SERPL-MCNC: 1.9 MG/DL (ref 1.5–2.5)
PHOSPHATE SERPL-MCNC: 2.8 MG/DL (ref 2.5–4.5)
POTASSIUM SERPL-SCNC: 4.1 MMOL/L (ref 3.6–5.5)
SODIUM SERPL-SCNC: 136 MMOL/L (ref 135–145)

## 2025-02-06 PROCEDURE — 700102 HCHG RX REV CODE 250 W/ 637 OVERRIDE(OP)

## 2025-02-06 PROCEDURE — 72192 CT PELVIS W/O DYE: CPT

## 2025-02-06 PROCEDURE — 80048 BASIC METABOLIC PNL TOTAL CA: CPT

## 2025-02-06 PROCEDURE — 700102 HCHG RX REV CODE 250 W/ 637 OVERRIDE(OP): Performed by: STUDENT IN AN ORGANIZED HEALTH CARE EDUCATION/TRAINING PROGRAM

## 2025-02-06 PROCEDURE — 83735 ASSAY OF MAGNESIUM: CPT

## 2025-02-06 PROCEDURE — 85014 HEMATOCRIT: CPT

## 2025-02-06 PROCEDURE — A9270 NON-COVERED ITEM OR SERVICE: HCPCS

## 2025-02-06 PROCEDURE — 770006 HCHG ROOM/CARE - MED/SURG/GYN SEMI*

## 2025-02-06 PROCEDURE — 97535 SELF CARE MNGMENT TRAINING: CPT

## 2025-02-06 PROCEDURE — 700111 HCHG RX REV CODE 636 W/ 250 OVERRIDE (IP): Mod: JZ | Performed by: STUDENT IN AN ORGANIZED HEALTH CARE EDUCATION/TRAINING PROGRAM

## 2025-02-06 PROCEDURE — A9270 NON-COVERED ITEM OR SERVICE: HCPCS | Performed by: RADIOLOGY

## 2025-02-06 PROCEDURE — 76775 US EXAM ABDO BACK WALL LIM: CPT

## 2025-02-06 PROCEDURE — 36415 COLL VENOUS BLD VENIPUNCTURE: CPT

## 2025-02-06 PROCEDURE — 84100 ASSAY OF PHOSPHORUS: CPT

## 2025-02-06 PROCEDURE — 85018 HEMOGLOBIN: CPT | Mod: 91

## 2025-02-06 PROCEDURE — A9270 NON-COVERED ITEM OR SERVICE: HCPCS | Performed by: STUDENT IN AN ORGANIZED HEALTH CARE EDUCATION/TRAINING PROGRAM

## 2025-02-06 PROCEDURE — 700102 HCHG RX REV CODE 250 W/ 637 OVERRIDE(OP): Performed by: RADIOLOGY

## 2025-02-06 PROCEDURE — 99233 SBSQ HOSP IP/OBS HIGH 50: CPT | Performed by: STUDENT IN AN ORGANIZED HEALTH CARE EDUCATION/TRAINING PROGRAM

## 2025-02-06 PROCEDURE — 700105 HCHG RX REV CODE 258: Performed by: STUDENT IN AN ORGANIZED HEALTH CARE EDUCATION/TRAINING PROGRAM

## 2025-02-06 RX ADMIN — OXYCODONE 5 MG: 5 TABLET ORAL at 16:23

## 2025-02-06 RX ADMIN — CARVEDILOL 25 MG: 25 TABLET, FILM COATED ORAL at 17:46

## 2025-02-06 RX ADMIN — CEFAZOLIN 2 G: 2 INJECTION, POWDER, FOR SOLUTION INTRAMUSCULAR; INTRAVENOUS at 12:13

## 2025-02-06 RX ADMIN — LABETALOL HYDROCHLORIDE 10 MG: 5 INJECTION INTRAVENOUS at 03:46

## 2025-02-06 RX ADMIN — CARVEDILOL 25 MG: 25 TABLET, FILM COATED ORAL at 08:06

## 2025-02-06 RX ADMIN — APIXABAN 5 MG: 5 TABLET, FILM COATED ORAL at 17:46

## 2025-02-06 RX ADMIN — SENNOSIDES AND DOCUSATE SODIUM 2 TABLET: 50; 8.6 TABLET ORAL at 17:46

## 2025-02-06 RX ADMIN — POLYETHYLENE GLYCOL 3350 1 PACKET: 17 POWDER, FOR SOLUTION ORAL at 04:18

## 2025-02-06 RX ADMIN — PANTOPRAZOLE SODIUM 40 MG: 40 INJECTION, POWDER, FOR SOLUTION INTRAVENOUS at 17:48

## 2025-02-06 RX ADMIN — APIXABAN 5 MG: 5 TABLET, FILM COATED ORAL at 04:20

## 2025-02-06 RX ADMIN — ACETAMINOPHEN 650 MG: 325 TABLET ORAL at 05:05

## 2025-02-06 RX ADMIN — PANTOPRAZOLE SODIUM 40 MG: 40 INJECTION, POWDER, FOR SOLUTION INTRAVENOUS at 04:20

## 2025-02-06 RX ADMIN — CEFAZOLIN 2 G: 2 INJECTION, POWDER, FOR SOLUTION INTRAMUSCULAR; INTRAVENOUS at 18:00

## 2025-02-06 RX ADMIN — OXYCODONE 5 MG: 5 TABLET ORAL at 13:20

## 2025-02-06 RX ADMIN — SODIUM CHLORIDE 250 MG: 9 INJECTION, SOLUTION INTRAVENOUS at 04:45

## 2025-02-06 RX ADMIN — SODIUM CHLORIDE 250 MG: 9 INJECTION, SOLUTION INTRAVENOUS at 18:49

## 2025-02-06 RX ADMIN — TICAGRELOR 90 MG: 90 TABLET ORAL at 04:20

## 2025-02-06 RX ADMIN — ACETAMINOPHEN 650 MG: 325 TABLET ORAL at 12:01

## 2025-02-06 RX ADMIN — TICAGRELOR 90 MG: 90 TABLET ORAL at 22:00

## 2025-02-06 ASSESSMENT — ENCOUNTER SYMPTOMS
SHORTNESS OF BREATH: 0
FEVER: 0
BLURRED VISION: 0
HEARTBURN: 0
FOCAL WEAKNESS: 0
ABDOMINAL PAIN: 0
VOMITING: 0
SPEECH CHANGE: 0
COUGH: 0
DIZZINESS: 0
CHILLS: 0
NAUSEA: 0
TINGLING: 0
SENSORY CHANGE: 0
TREMORS: 0
HEADACHES: 0
BLOOD IN STOOL: 0

## 2025-02-06 ASSESSMENT — PAIN DESCRIPTION - PAIN TYPE
TYPE: ACUTE PAIN

## 2025-02-06 ASSESSMENT — PATIENT HEALTH QUESTIONNAIRE - PHQ9
2. FEELING DOWN, DEPRESSED, IRRITABLE, OR HOPELESS: NOT AT ALL
1. LITTLE INTEREST OR PLEASURE IN DOING THINGS: NOT AT ALL
SUM OF ALL RESPONSES TO PHQ9 QUESTIONS 1 AND 2: 0

## 2025-02-06 NOTE — CARE PLAN
The patient is Stable - Low risk of patient condition declining or worsening    Shift Goals  Clinical Goals: monitor h/h, safety, encourage mobility  Patient Goals: pain control  Family Goals: sergey    Progress made toward(s) clinical / shift goals:      Problem: Hemodynamics  Goal: Patient's hemodynamics, fluid balance and neurologic status will be stable or improve  Outcome: Progressing  Note: Patient received 1 unit of PRBCs to increase H/H. Lab draw of H/H in place q8hrs.     Problem: Neuro Status  Goal: Neuro status will remain stable or improve  Outcome: Progressing  Note: Q4hr neuro checks in place.     Problem: Skin Integrity  Goal: Skin integrity is maintained or improved  Outcome: Progressing  Note: Patient turned and repositioned q2hrs or as requested. Barrier cream and mepilexes used to prevent skin breakdown on delicate perineal areas and bony prominences. Linen changes provided as needed to avoid risk of developing pressure ulcers.     Problem: Pain - Standard  Goal: Alleviation of pain or a reduction in pain to the patient’s comfort goal  Outcome: Progressing  Note: PRN medications available and given to help alleviate pain.     Problem: Mobility  Goal: Patient's capacity to carry out activities will improve  Outcome: Progressing  Note: Patient was able to take small steps to the chair with a front-wheel walker.       Patient is not progressing towards the following goals: N/A

## 2025-02-06 NOTE — THERAPY
Physical Therapy Contact Note    Patient Name: Lauren Dave  Age:  74 y.o., Sex:  female  Medical Record #: [id number]  Today's Date: 2/6/2025    Attempted PT tx session, however pt politely declined OOB mobility due to right anterior hip/pelvic pain due to potential hematoma. Examined area, minimal bruising over lines made by bedside RN earlier today. Pt educated on importance of continued mobility if her hip CT comes back with no significant findings. Pt receptive and stated she will be ready for therapy tomorrow.     Tommy Redman, PT, DPT   English

## 2025-02-06 NOTE — DISCHARGE PLANNING
"Case Management Discharge Planning    Anticipated Discharge Dispo: Discharge Disposition: D/T to home under [facility] care in anticipation of covered skilled care (06)    Action(s) Taken: Chart review completed. Patient discussed in IDT rounds.     Patient is not anticipated to be [facility] for \"a couple more days\"    PT recommending HH; RNCM requested HH referral from provider during IDT rounds    Escalations Completed: None    Medically Clear: No    Next Steps: CM to follow up with IDT regarding DCP needs/barriers    Barriers to Discharge: Medical clearance    Is the patient up for discharge tomorrow: No        "

## 2025-02-06 NOTE — PROGRESS NOTES
"Radiology Progress Note   Author: YULISSA Motta Date & Time created: 2/6/2025     Date of admission  2/3/2025  Note to reader: this note follows the APSO format rather than the historical SOAP format. Assessment and plan located at the top of the note for ease of use.    Chief Complaint  74 y.o. female admitted 2/3/2025 with incidental finding of left middle cerebral artery aneurysm for which she underwent coiling and stent placement of 7 mm left ICA aneurysm.        HPI  Shazia is a 74 yof with a pmhx of right breast cancer, HTN, HLD pacemenker, CAD,  ischemic CM and in 2022 underwent a CABG and developed hypoperfusion strokes. She has had deficits since then that impair her mobility. She presented to a new neurologist who initiated workup. Imaging revealed chronic infarcts from her strokes and an incidental left middle cerebral artery aneurysm for which she was referred to the Neuro Interventional Service for evaluation and management of this finding.  On 02/03/2025 she underwent coiling and stent placement of 7 mm left ICA terminus aneurysm by Dr. Chepe Cuellar.    Interval History:   02/04/2025-patient had some oozing from right groin  MELISSA site last night requiring manual pressure to be held.  Groin site this a.m. has some scant bruising to area however groin site is soft to palpate without any active bleeding at time of assessment.   Dressing is dry and intact.  The patient reports that she believes Brilinta is causing her itching on her legs and arms.  I explained the importance of taking Brilinta, but when I suggested the possibility of switching medications, she quickly responded, \"no, I want to keep taking Brilinta.\"  As a result she will continue with Brilinta for now.  I reviewed today's labs: WBC 8.3; H&H 8.0/25.1, Cr 1.97, GFR 44; Coags INR 1.54; platelet mapping present AA inhibition 41.3; percent inhibition ADP 43.1,  Micro -micro.  The patient's renal function has worsened, so she " "will be transferred to the floor and managed by the hospitalist team for ongoing evaluation, management of her renal function.  I discussed plan of care with patient patient son at bedside, the hospitalist and the ICU team.     02/05/2025- Patient transferred to the floor yesterday.  Patient reports that she has not experienced any \"itching\" today. I reviewed today's labs: WBC 8.7; H&H 7.1/22, Cr 1.79 down from yesterday 1.97, GFR 44; most recent Coags INR 1.54; platelet mapping present AA inhibition 41.3; percent inhibition ADP 43.1,  no micro.  I discussed plan of care with hospitalist and patient at bedside.  MELISSA groin site with scant bruising to area however groin site is soft to palpate without  bleeding.  R groin Dsg is dry and intact. I reviewed IDT notes     02/06/2025- Patient received 1 unit PRBCs and Iron  infusion yesterday for  iron deficiency anemia with a hemoglobin of 7.1 Hct of 22. I reviewed most recent labs: WBC 8.7; H&H 8.5/26.8 (post iron and PRBCs), Cr 1.78. GFR 44; most recent Coags INR 1.54; platelet mapping present AA inhibition 41.3; percent inhibition ADP 43.1,  no micro. MELISSA groin site with scant bruising to area however groin site is soft to palpate without  bleeding.  R groin Dsg is dry and intact. I reviewed the plan of care and emphasized the importance of administering Brilinta, even in the presence of anemia, with the hospitalist and patient.  I reviewed education regarding Brilinta and postop groin site care with patient.  I reviewed IDT notes     Assessment/Plan     Active Problems:    Dyslipidemia    Gastroesophageal reflux disease without esophagitis    S/P CABG x 3    PAF (paroxysmal atrial fibrillation) ([facility])    HFrEF (heart failure with reduced ejection fraction) ([facility])    TAN (acute kidney injury) ([facility])    Aneurysm of left internal carotid artery    Anemia      Plan IR  - Continue Brilinta 90 twice daily mg and Eliquis 5 mg twice daily for 6 months S/P procedure to assure " "stent patency and prevent stroke DO NOT HOLD BRILINTA. - For any questions concerning Brilinta please reach out to Neuro IR team.  If there are any concerns regarding the Brilinta prescription, we request to be contacted for discussion prior to discontinuing the medication.  Please also inform neuro IR if Eliquis is discontinued, as the patient may require additional medication, such as aspirin  - Follow up appointment in 2-4 weeks with OP Neuro IR, our office to call and schedule (appointment has been ordered on 02/05/2025)  -- Post MELISSA groin access site instructions: no lifting greater than 5 lbs and no baths/swimming/soaking in tub for 7-10 days. Shower OK. OK to change dressings/band aid as needed.  - From a Neuro Interventional Service standpoint, OK to discharge when medically cleared.     -Thank you for allowing Interventional Radiology team to participate in the patients care, if any additional care or requests are needed in the future please do not hesitate to call or place IR order           Review of Systems  Physical Exam   Review of Systems   Constitutional:  Negative for chills and fever.   Eyes:  Negative for blurred vision.   Respiratory:  Negative for cough.    Cardiovascular:  Negative for chest pain.   Gastrointestinal:  Negative for abdominal pain, heartburn, nausea and vomiting.   Skin:  Negative for itching.        \"I have not been itchy\"   Neurological:  Negative for dizziness, tingling, tremors, sensory change and headaches.      Vitals:    02/06/25 0806   BP:    Pulse: 82   Resp:    Temp:    SpO2:         Physical Exam  Vitals and nursing note reviewed.   Constitutional:       General: She is awake.   HENT:      Head: Normocephalic and atraumatic.      Mouth/Throat:      Mouth: Mucous membranes are dry.      Pharynx: Oropharynx is clear.   Eyes:      Pupils: Pupils are equal, round, and reactive to light.   Cardiovascular:      Pulses:           Dorsalis pedis pulses are 2+ on the right side " and 2+ on the left side.      Comments: Right groin site with scant bruising to area however groin site is soft to palpate without any active bleeding   Pulmonary:      Comments: Room air  Without any respiratory distress  Chest:   Breasts:     Breasts are symmetrical.   Abdominal:      Palpations: Abdomen is soft.   Musculoskeletal:      Right lower leg: No edema.      Left lower leg: No edema.   Skin:     General: Skin is warm and dry.      Capillary Refill: Capillary refill takes less than 2 seconds.   Neurological:      Mental Status: She is alert and oriented to person, place, and time. Mental status is at baseline.   Psychiatric:         Attention and Perception: Attention and perception normal.         Mood and Affect: Mood normal.         Speech: Speech normal.         Behavior: Behavior is cooperative.             Labs    Recent Labs     02/04/25 0311 02/05/25 0906 02/05/25  1459 02/05/25  2259 02/06/25  0119 02/06/25  0851   WBC 8.3 8.7  --   --   --   --    RBC 2.55* 2.31*  --   --   --   --    HEMOGLOBIN 8.0* 7.1*   < > 9.2* 8.5* 8.1*   HEMATOCRIT 25.1* 22.0*   < > 28.4* 26.8* 25.5*   MCV 98.4* 95.2  --   --   --   --    MCH 31.4 30.7  --   --   --   --    MCHC 31.9* 32.3  --   --   --   --    RDW 46.2 45.2  --   --   --   --    PLATELETCT 213 190  --   --   --   --    MPV 9.9 9.8  --   --   --   --     < > = values in this interval not displayed.     Recent Labs     02/04/25 0311 02/05/25 0906 02/06/25  0119   SODIUM 137 137 136   POTASSIUM 4.3 4.0 4.1   CHLORIDE 107 107 106   CO2 17* 19* 17*   GLUCOSE 175* 110* 106*   BUN 38* 27* 22   CREATININE 1.97* 1.79* 1.78*   CALCIUM 8.3* 8.8 8.8     Recent Labs     02/04/25 0311 02/05/25 0906 02/06/25  0119   ALBUMIN 3.5  --   --    TBILIRUBIN 0.2  --   --    ALKPHOSPHAT 46  --   --    TOTPROTEIN 5.9*  --   --    ALTSGPT 9  --   --    ASTSGOT 36  --   --    CREATININE 1.97* 1.79* 1.78*     DX-HIP-COMPLETE - UNILATERAL 2+ RIGHT   Final Result     "  Osteoarthritic degenerative changes of the hips without acute fracture or malalignment.      IR-EMBOLIZE-NEURO-INTRACRANIAL   Final Result   Impression:      Stent assisted coil embolization of a left ICA terminus aneurysm as described above. Final angiographic images demonstrate satisfactory occlusion of the aneurysm with no significant filling within the aneurysm. Patient will be followed up in the neuro    interventional clinic. Patient will remain on Brilinta and Eliquis for 90 days following which she will discontinue Brilinta therapy.      I, Lilliana Cuellar was physically present and participated during the entire procedure of the IR-EMBOLIZE-NEURO-INTRACRANIAL.      US-RENAL    (Results Pending)     INR   Date Value Ref Range Status   02/03/2025 1.54 (H) 0.87 - 1.13 Final     Comment:     INR - Non-therapeutic Reference Range: 0.87-1.13  INR - Therapeutic Reference Range: 2.0-4.0       No results found for: \"POCINR\"     Intake/Output Summary (Last 24 hours) at 2/4/2025 0833  Last data filed at 2/4/2025 0600  Gross per 24 hour   Intake 596.92 ml   Output 750 ml   Net -153.08 ml      I have personally reviewed the above labs and imaging      I have performed a physical exam and reviewed and updated ROS and Plan today (2/6/2025).     38 minutes in directly providing and coordinating care and extensive data review.  No time overlap and excludes procedures.    "

## 2025-02-06 NOTE — CONSULTS
"[facility] -  CONSULTATION NOTE               Author: Aj Garcia M.D. Date & Time: 2/6/2025  12:00 PM       REASON FOR CONSULTATION:   - Evaluation and management of acute and chronic conditions related to Nephrology    CHIEF COMPLAINT:   -  \"I had aneurysm repair\"    HISTORY OF PRESENT ILLNESS:    73 yo F PMH HTN, chronic systolic HF, HLD, breast cancer, and discoid lupus who presents to hospital for scheduled left ICA aneurysm embolization.  She underwent the procedure under fluoroscopy without any issues.  The next morning her SCr jumped and so she was held to monitor and it has slightly improved but not quite at baseline for her.  Review of labs show in 2022 she had an Mercedez where she was ~ 1.4-1.7 range mostly, then it seems she improved a bit.  Otherwise denies any associated F/C/N/V/CP/SOB.  No melena, hematochezia, hematemesis.  No HA, visual changes, or abdominal pain.    REVIEW OF SYSTEMS:    10 point ROS was performed and is as per HPI or otherwise negative    PAST MEDICAL HISTORY:   Past Medical History:   Diagnosis Date    Arthritis     Breast cancer (HCC) 08/07/2013    Bronchitis 2005    Cancer (HCC)     right breast cancer     Cataract     removed    Chronic systolic congestive heart failure (HCC) EF 35% 10/12/2022    Coronary artery disease due to calcified coronary lesion - Calcifications seen on CT scan also wtih aortic ulceration     Delayed emergence from general anesthesia     Dental disorder     tooth infection    Discoid lupus     Dyslipidemia     Tried atorvastatin, pravastatin, lovastatin, and Zetia.  All causes severe myalgias.  Just taking fish oil.      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     Ischemic heart disease due to coronary artery obstruction (HCC) - Severe 3vD on cath 10/19/2022    LBBB (left bundle branch block) 12/16/2014    Noted on prechemo EKG 9/2014, FELIX WNL    Pacemaker     aicd    " Pneumonia     Pseudogout     Psychiatric problem     Scarlet fever     Unspecified hemorrhagic conditions     gums       PAST SURGICAL HISTORY:   Past Surgical History:   Procedure Laterality Date    MULTIPLE CORONARY ARTERY BYPASS ENDO VEIN HARVEST  11/10/2022    Procedure: CORONARY ARTERY BYPASS GRAFTING X 3, WITH LEFT INTERNAL MAMMARY ARTERY TAKEDOWN AND ENDOSCOPIC VEIN HARVEST LEFT GREATER SAPHENOUS VEIN, AND TRANSESOPHAGEAL ECHOCARDIOGRAM;  Surgeon: Padmini Mac M.D.;  Location: SURGERY [facility];  Service: Cardiothoracic    ECHOCARDIOGRAM, TRANSESOPHAGEAL, INTRAOPERATIVE  11/10/2022    Procedure: ECHOCARDIOGRAM, TRANSESOPHAGEAL, INTRAOPERATIVE;  Surgeon: Padmini Mac M.D.;  Location: SURGERY [facility];  Service: Cardiothoracic    OTHER CARDIAC SURGERY  2022    angiogram    CATH REMOVAL  2014    Performed by Aggie Bunrs M.D. [facility]    MASTECTOMY  2013    Performed by Aggie Burns M.D. at SURGERY SAME DAY [facility] ORS    NODE BIOPSY SENTINEL  2013    Performed by Aggie Burns M.D. at SURGERY SAME DAY [facility] ORS    CATH PLACEMENT  2013    Performed by Aggie Burns M.D. at [facility]ERY [facility]    US-NEEDLE CORE BX-BREAST PANEL  2013    right breast    COLONOSCOPY  2003    BREAST BIOPSY      TONSILLECTOMY         FAMILY HISTORY:   - Reviewed and non contributory to current illness    SOCIAL HISTORY:   Social History     Tobacco Use   Smoking Status Former    Current packs/day: 0.00    Types: Cigarettes    Start date: 10/1/2013    Quit date: 10/1/2013    Years since quittin.3   Smokeless Tobacco Never     Social History     Substance and Sexual Activity   Alcohol Use No    Alcohol/week: 0.0 oz     Social History     Substance and Sexual Activity   Drug Use No        HOME MEDICATIONS:   - Reviewed and documented in chart    LABORATORY STUDIES:   - Reviewed and documented in chart    ALLERGIES:  Albumin,  "Ezetimibe, Atorvastatin, Codeine, Eggs, Honey, Hydroxychloroquine, Jardiance [empagliflozin], Lisinopril, Losartan, Penicillins, Statins [hmg-coa-r inhibitors], and Wasp venom    VS:  BP (!) 143/55   Pulse 82   Temp 36.7 °C (98.1 °F) (Temporal)   Resp 17   Ht 1.6 m (5' 3\")   Wt 69.5 kg (153 lb 3.5 oz)   SpO2 94%   BMI 27.14 kg/m²   Physical Exam  Nursing note reviewed.   Constitutional:       Appearance: She is ill-appearing.   Eyes:      General: No scleral icterus.  Cardiovascular:      Comments: No edema  Pulmonary:      Effort: Pulmonary effort is normal.   Abdominal:      General: There is no distension.   Musculoskeletal:         General: No deformity.   Skin:     Findings: No rash.   Neurological:      Mental Status: She is alert.   Psychiatric:         Behavior: Behavior is cooperative.       FLUID BALANCE:  In: 533 [P.O.:118; Blood:415]  Out: -     LABS:  Recent Labs     02/04/25  0311 02/05/25  0906 02/06/25  0119   SODIUM 137 137 136   POTASSIUM 4.3 4.0 4.1   CHLORIDE 107 107 106   CO2 17* 19* 17*   GLUCOSE 175* 110* 106*   BUN 38* 27* 22   CREATININE 1.97* 1.79* 1.78*   CALCIUM 8.3* 8.8 8.8     Recent Labs     02/04/25  0311 02/05/25  0906 02/05/25  1459 02/05/25  2259 02/06/25  0119 02/06/25  0851   WBC 8.3 8.7  --   --   --   --    RBC 2.55* 2.31*  --   --   --   --    HEMOGLOBIN 8.0* 7.1*   < > 9.2* 8.5* 8.1*   HEMATOCRIT 25.1* 22.0*   < > 28.4* 26.8* 25.5*   MCV 98.4* 95.2  --   --   --   --    MCH 31.4 30.7  --   --   --   --    MCHC 31.9* 32.3  --   --   --   --    RDW 46.2 45.2  --   --   --   --    PLATELETCT 213 190  --   --   --   --    MPV 9.9 9.8  --   --   --   --     < > = values in this interval not displayed.       IMAGING:  - Imaging studies reviewed by me    IMPRESSION:  # TAN  - Etiology unclear  - Diff Dx: ATN vs pre-renal vs CRS  - Non-oliguric  # CKD Stage 3a  - BCr ~ 1.1-1.4  - Etiology likely 2/2 HTN/CRS  # Left ICA aneurysm  - S/P embolization  # HTN  - Goal BP < 140/90  # " HLD  # Chronic systolic HF  - EF 35%  # CAD    PLAN:  - No compelling indication for KRT  - Daily labs  - BNP  - May need to diurese if volume up suspected  - CXR in AM  - Dose all meds per eGFR < 15    Thank you for the consultation!

## 2025-02-06 NOTE — CARE PLAN
The patient is Watcher - Medium risk of patient condition declining or worsening    Shift Goals  Clinical Goals: neuro exam, monitor groin site  Patient Goals: pain control  Family Goals: updates    Progress made toward(s) clinical / shift goals:    Problem: Fall Risk  Goal: Patient will remain free from falls  Description: Target End Date:  Prior to discharge or change in level of care  Document interventions on the [facility] Fall Risk Assessment  1.  Assess for fall risk factors  2.  Implement fall precautions, bed alarms in place  Outcome: Progressing     Problem: Neuro Status  Goal: Neuro status will remain stable or improve  Description: Target End Date:  Prior to discharge or change in level of care  Document on Neuro assessment in the Assessment flowsheet  1.  Assess and monitor neurologic status per provider order/protocol/unit policy  2.  Assess level of consciousness and orientation  3.  Assess for speech, dysarthria, dysphagia, facial symmetry  4.  Assess visual field, eye movements, gaze preference, pupil reaction and size  5.  Assess muscle strength and motor response in all four extremities  6.  Assess for sensation (numbness and tingling)  7.  Assess basic neuro reflexes (cough, gag, corneal)  8.  Identify changes in neuro status and report to provider for testing/treatment orders  Outcome: Progressing       Patient is not progressing towards the following goals:

## 2025-02-07 ENCOUNTER — APPOINTMENT (OUTPATIENT)
Dept: RADIOLOGY | Facility: MEDICAL CENTER | Age: 75
DRG: 026 | End: 2025-02-07
Attending: INTERNAL MEDICINE
Payer: MEDICARE

## 2025-02-07 ENCOUNTER — APPOINTMENT (OUTPATIENT)
Dept: RADIOLOGY | Facility: MEDICAL CENTER | Age: 75
DRG: 026 | End: 2025-02-07
Attending: STUDENT IN AN ORGANIZED HEALTH CARE EDUCATION/TRAINING PROGRAM
Payer: MEDICARE

## 2025-02-07 LAB
ANION GAP SERPL CALC-SCNC: 14 MMOL/L (ref 7–16)
BASOPHILS # BLD AUTO: 0.3 % (ref 0–1.8)
BASOPHILS # BLD: 0.04 K/UL (ref 0–0.12)
BUN SERPL-MCNC: 19 MG/DL (ref 8–22)
CALCIUM SERPL-MCNC: 9 MG/DL (ref 8.5–10.5)
CHLORIDE SERPL-SCNC: 105 MMOL/L (ref 96–112)
CO2 SERPL-SCNC: 17 MMOL/L (ref 20–33)
CREAT SERPL-MCNC: 1.73 MG/DL (ref 0.5–1.4)
EOSINOPHIL # BLD AUTO: 0.21 K/UL (ref 0–0.51)
EOSINOPHIL NFR BLD: 1.7 % (ref 0–6.9)
ERYTHROCYTE [DISTWIDTH] IN BLOOD BY AUTOMATED COUNT: 54.5 FL (ref 35.9–50)
GFR SERPLBLD CREATININE-BSD FMLA CKD-EPI: 30 ML/MIN/1.73 M 2
GLUCOSE SERPL-MCNC: 91 MG/DL (ref 65–99)
HCT VFR BLD AUTO: 24.4 % (ref 37–47)
HGB BLD-MCNC: 7.8 G/DL (ref 12–16)
IMM GRANULOCYTES # BLD AUTO: 0.12 K/UL (ref 0–0.11)
IMM GRANULOCYTES NFR BLD AUTO: 1 % (ref 0–0.9)
LYMPHOCYTES # BLD AUTO: 1.43 K/UL (ref 1–4.8)
LYMPHOCYTES NFR BLD: 11.7 % (ref 22–41)
MCH RBC QN AUTO: 30.1 PG (ref 27–33)
MCHC RBC AUTO-ENTMCNC: 32 G/DL (ref 32.2–35.5)
MCV RBC AUTO: 94.2 FL (ref 81.4–97.8)
MONOCYTES # BLD AUTO: 1.37 K/UL (ref 0–0.85)
MONOCYTES NFR BLD AUTO: 11.2 % (ref 0–13.4)
NEUTROPHILS # BLD AUTO: 9.09 K/UL (ref 1.82–7.42)
NEUTROPHILS NFR BLD: 74.1 % (ref 44–72)
NRBC # BLD AUTO: 0 K/UL
NRBC BLD-RTO: 0 /100 WBC (ref 0–0.2)
NT-PROBNP SERPL IA-MCNC: ABNORMAL PG/ML (ref 0–125)
NT-PROBNP SERPL IA-MCNC: ABNORMAL PG/ML (ref 0–125)
PLATELET # BLD AUTO: 213 K/UL (ref 164–446)
PMV BLD AUTO: 10.1 FL (ref 9–12.9)
POTASSIUM SERPL-SCNC: 3.9 MMOL/L (ref 3.6–5.5)
RBC # BLD AUTO: 2.59 M/UL (ref 4.2–5.4)
SODIUM SERPL-SCNC: 136 MMOL/L (ref 135–145)
WBC # BLD AUTO: 12.3 K/UL (ref 4.8–10.8)

## 2025-02-07 PROCEDURE — 700102 HCHG RX REV CODE 250 W/ 637 OVERRIDE(OP)

## 2025-02-07 PROCEDURE — 700102 HCHG RX REV CODE 250 W/ 637 OVERRIDE(OP): Performed by: STUDENT IN AN ORGANIZED HEALTH CARE EDUCATION/TRAINING PROGRAM

## 2025-02-07 PROCEDURE — 770006 HCHG ROOM/CARE - MED/SURG/GYN SEMI*

## 2025-02-07 PROCEDURE — 700111 HCHG RX REV CODE 636 W/ 250 OVERRIDE (IP): Performed by: INTERNAL MEDICINE

## 2025-02-07 PROCEDURE — 700105 HCHG RX REV CODE 258: Performed by: STUDENT IN AN ORGANIZED HEALTH CARE EDUCATION/TRAINING PROGRAM

## 2025-02-07 PROCEDURE — 97162 PT EVAL MOD COMPLEX 30 MIN: CPT

## 2025-02-07 PROCEDURE — 700102 HCHG RX REV CODE 250 W/ 637 OVERRIDE(OP): Performed by: RADIOLOGY

## 2025-02-07 PROCEDURE — 83880 ASSAY OF NATRIURETIC PEPTIDE: CPT

## 2025-02-07 PROCEDURE — 97530 THERAPEUTIC ACTIVITIES: CPT

## 2025-02-07 PROCEDURE — 99233 SBSQ HOSP IP/OBS HIGH 50: CPT | Performed by: STUDENT IN AN ORGANIZED HEALTH CARE EDUCATION/TRAINING PROGRAM

## 2025-02-07 PROCEDURE — A9270 NON-COVERED ITEM OR SERVICE: HCPCS | Performed by: RADIOLOGY

## 2025-02-07 PROCEDURE — 71045 X-RAY EXAM CHEST 1 VIEW: CPT

## 2025-02-07 PROCEDURE — 80048 BASIC METABOLIC PNL TOTAL CA: CPT

## 2025-02-07 PROCEDURE — A9270 NON-COVERED ITEM OR SERVICE: HCPCS | Performed by: STUDENT IN AN ORGANIZED HEALTH CARE EDUCATION/TRAINING PROGRAM

## 2025-02-07 PROCEDURE — 85025 COMPLETE CBC W/AUTO DIFF WBC: CPT

## 2025-02-07 PROCEDURE — 97535 SELF CARE MNGMENT TRAINING: CPT

## 2025-02-07 PROCEDURE — A9270 NON-COVERED ITEM OR SERVICE: HCPCS

## 2025-02-07 PROCEDURE — 700111 HCHG RX REV CODE 636 W/ 250 OVERRIDE (IP): Mod: JZ | Performed by: STUDENT IN AN ORGANIZED HEALTH CARE EDUCATION/TRAINING PROGRAM

## 2025-02-07 PROCEDURE — 36415 COLL VENOUS BLD VENIPUNCTURE: CPT

## 2025-02-07 RX ORDER — FUROSEMIDE 10 MG/ML
40 INJECTION INTRAMUSCULAR; INTRAVENOUS ONCE
Status: COMPLETED | OUTPATIENT
Start: 2025-02-07 | End: 2025-02-07

## 2025-02-07 RX ORDER — BISACODYL 10 MG
10 SUPPOSITORY, RECTAL RECTAL DAILY
Status: DISCONTINUED | OUTPATIENT
Start: 2025-02-07 | End: 2025-02-10 | Stop reason: HOSPADM

## 2025-02-07 RX ORDER — ONDANSETRON 2 MG/ML
4 INJECTION INTRAMUSCULAR; INTRAVENOUS EVERY 4 HOURS PRN
Status: DISPENSED | OUTPATIENT
Start: 2025-02-07 | End: 2025-02-08

## 2025-02-07 RX ORDER — BISACODYL 10 MG
10 SUPPOSITORY, RECTAL RECTAL DAILY
Status: DISCONTINUED | OUTPATIENT
Start: 2025-02-08 | End: 2025-02-07

## 2025-02-07 RX ORDER — SODIUM PHOSPHATE,MONO-DIBASIC 19G-7G/118
1 ENEMA (ML) RECTAL ONCE
Status: DISPENSED | OUTPATIENT
Start: 2025-02-07 | End: 2025-02-08

## 2025-02-07 RX ADMIN — APIXABAN 5 MG: 5 TABLET, FILM COATED ORAL at 07:49

## 2025-02-07 RX ADMIN — FUROSEMIDE 40 MG: 10 INJECTION INTRAMUSCULAR; INTRAVENOUS at 13:00

## 2025-02-07 RX ADMIN — TICAGRELOR 90 MG: 90 TABLET ORAL at 07:49

## 2025-02-07 RX ADMIN — SENNOSIDES AND DOCUSATE SODIUM 2 TABLET: 50; 8.6 TABLET ORAL at 17:14

## 2025-02-07 RX ADMIN — APIXABAN 5 MG: 5 TABLET, FILM COATED ORAL at 17:13

## 2025-02-07 RX ADMIN — TICAGRELOR 90 MG: 90 TABLET ORAL at 17:14

## 2025-02-07 RX ADMIN — SODIUM CHLORIDE 250 MG: 9 INJECTION, SOLUTION INTRAVENOUS at 07:50

## 2025-02-07 RX ADMIN — OXYCODONE HYDROCHLORIDE 10 MG: 10 TABLET ORAL at 16:14

## 2025-02-07 RX ADMIN — OXYCODONE HYDROCHLORIDE 10 MG: 10 TABLET ORAL at 03:11

## 2025-02-07 RX ADMIN — CARVEDILOL 25 MG: 25 TABLET, FILM COATED ORAL at 07:48

## 2025-02-07 RX ADMIN — OXYCODONE HYDROCHLORIDE 10 MG: 10 TABLET ORAL at 19:16

## 2025-02-07 RX ADMIN — ONDANSETRON 4 MG: 2 INJECTION INTRAMUSCULAR; INTRAVENOUS at 08:17

## 2025-02-07 RX ADMIN — OXYCODONE HYDROCHLORIDE 10 MG: 10 TABLET ORAL at 22:37

## 2025-02-07 RX ADMIN — POLYETHYLENE GLYCOL 3350 1 PACKET: 17 POWDER, FOR SOLUTION ORAL at 14:26

## 2025-02-07 RX ADMIN — PANTOPRAZOLE SODIUM 40 MG: 40 INJECTION, POWDER, FOR SOLUTION INTRAVENOUS at 17:14

## 2025-02-07 RX ADMIN — BISACODYL 10 MG: 10 SUPPOSITORY RECTAL at 22:28

## 2025-02-07 RX ADMIN — CEFAZOLIN 2 G: 2 INJECTION, POWDER, FOR SOLUTION INTRAMUSCULAR; INTRAVENOUS at 17:11

## 2025-02-07 RX ADMIN — ACETAMINOPHEN 650 MG: 325 TABLET ORAL at 14:57

## 2025-02-07 RX ADMIN — PANTOPRAZOLE SODIUM 40 MG: 40 INJECTION, POWDER, FOR SOLUTION INTRAVENOUS at 07:48

## 2025-02-07 RX ADMIN — CEFAZOLIN 2 G: 2 INJECTION, POWDER, FOR SOLUTION INTRAMUSCULAR; INTRAVENOUS at 06:16

## 2025-02-07 RX ADMIN — CARVEDILOL 25 MG: 25 TABLET, FILM COATED ORAL at 17:13

## 2025-02-07 ASSESSMENT — COGNITIVE AND FUNCTIONAL STATUS - GENERAL
STANDING UP FROM CHAIR USING ARMS: A LOT
MOBILITY SCORE: 12
MOVING TO AND FROM BED TO CHAIR: A LOT
CLIMB 3 TO 5 STEPS WITH RAILING: A LOT
TURNING FROM BACK TO SIDE WHILE IN FLAT BAD: A LOT
SUGGESTED CMS G CODE MODIFIER MOBILITY: CL
WALKING IN HOSPITAL ROOM: A LOT
MOVING FROM LYING ON BACK TO SITTING ON SIDE OF FLAT BED: A LOT

## 2025-02-07 ASSESSMENT — PAIN DESCRIPTION - PAIN TYPE
TYPE: ACUTE PAIN

## 2025-02-07 ASSESSMENT — ENCOUNTER SYMPTOMS
BLOOD IN STOOL: 0
SENSORY CHANGE: 0
SHORTNESS OF BREATH: 0
NAUSEA: 0
VOMITING: 0
SPEECH CHANGE: 0
FOCAL WEAKNESS: 0
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
## 2025-02-07 NOTE — CARE PLAN
The patient is Stable - Low risk of patient condition declining or worsening    Shift Goals  Clinical Goals: Monitor H/H , [id number] neuro, maintain  a safe environment  Patient Goals: Pain management to tolerable level  Family Goals: SUNDAR no familly member present    Progress made toward(s) clinical / shift goals:  on going    Patient is progressing towards the following goals:  Problem: Pain - Standard  Goal: Alleviation of pain or a reduction in pain to the patient’s comfort goal  Description: Target End Date:  Prior to discharge or change in level of care    Document on Vitals flowsheet    1.  Document pain using the appropriate pain scale per order or unit policy  2.  Educate and implement non-pharmacologic comfort measures (i.e. relaxation, distraction, massage, cold/heat therapy, etc.)  3.  Pain management medications as ordered  4.  Reassess pain after pain med administration per policy  5.  If opiods administered assess patient's response to pain medication is appropriate per POSS sedation scale  6.  Follow pain management plan developed in collaboration with patient and interdisciplinary team (including palliative care or pain specialists if applicable)  Outcome: Progressing     Problem: Mobility  Goal: Patient's capacity to carry out activities will improve  Description: Target End Date:  Prior to discharge or change in level of care    1.  Assess for barriers to mobility/activity  2.  Implement activity per interdisciplinary team recommendations  3.  Target activity level identified and patient/family/caregiver aware of goal  4.  Provide assistive devices  5.  Instruct patient/caregiver on proper use of assistive/adaptive devices  6.  Schedule activities and rest periods to decrease effects of fatigue  7.  Encourage mobilization to extent of ability  8.  Maintain proper body alignment  9.  Provide adequate pain management to allow progressive mobilization  10. Implement pace maker precautions as  needed  Outcome: Progressing     Problem: Risk for Aspiration  Goal: Patient's risk for aspiration will be absent or decrease  Description: Target End Date:  Prior to discharge or change in level of care    1.   Complete dysphagia screening on admission  2.   NPO until dysphagia screening complete or medically cleared  3.   Collaborate with Speech Therapy, Clinical Dietitian and interdisciplinary team  4.   Implement aspiration precautions  5.   Assist patient up to chair for meals  6.   Elevate head of bed 90 degrees if patient is unable to get out of bed  7.   Encourage small bites  8.   Ensure foods/liquids are of appropriate consistency  9.   Assess for any signs/symptoms of aspiration  10. Assess breath sounds and vital signs after oral intake  Outcome: Progressing

## 2025-02-07 NOTE — CARE PLAN
The patient is Stable - Low risk of patient condition declining or worsening    Shift Goals  Clinical Goals: Monitor H/H, Stable neuros, Safety  Patient Goals: Pain control  Family Goals: sergey    Progress made toward(s) clinical / shift goals:    Problem: Knowledge Deficit - Standard  Goal: Patient and family/care givers will demonstrate understanding of plan of care, disease process/condition, diagnostic tests and medications  Outcome: Progressing  Note: Pt verbalizes understanding of plan of care and asks appropriate questions.      Problem: Neuro Status  Goal: Neuro status will remain stable or improve  Outcome: Progressing  Note: Neuro status remains stable.

## 2025-02-07 NOTE — PROGRESS NOTES
"[facility] -  PROGRESS NOTE               Author: Aj Garcia M.D. Date & Time: 2/7/2025  12:16 PM     HPI:  73 yo F PMH HTN, chronic systolic HF, HLD, breast cancer, and discoid lupus who presents to hospital for scheduled left ICA aneurysm embolization.  She underwent the procedure under fluoroscopy without any issues.  The next morning her SCr jumped and so she was held to monitor and it has slightly improved but not quite at baseline for her.  Review of labs show in 2022 she had an [place] where she was ~ 1.4-1.7 range mostly, then it seems she improved a bit.  Otherwise denies any associated F/C/N/V/CP/SOB.  No melena, hematochezia, hematemesis.  No HA, visual changes, or abdominal pain.     DAILY NEPHROLOGY SUMMARY:  2/06: Consult done  2/07: NAEO, no complaints, feels ok, wanted me to talk to her son    REVIEW OF SYSTEMS:    10 point ROS reviewed and is as per HPI/daily summary or otherwise negative    PMH/PSH/SH/FH: Reviewed and unchanged since admission note  CURRENT MEDICATIONS: Reviewed from admission to present day    VS:  BP (!) 147/57   Pulse 84   Temp 37.3 °C (99.2 °F) (Temporal)   Resp 18   Ht 1.6 m (5' 3\")   Wt 69.5 kg (153 lb 3.5 oz)   SpO2 94%   BMI 27.14 kg/m²   Physical Exam  Nursing note reviewed.   Constitutional:       Appearance: She is ill-appearing.   Eyes:      General: No scleral icterus.  Cardiovascular:      Comments: No edema  Pulmonary:      Effort: Pulmonary effort is normal.   Abdominal:      General: There is no distension.   Musculoskeletal:         General: No deformity.   Skin:     Findings: No rash.   Neurological:      Mental Status: She is alert.   Psychiatric:         Behavior: Behavior is cooperative.       FLUID BALANCE:  In: 238 [P.O.:238]  Out: -     LABS:  Recent Labs     02/05/25  0906 02/06/25  0119 02/07/25  0512   SODIUM 137 136 136   POTASSIUM 4.0 4.1 3.9   CHLORIDE 107 106 105   CO2 19* 17* 17*   GLUCOSE 110* 106* 91   BUN 27* 22 " 19   CREATININE 1.79* 1.78* 1.73*   CALCIUM 8.8 8.8 9.0     Recent Labs     02/05/25  0906 02/05/25  1459 02/06/25  0119 02/06/25  0851 02/07/25  0512   WBC 8.7  --   --   --  12.3*   RBC 2.31*  --   --   --  2.59*   HEMOGLOBIN 7.1*   < > 8.5* 8.1* 7.8*   HEMATOCRIT 22.0*   < > 26.8* 25.5* 24.4*   MCV 95.2  --   --   --  94.2   MCH 30.7  --   --   --  30.1   MCHC 32.3  --   --   --  32.0*   RDW 45.2  --   --   --  54.5*   PLATELETCT 190  --   --   --  213   MPV 9.8  --   --   --  10.1    < > = values in this interval not displayed.       IMPRESSION:  # TAN  - Etiology unclear  - Diff Dx: ATN vs pre-renal vs CRS  - Non-oliguric  # CKD Stage 3a  - BCr ~ 1.1-1.4  - Etiology likely 2/2 HTN/CRS  # Left ICA aneurysm  - S/P embolization  # HTN  - Goal BP < 140/90  # HLD  # Chronic systolic HF  - EF 35%  - BNP elevated  - Fluid on CXR  # CAD     PLAN:  - No compelling indication for KRT  - Daily labs  - May need to diurese if volume up suspected  - Lasix 40mg IV x 1 todau  - Dose all meds per eGFR < 15

## 2025-02-07 NOTE — CARE PLAN
The patient is Stable - Low risk of patient condition declining or worsening    Shift Goals  Clinical Goals: Monitor H/H, Q4 neuros, provide calming enviroment  Patient Goals: pain control  Family Goals: updates    Progress made toward(s) clinical / shift goals:    Problem: Urinary Elimination  Goal: Establish and maintain regular urinary output  Outcome: Progressing  Note: Patient has been able to eliminate with assistance of external devices     Problem: Bowel Elimination  Goal: Establish and maintain regular bowel function  Outcome: Progressing  Note: Patient has not had a bowel movement since admission. PRN BM protocol initicated to encourage a BM     Problem: Mobility  Goal: Patient's capacity to carry out activities will improve  Outcome: Progressing  Note: Patient works with PT/OT. Patient says it is too hard to ambulate due to pain in back from surgery 3 years ago. Patient able to stand at EOB with therapy staff before getting back in to bed.       Patient is not progressing towards the following goals:

## 2025-02-07 NOTE — PROGRESS NOTES
Hospital Medicine Daily Progress Note    Date of Service  2/6/2025    Chief Complaint  Lauren Dave is a 74 y.o. female admitted 2/3/2025 for left ICA aneurysm embolization    Hospital Course  Lauren Dave is a 74 y.o. female with a hx of breast cancer s/p tx, HFrEF:35%, CAD (severe 3vessel disease on cath 10/19/22), discoid lupus, dyslipidemia, and essential HTN. who presented 2/3/2025 for elective anal embolization.  Underwent Left ICA terminus aneurysm embolization  on 2/3/2025 .  Postprocedure was admitted to ICU for close monitoring.  Hospital course complicated with severe anemia and TAN.  Nephrology has been consulted for TAN      Interval Problem Update    2/6/2025  Seen and examined at bedside  Vitals been stable  Patient denies any discomfort  No new weakness and numbness  No concern for bleeding  Denies right groin pain  Labs reviewed, stable H&H hemoglobin 8.1 after 1 unit transfusion, sodium 136, BUN/creatinine continue to remain elevated, 1.78/2.9, UA positive for UTI  Renal ultrasound unremarkable  Ordered CT  for evaluation of right groin to rule out hematoma, imaging reviewed noted to have a small amount of hemorrhage    Plan  Initiate IV antibiotics for acute UTI  I will continue Eliquis and Brilinta for now as a no concern for active GI bleed  Started on IV iron for iron deficiency anemia  Repeat CBC in a.m to monitor hemoglobin,   Repeat BMP in a.m to monitor lites, renal function  Transfuse to keep hemoglobin above 8  Monitor for toxicity when on IV antibiotics  Requiring IV narcotics for pain management, monitor for toxicity  Case discussed with nephrology Dr. Garcia  Discussed case with CORNELL BAUTISTA  I have discussed patient's treatment plan and discharge plan with patient's son over phone at the bedside .all questions were answered              I have discussed this patient's plan of care and discharge plan at IDT rounds today with Case Management, Nursing, Nursing leadership, and  other members of the IDT team.    Consultants/Specialty  Interventional radiology    Code Status  Full Code    Disposition  The patient is not medically cleared for discharge to home or a post-acute facility.  Anticipate discharge to: home with organized home healthcare and close outpatient follow-up    I have placed the appropriate orders for post-discharge needs.    Review of Systems  Review of Systems   Respiratory:  Negative for shortness of breath.    Cardiovascular:  Negative for chest pain.   Gastrointestinal:  Negative for blood in stool, melena, nausea and vomiting.   Neurological:  Negative for sensory change, speech change and focal weakness.        Physical Exam  Temp:  [36 °C (96.8 °F)-38.1 °C (100.6 °F)] 36.8 °C (98.2 °F)  Pulse:  [75-82] 79  Resp:  [12-20] 19  BP: (107-163)/(45-62) 131/48  SpO2:  [93 %-98 %] 94 %    Physical Exam  Constitutional:       Appearance: Normal appearance.   Cardiovascular:      Rate and Rhythm: Normal rate.      Pulses: Normal pulses.      Heart sounds: Normal heart sounds.   Pulmonary:      Effort: Pulmonary effort is normal. No respiratory distress.   Abdominal:      General: Abdomen is flat.   Musculoskeletal:      Right lower leg: No edema.      Left lower leg: No edema.      Comments: Right groin with no concern for hematoma   Neurological:      General: No focal deficit present.      Mental Status: She is alert.   Psychiatric:         Mood and Affect: Mood normal.         Fluids    Intake/Output Summary (Last 24 hours) at 2/6/2025 1842  Last data filed at 2/6/2025 1201  Gross per 24 hour   Intake 533 ml   Output --   Net 533 ml        Laboratory  Recent Labs     02/04/25  0311 02/05/25  0906 02/05/25  1459 02/05/25  2259 02/06/25  0119 02/06/25  0851   WBC 8.3 8.7  --   --   --   --    RBC 2.55* 2.31*  --   --   --   --    HEMOGLOBIN 8.0* 7.1*   < > 9.2* 8.5* 8.1*   HEMATOCRIT 25.1* 22.0*   < > 28.4* 26.8* 25.5*   MCV 98.4* 95.2  --   --   --   --    MCH 31.4 30.7  --    --   --   --    MCHC 31.9* 32.3  --   --   --   --    RDW 46.2 45.2  --   --   --   --    PLATELETCT 213 190  --   --   --   --    MPV 9.9 9.8  --   --   --   --     < > = values in this interval not displayed.     Recent Labs     02/04/25  0311 02/05/25  0906 02/06/25  0119   SODIUM 137 137 136   POTASSIUM 4.3 4.0 4.1   CHLORIDE 107 107 106   CO2 17* 19* 17*   GLUCOSE 175* 110* 106*   BUN 38* 27* 22   CREATININE 1.97* 1.79* 1.78*   CALCIUM 8.3* 8.8 8.8                     Imaging  CT-PELVIS W/O   Final Result         1. There is mild soft tissue attenuation and stranding along the right external iliac and common femoral vasculature consistent with a small amount of hemorrhage.   2. Atherosclerosis.   3. Diverticulosis.   4. There is indistinctness of the urinary bladder wall which may be seen with cystitis. Please correlate clinically.      US-RENAL   Final Result      1.  Unremarkable renal ultrasound.      DX-HIP-COMPLETE - UNILATERAL 2+ RIGHT   Final Result      Osteoarthritic degenerative changes of the hips without acute fracture or malalignment.      IR-EMBOLIZE-NEURO-INTRACRANIAL   Final Result   Impression:      Stent assisted coil embolization of a left ICA terminus aneurysm as described above. Final angiographic images demonstrate satisfactory occlusion of the aneurysm with no significant filling within the aneurysm. Patient will be followed up in the neuro    interventional clinic. Patient will remain on Brilinta and Eliquis for 90 days following which she will discontinue Brilinta therapy.      ILilliana was physically present and participated during the entire procedure of the IR-EMBOLIZE-NEURO-INTRACRANIAL.           Assessment/Plan  Acute UTI  Assessment & Plan  UA consistent with UTI  Continue IV antibiotic        Anemia  Assessment & Plan  I will continue Eliquis and brilinta for now as a no concern for active GI bleed  Started on IV iron for iron deficiency anemia  Currently there  is no concern for active GI bleed, likely had postoperative blood loss.  I will add Protonix for now  Ordered CT  for evaluation of right groin to rule out hematoma, imaging reviewed noted to have a small amount of hemorrhage  2/6/2025  Status post 1 unit PRBC transfusion  H&H remained stable  Repeat labs in a.m.    Aneurysm of left internal carotid artery  Assessment & Plan  S/p stenting  On apixaban, aspirin  Follow up with interventional radiology    TAN (acute kidney injury) (formerly [facility])- (present on admission)  Assessment & Plan  Mostly due to  dehydration /likely from contrast used during procedure    Avoid nephrotoxins, monitor inputs and outputs  Renal ultrasound unremarkable  UA positive for UTI, noted to have slight proteinuria  Repeat labs in a.m.  Nephrology following          HFrEF (heart failure with reduced ejection fraction) (formerly [facility])- (present on admission)  Assessment & Plan  Entresto, coreg, asa,   No acute exacerbation  Euvolemic on exam    PAF (paroxysmal atrial fibrillation) (formerly [facility])- (present on admission)  Assessment & Plan  On Eliquis and coreg  Monitor on telemetry  Paced rhythm on 2/4/25 tele    Gastroesophageal reflux disease without esophagitis- (present on admission)  Assessment & Plan  famotidine    Dyslipidemia- (present on admission)  Assessment & Plan  Statin therapy         VTE prophylaxis: eliquis    I have performed a physical exam and reviewed and updated ROS and Plan today (2/6/2025). In review of yesterday's note (2/5/2025), there are no changes except as documented above.

## 2025-02-08 LAB
ANION GAP SERPL CALC-SCNC: 14 MMOL/L (ref 7–16)
BASOPHILS # BLD AUTO: 0.3 % (ref 0–1.8)
BASOPHILS # BLD: 0.04 K/UL (ref 0–0.12)
BUN SERPL-MCNC: 26 MG/DL (ref 8–22)
CALCIUM SERPL-MCNC: 9.3 MG/DL (ref 8.5–10.5)
CHLORIDE SERPL-SCNC: 100 MMOL/L (ref 96–112)
CO2 SERPL-SCNC: 20 MMOL/L (ref 20–33)
CREAT SERPL-MCNC: 2.33 MG/DL (ref 0.5–1.4)
EOSINOPHIL # BLD AUTO: 0.47 K/UL (ref 0–0.51)
EOSINOPHIL NFR BLD: 3.9 % (ref 0–6.9)
ERYTHROCYTE [DISTWIDTH] IN BLOOD BY AUTOMATED COUNT: 52.5 FL (ref 35.9–50)
GFR SERPLBLD CREATININE-BSD FMLA CKD-EPI: 21 ML/MIN/1.73 M 2
GLUCOSE SERPL-MCNC: 110 MG/DL (ref 65–99)
HCT VFR BLD AUTO: 26.7 % (ref 37–47)
HEMOCCULT STL QL: POSITIVE
HGB BLD-MCNC: 8.3 G/DL (ref 12–16)
IMM GRANULOCYTES # BLD AUTO: 0.18 K/UL (ref 0–0.11)
IMM GRANULOCYTES NFR BLD AUTO: 1.5 % (ref 0–0.9)
LYMPHOCYTES # BLD AUTO: 1 K/UL (ref 1–4.8)
LYMPHOCYTES NFR BLD: 8.4 % (ref 22–41)
MCH RBC QN AUTO: 29.9 PG (ref 27–33)
MCHC RBC AUTO-ENTMCNC: 31.1 G/DL (ref 32.2–35.5)
MCV RBC AUTO: 96 FL (ref 81.4–97.8)
MONOCYTES # BLD AUTO: 1.43 K/UL (ref 0–0.85)
MONOCYTES NFR BLD AUTO: 12 % (ref 0–13.4)
NEUTROPHILS # BLD AUTO: 8.84 K/UL (ref 1.82–7.42)
NEUTROPHILS NFR BLD: 73.9 % (ref 44–72)
NRBC # BLD AUTO: 0.02 K/UL
NRBC BLD-RTO: 0.2 /100 WBC (ref 0–0.2)
PLATELET # BLD AUTO: 236 K/UL (ref 164–446)
PMV BLD AUTO: 10.8 FL (ref 9–12.9)
POTASSIUM SERPL-SCNC: 4 MMOL/L (ref 3.6–5.5)
RBC # BLD AUTO: 2.78 M/UL (ref 4.2–5.4)
SODIUM SERPL-SCNC: 134 MMOL/L (ref 135–145)
WBC # BLD AUTO: 12 K/UL (ref 4.8–10.8)

## 2025-02-08 PROCEDURE — A9270 NON-COVERED ITEM OR SERVICE: HCPCS | Performed by: STUDENT IN AN ORGANIZED HEALTH CARE EDUCATION/TRAINING PROGRAM

## 2025-02-08 PROCEDURE — 80048 BASIC METABOLIC PNL TOTAL CA: CPT

## 2025-02-08 PROCEDURE — A9270 NON-COVERED ITEM OR SERVICE: HCPCS | Performed by: RADIOLOGY

## 2025-02-08 PROCEDURE — 82272 OCCULT BLD FECES 1-3 TESTS: CPT

## 2025-02-08 PROCEDURE — 700111 HCHG RX REV CODE 636 W/ 250 OVERRIDE (IP): Performed by: STUDENT IN AN ORGANIZED HEALTH CARE EDUCATION/TRAINING PROGRAM

## 2025-02-08 PROCEDURE — 85025 COMPLETE CBC W/AUTO DIFF WBC: CPT

## 2025-02-08 PROCEDURE — 770006 HCHG ROOM/CARE - MED/SURG/GYN SEMI*

## 2025-02-08 PROCEDURE — 99233 SBSQ HOSP IP/OBS HIGH 50: CPT | Performed by: STUDENT IN AN ORGANIZED HEALTH CARE EDUCATION/TRAINING PROGRAM

## 2025-02-08 PROCEDURE — 700102 HCHG RX REV CODE 250 W/ 637 OVERRIDE(OP): Performed by: STUDENT IN AN ORGANIZED HEALTH CARE EDUCATION/TRAINING PROGRAM

## 2025-02-08 PROCEDURE — 700105 HCHG RX REV CODE 258: Performed by: STUDENT IN AN ORGANIZED HEALTH CARE EDUCATION/TRAINING PROGRAM

## 2025-02-08 PROCEDURE — 700102 HCHG RX REV CODE 250 W/ 637 OVERRIDE(OP): Performed by: RADIOLOGY

## 2025-02-08 PROCEDURE — 700102 HCHG RX REV CODE 250 W/ 637 OVERRIDE(OP)

## 2025-02-08 PROCEDURE — A9270 NON-COVERED ITEM OR SERVICE: HCPCS

## 2025-02-08 PROCEDURE — 36415 COLL VENOUS BLD VENIPUNCTURE: CPT

## 2025-02-08 RX ORDER — FUROSEMIDE 10 MG/ML
20 INJECTION INTRAMUSCULAR; INTRAVENOUS DAILY
Status: DISCONTINUED | OUTPATIENT
Start: 2025-02-08 | End: 2025-02-09

## 2025-02-08 RX ORDER — CYCLOBENZAPRINE HCL 10 MG
10 TABLET ORAL 3 TIMES DAILY PRN
Status: DISCONTINUED | OUTPATIENT
Start: 2025-02-08 | End: 2025-02-10 | Stop reason: HOSPADM

## 2025-02-08 RX ORDER — FERROUS SULFATE 325(65) MG
325 TABLET ORAL
Status: DISCONTINUED | OUTPATIENT
Start: 2025-02-09 | End: 2025-02-10 | Stop reason: HOSPADM

## 2025-02-08 RX ORDER — HYDROMORPHONE HYDROCHLORIDE 1 MG/ML
0.25 INJECTION, SOLUTION INTRAMUSCULAR; INTRAVENOUS; SUBCUTANEOUS EVERY 8 HOURS PRN
Status: DISCONTINUED | OUTPATIENT
Start: 2025-02-08 | End: 2025-02-10 | Stop reason: HOSPADM

## 2025-02-08 RX ORDER — AMOXICILLIN 500 MG/1
500 CAPSULE ORAL 2 TIMES DAILY
Status: DISCONTINUED | OUTPATIENT
Start: 2025-02-08 | End: 2025-02-09

## 2025-02-08 RX ADMIN — OXYCODONE HYDROCHLORIDE 10 MG: 10 TABLET ORAL at 12:27

## 2025-02-08 RX ADMIN — PANTOPRAZOLE SODIUM 40 MG: 40 INJECTION, POWDER, FOR SOLUTION INTRAVENOUS at 18:06

## 2025-02-08 RX ADMIN — CARVEDILOL 25 MG: 25 TABLET, FILM COATED ORAL at 09:16

## 2025-02-08 RX ADMIN — APIXABAN 5 MG: 5 TABLET, FILM COATED ORAL at 04:56

## 2025-02-08 RX ADMIN — CEFAZOLIN 2 G: 2 INJECTION, POWDER, FOR SOLUTION INTRAMUSCULAR; INTRAVENOUS at 06:46

## 2025-02-08 RX ADMIN — APIXABAN 5 MG: 5 TABLET, FILM COATED ORAL at 18:07

## 2025-02-08 RX ADMIN — SENNOSIDES AND DOCUSATE SODIUM 2 TABLET: 50; 8.6 TABLET ORAL at 18:07

## 2025-02-08 RX ADMIN — OXYCODONE HYDROCHLORIDE 10 MG: 10 TABLET ORAL at 04:56

## 2025-02-08 RX ADMIN — Medication: at 18:42

## 2025-02-08 RX ADMIN — FUROSEMIDE 20 MG: 10 INJECTION, SOLUTION INTRAVENOUS at 18:06

## 2025-02-08 RX ADMIN — OXYCODONE HYDROCHLORIDE 10 MG: 10 TABLET ORAL at 09:16

## 2025-02-08 RX ADMIN — PANTOPRAZOLE SODIUM 40 MG: 40 INJECTION, POWDER, FOR SOLUTION INTRAVENOUS at 06:41

## 2025-02-08 RX ADMIN — ACETAMINOPHEN 650 MG: 325 TABLET ORAL at 21:44

## 2025-02-08 RX ADMIN — TICAGRELOR 90 MG: 90 TABLET ORAL at 18:06

## 2025-02-08 RX ADMIN — TICAGRELOR 90 MG: 90 TABLET ORAL at 06:40

## 2025-02-08 ASSESSMENT — PAIN DESCRIPTION - PAIN TYPE
TYPE: ACUTE PAIN

## 2025-02-08 ASSESSMENT — ENCOUNTER SYMPTOMS
FOCAL WEAKNESS: 0
VOMITING: 0
NAUSEA: 0
SHORTNESS OF BREATH: 0
SPEECH CHANGE: 0
BLOOD IN STOOL: 0
SENSORY CHANGE: 0

## 2025-02-08 ASSESSMENT — FIBROSIS 4 INDEX: FIB4 SCORE: 3.76

## 2025-02-08 NOTE — CARE PLAN
The patient is Stable - Low risk of patient condition declining or worsening    Shift Goals  Clinical Goals: Sasfety/mobility  Patient Goals: pain control, comfort  Family Goals: not present    Progress made toward(s) clinical / shift goals:    Problem: Fall Risk  Goal: Patient will remain free from falls  Outcome: Progressing   Fall precautions in place. Call light, bedside table, and pt belongings within reach. Pt able to demonstrate the use of call light prior to getting out of bed.      Problem: Mobility  Goal: Patient's capacity to carry out activities will improve  Outcome: Progressing   Encourage pt sit up to chair for breakfast, lunch, and dinner time.     Patient is not progressing towards the following goals: N/A

## 2025-02-08 NOTE — PROGRESS NOTES
Hospital Medicine Daily Progress Note    Date of Service  2/7/2025    Chief Complaint  Lauren Dave is a 74 y.o. female admitted 2/3/2025 for left ICA aneurysm embolization    Hospital Course  Lauren Dave is a 74 y.o. female with a hx of breast cancer s/p tx, HFrEF:35%, CAD (severe 3vessel disease on cath 10/19/22), discoid lupus, dyslipidemia, and essential HTN. who presented 2/3/2025 for elective anal embolization.  Underwent Left ICA terminus aneurysm embolization  on 2/3/2025 .  Postprocedure was admitted to ICU for close monitoring.  Hospital course complicated with severe anemia and TAN.  Ordered CT  for evaluation of right groin to rule out hematoma, imaging reviewed noted to have a small amount of hemorrhageNephrology has been consulted for TAN.      Interval Problem Update    2/7/2025  Seen and examined at bedside  Vitals remained stable  Patient denies any discomfort  Examined the right groin, no concern for hematoma  Labs reviewed noted to have leukocytosis white count 12.3, hemoglobin 7.8, chemistry sodium 138 potassium 3.9, renal function slightly improved  I reviewed her chest x-ray, noted to have stable cardiomegaly, left pleural effusion, BNP is elevated around 11,000    Plan  40 mg IV Lasix given , overall she does not appear to be in overt volume overload, though noted to have significantly elevated BNP  Initiate IV antibiotics for acute UTI  I will continue Eliquis and Brilinta for now as a no concern for active GI bleed  Started on IV iron for iron deficiency anemia  Repeat CBC in a.m to monitor hemoglobin,   Repeat BMP in a.m to monitor lites, renal function  Transfuse as needed   Monitor for toxicity while on IV antibiotics  Monitor for toxicity while on IV Lasix  Requiring IV narcotics for pain management, monitor for toxicity  Case discussed with nephrology Dr. Garcia  I have discussed patient's treatment plan and discharge plan with patient's son over phone at the bedside .all  questions were answered      I had extensive discussion regarding medical plan with patient's son over phone.  All question answered    I have discussed this patient's plan of care and discharge plan at IDT rounds today with Case Management, Nursing, Nursing leadership, and other members of the IDT team.    Consultants/Specialty  Interventional radiology    Code Status  Full Code    Disposition  The patient is not medically cleared for discharge to home or a post-acute facility.  Anticipate discharge to: home with organized home healthcare and close outpatient follow-up    I have placed the appropriate orders for post-discharge needs.    Review of Systems  Review of Systems   Respiratory:  Negative for shortness of breath.    Cardiovascular:  Negative for chest pain.   Gastrointestinal:  Negative for blood in stool, melena, nausea and vomiting.   Neurological:  Negative for sensory change, speech change and focal weakness.        Physical Exam  Temp:  [36.4 °C (97.6 °F)-37.3 °C (99.2 °F)] 37.3 °C (99.2 °F)  Pulse:  [66-93] 75  Resp:  [16-18] 16  BP: (119-147)/(45-72) 119/45  SpO2:  [94 %-96 %] 96 %    Physical Exam  Constitutional:       Appearance: Normal appearance.   Cardiovascular:      Rate and Rhythm: Normal rate.      Pulses: Normal pulses.      Heart sounds: Normal heart sounds.   Pulmonary:      Effort: Pulmonary effort is normal. No respiratory distress.   Abdominal:      General: Abdomen is flat.   Musculoskeletal:      Right lower leg: No edema.      Left lower leg: No edema.      Comments: Right groin with no concern for hematoma   Neurological:      General: No focal deficit present.      Mental Status: She is alert.   Psychiatric:         Mood and Affect: Mood normal.         Fluids    Intake/Output Summary (Last 24 hours) at 2/7/2025 1756  Last data filed at 2/7/2025 1207  Gross per 24 hour   Intake 297 ml   Output --   Net 297 ml        Laboratory  Recent Labs     02/05/25  0906 02/05/25  6981  02/06/25  0119 02/06/25  0851 02/07/25  0512   WBC 8.7  --   --   --  12.3*   RBC 2.31*  --   --   --  2.59*   HEMOGLOBIN 7.1*   < > 8.5* 8.1* 7.8*   HEMATOCRIT 22.0*   < > 26.8* 25.5* 24.4*   MCV 95.2  --   --   --  94.2   MCH 30.7  --   --   --  30.1   MCHC 32.3  --   --   --  32.0*   RDW 45.2  --   --   --  54.5*   PLATELETCT 190  --   --   --  213   MPV 9.8  --   --   --  10.1    < > = values in this interval not displayed.     Recent Labs     02/05/25  0906 02/06/25 0119 02/07/25 0512   SODIUM 137 136 136   POTASSIUM 4.0 4.1 3.9   CHLORIDE 107 106 105   CO2 19* 17* 17*   GLUCOSE 110* 106* 91   BUN 27* 22 19   CREATININE 1.79* 1.78* 1.73*   CALCIUM 8.8 8.8 9.0                     Imaging  DX-CHEST-LIMITED (1 VIEW)   Final Result      1.  Stable cardiomegaly.   2.  Left lower lung zone stranding consistent with subsegmental atelectatic change.   3.  Blurring of the left costophrenic angle consistent with minimal left pleural effusion      CT-PELVIS W/O   Final Result         1. There is mild soft tissue attenuation and stranding along the right external iliac and common femoral vasculature consistent with a small amount of hemorrhage.   2. Atherosclerosis.   3. Diverticulosis.   4. There is indistinctness of the urinary bladder wall which may be seen with cystitis. Please correlate clinically.      US-RENAL   Final Result      1.  Unremarkable renal ultrasound.      DX-HIP-COMPLETE - UNILATERAL 2+ RIGHT   Final Result      Osteoarthritic degenerative changes of the hips without acute fracture or malalignment.      IR-EMBOLIZE-NEURO-INTRACRANIAL   Final Result   Impression:      Stent assisted coil embolization of a left ICA terminus aneurysm as described above. Final angiographic images demonstrate satisfactory occlusion of the aneurysm with no significant filling within the aneurysm. Patient will be followed up in the neuro    interventional clinic. Patient will remain on Brilinta and Eliquis for 90 days  following which she will discontinue Brilinta therapy.      I, Lilliana Cuellar was physically present and participated during the entire procedure of the IR-EMBOLIZE-NEURO-INTRACRANIAL.           Assessment/Plan  Acute UTI  Assessment & Plan  UA consistent with UTI  Continue IV antibiotic        Anemia  Assessment & Plan  I will continue Eliquis and brilinta for now as a no concern for active GI bleed  Started on IV iron for iron deficiency anemia  Currently there is no concern for active GI bleed, likely had postoperative blood loss.  I will add Protonix for now  Ordered CT  for evaluation of right groin to rule out hematoma, imaging reviewed noted to have a small amount of hemorrhage  No concern of bleeding, monitor H&H closely  GI evaluation if H&H continue to drop    Aneurysm of left internal carotid artery  Assessment & Plan  S/p stenting  On apixaban, aspirin  Follow up with interventional radiology    TAN (acute kidney injury) ([facility])- (present on admission)  Assessment & Plan          2/7/2025  40 mg IV Lasix given , overall she does not appear to be in overt volume overload, though noted to have significantly elevated BNP  Initiate IV antibiotics for acute UTI  I will continue Eliquis and Brilinta for now as a no concern for active GI bleed  Started on IV iron for iron deficiency anemia  Repeat CBC in a.m to monitor hemoglobin,   Repeat BMP in a.m to monitor lites, renal function  Transfuse as needed   Monitor for toxicity while on IV antibiotics  Monitor for toxicity while on IV Lasix  Requiring IV narcotics for pain management, monitor for toxicity  Case discussed with nephrology Dr. Garcia  I have discussed patient's treatment plan and discharge plan with patient's son over phone at the bedside .all questions were answered    HFrEF (heart failure with reduced ejection fraction) ([facility])- (present on admission)  Assessment & Plan  Entresto, coreg, asa,   No acute exacerbation  Euvolemic on exam    PAF  (paroxysmal atrial fibrillation) ([facility])- (present on admission)  Assessment & Plan  On Eliquis and coreg  Monitor on telemetry  Paced rhythm on 2/4/25 tele    Gastroesophageal reflux disease without esophagitis- (present on admission)  Assessment & Plan  famotidine    Dyslipidemia- (present on admission)  Assessment & Plan  Statin therapy         VTE prophylaxis: eliquis    I have performed a physical exam and reviewed and updated ROS and Plan today (2/7/2025). In review of yesterday's note (2/6/2025), there are no changes except as documented above.

## 2025-02-08 NOTE — PROGRESS NOTES
"[facility] -  PROGRESS NOTE               Author: Aj Garcia M.D. Date & Time: 2/8/2025  11:15 AM     HPI:  73 yo F PMH HTN, chronic systolic HF, HLD, breast cancer, and discoid lupus who presents to hospital for scheduled left ICA aneurysm embolization.  She underwent the procedure under fluoroscopy without any issues.  The next morning her SCr jumped and so she was held to monitor and it has slightly improved but not quite at baseline for her.  Review of labs show in 2022 she had an Mercedez where she was ~ 1.4-1.7 range mostly, then it seems she improved a bit.  Otherwise denies any associated F/C/N/V/CP/SOB.  No melena, hematochezia, hematemesis.  No HA, visual changes, or abdominal pain.     DAILY NEPHROLOGY SUMMARY:  2/06: Consult done  2/07: NAEO, no complaints, feels ok, wanted me to talk to her son  2/08: NAEO, feels better with lasix, SOB improved    REVIEW OF SYSTEMS:    10 point ROS reviewed and is as per HPI/daily summary or otherwise negative    PMH/PSH/SH/FH: Reviewed and unchanged since admission note  CURRENT MEDICATIONS: Reviewed from admission to present day    VS:  /45   Pulse 78   Temp 36.7 °C (98 °F) (Temporal)   Resp 19   Ht 1.6 m (5' 3\")   Wt 69.5 kg (153 lb 3.5 oz)   SpO2 90%   BMI 27.14 kg/m²   Physical Exam  Nursing note reviewed.   Constitutional:       Appearance: She is ill-appearing.   Eyes:      General: No scleral icterus.  Cardiovascular:      Comments: No edema  Pulmonary:      Effort: Pulmonary effort is normal.   Abdominal:      General: There is no distension.   Musculoskeletal:         General: No deformity.   Skin:     Findings: No rash.   Neurological:      Mental Status: She is alert.   Psychiatric:         Behavior: Behavior is cooperative.       FLUID BALANCE:  In: 177 [P.O.:177]  Out: 700     LABS:  Recent Labs     02/06/25  0119 02/07/25  0512 02/08/25  0329   SODIUM 136 136 134*   POTASSIUM 4.1 3.9 4.0   CHLORIDE 106 105 100   CO2 " 17* 17* 20   GLUCOSE 106* 91 110*   BUN 22 19 26*   CREATININE 1.78* 1.73* 2.33*   CALCIUM 8.8 9.0 9.3     Recent Labs     02/06/25  0851 02/07/25  0512 02/08/25  0329   WBC  --  12.3* 12.0*   RBC  --  2.59* 2.78*   HEMOGLOBIN 8.1* 7.8* 8.3*   HEMATOCRIT 25.5* 24.4* 26.7*   MCV  --  94.2 96.0   MCH  --  30.1 29.9   MCHC  --  32.0* 31.1*   RDW  --  54.5* 52.5*   PLATELETCT  --  213 236   MPV  --  10.1 10.8       IMPRESSION:  # TAN  - Etiology unclear  - Diff Dx: ATN vs pre-renal vs CRS  - Non-oliguric  # CKD Stage 3a  - BCr ~ 1.1-1.4  - Etiology likely 2/2 HTN/CRS  # Left ICA aneurysm  - S/P embolization  # HTN  - Goal BP < 140/90  # HLD  # Chronic systolic HF  - EF 35%  - BNP elevated  - Fluid on CXR  # CAD  # UTI  - On Abx     PLAN:  - No compelling indication for KRT  - Daily labs  - Lasix 40mg IV x 1 again  - BNP in AM  - Abx per primary team  - Dose all meds per eGFR < 15

## 2025-02-09 PROBLEM — E87.6 HYPOKALEMIA: Status: ACTIVE | Noted: 2025-02-09

## 2025-02-09 LAB
ANION GAP SERPL CALC-SCNC: 15 MMOL/L (ref 7–16)
BASOPHILS # BLD AUTO: 0.3 % (ref 0–1.8)
BASOPHILS # BLD: 0.03 K/UL (ref 0–0.12)
BUN SERPL-MCNC: 36 MG/DL (ref 8–22)
CALCIUM SERPL-MCNC: 9.5 MG/DL (ref 8.5–10.5)
CHLORIDE SERPL-SCNC: 96 MMOL/L (ref 96–112)
CO2 SERPL-SCNC: 20 MMOL/L (ref 20–33)
CREAT SERPL-MCNC: 2.6 MG/DL (ref 0.5–1.4)
EOSINOPHIL # BLD AUTO: 0.51 K/UL (ref 0–0.51)
EOSINOPHIL NFR BLD: 5.2 % (ref 0–6.9)
ERYTHROCYTE [DISTWIDTH] IN BLOOD BY AUTOMATED COUNT: 49.2 FL (ref 35.9–50)
GFR SERPLBLD CREATININE-BSD FMLA CKD-EPI: 19 ML/MIN/1.73 M 2
GLUCOSE SERPL-MCNC: 93 MG/DL (ref 65–99)
HCT VFR BLD AUTO: 25.1 % (ref 37–47)
HGB BLD-MCNC: 8.2 G/DL (ref 12–16)
IMM GRANULOCYTES # BLD AUTO: 0.18 K/UL (ref 0–0.11)
IMM GRANULOCYTES NFR BLD AUTO: 1.8 % (ref 0–0.9)
LYMPHOCYTES # BLD AUTO: 1.22 K/UL (ref 1–4.8)
LYMPHOCYTES NFR BLD: 12.4 % (ref 22–41)
MAGNESIUM SERPL-MCNC: 2 MG/DL (ref 1.5–2.5)
MCH RBC QN AUTO: 30.7 PG (ref 27–33)
MCHC RBC AUTO-ENTMCNC: 32.7 G/DL (ref 32.2–35.5)
MCV RBC AUTO: 94 FL (ref 81.4–97.8)
MONOCYTES # BLD AUTO: 1.12 K/UL (ref 0–0.85)
MONOCYTES NFR BLD AUTO: 11.4 % (ref 0–13.4)
NEUTROPHILS # BLD AUTO: 6.76 K/UL (ref 1.82–7.42)
NEUTROPHILS NFR BLD: 68.9 % (ref 44–72)
NRBC # BLD AUTO: 0.02 K/UL
NRBC BLD-RTO: 0.2 /100 WBC (ref 0–0.2)
NT-PROBNP SERPL IA-MCNC: 4531 PG/ML (ref 0–125)
PHOSPHATE SERPL-MCNC: 4 MG/DL (ref 2.5–4.5)
PLATELET # BLD AUTO: 271 K/UL (ref 164–446)
PMV BLD AUTO: 10.4 FL (ref 9–12.9)
POTASSIUM SERPL-SCNC: 3.3 MMOL/L (ref 3.6–5.5)
RBC # BLD AUTO: 2.67 M/UL (ref 4.2–5.4)
SODIUM SERPL-SCNC: 131 MMOL/L (ref 135–145)
WBC # BLD AUTO: 9.8 K/UL (ref 4.8–10.8)

## 2025-02-09 PROCEDURE — A9270 NON-COVERED ITEM OR SERVICE: HCPCS | Performed by: STUDENT IN AN ORGANIZED HEALTH CARE EDUCATION/TRAINING PROGRAM

## 2025-02-09 PROCEDURE — 85025 COMPLETE CBC W/AUTO DIFF WBC: CPT

## 2025-02-09 PROCEDURE — 80048 BASIC METABOLIC PNL TOTAL CA: CPT

## 2025-02-09 PROCEDURE — 83880 ASSAY OF NATRIURETIC PEPTIDE: CPT

## 2025-02-09 PROCEDURE — 99233 SBSQ HOSP IP/OBS HIGH 50: CPT | Performed by: STUDENT IN AN ORGANIZED HEALTH CARE EDUCATION/TRAINING PROGRAM

## 2025-02-09 PROCEDURE — 770006 HCHG ROOM/CARE - MED/SURG/GYN SEMI*

## 2025-02-09 PROCEDURE — 700102 HCHG RX REV CODE 250 W/ 637 OVERRIDE(OP)

## 2025-02-09 PROCEDURE — 83735 ASSAY OF MAGNESIUM: CPT

## 2025-02-09 PROCEDURE — A9270 NON-COVERED ITEM OR SERVICE: HCPCS | Performed by: RADIOLOGY

## 2025-02-09 PROCEDURE — A9270 NON-COVERED ITEM OR SERVICE: HCPCS

## 2025-02-09 PROCEDURE — 700102 HCHG RX REV CODE 250 W/ 637 OVERRIDE(OP): Performed by: STUDENT IN AN ORGANIZED HEALTH CARE EDUCATION/TRAINING PROGRAM

## 2025-02-09 PROCEDURE — 700111 HCHG RX REV CODE 636 W/ 250 OVERRIDE (IP): Mod: JZ | Performed by: STUDENT IN AN ORGANIZED HEALTH CARE EDUCATION/TRAINING PROGRAM

## 2025-02-09 PROCEDURE — 700102 HCHG RX REV CODE 250 W/ 637 OVERRIDE(OP): Performed by: RADIOLOGY

## 2025-02-09 PROCEDURE — 84100 ASSAY OF PHOSPHORUS: CPT

## 2025-02-09 PROCEDURE — 700105 HCHG RX REV CODE 258: Performed by: STUDENT IN AN ORGANIZED HEALTH CARE EDUCATION/TRAINING PROGRAM

## 2025-02-09 RX ORDER — POTASSIUM CHLORIDE 1500 MG/1
40 TABLET, EXTENDED RELEASE ORAL ONCE
Status: COMPLETED | OUTPATIENT
Start: 2025-02-09 | End: 2025-02-09

## 2025-02-09 RX ORDER — AMPICILLIN 500 MG/1
500 CAPSULE ORAL EVERY 12 HOURS
Status: DISCONTINUED | OUTPATIENT
Start: 2025-02-09 | End: 2025-02-10 | Stop reason: HOSPADM

## 2025-02-09 RX ORDER — ONDANSETRON 2 MG/ML
4 INJECTION INTRAMUSCULAR; INTRAVENOUS EVERY 4 HOURS PRN
Status: DISCONTINUED | OUTPATIENT
Start: 2025-02-09 | End: 2025-02-10 | Stop reason: HOSPADM

## 2025-02-09 RX ADMIN — PANTOPRAZOLE SODIUM 40 MG: 40 INJECTION, POWDER, FOR SOLUTION INTRAVENOUS at 17:09

## 2025-02-09 RX ADMIN — CYCLOBENZAPRINE 10 MG: 10 TABLET, FILM COATED ORAL at 19:59

## 2025-02-09 RX ADMIN — AMPICILLIN 500 MG: 500 CAPSULE ORAL at 17:08

## 2025-02-09 RX ADMIN — POTASSIUM CHLORIDE 40 MEQ: 1500 TABLET, EXTENDED RELEASE ORAL at 11:08

## 2025-02-09 RX ADMIN — TICAGRELOR 90 MG: 90 TABLET ORAL at 05:19

## 2025-02-09 RX ADMIN — APIXABAN 5 MG: 5 TABLET, FILM COATED ORAL at 17:09

## 2025-02-09 RX ADMIN — HYDROMORPHONE HYDROCHLORIDE 0.25 MG: 1 INJECTION, SOLUTION INTRAMUSCULAR; INTRAVENOUS; SUBCUTANEOUS at 23:03

## 2025-02-09 RX ADMIN — OXYCODONE HYDROCHLORIDE 10 MG: 10 TABLET ORAL at 23:02

## 2025-02-09 RX ADMIN — TICAGRELOR 90 MG: 90 TABLET ORAL at 17:09

## 2025-02-09 RX ADMIN — APIXABAN 5 MG: 5 TABLET, FILM COATED ORAL at 05:19

## 2025-02-09 RX ADMIN — OXYCODONE HYDROCHLORIDE 10 MG: 10 TABLET ORAL at 19:59

## 2025-02-09 RX ADMIN — AMPICILLIN SODIUM 1 G: 1 INJECTION, POWDER, FOR SOLUTION INTRAMUSCULAR; INTRAVENOUS at 10:40

## 2025-02-09 RX ADMIN — Medication: at 03:49

## 2025-02-09 RX ADMIN — OXYCODONE HYDROCHLORIDE 10 MG: 10 TABLET ORAL at 00:26

## 2025-02-09 RX ADMIN — OXYCODONE HYDROCHLORIDE 10 MG: 10 TABLET ORAL at 03:38

## 2025-02-09 RX ADMIN — OXYCODONE HYDROCHLORIDE 10 MG: 10 TABLET ORAL at 07:46

## 2025-02-09 RX ADMIN — FUROSEMIDE 20 MG: 10 INJECTION, SOLUTION INTRAVENOUS at 05:20

## 2025-02-09 RX ADMIN — PANTOPRAZOLE SODIUM 40 MG: 40 INJECTION, POWDER, FOR SOLUTION INTRAVENOUS at 05:19

## 2025-02-09 RX ADMIN — FERROUS SULFATE TAB 325 MG (65 MG ELEMENTAL FE) 325 MG: 325 (65 FE) TAB at 07:47

## 2025-02-09 ASSESSMENT — PAIN DESCRIPTION - PAIN TYPE
TYPE: ACUTE PAIN
TYPE: ACUTE PAIN
TYPE: ACUTE PAIN;CHRONIC PAIN
TYPE: ACUTE PAIN

## 2025-02-09 ASSESSMENT — ENCOUNTER SYMPTOMS
VOMITING: 0
FOCAL WEAKNESS: 0
SPEECH CHANGE: 0
SHORTNESS OF BREATH: 0
BLOOD IN STOOL: 0
NAUSEA: 0
SENSORY CHANGE: 0

## 2025-02-09 ASSESSMENT — CHA2DS2 SCORE
CHF OR LEFT VENTRICULAR DYSFUNCTION: YES
CHA2DS2 VASC SCORE: 1

## 2025-02-09 NOTE — CARE PLAN
The patient is Stable - Low risk of patient condition declining or worsening    Shift Goals  Clinical Goals: Safety/mobility  Patient Goals: Comfort  Family Goals: SUNDAR    Progress made toward(s) clinical / shift goals:   Problem: Fall Risk  Goal: Patient will remain free from falls  Outcome: Progressing  Fall precautions in place. Call light, bedside table, and pt belongings within reach. Pt able to demonstrate the use of call light prior to getting out of bed.      Problem: Mobility  Goal: Patient's capacity to carry out activities will improve  Outcome: Progressing   Pt up to chair for breakfast time. Encourage pt to sit up to chair for lunch and dinner time.      Patient is not progressing towards the following goals: N/A

## 2025-02-09 NOTE — CARE PLAN
The patient is Stable - Low risk of patient condition declining or worsening    Shift Goals  Clinical Goals: Safety  Patient Goals: Comfort  Family Goals: SUNDAR    Progress made toward(s) clinical / shift goals:    Problem: Knowledge Deficit - Standard  Goal: Patient and family/care givers will demonstrate understanding of plan of care, disease process/condition, diagnostic tests and medications  Outcome: Progressing     Problem: Respiratory  Goal: Patient will achieve/maintain optimum respiratory ventilation and gas exchange  Outcome: Progressing     Problem: Mobility  Goal: Patient's capacity to carry out activities will improve  Outcome: Progressing       Patient is not progressing towards the following goals:

## 2025-02-09 NOTE — PROGRESS NOTES
"[facility] -  PROGRESS NOTE               Author: Aj Garcia M.D. Date & Time: 2/9/2025  10:46 AM     HPI:  75 yo F PMH HTN, chronic systolic HF, HLD, breast cancer, and discoid lupus who presents to hospital for scheduled left ICA aneurysm embolization.  She underwent the procedure under fluoroscopy without any issues.  The next morning her SCr jumped and so she was held to monitor and it has slightly improved but not quite at baseline for her.  Review of labs show in 2022 she had an Mercedez where she was ~ 1.4-1.7 range mostly, then it seems she improved a bit.  Otherwise denies any associated F/C/N/V/CP/SOB.  No melena, hematochezia, hematemesis.  No HA, visual changes, or abdominal pain.     DAILY NEPHROLOGY SUMMARY:  2/06: Consult done  2/07: NAEO, no complaints, feels ok, wanted me to talk to her son  2/08: NAEO, feels better with lasix, SOB improved  2/09: N/V overnight and all day yesterday, could not keep anything down    REVIEW OF SYSTEMS:    10 point ROS reviewed and is as per HPI/daily summary or otherwise negative    PMH/PSH/SH/FH: Reviewed and unchanged since admission note  CURRENT MEDICATIONS: Reviewed from admission to present day    VS:  /53   Pulse 75   Temp 36.1 °C (97 °F) (Temporal)   Resp 20   Ht 1.6 m (5' 3\")   Wt 75 kg (165 lb 5.5 oz)   SpO2 92%   BMI 29.29 kg/m²   Physical Exam  Nursing note reviewed.   Constitutional:       Appearance: She is ill-appearing.   Eyes:      General: No scleral icterus.  Cardiovascular:      Comments: No edema  Pulmonary:      Effort: Pulmonary effort is normal.   Abdominal:      General: There is no distension.   Musculoskeletal:         General: No deformity.   Skin:     Findings: No rash.   Neurological:      Mental Status: She is alert.   Psychiatric:         Behavior: Behavior is cooperative.       FLUID BALANCE:  In: 725 [P.O.:725]  Out: -     LABS:  Recent Labs     02/07/25  0512 02/08/25  0329 02/09/25  0615 "   SODIUM 136 134* 131*   POTASSIUM 3.9 4.0 3.3*   CHLORIDE 105 100 96   CO2 17* 20 20   GLUCOSE 91 110* 93   BUN 19 26* 36*   CREATININE 1.73* 2.33* 2.60*   CALCIUM 9.0 9.3 9.5     Recent Labs     02/07/25  0512 02/08/25  0329 02/09/25  0615   WBC 12.3* 12.0* 9.8   RBC 2.59* 2.78* 2.67*   HEMOGLOBIN 7.8* 8.3* 8.2*   HEMATOCRIT 24.4* 26.7* 25.1*   MCV 94.2 96.0 94.0   MCH 30.1 29.9 30.7   MCHC 32.0* 31.1* 32.7   RDW 54.5* 52.5* 49.2   PLATELETCT 213 236 271   MPV 10.1 10.8 10.4       IMPRESSION:  # TAN  - Etiology unclear  - Diff Dx: ATN vs pre-renal vs CRS  - Non-oliguric  # CKD Stage 3a  - BCr ~ 1.1-1.4  - Etiology likely 2/2 HTN/CRS  # Left ICA aneurysm  - S/P embolization  # HTN  - Goal BP < 140/90  # HLD  # Chronic systolic HF  - EF 35%  - BNP elevated  - Fluid on CXR  # CAD  # UTI  - On Abx     PLAN:  - No compelling indication for KRT  - Daily labs  - Hold diuretics  - LR bolus if N/V persists and po intake is poor, otherwise monitor on oral intake  - Monitor I/O  - Abx per primary team  - Dose all meds per eGFR < 15

## 2025-02-09 NOTE — PROGRESS NOTES
Hospital Medicine Daily Progress Note    Date of Service  2/8/2025    Chief Complaint  Lauren Dave is a 74 y.o. female admitted 2/3/2025 for left ICA aneurysm embolization    Hospital Course  Lauren Dave is a 74 y.o. female with a hx of breast cancer s/p tx, HFrEF:35%, CAD (severe 3vessel disease on cath 10/19/22), discoid lupus, dyslipidemia, and essential HTN. who presented 2/3/2025 for elective anal embolization.  Underwent Left ICA terminus aneurysm embolization  on 2/3/2025 .  Postprocedure was admitted to ICU for close monitoring.  Hospital course complicated with severe anemia and TAN.  Ordered CT  for evaluation of right groin to rule out hematoma, imaging reviewed noted to have a small amount of hemorrhage.nephrology has been following for TAN.    Interval Problem Update    2/8/2025  Seen and examined at bedside  Vital remained stable, blood pressure borderline low  Patient reports of generalized pain  Denies any shortness of breath, nausea/vomiting  Labs reviewed noted to have leukocytosis improving, hemoglobin 8.3, chemistry with sodium 134 worsening renal function BUN/creatinine 26/2.33, UA growing E. coli which is pansensitive  Chart reviewed Meds reviewed  Plan    Overall appear dry on exam  I will hold Coreg for borderline hypotension  Trial of additional 20mg IV lasix  Patient reports she has tolerated amoxicillin in the past and requesting to be started on amoxicillin  I will continue Eliquis and Brilinta for now as a no concern for active GI bleed  Repeat CBC in a.m to monitor hemoglobin,   Repeat BMP in a.m to monitor lites, renal function  Transfuse as needed   Monitor for toxicity while on IV Lasix  Requiring IV narcotics for pain management, monitor for toxicity  Case discussed with nephrology Dr. Garcia    I had extensive discussion with patient's son and his spouse at the bedside regarding the current medical status, treatment plan and prognosis. They were briefed on the  patient's condition including recent changes or developments .  Updated with recent test results vital signs and symptoms.  Current treatment plan was explained in details including medications, therapies .    I have discussed this patient's plan of care and discharge plan at IDT rounds today with Case Management, Nursing, Nursing leadership, and other members of the IDT team.    Consultants/Specialty  Interventional radiology    Code Status  Full Code    Disposition  The patient is not medically cleared for discharge to home or a post-acute facility.  Anticipate discharge to: home with close outpatient follow-up    I have placed the appropriate orders for post-discharge needs.    Review of Systems  Review of Systems   Respiratory:  Negative for shortness of breath.    Cardiovascular:  Negative for chest pain.   Gastrointestinal:  Negative for blood in stool, melena, nausea and vomiting.   Neurological:  Negative for sensory change, speech change and focal weakness.        Physical Exam  Temp:  [36.3 °C (97.3 °F)-36.7 °C (98 °F)] 36.3 °C (97.3 °F)  Pulse:  [75-80] 80  Resp:  [16-19] 18  BP: (113-130)/(45-55) 113/45  SpO2:  [90 %-96 %] 90 %    Physical Exam  Constitutional:       Appearance: Normal appearance.   Cardiovascular:      Rate and Rhythm: Normal rate.      Pulses: Normal pulses.      Heart sounds: Normal heart sounds.   Pulmonary:      Effort: Pulmonary effort is normal. No respiratory distress.   Abdominal:      General: Abdomen is flat.   Musculoskeletal:      Right lower leg: No edema.      Left lower leg: No edema.      Comments: Right groin with no concern for hematoma   Neurological:      General: No focal deficit present.      Mental Status: She is alert.   Psychiatric:         Mood and Affect: Mood normal.         Fluids    Intake/Output Summary (Last 24 hours) at 2/8/2025 1608  Last data filed at 2/8/2025 1500  Gross per 24 hour   Intake 550 ml   Output 700 ml   Net -150 ml         Laboratory  Recent Labs     02/06/25  0851 02/07/25  0512 02/08/25  0329   WBC  --  12.3* 12.0*   RBC  --  2.59* 2.78*   HEMOGLOBIN 8.1* 7.8* 8.3*   HEMATOCRIT 25.5* 24.4* 26.7*   MCV  --  94.2 96.0   MCH  --  30.1 29.9   MCHC  --  32.0* 31.1*   RDW  --  54.5* 52.5*   PLATELETCT  --  213 236   MPV  --  10.1 10.8     Recent Labs     02/06/25  0119 02/07/25  0512 02/08/25  0329   SODIUM 136 136 134*   POTASSIUM 4.1 3.9 4.0   CHLORIDE 106 105 100   CO2 17* 17* 20   GLUCOSE 106* 91 110*   BUN 22 19 26*   CREATININE 1.78* 1.73* 2.33*   CALCIUM 8.8 9.0 9.3                     Imaging  DX-CHEST-LIMITED (1 VIEW)   Final Result      1.  Stable cardiomegaly.   2.  Left lower lung zone stranding consistent with subsegmental atelectatic change.   3.  Blurring of the left costophrenic angle consistent with minimal left pleural effusion      CT-PELVIS W/O   Final Result         1. There is mild soft tissue attenuation and stranding along the right external iliac and common femoral vasculature consistent with a small amount of hemorrhage.   2. Atherosclerosis.   3. Diverticulosis.   4. There is indistinctness of the urinary bladder wall which may be seen with cystitis. Please correlate clinically.      US-RENAL   Final Result      1.  Unremarkable renal ultrasound.      DX-HIP-COMPLETE - UNILATERAL 2+ RIGHT   Final Result      Osteoarthritic degenerative changes of the hips without acute fracture or malalignment.      IR-EMBOLIZE-NEURO-INTRACRANIAL   Final Result   Impression:      Stent assisted coil embolization of a left ICA terminus aneurysm as described above. Final angiographic images demonstrate satisfactory occlusion of the aneurysm with no significant filling within the aneurysm. Patient will be followed up in the neuro    interventional clinic. Patient will remain on Brilinta and Eliquis for 90 days following which she will discontinue Brilinta therapy.      Lilliana PRESCOTT was physically present and  participated during the entire procedure of the IR-EMBOLIZE-NEURO-INTRACRANIAL.           Assessment/Plan  Acute UTI  Assessment & Plan  UA consistent with UTI  Culture growing E. coli  Complete 5 days antibiotic course        Anemia  Assessment & Plan  I will continue Eliquis and brilinta for now as a no concern for active GI bleed  S/p completion of IV iron  Currently there is no concern for active GI bleed, likely had postoperative blood loss.  Continue IV Protonix  Ordered CT  for evaluation of right groin to rule out hematoma, imaging reviewed noted to have a small amount of hemorrhage  No concern of bleeding, monitor H&H closely  GI evaluation if H&H continue to drop    2/8/2025  Occult blood stool positive  No melena  Likely need EGD in future  If H&H keeps dropping I will have GI consult for endoscopy    Aneurysm of left internal carotid artery  Assessment & Plan  S/p stenting  On apixaban, eliquis  Follow up with interventional radiology    TAN (acute kidney injury) ([facility] [place])- (present on admission)  Assessment & Plan    2/8/2025  Overall appear dry on exam  I will hold Coreg for borderline hypotension  Trial of additional 20mg IV lasix  Patient reports she has tolerated amoxicillin in the past and requesting to be started on amoxicillin  I will continue Eliquis and Brilinta for now as a no concern for active GI bleed  Repeat CBC in a.m to monitor hemoglobin,   Repeat BMP in a.m to monitor lites, renal function  Transfuse as needed   Monitor for toxicity while on IV Lasix  Requiring IV narcotics for pain management, monitor for toxicity  Case discussed with nephrology Dr. Garcia    HFrEF (heart failure with reduced ejection fraction) ([facility] [place])- (present on admission)  Assessment & Plan    No acute exacerbation  Euvolemic on exam  Continue on heart failure regimen    PAF (paroxysmal atrial fibrillation) ([facility] [place])- (present on admission)  Assessment & Plan  On Eliquis and coreg  Monitor on telemetry  Paced rhythm on  2/4/25 tele    Gastroesophageal reflux disease without esophagitis- (present on admission)  Assessment & Plan  On PPI    Dyslipidemia- (present on admission)  Assessment & Plan  Statin therapy         VTE prophylaxis: eliquis    I have performed a physical exam and reviewed and updated ROS and Plan today (2/8/2025). In review of yesterday's note (2/7/2025), there are no changes except as documented above.

## 2025-02-10 VITALS
WEIGHT: 165.34 LBS | HEIGHT: 63 IN | TEMPERATURE: 97.7 F | SYSTOLIC BLOOD PRESSURE: 129 MMHG | RESPIRATION RATE: 17 BRPM | OXYGEN SATURATION: 96 % | BODY MASS INDEX: 29.3 KG/M2 | HEART RATE: 76 BPM | DIASTOLIC BLOOD PRESSURE: 48 MMHG

## 2025-02-10 LAB
ANION GAP SERPL CALC-SCNC: 14 MMOL/L (ref 7–16)
BASOPHILS # BLD AUTO: 0.5 % (ref 0–1.8)
BASOPHILS # BLD: 0.04 K/UL (ref 0–0.12)
BUN SERPL-MCNC: 41 MG/DL (ref 8–22)
CALCIUM SERPL-MCNC: 9.7 MG/DL (ref 8.5–10.5)
CHLORIDE SERPL-SCNC: 98 MMOL/L (ref 96–112)
CO2 SERPL-SCNC: 20 MMOL/L (ref 20–33)
CREAT SERPL-MCNC: 2.56 MG/DL (ref 0.5–1.4)
EOSINOPHIL # BLD AUTO: 0.44 K/UL (ref 0–0.51)
EOSINOPHIL NFR BLD: 5 % (ref 0–6.9)
ERYTHROCYTE [DISTWIDTH] IN BLOOD BY AUTOMATED COUNT: 46.9 FL (ref 35.9–50)
GFR SERPLBLD CREATININE-BSD FMLA CKD-EPI: 19 ML/MIN/1.73 M 2
GLUCOSE SERPL-MCNC: 94 MG/DL (ref 65–99)
HCT VFR BLD AUTO: 25 % (ref 37–47)
HGB BLD-MCNC: 8.2 G/DL (ref 12–16)
IMM GRANULOCYTES # BLD AUTO: 0.22 K/UL (ref 0–0.11)
IMM GRANULOCYTES NFR BLD AUTO: 2.5 % (ref 0–0.9)
LYMPHOCYTES # BLD AUTO: 1.34 K/UL (ref 1–4.8)
LYMPHOCYTES NFR BLD: 15.3 % (ref 22–41)
MAGNESIUM SERPL-MCNC: 2.1 MG/DL (ref 1.5–2.5)
MCH RBC QN AUTO: 30.4 PG (ref 27–33)
MCHC RBC AUTO-ENTMCNC: 32.8 G/DL (ref 32.2–35.5)
MCV RBC AUTO: 92.6 FL (ref 81.4–97.8)
MONOCYTES # BLD AUTO: 1.19 K/UL (ref 0–0.85)
MONOCYTES NFR BLD AUTO: 13.6 % (ref 0–13.4)
NEUTROPHILS # BLD AUTO: 5.55 K/UL (ref 1.82–7.42)
NEUTROPHILS NFR BLD: 63.1 % (ref 44–72)
NRBC # BLD AUTO: 0 K/UL
NRBC BLD-RTO: 0 /100 WBC (ref 0–0.2)
PLATELET # BLD AUTO: 297 K/UL (ref 164–446)
PMV BLD AUTO: 10.1 FL (ref 9–12.9)
POTASSIUM SERPL-SCNC: 3.7 MMOL/L (ref 3.6–5.5)
RBC # BLD AUTO: 2.7 M/UL (ref 4.2–5.4)
SODIUM SERPL-SCNC: 132 MMOL/L (ref 135–145)
WBC # BLD AUTO: 8.8 K/UL (ref 4.8–10.8)

## 2025-02-10 PROCEDURE — 700102 HCHG RX REV CODE 250 W/ 637 OVERRIDE(OP)

## 2025-02-10 PROCEDURE — 80048 BASIC METABOLIC PNL TOTAL CA: CPT

## 2025-02-10 PROCEDURE — A9270 NON-COVERED ITEM OR SERVICE: HCPCS

## 2025-02-10 PROCEDURE — A9270 NON-COVERED ITEM OR SERVICE: HCPCS | Performed by: RADIOLOGY

## 2025-02-10 PROCEDURE — 97530 THERAPEUTIC ACTIVITIES: CPT

## 2025-02-10 PROCEDURE — 99239 HOSP IP/OBS DSCHRG MGMT >30: CPT | Performed by: STUDENT IN AN ORGANIZED HEALTH CARE EDUCATION/TRAINING PROGRAM

## 2025-02-10 PROCEDURE — 83735 ASSAY OF MAGNESIUM: CPT

## 2025-02-10 PROCEDURE — 700102 HCHG RX REV CODE 250 W/ 637 OVERRIDE(OP): Performed by: RADIOLOGY

## 2025-02-10 PROCEDURE — 700102 HCHG RX REV CODE 250 W/ 637 OVERRIDE(OP): Performed by: STUDENT IN AN ORGANIZED HEALTH CARE EDUCATION/TRAINING PROGRAM

## 2025-02-10 PROCEDURE — A9270 NON-COVERED ITEM OR SERVICE: HCPCS | Performed by: STUDENT IN AN ORGANIZED HEALTH CARE EDUCATION/TRAINING PROGRAM

## 2025-02-10 PROCEDURE — 700111 HCHG RX REV CODE 636 W/ 250 OVERRIDE (IP): Performed by: STUDENT IN AN ORGANIZED HEALTH CARE EDUCATION/TRAINING PROGRAM

## 2025-02-10 PROCEDURE — 85025 COMPLETE CBC W/AUTO DIFF WBC: CPT

## 2025-02-10 RX ORDER — FERROUS SULFATE 325(65) MG
325 TABLET ORAL DAILY
Qty: 100 TABLET | Refills: 0 | Status: SHIPPED | OUTPATIENT
Start: 2025-02-10 | End: 2025-05-21

## 2025-02-10 RX ORDER — CARVEDILOL 6.25 MG/1
3.12 TABLET ORAL 2 TIMES DAILY WITH MEALS
Status: DISCONTINUED | OUTPATIENT
Start: 2025-02-10 | End: 2025-02-10 | Stop reason: HOSPADM

## 2025-02-10 RX ORDER — PANTOPRAZOLE SODIUM 40 MG/1
40 TABLET, DELAYED RELEASE ORAL 2 TIMES DAILY
Qty: 120 TABLET | Refills: 0 | Status: SHIPPED | OUTPATIENT
Start: 2025-02-10 | End: 2025-04-11

## 2025-02-10 RX ORDER — CARVEDILOL 3.12 MG/1
3.12 TABLET ORAL 2 TIMES DAILY WITH MEALS
Qty: 200 TABLET | Refills: 0 | Status: SHIPPED | OUTPATIENT
Start: 2025-02-10 | End: 2025-05-21

## 2025-02-10 RX ADMIN — OXYCODONE HYDROCHLORIDE 10 MG: 10 TABLET ORAL at 05:12

## 2025-02-10 RX ADMIN — OXYCODONE HYDROCHLORIDE 10 MG: 10 TABLET ORAL at 02:03

## 2025-02-10 RX ADMIN — FERROUS SULFATE TAB 325 MG (65 MG ELEMENTAL FE) 325 MG: 325 (65 FE) TAB at 08:10

## 2025-02-10 RX ADMIN — APIXABAN 5 MG: 5 TABLET, FILM COATED ORAL at 05:11

## 2025-02-10 RX ADMIN — TICAGRELOR 90 MG: 90 TABLET ORAL at 05:12

## 2025-02-10 RX ADMIN — CARVEDILOL 3.12 MG: 6.25 TABLET, FILM COATED ORAL at 08:08

## 2025-02-10 RX ADMIN — ACETAMINOPHEN 650 MG: 325 TABLET ORAL at 00:51

## 2025-02-10 RX ADMIN — PANTOPRAZOLE SODIUM 40 MG: 40 INJECTION, POWDER, FOR SOLUTION INTRAVENOUS at 05:12

## 2025-02-10 RX ADMIN — AMPICILLIN 500 MG: 500 CAPSULE ORAL at 05:12

## 2025-02-10 ASSESSMENT — PAIN DESCRIPTION - PAIN TYPE
TYPE: ACUTE PAIN

## 2025-02-10 ASSESSMENT — COGNITIVE AND FUNCTIONAL STATUS - GENERAL
TURNING FROM BACK TO SIDE WHILE IN FLAT BAD: A LITTLE
SUGGESTED CMS G CODE MODIFIER MOBILITY: CK
MOBILITY SCORE: 18
MOVING TO AND FROM BED TO CHAIR: A LITTLE
WALKING IN HOSPITAL ROOM: A LITTLE
STANDING UP FROM CHAIR USING ARMS: A LITTLE
MOVING FROM LYING ON BACK TO SITTING ON SIDE OF FLAT BED: A LITTLE
CLIMB 3 TO 5 STEPS WITH RAILING: A LITTLE

## 2025-02-10 ASSESSMENT — GAIT ASSESSMENTS: GAIT LEVEL OF ASSIST: REFUSED

## 2025-02-10 NOTE — PROGRESS NOTES
"[facility] -  PROGRESS NOTE               Author: Julian Rice M.D. Date & Time: 2/10/2025  11:38 AM     HPI:  75 yo F PMH HTN, chronic systolic HF, HLD, breast cancer, and discoid lupus who presents to hospital for scheduled left ICA aneurysm embolization.  She underwent the procedure under fluoroscopy without any issues.  The next morning her SCr jumped and so she was held to monitor and it has slightly improved but not quite at baseline for her.  Review of labs show in 2022 she had an [place]ez where she was ~ 1.4-1.7 range mostly, then it seems she improved a bit.  Otherwise denies any associated F/C/N/V/CP/SOB.  No melena, hematochezia, hematemesis.  No HA, visual changes, or abdominal pain.     DAILY NEPHROLOGY SUMMARY:  2/06: Consult done  2/07: NAEO, no complaints, feels ok, wanted me to talk to her son  2/08: NAEO, feels better with lasix, SOB improved  2/09: N/V overnight and all day yesterday, could not keep anything down  2/10: No new issues, creatinine plateaued    REVIEW OF SYSTEMS:    10 point ROS reviewed and is as per HPI/daily summary or otherwise negative    PMH/PSH/SH/FH: Reviewed and unchanged since admission note  CURRENT MEDICATIONS: Reviewed from admission to present day    VS:  /48   Pulse 76   Temp 36.5 °C (97.7 °F) (Temporal)   Resp 17   Ht 1.6 m (5' 3\")   Wt 75 kg (165 lb 5.5 oz)   SpO2 96%   BMI 29.29 kg/m²   Physical Exam  Nursing note reviewed.   Constitutional:       Appearance: She is ill-appearing.   Eyes:      General: No scleral icterus.  Cardiovascular:      Comments: No edema  Pulmonary:      Effort: Pulmonary effort is normal.   Abdominal:      General: There is no distension.   Musculoskeletal:         General: No deformity.   Skin:     Findings: No rash.   Neurological:      Mental Status: She is alert.   Psychiatric:         Behavior: Behavior is cooperative.       FLUID BALANCE:  In: 730 [P.O.:730]  Out: 800     LABS:  Recent Labs     " 02/08/25 0329 02/09/25  0615 02/10/25  0341   SODIUM 134* 131* 132*   POTASSIUM 4.0 3.3* 3.7   CHLORIDE 100 96 98   CO2 20 20 20   GLUCOSE 110* 93 94   BUN 26* 36* 41*   CREATININE 2.33* 2.60* 2.56*   CALCIUM 9.3 9.5 9.7     Recent Labs     02/08/25 0329 02/09/25  0615 02/10/25  0341   WBC 12.0* 9.8 8.8   RBC 2.78* 2.67* 2.70*   HEMOGLOBIN 8.3* 8.2* 8.2*   HEMATOCRIT 26.7* 25.1* 25.0*   MCV 96.0 94.0 92.6   MCH 29.9 30.7 30.4   MCHC 31.1* 32.7 32.8   RDW 52.5* 49.2 46.9   PLATELETCT 236 271 297   MPV 10.8 10.4 10.1       IMPRESSION:  # TAN-plateuaed over last 3 days  - Etiology unclear  - Diff Dx: ATN vs pre-renal vs CRS  - Non-oliguric  # CKD Stage 3a  - BCr ~ 1.1-1.4  - Etiology likely 2/2 HTN/CRS  # Left ICA aneurysm  - S/P embolization  # HTN  - Goal BP < 140/90  # HLD  # Chronic systolic HF  - EF 35%  - BNP elevated  - Fluid on CXR  # CAD  # UTI  - On Abx     PLAN:  - No compelling indication for KRT  - OK to DC from renal perspective with outpatient follow up

## 2025-02-10 NOTE — PROGRESS NOTES
Bedside report received by day shift, assumed care. Assessment completed. Patient is A&O x4, RA, denies CP or SOB. PERRLA. Patient is on aspiration, seizure, and fall precautions. Seizure precautions with side rail padding was removed by patient per her preference. Education provided regarding seizure precaution interventions, patient verbalized understanding and declined interventions.

## 2025-02-10 NOTE — CARE PLAN
The patient is Watcher - Medium risk of patient condition declining or worsening    Shift Goals  Clinical Goals: VSS, mobility  Patient Goals: rest  Family Goals: updates on when pt will be going home    Progress made toward(s) clinical / shift goals:    Problem: Pain - Standard  Goal: Alleviation of pain or a reduction in pain to the patient’s comfort goal  Description: Target End Date:  Prior to discharge or change in level of care    Document on Vitals flowsheet    1.  Document pain using the appropriate pain scale per order or unit policy  2.  Educate and implement non-pharmacologic comfort measures (i.e. relaxation, distraction, massage, cold/heat therapy, etc.)  3.  Pain management medications as ordered  4.  Reassess pain after pain med administration per policy  5.  If opiods administered assess patient's response to pain medication is appropriate per POSS sedation scale  6.  Follow pain management plan developed in collaboration with patient and interdisciplinary team (including palliative care or pain specialists if applicable)  Outcome: Progressing     Problem: Knowledge Deficit - Standard  Goal: Patient and family/care givers will demonstrate understanding of plan of care, disease process/condition, diagnostic tests and medications  Description: Target End Date:  1-3 days or as soon as patient condition allows    Document in Patient Education    1.  Patient and family/caregiver oriented to unit, equipment, visitation policy and means for communicating concern  2.  Complete/review Learning Assessment  3.  Assess knowledge level of disease process/condition, treatment plan, diagnostic tests and medications  4.  Explain disease process/condition, treatment plan, diagnostic tests and medications  Outcome: Progressing       Patient is not progressing towards the following goals:

## 2025-02-10 NOTE — THERAPY
"Physical Therapy   Daily Treatment     Patient Name: Lauren Dave  Age:  74 y.o., Sex:  female  Medical Record #: [id number]  Today's Date: 2/10/2025     Precautions  Precautions: Fall Risk  Comments: -140, residual RLE deficits from CVA    Assessment  Pt seen for PT treatment session. Pt refusing ambulation despite education on role of PT assessing mobility for d/c planning, reporting she has been to the bathroom twice this morning and walked in the garcia with nursing yesterday. Pt declined formal stair negotiation (has 5 steps to enter RV with 2 steps inside with B rails for each), agreeable to standing marching to simulate standing alternating single leg balance needed for stairs, required SBA with BUE support. Pt getting easily frustrated with therapist asking pt about therapist recommending HHPT, reporting she does not need or want that. Pt would benefit from continued acute IP PT services to address pt's weakness, impaired balance, activity tolerance, endurance, and overall mobility from her baseline if agreeable to working with therapy while in house.     Plan    Treatment Plan Status: Continue Current Treatment Plan  Type of Treatment: Bed Mobility, Gait Training, Neuro Re-Education / Balance, Self Care / Home Evaluation, Stair Training, Therapeutic Activities, Therapeutic Exercise  Treatment Frequency: 5 Times per Week  Treatment Duration: Until Therapy Goals Met    DC Equipment Recommendations: None (reports has a FWW and Quad Cane)  Discharge Recommendations: Recommend home health for continued physical therapy services (refusing placement. Reports son will be home to assist as needed)      Subjective    \"I just got out of the hospital. I think I know what my body can do!\"      Objective     02/10/25 0846   Vitals   O2 (LPM) 1   O2 Delivery Device Silicone Nasal Cannula   Pain 0 - 10 Group   Therapist Pain Assessment Nurse Notified;Post Activity Pain Same as Prior to Activity  (no c/o pain) " "  Cognition    Cognition / Consciousness X   Level of Consciousness Alert   Safety Awareness Impaired   New Learning Impaired   Attention Impaired   Comments briefly cooperative   Active ROM Lower Body    Active ROM Lower Body  WDL   Strength Lower Body   Comments BLE grossly 4/5   Other Treatments   Other Treatments Provided Pt getting upset with therapist for turning on chair alarm, educated pt chair alarm needs to be on and if she refuses I can let her nurse know   Balance   Sitting Balance (Static) Fair +   Sitting Balance (Dynamic) Fair   Standing Balance (Static) Fair   Standing Balance (Dynamic) Fair -   Weight Shift Sitting Fair   Weight Shift Standing Fair   Skilled Intervention Verbal Cuing;Compensatory Strategies   Comments w/ FWW   Bed Mobility    Comments in chair pre/post   Gait Analysis   Gait Level Of Assist Refused  (reports she has already been to the bathroom twice this morning and walked down garcia with walker yesterday with staff, asked RN and RN unable to confirm as it was not with her)   Comments declined formal stair negotiation, agreeable to performing standing marching as pt reporting \"i can get up the stairs just fine.\" Reports has rails she can hold onto, BUE support pt performing high knee marching with SBA   Functional Mobility   Sit to Stand Standby Assist  (with repetitive cues for hand placement)   Bed, Chair, Wheelchair Transfer Standby Assist   Transfer Method Stand Step   Mobility w/ FWW   Skilled Intervention Verbal Cuing;Compensatory Strategies   6 Clicks Assessment - How much HELP from from another person do you currently need... (If the patient hasn't done an activity recently, how much help from another person do you think he/she would need if he/she tried?)   Turning from your back to your side while in a flat bed without using bedrails? 3   Moving from lying on your back to sitting on the side of a flat bed without using bedrails? 3   Moving to and from a bed to a chair " (including a wheelchair)? 3   Standing up from a chair using your arms (e.g., wheelchair, or bedside chair)? 3   Walking in hospital room? 3   Climbing 3-5 steps with a railing? 3   6 clicks Mobility Score 18   Activity Tolerance   Sitting in Chair up pre/post   Short Term Goals    Short Term Goal # 1 pt will move supine<>eob with spv in 6 tx for bed mobility.   Goal Outcome # 1 goal not met   Short Term Goal # 2 pt will complete spt with fww and spv in 6 tx for functional mobility.   Goal Outcome # 2 Goal not met   Short Term Goal # 3 pt will ambulate 150 ft with fww and spv in 6 tx for household distances.   Goal Outcome # 3 Goal not met   Short Term Goal # 4 pt will negotiate 5 stairs with sba and BUE support in 6 tx for home access.   Goal Outcome # 4   (addressed via simulated steps as pt declined formal stair negotiation practice)   Education Group   Education Provided Stair Training;Role of Physical Therapist   Role of Physical Therapist Patient Response Patient;Acceptance;Explanation;Verbal Demonstration   Stair Training Patient Response Patient;Acceptance;Explanation;Demonstration;Verbal Demonstration;Action Demonstration   Physical Therapy Treatment Plan   Physical Therapy Treatment Plan Continue Current Treatment Plan   Anticipated Discharge Equipment and Recommendations   DC Equipment Recommendations None  (reports has a FWW and Quad Cane)   Discharge Recommendations Recommend home health for continued physical therapy services  (refusing placement. Reports son will be home to assist as needed)   Interdisciplinary Plan of Care Collaboration   IDT Collaboration with  Nursing   Patient Position at End of Therapy Seated;Chair Alarm On;Call Light within Reach;Tray Table within Reach;Phone within Reach   Collaboration Comments RN updated   Session Information   Date / Session Number  2/10- 4 (4/5, 2/11) [facility] 2/6

## 2025-02-10 NOTE — PROGRESS NOTES
[facility] Medicine Daily Progress Note    Date of Service  2/9/2025    Chief Complaint  Lauren Dave is a 74 y.o. female admitted 2/3/2025 for left ICA aneurysm embolization    Hospital Course  Lauren Dave is a 74 y.o. female with a hx of breast cancer s/p tx, HFrEF:35%, CAD (severe 3vessel disease on cath 10/19/22), discoid lupus, dyslipidemia, and essential HTN. who presented 2/3/2025 for elective anal embolization.  Underwent Left ICA terminus aneurysm embolization  on 2/3/2025 .  Postprocedure was admitted to ICU for close monitoring.  Hospital course complicated with severe anemia and TAN.  Ordered CT  for evaluation of right groin to rule out hematoma, imaging reviewed noted to have a small amount of hemorrhage.nephrology has been following for TAN.    Interval Problem Update    2/9/2025  Seen and examined at bedside  Reports of nausea and vomiting this morning  Vital remained stable  No concern for melena  Labs reviewed leukocytosis has resolved, hemoglobin 8.2, sodium 131 potassium 3.3, worsening renal function,    Plan  Replace KCl  Hold further Lasix  Patient is requesting to be on ampicillin for ongoing UTI.  Continue ampicillin for another 1 to 2 days  I will continue Eliquis and Brilinta for now as a no concern for active GI bleed  Repeat CBC in a.m. to monitor hemoglobin,   Repeat BMP in a.m. to monitor lites, renal function  Transfuse as needed   Zofran as needed for ongoing nausea and vomiting  Requiring IV narcotics for pain management, monitor for toxicity  Case discussed with nephrology Dr. Garcia     I had extensive discussion with patient's son and his spouse at the bedside regarding the current medical status, treatment plan and prognosis. They were briefed on the patient's condition including recent changes or developments .  Updated with recent test results vital signs and symptoms.  Current treatment plan was explained in details including medications, therapies .    I have  discussed this patient's plan of care and discharge plan at IDT rounds today with Case Management, Nursing, Nursing leadership, and other members of the IDT team.    Consultants/Specialty  Interventional radiology    Code Status  Full Code    Disposition  The patient is not medically cleared for discharge to home or a post-acute facility.  Anticipate discharge to: home with close outpatient follow-up    I have placed the appropriate orders for post-discharge needs.    Review of Systems  Review of Systems   Respiratory:  Negative for shortness of breath.    Cardiovascular:  Negative for chest pain.   Gastrointestinal:  Negative for blood in stool, melena, nausea and vomiting.   Neurological:  Negative for sensory change, speech change and focal weakness.        Physical Exam  Temp:  [36.1 °C (97 °F)-36.8 °C (98.2 °F)] 36.8 °C (98.2 °F)  Pulse:  [73-78] 78  Resp:  [16-20] 18  BP: (117-139)/(50-66) 139/66  SpO2:  [91 %-94 %] 91 %    Physical Exam  Constitutional:       Appearance: Normal appearance.   Cardiovascular:      Rate and Rhythm: Normal rate.      Pulses: Normal pulses.      Heart sounds: Normal heart sounds.   Pulmonary:      Effort: Pulmonary effort is normal. No respiratory distress.   Abdominal:      General: Abdomen is flat.   Musculoskeletal:      Right lower leg: No edema.      Left lower leg: No edema.      Comments: Right groin with no concern for hematoma   Neurological:      General: No focal deficit present.      Mental Status: She is alert.   Psychiatric:         Mood and Affect: Mood normal.         Fluids    Intake/Output Summary (Last 24 hours) at 2/9/2025 1651  Last data filed at 2/9/2025 1300  Gross per 24 hour   Intake 625 ml   Output --   Net 625 ml        Laboratory  Recent Labs     02/07/25  0512 02/08/25  0329 02/09/25  0615   WBC 12.3* 12.0* 9.8   RBC 2.59* 2.78* 2.67*   HEMOGLOBIN 7.8* 8.3* 8.2*   HEMATOCRIT 24.4* 26.7* 25.1*   MCV 94.2 96.0 94.0   MCH 30.1 29.9 30.7   MCHC 32.0* 31.1*  32.7   RDW 54.5* 52.5* 49.2   PLATELETCT 213 236 271   MPV 10.1 10.8 10.4     Recent Labs     02/07/25  0512 02/08/25  0329 02/09/25  0615   SODIUM 136 134* 131*   POTASSIUM 3.9 4.0 3.3*   CHLORIDE 105 100 96   CO2 17* 20 20   GLUCOSE 91 110* 93   BUN 19 26* 36*   CREATININE 1.73* 2.33* 2.60*   CALCIUM 9.0 9.3 9.5                     Imaging  DX-CHEST-LIMITED (1 VIEW)   Final Result      1.  Stable cardiomegaly.   2.  Left lower lung zone stranding consistent with subsegmental atelectatic change.   3.  Blurring of the left costophrenic angle consistent with minimal left pleural effusion      CT-PELVIS W/O   Final Result         1. There is mild soft tissue attenuation and stranding along the right external iliac and common femoral vasculature consistent with a small amount of hemorrhage.   2. Atherosclerosis.   3. Diverticulosis.   4. There is indistinctness of the urinary bladder wall which may be seen with cystitis. Please correlate clinically.      US-RENAL   Final Result      1.  Unremarkable renal ultrasound.      DX-HIP-COMPLETE - UNILATERAL 2+ RIGHT   Final Result      Osteoarthritic degenerative changes of the hips without acute fracture or malalignment.      IR-EMBOLIZE-NEURO-INTRACRANIAL   Final Result   Impression:      Stent assisted coil embolization of a left ICA terminus aneurysm as described above. Final angiographic images demonstrate satisfactory occlusion of the aneurysm with no significant filling within the aneurysm. Patient will be followed up in the neuro    interventional clinic. Patient will remain on Brilinta and Eliquis for 90 days following which she will discontinue Brilinta therapy.      Lilliana PRESCOTT was physically present and participated during the entire procedure of the IR-EMBOLIZE-NEURO-INTRACRANIAL.           Assessment/Plan  Hypokalemia  Assessment & Plan  Repleted  Repeat labs in a.m.    Acute UTI  Assessment & Plan  UA consistent with UTI  Culture growing E.  coli  Complete 5 days antibiotic course        Anemia  Assessment & Plan  I will continue Eliquis and brilinta for now as a no concern for active GI bleed  S/p completion of IV iron  Currently there is no concern for active GI bleed, likely had postoperative blood loss.  Continue IV Protonix  Ordered CT  for evaluation of right groin to rule out hematoma, imaging reviewed noted to have a small amount of hemorrhage  No concern of bleeding, monitor H&H closely  GI evaluation if H&H continue to drop    2/9/2025  Stable H&H  Occult blood stool positive  No melena  Likely need EGD in future  If H&H keeps dropping I will have GI consult for endoscopy    Aneurysm of left internal carotid artery  Assessment & Plan  S/p stenting  On apixaban, eliquis  Follow up with interventional radiology    TAN (acute kidney injury) ([facility])- (present on admission)  Assessment & Plan  Worsening renal function  2/9/2025  Hold further Lasix  Patient is requesting to be on ampicillin for ongoing UTI.  Continue ampicillin for another 1 to 2 days  I will continue Eliquis and Brilinta for now as a no concern for active GI bleed  Repeat CBC in a.m. to monitor hemoglobin,   Repeat BMP in a.m. to monitor lites, renal function  Transfuse as needed   Zofran as needed for ongoing nausea and vomiting  Requiring IV narcotics for pain management, monitor for toxicity  Case discussed with nephrology Dr. Garcia     HFrEF (heart failure with reduced ejection fraction) ([facility])- (present on admission)  Assessment & Plan    No acute exacerbation  Euvolemic on exam  Continue on heart failure regimen    PAF (paroxysmal atrial fibrillation) ([facility])- (present on admission)  Assessment & Plan  On Eliquis and coreg  Monitor on telemetry  Paced rhythm on 2/4/25 tele    Gastroesophageal reflux disease without esophagitis- (present on admission)  Assessment & Plan  On PPI    Dyslipidemia- (present on admission)  Assessment & Plan  Statin therapy         VTE prophylaxis:  eliquis    I have performed a physical exam and reviewed and updated ROS and Plan today (2/9/2025). In review of yesterday's note (2/8/2025), there are no changes except as documented above.

## 2025-02-10 NOTE — DISCHARGE INSTRUCTIONS
Follow-up with GI on discharge  Follow-up with PCP  Follow-up with nephrology  Follow-up with neuro IR in 1 to 2 weeks

## 2025-02-10 NOTE — DISCHARGE SUMMARY
Discharge Summary    CHIEF COMPLAINT ON ADMISSION  No chief complaint on file.      Reason for Admission  Cerebral aneurysm, nonruptured     Admission Date  2/3/2025    CODE STATUS  Full Code    HPI & HOSPITAL COURSE  Lauren Dave is a 74 y.o. female with a hx of breast cancer s/p tx, HFrEF:35%, CAD (severe 3vessel disease on cath 10/19/22), discoid lupus, dyslipidemia, and essential HTN. who presented 2/3/2025 for elective anal embolization.  Underwent Left ICA terminus aneurysm embolization  on 2/3/2025 .  Postprocedure was admitted to ICU for close monitoring.  Hospital course complicated with severe anemia and TAN.  Ordered CT  for evaluation of right groin to rule out hematoma, imaging reviewed noted to have a small amount of hemorrhage.  Her renal function remained stable, nephrology recommended outpatient follow-up in 1 week.  Her hemoglobin remained stable, patient was offered EGD, Hx of worsening anemia on anticoagulant was clearly discussed with patient, they opted for outpatient EGD workup.  Patient received IV iron in the hospitalized and for ongoing anemia, no melena, hematemesis.  Patient to be discharged on Protonix 40 twice daily with outpatient follow-up with GI.  Recommended to come to ED if she has melena and so recommended to hold Advil.  Case was briefly discussed with GI recommended EGD if patient has melena, patient denied to stay in the hospital for EGD.  I placed referral to GI.  Recommended to follow-up with neuro IR in 1 week.  I have also placed repeat BMP and CBC in 1 week.    Patient seen and examined at bedside.  Her vitals remained stable.  Renal function and hemoglobin remained stable.  No concern for bleeding.  At the time of discharge patient remain hemodynamically stable ,asymptomatic ,labs remain unremarkable .  On room air saturating over 90%.  Patient will be discharged with close follow up with PCP .Discharge plan was discussed with patient in details .  Patient agreed with  discharge plan  and  all questions answered.      I had extensive discussion with patient's son regarding the current medical status, treatment plan and prognosis.  He was briefed on the patient's condition including recent changes or developments .  Updated with recent test results vital signs and symptoms.  Current treatment plan was explained in details including medications.  He verbalized good understanding regarding GI, neuro IR and nephrology follow-up.    Therefore, she is discharged in good and stable condition to home with close outpatient follow-up.    The patient met 2-midnight criteria for an inpatient stay at the time of discharge.    Discharge Date  2/10/2025          DISCHARGE DIAGNOSES  Active Problems:    Dyslipidemia (POA: Yes)      Overview: Tried atorvastatin, pravastatin, lovastatin, and Zetia.  All causes severe       myalgias.  Just taking fish oil.     Gastroesophageal reflux disease without esophagitis (POA: Yes)    S/P CABG x 3 (POA: Yes)    PAF (paroxysmal atrial fibrillation) ([facility]) (POA: Yes)    HFrEF (heart failure with reduced ejection fraction) ([facility]) (POA: Yes)    TAN (acute kidney injury) ([facility]) (POA: Yes)    Aneurysm of left internal carotid artery (POA: Unknown)    Anemia (POA: Unknown)    Acute UTI (POA: Unknown)    Hypokalemia (POA: Unknown)  Resolved Problems:    * No resolved hospital problems. *      FOLLOW UP  No future appointments.  Lilliana Cuellar M.D.  [address] [facility] [id number] [phone]    Follow up in 1 week(s)      Aj Garcia M.D.  [address] [facility] [id number] [phone]    Follow up in 1 week(s)        MEDICATIONS ON DISCHARGE     Medication List        START taking these medications        Instructions   ferrous sulfate 325 (65 Fe) MG tablet   Take 1 Tablet by mouth every day for 100 days.  Dose: 325 mg     pantoprazole 40 MG Tbec  Commonly known as: Protonix   Take 1 Tablet by mouth 2 times a day for 60 days.  Dose: 40 mg    "         CHANGE how you take these medications        Instructions   carvedilol 3.125 MG Tabs  What changed:   medication strength  how much to take  Commonly known as: Coreg   Take 1 Tablet by mouth 2 times a day with meals for 100 days.  Dose: 3.125 mg            CONTINUE taking these medications        Instructions   acetaminophen 500 MG Tabs  Commonly known as: Tylenol   Take 1,000 mg by mouth 2 times a day as needed for Moderate Pain.  Dose: 1,000 mg     Eliquis 5 MG Tabs  Generic drug: apixaban   TAKE 1 TABLET BY MOUTH TWO TIMES A DAY.  Dose: 5 mg     nitroglycerin 0.4 MG Subl  Commonly known as: Nitrostat   Place 1 Tablet under the tongue as needed for Chest Pain.  Dose: 0.4 mg     ticagrelor 90 MG Tabs tablet  Commonly known as: Brilinta   Take 1 Tablet by mouth 2 times a day for 100 days. Start 5 days prior to your procedure. Stop taking aspirin while you are taking this medication.  Dose: 90 mg     VITAMIN B-12 PO   Take 1 Tablet by mouth every morning.  Dose: 1 Tablet            STOP taking these medications      ampicillin 500 MG Caps  Commonly known as: Principen     aspirin 81 MG EC tablet     Entresto 49-51 MG Tabs  Generic drug: sacubitril-valsartan     famotidine 40 MG Tabs  Commonly known as: Pepcid     ibuprofen 200 MG Tabs  Commonly known as: Motrin     vitamin D3 125 MCG (5000 UT) Caps              Allergies  Allergies   Allergen Reactions    Albumin Unspecified     PT is unsure     Ezetimibe Rash     Itchy bumps/rash on face    Atorvastatin Myalgia    Codeine Vomiting and Nausea     Clarified with patient - states that she has an \"upset stomach\" when she takes it    Eggs Diarrhea     Pt states that she is allergic to eggs per her mother.  Tolerated clevidipine 11/2022    Honey Hives    Hydroxychloroquine Unspecified     Eye changes    Jardiance [Empagliflozin] Unspecified     Frequent UTI    Lisinopril Cough    Losartan Unspecified     Tried in 2017  Cannot recall reaction    Penicillins " "Unspecified     \"does not work\" .'IMMUNE TO PENICILLIN,AMPICILLIN IS THE ONLY THING THAT WORKS'  PT declines    Statins [Hmg-Coa-R Inhibitors] Myalgia     Muscle Spasms   RXN=unknown    Wasp Venom Swelling     Swelled at injection       DIET  Orders Placed This Encounter   Procedures    Diet Order Diet: Regular     Standing Status:   Standing     Number of Occurrences:   1     Order Specific Question:   Diet:     Answer:   Regular [1]       ACTIVITY  As tolerated.  Weight bearing as tolerated    CONSULTATIONS  [facility]  Gi   Nephro       PROCEDURES  DX-CHEST-LIMITED (1 VIEW)   Final Result      1.  Stable cardiomegaly.   2.  Left lower lung zone stranding consistent with subsegmental atelectatic change.   3.  Blurring of the left costophrenic angle consistent with minimal left pleural effusion      CT-PELVIS W/O   Final Result         1. There is mild soft tissue attenuation and stranding along the right external iliac and common femoral vasculature consistent with a small amount of hemorrhage.   2. Atherosclerosis.   3. Diverticulosis.   4. There is indistinctness of the urinary bladder wall which may be seen with cystitis. Please correlate clinically.      US-RENAL   Final Result      1.  Unremarkable renal ultrasound.      DX-HIP-COMPLETE - UNILATERAL 2+ RIGHT   Final Result      Osteoarthritic degenerative changes of the hips without acute fracture or malalignment.      IR-EMBOLIZE-NEURO-INTRACRANIAL   Final Result   Impression:      Stent assisted coil embolization of a left ICA terminus aneurysm as described above. Final angiographic images demonstrate satisfactory occlusion of the aneurysm with no significant filling within the aneurysm. Patient will be followed up in the neuro    interventional clinic. Patient will remain on Brilinta and Eliquis for 90 days following which she will discontinue Brilinta therapy.      Lilliana PRESCOTT was physically present and participated during the entire procedure of " the IR-EMBOLIZE-NEURO-INTRACRANIAL.            LABORATORY  Lab Results   Component Value Date    SODIUM 132 (L) 02/10/2025    POTASSIUM 3.7 02/10/2025    CHLORIDE 98 02/10/2025    CO2 20 02/10/2025    GLUCOSE 94 02/10/2025    BUN 41 (H) 02/10/2025    CREATININE 2.56 (H) 02/10/2025        Lab Results   Component Value Date    WBC 8.8 02/10/2025    HEMOGLOBIN 8.2 (L) 02/10/2025    HEMATOCRIT 25.0 (L) 02/10/2025    PLATELETCT 297 02/10/2025        Total time of the discharge process exceeds 33 minutes.

## 2025-02-10 NOTE — DISCHARGE PLANNING
Care Transition Team Final Discharge Disposition    Actual Discharge Information  Discharge Disposition: Discharged to home/self care (01)    Case Management Discharge Planning    Admission Date: 2/3/2025  GMLOS: 3  ALOS: 7    6-Clicks ADL Score: 20  6-Clicks Mobility Score: 18      Anticipated Discharge Dispo: Discharge Disposition: Discharged to home/self care (01)    DME Needed: No    Action(s) Taken: Updated Provider/Nurse on Discharge Plan and OTHER    LMSW met with pt and pt's family to discuss recommendation for HH services.  Pt declined HH services as pt receives services from a caregiver.  Bedside RN notified.  IMM delivered, faxed to [facility] for processing.  Copy of IMM provided to pt's grandson.      Escalations Completed: None    Medically Clear: Yes    Barriers to Discharge: None    Is the patient up for discharge tomorrow: No

## 2025-02-10 NOTE — PROGRESS NOTES
Discharge orders received.  Patient arrived to the discharge lounge.  PIV removed by discharge RN. Meds to beds medications  not covered by patient's insurance, patient notified, medications sent to patient's home pharmacy. Instructions given, medications reviewed and general discharge education provided to patient.  Follow up appointments discussed.  Patient verbalized understanding of dc instructions and prescriptions.  Patient signed discharge instructions.  Patient verbalized she had all belongings with her, patient's home medications returned by floor RN. Patient wheeled from discharge lounge to private vehicle. Patient left via car with 3 family members to home in stable condition.

## 2025-02-13 NOTE — Clinical Note
REFERRAL APPROVAL NOTICE         Sent on February 13, 2025                   Lauren Dave  [address]   [address]  [place] [address]                   Dear Ms. Dave,    After a careful review of the medical information and benefit coverage, [facility] has processed your referral. See below for additional details.    If applicable, you must be actively enrolled with your insurance for coverage of the authorized service. If you have any questions regarding your coverage, please contact your insurance directly.    REFERRAL INFORMATION   Referral #:  [id number]  Referred-To Provider    Referred-By Provider:  Nephrology    Tay Garcia M.D.   [facility]      [address] [address]  [phone] [address] [facility] [address]  [phone]    Referral Start Date:  02/10/2025  Referral End Date:   02/10/2026             SCHEDULING  If you do not already have an appointment, please call [phone] to make an appointment.     MORE INFORMATION  If you do not already have a [email or web address] account, sign up at: [email or web address]  You can access your medical information, make appointments, see lab results, billing information, and more.  If you have questions regarding this referral, please contact  the [facility] Referrals department at:             [phone]. Monday - Friday 8:00AM - 5:00PM.     Sincerely,    [facility]

## 2025-02-13 NOTE — Clinical Note
REFERRAL APPROVAL NOTICE         Sent on February 13, 2025                   Lauren Dave  [address]   [address]  [place] [address]                   Dear Ms. Dave,    After a careful review of the medical information and benefit coverage, [facility] has processed your referral. See below for additional details.    If applicable, you must be actively enrolled with your insurance for coverage of the authorized service. If you have any questions regarding your coverage, please contact your insurance directly.    REFERRAL INFORMATION   Referral #:  [id number]  Referred-To Provider    Referred-By Provider:  Gastroenterology    Tay Garcia M.D.   [facility]      [address] [facility] [id number] [phone] [address] [address]  [phone]    Referral Start Date:  02/10/2025  Referral End Date:   02/10/2026             SCHEDULING  If you do not already have an appointment, please call [phone] to make an appointment.     MORE INFORMATION  If you do not already have a [employer] account, sign up at: [email or web address]  You can access your medical information, make appointments, see lab results, billing information, and more.  If you have questions regarding this referral, please contact  the [facility] Referrals department at:             [phone]. Monday - Friday 8:00AM - 5:00PM.     Sincerely,    [facility]

## 2025-02-18 ENCOUNTER — PATIENT MESSAGE (OUTPATIENT)
Dept: CARDIOLOGY | Facility: MEDICAL CENTER | Age: 75
End: 2025-02-18
Payer: MEDICARE

## 2025-02-18 DIAGNOSIS — I24.0 ISCHEMIC HEART DISEASE DUE TO CORONARY ARTERY OBSTRUCTION (HCC): ICD-10-CM

## 2025-02-18 DIAGNOSIS — I25.9 ISCHEMIC HEART DISEASE DUE TO CORONARY ARTERY OBSTRUCTION (HCC): ICD-10-CM

## 2025-02-19 ENCOUNTER — PATIENT MESSAGE (OUTPATIENT)
Dept: CARDIOLOGY | Facility: MEDICAL CENTER | Age: 75
End: 2025-02-19
Payer: MEDICARE

## 2025-02-19 RX ORDER — SACUBITRIL AND VALSARTAN 49; 51 MG/1; MG/1
1 TABLET, FILM COATED ORAL 2 TIMES DAILY
Qty: 180 TABLET | Refills: 3 | Status: SHIPPED | OUTPATIENT
Start: 2025-02-19

## 2025-02-21 NOTE — PROGRESS NOTES
Neuro Interventional Service Consultation     Lauren Dave is a 75 y.o. female seen in clinic for evaluation and possible intracranial aneurysm intervention.     Neurointerventional radiologist: Chepe Cuellar MD  Referring provider/ Neurologist: Kamron Hurtado PA-C  PCP: Jenna Hernandez MD   Cardiologist: Jarrod Mayberry MD  Rheumatologist: Jeanette Mendiola MD  Nephrology: Julian Rice MD    History of Present Illness:  Initial consultation 1/16/25:  is a right handed female who presents with the incidental finding of an intracranial aneurysm. She has a history of ischemic CM and in 2022 underwent a CABG and developed hypoperfusion strokes. She has had deficits since then that impair her mobility. She presented to a new neurologist who initiated workup. Imaging revealed the chronic infarcts from her strokes and an incidental left middle cerebral artery aneurysm and the patient has been referred to the Neuro Interventional Service for evaluation and management of this finding. Additional clinical history includes HTN, HLD, right breast cancer, pacemaker implant, and CAD with CABG.    She is seen today for review of imaging studies and discussion of possible intracranial aneurysm embolization. Today, the patient reports chronic headaches but no sentinel headache symptoms. She has residual weakness in her left arm and right leg from her stroke and continues with physical therapy and has been noticing slow improvement. She denies pulmonary complaints and follows closely with her cardiologist. There is no family history of intracranial aneurysm. Blood pressure is controlled with medications. There is a history of smoking with cessation in 2012. The patient has had anesthesia in the past and has significant anxiety and requires sedation for CTs and MRIs due to claustrophobia. She reports that since she can have IVs in the left arm only that veins can be difficult to access by medical staff. The patient  is taking aspirin and Eliquis and denies any history of major bleeding, including epistaxis, hemoptysis, hematemesis, gross hematuria, and melena. She lives in [place] and is retired. She is accompanied by a support person Tommy to today's consultation.    Interval history 2/24/25: Ms. Dave presented for elective stent assisted coil embolization of the unruptured left ICA aneurysm with Dr. Cuellar on 2/3/25. Her clinical course was  complicated by anemia and TAN. She was discharged to home on 2/10/25 with outpatient follow up with nephrology. Inpatient EGD was deferred with outpatient follow up with GI services recommended.     She is seen today for evaluation after the procedure. Today, the patient continues to have weakness in her left arm and right leg. She stopped rehab due to her procedure and has not restarted and feels like her leg weakness coordination has mildly regressed while she has been sedentary. She has a new complaint of shortness of breath that can occur at rest that was not present prior to the procedure; this is also causing her anxiety. She has not removed her angio site dressing yet for fear of bleeding. There are no new distal extremity complaints of claudication and temperature change. The patient endorses compliance with medications of Brilinta and Eliquis and endorses adequate supply to complete the recommended course of 90 days. She reports bloody mucous when she blows her nose but no unprovoked hemorrhagic events. She lives in [place] and  is accompanied by a support person Tommy (son) to today's consultation.     Family history of aneurysm: no  Anticoagulation/ antiplatelet therapy: Eliquis and Brilinta (Eliquis and aspirin 81 mg prior to neuro intervention)  Hyperlipidemia: yes  Hypertension: yes  Smoking: former, 2012 cessation  Diabetes Mellitus: no    Past Medical History:   Diagnosis Date    Arthritis     Breast cancer (HCC) 08/07/2013    Bronchitis 2005    Cancer (HCC)     right  breast cancer     Cataract     removed    Chronic systolic congestive heart failure (HCC) EF 35% 10/12/2022    Coronary artery disease due to calcified coronary lesion - Calcifications seen on CT scan also wtih aortic ulceration     Delayed emergence from general anesthesia     Dental disorder     tooth infection    Discoid lupus     Dyslipidemia     Tried atorvastatin, pravastatin, lovastatin, and Zetia.  All causes severe myalgias.  Just taking fish oil.      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     Ischemic heart disease due to coronary artery obstruction (HCC) - Severe 3vD on cath 10/19/2022    LBBB (left bundle branch block) 12/16/2014    Noted on prechemo EKG 9/2014, MUGA WNL    Pacemaker     aicd    Pneumonia     Pseudogout     Psychiatric problem     Scarlet fever     Unspecified hemorrhagic conditions     gums     Past Surgical History:   Procedure Laterality Date    MULTIPLE CORONARY ARTERY BYPASS ENDO VEIN HARVEST  11/10/2022    Procedure: CORONARY ARTERY BYPASS GRAFTING X 3, WITH LEFT INTERNAL MAMMARY ARTERY TAKEDOWN AND ENDOSCOPIC VEIN HARVEST LEFT GREATER SAPHENOUS VEIN, AND TRANSESOPHAGEAL ECHOCARDIOGRAM;  Surgeon: Padmini Mac M.D.;  Location: SURGERY [facility];  Service: Cardiothoracic    ECHOCARDIOGRAM, TRANSESOPHAGEAL, INTRAOPERATIVE  11/10/2022    Procedure: ECHOCARDIOGRAM, TRANSESOPHAGEAL, INTRAOPERATIVE;  Surgeon: Padmini Mac M.D.;  Location: SURGERY [facility];  Service: Cardiothoracic    OTHER CARDIAC SURGERY  11/2022    angiogram    CATH REMOVAL  02/24/2014    Performed by Aggie Burns M.D. at SURGERY SAME DAY [facility]    MASTECTOMY  08/22/2013    Performed by Aggie Burns M.D. at SURGERY SAME DAY [facility]    NODE BIOPSY SENTINEL  08/22/2013    Performed by Aggie Burns M.D. at SURGERY SAME DAY [facility]    CATH PLACEMENT  08/22/2013    Performed by Aggie Burns M.D. at SURGERY SAME DAY [facility]  ORS    US-NEEDLE CORE BX-BREAST PANEL  2013    right breast    COLONOSCOPY  2003    BREAST BIOPSY      TONSILLECTOMY       Social History     Socioeconomic History    Marital status:      Spouse name: Not on file    Number of children: 2    Years of education: Not on file    Highest education level: Not on file   Occupational History     Employer: [employer]   Tobacco Use    Smoking status: Former     Current packs/day: 0.00     Types: Cigarettes     Start date: 10/1/2013     Quit date: 10/1/2013     Years since quittin.4    Smokeless tobacco: Never   Vaping Use    Vaping status: Never Used   Substance and Sexual Activity    Alcohol use: No     Alcohol/week: 0.0 oz    Drug use: No    Sexual activity: Not on file     Comment: , 2 children, enemployed   Other Topics Concern    Not on file   Social History Narrative    ** Merged History Encounter **         Patient is a . Patient has 2 adult children. She is  from .           Social Drivers of Health     Financial Resource Strain: Not on file   Food Insecurity: Not on file   Transportation Needs: Not on file   Physical Activity: Not on file   Stress: Not on file   Social Connections: Not on file   Intimate Partner Violence: Not on file   Housing Stability: Not on file     Family History   Problem Relation Age of Onset    Cancer Mother         possible thyroid and gynecological    Multiple Sclerosis Mother     Arthritis Father     Multiple Sclerosis Brother     Gout Brother     Heart Disease Paternal Grandmother     Diabetes Paternal Grandmother     Cancer Maternal Aunt         breast cancer- 60s    Diabetes Maternal Uncle     Heart Disease Maternal Grandmother     Heart Disease Maternal Grandfather         MI    Stroke Paternal Grandfather     Other Son         breast mass    Heart Disease Son         HEART MURMUR    Gout Son        Review of Systems   Constitutional: Negative.  Negative for chills,  diaphoresis, fever, malaise/fatigue and weight loss.   HENT:  Positive for nosebleeds.    Respiratory:  Positive for shortness of breath.    Cardiovascular:  Negative for claudication.   Musculoskeletal:         Positive for leg pain   Skin: Negative.    Neurological:  Positive for focal weakness and weakness. Negative for sensory change and speech change.   Endo/Heme/Allergies:  Bruises/bleeds easily.   Psychiatric/Behavioral:  Negative for substance abuse.      A comprehensive 14-point review of systems was negative except as described above.     Labs:    Latest Reference Range & Units Most Recent 02/03/25 10:55 02/04/25 03:11 02/05/25 09:06 02/05/25 14:59 02/05/25 22:59 02/06/25 01:19   WBC 4.8 - 10.8 K/uL 8.8  2/10/25 03:41 7.6 8.3 8.7      RBC 4.20 - 5.40 M/uL 2.70 (L)  2/10/25 03:41 3.18 (L) 2.55 (L) 2.31 (L)      Hemoglobin 12.0 - 16.0 g/dL 8.2 (L)  2/10/25 03:41 9.9 (L) 8.0 (L) 7.1 (L) 7.9 (L) 9.2 (L) 8.5 (L)   Hematocrit 37.0 - 47.0 % 25.0 (L)  2/10/25 03:41 30.1 (L) 25.1 (L) 22.0 (L) 24.6 (L) 28.4 (L) 26.8 (L)   MCV 81.4 - 97.8 fL 92.6  2/10/25 03:41 94.7 98.4 (H) 95.2      MCH 27.0 - 33.0 pg 30.4  2/10/25 03:41 31.1 31.4 30.7      MCHC 32.2 - 35.5 g/dL 32.8  2/10/25 03:41 32.9 31.9 (L) 32.3      RDW 35.9 - 50.0 fL 46.9  2/10/25 03:41 44.6 46.2 45.2      Platelet Count 164 - 446 K/uL 297  2/10/25 03:41 272 213 190      MPV 9.0 - 12.9 fL 10.1  2/10/25 03:41 9.9 9.9 9.8      Neutrophils-Polys 44.00 - 72.00 % 63.10  2/10/25 03:41 70.30 82.10 (H) 75.00 (H)      Neutrophils (Absolute) 1.82 - 7.42 K/uL 5.55  2/10/25 03:41 5.31 6.78 6.51      Lymphocytes 22.00 - 41.00 % 15.30 (L)  2/10/25 03:41 17.30 (L) 10.40 (L) 10.30 (L)      Lymphs (Absolute) 1.00 - 4.80 K/uL 1.34  2/10/25 03:41 1.31 0.86 (L) 0.90 (L)      Monocytes 0.00 - 13.40 % 13.60 (H)  2/10/25 03:41 8.10 6.30 11.10      Monos (Absolute) 0.00 - 0.85 K/uL 1.19 (H)  2/10/25 03:41 0.61 0.52 0.97 (H)      Eosinophils 0.00 - 6.90 % 5.00  2/10/25 03:41 3.40  0.60 3.20      Eos (Absolute) 0.00 - 0.51 K/uL 0.44  2/10/25 03:41 0.26 0.05 0.28      Basophils 0.00 - 1.80 % 0.50  2/10/25 03:41 0.50 0.20 0.20      Baso (Absolute) 0.00 - 0.12 K/uL 0.04  2/10/25 03:41 0.04 0.02 0.02      Immature Granulocytes 0.00 - 0.90 % 2.50 (H)  2/10/25 03:41 0.40 0.40 0.20      Immature Granulocytes (abs) 0.00 - 0.11 K/uL 0.22 (H)  2/10/25 03:41 0.03 0.03 0.02      Nucleated RBC 0.00 - 0.20 /100 WBC 0.00  2/10/25 03:41 0.00 0.00 0.00      NRBC (Absolute) K/uL 0.00  2/10/25 03:41 0.00 0.00 0.00         [facility] Reference Range & Units Most Recent 02/03/25 10:55 02/04/25 03:11 02/05/25 09:06 02/06/25 01:19 02/07/25 05:12 02/08/25 03:29   Sodium 135 - 145 mmol/L 132 (L)  2/10/25 03:41 137 137 137 136 136 134 (L)   Potassium 3.6 - 5.5 mmol/L 3.7  2/10/25 03:41 4.6 4.3 4.0 4.1 3.9 4.0   Chloride 96 - 112 mmol/L 98  2/10/25 03:41 105 107 107 106 105 100   Co2 20 - 33 mmol/L 20  2/10/25 03:41 18 (L) 17 (L) 19 (L) 17 (L) 17 (L) 20   Anion Gap 7.0 - 16.0  14.0  2/10/25 03:41 14.0 13.0 11.0 13.0 14.0 14.0   Glucose 65 - 99 mg/dL 94  2/10/25 03:41 89 175 (H) 110 (H) 106 (H) 91 110 (H)   Bun 8 - 22 mg/dL 41 (H)  2/10/25 03:41 42 (H) 38 (H) 27 (H) 22 19 26 (H)   Creatinine 0.50 - 1.40 mg/dL 2.56 (H)  2/10/25 03:41 1.64 (H) 1.97 (H) 1.79 (H) 1.78 (H) 1.73 (H) 2.33 (H)   GFR (CKD-EPI) >60 mL/min/1.73 m 2 19 !  2/10/25 03:41 32 ! 26 ! 29 ! 29 ! 30 ! 21 !   Calcium 8.5 - 10.5 mg/dL 9.7  2/10/25 03:41 9.7 8.3 (L) 8.8 8.8 9.0 9.3   Correct Calcium 8.5 - 10.5 mg/dL 8.7  2/4/25 03:11  8.7       AST(SGOT) 12 - 45 U/L 36  2/4/25 03:11  36       ALT(SGPT) 2 - 50 U/L 9  2/4/25 03:11  9       Alkaline Phosphatase 30 - 99 U/L 46  2/4/25 03:11  46       Total Bilirubin 0.1 - 1.5 mg/dL 0.2  2/4/25 03:11  0.2       Albumin 3.2 - 4.9 g/dL 3.5  2/4/25 03:11  3.5       Total Protein 6.0 - 8.2 g/dL 5.9 (L)  2/4/25 03:11  5.9 (L)       Globulin 1.9 - 3.5 g/dL 2.4  2/4/25 03:11  2.4       A-G Ratio g/dL 1.5  2/4/25 03:11  1.5        Phosphorus 2.5 - 4.5 mg/dL 4.0  2/9/25 06:15    2.8     Magnesium 1.5 - 2.5 mg/dL 2.1  2/10/25 03:41    1.9     Lipase 11 - 82 U/L 90 (H)  8/7/23 08:16         Amylase 20 - 103 U/L 88  8/7/23 08:16         Gamma Gt 7 - 34 U/L 11  8/7/23 08:16         Iron 40 - 170 ug/dL 11 (L)  2/5/25 09:06   11 (L)      Total Iron Binding 250 - 450 ug/dL 249 (L)  2/5/25 09:06   249 (L)      % Saturation 15 - 55 % 4 (L)  2/5/25 09:06   4 (L)      Unsat Iron Binding 110 - 370 ug/dL 238  2/5/25 09:06   238      Glycohemoglobin 4.0 - 5.6 % 6.1 (H)  7/10/24 09:57         Estim. Avg Glu mg/dL 128  7/10/24 09:57         Fasting Status  Fasting  3/19/24 08:23         Cholesterol,Tot 100 - 199 mg/dL 303 (H)  7/10/24 09:57         Triglycerides 0 - 149 mg/dL 362 (H)  7/10/24 09:57         HDL >=40 mg/dL 52  7/10/24 09:57         LDL <100 mg/dL 179 (H)  7/10/24 09:57         Apolipoprotein-B 55 - 125 mg/dL 119  8/7/23 08:12         Lipoprotein (a) <=29 mg/dL 49 (H)  8/7/23 08:12            Latest Reference Range & Units Most Recent 02/07/25 05:12 02/07/25 20:49   NT-proBNP 0 - 125 pg/mL 4531 (H)  2/9/25 06:15 30292 (H) 30270 (H)      Latest Reference Range & Units Most Recent   Total Protein, Urine 0.0 - 15.0 mg/dL 46.9 (H)  2/5/25 16:25   Micro Alb Creat Ratio 0 - 30 mg/g 488 (H)  7/10/24 09:57   Creatinine, Urine mg/dL 80.61  7/10/24 09:57   Microalbumin, Urine Random mg/dL 39.3  7/10/24 09:57   Urobilinogen, Urine <=1.0 EU/dL 0.2  2/5/25 16:25   Occult Blood Feces Negative  Positive !  2/8/25 05:00      Latest Reference Range & Units 02/03/25 10:55   PT 12.0 - 14.6 sec 18.5 (H)   INR 0.87 - 1.13  1.54 (H)   Reaction Time Initial-R 4.6 - 9.1 min 8.7   Clot Kinetics-K 0.8 - 2.1 min 1.0   Clot Angle-Angle 63.0 - 78.0 degrees 75.9   Maximum Clot Strength-MA 52.0 - 69.0 mm 65.2   Lysis 30 minutes-LY30 0.0 - 2.6 % 0.8   % Inhibition AA 0.0 - 11.0 % 41.3 (H)   % Inhibition ADP 0.0 - 17.0 % 43.1 (H)   React Time Initial Hep 4.3 - 8.3 min 6.7    TEG Functional Fibrinogen(MA) 15.0 - 32.0 mm 27.5   TEG Algorithm  Link Algorithm   (H): Data is abnormally high    Radiology:   CT pelvis 2/6/25 at [facility]:  1. There is mild soft tissue attenuation and stranding along the right external iliac and common femoral vasculature consistent with a small amount of hemorrhage.  2. Atherosclerosis.  3. Diverticulosis.  4. There is indistinctness of the urinary bladder wall which may be seen with cystitis. Please correlate clinically.    Neurointerventional radiology procedure 2/3/25 at [facility]:  Stent assisted coil embolization of a left ICA terminus aneurysm as described above. Final angiographic images demonstrate satisfactory occlusion of the aneurysm with no significant filling within the aneurysm. Patient will be followed up in the neuro   interventional clinic. Patient will remain on Brilinta and Eliquis for 90 days following which she will discontinue Brilinta therapy.     I, Lilliana Cuellar was physically present and participated during the entire procedure of the IR-EMBOLIZE-NEURO-INTRACRANIAL.      [facility] Head 12/27/24 at [facility]:        TTE 6/22/23 at [facility]:  CONCLUSIONS  Compared to the images of the prior study on 01/04/2023 - there is   improved ventricular function, previously 35%.  Mildly reduced left ventricular systolic function.  Global hypokinesis with regional variation.  Grade II diastolic dysfunction.  Normal right ventricular size and systolic function.  No significant valvular abnormalities.     MRI brain 11/13/22 at [facility]:  1.  BILATERAL mostly supratentorial areas of ischemia as described above. These appear to be primarily watershed zone infarctions.  2.  No hemorrhage  3.  No significant mass effect          Current Outpatient Medications   Medication Sig Dispense Refill    sacubitril-valsartan (ENTRESTO) 49-51 MG Tab Take 1 Tablet by mouth 2 times a day. 180 Tablet 3    ticagrelor (BRILINTA) 90 MG Tab tablet  "Take 1 Tablet by mouth 2 times a day for 100 days. Start 5 days prior to your procedure. Stop taking aspirin while you are taking this medication. 200 Tablet 0    pantoprazole (PROTONIX) 40 MG Tablet Delayed Response Take 1 Tablet by mouth 2 times a day for 60 days. 120 Tablet 0    carvedilol (COREG) 3.125 MG Tab Take 1 Tablet by mouth 2 times a day with meals for 100 days. 200 Tablet 0    ferrous sulfate 325 (65 Fe) MG tablet Take 1 Tablet by mouth every day for 100 days. 100 Tablet 0    acetaminophen (TYLENOL) 500 MG Tab Take 1,000 mg by mouth 2 times a day as needed for Moderate Pain.      nitroglycerin (NITROSTAT) 0.4 MG SL Tab Place 1 Tablet under the tongue as needed for Chest Pain. 25 Tablet 11    ELIQUIS 5 MG Tab TAKE 1 TABLET BY MOUTH TWO TIMES A DAY. 180 Tablet 2    Cyanocobalamin (VITAMIN B-12 PO) Take 1 Tablet by mouth every morning.       No current facility-administered medications for this visit.       Allergies   Allergen Reactions    Albumin Unspecified     PT is unsure     Ezetimibe Rash     Itchy bumps/rash on face    Atorvastatin Myalgia    Codeine Vomiting and Nausea     Clarified with patient - states that she has an \"upset stomach\" when she takes it    Eggs Diarrhea     Pt states that she is allergic to eggs per her mother.  Tolerated clevidipine 11/2022    Honey Hives    Hydroxychloroquine Unspecified     Eye changes    Jardiance [Empagliflozin] Unspecified     Frequent UTI    Lisinopril Cough    Losartan Unspecified     Tried in 2017  Cannot recall reaction    Penicillins Unspecified     \"does not work\" .'IMMUNE TO PENICILLIN,AMPICILLIN IS THE ONLY THING THAT WORKS'  PT declines    Statins [Hmg-Coa-R Inhibitors] Myalgia     Muscle Spasms   RXN=unknown    Wasp Venom Swelling     Swelled at injection       Physical Exam  Constitutional:       General: She is not in acute distress.     Appearance: She is not diaphoretic.   HENT:      Head: Normocephalic.   Eyes:      General: No scleral " icterus.  Pulmonary:      Effort: Pulmonary effort is normal. No respiratory distress.   Abdominal:      General: There is no distension.   Skin:     General: Skin is warm and dry.      Coloration: Skin is not pale.      Findings: No erythema or rash.      Comments: Right angio site dressing removed, no active bleeding, no residual ecchymosis, and site is well healed with no erythema, discharge, or swelling.   Neurological:      General: No focal deficit present.      Mental Status: She is alert and oriented to person, place, and time. She is not disoriented.      Cranial Nerves: No cranial nerve deficit or facial asymmetry.      Sensory: Sensation is intact.      Motor: No weakness or tremor.      Coordination: Coordination normal.      Gait: Gait is intact. Gait normal.   Psychiatric:         Mood and Affect: Mood and affect normal.         Behavior: Behavior normal.         Thought Content: Thought content normal.         Cognition and Memory: Memory normal.         Judgment: Judgment normal.     PHASES Aneurysm Risk Score: 7    The 5-year absolute risk of rupture based on score is currently estimated to be:    <=2 points: 0.4%  3 points: 0.7%  4 points: 0.9%  5 points: 1.3%  6 points: 1.7%  7 points: 2.4%  8 points: 3.2%  9 points: 4.3%  10 points: 5.3%  11 points: 7.2%  >= 12 points: 17.8%    Implant information:  Aneurysm 2025:   HydroFrame 10  REF: [id number]  LOT: [id number]  EXP: 4-30-29      Intraluminal Support  REF [id number]  LOT [id number]  EXP 12-31-26     Microplex   REF [id number]  LOT [id number]  EXP 12-31-28     Microplex 10  REF [id number]  LOT [id number]  Exp 4-30-29     Microplex 10  REF [id number]  LOT [id number]  EXP 4-30-27     SwiftPAC coil  REF [id number]  LOT [id number]  EXP 9-12-29     SwiftPACT coil  REF [id number]  LOT [id number]  Exp 10-7-29     HydroSoft 10  [id number]  LOT [id number]  EXP 8-31-29     HydroSoft 10  REF [id number]  LOT [id number]  EXP 4-30-29    Implanted cardiac device  2022:  IMPLANTED DEVICE INFORMATION:    Pulse generator is a Medtronic model [id number]   Serial number [id number]  LEAD INFORMATION:  1. Right atrial lead is a Medtronic model 5076-45, serial number [id number], P wave 2 millivolts, threshold 0.75 volts, pacing impedance 779 ohms.  2. Right ventricular lead is a Medtronic model 9961Y41, serial number [id number], R wave 5 millivolts, threshold 0.5 volts , pacing impedance 399 ohms.   3. Left ventricular lead is a Medtronic model 4798-88, serial number [id number], R wave paced millivolts, threshold 2.25 volts (1 to 2), pacing impedance 475 ohms.     Impression:   1. 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  2. History of intraoperative watershed strokes.  3. Ischemic cardiomyopathy.  4. Hyperlipidemia.  5. Hypertension.  6. Implanted cardiac device.  7. Coronary artery disease, status post CABG.  8. Claustrophobia with CT and MRI studies requiring anesthesia.    Plan:   We discussed the method of the procedure at length including the use of catheters, contrast, and xrays to facilitate diagnostic procedures and stents and embolic agents to perform endovascular intervention. The patient has recovered from the procedure with new neurologic deficits. She has follow up with her nephrologist tomorrow for her renal disease workup. We explained that the patient will need to have surveillance imaging after the procedure to monitor for recurrence of the condition, which will be managed by us, and that future treatment depends on multiple factors including lab studies, imaging, and performance status.We reviewed known risk factors for vascular health that include hypertension, hyperglycemia, hyperlipidemia, and tobacco use, and modifiable risk factors were discussed. We have planned that the next imaging study will a conventional angiogram performed in 6 months. Her dyspnea may be a side effect of Brilinta and seems to be limiting her ability to  be mobile and we discussed altering her anticipated medication course in light of this side effect. DAPT with Brilinta and Eliquis should be continued for 6 weeks and then switch to aspirin monotherapy for the remainder of the 3 months. Side effects, specifically bleeding, were again reviewed with instructions to contact us for minor bleeding and seek emergency care for hemorrhage. From a MELISSA standpoint she may resume her rehab exercises and her OTC vitamins. All questions were answered.    Follow-up: Continue uninterrupted dual therapy with Eliquis and Brilinta until 3/17, then continue Eliquis and stop Brilinta and start aspirin 81 mg and continue uninterrupted dual therapy until 3 months post procedure 5/3. Then continue medications as before aneurysm intervention. Surveillance plan to monitor for aneurysm recurrence: conventional angiogram in 6 months. Noninvasive aneurysm monitoring with MRA (with anesthesia) if cardiac implants are MR Conditional; if not, monitor with conventional angiography.    AYAKA Buckner   Neuro Interventional Service   [address] ([id number])  [place] [address]  [phone]

## 2025-02-24 ENCOUNTER — HOSPITAL ENCOUNTER (OUTPATIENT)
Dept: RADIOLOGY | Facility: MEDICAL CENTER | Age: 75
End: 2025-02-24
Attending: NURSE PRACTITIONER
Payer: MEDICARE

## 2025-02-24 DIAGNOSIS — I67.1 ANEURYSM OF LEFT INTERNAL CAROTID ARTERY: ICD-10-CM

## 2025-02-24 ASSESSMENT — ENCOUNTER SYMPTOMS
SHORTNESS OF BREATH: 1
CLAUDICATION: 0
CONSTITUTIONAL NEGATIVE: 1
DIAPHORESIS: 0
FOCAL WEAKNESS: 1
BRUISES/BLEEDS EASILY: 1
FEVER: 0
CHILLS: 0
SENSORY CHANGE: 0
WEIGHT LOSS: 0
SPEECH CHANGE: 0
WEAKNESS: 1

## 2025-02-24 ASSESSMENT — LIFESTYLE VARIABLES: SUBSTANCE_ABUSE: 0

## 2025-02-25 ENCOUNTER — PATIENT MESSAGE (OUTPATIENT)
Dept: CARDIOLOGY | Facility: MEDICAL CENTER | Age: 75
End: 2025-02-25
Payer: MEDICARE

## 2025-03-13 ENCOUNTER — PATIENT MESSAGE (OUTPATIENT)
Dept: CARDIOLOGY | Facility: MEDICAL CENTER | Age: 75
End: 2025-03-13
Payer: MEDICARE

## 2025-03-13 ENCOUNTER — TELEPHONE (OUTPATIENT)
Dept: RADIOLOGY | Facility: MEDICAL CENTER | Age: 75
End: 2025-03-13
Payer: MEDICARE

## 2025-03-13 NOTE — TELEPHONE ENCOUNTER
Message forwarded from [facility] Scheduling that pt has questions about Brilinta.   Returned call. No answer, LM with direct call back information.

## 2025-03-13 NOTE — TELEPHONE ENCOUNTER
Pt returned call, discussed her plan for Eliquis/ Brilinta/ ASA medications as planned in clinic. All questions answered.

## 2025-03-18 NOTE — PROGRESS NOTES
"Pt contacted [facility] Service with \"medication questions.\" Returned call. She has stopped Brilinta due to SE of dyspnea on 3/17 as instructed. She has started aspirin 81 mg daily and has continued Eliquis as prescribed by her cardiologist. Regimen confirmed, all questions answered.   "

## 2025-03-31 DIAGNOSIS — D68.69 HYPERCOAGULABLE STATE DUE TO PAROXYSMAL ATRIAL FIBRILLATION (HCC): ICD-10-CM

## 2025-03-31 DIAGNOSIS — I48.0 PAF (PAROXYSMAL ATRIAL FIBRILLATION) (HCC): ICD-10-CM

## 2025-03-31 DIAGNOSIS — I48.0 HYPERCOAGULABLE STATE DUE TO PAROXYSMAL ATRIAL FIBRILLATION (HCC): ICD-10-CM

## 2025-04-01 RX ORDER — APIXABAN 5 MG/1
5 TABLET, FILM COATED ORAL 2 TIMES DAILY
Qty: 180 TABLET | Refills: 2 | Status: SHIPPED | OUTPATIENT
Start: 2025-04-01

## 2025-04-14 ENCOUNTER — TELEPHONE (OUTPATIENT)
Dept: RADIOLOGY | Facility: MEDICAL CENTER | Age: 75
End: 2025-04-14
Payer: MEDICARE

## 2025-04-14 NOTE — TELEPHONE ENCOUNTER
Message forwarded from MELISSA  that pt had an episode of high BP and electrical flashes in her right eye. Had floaters crawling over her eye from right to left that has persisted for a day or more. It is in her right eye only. She is not missing any vision. She is concerned that it is related to her recent aneurysm coiling.   Explained that the aneurysm was on the left ICA (not the right) and that it is distal to the ophthalmic artery. Her symptoms are not related. Strong recommendation for the pt to contact her eye doctor for evaluation given unresolved symptoms. Pt thankful for phone call.

## 2025-04-30 ENCOUNTER — APPOINTMENT (OUTPATIENT)
Dept: RADIOLOGY | Facility: MEDICAL CENTER | Age: 75
End: 2025-04-30
Attending: EMERGENCY MEDICINE
Payer: MEDICARE

## 2025-04-30 ENCOUNTER — PHARMACY VISIT (OUTPATIENT)
Dept: PHARMACY | Facility: MEDICAL CENTER | Age: 75
End: 2025-04-30
Payer: COMMERCIAL

## 2025-04-30 ENCOUNTER — HOSPITAL ENCOUNTER (EMERGENCY)
Facility: MEDICAL CENTER | Age: 75
End: 2025-04-30
Attending: EMERGENCY MEDICINE
Payer: MEDICARE

## 2025-04-30 VITALS
TEMPERATURE: 97.8 F | HEART RATE: 75 BPM | WEIGHT: 145.72 LBS | BODY MASS INDEX: 25.81 KG/M2 | DIASTOLIC BLOOD PRESSURE: 74 MMHG | RESPIRATION RATE: 17 BRPM | SYSTOLIC BLOOD PRESSURE: 168 MMHG | OXYGEN SATURATION: 98 %

## 2025-04-30 DIAGNOSIS — M54.50 ACUTE BILATERAL LOW BACK PAIN WITHOUT SCIATICA: ICD-10-CM

## 2025-04-30 DIAGNOSIS — R10.9 BILATERAL FLANK PAIN: ICD-10-CM

## 2025-04-30 DIAGNOSIS — M54.9 MUSCULOSKELETAL BACK PAIN: ICD-10-CM

## 2025-04-30 LAB
ALBUMIN SERPL BCP-MCNC: 4.2 G/DL (ref 3.2–4.9)
ALBUMIN/GLOB SERPL: 1.3 G/DL
ALP SERPL-CCNC: 65 U/L (ref 30–99)
ALT SERPL-CCNC: 9 U/L (ref 2–50)
ANION GAP SERPL CALC-SCNC: 11 MMOL/L (ref 7–16)
APPEARANCE UR: CLEAR
AST SERPL-CCNC: 33 U/L (ref 12–45)
BACTERIA #/AREA URNS HPF: ABNORMAL /HPF
BASOPHILS # BLD AUTO: 0.6 % (ref 0–1.8)
BASOPHILS # BLD: 0.05 K/UL (ref 0–0.12)
BILIRUB SERPL-MCNC: 0.2 MG/DL (ref 0.1–1.5)
BILIRUB UR QL STRIP.AUTO: NEGATIVE
BUN SERPL-MCNC: 26 MG/DL (ref 8–22)
CALCIUM ALBUM COR SERPL-MCNC: 9.3 MG/DL (ref 8.5–10.5)
CALCIUM SERPL-MCNC: 9.5 MG/DL (ref 8.5–10.5)
CASTS URNS QL MICRO: ABNORMAL /LPF (ref 0–2)
CHLORIDE SERPL-SCNC: 105 MMOL/L (ref 96–112)
CO2 SERPL-SCNC: 22 MMOL/L (ref 20–33)
COLOR UR: YELLOW
CREAT SERPL-MCNC: 1.43 MG/DL (ref 0.5–1.4)
EOSINOPHIL # BLD AUTO: 0.13 K/UL (ref 0–0.51)
EOSINOPHIL NFR BLD: 1.6 % (ref 0–6.9)
EPITHELIAL CELLS 1715: ABNORMAL /HPF (ref 0–5)
EPITHELIAL CELLS RENAL  1715R: PRESENT /HPF
ERYTHROCYTE [DISTWIDTH] IN BLOOD BY AUTOMATED COUNT: 52 FL (ref 35.9–50)
GFR SERPLBLD CREATININE-BSD FMLA CKD-EPI: 38 ML/MIN/1.73 M 2
GLOBULIN SER CALC-MCNC: 3.3 G/DL (ref 1.9–3.5)
GLUCOSE SERPL-MCNC: 95 MG/DL (ref 65–99)
GLUCOSE UR STRIP.AUTO-MCNC: NEGATIVE MG/DL
HCT VFR BLD AUTO: 33.3 % (ref 37–47)
HGB BLD-MCNC: 10.6 G/DL (ref 12–16)
IMM GRANULOCYTES # BLD AUTO: 0.08 K/UL (ref 0–0.11)
IMM GRANULOCYTES NFR BLD AUTO: 1 % (ref 0–0.9)
KETONES UR STRIP.AUTO-MCNC: NEGATIVE MG/DL
LEUKOCYTE ESTERASE UR QL STRIP.AUTO: ABNORMAL
LIPASE SERPL-CCNC: 116 U/L (ref 11–82)
LYMPHOCYTES # BLD AUTO: 1.75 K/UL (ref 1–4.8)
LYMPHOCYTES NFR BLD: 20.9 % (ref 22–41)
MCH RBC QN AUTO: 32 PG (ref 27–33)
MCHC RBC AUTO-ENTMCNC: 31.8 G/DL (ref 32.2–35.5)
MCV RBC AUTO: 100.6 FL (ref 81.4–97.8)
MICRO URNS: ABNORMAL
MONOCYTES # BLD AUTO: 0.68 K/UL (ref 0–0.85)
MONOCYTES NFR BLD AUTO: 8.1 % (ref 0–13.4)
NEUTROPHILS # BLD AUTO: 5.68 K/UL (ref 1.82–7.42)
NEUTROPHILS NFR BLD: 67.8 % (ref 44–72)
NITRITE UR QL STRIP.AUTO: NEGATIVE
NRBC # BLD AUTO: 0 K/UL
NRBC BLD-RTO: 0 /100 WBC (ref 0–0.2)
PH UR STRIP.AUTO: 6 [PH] (ref 5–8)
PLATELET # BLD AUTO: 297 K/UL (ref 164–446)
PMV BLD AUTO: 9.8 FL (ref 9–12.9)
POTASSIUM SERPL-SCNC: 4.6 MMOL/L (ref 3.6–5.5)
PROT SERPL-MCNC: 7.5 G/DL (ref 6–8.2)
PROT UR QL STRIP: 100 MG/DL
RBC # BLD AUTO: 3.31 M/UL (ref 4.2–5.4)
RBC # URNS HPF: ABNORMAL /HPF (ref 0–2)
RBC UR QL AUTO: ABNORMAL
SODIUM SERPL-SCNC: 138 MMOL/L (ref 135–145)
SP GR UR STRIP.AUTO: 1.02
UROBILINOGEN UR STRIP.AUTO-MCNC: 0.2 EU/DL
WBC # BLD AUTO: 8.4 K/UL (ref 4.8–10.8)
WBC #/AREA URNS HPF: ABNORMAL /HPF

## 2025-04-30 PROCEDURE — 80053 COMPREHEN METABOLIC PANEL: CPT

## 2025-04-30 PROCEDURE — 83690 ASSAY OF LIPASE: CPT

## 2025-04-30 PROCEDURE — 74176 CT ABD & PELVIS W/O CONTRAST: CPT

## 2025-04-30 PROCEDURE — 700102 HCHG RX REV CODE 250 W/ 637 OVERRIDE(OP): Mod: UD | Performed by: EMERGENCY MEDICINE

## 2025-04-30 PROCEDURE — A9270 NON-COVERED ITEM OR SERVICE: HCPCS | Mod: UD | Performed by: EMERGENCY MEDICINE

## 2025-04-30 PROCEDURE — 81001 URINALYSIS AUTO W/SCOPE: CPT

## 2025-04-30 PROCEDURE — RXMED WILLOW AMBULATORY MEDICATION CHARGE: Performed by: EMERGENCY MEDICINE

## 2025-04-30 PROCEDURE — 85025 COMPLETE CBC W/AUTO DIFF WBC: CPT

## 2025-04-30 PROCEDURE — 36415 COLL VENOUS BLD VENIPUNCTURE: CPT

## 2025-04-30 PROCEDURE — 99284 EMERGENCY DEPT VISIT MOD MDM: CPT

## 2025-04-30 RX ORDER — HYDROCODONE BITARTRATE AND ACETAMINOPHEN 5; 325 MG/1; MG/1
1 TABLET ORAL EVERY 4 HOURS PRN
Qty: 20 TABLET | Refills: 0 | Status: SHIPPED | OUTPATIENT
Start: 2025-04-30 | End: 2025-05-05

## 2025-04-30 RX ORDER — ACETAMINOPHEN 500 MG
1000 TABLET ORAL ONCE
Status: COMPLETED | OUTPATIENT
Start: 2025-04-30 | End: 2025-04-30

## 2025-04-30 RX ADMIN — ACETAMINOPHEN 1000 MG: 500 TABLET ORAL at 13:54

## 2025-04-30 ASSESSMENT — FIBROSIS 4 INDEX: FIB4 SCORE: 3.03

## 2025-04-30 ASSESSMENT — PAIN DESCRIPTION - PAIN TYPE: TYPE: ACUTE PAIN

## 2025-04-30 NOTE — DISCHARGE INSTRUCTIONS
Your pain pattern is most concerning for musculoskeletal back pain.  You are being treated with a narcotic pain medication do not take this if you are driving or need to focus otherwise continue follow-up with a primary doctor and consideration for physical therapy can be made.        Return for any change or worsening symptoms

## 2025-04-30 NOTE — ED TRIAGE NOTES
Chief Complaint   Patient presents with    Flank Pain     Pt reports 1 week of R flank pain that has now spread to both sides for last 3 days.     PT has h/o renal disease, concerned for worsening disease.  Denies any dysuria, does endorse less urination than normal however.

## 2025-04-30 NOTE — ED PROVIDER NOTES
ED Provider Note    Scribed for Dr. Bran by Yesenia Rios. 4/30/2025,  1:43 PM.      CHIEF COMPLAINT  Chief Complaint   Patient presents with    Flank Pain     Pt reports 1 week of R flank pain that has now spread to both sides for last 3 days.       EXTERNAL RECORDS REVIEWED  Outpatient labs & studies Ultrasound from 4/17/2025 shows no renal stenosis and a right sided cyst.    HPI    Lauren Dave is a 75 y.o. female who presents to the Emergency Department for evaluation of right flank pain onset one week ago. She explains that she thought it was her right hip, and a cortisone injection with ALAYNA on 4/8 did not help. Patient adds that she started to have right flank pain for the past three days. Denies dysuria, fever, control loss of urine or bowel function, or blood in urine. She has a history of kidney disease, and presents with an envelop of recent kidney function results as well as ultrasound. Ultrasound from 4/17/2025 shows no renal stenosis and a right sided cyst. Patient reports abdominal swelling and bilateral flank pain. Standing up and walking exacerbates her pain. Laying flat alleviates pain, but if she rolls over in bed, she experiences pain. Patient has not yet medicated her symptoms today.      REVIEW OF SYSTEMS  See HPI for further details. All other systems are negative.     PAST MEDICAL HISTORY     Past Medical History:   Diagnosis Date    Arthritis     Breast cancer (HCC) 08/07/2013    Bronchitis 2005    Cancer (HCC)     right breast cancer     Cataract     removed    Chronic systolic congestive heart failure (HCC) EF 35% 10/12/2022    Coronary artery disease due to calcified coronary lesion - Calcifications seen on CT scan also wtih aortic ulceration     Delayed emergence from general anesthesia     Dental disorder     tooth infection    Discoid lupus     Dyslipidemia     Tried atorvastatin, pravastatin, lovastatin, and Zetia.  All causes severe myalgias.  Just taking fish oil.       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     Ischemic heart disease due to coronary artery obstruction (HCC) - Severe 3vD on cath 10/19/2022    LBBB (left bundle branch block) 12/16/2014    Noted on prechemo EKG 9/2014, MUGA WNL    Pacemaker     aicd    Pneumonia     Pseudogout     Psychiatric problem     Scarlet fever     Unspecified hemorrhagic conditions     gums       SURGICAL HISTORY  Past Surgical History:   Procedure Laterality Date    MULTIPLE CORONARY ARTERY BYPASS ENDO VEIN HARVEST  11/10/2022    Procedure: CORONARY ARTERY BYPASS GRAFTING X 3, WITH LEFT INTERNAL MAMMARY ARTERY TAKEDOWN AND ENDOSCOPIC VEIN HARVEST LEFT GREATER SAPHENOUS VEIN, AND TRANSESOPHAGEAL ECHOCARDIOGRAM;  Surgeon: Padmini Mac M.D.;  Location: SURGERY [facility];  Service: Cardiothoracic    ECHOCARDIOGRAM, TRANSESOPHAGEAL, INTRAOPERATIVE  11/10/2022    Procedure: ECHOCARDIOGRAM, TRANSESOPHAGEAL, INTRAOPERATIVE;  Surgeon: Padmini Mac M.D.;  Location: SURGERY [facility];  Service: Cardiothoracic    OTHER CARDIAC SURGERY  11/2022    angiogram    CATH REMOVAL  02/24/2014    Performed by Aggie Burns M.D. at [facility]    MASTECTOMY  08/22/2013    Performed by Aggie Burns M.D. at [facility]    NODE BIOPSY SENTINEL  08/22/2013    Performed by Aggie Burns M.D. at [facility]    CATH PLACEMENT  08/22/2013    Performed by Aggie Burns M.D. at [facility]    US-NEEDLE CORE BX-BREAST PANEL  01/01/2013    right breast    COLONOSCOPY  01/01/2003    BREAST BIOPSY      TONSILLECTOMY         FAMILY HISTORY  Family History   Problem Relation Age of Onset    Cancer Mother         possible thyroid and gynecological    Multiple Sclerosis Mother     Arthritis Father     Multiple Sclerosis Brother     Gout Brother     Heart Disease Paternal Grandmother     Diabetes Paternal Grandmother     Cancer  "Maternal Aunt         breast cancer- 60s    Diabetes Maternal Uncle     Heart Disease Maternal Grandmother     Heart Disease Maternal Grandfather         MI    Stroke Paternal Grandfather     Other Son         breast mass    Heart Disease Son         HEART MURMUR    Gout Son        SOCIAL HISTORY    reports that she quit smoking about 11 years ago. Her smoking use included cigarettes. She started smoking about 11 years ago. She has never used smokeless tobacco. She reports that she does not drink alcohol and does not use drugs.    CURRENT MEDICATIONS  Home Medications       Reviewed by Seema Marshall R.N. (Registered Nurse) on 04/30/25 at 1319  Med List Status: Partial     Medication Last Dose Status   acetaminophen (TYLENOL) 500 MG Tab  Active   aspirin 81 MG EC tablet  Active   BEMPEDOIC 180 MG Tab  Active   carvedilol (COREG) 3.125 MG Tab  Active   Cyanocobalamin (VITAMIN B-12 PO)  Active   ELIQUIS 5 MG Tab  Active   famotidine (PEPCID) 40 MG Tab  Active   ferrous sulfate 325 (65 Fe) MG tablet  Active   hydrALAZINE (APRESOLINE) 10 MG Tab  Active   hydroxychloroquine (PLAQUENIL) 200 MG Tab  Active   hydrOXYzine HCl (ATARAX) 10 MG Tab  Active   metoprolol SR (TOPROL XL) 25 MG TABLET SR 24 HR  Active   nitroglycerin (NITROSTAT) 0.4 MG SL Tab  Active   predniSONE (DELTASONE) 5 MG Tab  Active   sacubitril-valsartan (ENTRESTO) 49-51 MG Tab  Active   sulfamethoxazole-trimethoprim (BACTRIM DS) 800-160 MG tablet  Active   ticagrelor (BRILINTA) 90 MG Tab tablet  Active   traZODone (DESYREL) 50 MG Tab  Active                  Audit from Redirected Encounters    **Home medications have not yet been reviewed for this encounter**         ALLERGIES  Allergies   Allergen Reactions    Albumin Unspecified     PT is unsure     Ezetimibe Rash     Itchy bumps/rash on face    Atorvastatin Myalgia    Codeine Vomiting and Nausea     Clarified with patient - states that she has an \"upset stomach\" when she takes it    Eggs Diarrhea    " " Pt states that she is allergic to eggs per her mother.  Tolerated clevidipine 2022    Honey Hives    Hydroxychloroquine Unspecified     Eye changes    Jardiance [Empagliflozin] Unspecified     Frequent UTI    Lisinopril Cough    Losartan Unspecified     Tried in 2017  Cannot recall reaction    Penicillins Unspecified     \"does not work\" .'IMMUNE TO PENICILLIN,AMPICILLIN IS THE ONLY THING THAT WORKS'  PT declines    Statins [Hmg-Coa-R Inhibitors] Myalgia     Muscle Spasms   RXN=unknown    Wasp Venom Swelling     Swelled at injection       PHYSICAL EXAM  VITAL SIGNS: /74   Pulse 82   Temp 36.7 °C (98.1 °F) (Temporal)   Resp 18   Wt 66.1 kg (145 lb 11.6 oz)   SpO2 97%   BMI 25.81 kg/m²   Gen: Alert, no acute distress  HEENT: ATNC  Eyes: PERRL, EOMI, normal conjunctiva  Neck: trachea midline  Resp: no respiratory distress  CV: No JVD, regular rate and rhythm  Abd: Soft, nontender, slightly distended  Back: Bilateral para spinous muscle tenderness/CVA tenderness  Ext: No deformities  Neuro: Sensation intact bilateral lower extremities. 5/5 strength to hallux, toe and ankle dorsiflexion/plantarflexion. 5/5 strength to knee flexion/extension. 4/5 strength to hip flexion bilaterally.    DIAGNOSTIC STUDIES / PROCEDURES  EKG  I independently interpreted this EKG.  Results for orders placed or performed in visit on 24   EKG   Result Value Ref Range    Report       [facility]    Test Date:  2024  Pt Name:    DMITRI LI              Department: [facility]  MRN:        [id number]                      Room:  Gender:     Female                       Technician: VAMSI  :        1950                   Requested By:CHINA ORTIZ  Order #:    [id number]                    Reading MD: China Ortiz MD    Measurements  Intervals                                Axis  Rate:       75                           P:          66  TN:         163                          QRS:        " 83  QRSD:       150                          T:          247  QT:         431  QTc:        482    Interpretive Statements  Atrial-sensed ventricular-paced complexes  Compared to ECG 11/30/2022 06:34:05  No significant changes  Electronically Signed On 09- 12:09:29 PDT by Jarrod Mayberry MD       *Note: Due to a large number of results and/or encounters for the requested time period, some results have not been displayed. A complete set of results can be found in Results Review.       LABS  Labs Reviewed   CBC WITH DIFFERENTIAL - Abnormal; Notable for the following components:       Result Value    RBC 3.31 (*)     Hemoglobin 10.6 (*)     Hematocrit 33.3 (*)     .6 (*)     MCHC 31.8 (*)     RDW 52.0 (*)     Lymphocytes 20.90 (*)     Immature Granulocytes 1.00 (*)     All other components within normal limits   COMP METABOLIC PANEL - Abnormal; Notable for the following components:    Bun 26 (*)     Creatinine 1.43 (*)     All other components within normal limits   LIPASE - Abnormal; Notable for the following components:    Lipase 116 (*)     All other components within normal limits   URINALYSIS - Abnormal; Notable for the following components:    Protein 100 (*)     Leukocyte Esterase Small (*)     Occult Blood Trace (*)     All other components within normal limits   URINE MICROSCOPIC (W/UA) - Abnormal; Notable for the following components:    Epithelial Cells Renal Present (*)     All other components within normal limits   ESTIMATED GFR - Abnormal; Notable for the following components:    GFR (CKD-EPI) 38 (*)     All other components within normal limits         RADIOLOGY    Radiologist interpretation:    CT-RENAL COLIC EVALUATION(A/P W/O)    (Results Pending)       COURSE & MEDICAL DECISION MAKING  Pertinent Labs & Imaging studies were reviewed. (See chart for details)  -----------    1:43 PM Patient seen and examined at bedside for bilateral flank pain. She agrees to the plan of care.        INITIAL ASSESSMENT AND PLAN  Medical Decision Making: Patient presents with bilateral low back/CVA tenderness.  Urinalysis reassuring, no evidence of ureterolithiasis or acute urinary tract infection.  Will obtain CT renal colic and given elevated lipase to rule out pancreatitis, AAA as possible etiologies although the patient is tender along her paraspinous muscles, most likely acute bilateral low back pain without sciatica.  No evidence of cauda equina syndrome on clinical exam.  Patient signed out to oncoming provider awaiting results of CAT scan.  Creatinine is improved compared to patient's baseline.    ADDITIONAL PROBLEM LIST AND DISPOSITION    I have discussed management of the patient with the following medical professionals:  Dr Razo    Escalation of care considered, and ultimately not performed: acute inpatient care management, however at this time, the patient is most appropriate for outpatient management.         FINAL IMPRESSION  1. Acute bilateral low back pain without sciatica             IYesenia (Victor Manuel), am scribing for, and in the presence of, Vince Bran M.D..    Electronically signed by: Yesenia Rios (Victor Manuel), 4/30/2025    IVince M.D. personally performed the services described in this documentation, as scribed by Yesenia Rios in my presence, and it is both accurate and complete.    The note accurately reflects work and decisions made by me.  Vince Bran M.D.  4/30/2025  4:03 PM      This dictation was created using voice recognition software. The accuracy of the dictation is limited to the abilities of the software. I expect there may be some errors of grammar and possibly content. The nursing notes were reviewed and certain aspects of this information were incorporated into this note.

## 2025-04-30 NOTE — DISCHARGE SUMMARY
ED Observation Discharge Summary    Scribed for Travon Razo D.o. by Jesus Alfonso. 2025  4:43 PM    Patient:Lauren Dave  Patient : 1950  Patient MRN: [id number]  Patient PCP: Jenna Hernandez M.D.    Admit Date: 2025  Discharge Date and Time: 25 4:43 PM  Discharge Diagnosis:   1. Acute bilateral low back pain without sciatica    2. Bilateral flank pain    3. Musculoskeletal back pain    Discharge Attending: Travon Razo D.o.   Discharge Service: ED Observation    ED Course  Lauren is a 75 y.o. female who was evaluated at [facility] for bilateral flank pain. The patient has no urinary tract infection and no signs of stones. Her pain is consistent with musculoskeletal pain. She is on anticoagulant so will treat pain with narcotics and follow up with primary care provider to consider physical therapy.      Discharge Exam:  /74   Pulse 82   Temp 36.7 °C (98.1 °F) (Temporal)   Resp 18   Wt 66.1 kg (145 lb 11.6 oz)   SpO2 97%   BMI 25.81 kg/m² .      Constitutional: Alert in no apparent distress.  HENT: Normocephalic, Atraumatic, mucous membranes are moist.   Eyes: Conjunctiva normal, non-icteric.   Heart: No peripheral cyanosis   Lungs: Respirations are even and unlabored   Skin: Warm, Dry, normal color.   Neurologic: Alert, Grossly non-focal.   Psychiatric: Affect normal, Judgment normal, Mood normal, Appears appropriate and not intoxicated.     Labs  Results for orders placed or performed during the hospital encounter of 25   Urinalysis    Collection Time: 25  1:10 PM    Specimen: Urine, Clean Catch   Result Value Ref Range    Color Yellow     Character Clear     Specific Gravity 1.020 <1.035    Ph 6.0 5.0 - 8.0    Glucose Negative Negative mg/dL    Ketones Negative Negative mg/dL    Protein 100 (A) Negative mg/dL    Bilirubin Negative Negative    Urobilinogen, Urine 0.2 <=1.0 EU/dL    Nitrite Negative Negative    Leukocyte Esterase Small (A) Negative    Occult  Blood Trace (A) Negative    Micro Urine Req Microscopic    URINE MICROSCOPIC (W/UA)    Collection Time: 04/30/25  1:10 PM   Result Value Ref Range    WBC 3-5 /hpf    RBC 0-2 0 - 2 /hpf    Bacteria None None /hpf    Epithelial Cells 3-5 0 - 5 /hpf    Epithelial Cells Renal Present (A) Absent /hpf    Urine Casts 0-2 0 - 2 /lpf   CBC with Differential    Collection Time: 04/30/25  1:34 PM   Result Value Ref Range    WBC 8.4 4.8 - 10.8 K/uL    RBC 3.31 (L) 4.20 - 5.40 M/uL    Hemoglobin 10.6 (L) 12.0 - 16.0 g/dL    Hematocrit 33.3 (L) 37.0 - 47.0 %    .6 (H) 81.4 - 97.8 fL    MCH 32.0 27.0 - 33.0 pg    MCHC 31.8 (L) 32.2 - 35.5 g/dL    RDW 52.0 (H) 35.9 - 50.0 fL    Platelet Count 297 164 - 446 K/uL    MPV 9.8 9.0 - 12.9 fL    Neutrophils-Polys 67.80 44.00 - 72.00 %    Lymphocytes 20.90 (L) 22.00 - 41.00 %    Monocytes 8.10 0.00 - 13.40 %    Eosinophils 1.60 0.00 - 6.90 %    Basophils 0.60 0.00 - 1.80 %    Immature Granulocytes 1.00 (H) 0.00 - 0.90 %    Nucleated RBC 0.00 0.00 - 0.20 /100 WBC    Neutrophils (Absolute) 5.68 1.82 - 7.42 K/uL    Lymphs (Absolute) 1.75 1.00 - 4.80 K/uL    Monos (Absolute) 0.68 0.00 - 0.85 K/uL    Eos (Absolute) 0.13 0.00 - 0.51 K/uL    Baso (Absolute) 0.05 0.00 - 0.12 K/uL    Immature Granulocytes (abs) 0.08 0.00 - 0.11 K/uL    NRBC (Absolute) 0.00 K/uL   Complete Metabolic Panel    Collection Time: 04/30/25  1:34 PM   Result Value Ref Range    Sodium 138 135 - 145 mmol/L    Potassium 4.6 3.6 - 5.5 mmol/L    Chloride 105 96 - 112 mmol/L    Co2 22 20 - 33 mmol/L    Anion Gap 11.0 7.0 - 16.0    Glucose 95 65 - 99 mg/dL    Bun 26 (H) 8 - 22 mg/dL    Creatinine 1.43 (H) 0.50 - 1.40 mg/dL    Calcium 9.5 8.5 - 10.5 mg/dL    Correct Calcium 9.3 8.5 - 10.5 mg/dL    AST(SGOT) 33 12 - 45 U/L    ALT(SGPT) 9 2 - 50 U/L    Alkaline Phosphatase 65 30 - 99 U/L    Total Bilirubin 0.2 0.1 - 1.5 mg/dL    Albumin 4.2 3.2 - 4.9 g/dL    Total Protein 7.5 6.0 - 8.2 g/dL    Globulin 3.3 1.9 - 3.5 g/dL     A-G Ratio 1.3 g/dL   Lipase    Collection Time: 04/30/25  1:34 PM   Result Value Ref Range    Lipase 116 (H) 11 - 82 U/L   ESTIMATED GFR    Collection Time: 04/30/25  1:34 PM   Result Value Ref Range    GFR (CKD-EPI) 38 (A) >60 mL/min/1.73 m 2     *Note: Due to a large number of results and/or encounters for the requested time period, some results have not been displayed. A complete set of results can be found in Results Review.     Radiology  CT-RENAL COLIC EVALUATION(A/P W/O)   Final Result      1. No urinary tract calculus identified. No renal collecting system dilatation.      2. Colonic diverticulosis.      3. Bilateral renal cysts. Bosniak 1..        Medications:   New Prescriptions    HYDROCODONE-ACETAMINOPHEN (NORCO) 5-325 MG TAB PER TABLET    Take 1 Tablet by mouth every four hours as needed (Pain) for up to 5 days.     My final assessment includes ***    Upon Reevaluation, the patient's condition has: {Improved/Not Improved:09606}    Patient discharged from ED Observation status at *** (Time) *** (Date).     Total time spent on this ED Observation discharge encounter is {Total time:20679}    {ERP Attestation (ERP ONLY):200109}

## 2025-05-01 NOTE — ED NOTES
Pt educated on discharge instructions. All questions & concerns addressed.    No IV to d/c     Pt ambulates to exit with steady gait and all belongings.    Pt provided prescription. Pt called MTM and was walked to exit to verify ride arrival.

## 2025-06-02 DIAGNOSIS — I10 ESSENTIAL HYPERTENSION: Primary | Chronic | ICD-10-CM

## 2025-06-02 DIAGNOSIS — I50.43 CHF (CONGESTIVE HEART FAILURE), NYHA CLASS III, ACUTE ON CHRONIC, COMBINED (HCC): ICD-10-CM

## 2025-06-03 RX ORDER — CARVEDILOL 25 MG/1
25 TABLET ORAL 2 TIMES DAILY WITH MEALS
Qty: 180 TABLET | Refills: 0 | Status: SHIPPED | OUTPATIENT
Start: 2025-06-03

## 2025-06-03 NOTE — TELEPHONE ENCOUNTER
Per chart review carvedilol 25mg discontinued on 2/10/2025 during hosptial admission, carvedilol 3.125mg started on 2/10/25 with end date of 5/21/25    CW please review and refill if appropriate, no longer active on medication list last office visit 9/17/24, no follow up visits scheduled

## 2025-06-05 NOTE — TELEPHONE ENCOUNTER
SMILEY  Caller: [facility]     Topic/issue: Cesar from the pharmacy is calling to confirm patient's dosage on carvedilol.    Callback Number: [phone]    Thank you,  Melody TIAN

## 2025-06-10 ENCOUNTER — TELEPHONE (OUTPATIENT)
Facility: MEDICAL CENTER | Age: 75
End: 2025-06-10
Payer: MEDICARE

## 2025-06-10 NOTE — TELEPHONE ENCOUNTER
Patient called stating she needed imaging and labs done. MA informed her that the imaging order is in, but the labs don't seem to be in the chart. Can you please reorder labs if they are needed?

## 2025-06-17 NOTE — TELEPHONE ENCOUNTER
Phone Number Called: [phone]    Call outcome: Spoke to patient regarding message below.    Message: Called to discuss. Pt is taking the higher dose of carvedilol 25mg BID.BP taken on the phone 132/82. She will keep a log and send after the weekend.

## 2025-07-22 ENCOUNTER — TELEPHONE (OUTPATIENT)
Facility: MEDICAL CENTER | Age: 75
End: 2025-07-22
Payer: MEDICARE

## 2025-07-22 NOTE — TELEPHONE ENCOUNTER
"Lauren called stating she needed her labs faxed over to [facility] MA advised her that she has not seen  at her new office and no labs have been ordered. MA informed patient we are OON with her medicare advantage plan. Patient then started arguing with MA stating \"you need to do your homework I am not out of network\" Patient continued to yell at MA and stated she will not be coming back.  "

## 2025-07-28 ENCOUNTER — TELEPHONE (OUTPATIENT)
Dept: CARDIOLOGY | Facility: MEDICAL CENTER | Age: 75
End: 2025-07-28
Payer: MEDICARE

## 2025-07-28 DIAGNOSIS — Z95.1 S/P CABG X 3: ICD-10-CM

## 2025-07-28 DIAGNOSIS — I50.20 HFREF (HEART FAILURE WITH REDUCED EJECTION FRACTION) (HCC): ICD-10-CM

## 2025-07-28 DIAGNOSIS — I50.43 CHF (CONGESTIVE HEART FAILURE), NYHA CLASS III, ACUTE ON CHRONIC, COMBINED (HCC): Primary | ICD-10-CM

## 2025-07-31 DIAGNOSIS — Z95.1 S/P CABG X 3: Primary | ICD-10-CM

## 2025-08-07 ENCOUNTER — TELEPHONE (OUTPATIENT)
Dept: CARDIOLOGY | Facility: MEDICAL CENTER | Age: 75
End: 2025-08-07
Payer: MEDICARE

## 2025-08-18 ENCOUNTER — PATIENT MESSAGE (OUTPATIENT)
Dept: CARDIOLOGY | Facility: MEDICAL CENTER | Age: 75
End: 2025-08-18
Payer: MEDICARE

## 2025-08-18 DIAGNOSIS — I24.0 ISCHEMIC HEART DISEASE DUE TO CORONARY ARTERY OBSTRUCTION (HCC): ICD-10-CM

## 2025-08-18 DIAGNOSIS — I25.9 ISCHEMIC HEART DISEASE DUE TO CORONARY ARTERY OBSTRUCTION (HCC): ICD-10-CM

## 2025-08-29 ENCOUNTER — TELEPHONE (OUTPATIENT)
Dept: CARDIOLOGY | Facility: MEDICAL CENTER | Age: 75
End: 2025-08-29
Payer: MEDICARE

## (undated) DEVICE — DRESSING SURGICAL NUKNIT 6X9 (10EA/BX)

## (undated) DEVICE — COVER LIGHT HANDLE ALC PLUS DISP (18EA/BX)

## (undated) DEVICE — SPONGE DRAIN 4 X 4IN 6-PLY - (2/PK25PK/BX12BX/CS)

## (undated) DEVICE — MICRODRIP PRIMARY VENTED 60 (48EA/CA) THIS WAS PART #2C8428 WHICH WAS DISCONTINUED

## (undated) DEVICE — KIT INTROPERCUTANEOUS SHEATH - 8.5 FR W/MAX BARRIER AND BIOPATCH  (5/CA)

## (undated) DEVICE — SOD. CHL. INJ. 0.9% 1000 ML - (14EA/CA 60CA/PF)

## (undated) DEVICE — CANISTER SUCTION 3000ML MECHANICAL FILTER AUTO SHUTOFF MEDI-VAC NONSTERILE LF DISP  (40EA/CA)

## (undated) DEVICE — SUTURE 6-0 PROLENE RB-2 D/A 30 (36PK/BX)"

## (undated) DEVICE — TOWELS CLOTH SURGICAL - (4/PK 20PK/CA)

## (undated) DEVICE — CATHETER IV 14 GA X 2 ---SURG.& SDS ONLY---(200EA/CA)

## (undated) DEVICE — SUCTION INSTRUMENT YANKAUER BULBOUS TIP W/O VENT (50EA/CA)

## (undated) DEVICE — KIT ENDOHARVEST SYSTEM 8 - MUST ORDER 5 AT A TIME

## (undated) DEVICE — WAX BONE ALKYLENE OXIDE HEMOSTASIS OSTENE 3.5GM (10EA/CA)

## (undated) DEVICE — SUTURE 4-0 PROLENE V-7 D/A (36PK/BX)

## (undated) DEVICE — TRAY CATHETER FOLEY URINE METER W/STATLOCK 350ML (10EA/CA)

## (undated) DEVICE — TOWEL STOP TIMEOUT SAFETY FLAG (40EA/CA)

## (undated) DEVICE — WIRE STEEL 5-0 B&S 20 OHS - 5/PK 12PK/BX ITEM. D5329 OR D6625 CAN BE USED AS A SUB

## (undated) DEVICE — TRAY MULTI-LUMEN 7FR PRESSURE W/MAX BARRIER AND BIOPATCH - (5/CA)

## (undated) DEVICE — NEEDLE SAFETY 18 GA X 1 1/2 IN (100EA/BX)

## (undated) DEVICE — SOLUTION NORMOSOL-4 INJ 1000ML

## (undated) DEVICE — SYRINGE 30 ML LL (56/BX)

## (undated) DEVICE — TRANSDUCER BIFURCATED MONITORING KIT (10EA/CA)

## (undated) DEVICE — BLADE STERNUM SAW SURGICAL 32.0 X 6.4 MM STERILE (1/EA)

## (undated) DEVICE — SET EXTENSION WITH 2 PORTS (48EA/CA) ***PART #2C8610 IS A SUBSTITUTE*****

## (undated) DEVICE — CATHETER THERMALDILUTION SWAN - (5EA/CA)

## (undated) DEVICE — STOPCOCK MALE 4-WAY - (50/CA)

## (undated) DEVICE — SYSTEM CHEST DRAIN ADULT/PEDS W/AUTO TRANSFUSION CAPABILITY SAHARA (6EA/CA)

## (undated) DEVICE — GLOVE BIOGEL PI INDICATOR SZ 6.0 SURGICAL PF LF -(200PR/CA)

## (undated) DEVICE — INSERT STEALTH #5 - (10/BX)

## (undated) DEVICE — GLOVE BIOGEL SZ 6 PF LATEX - (50EA/BX 4BX/CA)

## (undated) DEVICE — CONNECTOR HUBLESS DRAINAGE - ONE WAY (20/BX)

## (undated) DEVICE — DRAPE MAYO STAND - (30/CA)

## (undated) DEVICE — CORDS BIPOLAR COAGULATION - 12FT STERILE DISP. (10EA/BX)

## (undated) DEVICE — KIT HEMOSTATIC GELATIN MATRIX FLOSEAL NT 5ML (6EA/CA)

## (undated) DEVICE — LEAD PACING TEMP MYO - (12/BX)

## (undated) DEVICE — SET LEADWIRE 5 LEAD BEDSIDE DISPOSABLE ECG (1SET OF 5/EA)

## (undated) DEVICE — SUTURE 0 ETHIBOND MO6 C/R - (12/BX) 8-18 INCH ETHICON

## (undated) DEVICE — APPLICATOR SURGIFLO - (6EA/CA)

## (undated) DEVICE — PACK VEIN - (19/CA)

## (undated) DEVICE — KIT TOURNIQUET DLP (40EA/PK)

## (undated) DEVICE — DRAPE SLUSH WARMER DISC (24EA/CA)

## (undated) DEVICE — PACK CV DRAPING/BASIN 2PART - (1/CA)

## (undated) DEVICE — CORETEMP DRAPE FORM-FITTED EASY DROPANDGO DRAPE FOR USE ON THE CORETEMP FLUID MANAGEMENT 56IN X 56IN

## (undated) DEVICE — CLIP SM INTNL HRZN TI ESCP LGT - (24EA/PK 25PK/BX)

## (undated) DEVICE — GOWN SURGEONS LARGE - (32/CA)

## (undated) DEVICE — SENSOR OXIMETER ADULT SPO2 RD SET (20EA/BX)

## (undated) DEVICE — SUTURE 5 SURGICAL STEEL V-40 - (12/BX) CCS CURRENT

## (undated) DEVICE — TUBING INSUFFLATION - (10/BX)

## (undated) DEVICE — COVER LIGHT HANDLE FLEXIBLE - SOFT (2EA/PK 80PK/CA)

## (undated) DEVICE — GOWN WARMING STANDARD FLEX - (30/CA)

## (undated) DEVICE — SUTURE 3-0 PROLENE SH 36 (36PK/BX)"

## (undated) DEVICE — SYSTEM PEEL & PLACE 13CM INCISIONS

## (undated) DEVICE — SUTURE 4-0 30CM STRATAFIX SPIRAL PS-2 (12EA/BX)

## (undated) DEVICE — SET BIFURCATED BLOOD - (48EA/CS)

## (undated) DEVICE — SUTURE OHS

## (undated) DEVICE — GLOVE BIOGEL INDICATOR SZ 6.5 SURGICAL PF LTX - (50PR/BX 4BX/CA)

## (undated) DEVICE — KIT RADIAL ARTERY 20GA W/MAX BARRIER AND BIOPATCH  (5EA/CA) #10740 IS FOR THE SET RADIAL ARTERIAL

## (undated) DEVICE — SUTURE 4-0 PROLENE RB-1 D/A 36 (36PK/BX)"

## (undated) DEVICE — SUTURE 7-0 PROLENE BV-1 D/A 24 (36PK/BX)"

## (undated) DEVICE — BLADE SURGICAL #15 - (50/BX 3BX/CA)

## (undated) DEVICE — GLOVE BIOGEL SZ 7 SURGICAL PF LTX - (50PR/BX 4BX/CA)

## (undated) DEVICE — SPRING BULLDOG 1/2 FORCE BLUE - (10/BX)

## (undated) DEVICE — TUBE E-T HI-LO CUFF 7.5MM (10EA/PK)

## (undated) DEVICE — SPONGE XRAY 8X4 STERL. 12PL - (10EA/TY 80TY/CA)

## (undated) DEVICE — PUNCH DISP VASCULAR 4.4 - 6/BX

## (undated) DEVICE — ELECTRODE DUAL RETURN W/ CORD - (50/PK)

## (undated) DEVICE — SYS DLV COST CLS RM TEMP - INJECTATE (CO-SET II) (10EA/CA)

## (undated) DEVICE — SODIUM CHL. INJ. 0.9% 500ML (24EA/CA 50CA/PF)

## (undated) DEVICE — D-5-W INJ. 500 ML - (24EA/CA)

## (undated) DEVICE — CONTAINER SPECIMEN BAG OR - STERILE 4 OZ W/LID (100EA/CA)

## (undated) DEVICE — BLADE BEAVER 6900 MINI SHARP ALL AROUND (20/CA)

## (undated) DEVICE — BAG RESUSCITATION DISPOSABLE - WITH MASK (10 EA/CA)

## (undated) DEVICE — LACTATED RINGERS INJ 1000 ML - (14EA/CA 60CA/PF)

## (undated) DEVICE — FIBRILLAR SURGICEL 4X4 - 10/CA

## (undated) DEVICE — ADHESIVE DERMABOND HVD MINI (12EA/BX)

## (undated) DEVICE — TUBING CLEARLINK DUO-VENT - C-FLO (48EA/CA)

## (undated) DEVICE — SYRINGE NON SAFETY 3 CC 21 GA X 1 1/2 IN (100/BX 8BX/CA)

## (undated) DEVICE — TUBE CHEST 32FR SOFT STRAIGHT (10EA/BX)

## (undated) DEVICE — RETRACTOR OFF PUMP OCTO ONLY - 10/BX

## (undated) DEVICE — DECANTER FLD BLS - (50/CA)

## (undated) DEVICE — SODIUM CHL IRRIGATION 0.9% 1000ML (12EA/CA)

## (undated) DEVICE — SET FLUID WARMING STANDARD FLOW - (10/CA)

## (undated) DEVICE — PTFE PLED STER - (250/CA)

## (undated) DEVICE — BLOWER/MISTER (5EA/PK)